# Patient Record
Sex: MALE | Race: WHITE | Employment: OTHER | URBAN - METROPOLITAN AREA
[De-identification: names, ages, dates, MRNs, and addresses within clinical notes are randomized per-mention and may not be internally consistent; named-entity substitution may affect disease eponyms.]

---

## 2016-10-18 LAB — CREATININE, EXTERNAL: 1.36

## 2017-01-03 ENCOUNTER — HOSPITAL ENCOUNTER (OUTPATIENT)
Dept: PHYSICAL THERAPY | Age: 67
Discharge: HOME | End: 2017-01-03
Payer: MEDICARE

## 2017-01-03 PROCEDURE — 97140 MANUAL THERAPY 1/> REGIONS: CPT

## 2017-01-04 NOTE — PROGRESS NOTES
[facility]  Physical Therapy Lymphedema Clinic  [address] [facility], [address] [place] [address], [facility] 22.    LYmphedema Therapy  Visit: 4    [x]                  Daily note               []                 30 day/10th visit progress note    NAME: Gertrudis Santana First  DATE: 1/3/2017    GOALS  Short term goals  Time frame: to meet by 1/20/2017  1. Patient will demonstrate knowledge of signs/symptoms of infections/cellulitis and be independent in skin care to prevent cellulitis. Continued education on prevention of infections/cellulitis and also basic skin care routines. Patient continues to have a large scab from his removal of skin cancer on his left anterior lower leg color around wound is improving. Patient declines to follow instructions for daily skin care to include lotion application. The scab continues to be black in color, but the scab thickness is decreasing. MD had recommended that he use Vaseline on the area. 2. Patient will demonstrate independence in lymphedema home program of therapeutic exercises to improve circulation and decongest limb to improve ADLs. Continued education on Hope and active range of motion exercises. Patient is performing the routines at home following the handouts. Goal Met 1/3/17. 3. Patient will tolerate multi-layer bandages (MLB) and show measureable decrease in limb volume to allow ordering of home compression system (daytime, nighttime garments and pump as needed). Patient stated that he would not feel he would do a good job with MLB and wanted to just work with the knee high stockings at this time. Patient may benefit from a pump trial in the future of a vaso-pneumatic pump, but did not show interest in this today. Patient has received knee high compression Sigvaris [address] from [employer].  The fit was good, but patient requested to order two more pairs of another brand that was able to be placed in the dryer. Order was send to [employer] for 2 pair of Jobst for Men Casual 20-30 mmHg in [place] size Large. Patient given Comperm F last visit  to wear on the L LE below the knee until his compression items arrived and he stated that it really helped his swelling. Patient now with all products ordered. Will return next week to have products assessed and prepare for discharge to restorative. Goal Met 1/3/17.     Long term goals  Time frame: 3/15/2017  1. Pt will show improvement in Lymphedema Life Impact Scale by decreasing impairment percentage to under 20% and thus allow improvement in patient's quality of life. Will have patient fill out the LLIS next visit. 2. Patient will be independent with don/doff of compression system and use in order to prevent reaccumulation of fluid at discharge. Patient was able to demonstrate don/doff of the compression garments today without difficulty. Goal Met 1/3/17. 3. Pt will be independent in self-MLD and show stable limb volumes showing decongestion and pt. ready for transition to independent restorative phase of lymphedema therapy. Will measure next visit after he has been wearing his compression garments. SUBJECTIVE REPORT: Patient arrived today with his first set of compression garments. He has not tried them on to date. Patient reports that the Comperm F did well for him and he feels that his legs are doing better. Patient reports doing his exercises and some of the Self MLD, but states he will not put lotion on as recommended. Patient stated that he would like to order more stockings. Patient feels that his condition is much improved. Pain: 0/10  Gait: Independent  ADLs: Modified Independent to Independent  Treatment Response:Patient has been using the Comperm F since last treatment and just received his first set of compression garments. Patient is eager to order more garments and this was done today through [employer].  Patient was educated in other options like night time garments and vasopneumatic pump and he was not interested in these compression options at this time. When patient returns for follow up next week if he has a good fit with his newest compression garments we will most likely discharge him as he does not appear to be fully compliant with all aspects of CDT at this time. Function: Patient works full time up to 50-60 hours a week. TREATMENT AND OBJECTIVE DATA SUMMARY:   Patient/Family Education:      Educated in skin care: Demonstrated skin care principles. See skin care section for details. Educated in exercise: Instructed in shelley ball and active range of motion exercises to date. Instructed in self MLD:   Written sequence given and reviewed with patient as well as demonstration and instruction during MLD portion of the session. Instructed in don/doff of compression system:   Multi layer bandage (MLB)/Compression donning principles and wear precautions were taught. Patient did not want to pursue bandaging. Patient received his Sigvaris [address] for Men (20-30 mmHg) size LS/Black from [employer]. The fit was good and he wore them out of the clinic today. He was given wear and care instructions. Patient did use the Comperm F that was given to him last visit and feels that his L LE is much better. Patient reports that he would like to order additional compression and requested to order two pair that could be washed and dried. Order was placed through [employer] for 2 pair of Jobst for Men Casual 20 mmHg-30 mmHg Khaki size Large that will be delivered to his home. Patient to return in a week to assess his volumes and the fit of his products and will most likely be discharged to the restorative phase of care at that time.       Therapeutic Activity 0 minutes   Treatment time: 0  Functional Mobility: Modified Independent to Independent   Fall risk: Low Therapeutic Exercise/Procedure 0 minutes   Treatment time: 0  Yolanda ball exercise program: Deferred today. Patient stated he has not received his Estil Gene to date. Stick exercise program: N/A   Free exercises/ROM: Able to demonstrate some knowledge of the lower extremity active range of motion routine. Patient reports that he is trying to be more active at work performing exercises through out the day and trying to walk more. Home program: Patient to perform daily to BID skin care, deep abdominal breathing, exercise routine, Self MLD and patient to wear his compression garments during the day and remove them at night. Patient stated that he did not feel that he would be able to self bandage and when shown the night time systems he also stated that he was not interested in them either at this time. Options of vaso-pneumatic pumps also given and he declined this as well at this time. Rationale: Exercise will increase the lymph angiomotoricity and tissue pressure of the skin and thus decrease swelling. Modalities 0 minutes   Treatment time: 0  Vasopneumatic pump: Will benefit from a pump trial in the future, but at this time patient is not interested in trial.       Manual Lymphatic Drainage (MLD) 35  minutes   Treatment time:8:05am-8:40am  Area to decongest: B LE (L > R)   Sequence used and effectiveness: Secondary Sequence B LE packet was given to patient for home use in a prior visit. He reports performing some of the packet at home. Omission of the lateral neck due to cardiac history. Skin/wound care/debridement: Patient has a scab on his L lower anterior leg that is improving. The redness has improved around the scab and patient stated that the MD wants him to apply Vaseline to the area daily. The thickness of the scab has improved from evaluation. Upper/Lower extremity compression: Multi layer bandage (MLB)/Compression donning principles and wear precautions were taught.  Patient did not want to pursue bandaging. Patient received his Sigvaris [address] for Men (20-30 mmHg) size LS/Black from [employer]. The fit was good and he wore them out of the clinic today. He was given wear and care instructions. Patient did use the Comperm F that was given to him last visit and feels that his L LE is much better. Patient reports that he would like to order additional compression and requested to order two pair that could be washed and dried. Order was placed through [employer] for 2 pair of Jobst for Men Casual 20 mmHg-30 mmHg Khaki size Large that will be delivered to his home. Patient to return in a week to assess his volumes and the fit of his products and will most likely be discharged to the restorative phase of care at that time. Kinesiotaping: Deferred    Girth/Volume measurement: Deferred today. Patient has just received his first set of compression. TOTAL TREATMENT 35 mins         ASSESSMENT:   Treatment effectiveness and tolerance: Patient has arrived with his first set of compression garments. Patient overall is improving, but does state that he is not willing to perform many aspects of his home program. Patient has ordered more compression and if the fit is good he may be potentially discharged to restorative soon. Patient to return next week for follow up and all of his assess goals to see if he is ready to discharge to restorative phase of care. Progress toward goals: See goal section of the note. MET STG 2+3 and LTG 3     PLAN OF CARE:   Changes to the plan of care: Patient to start wearing compression daily. Frequency: Once a week   Other: More compression garments were ordered from [employer].         Toni Rodriguez, PTA, CLT

## 2017-01-10 ENCOUNTER — HOSPITAL ENCOUNTER (OUTPATIENT)
Dept: PHYSICAL THERAPY | Age: 67
Discharge: HOME | End: 2017-01-10
Payer: MEDICARE

## 2017-01-10 VITALS — BODY MASS INDEX: 29.13 KG/M2 | HEIGHT: 68 IN | WEIGHT: 192.2 LBS

## 2017-01-10 PROCEDURE — G8992 OTHER PT/OT  D/C STATUS: HCPCS

## 2017-01-10 PROCEDURE — 97140 MANUAL THERAPY 1/> REGIONS: CPT

## 2017-01-10 PROCEDURE — G8991 OTHER PT/OT GOAL STATUS: HCPCS

## 2017-01-10 NOTE — PROGRESS NOTES
[facility]  Physical Therapy Lymphedema Clinic  [address] [facility], [address], [facility] 22.    LYmphedema Therapy  Visit: 5    []                  Daily note               [x]                 Discharge note with updated G-Codes     NAME: Gertrudis Maki  DATE: 1/10/2017    GOALS  Short term goals met:  2. Patient will demonstrate independence in lymphedema home program of therapeutic exercises to improve circulation and decongest limb to improve ADLs. Continued education on Hope and active range of motion exercises. Patient is performing the routines at home following the handouts. Goal Met 1/3/17. 3. Patient will tolerate multi-layer bandages (MLB) and show measureable decrease in limb volume to allow ordering of home compression system (daytime, nighttime garments and pump as needed). Patient stated that he would not feel he would do a good job with MLB and wanted to just work with the knee high stockings at this time. Patient may benefit from a pump trial in the future of a vaso-pneumatic pump, but did not show interest in this today. Patient has received knee high compression Sigvaris [address] from [employer]. The fit was good, but patient requested to order two more pairs of another brand that was able to be placed in the dryer. Order was send to [employer] for 2 pair of Jobst for Men Casual 20-30 mmHg in [place] size Large. Patient given Comperm F last visit  to wear on the L LE below the knee until his compression items arrived and he stated that it really helped his swelling. Patient now with all products ordered. Will return next week to have products assessed and prepare for discharge to restorative. Goal Met 1/3/17. Short term goals to meet by 1/20/2017:  1. Patient will demonstrate knowledge of signs/symptoms of infections/cellulitis and be independent in skin care to prevent cellulitis. Patient has been educated on skin care and signs and symptoms of cellulitis. Patient has not been fully compliant with this portion of CDT. Patient did arrive today with his skin in improved condition and no longer with scab over wound. Wound bed pink and healing. Patient reports that he has medication provided by MD to place on area. See skin care for details. Goal Met 1/10/17.         Long term goals met:  2. Patient will be independent with don/doff of compression system and use in order to prevent reaccumulation of fluid at discharge. Patient was able to demonstrate don/doff of the compression garments today without difficulty. Goal Met 1/3/17. Long term goals to meet by 3/15/2017:  1. Pt will show improvement in Lymphedema Life Impact Scale by decreasing impairment percentage to under 20% and thus allow improvement in patient's quality of life. Patient scored a 9 on the LLIS with a percent impairment of 13%. Goal Met 1/10/17     3. Pt will be independent in self-MLD and show stable limb volumes showing decongestion and pt. ready for transition to independent restorative phase of lymphedema therapy. Full volumes were taken today to reveal that patient had a decrease of 917 ml in the L LE and 723 ml in the R LE since evaluation. His percent difference has also decreased from 3.94% to 2.03%. Patient also has been performing his Self MLD packet at home. Goal Met 1/10/17. SUBJECTIVE REPORT: Patient arrived today stating that he has been doing much better and just received his Jobst compression stockings that were ordered last visit. Patient is ready for discharge to restorative today. Pain: 0/10  Gait: Independent  ADLs: Modified Independent to Independent  Treatment Response: Patient has been using his Sigvaris compression socks and now has new Jobst stockings that should be better for daily use as they can be dried in the dryer.  Patient feels that he is much improved from evaluation and feels that he can manage with his home program daily during the restorative phase. Patient continues to report that he will not be placing lotion on daily as recommended, but has been compliant with the rest of his home program.    Function: Patient works full time up to 50-60 hours a week. Lymphedema Life Impact Scale: Patient scored a 9 on the LLIS with a percent impairment of 13%. See scanned document. Weight: 188.8 lbs with out boots and 192.2 lbs with boots on. Down from evaluation weight of 197.4 lbs. TREATMENT AND OBJECTIVE DATA SUMMARY:   Patient/Family Education:      Educated in skin care: Demonstrated skin care principles. See skin care section for details. Educated in exercise: Instructed in yolanda ball and active range of motion exercises to date. Instructed in self MLD:   Written sequence given and reviewed with patient as well as demonstration and instruction during MLD portion of the session. Instructed in don/doff of compression system:   Multi layer bandage (MLB)/Compression donning principles and wear precautions were taught. Patient did not want to pursue bandaging. Patient has  Sigvaris [address] for Men (20-30 mmHg) size LS/Black from [employer]. He was given wear and care instructions. Patient ordered 2 pair of Jobst for Men Casual 20 mmHg-30 mmHg Khaki size Large and he brought them in today to be fitted. The fit is good and patient is comfortable in the products. Patient aware if he needs more compression items that he can order them through [employer]. Therapeutic Activity 0 minutes   Treatment time: 0  Functional Mobility: Modified Independent to Independent   Fall risk: Low     Therapeutic Exercise/Procedure 0 minutes   Treatment time: 0  Yolanda ball exercise program: Deferred today. Stick exercise program: N/A   Free exercises/ROM: Able to demonstrate some knowledge of the lower extremity active range of motion routine. Patient reports that he is trying to be more active at work performing exercises through out the day and trying to walk more. Home program: Patient to perform daily to BID skin care, deep abdominal breathing, exercise routine, Self MLD and patient to wear his compression garments during the day and remove them at night. Patient also to elevate legs during the day and evening as able. Patient stated that he did not feel that he would be able to self bandage and when shown the night time systems he also stated that he was not interested in them either at this time. Options of vaso-pneumatic pumps also given and he declined this as well at this time. Rationale: Exercise will increase the lymph angiomotoricity and tissue pressure of the skin and thus decrease swelling. Modalities 0 minutes   Treatment time: 0  Vasopneumatic pump: Will benefit from a pump trial in the future, but at this time patient is not interested in trial.       Manual Lymphatic Drainage (MLD) 50 minutes   Treatment time:8:10am-9:00am  Area to decongest: B LE (L > R)   Sequence used and effectiveness: Secondary Sequence B LE packet was given to patient for home use in a prior visit. He reports performing some of the packet at home. Omission of the lateral neck due to cardiac history. Skin/wound care/debridement: Patient now without scab and the wound is pink. The redness has gone from around the wound and patient stated that the MD wants him to apply Vaseline to the area daily. B LEs were cleansed with Dove soap using MLD principles. Patient also had neosporin placed on the L LE wound for protection. The wound is closed, but continues to be pink.      L LE wound today     L LE wound on 12/28/16 for comparison     L LE wound on evaluation on 12/19/16:      B LE's today     B LE's on 12/23/16 for comparison        B LE's 1/10/17    B LE's on evaluation 12/19/16       Upper/Lower extremity compression: Multi layer bandage (MLB)/Compression donning principles and wear precautions were taught. Patient did not want to pursue bandaging. Patient has  Sigvaris [address] for Men (20-30 mmHg) size LS/Black from [employer]. He was given wear and care instructions. Patient ordered 2 pair of Jobst for Men Casual 20 mmHg-30 mmHg Khaki size Large and he brought them in today to be fitted. The fit is good and patient is comfortable in the products. Patient aware if he needs more compression items that he can order them through [employer]. Kinesiotaping: Deferred    Girth/Volume measurement: Full volumes were taken today to reveal that patient had a decrease of 917 ml in the L LE and 723 ml in the R LE since evaluation. His percent difference has also decreased from 3.94% to 2.03%. Patient also has been performing his Self MLD packet at home. See scanned documents. TOTAL TREATMENT 50 mins         ASSESSMENT:   Treatment effectiveness and tolerance: Patient has met all of his goals to date and is ready to discharge to the restorative phase. Patient has lost volumes in both legs and is now feeling that he is able to manage his swelling with his home program and his compression items. Patient is aware that he needs to follow his daily home program and return in 6 months for follow up. Progress toward goals: See goal section for details. All STG/LTG goals were met. PLAN OF CARE:   Changes to the plan of care: Patient to start wearing compression daily. Frequency: Follow up in 6 months. Other: Patient aware that he can order more compression garments if needed. In compliance with CMSs Claims Based Outcome Reporting, the following G-code set was chosen for this patient based on their primary functional limitation being treated:     The outcome measure chosen to determine the severity of the functional limitation was the Lymphedema Life Impact Scale with a score of 9  which was correlated with a 13% impairment percent.      ? Other PT/OT Primary Functional Limitations:        - GOAL STATUS: CI - 1%-19% impaired, limited or restricted    - D/C STATUS: CI - 1%-19% impaired, limited or restricted     Toni C Patch, PTA, CLT  [employer], PT, [employer]

## 2017-01-24 ENCOUNTER — TELEPHONE (OUTPATIENT)
Dept: ENDOCRINOLOGY | Age: 67
End: 2017-01-24

## 2017-01-24 NOTE — TELEPHONE ENCOUNTER
Called and spoke with pt. His BUN/Cr were very elevated on lab draw from yesterday. He states his BP was low at Dr. Taniya Madison office yesterday. He did have some neck pain yesterday and had taken some BC powder prior to lab draw and has taken some of these in the past week. I told him to not take any more BC powder and just use Tylenol if he has any pain the next 2 days. I asked him to hold the metformin, lasix and lisinopril/hctz the next 2 days. He states he has been following a higher protein diet recently and I don't know if this is contributing to his worsening kidney function. States he was having lower blood sugars with losing weight so he stopped the victoza about a month ago. I told him to stay off this for now and just take the glipizide the next 2 days. We will repeat his kidney function at his visit in 2 days. he voiced understanding of this plan.

## 2017-01-26 ENCOUNTER — OFFICE VISIT (OUTPATIENT)
Dept: ENDOCRINOLOGY | Age: 67
End: 2017-01-26

## 2017-01-26 VITALS
HEIGHT: 68 IN | WEIGHT: 190 LBS | HEART RATE: 48 BPM | BODY MASS INDEX: 28.79 KG/M2 | SYSTOLIC BLOOD PRESSURE: 168 MMHG | DIASTOLIC BLOOD PRESSURE: 65 MMHG

## 2017-01-26 DIAGNOSIS — D50.9 IRON DEFICIENCY ANEMIA, UNSPECIFIED: ICD-10-CM

## 2017-01-26 DIAGNOSIS — E55.9 VITAMIN D DEFICIENCY: ICD-10-CM

## 2017-01-26 DIAGNOSIS — E11.9 CONTROLLED TYPE 2 DIABETES MELLITUS WITHOUT COMPLICATION, WITHOUT LONG-TERM CURRENT USE OF INSULIN (HCC): Primary | ICD-10-CM

## 2017-01-26 DIAGNOSIS — I10 ESSENTIAL HYPERTENSION, BENIGN: ICD-10-CM

## 2017-01-26 DIAGNOSIS — E78.5 HYPERLIPIDEMIA LDL GOAL <70: ICD-10-CM

## 2017-01-26 DIAGNOSIS — E66.9 OBESITY, CLASS I, BMI 30-34.9: ICD-10-CM

## 2017-01-26 RX ORDER — NEBIVOLOL HYDROCHLORIDE 10 MG/1
TABLET ORAL
Refills: 11 | COMMUNITY
Start: 2016-12-28 | End: 2018-01-31 | Stop reason: SDUPTHER

## 2017-01-26 RX ORDER — MELATONIN
2000
COMMUNITY
End: 2018-06-14

## 2017-01-26 NOTE — MR AVS SNAPSHOT
Visit Information Date & Time Provider Department Dept. Phone Encounter #  
 1/26/2017  8:50 AM Donny Kuhn MD [facility]  Follow-up Instructions Return in about 6 months (around 7/26/2017). Upcoming Health Maintenance Date Due Hepatitis C Screening 1950 DTaP/Tdap/Td series (1 - Tdap) 4/25/1972 FOBT Q 1 YEAR AGE 50-75 4/25/2001 ZOSTER VACCINE AGE 60> 4/25/2011 EYE EXAM RETINAL OR DILATED Q1 10/21/2015 GLAUCOMA SCREENING Q2Y 4/25/2016 Pneumococcal 65+ Low/Medium Risk (1 of 2 - PCV13) 4/25/2016 MEDICARE YEARLY EXAM 4/25/2016 INFLUENZA AGE 9 TO ADULT 8/1/2016 FOOT EXAM Q1 2/26/2017 MICROALBUMIN Q1 7/21/2017 HEMOGLOBIN A1C Q6M 7/23/2017 LIPID PANEL Q1 1/23/2018 Allergies as of 1/26/2017  Review Complete On: 1/26/2017 By: Donny Kuhn MD  
 No Known Allergies Current Immunizations  Never Reviewed No immunizations on file. Not reviewed this visit You Were Diagnosed With   
  
 Codes Comments Controlled type 2 diabetes mellitus without complication, without long-term current use of insulin ([facility]    -  Primary ICD-10-CM: E11.9 ICD-9-CM: 250.00 Obesity, Class I, BMI 30-34.9     ICD-10-CM: E66.9 ICD-9-CM: 278.00 Vitamin D deficiency     ICD-10-CM: E55.9 ICD-9-CM: 268.9 Iron deficiency anemia, unspecified     ICD-10-CM: D50.9 ICD-9-CM: 280.9 Essential hypertension, benign     ICD-10-CM: I10 
ICD-9-CM: 401.1 Hyperlipidemia LDL goal <70     ICD-10-CM: E78.5 ICD-9-CM: 272.4 Vitals BP Pulse Height(growth percentile) Weight(growth percentile) BMI Smoking Status 168/65 (BP 1 Location: Left arm, BP Patient Position: Sitting) (!) 48 5' 8\" (1.727 m) 190 lb (86.2 kg) 28.89 kg/m2 Former Smoker Vitals History BMI and BSA Data Body Mass Index Body Surface Area  
 28.89 kg/m 2 2.03 m 2 Preferred Pharmacy Pharmacy Name Phone [phone] Your Updated Medication List  
  
   
This list is accurate as of: 1/26/17  9:43 AM.  Always use your most recent med list.  
  
  
  
  
 allopurinol 100 mg tablet Commonly known as:  [place] Minor Take 200 mg by mouth nightly. amLODIPine 10 mg tablet Commonly known as:  Vane Hutchison Take  by mouth daily. BYSTOLIC 10 mg tablet Generic drug:  nebivolol TAKE TWO TABLETS BY MOUTH EVERY DAY  
  
 ferrous sulfate 325 mg (65 mg iron) Cper Take  by mouth two (2) times a day. FISH OIL 1,000 mg Cap Generic drug:  omega-3 fatty acids-vitamin e Take 3 Caps by mouth. FLOMAX 0.4 mg capsule Generic drug:  tamsulosin Take 0.4 mg by mouth daily. FOLTX 2.5-25-2 mg tablet Generic drug:  folic acid-vit V6-WFG C19 Take 1 Tab by mouth daily. furosemide 40 mg tablet Commonly known as:  LASIX Take 40 mg by mouth daily. glipiZIDE SR 2.5 mg CR tablet Commonly known as:  GLUCOTROL XL  
TAKE ONE (1) TABLET(S) ION Y *NEW LOWER DOSE REPLACES PRIOR SCRIPT ON FILE* LIPITOR 40 mg tablet Generic drug:  atorvastatin Take  by mouth daily. lisinopril-hydroCHLOROthiazide 20-12.5 mg per tablet Commonly known as:  Linda Schilder Take 2 Tabs by mouth daily. metFORMIN 1,000 mg tablet Commonly known as:  GLUCOPHAGE Take 1 Tab by mouth two (2) times daily (with meals). MINOXIDIL PO Take 10 mg by mouth. 1 tab in the morning and 1 whole tab at night OTHER Beta prostate 2 per day PLAVIX 75 mg Tab Generic drug:  clopidogrel Take  by mouth daily. UNIFINE PENTIPS PLUS 32 gauge x 5/32\" Ndle Generic drug:  Insulin Needles (Disposable) USE DAILY  
  
 VICTOZA 3-ЕКАТЕРИНА 0.6 mg/0.1 mL (18 mg/3 mL) sub-q pen Generic drug:  Liraglutide INJECT, SUBCUTANEOUSLY (1.8 MG) DAILY  
  
 VITAMIN D3 1,000 unit tablet Generic drug:  cholecalciferol Take  by mouth daily. We Performed the Following CBC W/O DIFF [64833 CPT(R)] CBC W/O DIFF [74398 CPT(R)] HEMOGLOBIN A1C WITH EAG [68804 CPT(R)] LIPID PANEL [68812 CPT(R)] METABOLIC PANEL, BASIC [50189 CPT(R)] METABOLIC PANEL, COMPREHENSIVE [28533 CPT(R)] MICROALBUMIN, UR, RAND W/ MICROALBUMIN/CREA RATIO [id number] CPT(R)] MN COLLECTION VENOUS BLOOD,VENIPUNCTURE [id number] CPT(R)] MN HANDLG&/OR CONVEY OF SPEC FOR TR OFFICE TO LAB [47331 CPT(R)] VITAMIN D, 25 HYDROXY [id number] CPT(R)] Follow-up Instructions Return in about 6 months (around 7/26/2017). To-Do List   
 01/31/2017 10:00 AM  
(Arrive by 9:30 AM) Appointment with Basilia Lim / Patrick Lomeli [facility] NON-INVASIVE CARD ([phone])  
  
 07/14/2017 8:00 AM  
  Appointment with Rocky Gonzales PT at [address] ([phone]) Patient Instructions 1) Your Hemoglobin A1c (3 month test of blood sugar) is very good at 6.1% and you have lost 13 lbs since 7/16. Stay off the metformin and the victoza until you hear back from me. Keep taking the glipizide in the morning. 2) I will repeat your creatinine (kidney test) today and call you tomorrow with a plan. Stay off the lasix and lisinopril/hctz until I speak with you tomorrow. 3) We will repeat your hemoglobin (red blood cell count) today. 4) Your vitamin D level is 18 which is lower than it was in July when it was 20. Goal is over 30. Please go back to taking 1000 units of vitamin D daily to keep your levels at goal. 
 
 
 
 
  
Introducing [facility]! Dear Joe Martin: Thank you for requesting a [email or web address] account. Our records indicate that you already have an active [email or web address] account. You can access your account anytime at [email or web address] Did you know that you can access your hospital and ER discharge instructions at any time in [email or web address]? You can also review all of your test results from your hospital stay or ER visit. Additional Information If you have questions, please visit the Frequently Asked Questions section of the [email or web address] website at [email or web address] Remember, [email or web address] is NOT to be used for urgent needs. For medical emergencies, dial 911. Now available from your iPhone and Android! Please provide this summary of care documentation to your next provider. Your primary care clinician is listed as Charlee Cohn. If you have any questions after today's visit, please call [phone].

## 2017-01-26 NOTE — PROGRESS NOTES
Chief Complaint   Patient presents with    Diabetes     pcp and pharmacy confirmed     History of Present Illness: Louis Rahman is a 73 y.o. male here for follow up of diabetes. Weight down 13 lbs since last visit in 7/16. Has been holding the lasix, metformin and lis/hctz after I called him 2 days ago when his creatinine was up. Also stopped taking any more BC powder. Took topamax for about 2-3 weeks but felt more depressed with starting this so he stopped. About 40 days ago started nutrimost program and was given some supplements and he stopped these when we spoke 2 days ago also. He told me that when he started the nutrimost he started having lower sugars in the 80s so he stopped the victoza about a month ago and was just taking metformin and glipizide until stopping the metformin 2 days ago. Hasn't checked his sugar the past 2 days. Did have an episode of low BP and low HR at home prior to seeing Dr. Donavan Piña in his office 2 days ago. Was told that his BP was up at Dr. Donavan Piña office around [phone] and will be getting a heart monitor next week. Also took a few doses of tramadol but felt more sleepy on this so he has been off this. Has been off the vitamin D recently. Current Outpatient Prescriptions   Medication Sig    glipiZIDE SR (GLUCOTROL) 2.5 mg CR tablet TAKE ONE (1) TABLET(S) ION Y *NEW LOWER DOSE REPLACES PRIOR SCRIPT ON FILE*    VICTOZA 3-ЕКАТЕРИНА 0.6 mg/0.1 mL (18 mg/3 mL) sub-q pen INJECT, SUBCUTANEOUSLY (1.8 MG) DAILY    UNIFINE PENTIPS PLUS 32 gauge x 5/32\" ndle USE DAILY    OTHER Beta prostate 2 per day    amLODIPine (NORVASC) 10 mg tablet Take  by mouth daily.  ferrous sulfate 325 mg (65 mg iron) cpER Take  by mouth two (2) times a day.  omega-3 fatty acids-vitamin e (FISH OIL) 1,000 mg cap Take 3 Caps by mouth.  MINOXIDIL PO Take 10 mg by mouth. 1 tab in the morning and 1 whole tab at night    atorvastatin (LIPITOR) 40 mg tablet Take  by mouth daily.     folic acid-vit b6-vit b12 (FOLTX) 2.5-25-2 mg tablet Take 1 Tab by mouth daily.  tamsulosin (FLOMAX) 0.4 mg capsule Take 0.4 mg by mouth daily.  clopidogrel (PLAVIX) 75 mg tablet Take  by mouth daily.  allopurinol (ZYLOPRIM) 100 mg tablet Take 200 mg by mouth nightly.  BYSTOLIC 10 mg tablet TAKE TWO TABLETS BY MOUTH EVERY DAY    metFORMIN (GLUCOPHAGE) 1,000 mg tablet Take 1 Tab by mouth two (2) times daily (with meals).  furosemide (LASIX) 40 mg tablet Take 40 mg by mouth daily.  lisinopril-hydrochlorothiazide (PRINZIDE, ZESTORETIC) 20-12.5 mg per tablet Take 2 Tabs by mouth daily. No current facility-administered medications for this visit. No Known Allergies     Review of Systems:  - Eyes: no blurry vision or double vision  - Cardiovascular: no chest pain  - Respiratory: no shortness of breath  - Musculoskeletal: no myalgias  - Neurological: no numbness/tingling in extremities    Physical Examination:  Blood pressure 168/65, pulse (!) 48, height 5' 8\" (1.727 m), weight 190 lb (86.2 kg).  Repeat manually 162/60 in left arm.  - General: pleasant, no distress, good eye contact   - Neck: no carotid bruits  - Cardiovascular: regular, normal rate, nl s1 and s2, no m/r/g,   - Respiratory: clear bilaterally  - Integumentary: 1-2+ pitting edema,   - Psychiatric: normal mood and affect    Data Reviewed:   Component      Latest Ref Rng & Units 1/23/2017 1/23/2017 1/23/2017 1/23/2017           8:10 AM  8:10 AM  8:10 AM  8:10 AM   Glucose      65 - 99 mg/dL 95      BUN      8 - 27 mg/dL 73 (H)      Creatinine      0.76 - 1.27 mg/dL 2.37 (H)      GFR est non-AA      >59 mL/min/1.73 28 (L)      GFR est AA      >59 mL/min/1.73 32 (L)      BUN/Creatinine ratio      10 - 22 31 (H)      Sodium      134 - 144 mmol/L 140      Potassium      3.5 - 5.2 mmol/L 5.8 (H)      Chloride      96 - 106 mmol/L 102      CO2      18 - 29 mmol/L 21      Calcium      8.6 - 10.2 mg/dL 9.6      Protein, total      6.0 - 8.5 g/dL 6.5 Albumin      3.6 - 4.8 g/dL 4.0      GLOBULIN, TOTAL      1.5 - 4.5 g/dL 2.5      A-G Ratio      1.1 - 2.5 1.6      Bilirubin, total      0.0 - 1.2 mg/dL <0.2      Alk. phosphatase      39 - 117 IU/L 81      AST      0 - 40 IU/L 20      ALT      0 - 44 IU/L 22      Cholesterol, total      100 - 199 mg/dL  171     Triglyceride      0 - 149 mg/dL  268 (H)     HDL Cholesterol      >39 mg/dL  32 (L)     VLDL, calculated      5 - 40 mg/dL  54 (H)     LDL, calculated      0 - 99 mg/dL  85     Hemoglobin A1c, (calculated)      4.8 - 5.6 %   6.1 (H)    Estimated average glucose      mg/dL   128    VITAMIN D, 25-HYDROXY      30.0 - 100.0 ng/mL    18.3 (L)       Assessment/Plan:     1. DM w/o complication type II, controlled: his most recent Hgb A1c was 6.1% in 1/17 down from 6.5% in 7/16 down from 6.6% in 2/16 up from 6% in 9/15 down from 6.4% in 4/15 up from 6.3% in 12/14 down from 7.6% in 8/14 up from 8.3% in 3/14 up from 6.9% in November up from 6.3% in July down from 7.3% in Feb 2013 up from 6.7% in October down from 7% in June down from 8.3% in March up from 7.3% in December down from 8% in September down from 9.5% in June, which is the same as in January. It's possible his A1c was falsely lower in Oct 2012 due to transfusion of 3 units PRBCs in September as his level was back up in Feb 2013 but his Hgb has been stable since then so the next 2 values that were <7% reflect good diabetes control. A1c remains very well controlled but his creatinine is elevated so currently I have him off metformin. Has been off victoza for 30 days with weight loss and will make a decision about whether to go back on this based on repeat creatinine today.   - Victoza 1.8 mg daily on hold  - cont Metformin 1g bid on hold  - cont glipizide SR 2.5 mg daily  - check bs once daily at alternating times  - foot exam done 2/16  - pablito BENDER spring 2016--will obtain report  - microalbumin 994 1/11 down to 897 9/11 up to 1383 in 10/12 (possibly due to protein shake) and down to 1083 in 2/13, stable at 1087 in 11/13, down to 453 in 4/15, up to 3402 in 7/16  - check A1c and cmp prior to next visit     2. Unspecified essential hypertension (401.9) his BP was above goal < 140/90 on the right arm which is normally his higher arm. We have now learned that he has HTN on his right arm so we will only use from now on to measure his readings. He is off lasix and lis/hctz while creatinine elevated so will repeat today  - lisinopril/hctz 20/12.5 mg 2 tabs daily on hold  - cont bystolic 10 mg 2 tabs daily  - cont amlodipine 10 mg daily  - cont minoxidil 10 mg bid   - lasix 40 mg daily on hold  - repeat bmp today     3. Other and unspecified hyperlipidemia (272.4) Given DM and CAD, Goal LDL < 70, non-HDL < 100, and TG < 150.  his lipids were all at goal in 3/14 and 12/14 and 4/15. LDL up to 84 in 2/16 due to more red meat.  and LDL 95 in 7/16 off the niaspan and with weight gain so hopefully weight loss will help. LDL 85 and  in 1/17.  - cont lipitor 40 mg daily  - check lipids prior to next visit     4. Iron deficiency anemia:  Hgb 11.3 in 7/16 up from 10.8 in 2/16 down from 11.4 in 9/15 up from 10.9 in 4/15 down from 11.4 in 12/14 down from 11.6 in 8/14 up from 11.1 in 3/14 from 12.5 in 11/13 up from 11.8 in 7/13. Has been on higher dose of iron 1 tab bid since 8/14   - cont iron twice daily  - check cbc w/o diff today and prior to next visit    5. Vitamin D deficiency: level was 29 in 9/15 on 2000 units of D3 daily so wanted to increase to 3000 units daily but his calcium was 10.6 in 9/15 and Dr. Megan Sexton advised cutting back on his dose and he has been taking 1000 units daily and level down to 19 in 2/16 but calcium back to normal at 9.6. Level 20 in 7/16 but calcium 10.2 in 7/16. Now off 1000 units and level down to 18 in 1/17 so will restart this  - cont vitamin D 1000 units daily  - check Vitamin D 25-OH level prior to next visit    6.   Class I Obesity: Tried topamax as I wanted to avoid phentermine given his vascular disease but didn't feel well on this so stopped after 2-3 weeks. Has lost 13 lbs from 7/16 to 1/17 with doing a nutrimost program but has stopped this 2 days ago when his creatinine was high and I don't know if this could have contributed to higher creatinine.  - diet and exercise        Patient Instructions   1) Your Hemoglobin A1c (3 month test of blood sugar) is very good at 6.1% and you have lost 13 lbs since 7/16. Stay off the metformin and the victoza until you hear back from me. Keep taking the glipizide in the morning. 2) I will repeat your creatinine (kidney test) today and call you tomorrow with a plan. Stay off the lasix and lisinopril/hctz until I speak with you tomorrow. 3) We will repeat your hemoglobin (red blood cell count) today. 4) Your vitamin D level is 18 which is lower than it was in July when it was 20. Goal is over 30. Please go back to taking 1000 units of vitamin D daily to keep your levels at goal.          Follow-up Disposition:  Return in about 6 months (around 7/26/2017).     Copy sent to:  Dr. Haydee Pérez [phone]  Dr. Donte Marquez [phone]  Dr. Yodit Alvarado [phone]  Dr. Berta Shea [phone]  Dr. Pennie Menezes    Lab follow up: 1/28/17  Component      Latest Ref Rng & Units 1/26/2017 1/26/2017           9:46 AM  9:46 AM   Glucose      65 - 99 mg/dL  155 (H)   BUN      8 - 27 mg/dL  57 (H)   Creatinine      0.76 - 1.27 mg/dL  1.67 (H)   GFR est non-AA      >59 mL/min/1.73  42 (L)   GFR est AA      >59 mL/min/1.73  49 (L)   BUN/Creatinine ratio      10 - 22  34 (H)   Sodium      134 - 144 mmol/L  142   Potassium      3.5 - 5.2 mmol/L  5.3 (H)   Chloride      96 - 106 mmol/L  103   CO2      18 - 29 mmol/L  18   Calcium      8.6 - 10.2 mg/dL  9.3   WBC      3.4 - 10.8 x10E3/uL 7.1    RBC      4.14 - 5.80 x10E6/uL 3.47 (L)    HGB      12.6 - 17.7 g/dL 10.6 (L)    HCT      37.5 - 51.0 % 32.4 (L)    MCV 79 - 97 fL 93    MCH      26.6 - 33.0 pg 30.5    MCHC      31.5 - 35.7 g/dL 32.7    RDW      12.3 - 15.4 % 16.6 (H)    PLATELET      924 - 647 x10E3/uL 269      Called pt with lab results at 6:30pm on 1/27/17 and sent him the following message through Jaxtr with a plan:  Per our phone conversation:  1) Your BUN and creatinine are markers of kidney function. Your values are abnormal but have improved from 2 days ago. 2) Please remain off the victoza and metformin for now and just continue glipizide for your diabetes. 3) Restart lisinopril/hctz 1 tab daily and lasix 40 mg 1 tab daily. 4) Go to [facility] on Thursday 2/2/17 and have your labs repeated and I'll be in touch with the results. I have placed an order in the computer so you can go directly to [facility] to have these drawn. 5) Your hemoglobin (red blood cell count) is low at 10.6 but stable in the 10-11 range so your anemia is not any worse. Lab follow up: 2/3/17  Component      Latest Ref Rng & Units 2/2/2017           7:53 AM   Glucose      65 - 99 mg/dL 182 (H)   BUN      8 - 27 mg/dL 46 (H)   Creatinine      0.76 - 1.27 mg/dL 1.25   GFR est non-AA      >59 mL/min/1.73 60   GFR est AA      >59 mL/min/1.73 69   BUN/Creatinine ratio      10 - 22 37 (H)   Sodium      134 - 144 mmol/L 142   Potassium      3.5 - 5.2 mmol/L 4.6   Chloride      96 - 106 mmol/L 103   CO2      18 - 29 mmol/L 24   Calcium      8.6 - 10.2 mg/dL 10.3 (H)     Sent him the following message through eyefactive:    BUN and creatinine are markers of kidney function. Your creatinine is back to normal and your BUN is coming down so I think your current doses of lasix one tab daily and lisinopril/hctz one tab daily look good and I would recommend staying on these doses.

## 2017-01-26 NOTE — PATIENT INSTRUCTIONS
1) Your Hemoglobin A1c (3 month test of blood sugar) is very good at 6.1% and you have lost 13 lbs since 7/16. Stay off the metformin and the victoza until you hear back from me. Keep taking the glipizide in the morning. 2) I will repeat your creatinine (kidney test) today and call you tomorrow with a plan. Stay off the lasix and lisinopril/hctz until I speak with you tomorrow. 3) We will repeat your hemoglobin (red blood cell count) today. 4) Your vitamin D level is 18 which is lower than it was in July when it was 20. Goal is over 30.   Please go back to taking 1000 units of vitamin D daily to keep your levels at goal.

## 2017-01-27 LAB
BUN SERPL-MCNC: 57 MG/DL (ref 8–27)
BUN/CREAT SERPL: 34 (ref 10–22)
CALCIUM SERPL-MCNC: 9.3 MG/DL (ref 8.6–10.2)
CHLORIDE SERPL-SCNC: 103 MMOL/L (ref 96–106)
CO2 SERPL-SCNC: 18 MMOL/L (ref 18–29)
CREAT SERPL-MCNC: 1.67 MG/DL (ref 0.76–1.27)
ERYTHROCYTE [DISTWIDTH] IN BLOOD BY AUTOMATED COUNT: 16.6 % (ref 12.3–15.4)
GLUCOSE SERPL-MCNC: 155 MG/DL (ref 65–99)
HCT VFR BLD AUTO: 32.4 % (ref 37.5–51)
HGB BLD-MCNC: 10.6 G/DL (ref 12.6–17.7)
INTERPRETATION: NORMAL
MCH RBC QN AUTO: 30.5 PG (ref 26.6–33)
MCHC RBC AUTO-ENTMCNC: 32.7 G/DL (ref 31.5–35.7)
MCV RBC AUTO: 93 FL (ref 79–97)
PLATELET # BLD AUTO: 269 X10E3/UL (ref 150–379)
POTASSIUM SERPL-SCNC: 5.3 MMOL/L (ref 3.5–5.2)
RBC # BLD AUTO: 3.47 X10E6/UL (ref 4.14–5.8)
SODIUM SERPL-SCNC: 142 MMOL/L (ref 134–144)
WBC # BLD AUTO: 7.1 X10E3/UL (ref 3.4–10.8)

## 2017-01-31 ENCOUNTER — HOSPITAL ENCOUNTER (OUTPATIENT)
Dept: NON INVASIVE DIAGNOSTICS | Age: 67
Discharge: HOME | End: 2017-01-31
Attending: INTERNAL MEDICINE
Payer: MEDICARE

## 2017-01-31 DIAGNOSIS — R00.1 SLOW HEART RATE: ICD-10-CM

## 2017-01-31 PROCEDURE — 93225 XTRNL ECG REC<48 HRS REC: CPT

## 2017-02-02 NOTE — PROCEDURES
Wood Pardo,  [facility].       Name:  Niko Garcia   MR#:  [id number]   :  1950   Account #:  [de-identified]        Date of Adm:  2017       PROCEDURE: Holter monitor. INDICATION: Bradycardia. DESCRIPTION OF PROCEDURE: Holter monitor was carried out   between  and 2017. The patient did not return a diary. During the recording, the patient had isolated premature ventricular   contractions numbering 131. The minimum heart rate was 43 beats per   minute. The maximum heart rate was 95 beats per minute. There were   isolated atrial premature contractions. There were 6 couplets. Isolated   premature ventricular contractions with occasional bigeminy (3). SUMMARY   1. Regular sinus rhythm is observed. 2. Sinus bradycardia with rate of 43 is the minimum heart rate. 3. Isolated premature ventricular contractions. 4. Isolated atrial premature contractions. 5. No significant pauses (1.96 maximal pause).         MD BRYSON Fernando / Lenny.Cue   D:  2017   08:03   T:  2017   11:59   Job #:  [id number]     [facility]

## 2017-02-03 LAB
BUN SERPL-MCNC: 46 MG/DL (ref 8–27)
BUN/CREAT SERPL: 37 (ref 10–22)
CALCIUM SERPL-MCNC: 10.3 MG/DL (ref 8.6–10.2)
CHLORIDE SERPL-SCNC: 103 MMOL/L (ref 96–106)
CO2 SERPL-SCNC: 24 MMOL/L (ref 18–29)
CREAT SERPL-MCNC: 1.25 MG/DL (ref 0.76–1.27)
GLUCOSE SERPL-MCNC: 182 MG/DL (ref 65–99)
POTASSIUM SERPL-SCNC: 4.6 MMOL/L (ref 3.5–5.2)
SODIUM SERPL-SCNC: 142 MMOL/L (ref 134–144)

## 2017-02-21 ENCOUNTER — HOSPITAL ENCOUNTER (OUTPATIENT)
Dept: MRI IMAGING | Age: 67
Discharge: HOME | End: 2017-02-21
Attending: INTERNAL MEDICINE
Payer: MEDICARE

## 2017-02-21 DIAGNOSIS — I70.1 RENAL ARTERY STENOSIS (HCC): ICD-10-CM

## 2017-02-21 PROCEDURE — A9585 GADOBUTROL INJECTION: HCPCS | Performed by: INTERNAL MEDICINE

## 2017-02-21 PROCEDURE — 74185 MRA ABD W OR W/O CNTRST: CPT

## 2017-02-21 PROCEDURE — 74011250636 HC RX REV CODE- 250/636: Performed by: INTERNAL MEDICINE

## 2017-02-21 RX ADMIN — GADOBUTROL 10 ML: 604.72 INJECTION INTRAVENOUS at 08:55

## 2017-03-29 ENCOUNTER — OFFICE VISIT (OUTPATIENT)
Dept: SURGERY | Age: 67
End: 2017-03-29

## 2017-03-29 VITALS
BODY MASS INDEX: 29.89 KG/M2 | WEIGHT: 197.2 LBS | RESPIRATION RATE: 16 BRPM | SYSTOLIC BLOOD PRESSURE: 180 MMHG | HEIGHT: 68 IN | OXYGEN SATURATION: 16 % | DIASTOLIC BLOOD PRESSURE: 51 MMHG | HEART RATE: 53 BPM

## 2017-03-29 DIAGNOSIS — R16.0 LIVER MASS: Primary | ICD-10-CM

## 2017-03-29 DIAGNOSIS — R93.2 ABNORMAL FINDINGS ON DIAGNOSTIC IMAGING OF LIVER AND BILIARY TRACT: ICD-10-CM

## 2017-03-29 DIAGNOSIS — E27.8 ADRENAL MASS (HCC): ICD-10-CM

## 2017-03-29 DIAGNOSIS — K86.2 PANCREAS CYST: ICD-10-CM

## 2017-03-29 NOTE — PROGRESS NOTES
Surgery Consult:  Liver lesion  Requesting physician:  Dr. Morteza Dodd:   Patient 73 y.o.  male presents with abnormal MRI. Patient underwent MRA to evaluate for renal artery stenosis and patient had multiple incidental findings. Patient had MRA on 2/21/17 and it showed:  50% stenosis of the superior left renal artery; duplicated bilateral renal arteries (normal variant); AAA (34 mm) post aortobiiliac bypass graft; indeterminate hypervascular right lobe hepatic lesion; indeterminate right adrenal mass (19 mm). Patient denies any abdominal pain. No nausea or vomiting. No change in bowel habits. No diarrhea or constipation. No history of jaundice or pancreatitis. Patient used to drink heavily, but now drinks socially. Patient with HTN and DM. Labs from 1/23/17 showed normal LFTs. Cr was 2.37. Denies any palpitation, night sweats, weight loss.       Past Medical & Surgical History:  Past Medical History:   Diagnosis Date    BPH (benign prostatic hypertrophy)     CAD (coronary artery disease)     s/p stents    GERD (gastroesophageal reflux disease)     Gout     Other and unspecified hyperlipidemia     PVD [facility]peripheral vascular disease) ([facility])     s/p PTCA of left femoral artery in July 2014    Type II or unspecified type diabetes mellitus without mention of complication, uncontrolled     Unspecified essential hypertension     Unspecified vitamin D deficiency       Past Surgical History:   Procedure Laterality Date    CARDIAC SURG PROCEDURE UNLIST      HX KNEE ARTHROSCOPY      right x2, left x1    VASCULAR SURGERY PROCEDURE UNLIST      aorto-bifem bypass    VASCULAR SURGERY PROCEDURE UNLIST      left carotid endarterectomy    VASCULAR SURGERY PROCEDURE UNLIST      right femoral PTCA       Social History:  Social History     Social History    Marital status: SINGLE     Spouse name: N/A    Number of children: N/A    Years of education: N/A     Occupational History    Not on file.     Social History Main Topics    Smoking status: Former Smoker     Packs/day: 2.00     Years: 25.00     Quit date: 3/11/1991    Smokeless tobacco: Never Used    Alcohol use Yes      Comment: 5 beers on a Friday night    Drug use: No    Sexual activity: Not on file     Other Topics Concern    Not on file     Social History Narrative    Lives in [place] with wife of 28 years. Has 1 son and 1 daughter and 4 step-children. 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 Family History:  Family History   Problem Relation Age of Onset   Jeancarlos Rahman Diabetes Mother     Heart Disease Mother     Heart Disease Maternal Grandmother     Diabetes Daughter      gestational   Jeancarlos Rahman Diabetes Sister     Stroke Sister     Cancer Father     Hypertension Father         Medications:  Current Outpatient Prescriptions   Medication Sig    BYSTOLIC 10 mg tablet TAKE TWO TABLETS BY MOUTH EVERY DAY    cholecalciferol (VITAMIN D3) 1,000 unit tablet Take  by mouth daily.  glipiZIDE SR (GLUCOTROL) 2.5 mg CR tablet TAKE ONE (1) TABLET(S) ION Y *NEW LOWER DOSE REPLACES PRIOR SCRIPT ON FILE*    UNIFINE PENTIPS PLUS 32 gauge x 5/32\" ndle USE DAILY    OTHER Beta prostate 2 per day    amLODIPine (NORVASC) 10 mg tablet Take  by mouth daily.  furosemide (LASIX) 40 mg tablet Take 40 mg by mouth daily.  ferrous sulfate 325 mg (65 mg iron) cpER Take  by mouth two (2) times a day.  omega-3 fatty acids-vitamin e (FISH OIL) 1,000 mg cap Take 3 Caps by mouth.  MINOXIDIL PO Take 10 mg by mouth. 1 tab in the morning and 1 whole tab at night    lisinopril-hydrochlorothiazide (PRINZIDE, ZESTORETIC) 20-12.5 mg per tablet Take 1 Tab by mouth daily. Dose change 1/27/17--updated med list--did not send prescription to the pharmacy    atorvastatin (LIPITOR) 40 mg tablet Take  by mouth daily.     folic acid-vit S3-TVR O02 (FOLTX) 2.5-25-2 mg tablet Take 1 Tab by mouth daily.  tamsulosin (FLOMAX) 0.4 mg capsule Take 0.4 mg by mouth daily.  clopidogrel (PLAVIX) 75 mg tablet Take  by mouth daily.  allopurinol (ZYLOPRIM) 100 mg tablet Take 200 mg by mouth nightly. No current facility-administered medications for this visit. Allergies:  No Known Allergies    Review of Systems  A comprehensive review of systems was negative except for that written in the HPI. Objective:     Exam:    Visit Vitals    /51 (BP 1 Location: Left arm, BP Patient Position: Sitting)    Pulse (!) 53    Resp 16    Ht 5' 8\" (1.727 m)    Wt 89.4 kg (197 lb 3.2 oz)    SpO2 (!) 16%    BMI 29.98 kg/m2     General appearance: alert, cooperative, no distress, appears stated age  Eyes: no sclera icterus  Lungs: clear to auscultation bilaterally  Heart: regular rate and rhythm  Abdomen: soft, non-tender. Non-distended. Extremities: extremities normal, atraumatic, no cyanosis or edema. BELINDA. Skin: Skin color, texture, turgor normal. No rashes or lesions. No jaundice. Neurologic: Grossly normal    Assessment:     Liver lesion  Adrenal mass  Pancreatic cyst    Plan:     Check labs to r/o functioning adrenal mass. Liver protocol CT scan to further evaluate the liver, pancreatic and adrenal mass. RTC after above studies.

## 2017-03-29 NOTE — MR AVS SNAPSHOT
Visit Information Date & Time Provider Department Dept. Phone Encounter #  
 3/29/2017  9:00 AM Courtney Stringer MD [facility] [place] [id number] Your Appointments 7/27/2017  9:10 AM  
Follow Up with MD Carlo Harper Diabetes and Endocrinology [address] [facility]) Appt Note: f/u       dm      6 month  
 [address] [facility] [address] [address] [address] [facility] Upcoming Health Maintenance Date Due Hepatitis C Screening 1950 DTaP/Tdap/Td series (1 - Tdap) 4/25/1972 FOBT Q 1 YEAR AGE 50-75 4/25/2001 ZOSTER VACCINE AGE 60> 4/25/2011 EYE EXAM RETINAL OR DILATED Q1 10/21/2015 GLAUCOMA SCREENING Q2Y 4/25/2016 Pneumococcal 65+ Low/Medium Risk (1 of 2 - PCV13) 4/25/2016 MEDICARE YEARLY EXAM 4/25/2016 INFLUENZA AGE 9 TO ADULT 8/1/2016 FOOT EXAM Q1 2/26/2017 MICROALBUMIN Q1 7/21/2017 HEMOGLOBIN A1C Q6M 7/23/2017 LIPID PANEL Q1 1/23/2018 Allergies as of 3/29/2017  Review Complete On: 3/29/2017 By: Alvaro Woods No Known Allergies Current Immunizations  Never Reviewed No immunizations on file. Not reviewed this visit You Were Diagnosed With   
  
 Codes Comments Ad[facility]    -  Primary ICD-10-CM: E27.9 ICD-9-CM: 255.9 Liver mass     ICD-10-CM: R16.0 ICD-9-CM: 573.9 Abnormal findings on diagnostic imaging of liver and biliary tract     ICD-10-CM: R93.2 ICD-9-CM: 793.3 Vitals BP Pulse Resp Height(growth percentile) Weight(growth percentile) SpO2  
 180/51 (BP 1 Location: Left arm, BP Patient Position: Sitting) (!) 53 16 5' 8\" (1.727 m) 197 lb 3.2 oz (89.4 kg) (!) 16% BMI Smoking Status 29.98 kg/m2 Former Smoker BMI and BSA Data Body Mass Index Body Surface Area  
 29.98 kg/m 2 2.07 m 2 Your Updated Medication List  
  
 This list is accurate as of: 3/29/17  9:45 AM.  Always use your most recent med list.  
  
  
  
  
 allopurinol 100 mg tablet Commonly known as:  Gene Deadayanna Take 200 mg by mouth nightly. amLODIPine 10 mg tablet Commonly known as:  Jo-Ann Croon Take  by mouth daily. BYSTOLIC 10 mg tablet Generic drug:  nebivolol TAKE TWO TABLETS BY MOUTH EVERY DAY  
  
 ferrous sulfate 325 mg (65 mg iron) Cper Take  by mouth two (2) times a day. FISH OIL 1,000 mg Cap Generic drug:  omega-3 fatty acids-vitamin e Take 3 Caps by mouth. FLOMAX 0.4 mg capsule Generic drug:  tamsulosin Take 0.4 mg by mouth daily. FOLTX 2.5-25-2 mg tablet Generic drug:  folic acid-vit P9-QNH J51 Take 1 Tab by mouth daily. furosemide 40 mg tablet Commonly known as:  LASIX Take 40 mg by mouth daily. glipiZIDE SR 2.5 mg CR tablet Commonly known as:  GLUCOTROL XL  
TAKE ONE (1) TABLET(S) ION Y *NEW LOWER DOSE REPLACES PRIOR SCRIPT ON FILE* LIPITOR 40 mg tablet Generic drug:  atorvastatin Take  by mouth daily. lisinopril-hydroCHLOROthiazide 20-12.5 mg per tablet Commonly known as:  Andrei Renaldo Take 1 Tab by mouth daily. Dose change 1/27/17--updated med list--did not send prescription to the pharmacy MINOXIDIL PO Take 10 mg by mouth. 1 tab in the morning and 1 whole tab at night OTHER Beta prostate 2 per day PLAVIX 75 mg Tab Generic drug:  clopidogrel Take  by mouth daily. UNIFINE PENTIPS PLUS 32 gauge x 5/32\" Ndle Generic drug:  Insulin Needles (Disposable) USE DAILY  
  
 VITAMIN D3 1,000 unit tablet Generic drug:  cholecalciferol Take  by mouth daily. We Performed the Following ALDOSTERONE [id number] CPT(R)] METANEPHRINES, PLASMA [84884 CPT(R)] RENIN, PLASMA (2 SPECIMENS) [78100 CPT(R)] To-Do List   
 03/29/2017   Imaging:  CT ABD PELV W WO CONT   
  
 03/30/2017 8:00 AM  
 Appointment with [address] 2 at [facility] RAD CT ([phone]) CONTRAST STUDY: 1. The patient should not eat solid food four hours before the appointment but should be encouraged to drink clear liquids. 2.  If you have to drink oral contrast, please pick it up any weekday prior to your appointment, if you cannot please check in 2 hrs before appt time. 3.  The patient will require IV access for contrast administration. 4.  The patient should not take Ibuprofen (Advil, Motrin, etc.) and Naproxen Sodium (Aleve, etc.)  on the day of the exam. Stopping non-steroidal anti-inflammatory agents (NSAIDs) like Ibuprofen decreases the risk of kidney damage from the x-ray contrast (dye). 5.  Bring any non [facility] facility films/images pertaining to the area of interest with you on the day of appointment. 6.  Bring current lab work if available (within last 90 days CMP) ***If scheduled at [facility], iSTAT is not available, labs will need to be done before appointment*** 7. Check in at registration at least 30 minutes before appt time unless you were instructed to do otherwise.  
  
 07/14/2017 8:00 AM  
  Appointment with Demetrio Diamond PT at [address] ([phone]) [facility]! Dear Hannah Hardin: Thank you for requesting a Kout account. Our records indicate that you already have an active Kout account. You can access your account anytime at [email or web address] Did you know that you can access your hospital and ER discharge instructions at any time in Kout? You can also review all of your test results from your hospital stay or ER visit. Additional Information If you have questions, please visit the Frequently Asked Questions section of the Kout website at [email or web address] Remember, Kout is NOT to be used for urgent needs. For medical emergencies, dial 911. Now available from your iPhone and Android! Please provide this summary of care documentation to your next provider. Your primary care clinician is listed as Martha Joshi. If you have any questions after today's visit, please call [phone].

## 2017-03-30 ENCOUNTER — HOSPITAL ENCOUNTER (OUTPATIENT)
Dept: CT IMAGING | Age: 67
Discharge: HOME | End: 2017-03-30
Attending: SURGERY
Payer: MEDICARE

## 2017-03-30 VITALS
OXYGEN SATURATION: 97 % | SYSTOLIC BLOOD PRESSURE: 174 MMHG | RESPIRATION RATE: 16 BRPM | HEART RATE: 53 BPM | DIASTOLIC BLOOD PRESSURE: 56 MMHG

## 2017-03-30 DIAGNOSIS — R93.2 ABNORMAL FINDINGS ON DIAGNOSTIC IMAGING OF LIVER AND BILIARY TRACT: ICD-10-CM

## 2017-03-30 DIAGNOSIS — E27.8 ADRENAL MASS (HCC): ICD-10-CM

## 2017-03-30 LAB
CREAT BLD-MCNC: 1.5 MG/DL (ref 0.6–1.3)
CREAT BLD-MCNC: 1.6 MG/DL (ref 0.6–1.3)

## 2017-03-30 PROCEDURE — 82565 ASSAY OF CREATININE: CPT

## 2017-03-30 PROCEDURE — 74011250636 HC RX REV CODE- 250/636: Performed by: RADIOLOGY

## 2017-03-30 RX ORDER — SODIUM CHLORIDE 0.9 % (FLUSH) 0.9 %
10 SYRINGE (ML) INJECTION
Status: DISCONTINUED | OUTPATIENT
Start: 2017-03-30 | End: 2017-03-30

## 2017-03-30 RX ORDER — SODIUM CHLORIDE 9 MG/ML
50 INJECTION, SOLUTION INTRAVENOUS
Status: DISCONTINUED | OUTPATIENT
Start: 2017-03-30 | End: 2017-03-30

## 2017-03-30 RX ADMIN — SODIUM CHLORIDE 1000 ML: 900 INJECTION, SOLUTION INTRAVENOUS at 09:00

## 2017-03-30 NOTE — ROUTINE PROCESS
0900- Pt in for hydration prior to CT. Dr Kirill Palomares wants 1 liter NS infused. Lungs clear. VSS.

## 2017-04-14 RX ORDER — GLIPIZIDE 2.5 MG/1
TABLET, EXTENDED RELEASE ORAL
Qty: 90 TAB | Refills: 0 | Status: SHIPPED | OUTPATIENT
Start: 2017-04-14 | End: 2017-05-04 | Stop reason: SDUPTHER

## 2017-05-04 RX ORDER — GLIPIZIDE 2.5 MG/1
TABLET, EXTENDED RELEASE ORAL
Qty: 90 TAB | Refills: 3 | Status: SHIPPED | OUTPATIENT
Start: 2017-05-04 | End: 2017-07-19 | Stop reason: SDUPTHER

## 2017-05-24 ENCOUNTER — TELEPHONE (OUTPATIENT)
Dept: ENDOCRINOLOGY | Age: 67
End: 2017-05-24

## 2017-05-24 NOTE — TELEPHONE ENCOUNTER
5/24/2017  4:27 PM      Please call Dr. Kita Morales at [phone] when you have a moment regarding Mr. Joleen Ash.         Thanks

## 2017-05-30 ENCOUNTER — TELEPHONE (OUTPATIENT)
Dept: SURGERY | Age: 67
End: 2017-05-30

## 2017-05-30 NOTE — TELEPHONE ENCOUNTER
Patient notified of CT scan reschedule on 6/31/17 for 9 AM. Instructions given with time.  Patient will  barium today to drink at home in the AM.

## 2017-05-31 ENCOUNTER — HOSPITAL ENCOUNTER (OUTPATIENT)
Dept: CT IMAGING | Age: 67
Discharge: HOME | End: 2017-05-31
Attending: SURGERY
Payer: MEDICARE

## 2017-05-31 LAB — CREAT BLD-MCNC: 2.1 MG/DL (ref 0.6–1.3)

## 2017-05-31 PROCEDURE — 82565 ASSAY OF CREATININE: CPT

## 2017-05-31 PROCEDURE — 74011636320 HC RX REV CODE- 636/320: Performed by: SURGERY

## 2017-05-31 PROCEDURE — 74011250636 HC RX REV CODE- 250/636: Performed by: SURGERY

## 2017-05-31 PROCEDURE — 74178 CT ABD&PLV WO CNTR FLWD CNTR: CPT

## 2017-05-31 RX ORDER — SODIUM CHLORIDE 9 MG/ML
100 INJECTION, SOLUTION INTRAVENOUS CONTINUOUS
Status: DISCONTINUED | OUTPATIENT
Start: 2017-05-31 | End: 2017-06-04 | Stop reason: HOSPADM

## 2017-05-31 RX ORDER — SODIUM CHLORIDE 0.9 % (FLUSH) 0.9 %
10 SYRINGE (ML) INJECTION
Status: COMPLETED | OUTPATIENT
Start: 2017-05-31 | End: 2017-05-31

## 2017-05-31 RX ADMIN — SODIUM CHLORIDE 100 ML/HR: 900 INJECTION, SOLUTION INTRAVENOUS at 11:31

## 2017-05-31 RX ADMIN — SODIUM CHLORIDE 100 ML/HR: 900 INJECTION, SOLUTION INTRAVENOUS at 10:25

## 2017-05-31 RX ADMIN — IOPAMIDOL 100 ML: 755 INJECTION, SOLUTION INTRAVENOUS at 11:30

## 2017-05-31 RX ADMIN — SODIUM CHLORIDE 100 ML/HR: 900 INJECTION, SOLUTION INTRAVENOUS at 11:30

## 2017-05-31 RX ADMIN — Medication 10 ML: at 11:30

## 2017-05-31 NOTE — ROUTINE PROCESS
IV hydration begun NaCl at 100 ml/hr via pump as per order. Pt resting in wheelchair in holding area with no complaints at this time.

## 2017-05-31 NOTE — PROGRESS NOTES
Renal-called to see Mr. Janet Zuñiga in anticipation of his ct scan. Reportedly scan won't be done unless I see patient. CT cancelled a couple weeks ago when creatinine was 1.5-pt seen in office, was nephrotic, and we discussed the necessity for the ct to assess the liver lesion/renal lesion and adrenal lesion that was seen on MRA from 2/17-Creatinine now 2.1 (POC testing)-will give ns 2 hour pre and post ct. Pt will have repeat labs done in office after. Pt understands that his renal function may deteriorate from contrast but we are trying to prevent this with IVF hydration. He agrees the benefits of doing the test outweigh the risks at this time.       Sunil Haywood MD

## 2017-06-07 ENCOUNTER — TELEPHONE (OUTPATIENT)
Dept: SURGERY | Age: 67
End: 2017-06-07

## 2017-06-07 DIAGNOSIS — K86.2 CYSTIC MASS OF PANCREAS: Primary | ICD-10-CM

## 2017-06-07 NOTE — TELEPHONE ENCOUNTER
Results of the CT scan discussed with the patient. Benign findings on adrenal and liver. Patient with small cystic mass in the neck of the pancreas measuring 8 x 6 x 18 mm consistent with small side branch IPMN. Given small size and patient's multiple medical problems, recommend f/u for now. F/u CT scan in 1 year.

## 2017-06-27 ENCOUNTER — TELEPHONE (OUTPATIENT)
Dept: SURGERY | Age: 67
End: 2017-06-27

## 2017-06-27 NOTE — TELEPHONE ENCOUNTER
Pt scheduled for 06/08/2018 at 1300 with arrival at 1045 to drink oral contrast.    Pt advised to consult with nephrologist prior to test for BUN and creat. appt time mailed to pt.

## 2017-07-14 ENCOUNTER — APPOINTMENT (OUTPATIENT)
Dept: PHYSICAL THERAPY | Age: 67
End: 2017-07-14

## 2017-07-19 RX ORDER — GLIPIZIDE 5 MG/1
TABLET, FILM COATED, EXTENDED RELEASE ORAL
Qty: 90 TAB | Refills: 3 | Status: SHIPPED | OUTPATIENT
Start: 2017-07-19 | End: 2017-10-27 | Stop reason: DRUGHIGH

## 2017-07-19 NOTE — TELEPHONE ENCOUNTER
Patient is requesting a refill on his glipizide. He stated that he is completely out. Patient uses [facility]  [phone]. Patient would also like a call to let him know it went through. He can be reached at [phone].

## 2017-07-19 NOTE — TELEPHONE ENCOUNTER
MrAlma Delia stated that it was okay to speak with Ms. Kari Conley and she was notified of the new script for Glipizide 5 mg tablets one daily. She voiced understanding to the message noted.

## 2017-07-19 NOTE — TELEPHONE ENCOUNTER
Please let him know if he was taking 2 of the 2.5 mg tabs then I will send 5 mg tabs of glipizide to the pharmacy so he can just take one tablet daily until he comes back to see me this month. This will be ready for  at the pharmacy today.

## 2017-07-19 NOTE — TELEPHONE ENCOUNTER
Patient is requesting a refill on his glipizide. He stated that he has been off of the medication for two weeks. He has been taking two tablets a day.

## 2017-07-22 NOTE — TELEPHONE ENCOUNTER
I had spoken with Dr. Ramin Figueroa on 5/24/17 about Mr. Canela's CT scan that shows an adrenal adenoma and told him I would keep this in mind during his upcoming appointment in July.

## 2017-07-25 LAB
25(OH)D3+25(OH)D2 SERPL-MCNC: 13.1 NG/ML (ref 30–100)
ALBUMIN SERPL-MCNC: 3.8 G/DL (ref 3.6–4.8)
ALBUMIN/CREAT UR: 4040 MG/G CREAT (ref 0–30)
ALBUMIN/GLOB SERPL: 1.3 {RATIO} (ref 1.2–2.2)
ALP SERPL-CCNC: 94 IU/L (ref 39–117)
ALT SERPL-CCNC: 20 IU/L (ref 0–44)
AST SERPL-CCNC: 23 IU/L (ref 0–40)
BILIRUB SERPL-MCNC: 0.2 MG/DL (ref 0–1.2)
BUN SERPL-MCNC: 73 MG/DL (ref 8–27)
BUN/CREAT SERPL: 45 (ref 10–24)
CALCIUM SERPL-MCNC: 9.7 MG/DL (ref 8.6–10.2)
CHLORIDE SERPL-SCNC: 97 MMOL/L (ref 96–106)
CHOLEST SERPL-MCNC: 230 MG/DL (ref 100–199)
CO2 SERPL-SCNC: 26 MMOL/L (ref 18–29)
CREAT SERPL-MCNC: 1.61 MG/DL (ref 0.76–1.27)
CREAT UR-MCNC: 45.3 MG/DL
ERYTHROCYTE [DISTWIDTH] IN BLOOD BY AUTOMATED COUNT: 15.9 % (ref 12.3–15.4)
EST. AVERAGE GLUCOSE BLD GHB EST-MCNC: 217 MG/DL
GLOBULIN SER CALC-MCNC: 2.9 G/DL (ref 1.5–4.5)
GLUCOSE SERPL-MCNC: 192 MG/DL (ref 65–99)
HBA1C MFR BLD: 9.2 % (ref 4.8–5.6)
HCT VFR BLD AUTO: 34.8 % (ref 37.5–51)
HDLC SERPL-MCNC: 33 MG/DL
HGB BLD-MCNC: 11.4 G/DL (ref 12.6–17.7)
INTERPRETATION, 910389: NORMAL
INTERPRETATION: NORMAL
LDLC SERPL CALC-MCNC: ABNORMAL MG/DL (ref 0–99)
Lab: NORMAL
MCH RBC QN AUTO: 30.8 PG (ref 26.6–33)
MCHC RBC AUTO-ENTMCNC: 32.8 G/DL (ref 31.5–35.7)
MCV RBC AUTO: 94 FL (ref 79–97)
MICROALBUMIN UR-MCNC: 1830.1 UG/ML
PDF IMAGE, 910387: NORMAL
PLATELET # BLD AUTO: 335 X10E3/UL (ref 150–379)
POTASSIUM SERPL-SCNC: 4.2 MMOL/L (ref 3.5–5.2)
PROT SERPL-MCNC: 6.7 G/DL (ref 6–8.5)
RBC # BLD AUTO: 3.7 X10E6/UL (ref 4.14–5.8)
SODIUM SERPL-SCNC: 142 MMOL/L (ref 134–144)
TRIGL SERPL-MCNC: 520 MG/DL (ref 0–149)
VLDLC SERPL CALC-MCNC: ABNORMAL MG/DL (ref 5–40)
WBC # BLD AUTO: 7.4 X10E3/UL (ref 3.4–10.8)

## 2017-07-27 ENCOUNTER — OFFICE VISIT (OUTPATIENT)
Dept: ENDOCRINOLOGY | Age: 67
End: 2017-07-27

## 2017-07-27 VITALS
BODY MASS INDEX: 29.33 KG/M2 | SYSTOLIC BLOOD PRESSURE: 138 MMHG | WEIGHT: 193.5 LBS | HEART RATE: 75 BPM | DIASTOLIC BLOOD PRESSURE: 50 MMHG | HEIGHT: 68 IN

## 2017-07-27 DIAGNOSIS — E55.9 VITAMIN D DEFICIENCY: ICD-10-CM

## 2017-07-27 DIAGNOSIS — D50.9 IRON DEFICIENCY ANEMIA, UNSPECIFIED: ICD-10-CM

## 2017-07-27 DIAGNOSIS — E11.9 CONTROLLED TYPE 2 DIABETES MELLITUS WITHOUT COMPLICATION, WITHOUT LONG-TERM CURRENT USE OF INSULIN (HCC): Primary | ICD-10-CM

## 2017-07-27 DIAGNOSIS — E78.5 HYPERLIPIDEMIA LDL GOAL <70: ICD-10-CM

## 2017-07-27 DIAGNOSIS — I10 ESSENTIAL HYPERTENSION, BENIGN: ICD-10-CM

## 2017-07-27 DIAGNOSIS — D35.01 ADENOMA OF RIGHT ADRENAL GLAND: ICD-10-CM

## 2017-07-27 DIAGNOSIS — E66.9 OBESITY, CLASS I, BMI 30-34.9: ICD-10-CM

## 2017-07-27 RX ORDER — FUROSEMIDE 80 MG/1
TABLET ORAL
Refills: 3 | COMMUNITY
Start: 2017-06-08 | End: 2017-07-27 | Stop reason: SDUPTHER

## 2017-07-27 RX ORDER — DEXAMETHASONE 0.5 MG/1
TABLET ORAL
Qty: 2 TAB | Refills: 0 | Status: SHIPPED | OUTPATIENT
Start: 2017-07-27 | End: 2017-10-27

## 2017-07-27 RX ORDER — FUROSEMIDE 80 MG/1
80 TABLET ORAL DAILY
Refills: 3 | Status: ON HOLD | COMMUNITY
Start: 2017-07-27 | End: 2018-06-15

## 2017-07-27 NOTE — MR AVS SNAPSHOT
Visit Information Date & Time Provider Department Dept. Phone Encounter #  
 7/27/2017  9:10 AM Isabel Nugent, [address] [place] [address] Diabetes and Endocrinology [phone] [id number] Follow-up Instructions Return in about 3 months (around 10/27/2017) for ok to use 12:10. Your Appointments 6/15/2018  2:00 PM  
ESTABLISHED PATIENT with Wally Hernandez MD  
[facility] ([address]) Appt Note: flu pancreas mass (1yr f/u) [address] [facility] [address] [address]  
[address], [address] [facility] [address] [address] Upcoming Health Maintenance Date Due Hepatitis C Screening 1950 DTaP/Tdap/Td series (1 - Tdap) 4/25/1972 FOBT Q 1 YEAR AGE 50-75 4/25/2001 ZOSTER VACCINE AGE 60> 2/25/2011 EYE EXAM RETINAL OR DILATED Q1 10/21/2015 GLAUCOMA SCREENING Q2Y 4/25/2016 Pneumococcal 65+ Low/Medium Risk (1 of 2 - PCV13) 4/25/2016 MEDICARE YEARLY EXAM 4/25/2016 FOOT EXAM Q1 2/26/2017 INFLUENZA AGE 9 TO ADULT 8/1/2017 HEMOGLOBIN A1C Q6M 1/24/2018 MICROALBUMIN Q1 7/24/2018 LIPID PANEL Q1 7/24/2018 Allergies as of 7/27/2017  Review Complete On: 7/27/2017 By: Isabel Nugent MD  
 No Known Allergies Current Immunizations  Never Reviewed No immunizations on file. Not reviewed this visit You Were Diagnosed With   
  
 Codes Comments Controlled type 2 diabetes mellitus without complication, without long-term current use of insulin ([facility]    -  Primary ICD-10-CM: E11.9 ICD-9-CM: 250.00 Essential hypertension, benign     ICD-10-CM: I10 
ICD-9-CM: 401.1 Hyperlipidemia LDL goal <70     ICD-10-CM: E78.5 ICD-9-CM: 272.4 Iron deficiency anemia, unspecified     ICD-10-CM: D50.9 ICD-9-CM: 280.9 Adenoma of right adrenal gland     ICD-10-CM: D35.01 
ICD-9-CM: 227.0 Vitals BP Pulse Height(growth percentile) Weight(growth percentile) BMI Smoking Status 138/50 75 5' 8\" (1.727 m) 193 lb 8 oz (87.8 kg) 29.42 kg/m2 Former Smoker Vitals History BMI and BSA Data Body Mass Index Body Surface Area  
 29.42 kg/m 2 2.05 m 2 Preferred Pharmacy Pharmacy Name Phone [facility] [place] [address] Drive [phone] Your Updated Medication List  
  
   
This list is accurate as of: 7/27/17  9:43 AM.  Always use your most recent med list.  
  
  
  
  
 allopurinol 100 mg tablet Commonly known as:  Agustina Ridgeway Take 100 mg by mouth nightly. amLODIPine 10 mg tablet Commonly known as:  Luciana Shield Take  by mouth daily. BYSTOLIC 10 mg tablet Generic drug:  nebivolol TAKE TWO TABLETS BY MOUTH EVERY DAY  
  
 dexamethasone 0.5 mg tablet Commonly known as:  DECADRON Take 2 tablets at 11 pm the night before coming for lab draw between 7:30-9am the next morning  
  
 ferrous sulfate 325 mg (65 mg iron) Cper Take  by mouth two (2) times a day. FISH OIL 1,000 mg Cap Generic drug:  omega-3 fatty acids-vitamin e Take  by mouth. FLOMAX 0.4 mg capsule Generic drug:  tamsulosin Take 0.4 mg by mouth daily. FOLTX 2.5-25-2 mg tablet Generic drug:  folic acid-vit Q2-GMI M15 Take 1 Tab by mouth daily. furosemide 80 mg tablet Commonly known as:  LASIX Take 1 tab in the morning and 1/2 tab at 2pm--Dose change 7/27/17--updated med list--did not send prescription to the pharmacy  
  
 glipiZIDE SR 5 mg CR tablet Commonly known as:  GLUCOTROL XL  
TAKE ONE (1) TABLET(S) ION Y *NEW HIGHER DOSE REPLACES PRIOR SCRIPT ON FILE* LIPITOR 40 mg tablet Generic drug:  atorvastatin Take  by mouth daily. lisinopril-hydroCHLOROthiazide 20-12.5 mg per tablet Commonly known as:  Grady Siegel  
 Take 1 Tab by mouth daily. Dose change 1/27/17--updated med list--did not send prescription to the pharmacy MINOXIDIL PO Take 10 mg by mouth nightly. OTHER Beta prostate 2 in am and 1 in pm  
  
 PLAVIX 75 mg Tab Generic drug:  clopidogrel Take  by mouth daily. UNIFINE PENTIPS PLUS 32 gauge x 5/32\" Ndle Generic drug:  Insulin Needles (Disposable) USE DAILY  
  
 VITAMIN D3 1,000 unit tablet Generic drug:  cholecalciferol Take  by mouth daily. Prescriptions Sent to Pharmacy Refills  
 dexamethasone (DECADRON) 0.5 mg tablet 0 Sig: Take 2 tablets at 11 pm the night before coming for lab draw between 7:30-9am the next morning Class: Normal  
 [facility] [address] AT [address]  #: [phone] We Performed the Following ALDOSTERONE/RENIN RATIO [[id number] Custom] CORTISOL, AM [id number] CPT(R)] METANEPHRINES, PLASMA [09998 CPT(R)] Follow-up Instructions Return in about 3 months (around 10/27/2017) for ok to use 12:10. To-Do List   
 06/08/2018 1:00 PM  
  Appointment with [facility] CT 1 at [facility] CT ([phone]) CONTRAST STUDY: 1. The patient should not eat solid food four hours before the appointment but should be encouraged to drink clear liquids. 2.  If you have to drink oral contrast, please pick it up any weekday prior to your appointment, if you cannot please check in 2 hrs before appt time. 3.  The patient will require IV access for contrast administration. 4.  The patient should not take Ibuprofen (Advil, Motrin, etc.) and Naproxen Sodium (Aleve, etc.)  on the day of the exam. Stopping non-steroidal anti-inflammatory agents (NSAIDs) like Ibuprofen decreases the risk of kidney damage from the x-ray contrast (dye).  5.  Bring any non [facility] facility films/images pertaining to the area of interest with you on the day of appointment. 6.  Bring current lab work if available (within last 90 days CMP) ***If scheduled at [facility], iSTAT is not available, labs will need to be done before appointment*** 7. Check in at registration at least 30 minutes before appt time unless you were instructed to do otherwise. Patient Instructions 1) Make sure you are taking the atorvastatin for your cholesterol every night as your level has risen. If you are out of this and need a prescription, let me know. 2) Your calcium level is back to normal and your vitamin D level is very low at 13 so I need you to take 1000 of vitamin D3 daily as this should not make your calcium level worse. 3) Your Hemoglobin A1c (3 month test of blood sugar) is much worse due to being off your diet but recently it sounds like your sugars are much better so keep taking the 5 mg of glipizide every morning. 4) Your hemoglobin (red blood cell count) is stable. It was 11.4 this time up from 10.6 in 1/17. 5) Your creatinine (kidney test) is worse than last time likely due to more lasix. Keep taking 80 mg in the morning but decrease to 40 mg (1/2 tab) around 2pm as I don't want this to get any worse. 6) We are evaluating you for 3 conditions: 
- primary hyperaldosteronism (elevated aldosterone levels, high blood pressure, low potassium) - pheochromocytoma (elevated adrenaline levels leading to high blood pressure that very rarely can lead to heart attack and stroke) - Cushings syndrome (elevated cortisol levels leading to high blood pressure and diabetes) Take Dexamethasone 0.5 mg 2 tabs at 11pm the night before you come for lab draw between 7:30-9am the next morning. Please fast for your labs. Don't eat anything after midnight. Introducing [facility]! Dear Samuel Astorga: Thank you for requesting a Commtimize account. Our records indicate that you already have an active Commtimize account.   You can access your account anytime at [email or web address] [place] Did you know that you can access your hospital and ER discharge instructions at any time in SciAps? You can also review all of your test results from your hospital stay or ER visit. Additional Information If you have questions, please visit the Frequently Asked Questions section of the SciAps website at [email or web address] Remember, SciAps is NOT to be used for urgent needs. For medical emergencies, dial 911. Now available from your iPhone and Android! Please provide this summary of care documentation to your next provider. Your primary care clinician is listed as Mary Nieto. If you have any questions after today's visit, please call [phone].

## 2017-07-27 NOTE — PROGRESS NOTES
Chief Complaint   Patient presents with    Diabetes     pcp and pharmacy confirmed    Diabetic Foot Exam     due     History of Present Illness: Cheryl Jarquin is a 78 y.o. male here for follow up of diabetes. Weight up 3 lbs since last visit in 1/17. After last visit, got off track on his diet and was eating club sandwiches at [facility] [employer] every other day and wasn't checking his sugars. Was told by the kidney doctor that his sugar was over 600 on lab draw and then he started checking his sugars and doubled up on the 2.5 mg glipizide and got back on his diet and his sugars started coming back down from the 300s to the 150s or less but he ran out of the pills for 2 weeks because of doubling up and then called us on 7/19/17 and I sent the 5 mg tabs at that time and he has been taking 1 tab daily since then. His sugars were running in the 140-150s when he was of glipizide but still watching his diet and back on the glipizide they are down in the 120s. Had stayed on one lasix 40 mg per day until about a month ago and Dr. Gene Toro was covering for Dr. Sheridan Harrington and she stopped his morning dose of minoxidil and has stayed on the evening dose and she increased his lasix to 80 mg twice daily and his swelling has improved. States he saw Dr. Sheridan Harrington yesterday and was told to take 2 of the 80 mg tabs in the next 4 days in the morning and cont 1 tab at 2pm but I told him I don't want him to do this as his creatinine is already worse than last time. Doesn't think he has been out of the atorvastatin. States he was taking 1000 units of vitamin D daily until Dr. Sheridan Harrington told him to stop this because it could make his calcium level worse but his calcium level is normal and his vitamin D is very low so I asked him to go back on this. He was found to have a 1.9 cm right adrenal adenoma on MRI in 2/17 and also an IPMN on CT scan in 6/17 as evaluated by Dr. Grover Hammans.   Dr. Sheridan Harrington called me about the adrenal adenoma and I told him I would further evaluate this as below. Current Outpatient Prescriptions   Medication Sig    furosemide (LASIX) 80 mg tablet TK 1 T PO BID    glipiZIDE SR (GLUCOTROL XL) 5 mg CR tablet TAKE ONE (1) TABLET(S) ION Y *NEW HIGHER DOSE REPLACES PRIOR SCRIPT ON FILE*    BYSTOLIC 10 mg tablet TAKE TWO TABLETS BY MOUTH EVERY DAY    cholecalciferol (VITAMIN D3) 1,000 unit tablet Take  by mouth daily.  UNIFINE PENTIPS PLUS 32 gauge x 5/32\" ndle USE DAILY    OTHER Beta prostate 2 in am and 1 in pm    amLODIPine (NORVASC) 10 mg tablet Take  by mouth daily.  ferrous sulfate 325 mg (65 mg iron) cpER Take  by mouth two (2) times a day.  omega-3 fatty acids-vitamin e (FISH OIL) 1,000 mg cap Take  by mouth.  MINOXIDIL PO Take 10 mg by mouth nightly.  lisinopril-hydrochlorothiazide (PRINZIDE, ZESTORETIC) 20-12.5 mg per tablet Take 1 Tab by mouth daily. Dose change 1/27/17--updated med list--did not send prescription to the pharmacy    atorvastatin (LIPITOR) 40 mg tablet Take  by mouth daily.  folic acid-vit N9-HGF H27 (FOLTX) 2.5-25-2 mg tablet Take 1 Tab by mouth daily.  tamsulosin (FLOMAX) 0.4 mg capsule Take 0.4 mg by mouth daily.  clopidogrel (PLAVIX) 75 mg tablet Take  by mouth daily.  allopurinol (ZYLOPRIM) 100 mg tablet Take 100 mg by mouth nightly. No current facility-administered medications for this visit. No Known Allergies     Review of Systems:  - Eyes: no blurry vision or double vision  - Cardiovascular: no chest pain  - Respiratory: no shortness of breath  - Musculoskeletal: no myalgias  - Neurological: no numbness/tingling in extremities    Physical Examination:  Blood pressure 138/50, pulse 75, height 5' 8\" (1.727 m), weight 193 lb 8 oz (87.8 kg).   - General: pleasant, no distress, good eye contact   - Neck: no carotid bruits  - Cardiovascular: regular, normal rate, nl s1 and s2, no m/r/g,   - Respiratory: clear bilaterally  - Integumentary: no edema,   - Psychiatric: normal mood and affect         Diabetic foot exam performed by Antonio Crocker MD     Measurement  Response Nurse Comment Physician Comment   Monofilament  R - reduced sensation with micro filament  L - reduced sensation with micro filament     Pulse DP R - 2+ (normal)  L - 2+ (normal)     Vibration R - decreased  L - decreased     Structural deformity R - None  L - None     Skin Integrity / Deformity R - Mild - callus  L - Mild - callus        Reviewed by:                 Data Reviewed: Component      Latest Ref Rng & Units 7/24/2017 7/24/2017 7/24/2017 7/24/2017           8:10 AM  8:10 AM  8:10 AM  8:10 AM   Glucose      65 - 99 mg/dL    192 (H)   BUN      8 - 27 mg/dL    73 (H)   Creatinine      0.76 - 1.27 mg/dL    1.61 (H)   GFR est non-AA      >59 mL/min/1.73    44 (L)   GFR est AA      >59 mL/min/1.73    51 (L)   BUN/Creatinine ratio      10 - 24    45 (H)   Sodium      134 - 144 mmol/L    142   Potassium      3.5 - 5.2 mmol/L    4.2   Chloride      96 - 106 mmol/L    97   CO2      18 - 29 mmol/L    26   Calcium      8.6 - 10.2 mg/dL    9.7   Protein, total      6.0 - 8.5 g/dL    6.7   Albumin      3.6 - 4.8 g/dL    3.8   GLOBULIN, TOTAL      1.5 - 4.5 g/dL    2.9   A-G Ratio      1.2 - 2.2    1.3   Bilirubin, total      0.0 - 1.2 mg/dL    0.2   Alk. phosphatase      39 - 117 IU/L    94   AST      0 - 40 IU/L    23   ALT (SGPT)      0 - 44 IU/L    20   WBC      3.4 - 10.8 x10E3/uL 7.4      RBC      4.14 - 5.80 x10E6/uL 3.70 (L)      HGB      12.6 - 17.7 g/dL 11.4 (L)      HCT      37.5 - 51.0 % 34.8 (L)      MCV      79 - 97 fL 94      MCH      26.6 - 33.0 pg 30.8      MCHC      31.5 - 35.7 g/dL 32.8      RDW      12.3 - 15.4 % 15.9 (H)      PLATELET      787 - 313 x10E3/uL 335      Creatinine, urine      Not Estab. mg/dL  45.3     Microalbumin, urine      Not Estab. ug/mL  1830.1     Microalbumin/Creat.  Ratio      0.0 - 30.0 mg/g creat  4040.0 (H)     VITAMIN D, 25-HYDROXY      30.0 - 100.0 ng/mL   13.1 (L)      Component      Latest Ref Rng & Units 7/24/2017 7/24/2017           8:10 AM  8:10 AM   Cholesterol, total      100 - 199 mg/dL 230 (H)    Triglyceride      0 - 149 mg/dL 520 (H)    HDL Cholesterol      >39 mg/dL 33 (L)    VLDL, calculated      5 - 40 mg/dL Comment    LDL, calculated      0 - 99 mg/dL Comment    Hemoglobin A1c, (calculated)      4.8 - 5.6 %  9.2 (H)   Estimated average glucose      mg/dL  217       Assessment/Plan:     1. DM w/o complication type II, controlled: his most recent Hgb A1c was 9.2% in 7/17 up from 6.1% in 1/17 down from 6.5% in 7/16 down from 6.6% in 2/16 up from 6% in 9/15 down from 6.4% in 4/15 up from 6.3% in 12/14 down from 7.6% in 8/14 up from 8.3% in 3/14 up from 6.9% in November up from 6.3% in July down from 7.3% in Feb 2013 up from 6.7% in October down from 7% in June down from 8.3% in March up from 7.3% in December down from 8% in September down from 9.5% in June, which is the same as in January. It's possible his A1c was falsely lower in Oct 2012 due to transfusion of 3 units PRBCs in September as his level was back up in Feb 2013 but his Hgb has been stable since then so the next 2 values that were <7% reflect good diabetes control. A1c is much worse due to being off diet but recently sugars are much better controlled since being back on the higher dose of glipizide so will continue this for now. - cont glipizide SR 5 mg daily  - check bs once daily at alternating times  - foot exam done 7/17  - optho UTD spring 2016--will obtain report  - microalbumin 994 1/11 down to 897 9/11 up to 1383 in 10/12 (possibly due to protein shake) and down to 1083 in 2/13, stable at 1087 in 11/13, down to 453 in 4/15, up to 3402 in 7/16 and 4040 in 7/17  - check A1c and cmp prior to next visit     2. Unspecified essential hypertension (401.9) his BP was at goal < 140/90 on the right arm which is normally his higher arm.  We have now learned that he has HTN on his right arm so we will only use from now on to measure his readings. I will cut back on his lasix to avoid worsening his creatinine.  - lisinopril/hctz 20/12.5 mg 2 tabs daily on hold  - cont bystolic 10 mg 2 tabs daily  - cont amlodipine 10 mg daily  - cont minoxidil 10 mg at night  - lasix 80 mg in the am and decrease to 40 mg at 2pm       3. Other and unspecified hyperlipidemia (272.4) Given DM and CAD, Goal LDL < 70, non-HDL < 100, and TG < 150.  his lipids were all at goal in 3/14 and 12/14 and 4/15. LDL up to 84 in 2/16 due to more red meat.  and LDL 95 in 7/16 off the niaspan and with weight gain so hopefully weight loss will help. LDL 85 and  in 1/17.  and non- in 7/17  - cont lipitor 40 mg daily  - check lipids prior to next visit     4. Iron deficiency anemia:  Hgb 11.4 in 7/17 up from 10.6 in 1/17 down from 11.3 in 7/16 up from 10.8 in 2/16 down from 11.4 in 9/15 up from 10.9 in 4/15 down from 11.4 in 12/14 down from 11.6 in 8/14 up from 11.1 in 3/14 from 12.5 in 11/13 up from 11.8 in 7/13. Has been on higher dose of iron 1 tab bid since 8/14   - cont iron twice daily  - check cbc w/o diff prior to next visit    5. Vitamin D deficiency: level was 29 in 9/15 on 2000 units of D3 daily so wanted to increase to 3000 units daily but his calcium was 10.6 in 9/15 and Dr. Zeb Matias advised cutting back on his dose and he has been taking 1000 units daily and level down to 19 in 2/16 but calcium back to normal at 9.6. Level 20 in 7/16 but calcium 10.2 in 7/16. Now off 1000 units and level down to 18 in 1/17 so wanted him to restart this and he apparently did and then was told to stop by Dr. Zeb Matias and down to 13.1 in 7/17 but calcium is normal at 9.7 so will have him go back to this  - cont vitamin D 1000 units daily  - check Vitamin D 25-OH level prior to next visit    6.   Class I Obesity: Tried topamax as I wanted to avoid phentermine given his vascular disease but didn't feel well on this so stopped after 2-3 weeks. Had lost 13 lbs from 7/16 to 1/17 with doing a nutrimost program but had stopped this in 1/17 when his creatinine was high and I don't know if this could have contributed to higher creatinine. Wt up 3 lbs by 7/17  - diet and exercise    7. Right adrenal gland adenoma: he was found to have a 1.9 cm right adrenal incidentaloma on MRI in 2/17. This is a common finding on CT/MRI that usually has no clinical significance. However, it is worth screening for 3 potential endocrine conditions that occur due to excess hormone production from the adrenal gland: hyperaldosteronism, Cushings syndrome, and pheochromocytoma. My suspicion for all three of these is rather low. The radiologic characteristics of the adenoma do not sound too worrisome as the Hounsfield units are low   - check overnight dexamethasone suppression test with 1 mg of dex given at 11pm and cortisol drawn at 7:30-9am the next morning to evaluate for Cushings (normal cortisol is < 1.8 in the morning after dex)  - check aldosterone/renin ratio to evaluate for hyperaldosteronism (normal ratio is < 20 and aldosterone should be < 15)  - check plasma metanephrines to evaluate for pheochromocytoma  - will need a repeat CT scan in 6-12 months to document stability in size of adenoma, if no change at that time, no need for further repeat imaging    We spent 40 minutes of face to face time together and > 50% of the time was spent in counseling on management of all of the above conditions            Patient Instructions   1) Make sure you are taking the atorvastatin for your cholesterol every night as your level has risen. If you are out of this and need a prescription, let me know. 2) Your calcium level is back to normal and your vitamin D level is very low at 13 so I need you to take 1000 of vitamin D3 daily as this should not make your calcium level worse.     3) Your Hemoglobin A1c (3 month test of blood sugar) is much worse due to being off your diet but recently it sounds like your sugars are much better so keep taking the 5 mg of glipizide every morning. 4) Your hemoglobin (red blood cell count) is stable. It was 11.4 this time up from 10.6 in 1/17. 5) Your creatinine (kidney test) is worse than last time likely due to more lasix. Keep taking 80 mg in the morning but decrease to 40 mg (1/2 tab) around 2pm as I don't want this to get any worse. 6) We are evaluating you for 3 conditions:  - primary hyperaldosteronism (elevated aldosterone levels, high blood pressure, low potassium)  - pheochromocytoma (elevated adrenaline levels leading to high blood pressure that very rarely can lead to heart attack and stroke)  - Cushings syndrome (elevated cortisol levels leading to high blood pressure and diabetes)    Take Dexamethasone 0.5 mg 2 tabs at 11pm the night before you come for lab draw between 7:30-9am the next morning. Please fast for your labs. Don't eat anything after midnight. Follow-up Disposition:  Return in about 3 months (around 10/27/2017) for ok to use 12:10. Copy sent to:  Dr. Christo Madera [phone]  Dr. Sonia Canavan [phone]  Dr. Michael Garza [phone]  Dr. Marylu Palma via [facility]    Lab follow up: 8/29/17    Component      Latest Ref Rng & Units 8/9/2017 8/9/2017 8/9/2017          11:48 AM 11:48 AM 11:48 AM   Aldosterone      0.0 - 30.0 ng/dL  <1.0    Renin Activity      0.167 - 5.380 ng/mL/hr  5.851 (H)    Aldos/Renin Ratio      0.0 - 30.0  <.2    Normetanephrine, free      0 - 145 pg/mL 105     Metanephrine, free      0 - 62 pg/mL 15     Cortisol, a.m.      6.2 - 19.4 ug/dL   1.9 (L)     Sent him the following message through [employer]: Thanks for your patience as it has been very busy getting caught up after being away on vacation the past 2 weeks. Your metanephrine levels are normal so you don't have evidence of pheochromocytoma.   Your aldosterone level is low, not high, and your renin level is slightly high, not low but this is to be expected when you are on lisinopril so you do not appear to have hyperaldosteronism. Lastly, your cortisol was appropriately low after taking dexamethasone as normal is anything less than 2.0. Therefore your body is not making too much cortisol and you don't have Cushing's disease. Therefore, it's likely that your adrenal gland has a benign growth on it that his not making too much hormone and you don't need further evaluation or treatment of this.

## 2017-07-27 NOTE — PATIENT INSTRUCTIONS
1) Make sure you are taking the atorvastatin for your cholesterol every night as your level has risen. If you are out of this and need a prescription, let me know. 2) Your calcium level is back to normal and your vitamin D level is very low at 13 so I need you to take 1000 of vitamin D3 daily as this should not make your calcium level worse. 3) Your Hemoglobin A1c (3 month test of blood sugar) is much worse due to being off your diet but recently it sounds like your sugars are much better so keep taking the 5 mg of glipizide every morning. 4) Your hemoglobin (red blood cell count) is stable. It was 11.4 this time up from 10.6 in 1/17. 5) Your creatinine (kidney test) is worse than last time likely due to more lasix. Keep taking 80 mg in the morning but decrease to 40 mg (1/2 tab) around 2pm as I don't want this to get any worse. 6) We are evaluating you for 3 conditions:  - primary hyperaldosteronism (elevated aldosterone levels, high blood pressure, low potassium)  - pheochromocytoma (elevated adrenaline levels leading to high blood pressure that very rarely can lead to heart attack and stroke)  - Cushings syndrome (elevated cortisol levels leading to high blood pressure and diabetes)    Take Dexamethasone 0.5 mg 2 tabs at 11pm the night before you come for lab draw between 7:30-9am the next morning. Please fast for your labs. Don't eat anything after midnight.

## 2017-07-28 ENCOUNTER — TELEPHONE (OUTPATIENT)
Dept: ENDOCRINOLOGY | Age: 67
End: 2017-07-28

## 2017-07-28 NOTE — TELEPHONE ENCOUNTER
----- Message from Kellie Friedman sent at 7/28/2017  8:37 AM EDT -----  Regarding: Dr. Jessica Candelaria telephone  Contact: [phone]  Pt sister in law Reyes Marinas is requesting a call back to see if pt needed lab work before next appt on 10/27. Best contact number is [phone].

## 2017-07-28 NOTE — TELEPHONE ENCOUNTER
is not on patient's Release of Information form. Spoke with patient. Advised that orders will be placed and that he does need to be fasting for his labs 1 week prior to [address] Patient expressed understanding.

## 2017-08-15 LAB
ALDOST SERPL-MCNC: <1 NG/DL (ref 0–30)
ALDOST/RENIN PLAS-RTO: <.2 {RATIO} (ref 0–30)
CORTIS AM PEAK SERPL-MCNC: 1.9 UG/DL (ref 6.2–19.4)
METANEPH FREE SERPL-MCNC: 15 PG/ML (ref 0–62)
NORMETANEPHRINE SERPL-MCNC: 105 PG/ML (ref 0–145)
RENIN PLAS-CCNC: 5.85 NG/ML/HR (ref 0.17–5.38)

## 2017-10-21 LAB
25(OH)D3+25(OH)D2 SERPL-MCNC: 13.4 NG/ML (ref 30–100)
ALBUMIN SERPL-MCNC: 3.9 G/DL (ref 3.6–4.8)
ALBUMIN/CREAT UR: 5421 MG/G CREAT (ref 0–30)
ALBUMIN/GLOB SERPL: 1.4 {RATIO} (ref 1.2–2.2)
ALP SERPL-CCNC: 86 IU/L (ref 39–117)
ALT SERPL-CCNC: 28 IU/L (ref 0–44)
AST SERPL-CCNC: 24 IU/L (ref 0–40)
BILIRUB SERPL-MCNC: <0.2 MG/DL (ref 0–1.2)
BUN SERPL-MCNC: 44 MG/DL (ref 8–27)
BUN/CREAT SERPL: 19 (ref 10–24)
CALCIUM SERPL-MCNC: 9.4 MG/DL (ref 8.6–10.2)
CHLORIDE SERPL-SCNC: 97 MMOL/L (ref 96–106)
CHOLEST SERPL-MCNC: 132 MG/DL (ref 100–199)
CO2 SERPL-SCNC: 24 MMOL/L (ref 18–29)
CREAT SERPL-MCNC: 2.37 MG/DL (ref 0.76–1.27)
CREAT UR-MCNC: 25.7 MG/DL
ERYTHROCYTE [DISTWIDTH] IN BLOOD BY AUTOMATED COUNT: 14.6 % (ref 12.3–15.4)
EST. AVERAGE GLUCOSE BLD GHB EST-MCNC: 226 MG/DL
GFR SERPLBLD CREATININE-BSD FMLA CKD-EPI: 28 ML/MIN/1.73
GFR SERPLBLD CREATININE-BSD FMLA CKD-EPI: 32 ML/MIN/1.73
GLOBULIN SER CALC-MCNC: 2.8 G/DL (ref 1.5–4.5)
GLUCOSE SERPL-MCNC: 266 MG/DL (ref 65–99)
HBA1C MFR BLD: 9.5 % (ref 4.8–5.6)
HCT VFR BLD AUTO: 34.6 % (ref 37.5–51)
HDLC SERPL-MCNC: 38 MG/DL
HGB BLD-MCNC: 11.5 G/DL (ref 12.6–17.7)
INTERPRETATION, 910389: NORMAL
INTERPRETATION: NORMAL
LDLC SERPL CALC-MCNC: 33 MG/DL (ref 0–99)
Lab: NORMAL
MCH RBC QN AUTO: 31.1 PG (ref 26.6–33)
MCHC RBC AUTO-ENTMCNC: 33.2 G/DL (ref 31.5–35.7)
MCV RBC AUTO: 94 FL (ref 79–97)
MICROALBUMIN UR-MCNC: 1393.2 UG/ML
PDF IMAGE, 910387: NORMAL
PLATELET # BLD AUTO: 290 X10E3/UL (ref 150–379)
POTASSIUM SERPL-SCNC: 4.3 MMOL/L (ref 3.5–5.2)
PROT SERPL-MCNC: 6.7 G/DL (ref 6–8.5)
RBC # BLD AUTO: 3.7 X10E6/UL (ref 4.14–5.8)
SODIUM SERPL-SCNC: 141 MMOL/L (ref 134–144)
TRIGL SERPL-MCNC: 306 MG/DL (ref 0–149)
VLDLC SERPL CALC-MCNC: 61 MG/DL (ref 5–40)
WBC # BLD AUTO: 7.9 X10E3/UL (ref 3.4–10.8)

## 2017-10-27 ENCOUNTER — OFFICE VISIT (OUTPATIENT)
Dept: ENDOCRINOLOGY | Age: 67
End: 2017-10-27

## 2017-10-27 VITALS
HEART RATE: 58 BPM | BODY MASS INDEX: 30.22 KG/M2 | HEIGHT: 68 IN | WEIGHT: 199.4 LBS | DIASTOLIC BLOOD PRESSURE: 66 MMHG | SYSTOLIC BLOOD PRESSURE: 178 MMHG

## 2017-10-27 DIAGNOSIS — D50.0 IRON DEFICIENCY ANEMIA DUE TO CHRONIC BLOOD LOSS: ICD-10-CM

## 2017-10-27 DIAGNOSIS — E55.9 VITAMIN D DEFICIENCY: ICD-10-CM

## 2017-10-27 DIAGNOSIS — E78.5 HYPERLIPIDEMIA LDL GOAL <70: ICD-10-CM

## 2017-10-27 DIAGNOSIS — D35.01 ADENOMA OF RIGHT ADRENAL GLAND: ICD-10-CM

## 2017-10-27 DIAGNOSIS — E66.9 OBESITY, CLASS I, BMI 30-34.9: ICD-10-CM

## 2017-10-27 DIAGNOSIS — I10 ESSENTIAL HYPERTENSION, BENIGN: ICD-10-CM

## 2017-10-27 RX ORDER — CLONIDINE HYDROCHLORIDE 0.1 MG/1
0.1 TABLET ORAL 2 TIMES DAILY
Qty: 180 TAB | Refills: 3 | Status: SHIPPED | OUTPATIENT
Start: 2017-10-27 | End: 2017-11-12 | Stop reason: SDUPTHER

## 2017-10-27 RX ORDER — GLIPIZIDE 10 MG/1
10 TABLET, FILM COATED, EXTENDED RELEASE ORAL DAILY
Qty: 180 TAB | Refills: 3 | Status: SHIPPED | OUTPATIENT
Start: 2017-10-27 | End: 2018-01-31 | Stop reason: SDUPTHER

## 2017-10-27 RX ORDER — ALIROCUMAB 75 MG/ML
75 INJECTION, SOLUTION SUBCUTANEOUS EVERY 2 WEEKS
Refills: 3 | COMMUNITY
Start: 2017-10-11

## 2017-10-27 NOTE — PATIENT INSTRUCTIONS
1) Stop the morning dose of atorvastatin (lipitor) and just stay on the dose at night along with praluent as your total cholesterol dropped 100 points with just one shot. We'll see if this helps with joint pains on the lower dose of lipitor. 2) Your Hemoglobin A1c (3 month test of blood sugar) is still high at 9.5%. Start taking 2 of the 5 mg glipizide tabs in the morning to use these up and then change to one 10 mg tab in the morning. This will be ready for  at the pharmacy today. If your after lunch readings are still over 180 in 2 weeks, then let me know as I will want to double your glipizide to 1 tab twice daily. 3) Your creatinine (kidney test) has risen from last time due to higher blood pressure and higher sugar so hopefully with getting your diabetes and blood pressure under better control, this will improve. 4) I will add clonidine 0.1 mg twice daily with breakfast and dinner. Try stopping the morning dose of minoxidil to see if this helps with swelling during the day. 5) Your vitamin D level is 13 which is still low. Goal is over 30.   Please increase your dose to 2000 units of vitamin D daily to keep your levels at goal.

## 2017-10-27 NOTE — MR AVS SNAPSHOT
Visit Information Date & Time Provider Department Dept. Phone Encounter #  
 10/27/2017 12:10 PM Audrey Matias, [address] [facility] Endocr[facility]ology [phone] Follow-up Instructions Return in about 4 months (around 2/27/2018). Your Appointments 6/15/2018  2:00 PM  
ESTABLISHED PATIENT with Hamilton Mar MD  
[facility] Dr. Rodrigo Christensen [facility] ([facility]) Appt Note: flu pancreas mass (1yr f/u) [address], [address] [id number]  
[address], [address], [address] [facility] [address] [address] Upcoming Health Maintenance Date Due Hepatitis C Screening 1950 DTaP/Tdap/Td series (1 - Tdap) 4/25/1972 FOBT Q 1 YEAR AGE 50-75 4/25/2001 ZOSTER VACCINE AGE 60> 2/25/2011 EYE EXAM RETINAL OR DILATED Q1 10/21/2015 GLAUCOMA SCREENING Q2Y 4/25/2016 Pneumococcal 65+ Low/Medium Risk (1 of 2 - PCV13) 4/25/2016 MEDICARE YEARLY EXAM 4/25/2016 INFLUENZA AGE 9 TO ADULT 8/1/2017 HEMOGLOBIN A1C Q6M 4/20/2018 FOOT EXAM Q1 7/27/2018 MICROALBUMIN Q1 10/20/2018 LIPID PANEL Q1 10/20/2018 Allergies as of 10/27/2017  Review Complete On: 10/27/2017 By: Audrey Matias MD  
 No Known Allergies Current Immunizations  Never Reviewed No immunizations on file. Not reviewed this visit You Were Diagnosed With   
  
 Codes Comments Uncontrolled type 2 diabetes mellitus with diabetic nephropathy, without long-term current use of insulin ([facility] [address]    -  Primary ICD-10-CM: E11.21, E11.65 ICD-9-CM: 250.42, 583.81 Adenoma of right adrenal gland     ICD-10-CM: D35.01 
ICD-9-CM: 227.0 Essential hypertension, benign     ICD-10-CM: I10 
ICD-9-CM: 401.1 Hyperlipidemia LDL goal <70     ICD-10-CM: E78.5 ICD-9-CM: 272.4 Vitamin D deficiency     ICD-10-CM: E55.9 ICD-9-CM: 268.9 Iron deficiency anemia due to chronic blood loss     ICD-10-CM: D50.0 ICD-9-CM: 280.0 Obesity, Class I, BMI 30-34.9     ICD-10-CM: E66.9 ICD-9-CM: 278.00 Vitals BP Pulse Height(growth percentile) Weight(growth percentile) BMI Smoking Status 178/66 (!) 58 5' 8\" (1.727 m) 199 lb 6.4 oz (90.4 kg) 30.32 kg/m2 Former Smoker Vitals History BMI and BSA Data Body Mass Index Body Surface Area  
 30.32 kg/m 2 2.08 m 2 Preferred Pharmacy Pharmacy Name [facility] [place] [address] Drive [phone] Your Updated Medication List  
  
   
This list is accurate as of: 10/27/17  1:31 PM.  Always use your most recent med list.  
  
  
  
  
 allopurinol 100 mg tablet Commonly known as:  Teresa Archie Take 100 mg by mouth nightly. amLODIPine 10 mg tablet Commonly known as:  Love Breach Take 10 mg by mouth daily. BYSTOLIC 10 mg tablet Generic drug:  nebivolol TAKE TWO TABLETS BY MOUTH EVERY DAY  
  
 cloNIDine HCl 0.1 mg tablet Commonly known as:  CATAPRES Take 1 Tab by mouth two (2) times a day. ferrous sulfate 325 mg (65 mg iron) Cper Take  by mouth two (2) times a day. FISH OIL 1,000 mg Cap Generic drug:  omega-3 fatty acids-vitamin e Take  by mouth. FLOMAX 0.4 mg capsule Generic drug:  tamsulosin Take 0.4 mg by mouth daily. FOLTX 2.5-25-2 mg tablet Generic drug:  folic acid-vit M0-UZG U57 Take 1 Tab by mouth daily. furosemide 80 mg tablet Commonly known as:  LASIX Take 1 tab in the morning and 1/2 tab at 2pm--Dose change 7/27/17--updated med list--did not send prescription to the pharmacy  
  
 glipiZIDE SR 10 mg CR tablet Commonly known as:  GLUCOTROL XL Take 1 Tab by mouth daily. LIPITOR 40 mg tablet Generic drug:  atorvastatin Take 40 mg by mouth nightly. MINOXIDIL PO Take 10 mg by mouth Daily (before dinner). OTHER Beta prostate 2 in am and 1 in pm  
  
 PLAVIX 75 mg Tab Generic drug:  clopidogrel Take  by mouth daily. PRALUENT PEN 75 mg/mL injector pen Generic drug:  alirocumab INJECT EVERY 2 WEEKS  
  
 UNIFINE PENTIPS PLUS 32 gauge x 5/32\" Ndle Generic drug:  Insulin Needles (Disposable) USE DAILY  
  
 VITAMIN D3 1,000 unit tablet Generic drug:  cholecalciferol Take 2,000 Units by mouth daily. Prescriptions Sent to Pharmacy Refills  
 cloNIDine HCl (CATAPRES) 0.1 mg tablet 3 Sig: Take 1 Tab by mouth two (2) times a day. Class: Normal  
 Pharmacy: [facility] [address] Ph #: [phone] Route: Oral  
 glipiZIDE SR (GLUCOTROL XL) 10 mg CR tablet 3 Sig: Take 1 Tab by mouth daily. Class: Normal  
 Pharmacy: [email or web address]. [facility] [address] Ph #: [phone] Route: Oral  
  
We Performed the Following CBC W/O DIFF [15104 CPT(R)] HEMOGLOBIN A1C WITH EAG [09116 CPT(R)] LIPID PANEL [85957 CPT(R)] METABOLIC PANEL, COMPREHENSIVE [53372 CPT(R)] VITAMIN D, 25 HYDROXY [id number] CPT(R)] Follow-up Instructions Return in about 4 months (around 2/27/2018). To-Do List   
 06/08/2018 1:00 PM  
  Appointment with [facility] CT 1 at [facility] ([phone]) CONTRAST STUDY: 1. The patient should not eat solid food four hours before the appointment but should be encouraged to drink clear liquids. 2.  If you have to drink oral contrast, please pick it up any weekday prior to your appointment, if you cannot please check in 2 hrs before appt time. 3.  The patient will require IV access for contrast administration.  4.  The patient should not take Ibuprofen (Advil, Motrin, etc.) and Naproxen Sodium (Aleve, etc.)  on the day of the exam. Stopping non-steroidal anti-inflammatory agents (NSAIDs) like Ibuprofen decreases the risk of kidney damage from the x-ray contrast (dye). 5.  Bring any non [facility] facility films/images pertaining to the area of interest with you on the day of appointment. 6.  Bring current lab work if available (within last 90 days [facility]) ***If scheduled at [facility], iSTAT is not available, labs will need to be done before appointment*** 7. Check in at registration at least 30 minutes before appt time unless you were instructed to do otherwise. Patient Instructions 1) Stop the morning dose of atorvastatin (lipitor) and just stay on the dose at night along with praluent as your total cholesterol dropped 100 points with just one shot. We'll see if this helps with joint pains on the lower dose of lipitor. 2) Your Hemoglobin A1c (3 month test of blood sugar) is still high at 9.5%. Start taking 2 of the 5 mg glipizide tabs in the morning to use these up and then change to one 10 mg tab in the morning. This will be ready for  at the pharmacy today. If your after lunch readings are still over 180 in 2 weeks, then let me know as I will want to double your glipizide to 1 tab twice daily. 3) Your creatinine (kidney test) has risen from last time due to higher blood pressure and higher sugar so hopefully with getting your diabetes and blood pressure under better control, this will improve. 4) I will add clonidine 0.1 mg twice daily with breakfast and dinner. Try stopping the morning dose of minoxidil to see if this helps with swelling during the day. 5) Your vitamin D level is 13 which is still low. Goal is over 30. Please increase your dose to 2000 units of vitamin D daily to keep your levels at goal. 
 
 
 
  
Introducing [facility]! Dear Rachna Mcfadden: Thank you for requesting a [employer] account. Our records indicate that you already have an active [employer] account.   You can access your account anytime at [email or web address] Did you know that you can access your hospital and ER discharge instructions at any time in [employer]? You can also review all of your test results from your hospital stay or ER visit. Additional Information If you have questions, please visit the Frequently Asked Questions section of the [employer] website at [email or web address] Remember, [employer] is NOT to be used for urgent needs. For medical emergencies, dial 911. Now available from your iPhone and Android! Please provide this summary of care documentation to your next provider. Your primary care clinician is listed as Ashlyn Cobos. If you have any questions after today's visit, please call [phone].

## 2017-10-27 NOTE — PROGRESS NOTES
Chief Complaint   Patient presents with    Diabetes     pcp and pharmacy confirmed     History of Present Illness: Dianne Camacho is a 78 y.o. male here for follow up of diabetes. Weight up 6 lbs since last visit in 7/17. Has been checking 2-2.5 hours after lunch and most readings are in the 200-250 range. Has still been taking glipizide 5 mg every morning. Was put back on the morning dose of minoxidil so he is now on 1 tab bid and his leg swelling has worsened. Mostly is just taking 1 tab of lasix in the morning and once a week may take the 1/2 tab in the afternoon. Dr. Priyanka Olivo doubled his lipitor to 40 mg bid and has noticed more joint pains on this higher dose. Was just started on praluent this month and took his 2nd dose last night so his labs actually reflect only one dose in his system and they improved drastically. Has been taking 1000 units of vitamin D daily. Was told not to drink more than 48 oz of fluid per day but some days this is hard to do due to dry mouth. Has not been taking any NSAIDs so it's likely his worsening BUN/Cr is from higher A1c and BP. Current Outpatient Prescriptions   Medication Sig    PRALUENT PEN 75 mg/mL injector pen INJECT EVERY 2 WEEKS    furosemide (LASIX) 80 mg tablet Take 1 tab in the morning and 1/2 tab at 2pm--Dose change 7/27/17--updated med list--did not send prescription to the pharmacy    glipiZIDE SR (GLUCOTROL XL) 5 mg CR tablet TAKE ONE (1) TABLET(S) ION Y *NEW HIGHER DOSE REPLACES PRIOR SCRIPT ON FILE*    BYSTOLIC 10 mg tablet TAKE TWO TABLETS BY MOUTH EVERY DAY    cholecalciferol (VITAMIN D3) 1,000 unit tablet Take  by mouth daily.  UNIFINE PENTIPS PLUS 32 gauge x 5/32\" ndle USE DAILY    OTHER Beta prostate 2 in am and 1 in pm    amLODIPine (NORVASC) 10 mg tablet Take 10 mg by mouth daily.  ferrous sulfate 325 mg (65 mg iron) cpER Take  by mouth two (2) times a day.  omega-3 fatty acids-vitamin e (FISH OIL) 1,000 mg cap Take  by mouth.  MINOXIDIL PO Take 10 mg by mouth two (2) times a day.  atorvastatin (LIPITOR) 40 mg tablet Take 40 mg by mouth two (2) times a day.  folic acid-vit X1-XUL H13 (FOLTX) 2.5-25-2 mg tablet Take 1 Tab by mouth daily.  tamsulosin (FLOMAX) 0.4 mg capsule Take 0.4 mg by mouth daily.  clopidogrel (PLAVIX) 75 mg tablet Take  by mouth daily.  allopurinol (ZYLOPRIM) 100 mg tablet Take 100 mg by mouth nightly. No current facility-administered medications for this visit. No Known Allergies     Review of Systems:  - Eyes: no blurry vision or double vision  - Cardiovascular: no chest pain  - Respiratory: no shortness of breath  - Musculoskeletal: no myalgias  - Neurological: no numbness/tingling in extremities    Physical Examination:  Blood pressure 178/66, pulse (!) 58, height 5' 8\" (1.727 m), weight 199 lb 6.4 oz (90.4 kg). - General: pleasant, no distress, good eye contact   - Neck: (+) bilateral carotid bruits  - Cardiovascular: regular, normal rate, nl s1 and s2, no m/r/g,   - Respiratory: clear bilaterally  - Integumentary: 1+ pitting edema,   - Psychiatric: normal mood and affect    Data Reviewed:   -  Component      Latest Ref Rng & Units 10/20/2017 10/20/2017 10/20/2017 10/20/2017           7:57 AM  7:57 AM  7:57 AM  7:57 AM   Cholesterol, total      100 - 199 mg/dL    132   Triglyceride      0 - 149 mg/dL    306 (H)   HDL Cholesterol      >39 mg/dL    38 (L)   VLDL, calculated      5 - 40 mg/dL    61 (H)   LDL, calculated      0 - 99 mg/dL    33   Creatinine, urine      Not Estab. mg/dL   25.7    Microalbumin, urine      Not Estab. ug/mL   1393.2    Microalbumin/Creat.  Ratio      0.0 - 30.0 mg/g creat   5421.0 (H)    Hemoglobin A1c, (calculated)      4.8 - 5.6 %  9.5 (H)     Estimated average glucose      mg/dL  226     VITAMIN D, 25-HYDROXY      30.0 - 100.0 ng/mL 13.4 (L)        Component      Latest Ref Rng & Units 10/20/2017 10/20/2017           7:57 AM  7:57 AM   Glucose      65 - 99 mg/dL  266 (H)   BUN      8 - 27 mg/dL  44 (H)   Creatinine      0.76 - 1.27 mg/dL  2.37 (H)   GFR est non-AA      >59 mL/min/1.73  28 (L)   GFR est AA      >59 mL/min/1.73  32 (L)   BUN/Creatinine ratio      10 - 24  19   Sodium      134 - 144 mmol/L  141   Potassium      3.5 - 5.2 mmol/L  4.3   Chloride      96 - 106 mmol/L  97   CO2      18 - 29 mmol/L  24   Calcium      8.6 - 10.2 mg/dL  9.4   Protein, total      6.0 - 8.5 g/dL  6.7   Albumin      3.6 - 4.8 g/dL  3.9   GLOBULIN, TOTAL      1.5 - 4.5 g/dL  2.8   A-G Ratio      1.2 - 2.2  1.4   Bilirubin, total      0.0 - 1.2 mg/dL  <0.2   Alk. phosphatase      39 - 117 IU/L  86   AST      0 - 40 IU/L  24   ALT (SGPT)      0 - 44 IU/L  28   WBC      3.4 - 10.8 x10E3/uL 7.9    RBC      4.14 - 5.80 x10E6/uL 3.70 (L)    HGB      12.6 - 17.7 g/dL 11.5 (L)    HCT      37.5 - 51.0 % 34.6 (L)    MCV      79 - 97 fL 94    MCH      26.6 - 33.0 pg 31.1    MCHC      31.5 - 35.7 g/dL 33.2    RDW      12.3 - 15.4 % 14.6    PLATELET      999 - 148 x10E3/uL 290        Assessment/Plan:     1. DM w/o complication type II, controlled: his most recent Hgb A1c was 9.5% in 10/17 up from9.2% in 7/17 up from 6.1% in 1/17 down from 6.5% in 7/16 down from 6.6% in 2/16 up from 6% in 9/15 down from 6.4% in 4/15 up from 6.3% in 12/14 down from 7.6% in 8/14 up from 8.3% in 3/14 up from 6.9% in November up from 6.3% in July down from 7.3% in Feb 2013 up from 6.7% in October down from 7% in June down from 8.3% in March up from 7.3% in December down from 8% in September down from 9.5% in June, which is the same as in January. It's possible his A1c was falsely lower in Oct 2012 due to transfusion of 3 units PRBCs in September as his level was back up in Feb 2013 but his Hgb has been stable since then so the next 2 values that were <7% reflect good diabetes control.   A1c is still uncontrolled despite higher dose of glipizide so will double again and if not at goal in 2 weeks, will make to 10 mg bid and if still not at goal, will add back victoza. - increase glipizide SR to 10 mg daily  - check bs once daily at alternating times  - foot exam done 7/17  - pablito BENDER spring 2016--will obtain report  - microalbumin 994 1/11 down to 897 9/11 up to 1383 in 10/12 (possibly due to protein shake) and down to 1083 in 2/13, stable at 1087 in 11/13, down to 453 in 4/15, up to 3402 in 7/16 and 4040 in 7/17 and 5421 in 10/17  - check A1c and cmp prior to next visit     2. Unspecified essential hypertension (401.9) his BP was above goal < 140/90 on the right arm which is normally his higher arm. We have now learned that he has HTN on his right arm so we will only use from now on to measure his readings. I will decrease his minoxidil to help with edema and instead add low dose clonidine and will have to watch his pulse closely. - cont bystolic 10 mg 2 tabs daily  - cont amlodipine 10 mg daily  - decrease minoxidil to 10 mg at night  - lasix 80 mg in the am and on occasion 40 mg at 2pm  - begin clonidine 0.1 mg bid       3. Other and unspecified hyperlipidemia (272.4) Given DM and CAD, Goal LDL < 70, non-HDL < 100, and TG < 150.  his lipids were all at goal in 3/14 and 12/14 and 4/15. LDL up to 84 in 2/16 due to more red meat.  and LDL 95 in 7/16 off the niaspan and with weight gain so hopefully weight loss will help. LDL 85 and  in 1/17.  and non- in 7/17. Lipitor doubled to 40 mg bid and praluent added in 10/17 and TG down to 306 and non-HDL down to 96 in 10/17. Will cut back on lipitor to 40 mg daily to help with joint pains.  - decrease lipitor back to 40 mg daily  - cont praluent 75 mg weekly  - check lipids prior to next visit     4.   Iron deficiency anemia:  Hgb 11.5% in 10/17 up from 11.4 in 7/17 up from 10.6 in 1/17 down from 11.3 in 7/16 up from 10.8 in 2/16 down from 11.4 in 9/15 up from 10.9 in 4/15 down from 11.4 in 12/14 down from 11.6 in 8/14 up from 11.1 in 3/14 from 12.5 in 11/13 up from 11.8 in 7/13. Has been on higher dose of iron 1 tab bid since 8/14   - cont iron twice daily  - check cbc w/o diff prior to next visit    5. Vitamin D deficiency: level was 29 in 9/15 on 2000 units of D3 daily so wanted to increase to 3000 units daily but his calcium was 10.6 in 9/15 and Dr. Ashley Truong advised cutting back on his dose and he has been taking 1000 units daily and level down to 19 in 2/16 but calcium back to normal at 9.6. Level 20 in 7/16 but calcium 10.2 in 7/16. Now off 1000 units and level down to 18 in 1/17 so wanted him to restart this and he apparently did and then was told to stop by Dr. Ashley Truong and down to 13.1 in 7/17 but calcium is normal at 9.7 so had him go back to this but level still 13 in 10/17 so will increase to 2000 units daily  - increase vitamin D to 2000 units daily  - check Vitamin D 25-OH level prior to next visit    6. Class I Obesity: Tried topamax as I wanted to avoid phentermine given his vascular disease but didn't feel well on this so stopped after 2-3 weeks. Had lost 13 lbs from 7/16 to 1/17 with doing a nutrimost program but had stopped this in 1/17 when his creatinine was high and I don't know if this could have contributed to higher creatinine. Wt up 3 lbs by 7/17 and 6 lbs by 10/17 likely due to fluid  - diet and exercise    7. Right adrenal gland adenoma: he was found to have a 1.9 cm right adrenal incidentaloma on MRI in 2/17. His dex suppressed cortisol was 1.9 and plasma mets were normal and josue/renin ratio were normal in 8/17 so he doesn't appear to have a functioning adrenal gland.  - will need a repeat CT scan in 6-12 months to document stability in size of adenoma, if no change at that time, no need for further repeat imaging      Patient Instructions   1) Stop the morning dose of atorvastatin (lipitor) and just stay on the dose at night along with praluent as your total cholesterol dropped 100 points with just one shot.   We'll see if this helps with joint pains on the lower dose of lipitor. 2) Your Hemoglobin A1c (3 month test of blood sugar) is still high at 9.5%. Start taking 2 of the 5 mg glipizide tabs in the morning to use these up and then change to one 10 mg tab in the morning. This will be ready for  at the pharmacy today. If your after lunch readings are still over 180 in 2 weeks, then let me know as I will want to double your glipizide to 1 tab twice daily. 3) Your creatinine (kidney test) has risen from last time due to higher blood pressure and higher sugar so hopefully with getting your diabetes and blood pressure under better control, this will improve. 4) I will add clonidine 0.1 mg twice daily with breakfast and dinner. Try stopping the morning dose of minoxidil to see if this helps with swelling during the day. 5) Your vitamin D level is 13 which is still low. Goal is over 30. Please increase your dose to 2000 units of vitamin D daily to keep your levels at goal.      Follow-up Disposition:  Return in about 4 months (around 2/27/2018).     Copy sent to:  Dr. Kari Hoffman [phone]  Dr. Stewart King [phone]  Dr. Luis Harrington [phone]  Dr. Inocencia Rothman via [facility]

## 2017-11-05 ENCOUNTER — HOSPITAL ENCOUNTER (EMERGENCY)
Age: 67
Discharge: STILL ADMITTED OR ADMINISTRATIVE | End: 2017-11-05
Attending: FAMILY MEDICINE

## 2017-11-12 RX ORDER — CLONIDINE HYDROCHLORIDE 0.2 MG/1
0.2 TABLET ORAL 2 TIMES DAILY
Qty: 180 TAB | Refills: 3 | Status: SHIPPED | OUTPATIENT
Start: 2017-11-12 | End: 2018-02-07 | Stop reason: DRUGHIGH

## 2018-01-31 ENCOUNTER — TELEPHONE (OUTPATIENT)
Dept: ENDOCRINOLOGY | Age: 68
End: 2018-01-31

## 2018-01-31 RX ORDER — ATORVASTATIN CALCIUM 80 MG/1
80 TABLET, FILM COATED ORAL
Refills: 3 | COMMUNITY
Start: 2017-11-14 | End: 2018-02-26 | Stop reason: SDUPTHER

## 2018-01-31 RX ORDER — NEBIVOLOL HYDROCHLORIDE 20 MG/1
20 TABLET ORAL
Refills: 6 | COMMUNITY
Start: 2017-12-15

## 2018-01-31 RX ORDER — MINOXIDIL 10 MG/1
10 TABLET ORAL 2 TIMES DAILY
COMMUNITY
Start: 2018-01-11 | End: 2018-06-14

## 2018-01-31 RX ORDER — GLIPIZIDE 10 MG/1
20 TABLET, FILM COATED, EXTENDED RELEASE ORAL DAILY
Qty: 180 TAB | Refills: 3 | Status: SHIPPED | OUTPATIENT
Start: 2018-01-31 | End: 2019-01-21

## 2018-02-01 NOTE — TELEPHONE ENCOUNTER
Sent him the following message through UserApp:    I received your letter that said your blood sugars have been over 200 the last 6 times and you had one value of 266 after eating salad.  I think it's reasonable to increase your glipizide to 2 tabs every morning.  I sent an updated prescription to your pharmacy.  I also updated your med list based on what you sent to me as I had your lipitor at 40 mg but you appear to be on 80 mg.  Also are you no longer on clonidine as this is not on your list?  Let me know when you have a chance.

## 2018-02-07 ENCOUNTER — APPOINTMENT (OUTPATIENT)
Dept: GENERAL RADIOLOGY | Age: 68
DRG: 871 | End: 2018-02-07
Attending: EMERGENCY MEDICINE
Payer: MEDICARE

## 2018-02-07 ENCOUNTER — HOSPITAL ENCOUNTER (INPATIENT)
Age: 68
LOS: 4 days | Discharge: HOME | DRG: 871 | End: 2018-02-11
Attending: EMERGENCY MEDICINE | Admitting: HOSPITALIST
Payer: MEDICARE

## 2018-02-07 ENCOUNTER — APPOINTMENT (OUTPATIENT)
Dept: CT IMAGING | Age: 68
DRG: 871 | End: 2018-02-07
Attending: EMERGENCY MEDICINE
Payer: MEDICARE

## 2018-02-07 DIAGNOSIS — R09.02 HYPOXIA: Primary | ICD-10-CM

## 2018-02-07 DIAGNOSIS — R79.89 ELEVATED BRAIN NATRIURETIC PEPTIDE (BNP) LEVEL: ICD-10-CM

## 2018-02-07 DIAGNOSIS — R68.89 FLU-LIKE SYMPTOMS: ICD-10-CM

## 2018-02-07 DIAGNOSIS — R06.00 DYSPNEA, UNSPECIFIED TYPE: ICD-10-CM

## 2018-02-07 PROBLEM — J18.9 BILATERAL PNEUMONIA: Status: ACTIVE | Noted: 2018-02-07

## 2018-02-07 PROBLEM — J96.00 ACUTE RESPIRATORY FAILURE (HCC): Status: ACTIVE | Noted: 2018-02-07

## 2018-02-07 LAB
ALBUMIN SERPL-MCNC: 2.5 G/DL (ref 3.5–5)
ALBUMIN/GLOB SERPL: 0.5 {RATIO} (ref 1.1–2.2)
ALP SERPL-CCNC: 86 U/L (ref 45–117)
ALT SERPL-CCNC: 20 U/L (ref 12–78)
ANION GAP SERPL CALC-SCNC: 8 MMOL/L (ref 5–15)
APPEARANCE UR: CLEAR
ARTERIAL PATENCY WRIST A: ABNORMAL
AST SERPL-CCNC: 17 U/L (ref 15–37)
ATRIAL RATE: 64 BPM
BACTERIA URNS QL MICRO: NEGATIVE /HPF
BASE DEFICIT BLDA-SCNC: 3.6 MMOL/L
BASOPHILS # BLD: 0 K/UL (ref 0–0.1)
BASOPHILS NFR BLD: 0 % (ref 0–1)
BDY SITE: ABNORMAL
BILIRUB SERPL-MCNC: 0.6 MG/DL (ref 0.2–1)
BILIRUB UR QL: NEGATIVE
BNP SERPL-MCNC: 3184 PG/ML (ref 0–125)
BUN SERPL-MCNC: 54 MG/DL (ref 6–20)
BUN/CREAT SERPL: 19 (ref 12–20)
CALCIUM SERPL-MCNC: 8.5 MG/DL (ref 8.5–10.1)
CALCULATED R AXIS, ECG10: 7 DEGREES
CALCULATED T AXIS, ECG11: 76 DEGREES
CHLORIDE SERPL-SCNC: 105 MMOL/L (ref 97–108)
CO2 SERPL-SCNC: 26 MMOL/L (ref 21–32)
COLOR UR: ABNORMAL
CREAT SERPL-MCNC: 2.8 MG/DL (ref 0.7–1.3)
DIAGNOSIS, 93000: NORMAL
DIFFERENTIAL METHOD BLD: ABNORMAL
EOSINOPHIL # BLD: 0.1 K/UL (ref 0–0.4)
EOSINOPHIL NFR BLD: 1 % (ref 0–7)
EPITH CASTS URNS QL MICRO: ABNORMAL /LPF
ERYTHROCYTE [DISTWIDTH] IN BLOOD BY AUTOMATED COUNT: 14.9 % (ref 11.5–14.5)
FIO2 ON VENT: 100 %
FLUAV AG NPH QL IA: NEGATIVE
FLUBV AG NOSE QL IA: NEGATIVE
GAS FLOW.O2 O2 DELIVERY SYS: 15 L/MIN
GLOBULIN SER CALC-MCNC: 4.7 G/DL (ref 2–4)
GLUCOSE BLD STRIP.AUTO-MCNC: 193 MG/DL (ref 65–100)
GLUCOSE BLD STRIP.AUTO-MCNC: 274 MG/DL (ref 65–100)
GLUCOSE SERPL-MCNC: 253 MG/DL (ref 65–100)
GLUCOSE UR STRIP.AUTO-MCNC: 500 MG/DL
HCO3 BLDA-SCNC: 19 MMOL/L (ref 22–26)
HCT VFR BLD AUTO: 28.3 % (ref 36.6–50.3)
HGB BLD-MCNC: 9.4 G/DL (ref 12.1–17)
HGB UR QL STRIP: ABNORMAL
HYALINE CASTS URNS QL MICRO: ABNORMAL /LPF (ref 0–5)
IMM GRANULOCYTES # BLD: 0 K/UL (ref 0–0.04)
IMM GRANULOCYTES NFR BLD AUTO: 0 % (ref 0–0.5)
KETONES UR QL STRIP.AUTO: NEGATIVE MG/DL
LACTATE SERPL-SCNC: 1.4 MMOL/L (ref 0.4–2)
LEUKOCYTE ESTERASE UR QL STRIP.AUTO: NEGATIVE
LYMPHOCYTES # BLD: 0.4 K/UL (ref 0.8–3.5)
LYMPHOCYTES NFR BLD: 3 % (ref 12–49)
MCH RBC QN AUTO: 31.1 PG (ref 26–34)
MCHC RBC AUTO-ENTMCNC: 33.2 G/DL (ref 30–36.5)
MCV RBC AUTO: 93.7 FL (ref 80–99)
MONOCYTES # BLD: 0.5 K/UL (ref 0–1)
MONOCYTES NFR BLD: 4 % (ref 5–13)
NEUTS BAND NFR BLD MANUAL: 7 %
NEUTS SEG # BLD: 11.9 K/UL (ref 1.8–8)
NEUTS SEG NFR BLD: 85 % (ref 32–75)
NITRITE UR QL STRIP.AUTO: NEGATIVE
NRBC # BLD: 0 K/UL (ref 0–0.01)
NRBC BLD-RTO: 0 PER 100 WBC
PCO2 BLDA: 29 MMHG (ref 35–45)
PH BLDA: 7.44 [PH] (ref 7.35–7.45)
PH UR STRIP: 6 [PH] (ref 5–8)
PLATELET # BLD AUTO: 228 K/UL (ref 150–400)
PMV BLD AUTO: 12.6 FL (ref 8.9–12.9)
PO2 BLDA: 77 MMHG (ref 80–100)
POTASSIUM SERPL-SCNC: 3.6 MMOL/L (ref 3.5–5.1)
PROT SERPL-MCNC: 7.2 G/DL (ref 6.4–8.2)
PROT UR STRIP-MCNC: >300 MG/DL
Q-T INTERVAL, ECG07: 414 MS
QRS DURATION, ECG06: 92 MS
QTC CALCULATION (BEZET), ECG08: 465 MS
RBC # BLD AUTO: 3.02 M/UL (ref 4.1–5.7)
RBC #/AREA URNS HPF: ABNORMAL /HPF (ref 0–5)
RBC MORPH BLD: ABNORMAL
SAO2 % BLD: 96 % (ref 92–97)
SAO2% DEVICE SAO2% SENSOR NAME: ABNORMAL
SERVICE CMNT-IMP: ABNORMAL
SERVICE CMNT-IMP: ABNORMAL
SODIUM SERPL-SCNC: 139 MMOL/L (ref 136–145)
SP GR UR REFRACTOMETRY: 1.02 (ref 1–1.03)
SPECIMEN SITE: ABNORMAL
TROPONIN I SERPL-MCNC: <0.04 NG/ML
UROBILINOGEN UR QL STRIP.AUTO: 0.2 EU/DL (ref 0.2–1)
VENTRICULAR RATE, ECG03: 76 BPM
WBC # BLD AUTO: 12.9 K/UL (ref 4.1–11.1)
WBC URNS QL MICRO: ABNORMAL /HPF (ref 0–4)

## 2018-02-07 PROCEDURE — 83880 ASSAY OF NATRIURETIC PEPTIDE: CPT | Performed by: EMERGENCY MEDICINE

## 2018-02-07 PROCEDURE — 74011250637 HC RX REV CODE- 250/637: Performed by: EMERGENCY MEDICINE

## 2018-02-07 PROCEDURE — 94640 AIRWAY INHALATION TREATMENT: CPT

## 2018-02-07 PROCEDURE — 71045 X-RAY EXAM CHEST 1 VIEW: CPT

## 2018-02-07 PROCEDURE — 87804 INFLUENZA ASSAY W/OPTIC: CPT | Performed by: EMERGENCY MEDICINE

## 2018-02-07 PROCEDURE — 77030029684 HC NEB SM VOL KT MONA -A

## 2018-02-07 PROCEDURE — 74011000250 HC RX REV CODE- 250: Performed by: EMERGENCY MEDICINE

## 2018-02-07 PROCEDURE — 36600 WITHDRAWAL OF ARTERIAL BLOOD: CPT | Performed by: HOSPITALIST

## 2018-02-07 PROCEDURE — 96374 THER/PROPH/DIAG INJ IV PUSH: CPT

## 2018-02-07 PROCEDURE — 99285 EMERGENCY DEPT VISIT HI MDM: CPT

## 2018-02-07 PROCEDURE — 74011000250 HC RX REV CODE- 250: Performed by: HOSPITALIST

## 2018-02-07 PROCEDURE — 71250 CT THORAX DX C-: CPT

## 2018-02-07 PROCEDURE — 74011250636 HC RX REV CODE- 250/636: Performed by: HOSPITALIST

## 2018-02-07 PROCEDURE — 65660000000 HC RM CCU STEPDOWN

## 2018-02-07 PROCEDURE — 82803 BLOOD GASES ANY COMBINATION: CPT | Performed by: HOSPITALIST

## 2018-02-07 PROCEDURE — 93005 ELECTROCARDIOGRAM TRACING: CPT

## 2018-02-07 PROCEDURE — 74011000258 HC RX REV CODE- 258: Performed by: HOSPITALIST

## 2018-02-07 PROCEDURE — 74011250637 HC RX REV CODE- 250/637: Performed by: HOSPITALIST

## 2018-02-07 PROCEDURE — 85025 COMPLETE CBC W/AUTO DIFF WBC: CPT | Performed by: EMERGENCY MEDICINE

## 2018-02-07 PROCEDURE — 83605 ASSAY OF LACTIC ACID: CPT | Performed by: EMERGENCY MEDICINE

## 2018-02-07 PROCEDURE — 74011250636 HC RX REV CODE- 250/636: Performed by: EMERGENCY MEDICINE

## 2018-02-07 PROCEDURE — 94664 DEMO&/EVAL PT USE INHALER: CPT

## 2018-02-07 PROCEDURE — 80053 COMPREHEN METABOLIC PANEL: CPT | Performed by: EMERGENCY MEDICINE

## 2018-02-07 PROCEDURE — 96361 HYDRATE IV INFUSION ADD-ON: CPT

## 2018-02-07 PROCEDURE — 81001 URINALYSIS AUTO W/SCOPE: CPT | Performed by: EMERGENCY MEDICINE

## 2018-02-07 PROCEDURE — 74011636637 HC RX REV CODE- 636/637: Performed by: HOSPITALIST

## 2018-02-07 PROCEDURE — 82962 GLUCOSE BLOOD TEST: CPT

## 2018-02-07 PROCEDURE — 87040 BLOOD CULTURE FOR BACTERIA: CPT | Performed by: EMERGENCY MEDICINE

## 2018-02-07 PROCEDURE — 36415 COLL VENOUS BLD VENIPUNCTURE: CPT | Performed by: EMERGENCY MEDICINE

## 2018-02-07 PROCEDURE — 84484 ASSAY OF TROPONIN QUANT: CPT | Performed by: EMERGENCY MEDICINE

## 2018-02-07 RX ORDER — LANOLIN ALCOHOL/MO/W.PET/CERES
1 CREAM (GRAM) TOPICAL
Status: DISCONTINUED | OUTPATIENT
Start: 2018-02-08 | End: 2018-02-11 | Stop reason: HOSPADM

## 2018-02-07 RX ORDER — TAMSULOSIN HYDROCHLORIDE 0.4 MG/1
0.4 CAPSULE ORAL
Status: DISCONTINUED | OUTPATIENT
Start: 2018-02-07 | End: 2018-02-11 | Stop reason: HOSPADM

## 2018-02-07 RX ORDER — FUROSEMIDE 10 MG/ML
40 INJECTION INTRAMUSCULAR; INTRAVENOUS
Status: COMPLETED | OUTPATIENT
Start: 2018-02-07 | End: 2018-02-07

## 2018-02-07 RX ORDER — ACETAMINOPHEN 500 MG
1000 TABLET ORAL
COMMUNITY
End: 2018-02-26

## 2018-02-07 RX ORDER — OSELTAMIVIR PHOSPHATE 30 MG/1
30 CAPSULE ORAL
Status: COMPLETED | OUTPATIENT
Start: 2018-02-07 | End: 2018-02-07

## 2018-02-07 RX ORDER — IPRATROPIUM BROMIDE AND ALBUTEROL SULFATE 2.5; .5 MG/3ML; MG/3ML
3 SOLUTION RESPIRATORY (INHALATION)
Status: DISCONTINUED | OUTPATIENT
Start: 2018-02-07 | End: 2018-02-10

## 2018-02-07 RX ORDER — DOCUSATE SODIUM 100 MG/1
100 CAPSULE, LIQUID FILLED ORAL 2 TIMES DAILY
Status: DISCONTINUED | OUTPATIENT
Start: 2018-02-07 | End: 2018-02-11 | Stop reason: HOSPADM

## 2018-02-07 RX ORDER — HEPARIN SODIUM 5000 [USP'U]/ML
5000 INJECTION, SOLUTION INTRAVENOUS; SUBCUTANEOUS EVERY 8 HOURS
Status: DISCONTINUED | OUTPATIENT
Start: 2018-02-07 | End: 2018-02-11 | Stop reason: HOSPADM

## 2018-02-07 RX ORDER — IPRATROPIUM BROMIDE AND ALBUTEROL SULFATE 2.5; .5 MG/3ML; MG/3ML
3 SOLUTION RESPIRATORY (INHALATION)
Status: COMPLETED | OUTPATIENT
Start: 2018-02-07 | End: 2018-02-07

## 2018-02-07 RX ORDER — ACETAMINOPHEN 500 MG
1000 TABLET ORAL
Status: COMPLETED | OUTPATIENT
Start: 2018-02-07 | End: 2018-02-07

## 2018-02-07 RX ORDER — DEXTROSE 50 % IN WATER (D50W) INTRAVENOUS SYRINGE
12.5-25 AS NEEDED
Status: DISCONTINUED | OUTPATIENT
Start: 2018-02-07 | End: 2018-02-11 | Stop reason: HOSPADM

## 2018-02-07 RX ORDER — MELATONIN
1000
Status: DISCONTINUED | OUTPATIENT
Start: 2018-02-07 | End: 2018-02-11 | Stop reason: HOSPADM

## 2018-02-07 RX ORDER — MAGNESIUM SULFATE 100 %
4 CRYSTALS MISCELLANEOUS AS NEEDED
Status: DISCONTINUED | OUTPATIENT
Start: 2018-02-07 | End: 2018-02-11 | Stop reason: HOSPADM

## 2018-02-07 RX ORDER — OMEGA-3 FATTY ACIDS 1000 MG
2000 CAPSULE ORAL
COMMUNITY
End: 2018-02-11

## 2018-02-07 RX ORDER — INSULIN LISPRO 100 [IU]/ML
INJECTION, SOLUTION INTRAVENOUS; SUBCUTANEOUS
Status: DISCONTINUED | OUTPATIENT
Start: 2018-02-07 | End: 2018-02-11 | Stop reason: HOSPADM

## 2018-02-07 RX ORDER — CLOPIDOGREL BISULFATE 75 MG/1
75 TABLET ORAL
Status: DISCONTINUED | OUTPATIENT
Start: 2018-02-07 | End: 2018-02-11 | Stop reason: HOSPADM

## 2018-02-07 RX ORDER — CLONIDINE HYDROCHLORIDE 0.1 MG/1
0.2 TABLET ORAL 2 TIMES DAILY
Status: DISCONTINUED | OUTPATIENT
Start: 2018-02-07 | End: 2018-02-11 | Stop reason: HOSPADM

## 2018-02-07 RX ORDER — OSELTAMIVIR PHOSPHATE 75 MG/1
75 CAPSULE ORAL 2 TIMES DAILY
COMMUNITY
End: 2018-02-11

## 2018-02-07 RX ORDER — SODIUM CHLORIDE 0.9 % (FLUSH) 0.9 %
5-10 SYRINGE (ML) INJECTION AS NEEDED
Status: DISCONTINUED | OUTPATIENT
Start: 2018-02-07 | End: 2018-02-11 | Stop reason: HOSPADM

## 2018-02-07 RX ORDER — ONDANSETRON 2 MG/ML
4 INJECTION INTRAMUSCULAR; INTRAVENOUS
Status: DISCONTINUED | OUTPATIENT
Start: 2018-02-07 | End: 2018-02-11 | Stop reason: HOSPADM

## 2018-02-07 RX ORDER — ALLOPURINOL 100 MG/1
200 TABLET ORAL
Status: DISCONTINUED | OUTPATIENT
Start: 2018-02-07 | End: 2018-02-11 | Stop reason: HOSPADM

## 2018-02-07 RX ORDER — ACETAMINOPHEN 325 MG/1
650 TABLET ORAL
Status: DISCONTINUED | OUTPATIENT
Start: 2018-02-07 | End: 2018-02-11 | Stop reason: HOSPADM

## 2018-02-07 RX ORDER — GLIPIZIDE 5 MG/1
20 TABLET, FILM COATED, EXTENDED RELEASE ORAL DAILY
Status: DISCONTINUED | OUTPATIENT
Start: 2018-02-08 | End: 2018-02-11 | Stop reason: HOSPADM

## 2018-02-07 RX ORDER — CLONIDINE HYDROCHLORIDE 0.2 MG/1
0.4 TABLET ORAL 2 TIMES DAILY
COMMUNITY
End: 2018-02-11

## 2018-02-07 RX ORDER — NEBIVOLOL 10 MG/1
20 TABLET ORAL
Status: DISCONTINUED | OUTPATIENT
Start: 2018-02-07 | End: 2018-02-11 | Stop reason: HOSPADM

## 2018-02-07 RX ORDER — OSELTAMIVIR PHOSPHATE 75 MG/1
150 CAPSULE ORAL ONCE
COMMUNITY
End: 2018-02-07

## 2018-02-07 RX ORDER — SODIUM CHLORIDE 9 MG/ML
100 INJECTION, SOLUTION INTRAVENOUS CONTINUOUS
Status: DISCONTINUED | OUTPATIENT
Start: 2018-02-07 | End: 2018-02-07

## 2018-02-07 RX ORDER — SODIUM CHLORIDE 0.9 % (FLUSH) 0.9 %
5-10 SYRINGE (ML) INJECTION EVERY 8 HOURS
Status: DISCONTINUED | OUTPATIENT
Start: 2018-02-07 | End: 2018-02-11 | Stop reason: HOSPADM

## 2018-02-07 RX ORDER — BUMETANIDE 0.25 MG/ML
1 INJECTION INTRAMUSCULAR; INTRAVENOUS DAILY
Status: DISCONTINUED | OUTPATIENT
Start: 2018-02-08 | End: 2018-02-10

## 2018-02-07 RX ORDER — OMEGA-3/DHA/EPA/FISH OIL 300-1000MG
2 CAPSULE,DELAYED RELEASE (ENTERIC COATED) ORAL DAILY
Status: DISCONTINUED | OUTPATIENT
Start: 2018-02-08 | End: 2018-02-11 | Stop reason: HOSPADM

## 2018-02-07 RX ORDER — OMEGA-3/DHA/EPA/FISH OIL 300-1000MG
1 CAPSULE,DELAYED RELEASE (ENTERIC COATED) ORAL DAILY
Status: DISCONTINUED | OUTPATIENT
Start: 2018-02-08 | End: 2018-02-11 | Stop reason: HOSPADM

## 2018-02-07 RX ORDER — MINOXIDIL 2.5 MG/1
10 TABLET ORAL 2 TIMES DAILY
Status: DISCONTINUED | OUTPATIENT
Start: 2018-02-07 | End: 2018-02-11 | Stop reason: HOSPADM

## 2018-02-07 RX ORDER — OMEGA-3 FATTY ACIDS 1000 MG
1000 CAPSULE ORAL 2 TIMES DAILY
COMMUNITY

## 2018-02-07 RX ADMIN — FUROSEMIDE 40 MG: 10 INJECTION, SOLUTION INTRAMUSCULAR; INTRAVENOUS at 15:24

## 2018-02-07 RX ADMIN — SODIUM CHLORIDE 100 ML/HR: 900 INJECTION, SOLUTION INTRAVENOUS at 15:21

## 2018-02-07 RX ADMIN — SODIUM CHLORIDE 2721 ML: 900 INJECTION, SOLUTION INTRAVENOUS at 08:54

## 2018-02-07 RX ADMIN — CEFTRIAXONE 1 G: 1 INJECTION, POWDER, FOR SOLUTION INTRAMUSCULAR; INTRAVENOUS at 13:19

## 2018-02-07 RX ADMIN — AZITHROMYCIN 500 MG: 500 INJECTION, POWDER, LYOPHILIZED, FOR SOLUTION INTRAVENOUS at 14:00

## 2018-02-07 RX ADMIN — Medication 10 ML: at 21:22

## 2018-02-07 RX ADMIN — ALLOPURINOL 200 MG: 100 TABLET ORAL at 21:16

## 2018-02-07 RX ADMIN — CLOPIDOGREL BISULFATE 75 MG: 75 TABLET ORAL at 21:16

## 2018-02-07 RX ADMIN — FUROSEMIDE 40 MG: 10 INJECTION, SOLUTION INTRAMUSCULAR; INTRAVENOUS at 10:12

## 2018-02-07 RX ADMIN — HEPARIN SODIUM 5000 UNITS: 5000 INJECTION, SOLUTION INTRAVENOUS; SUBCUTANEOUS at 13:43

## 2018-02-07 RX ADMIN — IPRATROPIUM BROMIDE AND ALBUTEROL SULFATE 3 ML: .5; 3 SOLUTION RESPIRATORY (INHALATION) at 08:47

## 2018-02-07 RX ADMIN — HEPARIN SODIUM 5000 UNITS: 5000 INJECTION, SOLUTION INTRAVENOUS; SUBCUTANEOUS at 21:19

## 2018-02-07 RX ADMIN — VITAMIN D, TAB 1000IU (100/BT) 1000 UNITS: 25 TAB at 21:16

## 2018-02-07 RX ADMIN — IPRATROPIUM BROMIDE AND ALBUTEROL SULFATE 3 ML: .5; 3 SOLUTION RESPIRATORY (INHALATION) at 13:18

## 2018-02-07 RX ADMIN — IPRATROPIUM BROMIDE AND ALBUTEROL SULFATE 3 ML: .5; 3 SOLUTION RESPIRATORY (INHALATION) at 19:11

## 2018-02-07 RX ADMIN — MINOXIDIL 10 MG: 2.5 TABLET ORAL at 17:55

## 2018-02-07 RX ADMIN — OSELTAMIVIR PHOSPHATE 30 MG: 30 CAPSULE ORAL at 10:12

## 2018-02-07 RX ADMIN — Medication 10 ML: at 14:01

## 2018-02-07 RX ADMIN — ACETAMINOPHEN 650 MG: 325 TABLET ORAL at 15:37

## 2018-02-07 RX ADMIN — ACETAMINOPHEN 1000 MG: 500 TABLET ORAL at 08:47

## 2018-02-07 RX ADMIN — IPRATROPIUM BROMIDE AND ALBUTEROL SULFATE 3 ML: .5; 3 SOLUTION RESPIRATORY (INHALATION) at 23:08

## 2018-02-07 RX ADMIN — TAMSULOSIN HYDROCHLORIDE 0.4 MG: 0.4 CAPSULE ORAL at 21:17

## 2018-02-07 RX ADMIN — IPRATROPIUM BROMIDE AND ALBUTEROL SULFATE 3 ML: .5; 3 SOLUTION RESPIRATORY (INHALATION) at 15:59

## 2018-02-07 RX ADMIN — ACETAMINOPHEN 650 MG: 325 TABLET ORAL at 23:00

## 2018-02-07 RX ADMIN — NEBIVOLOL HYDROCHLORIDE 20 MG: 10 TABLET ORAL at 21:17

## 2018-02-07 RX ADMIN — CLONIDINE HYDROCHLORIDE 0.2 MG: 0.1 TABLET ORAL at 17:55

## 2018-02-07 RX ADMIN — INSULIN LISPRO 7 UNITS: 100 INJECTION, SOLUTION INTRAVENOUS; SUBCUTANEOUS at 17:13

## 2018-02-07 RX ADMIN — DOCUSATE SODIUM 100 MG: 100 CAPSULE, LIQUID FILLED ORAL at 17:55

## 2018-02-07 NOTE — PROGRESS NOTES
TRANSFER - OUT REPORT:    Verbal report given to VIRGIL Davila (name) on Acacia Romero  being transferred to PCU (unit) for routine progression of care       Report consisted of patients Situation, Background, Assessment and   Recommendations(SBAR). Information from the following report(s) SBAR, Kardex, Intake/Output, Recent Results and Cardiac Rhythm NSR was reviewed with the receiving nurse. Lines:   Peripheral IV 02/07/18 Left Antecubital (Active)   Site Assessment Clean, dry, & intact 2/7/2018  3:18 PM   Phlebitis Assessment 0 2/7/2018  3:18 PM   Infiltration Assessment 0 2/7/2018  3:18 PM   Dressing Status Clean, dry, & intact 2/7/2018  3:18 PM   Dressing Type Tape 2/7/2018  3:18 PM   Hub Color/Line Status Infusing;Blue 2/7/2018  3:18 PM   Action Taken Blood drawn 2/7/2018  7:55 AM        Opportunity for questions and clarification was provided.       Patient transported with:   Monitor  Registered Nurse

## 2018-02-07 NOTE — IP AVS SNAPSHOT
[address] [facility] 
[phone] Patient: Liliana Riggins 
MRN: [id number] BQN:[id number] About your hospitalization You were admitted on:  February 7, 2018 You last received care in the:  [facility] 2 CARDIOPULMONARY CARE You were discharged on:  February 11, 2018 Why you were hospitalized Your primary diagnosis was:  Bilateral Pneumonia Your diagnoses also included:  Acute Respiratory Failure (Hcc), Pulmonary Edema, Sepsis (Hcc) Follow-up Information Follow up With Details Comments Contact Info Deandra Diego MD In 3 days hospital follow-up [address] [facility] 
[phone] [facility] Office on Aging  For additional CHF education, med management, resources, and support in the community. [address] Your Scheduled Appointments Monday February 26, 2018  9:10 AM EST Follow Up with MD [facility]) [facility] [address] [address] [phone] Discharge Orders None A check griselda indicates which time of day the medication should be taken. My Medications START taking these medications Instructions Each Dose to Equal  
 Morning Noon Evening Bedtime  
 guaiFENesin  mg ER tablet Commonly known as:  Nonnie Eagles Your last dose was: Your next dose is: Take 1 Tab by mouth every twelve (12) hours. 600 mg  
    
   
   
   
  
 levoFLOXacin 750 mg tablet Commonly known as:  Edvin Crater Your last dose was: Your next dose is: Take 1 Tab by mouth daily. 750 mg CHANGE how you take these medications Instructions Each Dose to Equal  
 Morning Noon Evening Bedtime  
 cloNIDine HCl 0.2 mg tablet Commonly known as:  CATAPRES What changed:   
- how much to take - when to take this - Another medication with the same name was removed. Continue taking this medication, and follow the directions you see here. Your last dose was: Your next dose is: Take 1 Tab by mouth Before breakfast, lunch, and dinner. 0.2 mg  
    
   
   
   
  
 FISH OIL CONCENTRATE 1,000 mg Cap Generic drug:  omega-3 fatty acids What changed:  Another medication with the same name was removed. Continue taking this medication, and follow the directions you see here. Your last dose was: Your next dose is: Take 1,000 mg by mouth daily. Patient takes 1,000 mg in the AM and 2,000 mg in the PM  
 1000 mg  
    
   
   
   
  
 * glipiZIDE SR 10 mg CR tablet Commonly known as:  GLUCOTROL XL What changed:  Another medication with the same name was added. Make sure you understand how and when to take each. Your last dose was: Your next dose is: Take 2 Tabs by mouth daily. 20 mg  
    
   
   
   
  
 * glipiZIDE SR 10 mg CR tablet Commonly known as:  GLUCOTROL XL Start taking on:  2/12/2018 What changed: You were already taking a medication with the same name, and this prescription was added. Make sure you understand how and when to take each. Your last dose was: Your next dose is: Take 2 Tabs by mouth daily. 20 mg  
    
   
   
   
  
 * Notice: This list has 2 medication(s) that are the same as other medications prescribed for you. Read the directions carefully, and ask your doctor or other care provider to review them with you. CONTINUE taking these medications Instructions Each Dose to Equal  
 Morning Noon Evening Bedtime  
 allopurinol 100 mg tablet Commonly known as:  Julio Wilder Your last dose was: Your next dose is: Take 200 mg by mouth nightly.   
 200 mg  
    
   
   
 amLODIPine 10 mg tablet Commonly known as:  Paola Perales Your last dose was: Your next dose is: Take 10 mg by mouth nightly. 10 mg  
    
   
   
   
  
 atorvastatin 80 mg tablet Commonly known as:  LIPITOR Your last dose was: Your next dose is: Take 80 mg by mouth nightly. Take 1 tab daily 80 mg  
    
   
   
   
  
 BYSTOLIC 20 mg tablet Generic drug:  nebivolol Your last dose was: Your next dose is: Take 20 mg by mouth nightly. Take 1 tab daily 20 mg  
    
   
   
   
  
 ferrous sulfate 325 mg (65 mg iron) Cper Your last dose was: Your next dose is: Take 1 Tab by mouth two (2) times a day. 1 Tab FLOMAX 0.4 mg capsule Generic drug:  tamsulosin Your last dose was: Your next dose is: Take 0.4 mg by mouth nightly. 0.4 mg  
    
   
   
   
  
 FOLTX 2.5-25-2 mg tablet Generic drug:  folic acid-vit J0-VXR N63 Your last dose was: Your next dose is: Take 1 Tab by mouth nightly. 1 Tab  
    
   
   
   
  
 furosemide 80 mg tablet Commonly known as:  LASIX Your last dose was: Your next dose is: Take 80 mg by mouth two (2) times a day. 80 mg  
    
   
   
   
  
 minoxidil 10 mg tablet Commonly known as:  Mearl Hollow Your last dose was: Your next dose is: Take 10 mg by mouth two (2) times a day. 10 mg  
    
   
   
   
  
 PLAVIX 75 mg Tab Generic drug:  clopidogrel Your last dose was: Your next dose is: Take 75 mg by mouth nightly. 75 mg PRALUENT PEN 75 mg/mL injector pen Generic drug:  alirocumab Your last dose was: Your next dose is: INJECT EVERY 2 WEEKS TYLENOL EXTRA STRENGTH 500 mg tablet Generic drug:  acetaminophen Your last dose was: Your next dose is: Take 1,000 mg by mouth every six (6) hours as needed for Pain. 1000 mg  
    
   
   
   
  
 VITAMIN D3 1,000 unit tablet Generic drug:  cholecalciferol Your last dose was: Your next dose is: Take 1,000 Units by mouth nightly. 1000 Units STOP taking these medications FISH OIL 1,000 mg Cap Generic drug:  omega-3 fatty acids-vitamin e  
   
  
 OTHER  
   
  
 TAMIFLU 75 mg capsule Generic drug:  oseltamivir Where to Get Your Medications These medications were sent to [address] [place] [address] Steve Reddynatanael [place] [address] Phone:  [phone]  
  glipiZIDE SR 10 mg CR tablet Information on where to get these meds will be given to you by the nurse or doctor. ! Ask your nurse or doctor about these medications  
  cloNIDine HCl 0.2 mg tablet  
 guaiFENesin  mg ER tablet  
 levoFLOXacin 750 mg tablet Discharge Instructions DASH Diet: Care Instructions Your Care Instructions The DASH diet is an eating plan that can help lower your blood pressure. DASH stands for Dietary Approaches to Stop Hypertension. Hypertension is high blood pressure. The DASH diet focuses on eating foods that are high in calcium, potassium, and magnesium. These nutrients can lower blood pressure. The foods that are highest in these nutrients are fruits, vegetables, low-fat dairy products, nuts, seeds, and legumes. But taking calcium, potassium, and magnesium supplements instead of eating foods that are high in those nutrients does not have the same effect. The DASH diet also includes whole grains, fish, and poultry. The DASH diet is one of several lifestyle changes your doctor may recommend to lower your high blood pressure.  Your doctor may also want you to decrease the amount of sodium in your diet. Lowering sodium while following the DASH diet can lower blood pressure even further than just the DASH diet alone. Follow-up care is a key part of your treatment and safety. Be sure to make and go to all appointments, and call your doctor if you are having problems. It's also a good idea to know your test results and keep a list of the medicines you take. How can you care for yourself at home? Following the DASH diet · Eat 4 to 5 servings of fruit each day. A serving is 1 medium-sized piece of fruit, ½ cup chopped or canned fruit, 1/4 cup dried fruit, or 4 ounces (½ cup) of fruit juice. Choose fruit more often than fruit juice. · Eat 4 to 5 servings of vegetables each day. A serving is 1 cup of lettuce or raw leafy vegetables, ½ cup of chopped or cooked vegetables, or 4 ounces (½ cup) of vegetable juice. Choose vegetables more often than vegetable juice. · Get 2 to 3 servings of low-fat and fat-free dairy each day. A serving is 8 ounces of milk, 1 cup of yogurt, or 1 ½ ounces of cheese. · Eat 6 to 8 servings of grains each day. A serving is 1 slice of bread, 1 ounce of dry cereal, or ½ cup of cooked rice, pasta, or cooked cereal. Try to choose whole-grain products as much as possible. · Limit lean meat, poultry, and fish to 2 servings each day. A serving is 3 ounces, about the size of a deck of cards. · Eat 4 to 5 servings of nuts, seeds, and legumes (cooked dried beans, lentils, and split peas) each week. A serving is 1/3 cup of nuts, 2 tablespoons of seeds, or ½ cup of cooked beans or peas. · Limit fats and oils to 2 to 3 servings each day. A serving is 1 teaspoon of vegetable oil or 2 tablespoons of salad dressing. · Limit sweets and added sugars to 5 servings or less a week. A serving is 1 tablespoon jelly or jam, ½ cup sorbet, or 1 cup of lemonade. · Eat less than 2,300 milligrams (mg) of sodium a day.  If you limit your sodium to 1,500 mg a day, you can lower your blood pressure even more. Tips for success · Start small. Do not try to make dramatic changes to your diet all at once. You might feel that you are missing out on your favorite foods and then be more likely to not follow the plan. Make small changes, and stick with them. Once those changes become habit, add a few more changes. · Try some of the following: ¨ Make it a goal to eat a fruit or vegetable at every meal and at snacks. This will make it easy to get the recommended amount of fruits and vegetables each day. ¨ Try yogurt topped with fruit and nuts for a snack or healthy dessert. ¨ Add lettuce, tomato, cucumber, and onion to sandwiches. ¨ Combine a ready-made pizza crust with low-fat mozzarella cheese and lots of vegetable toppings. Try using tomatoes, squash, spinach, broccoli, carrots, cauliflower, and onions. ¨ Have a variety of cut-up vegetables with a low-fat dip as an appetizer instead of chips and dip. ¨ Sprinkle sunflower seeds or chopped almonds over salads. Or try adding chopped walnuts or almonds to cooked vegetables. ¨ Try some vegetarian meals using beans and peas. Add garbanzo or kidney beans to salads. Make burritos and tacos with mashed don beans or black beans. Where can you learn more? Go to http://theodore-levar.info/. Enter M464 in the search box to learn more about \"DASH Diet: Care Instructions. \" Current as of: September 21, 2016 Content Version: 11.4 © 1975-6031 [employer]. Care instructions adapted under license by [employer] (which disclaims liability or warranty for this information). If you have questions about a medical condition or this instruction, always ask your healthcare professional. [place] [address] any warranty or liability for your use of this information. [employer] Announcement We are excited to announce that we are making your provider's discharge notes available to you in [employer]. You will see these notes when they are completed and signed by the physician that discharged you from your recent hospital stay. If you have any questions or concerns about any information you see in [employer], please call the Health Information Department where you were seen or reach out to your Primary Care Provider for more information about your plan of care. Introducing [address]! Dear Kim Fiddler: Thank you for requesting a [employer] account. Our records indicate that you already have an active [employer] account. You can access your account anytime at [email or web address] Did you know that you can access your hospital and ER discharge instructions at any time in [employer]? You can also review all of your test results from your hospital stay or ER visit. Additional Information If you have questions, please visit the Frequently Asked Questions section of the [employer] website at [email or web address] Remember, [employer] is NOT to be used for urgent needs. For medical emergencies, dial 911. Now available from your iPhone and Android! Unresulted Labs-Please follow up with your PCP about these lab tests Order Current Status CULTURE, BLOOD Preliminary result CULTURE, BLOOD Preliminary result Providers Seen During Your Hospitalization Provider Specialty Primary office phone Ravin Olivas MD Emergency Medicine [phone] Clayton Holloway MD Internal Medicine [phone] Carlito Torres MD Internal Medicine [phone] Your Primary Care Physician (PCP) Primary Care Physician Office Phone Office Fax [address], [address] [facility] [phone] You are allergic to the following No active allergies Recent Documentation Height Weight BMI Smoking Status 1.727 m 87.6 kg 29.36 kg/m2 Former Smoker Emergency Contacts Name Discharge Info Relation Home Work Mobile [address] DISCHARGE CAREGIVER [3] Son [22] [phone] Milli Canela DISCHARGE CAREGIVER [3] Spouse [3] [phone] Patient Belongings The following personal items are in your possession at time of discharge: 
  Dental Appliances: None  Visual Aid: Glasses, With patient      Home Medications: None   Jewelry: Watch  Clothing: Shirt, Undergarments, Pants, With patient, At bedside    Other Valuables: Cell Phone, Other (comment), With patient, At bedside, Personal electronic devices (comment) (home CPAP machine) Please provide this summary of care documentation to your next provider. Signatures-by signing, you are acknowledging that this After Visit Summary has been reviewed with you and you have received a copy. Patient Signature:  ____________________________________________________________ Date:  ____________________________________________________________  
  
Lisa Trinidad Provider Signature:  ____________________________________________________________ Date:  ____________________________________________________________

## 2018-02-07 NOTE — PROGRESS NOTES
TRANSFER - IN REPORT:    Verbal report received from VIRGIL Martines(name) on Gertrudis Canela Jr  being received from [facility](unit) for routine progression of care      Report consisted of patients Situation, Background, Assessment and   Recommendations(SBAR). Information from the following report(s) SBAR, Kardex, ED Summary, Recent Results and Cardiac Rhythm NSR was reviewed with the receiving nurse. Opportunity for questions and clarification was provided. Assessment completed upon patients arrival to unit and care assumed.

## 2018-02-07 NOTE — ED PROVIDER NOTES
EMERGENCY DEPARTMENT HISTORY AND PHYSICAL EXAM      Date: 2/7/2018  Patient Name: Kenny Martínez    History of Presenting Illness     Chief Complaint   Patient presents with    Shortness of Breath       History Provided By: Patient and significant other    HPI: Gertrudis Cullen, 78 y.o. male with PMHx significant for DM, HTN, HLD, CAD s/p stents, PVD, GERD, gout, BPH, presents via EMS from home to the ED with cc of two days of moderately severe shortness of breath, which has decreased since onset. Pt describes his sx as \"flu\" like sx. He reports improvement of his SOB with sitting upright after noticing that his CPAP last night did not help. He also c/o chills, muffled hearing, some dysuria, intermittent cough, myalgia, and rigors. Pt specifically denies any vomiting, diarrhea, or other sx. He does not have home inhalers for use. Of note, pt is a former tobacco user of 25-30 years. He denies h/o asthma, copd or emphysema. Pt does have a h/o CAD and stents. Pt is on Plavix. PCP: Robb Silva MD    There are no other complaints, changes, or physical findings at this time.     Current Facility-Administered Medications   Medication Dose Route Frequency Provider Last Rate Last Dose    cefTRIAXone (ROCEPHIN) 1 g/50 mL NS IVPB  1 g IntraVENous Q24H Luis Antonio Dallas MD   1 g at 02/08/18 1149    guaiFENesin ER (MUCINEX) tablet 600 mg  600 mg Oral Q12H Luis Antonio Dallas MD   600 mg at 02/08/18 1414    sodium chloride (NS) flush 5-10 mL  5-10 mL IntraVENous PRN Charli Wilson MD        azithromycin [facility]) 500 mg in 0.9% sodium chloride 250 mL IVPB  500 mg IntraVENous Q24H Demond Cuevas  mL/hr at 02/08/18 1326 500 mg at 02/08/18 1326    sodium chloride (NS) flush 5-10 mL  5-10 mL IntraVENous Q8H Demond Cuevas MD   10 mL at 02/08/18 1217    sodium chloride (NS) flush 5-10 mL  5-10 mL IntraVENous PRN Demond Cuevas MD        acetaminophen (TYLENOL) tablet 650 mg  650 mg Oral Q6H PRN Demond Cuevas MD 650 mg at 02/08/18 1605    ondansetron (ZOFRAN) injection 4 mg  4 mg IntraVENous Q6H PRN Luma Aviles MD        docusate sodium (COLACE) capsule 100 mg  100 mg Oral BID Luma Aviles MD   100 mg at 02/08/18 1713    heparin (porcine) injection 5,000 Units  5,000 Units SubCUTAneous Stacey Beatty MD   5,000 Units at 02/08/18 1305    insulin lispro (HUMALOG) injection   SubCUTAneous AC&HS Luma Aviles MD   10 Units at 02/08/18 1214    glucose chewable tablet 16 g  4 Tab Oral PRN Luma Aviles MD        dextrose (D50W) injection syrg 12.5-25 g  12.5-25 g IntraVENous PRN Luma Aviles MD        glucagon [facility]) injection 1 mg  1 mg IntraMUSCular PRN Luma Aviles MD        albuterol-ipratropium (DUO-NEB) 2.5 MG-0.5 MG/3 ML  3 mL Nebulization Q4H RT Luma Aviles MD   3 mL at 02/08/18 1950    cloNIDine HCl (CATAPRES) tablet 0.2 mg  0.2 mg Oral BID Luma Aviles MD   0.2 mg at 02/08/18 1713    nebivolol (BYSTOLIC) tablet 20 mg  20 mg Oral QHS Luma Aviles MD   20 mg at 02/07/18 2117    glipiZIDE SR (GLUCOTROL XL) tablet 20 mg  20 mg Oral DAILY Luma Aviles MD   20 mg at 02/08/18 0858    minoxidil (LONITEN) tablet 10 mg  10 mg Oral BID Luma Aviles MD   10 mg at 02/07/18 1755    allopurinol (ZYLOPRIM) tablet 200 mg  200 mg Oral QHS Luma Aviles MD   200 mg at 02/07/18 2116    clopidogrel (PLAVIX) tablet 75 mg  75 mg Oral QHS Luma Aviles MD   75 mg at 02/07/18 2116    tamsulosin (FLOMAX) capsule 0.4 mg  0.4 mg Oral QHS Luma Aviles MD   0.4 mg at 02/07/18 2117    cholecalciferol (VITAMIN D3) tablet 1,000 Units  1,000 Units Oral QHS Luma Aviles MD   1,000 Units at 02/07/18 2116    fish oil- omega-3 fatty acids 300-1,000 mg capsule 1 Cap  1 Cap Oral DAILY Luma Aviles MD   1 Cap at 02/08/18 9636    And    fish oil- omega-3 fatty acids 300-1,000 mg capsule 2 Cap  2 Cap Oral DAILY Luma Aviles MD   2 Cap at 02/08/18 1714    B complex-vitaminC-folic acid (NEPHROCAP) cap  1 Cap Oral DAILY Cliff Multani MD   1 Cap at 02/08/18 9225    ferrous sulfate tablet 325 mg  1 Tab Oral DAILY WITH BREAKFAST Cliff Multani MD   325 mg at 02/08/18 0800    bumetanide (BUMEX) injection 1 mg  1 mg IntraVENous DAILY Cliff Multani MD   Stopped at 02/08/18 0900       Past History     Past Medical History:  Past Medical History:   Diagnosis Date    BPH (benign prostatic hypertrophy)     CAD (coronary artery disease)     s/p stents    GERD (gastroesophageal reflux disease)     Gout     Other and unspecified hyperlipidemia     PVD (peripheral vascular disease) ([facility] [address]     s/p PTCA of left femoral artery in July 2014    Type II or unspecified type diabetes mellitus without mention of complication, uncontrolled     Unspecified essential hypertension     Unspecified vitamin D deficiency        Past Surgical History:  Past Surgical History:   Procedure Laterality Date    CARDIAC SURG PROCEDURE UNLIST      HX KNEE ARTHROSCOPY      right x2, left x1    VASCULAR SURGERY PROCEDURE UNLIST      aorto-bifem bypass    VASCULAR SURGERY PROCEDURE UNLIST      left carotid endarterectomy    VASCULAR SURGERY PROCEDURE UNLIST      right femoral PTCA       Family History:  Family History   Problem Relation Age of Onset    Diabetes Mother     Heart Disease Mother     Heart Disease Maternal Grandmother     Diabetes Daughter      gestational   Delona Face Diabetes Sister     Stroke Sister     Cancer Father     Hypertension Father        Social History:  Social History   Substance Use Topics    Smoking status: Former Smoker     Packs/day: 2.00     Years: 25.00     Quit date: 3/11/1991    Smokeless tobacco: Never Used    Alcohol use Yes      Comment: 5 beers on a Friday night       Allergies:  No Known Allergies      Review of Systems   Review of Systems   Constitutional: Positive for chills and fever. Respiratory: Positive for cough and shortness of breath. Gastrointestinal: Negative for diarrhea and vomiting. Genitourinary: Positive for dysuria. Musculoskeletal: Positive for myalgias. All other systems reviewed and are negative. Physical Exam   Physical Exam  Vital signs and nursing notes reviewed    CONSTITUTIONAL: Alert, in no apparent distress; well-developed; well-nourished. HEAD:  Normocephalic, atraumatic  EYES: PERRL; EOM's intact. ENTM: Nose: no rhinorrhea; Throat: no erythema or exudate, mucous membranes moist  Neck:  Supple. trachea is midline. RESP: Chest clear, equal breath sounds. - W/R/R. 89% on RA. CV: S1 and S2 WNL; No murmurs, gallops or rubs. 2+ radial and DP pulses bilaterally. GI: non-distended, normal bowel sounds, abdomen soft and non-tender. No masses or organomegaly. : No costo-vertebral angle tenderness. BACK:  Non-tender, normal appearance  UPPER EXT:  Normal inspection. no joint or soft tissue swelling  LOWER EXT: No edema, no calf tenderness. Trace peripheral edema. NEURO: Alert and oriented x3, 5/5 strength and light touch sensation intact in bilateral upper and lower extremities. SKIN: No rashes; Warm and dry.    PSYCH: Normal mood, normal affect  Written by ELMER Templeton, as dictated by Silas Fuentes MD.     Diagnostic Study Results     Labs -     Recent Results (from the past 12 hour(s))   TROPONIN I    Collection Time: 02/08/18  9:26 AM   Result Value Ref Range    Troponin-I, Qt. 0.13 (H) <0.05 ng/mL   GLUCOSE, POC    Collection Time: 02/08/18 11:14 AM   Result Value Ref Range    Glucose (POC) 306 (H) 65 - 100 mg/dL    Performed by Gloria Samano    GLUCOSE, POC    Collection Time: 02/08/18  4:20 PM   Result Value Ref Range    Glucose (POC) 198 (H) 65 - 100 mg/dL    Performed by GEOFF MOHAN (PCT) CON    TROPONIN I    Collection Time: 02/08/18  4:45 PM   Result Value Ref Range    Troponin-I, Qt. 0.10 (H) <0.05 ng/mL       Radiologic Studies -   XR CHEST PORT   Final Result      CT CHEST WO CONT   Final Result      XR CHEST PORT   Final Result        CT Results  (Last 48 hours)               02/07/18 1001  CT CHEST WO CONT Final result    Impression:  IMPRESSION:   Tiny bilateral pleural effusions with bilateral lower lobe consolidation. Bilateral pulmonary infiltrates are noted. Narrative:  INDICATION: Persistent cough, fever       COMPARISON: None       CONTRAST: None. TECHNIQUE:  5 mm axial images were obtained through the chest. Coronal and   sagittal reconstructions were generated. CT dose reduction was achieved through   use of a standardized protocol tailored for this examination and automatic   exposure control for dose modulation. The absence of intravenous contrast reduces the sensitivity for evaluation of   the mediastinum and upper abdominal organs. FINDINGS:       THYROID: No nodule. MEDIASTINUM: Prominent mediastinal lymph nodes are noted, with a reference 17 mm   precarinal lymph node. CHYNA: Hilar evaluation is limited by the lack of intravenous contrast.   THORACIC AORTA: Atherosclerotic without evidence of aneurysm. MAIN PULMONARY ARTERY: Enlarged   TRACHEA/BRONCHI: Patent. ESOPHAGUS: No wall thickening or dilatation. HEART: Dilatation of the left atrium and left ventricle is noted. PLEURA: There are tiny bilateral pleural effusions. LUNGS: Bilateral lower lobe consolidation is noted along with bilateral   pulmonary infiltrates. INCIDENTALLY IMAGED UPPER ABDOMEN: No focal abnormality. BONES: Degenerative changes are seen in the thoracic spine. CXR Results  (Last 48 hours)               02/07/18 1442  XR CHEST PORT Final result    Impression:  IMPRESSION: Worsening pulmonary gas or congestive changes. Narrative:  INDICATION: Acute shortness of breath, worsening. Portable AP semiupright views of the chest.       Direct comparison made to prior chest x-ray dated February 7, 2018, 8:38 AM.       Cardiomediastinal silhouette is stable.  There is worsening pulmonary vascular prominence and diffuse bilateral interstitial edema. There is also mild patchy   bibasilar airspace disease. No pleural fluid is visualized on AP views. 02/07/18 0841  XR CHEST PORT Final result    Impression:  Impression: Stable cardiomegaly. Mild pulmonary edema. Narrative: Indication: Shortness of breath since yesterday       Comparison: 2/11/2014       Portable exam of the chest obtained at 835 demonstrates stable cardiomegaly. Mild pulmonary edema is noted. Degenerative changes are seen in the thoracic   spine. Medical Decision Making   I am the first provider for this patient. I reviewed the vital signs, available nursing notes, past medical history, past surgical history, family history and social history. Vital Signs-Reviewed the patient's vital signs. Patient Vitals for the past 12 hrs:   Temp Pulse Resp BP SpO2   02/08/18 1923 100.2 °F (37.9 °C) 77 22 130/60 96 %   02/08/18 1552 99.1 °F (37.3 °C) - - - -   02/08/18 1540 - 85 - 159/50 -   02/08/18 1531 - 82 - - 95 %   02/08/18 1521 - - - - 93 %   02/08/18 1427 - 74 - - 90 %   02/08/18 1425 - 75 - - 90 %   02/08/18 1413 - 73 - - (!) 88 %   02/08/18 1352 - 80 - - 93 %   02/08/18 1336 98.8 °F (37.1 °C) 74 - - 97 %   02/08/18 1218 99.1 °F (37.3 °C) - - - -   02/08/18 1217 - 75 - - 100 %   02/08/18 1122 - - - - 97 %   02/08/18 1027 98.8 °F (37.1 °C) - - - -   02/08/18 0928 98 °F (36.7 °C) - - - 95 %   02/08/18 0903 - - - 130/52 94 %       Pulse Oximetry Analysis - 93% on 4L O2 NC    Cardiac Monitor:   Rate: 78 bpm  Rhythm: Normal Sinus Rhythm      EKG interpretation: (Preliminary)  0852. SR at 76 bpm. Nml axis. Variable MI interval without visible AV block. No acute ischemic changes.    Written by Edman Mercy, ED Scribe, as dictated by Bernice Squires MD.    Records Reviewed: Nursing Notes and Old Medical Records    Provider Notes (Medical Decision Making): 79 y/o M without previous oxygen requirement now with hypoxia in the setting of upper respiratory and flu like sxs meeting intial SIRS criteria with suspicion for influenza and/or PNA. Plan for early goal directed therapy. Pt does have h/o CAD, consider CHF, doubt acute ischemia but will evaluate with Tn and EKG. Given oxygen requirement, pt will need to be admitted for further care. ED Course:   Initial assessment performed. The patients presenting problems have been discussed, and they are in agreement with the care plan formulated and outlined with them. I have encouraged them to ask questions as they arise throughout their visit. 9:29 AM   Pt's repeat lactate nml. Reviewed CXR which revealed pulmonary edema. Pt also has elevated PRO BNP. Given reassuring lactate, ongoing hypoxia, prudent to d/c IVF at this time. D/w nurse. Lasix 40 mg IV ordered. Consult Note:   9:32 AM  Raisa Lawrence MD spoke with Gefof Hoyos MD   Specialty: Hospitalist  Discussed pt's hx, disposition, and available diagnostic and imaging results. Reviewed care plans. Consultant will evaluate pt for admission. Written by Hugo Avitia ED Scribe, as dictated by Raisa Lawrence MD.     2:24 PM   Pt c/o worsening SOB after starting NRB. Stat portable CXR ordered. Critical Care Time:   CRITICAL CARE NOTE:  5:13 PM  IMPENDING DETERIORATION -Respiratory  ASSOCIATED RISK FACTORS - Hypoxia  MANAGEMENT- Bedside Assessment and Supervision of Care  INTERPRETATION -  Xrays, Blood Pressure and Cardiac Output Measures   INTERVENTIONS - respiratory management   CASE REVIEW - Hospitalist, Nursing and Family  TREATMENT RESPONSE -Stable  PERFORMED BY - Self    NOTES:   I have spent 38 minutes of critical care time involved in lab review, consultations with specialist, family decision- making, bedside attention and documentation. During this entire length of time I was immediately available to the patient. Raisa Lawrence MD    Disposition:    1.  Admit to hospitalist.    Admit Note:  9:32 AM  Pt is being admitted by Dr. Garland Lau, hospitalist. The results of their tests and reason(s) for their admission have been discussed with pt and/or available family. They convey agreement and understanding for the need to be admitted and for admission diagnosis. PLAN:  1. Current Discharge Medication List        2. Follow-up Information     None        Return to ED if worse     Diagnosis     Clinical Impression:   1. Hypoxia    2. Dyspnea, unspecified type    3. Flu-like symptoms    4. Elevated brain natriuretic peptide (BNP) level        Attestations: This note is prepared by Abdi Andrade, acting as Scribe for Rosaline Farmer MD.       The scribe's documentation has been prepared under my direction and personally reviewed by me in its entirety. I confirm that the note above accurately reflects all work, treatment, procedures, and medical decision making performed by me.

## 2018-02-07 NOTE — IP AVS SNAPSHOT
[address] [facility] 
[phone] Patient: Saintclair Rickers 
MRN: [id number] A check griselda indicates which time of day the medication should be taken. My Medications START taking these medications Instructions Each Dose to Equal  
 Morning Noon Evening Bedtime  
 guaiFENesin  mg ER tablet Commonly known as:  Vikram & Vikram Your last dose was: Your next dose is: Take 1 Tab by mouth every twelve (12) hours. 600 mg  
    
   
   
   
  
 levoFLOXacin 750 mg tablet Commonly known as:  Claude Santana Your last dose was: Your next dose is: Take 1 Tab by mouth daily. 750 mg CHANGE how you take these medications Instructions Each Dose to Equal  
 Morning Noon Evening Bedtime  
 cloNIDine HCl 0.2 mg tablet Commonly known as:  CATAPRES What changed:   
- how much to take - when to take this - Another medication with the same name was removed. Continue taking this medication, and follow the directions you see here. Your last dose was: Your next dose is: Take 1 Tab by mouth Before breakfast, lunch, and dinner. 0.2 mg  
    
   
   
   
  
 FISH OIL CONCENTRATE 1,000 mg Cap Generic drug:  omega-3 fatty acids What changed:  Another medication with the same name was removed. Continue taking this medication, and follow the directions you see here. Your last dose was: Your next dose is: Take 1,000 mg by mouth daily. Patient takes 1,000 mg in the AM and 2,000 mg in the PM  
 1000 mg  
    
   
   
   
  
 * glipiZIDE SR 10 mg CR tablet Commonly known as:  GLUCOTROL XL What changed:  Another medication with the same name was added. Make sure you understand how and when to take each. Your last dose was: Your next dose is: Take 2 Tabs by mouth daily. 20 mg  
    
   
   
   
  
 * glipiZIDE SR 10 mg CR tablet Commonly known as:  GLUCOTROL XL Start taking on:  2/12/2018 What changed: You were already taking a medication with the same name, and this prescription was added. Make sure you understand how and when to take each. Your last dose was: Your next dose is: Take 2 Tabs by mouth daily. 20 mg  
    
   
   
   
  
 * Notice: This list has 2 medication(s) that are the same as other medications prescribed for you. Read the directions carefully, and ask your doctor or other care provider to review them with you. CONTINUE taking these medications Instructions Each Dose to Equal  
 Morning Noon Evening Bedtime  
 allopurinol 100 mg tablet Commonly known as:  Mortimer Parma Your last dose was: Your next dose is: Take 200 mg by mouth nightly. 200 mg  
    
   
   
   
  
 amLODIPine 10 mg tablet Commonly known as:  Saintclair Rickers Your last dose was: Your next dose is: Take 10 mg by mouth nightly. 10 mg  
    
   
   
   
  
 atorvastatin 80 mg tablet Commonly known as:  LIPITOR Your last dose was: Your next dose is: Take 80 mg by mouth nightly. Take 1 tab daily 80 mg  
    
   
   
   
  
 BYSTOLIC 20 mg tablet Generic drug:  nebivolol Your last dose was: Your next dose is: Take 20 mg by mouth nightly. Take 1 tab daily 20 mg  
    
   
   
   
  
 ferrous sulfate 325 mg (65 mg iron) Cper Your last dose was: Your next dose is: Take 1 Tab by mouth two (2) times a day. 1 Tab FLOMAX 0.4 mg capsule Generic drug:  tamsulosin Your last dose was: Your next dose is: Take 0.4 mg by mouth nightly. 0.4 mg  
    
   
   
   
  
 FOLTX 2.5-25-2 mg tablet Generic drug:  folic acid-vit U3-OTB M14  
   
 Your last dose was: Your next dose is: Take 1 Tab by mouth nightly. 1 Tab  
    
   
   
   
  
 furosemide 80 mg tablet Commonly known as:  LASIX Your last dose was: Your next dose is: Take 80 mg by mouth two (2) times a day. 80 mg  
    
   
   
   
  
 minoxidil 10 mg tablet Commonly known as:  Alexander Julio Your last dose was: Your next dose is: Take 10 mg by mouth two (2) times a day. 10 mg  
    
   
   
   
  
 PLAVIX 75 mg Tab Generic drug:  clopidogrel Your last dose was: Your next dose is: Take 75 mg by mouth nightly. 75 mg PRALUENT PEN 75 mg/mL injector pen Generic drug:  alirocumab Your last dose was: Your next dose is: INJECT EVERY 2 WEEKS TYLENOL EXTRA STRENGTH 500 mg tablet Generic drug:  acetaminophen Your last dose was: Your next dose is: Take 1,000 mg by mouth every six (6) hours as needed for Pain. 1000 mg  
    
   
   
   
  
 VITAMIN D3 1,000 unit tablet Generic drug:  cholecalciferol Your last dose was: Your next dose is: Take 1,000 Units by mouth nightly. 1000 Units STOP taking these medications FISH OIL 1,000 mg Cap Generic drug:  omega-3 fatty acids-vitamin e  
   
  
 OTHER  
   
  
 TAMIFLU 75 mg capsule Generic drug:  oseltamivir Where to Get Your Medications These medications were sent to [address] AT [address] [place] [id number] Phone:  [phone]  
  glipiZIDE SR 10 mg CR tablet Information on where to get these meds will be given to you by the nurse or doctor. ! Ask your nurse or doctor about these medications  
  cloNIDine HCl 0.2 mg tablet guaiFENesin  mg ER tablet  
 levoFLOXacin 750 mg tablet

## 2018-02-07 NOTE — PROGRESS NOTES
TRANSFER - IN REPORT:    Verbal report received from [facility] (name) on Rosy Faye  being received from ED (unit) for routine progression of care      Report consisted of patients Situation, Background, Assessment and   Recommendations(SBAR). Information from the following report(s) SBAR was reviewed with the receiving nurse. Opportunity for questions and clarification was provided. Assessment completed upon patients arrival to unit and care assumed.            Primary Nurse Dickson Cervantes Parent, RN performed a dual skin assessment on this patient No impairment noted  Gregorio score is 20

## 2018-02-07 NOTE — ED NOTES
TRANSFER - OUT REPORT:    Verbal report given to Tressa Stephenson (name) on Rosaura Villa  being transferred to [facility] (unit) for routine progression of care       Report consisted of patients Situation, Background, Assessment and   Recommendations(SBAR). Information from the following report(s) SBAR, Kardex, ED Summary and MAR was reviewed with the receiving nurse. Lines:   Peripheral IV 02/07/18 Left Antecubital (Active)   Site Assessment Clean, dry, & intact 2/7/2018  7:55 AM   Phlebitis Assessment 0 2/7/2018  7:55 AM   Infiltration Assessment 0 2/7/2018  7:55 AM   Dressing Status Clean, dry, & intact 2/7/2018  7:55 AM   Dressing Type Tape 2/7/2018  7:55 AM   Hub Color/Line Status Flushed 2/7/2018  7:55 AM   Action Taken Blood drawn 2/7/2018  7:55 AM        Opportunity for questions and clarification was provided.       Patient transported with:   Registered Nurse

## 2018-02-07 NOTE — ED TRIAGE NOTES
Pt arrives via EMS with difficulty breathing that started yesterday. Pt is also c/o flu-like symptoms. Pt uses a CPAP for sleep apnea. EMS repors the pt was sating at 77% on room air so they kept the pt on his CPAP machine. Upon arrival, pt was placed on 2 L nasal cannula and is having oxygen saturation at 93%. Pt has a hx of CHF and HTN.

## 2018-02-07 NOTE — H&P
Hospitalist Admission Note    NAME: Lisbet Hurtado   :  1950   MRN:  [id number]     Date/Time:  2018 12:38 PM    Patient PCP: Chandu Kitchen MD   Renal:  Dr. Boo Henry  Cardiology:  Momo Aviles MD  ______________________________________________________________________   Assessment & Plan:  Acute hypoxic respiratory, POA due to  Bilateral lower lobe pneumonia, POA with  Sepsis, POA due to pneumonia  Possible chf EF unknown  --requiring 4L to keep O2 sat 93%. Not on home O2.  --CXR read as mild pulmonary edema and probnp elevated 3184.  --WBC 12.9, fever 101.5. Flu negative. CT chest without contrast shows tiny b/l pleural effusions with bilateral lower lobe consolidation and infiltrates. --supplemental O2, start duoneb q4h, start abx with rocephin and azithromycin. Get sputum culture, urine legionella and urine strep pneumoniae antigens. --repeat cardiac enzymes in AM, check echo  --diuresed with lasix given in ER. Will put on bumex 1mg IV daily. I/O, daily weight  --tamiflu given empirically in ER. Given PNA on CT chest, will not continue. HARDIK, on cpap but not O2 at home  --continue Cpap but add in O2    DM type 2   --continue glipizide. SSI    CAD s/p prior stent  HTN  --denies hx of chf   --continue aspirin, plavix. Hold statin due to myalgia. --change lasix to bumex  --continue clonidine but reduce dose by 50% to have BP room to diurese, bystolic, minoxidil. Hold amlodipine. --check echo    PAD   --s/p left CEA, s/p aortobifemoral bypass  --has stenosis in left arm causing false low blood pressure in that arm by 25mmHg per patient, ,     CKD stage 4  --Cr slightly worse from prior 2.37 10/17 to 2.8 today  --monitor with diuresis. Gout  --continue allopurinol    BPH  --continue flomax    Right adrenal gland adenoma:   --found to have a 1.9 cm right adrenal incidentaloma on MRI in .   His dex suppressed cortisol was 1.9 and plasma mets were normal and josue/renin ratio were normal in 8/17 so he doesn't appear to have a functioning adrenal gland.  --seen Dr. Sophie Salamanca 3/2017. Benign findings on adrenal and liver. Patient with small cystic mass in the neck of the pancreas measuring 8 x 6 x 18 mm consistent with small side branch IPMN. Given small size and patient's multiple medical problems, recommend f/u for now. F/u CT scan in 1 year. --Dr. Salena Beckwith recommended to have repeat CT scan in 6-12 months to document stability in size of adenoma, if no change at that time, no need for further repeat imaging  --patient reports he discussed with his nephrologist and it was felt risk of IV dye and contrast nephropathy outweighs benefit (since CT is for surveillance) so he does not plan to get follow up CT     Body mass index is 30.41 kg/(m^2). Code: discussed, full code but no long term mechanical ventilation if he does not improve. Had been DNR in past  DVT prophylaxis: heparin  Surrogate decision maker:  Son Claudio Oh        Subjective:   CHIEF COMPLAINT:  Myalgia, fever, sob    HISTORY OF PRESENT ILLNESS:     Gertrudis Cunningham is a 78 y.o.  male PMHx significant for DM, HTN, HLD, CAD s/p stents, PVD, GERD, gout, BPH, presents two days of moderately severe shortness of breath. Report son had flu. He has been having subjective fever and chills, myalgia and arthralgia since yesterday. Very weak. Developed SOB when took off his cpap this morning  Mild cough, no chest pain. Son gave him a dose of his tamiflu to take since patient and his girlfriend self diagnosed as having flu. SOB better with standing. Mild dysuria. No abdominal pain, nausea, vomiting, diarrhea. Not on home O2. Denies hx of copd, not on inhaler despite hx heavy smoking. No travel. We were asked to admit for work up and evaluation of the above problems.      Past Medical History:   Diagnosis Date    BPH (benign prostatic hypertrophy)     CAD (coronary artery disease) s/p stents    GERD (gastroesophageal reflux disease)     Gout     Other and unspecified hyperlipidemia     [facility]VD [facility]peripheral vascular disease) [facility])     s/p PTCA of left femoral artery in July 2014    Type II or unspecified type diabetes mellitus without mention of complication, uncontrolled     Unspecified essential hypertension     Unspecified vitamin D deficiency       Past Surgical History:   Procedure Laterality Date    CARDIAC SURG PROCEDURE UNLIST      HX KNEE ARTHROSCOPY      right x2, left x1    VASCULAR SURGERY PROCEDURE UNLIST      aorto-bifem bypass    VASCULAR SURGERY PROCEDURE UNLIST      left carotid endarterectomy    VASCULAR SURGERY PROCEDURE UNLIST      right femoral PTCA     Social History   Substance Use Topics    Smoking status: Former Smoker     Packs/day: 2.00     Years: 25.00     Quit date: 3/11/1991    Smokeless tobacco: Never Used    Alcohol use Yes      Comment: 5 beers on a Friday night     Family History   Problem Relation Age of Onset    Diabetes Mother     Heart Disease Mother     Heart Disease Maternal Grandmother     Diabetes Daughter      [facility] Sister     Stroke Sister     Cancer Father     Hypertension Father      No Known Allergies     Prior to Admission medications    Medication Sig Start Date End Date Taking? Authorizing Provider   cloNIDine HCl (CATAPRES) 0.2 mg tablet Take 0.4 mg by mouth two (2) times a day. Yes Historical Provider   omega-3 fatty acids (FISH OIL CONCENTRATE) 1,000 mg cap Take 1,000 mg by mouth daily. Patient takes 1,000 mg in the AM and 2,000 mg in the PM    Yes Historical Provider   omega-3 fatty acids (FISH OIL CONCENTRATE) 1,000 mg cap Take 2,000 mg by mouth nightly. Patient takes 1,000 mg in the AM and 2,000 mg in the PM    Yes Historical Provider   acetaminophen (TYLENOL EXTRA STRENGTH) 500 mg tablet Take 1,000 mg by mouth every six (6) hours as needed for Pain.    Yes Historical Provider   OTHER Take 1 Tab by mouth daily as needed (Cold). Patient takes Zicam tablet PRN for Cold   Yes Historical Provider   oseltamivir (TAMIFLU) 75 mg capsule Take 75 mg by mouth two (2) times a day. Yes Historical Provider   glipiZIDE SR (GLUCOTROL XL) 10 mg CR tablet Take 2 Tabs by mouth daily. 1/31/18  Yes Nanette Andrew MD   atorvastatin (LIPITOR) 80 mg tablet Take 80 mg by mouth nightly. Take 1 tab daily 11/14/17  Yes Historical Provider   minoxidil (LONITEN) 10 mg tablet Take 10 mg by mouth two (2) times a day. 1/11/18  Yes Historical Provider   BYSTOLIC 20 mg tablet Take 20 mg by mouth nightly. Take 1 tab daily 12/15/17  Yes Historical Provider   PRALUENT PEN 75 mg/mL injector pen INJECT EVERY 2 WEEKS 10/11/17  Yes Historical Provider   furosemide (LASIX) 80 mg tablet Take 80 mg by mouth two (2) times a day. 7/27/17  Yes Nanette Andrew MD   cholecalciferol (VITAMIN D3) 1,000 unit tablet Take 1,000 Units by mouth nightly. Yes Historical Provider   amLODIPine (NORVASC) 10 mg tablet Take 10 mg by mouth nightly. Yes Historical Provider   ferrous sulfate 325 mg (65 mg iron) cpER Take 1 Tab by mouth two (2) times a day. Yes Historical Provider   folic acid-vit Z5-TFV E24 (FOLTX) 2.5-25-2 mg tablet Take 1 Tab by mouth nightly. Yes Historical Provider   tamsulosin (FLOMAX) 0.4 mg capsule Take 0.4 mg by mouth nightly. Yes Historical Provider   clopidogrel (PLAVIX) 75 mg tablet Take 75 mg by mouth nightly. Yes Historical Provider   allopurinol (ZYLOPRIM) 100 mg tablet Take 200 mg by mouth nightly. Yes Historical Provider     REVIEW OF SYSTEMS:  POSITIVE= Bold.   Negative = normal text  General:  fever, chills, sweats, generalized weakness, weight loss/gain, loss of appetite  Eyes:  blurred vision, eye pain, loss of vision, diplopia  Ear Nose and Throat:  rhinorrhea, pharyngitis  Respiratory:   cough, sputum production, SOB, wheezing, RUBIO, pleuritic pain  Cardiology:  chest pain, palpitations, orthopnea, PND, edema, syncope   Gastrointestinal:  abdominal pain, N/V, dysphagia, diarrhea, constipation, bleeding  Genitourinary:  frequency, urgency, dysuria, hematuria, incontinence, hesitancy  Muskuloskeletal :  arthralgia, myalgia  Hematology:  easy bruising, bleeding, lymphadenopathy  Dermatological:  Rash--girlfriend notice petechial rash in right forearm today not itching, ulceration, pruritis  Endocrine:  hot flashes or polydipsia  Neurological:  headache, dizziness, confusion, focal weakness, paresthesia, memory loss, gait disturbance  Psychological: anxiety, depression, agitation      Objective:   VITALS:    Visit Vitals    /58    Pulse 81    Temp (!) 101.5 °F (38.6 °C)    Resp 23    Ht 5' 8\" (1.727 m)    Wt 90.7 kg (200 lb)    SpO2 93%    BMI 30.41 kg/m2     Temp (24hrs), Av.5 °F (38.6 °C), Min:101.5 °F (38.6 °C), Max:101.5 °F (38.6 °C)    Body mass index is 30.41 kg/(m^2). PHYSICAL EXAM:    General:    Alert, ill appearing, cooperative, no respiratory distress, appears stated age. Laying flat in bed  HEENT: Atraumatic, anicteric sclerae, pale conjunctivae     No oral ulcers, mucosa dry, throat clear. Hearing intact. Neck:  Supple, symmetrical,  thyroid: non tender. No JVD  Lungs:   Diffuse rhonchi R>>L. Mild diffuse wheezing. No rales. Chest wall:  No tenderness  No Accessory muscle use. Heart:   Regular  rhythm,  No  murmur   No gallop. No pitting edema. Abdomen:   Protuberant abdomen, Soft, mild epigastric tenderness. Not distended. Bowel sounds normal. No masses  Extremities: No cyanosis. No clubbing  Skin:     Pale Not Jaundiced  Petechial rash on right forearm (per GF, noticed in ambulance)   Psych:  Good insight. Not depressed. Not anxious or agitated. Neurologic: EOMs intact. No facial asymmetry. No aphasia or slurred speech. Symmetrical strength 5/5, legs 4/5, Alert and oriented X 3.    Peripheral pulse: Left radial 1+ Right radial 2+,   Capillary refill:  normal    IMAGING RESULTS:   []       I have personally reviewed the actual   []     CXR  []     CT scan  CXR:  CT :  EKG:   ________________________________________________________________________  Care Plan discussed with:    Comments   Patient y    Family  y girlfriend   RN     Care Manager                    Consultant:      ________________________________________________________________________  Prophylaxis:  GI none   DVT heparin   ________________________________________________________________________  Recommended Disposition:   Home with Family y   HH/PT/OT/RN    SNF/LTC    [facility]    ________________________________________________________________________  Code Status:  Full Code y   DNR/DNI    ________________________________________________________________________  TOTAL TIME:  55 minutes  ______________________________________________________________________  Lonnie Ramirez MD      Procedures: see electronic medical records for all procedures/Xrays and details which were not copied into this note but were reviewed prior to creation of Plan. LAB DATA REVIEWED:    Recent Results (from the past 24 hour(s))   LACTIC ACID    Collection Time: 02/07/18  8:49 AM   Result Value Ref Range    Lactic acid 1.4 0.4 - 2.0 MMOL/L   METABOLIC PANEL, COMPREHENSIVE    Collection Time: 02/07/18  8:49 AM   Result Value Ref Range    Sodium 139 136 - 145 mmol/L    Potassium 3.6 3.5 - 5.1 mmol/L    Chloride 105 97 - 108 mmol/L    CO2 26 21 - 32 mmol/L    Anion gap 8 5 - 15 mmol/L    Glucose 253 (H) 65 - 100 mg/dL    BUN 54 (H) 6 - 20 MG/DL    Creatinine 2.80 (H) 0.70 - 1.30 MG/DL    BUN/Creatinine ratio 19 12 - 20      GFR est AA 28 (L) >60 ml/min/1.73m2    GFR est non-AA 23 (L) >60 ml/min/1.73m2    Calcium 8.5 8.5 - 10.1 MG/DL    Bilirubin, total 0.6 0.2 - 1.0 MG/DL    ALT (SGPT) 20 12 - 78 U/L    AST (SGOT) 17 15 - 37 U/L    Alk.  phosphatase 86 45 - 117 U/L    Protein, total 7.2 6.4 - 8.2 g/dL    Albumin 2.5 (L) 3.5 - 5.0 g/dL    Globulin 4.7 (H) 2.0 - 4.0 g/dL    A-G Ratio 0.5 (L) 1.1 - 2.2     CBC WITH AUTOMATED DIFF    Collection Time: 02/07/18  8:49 AM   Result Value Ref Range    WBC 12.9 (H) 4.1 - 11.1 K/uL    RBC 3.02 (L) 4.10 - 5.70 M/uL    HGB 9.4 (L) 12.1 - 17.0 g/dL    HCT 28.3 (L) 36.6 - 50.3 %    MCV 93.7 80.0 - 99.0 FL    MCH 31.1 26.0 - 34.0 PG    MCHC 33.2 30.0 - 36.5 g/dL    RDW 14.9 (H) 11.5 - 14.5 %    PLATELET 532 371 - 817 K/uL    MPV 12.6 8.9 - 12.9 FL    NRBC 0.0 0  WBC    ABSOLUTE NRBC 0.00 0.00 - 0.01 K/uL    NEUTROPHILS 85 (H) 32 - 75 %    BAND NEUTROPHILS 7 %    LYMPHOCYTES 3 (L) 12 - 49 %    MONOCYTES 4 (L) 5 - 13 %    EOSINOPHILS 1 0 - 7 %    BASOPHILS 0 0 - 1 %    IMMATURE GRANULOCYTES 0 0.0 - 0.5 %    ABS. NEUTROPHILS 11.9 (H) 1.8 - 8.0 K/UL    ABS. LYMPHOCYTES 0.4 (L) 0.8 - 3.5 K/UL    ABS. MONOCYTES 0.5 0.0 - 1.0 K/UL    ABS. EOSINOPHILS 0.1 0.0 - 0.4 K/UL    ABS. BASOPHILS 0.0 0.0 - 0.1 K/UL    ABS. IMM.  GRANS. 0.0 0.00 - 0.04 K/UL    DF MANUAL      RBC COMMENTS NORMOCYTIC, NORMOCHROMIC     TROPONIN I    Collection Time: 02/07/18  8:49 AM   Result Value Ref Range    Troponin-I, Qt. <0.04 <0.05 ng/mL   NT-PRO BNP    Collection Time: 02/07/18  8:49 AM   Result Value Ref Range    NT pro-BNP 3184 (H) 0 - 125 PG/ML   INFLUENZA A & B AG (RAPID TEST)    Collection Time: 02/07/18  8:49 AM   Result Value Ref Range    Influenza A Antigen NEGATIVE  NEG      Influenza B Antigen NEGATIVE  NEG     EKG, 12 LEAD, INITIAL    Collection Time: 02/07/18  8:52 AM   Result Value Ref Range    Ventricular Rate 76 BPM    Atrial Rate 64 BPM    QRS Duration 92 ms    Q-T Interval 414 ms    QTC Calculation (Bezet) 465 ms    Calculated R Axis 7 degrees    Calculated T Axis 76 degrees    Diagnosis       Sinus rhythm with AV dissociation and Accelerated Junctional rhythm  Cannot rule out Anterior infarct , age undetermined  Abnormal ECG     URINALYSIS W/ RFLX MICROSCOPIC    Collection Time: 02/07/18 10:15 AM   Result Value Ref Range    Color YELLOW/STRAW      Appearance CLEAR CLEAR      Specific gravity 1.021 1.003 - 1.030      pH (UA) 6.0 5.0 - 8.0      Protein >300 (A) NEG mg/dL    Glucose 500 (A) NEG mg/dL    Ketone NEGATIVE  NEG mg/dL    Bilirubin NEGATIVE  NEG      Blood TRACE (A) NEG      Urobilinogen 0.2 0.2 - 1.0 EU/dL    Nitrites NEGATIVE  NEG      Leukocyte Esterase NEGATIVE  NEG      WBC 0-4 0 - 4 /hpf    RBC 0-5 0 - 5 /hpf    Epithelial cells FEW FEW /lpf    Bacteria NEGATIVE  NEG /hpf    Hyaline cast 2-5 0 - 5 /lpf

## 2018-02-07 NOTE — PROGRESS NOTES
1554: Patient continue to have difficulty breathing. Patient was placed on venti-mask with O2 sat at  88%, and then was placed on NRB mask. Patient remain on respiratory distress. Notified admitting MD - Dr. Moriah Browning, order received to transfer to [facility] for BIPAP. Supervisor and PCU CCL was notified. PCU CCL at bedside with patient.

## 2018-02-07 NOTE — PROGRESS NOTES
Pharmacy Clarification of Prior to Admission Medication Regimen     The patient was interviewed regarding clarification of the prior to admission medication regimen. Patient's girlfriend was present in room and obtained permission from patient to discuss drug regimen with visitor(s) present. Patient was questioned regarding use of any other inhalers, topical products, over the counter medications, herbal medications, vitamin products or ophthalmic/nasal/otic medication use. Information Obtained From: Rx Query and patient    Pertinent Pharmacy Findings:   oseltamivir (TAMIFLU) 75 mg capsule: Patient stated he started this regimen yesterday, 02/06/2018. The patient has completed 1 day and 1 dose of therapy as of 02/07/2018.  OTHER: Patient stated he takes Zicam daily as needed for \"cold symptoms. \"      PTA medication list was corrected to the following:     Prior to Admission Medications   Prescriptions Last Dose Informant Patient Reported? Taking? BYSTOLIC 20 mg tablet 9/5/9603 at Unknown time Self Yes Yes   Sig: Take 20 mg by mouth nightly. Take 1 tab daily   OTHER 2/6/2018 at Unknown time Self Yes Yes   Sig: Take 1 Tab by mouth daily as needed (Cold). Patient takes Zicam tablet PRN for Cold   PRALUENT PEN 75 mg/mL injector pen 2/1/2018 at Unknown time Self Yes Yes   Sig: INJECT EVERY 2 WEEKS   acetaminophen (TYLENOL EXTRA STRENGTH) 500 mg tablet 2/6/2018 at Unknown time Self Yes Yes   Sig: Take 1,000 mg by mouth every six (6) hours as needed for Pain. allopurinol (ZYLOPRIM) 100 mg tablet 2/6/2018 at Unknown time Self Yes Yes   Sig: Take 200 mg by mouth nightly. amLODIPine (NORVASC) 10 mg tablet 2/6/2018 at Unknown time Self Yes Yes   Sig: Take 10 mg by mouth nightly. atorvastatin (LIPITOR) 80 mg tablet 2/6/2018 at Unknown time Self Yes Yes   Sig: Take 80 mg by mouth nightly.  Take 1 tab daily   cholecalciferol (VITAMIN D3) 1,000 unit tablet 2/6/2018 at Unknown time Self Yes Yes   Sig: Take 1,000 Units by mouth nightly. cloNIDine HCl (CATAPRES) 0.2 mg tablet 2/6/2018 at Unknown time Self Yes Yes   Sig: Take 0.4 mg by mouth two (2) times a day. clopidogrel (PLAVIX) 75 mg tablet 2/6/2018 at Unknown time Self Yes Yes   Sig: Take 75 mg by mouth nightly. ferrous sulfate 325 mg (65 mg iron) cpER 1/31/2018 at Unknown time Self Yes Yes   Sig: Take 1 Tab by mouth two (2) times a day. folic acid-vit P2-LBQ B76 (FOLTX) 2.5-25-2 mg tablet 2/6/2018 at Unknown time Self Yes Yes   Sig: Take 1 Tab by mouth nightly. furosemide (LASIX) 80 mg tablet 2/6/2018 at Unknown time Self Yes Yes   Sig: Take 80 mg by mouth two (2) times a day. glipiZIDE SR (GLUCOTROL XL) 10 mg CR tablet 2/6/2018 at Unknown time Self No Yes   Sig: Take 2 Tabs by mouth daily. minoxidil (LONITEN) 10 mg tablet 2/6/2018 at Unknown time Self Yes Yes   Sig: Take 10 mg by mouth two (2) times a day. omega-3 fatty acids (FISH OIL CONCENTRATE) 1,000 mg cap 2/6/2018 at Unknown time Self Yes Yes   Sig: Take 1,000 mg by mouth daily. Patient takes 1,000 mg in the AM and 2,000 mg in the PM    omega-3 fatty acids (FISH OIL CONCENTRATE) 1,000 mg cap 2/6/2018 at Unknown time Self Yes Yes   Sig: Take 2,000 mg by mouth nightly. Patient takes 1,000 mg in the AM and 2,000 mg in the PM    oseltamivir (TAMIFLU) 75 mg capsule 2/7/2018 at Unknown time Self Yes Yes   Sig: Take 75 mg by mouth two (2) times a day. tamsulosin (FLOMAX) 0.4 mg capsule 2/6/2018 at Unknown time Self Yes Yes   Sig: Take 0.4 mg by mouth nightly.       Facility-Administered Medications: None          Thank you,  Teodoro Denver, CPhT  Medication History Pharmacy Technician

## 2018-02-07 NOTE — ED NOTES
Per Dr. Freeman Lombardo, pt's normal saline fluids will be stopped due to pulmonary edema and CHF on the chest xray

## 2018-02-08 LAB
ANION GAP SERPL CALC-SCNC: 10 MMOL/L (ref 5–15)
BASOPHILS # BLD: 0 K/UL (ref 0–0.1)
BASOPHILS NFR BLD: 0 % (ref 0–1)
BUN SERPL-MCNC: 52 MG/DL (ref 6–20)
BUN/CREAT SERPL: 19 (ref 12–20)
CALCIUM SERPL-MCNC: 8.5 MG/DL (ref 8.5–10.1)
CHLORIDE SERPL-SCNC: 105 MMOL/L (ref 97–108)
CO2 SERPL-SCNC: 23 MMOL/L (ref 21–32)
CREAT SERPL-MCNC: 2.68 MG/DL (ref 0.7–1.3)
DIFFERENTIAL METHOD BLD: ABNORMAL
EOSINOPHIL # BLD: 0 K/UL (ref 0–0.4)
EOSINOPHIL NFR BLD: 0 % (ref 0–7)
ERYTHROCYTE [DISTWIDTH] IN BLOOD BY AUTOMATED COUNT: 14.9 % (ref 11.5–14.5)
GLUCOSE BLD STRIP.AUTO-MCNC: 198 MG/DL (ref 65–100)
GLUCOSE BLD STRIP.AUTO-MCNC: 209 MG/DL (ref 65–100)
GLUCOSE BLD STRIP.AUTO-MCNC: 215 MG/DL (ref 65–100)
GLUCOSE BLD STRIP.AUTO-MCNC: 306 MG/DL (ref 65–100)
GLUCOSE SERPL-MCNC: 223 MG/DL (ref 65–100)
HCT VFR BLD AUTO: 24.1 % (ref 36.6–50.3)
HGB BLD-MCNC: 7.9 G/DL (ref 12.1–17)
IMM GRANULOCYTES # BLD: 0.1 K/UL (ref 0–0.04)
IMM GRANULOCYTES NFR BLD AUTO: 1 % (ref 0–0.5)
LYMPHOCYTES # BLD: 0.8 K/UL (ref 0.8–3.5)
LYMPHOCYTES NFR BLD: 7 % (ref 12–49)
MAGNESIUM SERPL-MCNC: 2.1 MG/DL (ref 1.6–2.4)
MCH RBC QN AUTO: 31 PG (ref 26–34)
MCHC RBC AUTO-ENTMCNC: 32.8 G/DL (ref 30–36.5)
MCV RBC AUTO: 94.5 FL (ref 80–99)
MONOCYTES # BLD: 1.4 K/UL (ref 0–1)
MONOCYTES NFR BLD: 11 % (ref 5–13)
NEUTS SEG # BLD: 10.3 K/UL (ref 1.8–8)
NEUTS SEG NFR BLD: 82 % (ref 32–75)
NRBC # BLD: 0 K/UL (ref 0–0.01)
NRBC BLD-RTO: 0 PER 100 WBC
PHOSPHATE SERPL-MCNC: 3.9 MG/DL (ref 2.6–4.7)
PLATELET # BLD AUTO: 226 K/UL (ref 150–400)
PMV BLD AUTO: 12.4 FL (ref 8.9–12.9)
POTASSIUM SERPL-SCNC: 3.5 MMOL/L (ref 3.5–5.1)
RBC # BLD AUTO: 2.55 M/UL (ref 4.1–5.7)
SERVICE CMNT-IMP: ABNORMAL
SODIUM SERPL-SCNC: 138 MMOL/L (ref 136–145)
TROPONIN I SERPL-MCNC: 0.1 NG/ML
TROPONIN I SERPL-MCNC: 0.13 NG/ML
TROPONIN I SERPL-MCNC: 0.16 NG/ML
WBC # BLD AUTO: 12.6 K/UL (ref 4.1–11.1)

## 2018-02-08 PROCEDURE — G8979 MOBILITY GOAL STATUS: HCPCS

## 2018-02-08 PROCEDURE — 84100 ASSAY OF PHOSPHORUS: CPT | Performed by: HOSPITALIST

## 2018-02-08 PROCEDURE — 36415 COLL VENOUS BLD VENIPUNCTURE: CPT | Performed by: HOSPITALIST

## 2018-02-08 PROCEDURE — 74011636637 HC RX REV CODE- 636/637: Performed by: HOSPITALIST

## 2018-02-08 PROCEDURE — 80048 BASIC METABOLIC PNL TOTAL CA: CPT | Performed by: HOSPITALIST

## 2018-02-08 PROCEDURE — 87449 NOS EACH ORGANISM AG IA: CPT | Performed by: HOSPITALIST

## 2018-02-08 PROCEDURE — G8988 SELF CARE GOAL STATUS: HCPCS | Performed by: OCCUPATIONAL THERAPIST

## 2018-02-08 PROCEDURE — 97162 PT EVAL MOD COMPLEX 30 MIN: CPT

## 2018-02-08 PROCEDURE — 83735 ASSAY OF MAGNESIUM: CPT | Performed by: HOSPITALIST

## 2018-02-08 PROCEDURE — 74011250636 HC RX REV CODE- 250/636: Performed by: HOSPITALIST

## 2018-02-08 PROCEDURE — 94640 AIRWAY INHALATION TREATMENT: CPT

## 2018-02-08 PROCEDURE — 82962 GLUCOSE BLOOD TEST: CPT

## 2018-02-08 PROCEDURE — 74011250637 HC RX REV CODE- 250/637: Performed by: HOSPITALIST

## 2018-02-08 PROCEDURE — 84484 ASSAY OF TROPONIN QUANT: CPT | Performed by: HOSPITALIST

## 2018-02-08 PROCEDURE — 87070 CULTURE OTHR SPECIMN AEROBIC: CPT | Performed by: GENERAL ACUTE CARE HOSPITAL

## 2018-02-08 PROCEDURE — 77010033678 HC OXYGEN DAILY

## 2018-02-08 PROCEDURE — 97165 OT EVAL LOW COMPLEX 30 MIN: CPT | Performed by: OCCUPATIONAL THERAPIST

## 2018-02-08 PROCEDURE — 87899 AGENT NOS ASSAY W/OPTIC: CPT | Performed by: HOSPITALIST

## 2018-02-08 PROCEDURE — 65660000000 HC RM CCU STEPDOWN

## 2018-02-08 PROCEDURE — 74011000250 HC RX REV CODE- 250: Performed by: HOSPITALIST

## 2018-02-08 PROCEDURE — 85025 COMPLETE CBC W/AUTO DIFF WBC: CPT | Performed by: HOSPITALIST

## 2018-02-08 PROCEDURE — 74011250636 HC RX REV CODE- 250/636: Performed by: GENERAL ACUTE CARE HOSPITAL

## 2018-02-08 PROCEDURE — G8987 SELF CARE CURRENT STATUS: HCPCS | Performed by: OCCUPATIONAL THERAPIST

## 2018-02-08 PROCEDURE — G8978 MOBILITY CURRENT STATUS: HCPCS

## 2018-02-08 PROCEDURE — 93306 TTE W/DOPPLER COMPLETE: CPT

## 2018-02-08 PROCEDURE — 74011250637 HC RX REV CODE- 250/637: Performed by: GENERAL ACUTE CARE HOSPITAL

## 2018-02-08 PROCEDURE — 97116 GAIT TRAINING THERAPY: CPT

## 2018-02-08 RX ORDER — GUAIFENESIN 600 MG/1
600 TABLET, EXTENDED RELEASE ORAL EVERY 12 HOURS
Status: DISCONTINUED | OUTPATIENT
Start: 2018-02-08 | End: 2018-02-11 | Stop reason: HOSPADM

## 2018-02-08 RX ADMIN — TAMSULOSIN HYDROCHLORIDE 0.4 MG: 0.4 CAPSULE ORAL at 21:40

## 2018-02-08 RX ADMIN — HEPARIN SODIUM 5000 UNITS: 5000 INJECTION, SOLUTION INTRAVENOUS; SUBCUTANEOUS at 13:05

## 2018-02-08 RX ADMIN — NEPHROCAP 1 CAPSULE: 1 CAP ORAL at 08:59

## 2018-02-08 RX ADMIN — ALLOPURINOL 200 MG: 100 TABLET ORAL at 21:40

## 2018-02-08 RX ADMIN — IPRATROPIUM BROMIDE AND ALBUTEROL SULFATE 3 ML: .5; 3 SOLUTION RESPIRATORY (INHALATION) at 03:31

## 2018-02-08 RX ADMIN — DOCUSATE SODIUM 100 MG: 100 CAPSULE, LIQUID FILLED ORAL at 17:13

## 2018-02-08 RX ADMIN — INSULIN LISPRO 4 UNITS: 100 INJECTION, SOLUTION INTRAVENOUS; SUBCUTANEOUS at 09:15

## 2018-02-08 RX ADMIN — OMEGA-3 FATTY ACIDS CAP DELAYED RELEASE 1000 MG 1 CAPSULE: 1000 CAPSULE DELAYED RELEASE at 09:13

## 2018-02-08 RX ADMIN — FERROUS SULFATE TAB 325 MG (65 MG ELEMENTAL FE) 325 MG: 325 (65 FE) TAB at 08:00

## 2018-02-08 RX ADMIN — Medication 10 ML: at 21:49

## 2018-02-08 RX ADMIN — AZITHROMYCIN 500 MG: 500 INJECTION, POWDER, LYOPHILIZED, FOR SOLUTION INTRAVENOUS at 13:26

## 2018-02-08 RX ADMIN — IPRATROPIUM BROMIDE AND ALBUTEROL SULFATE 3 ML: .5; 3 SOLUTION RESPIRATORY (INHALATION) at 15:21

## 2018-02-08 RX ADMIN — DOCUSATE SODIUM 100 MG: 100 CAPSULE, LIQUID FILLED ORAL at 07:54

## 2018-02-08 RX ADMIN — Medication 10 ML: at 12:17

## 2018-02-08 RX ADMIN — GUAIFENESIN 600 MG: 600 TABLET, EXTENDED RELEASE ORAL at 14:14

## 2018-02-08 RX ADMIN — CLONIDINE HYDROCHLORIDE 0.2 MG: 0.1 TABLET ORAL at 17:13

## 2018-02-08 RX ADMIN — CEFTRIAXONE SODIUM 1 G: 1 INJECTION, POWDER, FOR SOLUTION INTRAMUSCULAR; INTRAVENOUS at 11:49

## 2018-02-08 RX ADMIN — NEBIVOLOL HYDROCHLORIDE 20 MG: 10 TABLET ORAL at 21:40

## 2018-02-08 RX ADMIN — CLONIDINE HYDROCHLORIDE 0.2 MG: 0.1 TABLET ORAL at 08:00

## 2018-02-08 RX ADMIN — Medication 10 ML: at 03:23

## 2018-02-08 RX ADMIN — IPRATROPIUM BROMIDE AND ALBUTEROL SULFATE 3 ML: .5; 3 SOLUTION RESPIRATORY (INHALATION) at 07:56

## 2018-02-08 RX ADMIN — INSULIN LISPRO 10 UNITS: 100 INJECTION, SOLUTION INTRAVENOUS; SUBCUTANEOUS at 12:14

## 2018-02-08 RX ADMIN — GUAIFENESIN 600 MG: 600 TABLET, EXTENDED RELEASE ORAL at 21:40

## 2018-02-08 RX ADMIN — ACETAMINOPHEN 650 MG: 325 TABLET ORAL at 07:52

## 2018-02-08 RX ADMIN — IPRATROPIUM BROMIDE AND ALBUTEROL SULFATE 3 ML: .5; 3 SOLUTION RESPIRATORY (INHALATION) at 11:22

## 2018-02-08 RX ADMIN — HEPARIN SODIUM 5000 UNITS: 5000 INJECTION, SOLUTION INTRAVENOUS; SUBCUTANEOUS at 05:24

## 2018-02-08 RX ADMIN — IPRATROPIUM BROMIDE AND ALBUTEROL SULFATE 3 ML: .5; 3 SOLUTION RESPIRATORY (INHALATION) at 23:25

## 2018-02-08 RX ADMIN — GLIPIZIDE 20 MG: 5 TABLET, FILM COATED, EXTENDED RELEASE ORAL at 08:58

## 2018-02-08 RX ADMIN — OMEGA-3 FATTY ACIDS CAP DELAYED RELEASE 1000 MG 2 CAPSULE: 1000 CAPSULE DELAYED RELEASE at 17:14

## 2018-02-08 RX ADMIN — INSULIN LISPRO 2 UNITS: 100 INJECTION, SOLUTION INTRAVENOUS; SUBCUTANEOUS at 21:45

## 2018-02-08 RX ADMIN — BUMETANIDE 1 MG: 0.25 INJECTION INTRAMUSCULAR; INTRAVENOUS at 07:36

## 2018-02-08 RX ADMIN — HEPARIN SODIUM 5000 UNITS: 5000 INJECTION, SOLUTION INTRAVENOUS; SUBCUTANEOUS at 21:40

## 2018-02-08 RX ADMIN — IPRATROPIUM BROMIDE AND ALBUTEROL SULFATE 3 ML: .5; 3 SOLUTION RESPIRATORY (INHALATION) at 19:50

## 2018-02-08 RX ADMIN — ACETAMINOPHEN 650 MG: 325 TABLET ORAL at 16:05

## 2018-02-08 RX ADMIN — CLOPIDOGREL BISULFATE 75 MG: 75 TABLET ORAL at 21:40

## 2018-02-08 RX ADMIN — VITAMIN D, TAB 1000IU (100/BT) 1000 UNITS: 25 TAB at 21:40

## 2018-02-08 NOTE — PROGRESS NOTES
Bedside and Verbal shift change report given to [address] (oncoming nurse) by Jase Lagos (offgoing nurse). Report included the following information SBAR, Kardex, ED Summary and Recent Results.

## 2018-02-08 NOTE — PROGRESS NOTES
Problem: Falls - Risk of  Goal: *Absence of Falls  Document Rosy Fall Risk and appropriate interventions in the flowsheet.    Fall Risk Interventions:            Medication Interventions: Teach patient to arise slowly, Patient to call before getting OOB    Elimination Interventions: Call light in reach

## 2018-02-08 NOTE — DIABETES MGMT
[facility]        Recommendations/ Comments:Please consider changing glipizide to immediate release tablet and changing dose to 15mg ac b/d. Current dosing of glipizide SR 20mg daily is at max recommended dose and BG today so far 215, 306. Also please consider adding a new A1C test to next lab draw to assess home management. A1c:   Lab Results   Component Value Date/Time    Hemoglobin A1c 9.5 (H) 10/20/2017 07:57 AM    Hemoglobin A1c 9.2 (H) 07/24/2017 08:10 AM       Will continue to follow as needed. Thank you.   CARMEN Lange, RN, [address]

## 2018-02-08 NOTE — PROGRESS NOTES
Primary Nurse Sana Salguero RN and Le Person, RN performed a dual skin assessment on this patient No impairment noted

## 2018-02-08 NOTE — PROGRESS NOTES
Problem: Falls - Risk of  Goal: *Absence of Falls  Document Rosy Fall Risk and appropriate interventions in the flowsheet.    Outcome: Progressing Towards Goal  Fall Risk Interventions:            Medication Interventions: Assess postural VS orthostatic hypotension, Patient to call before getting OOB, Teach patient to arise slowly    Elimination Interventions: Call light in reach, Patient to call for help with toileting needs, Toileting schedule/hourly rounds, Urinal in reach

## 2018-02-08 NOTE — PROGRESS NOTES
Bedside and Verbal shift change report given to [address] (oncoming nurse) by Curt Lei (offgoing nurse). Report included the following information SBAR, Kardex and Recent Results.

## 2018-02-08 NOTE — PROGRESS NOTES
Hospitalist Progress Note    NAME: Acacia Romero   :  1950   MRN:  [id number]       Assessment / Plan:  Acute hypoxic respiratory, POA due to  Bilateral lower lobe pneumonia, POA with  Sepsis, POA due to pneumonia  Possible chf EF unknown  --requiring 4L to keep O2 sat 93%. Not on home O2. - continue to wean as tolerated   --CXR read as mild pulmonary edema and probnp elevated 3184.  --WBC improving 12.6 , fever 100.5 most recent. Flu negative. CT chest without contrast shows tiny b/l pleural effusions with bilateral lower lobe consolidation and infiltrates. --supplemental O2, start duoneb q4h, continue abx with rocephin and azithromycin. Get sputum culture, urine legionella and urine strep pneumoniae antigens. --Trop 0.04 to 0.16 to 0.13   --diuresed with lasix given in ER. Will put on bumex 1mg IV daily. I/O, daily weight  --tamiflu given empirically in ER. Given PNA on CT chest, will not continue.     HARDIK, on cpap but not O2 at home  --continue Cpap but add in O2     DM type 2   --continue glipizide. SSI     CAD s/p prior stent  HTN  --denies hx of chf   --continue aspirin, plavix. Hold statin due to myalgia. --Continue Bumex  --continue clonidine but reduce dose by 50% to have BP room to diurese, bystolic, minoxidil. Hold amlodipine. --check echo     PAD   --s/p left CEA, s/p aortobifemoral bypass  --has stenosis in left arm causing false low blood pressure in that arm by 25mmHg per patient, ,     Likely at baseline, has CKD4  -BUN/Cr 52/2.68  -monitor with diuresis.     Gout  --continue allopurinol     BPH  --continue flomax     Right adrenal gland adenoma:   --found to have a 1.9 cm right adrenal incidentaloma on MRI in .  His dex suppressed cortisol was 1.9 and plasma mets were normal and josue/renin ratio were normal in  so he doesn't appear to have a functioning adrenal gland.  --seen Dr. Emely Xiong 3/2017.   Benign findings on adrenal and liver.  Patient with small cystic mass in the neck of the pancreas measuring 8 x 6 x 18 mm consistent with small side branch IPMN.  Given small size and patient's multiple medical problems, recommend f/u for now.  F/u CT scan in 1 year. --Dr. Yoselyn Paitño recommended to have repeat CT scan in 6-12 months to document stability in size of adenoma, if no change at that time, no need for further repeat imaging  --patient reports he discussed with his nephrologist and it was felt risk of IV dye and contrast nephropathy outweighs benefit (since CT is for surveillance) so he does not plan to get follow up CT      Body mass index is 30.41 kg/(m^2).     Code: discussed, full code but no long term mechanical ventilation if he does not improve. Had been DNR in past  DVT prophylaxis: Heparin     Subjective:     Chief Complaint / Reason for Physician Visit  \"I feel better than when I came in but still can't catch my breath\". Discussed with RN events overnight. Review of Systems:  Symptom Y/N Comments  Symptom Y/N Comments   Fever/Chills n   Chest Pain n    Poor Appetite n   Edema     Cough n   Abdominal Pain n    Sputum    Joint Pain     SOB/RUBIO y   Pruritis/Rash     Nausea/vomit    Tolerating PT/OT     Diarrhea    Tolerating Diet     Constipation    Other       Could NOT obtain due to:      Objective:     VITALS:   Last 24hrs VS reviewed since prior progress note.  Most recent are:  Patient Vitals for the past 24 hrs:   Temp Pulse Resp BP SpO2   02/08/18 1521 - - - - 93 %   02/08/18 1427 - 74 - - 90 %   02/08/18 1425 - 75 - - 90 %   02/08/18 1413 - 73 - - (!) 88 %   02/08/18 1352 - 80 - - 93 %   02/08/18 1336 98.8 °F (37.1 °C) 74 - - 97 %   02/08/18 1218 99.1 °F (37.3 °C) - - - -   02/08/18 1217 - 75 - - 100 %   02/08/18 1122 - - - - 97 %   02/08/18 1027 98.8 °F (37.1 °C) - - - -   02/08/18 0928 98 °F (36.7 °C) - - - 95 %   02/08/18 0903 - - - 130/52 94 %   02/08/18 0809 - - - 156/55 -   02/08/18 0757 - - - - 97 %   02/08/18 0755 - 83 - - 98 %   02/08/18 0739 - 82 - - 94 %   02/08/18 0714 - 82 - 176/60 (!) 87 %   02/08/18 0712 - 82 - 172/65 (!) 88 %   02/08/18 0711 (!) 100.5 °F (38.1 °C) 83 20 172/65 (!) 88 %   02/08/18 0301 99.2 °F (37.3 °C) 66 22 134/52 97 %   02/07/18 2255 99.9 °F (37.7 °C) 85 24 149/66 90 %   02/07/18 2115 - 82 - 138/41 -   02/07/18 1928 99.6 °F (37.6 °C) 80 22 125/46 93 %   02/07/18 1910 98.9 °F (37.2 °C) - - - -   02/07/18 1908 98.9 °F (37.2 °C) 76 20 (!) 126/37 95 %   02/07/18 1754 - - - - 96 %   02/07/18 1718 99.2 °F (37.3 °C) - - - -   02/07/18 1642 100.3 °F (37.9 °C) 87 24 147/46 95 %   02/07/18 1558 - - - - 96 %   02/07/18 1538 - - - - (!) 89 %   02/07/18 1534 - - - - (!) 88 %   02/07/18 1533 - - - - (!) 86 %       Intake/Output Summary (Last 24 hours) at 02/08/18 1532  Last data filed at 02/08/18 1432   Gross per 24 hour   Intake             2130 ml   Output             2455 ml   Net             -325 ml        PHYSICAL EXAM:  General: Alert, cooperative, on NC  EENT:  EOMI. Anicteric sclerae. MMM  Resp:  Coarse bibasilar  CV:  Regular  rhythm,  No edema  GI:  Soft, Non distended, Non tender.  +Bowel sounds  Neurologic:  Alert and oriented X 3, normal speech   Psych:   Good insight. Not anxious nor agitated  Skin:  No rashes. No jaundice    Reviewed most current lab test results and cultures  YES  Reviewed most current radiology test results   YES  Review and summation of old records today    NO  Reviewed patient's current orders and MAR    YES  PMH/SH reviewed - no change compared to H&P  ________________________________________________________________________  Care Plan discussed with:    Comments   Patient x    Family      RN x    Care Manager     Consultant                        Multidiciplinary team rounds were held today with , nursing, pharmacist and clinical coordinator. Patient's plan of care was discussed; medications were reviewed and discharge planning was addressed. ________________________________________________________________________  Total NON critical care TIME:  25   Minutes    Total CRITICAL CARE TIME Spent:   Minutes non procedure based      Comments   >50% of visit spent in counseling and coordination of care     ________________________________________________________________________  Nancy Quintero MD     Procedures: see electronic medical records for all procedures/Xrays and details which were not copied into this note but were reviewed prior to creation of Plan. LABS:  I reviewed today's most current labs and imaging studies.   Pertinent labs include:  Recent Labs      02/08/18 0309 02/07/18   0849   WBC  12.6*  12.9*   HGB  7.9*  9.4*   HCT  24.1*  28.3*   PLT  226  228     Recent Labs      02/08/18 0309 02/07/18   0849   NA  138  139   K  3.5  3.6   CL  105  105   CO2  23  26   GLU  223*  253*   BUN  52*  54*   CREA  2.68*  2.80*   CA  8.5  8.5   MG  2.1   --    PHOS  3.9   --    ALB   --   2.5*   TBILI   --   0.6   SGOT   --   17   ALT   --   20       Signed: Nancy Quintero MD

## 2018-02-08 NOTE — PROGRESS NOTES
PCU SHIFT NURSING NOTE      Bedside and Verbal shift change report given to Sherly Randle RN (oncoming nurse) by Abraham Madera RN (offgoing nurse). Report included the following information SBAR, Kardex, ED Summary, Intake/Output, MAR, Recent Results and Cardiac Rhythm NSR. Shift Summary: 2985 1930: PM assessment completed. Pt A&O x 4. VSS on 4L NC. Patient's girlfriend @ bedside. All questions answered. No complaints voiced. Call bell in reach. Will monitor. 2045: [employer] @ bedside assessing home CPAP machine. Approved per . 2125: Patient placed on home CPAP mask w/ 4L bled in.    0040: Spoke w/ patient's gf. Updated. 0425: Trop 0.16 from <0.04, hospitalist paged. 18: Hospitalist paged again. 9402: Dr. Darshan Antunez made aware of jump in troponin. Patient asymptomatic. No new orders received. 0720: Bedside and Verbal shift change report given to Abraham Madera RN (oncoming nurse) by Sherly Randle RN (offgoing nurse). Report included the following information SBAR, Kardex, ED Summary, Intake/Output, MAR, Recent Results and Cardiac Rhythm NSR. Admission Date 2/7/2018   Admission Diagnosis Bilateral pneumonia  Acute respiratory failure ([facility] [address]   Consults None        Consults   [x]PT   [x]OT   []Speech   []Case Management      [] Palliative      Cardiac Monitoring Order   [x]Yes   []No     IV drips   []Yes    Drip:                            Dose:  Drip:                            Dose:  Drip:                            Dose:   [x]No     GI Prophylaxis   [x]Yes   []No         DVT Prophylaxis   SCDs:             Tico stockings:         [x] Medication   []Contraindicated   []None      Activity Level Activity Level: Up with Assistance     Activity Assistance: Partial (one person)   Purposeful Rounding every 1-2 hour?    [x]Yes   Esparza Score  Total Score: 1   Bed Alarm (If score 3 or >)   [x]Yes   [] Refused (See signed refusal form in chart)   Gregorio Score  Gregorio Score: 20   Gregorio Score (if score 14 or less)   []PMT consult   []Wound Care consult      []Specialty bed   [] Nutrition consult          Needs prior to discharge:   Home O2 required:    [x]Yes   []No    If yes, how much O2 required? CPAP @ night    Other:    Last Bowel Movement: Last Bowel Movement Date: 02/05/18      Influenza Vaccine          Pneumonia Vaccine           Diet Active Orders   Diet    DIET RENAL WITH OPTIONS 70-70-70 (House); Regular; Consistent Carb 2000kcal; AHA-LOW-CHOL FAT      LDAs               Peripheral IV 02/07/18 Left Antecubital (Active)   Site Assessment Clean, dry, & intact 2/7/2018  5:00 PM   Phlebitis Assessment 0 2/7/2018  5:00 PM   Infiltration Assessment 0 2/7/2018  5:00 PM   Dressing Status Clean, dry, & intact 2/7/2018  5:00 PM   Dressing Type Transparent 2/7/2018  5:00 PM   Hub Color/Line Status Capped 2/7/2018  5:00 PM   Action Taken Open ports on tubing capped 2/7/2018  5:00 PM   Alcohol Cap Used Yes 2/7/2018  5:00 PM                      Urinary Catheter      Intake & Output   Date 02/07/18 0700 - 02/08/18 0659 02/08/18 0700 - 02/09/18 0659   Shift 3817-5458 5440-2083 24 Hour Total 2471-7509 4691-9647 24 Hour Total   I  N  T  A  K  E   P.O. 250 490 740         P. O. 250 490 740       Shift Total  (mL/kg) 250  (2.8) 490  (5.4) 740  (8.2)      O  U  T  P  U  T   Urine  (mL/kg/hr) 750  (0.7) 680 1430         Urine Voided        Shift Total  (mL/kg) 750  (8.3) 680  (7.5) 1430  (15.8)      NET -500 -190 -690      Weight (kg) 90.7 90.7 90.7 90.7 90.7 90.7         Readmission Risk Assessment Tool Score Medium Risk            19       Total Score        5 Pt. Coverage (Medicare=5 , Medicaid, or Self-Pay=4)    14 Charlson Comorbidity Score (Age + Comorbid Conditions)        Criteria that do not apply:    Has Seen PCP in Last 6 Months (Yes=3, No=0)    . Living with Significant Other. Assisted Living. LTAC. SNF.  or   Rehab    Patient Length of Stay (>5 days = 3)    IP Visits Last 12 Months (1-3=4, 4=9, >4=11) Expected Length of Stay - - -   Actual Length of Stay 1

## 2018-02-08 NOTE — PROGRESS NOTES
Problem: Mobility Impaired (Adult and Pediatric)  Goal: *Acute Goals and Plan of Care (Insert Text)  Physical Therapy Goals  Initiated 2/8/2018  1. Patient will move from supine to sit and sit to supine , scoot up and down and roll side to side in bed with independence within 7 day(s). 2.  Patient will transfer from bed to chair and chair to bed with independence using the least restrictive device within 7 day(s). 3.  Patient will perform sit to stand with independence within 7 day(s). 4.  Patient will ambulate with independence for 200 feet with the least restrictive device within 7 day(s). 5.  Patient will ascend/descend 4 stairs with one sided handrail(s) with modified independence within 7 day(s). All while maintaining O2 sats >90%  physical Therapy EVALUATION  Patient: Helena Gr (78 y.o. male)  Date: 2/8/2018  Primary Diagnosis: Bilateral pneumonia  Acute respiratory failure (HCC)        Precautions:        ASSESSMENT :  Based on the objective data described below, the patient presents with good strength, good functional mobility, and mildly unsteady gait following admission for bilateral pneumonia. PTA patient lives with his significant other. He is independent with all aspects of functional mobility and ADLs, works full time. Currently, patient received sitting in the chair, agreeable to PT. Patient is independent with sit <> stand. Patient ambulated 150 feet with SBA-supervision. Patient with occasional path deviations and mildly unsteady gait although patient reports this is his first time ambulating. O2 sats at 90% on 4L O2 with ambulation, 96% at rest. Patient left sitting in the chair at the conclusion of PT treatment session. Encouraged patient to ambulate to the bathroom and be up in the chair for all meals to improve strength and endurance. Patient may need HH PT vs. None pending progress at discharge.      Patient will benefit from skilled intervention to address the above impairments. Patients rehabilitation potential is considered to be Good  Factors which may influence rehabilitation potential include:   []         None noted  []         Mental ability/status  [x]         Medical condition  []         Home/family situation and support systems  []         Safety awareness  []         Pain tolerance/management  []         Other:      PLAN :  Recommendations and Planned Interventions:  [x]           Bed Mobility Training             []    Neuromuscular Re-Education  [x]           Transfer Training                   []    Orthotic/Prosthetic Training  [x]           Gait Training                         []    Modalities  [x]           Therapeutic Exercises           []    Edema Management/Control  [x]           Therapeutic Activities            [x]    Patient and Family Training/Education  []           Other (comment):    Frequency/Duration: Patient will be followed by physical therapy  3 times a week to address goals. Discharge Recommendations: Home Health vs None  Further Equipment Recommendations for Discharge: none     SUBJECTIVE:   Patient stated It is nice to get up and move.     OBJECTIVE DATA SUMMARY:   HISTORY:    Past Medical History:   Diagnosis Date    BPH (benign prostatic hypertrophy)     CAD (coronary artery disease)     s/p stents    GERD (gastroesophageal reflux disease)     Gout     Other and unspecified hyperlipidemia     [facility]VD [facility]peripheral vascular [facility]     s/p PTCA of left femoral artery in July 2014    Type II or unspecified type diabetes mellitus without mention of complication, uncontrolled     Unspecified essential hypertension     Unspecified vitamin D deficiency      Past Surgical History:   Procedure Laterality Date    CARDIAC SURG PROCEDURE UNLIST      HX KNEE ARTHROSCOPY      right x2, left x1    VASCULAR SURGERY PROCEDURE UNLIST      aorto-bifem bypass    VASCULAR SURGERY PROCEDURE UNLIST      left carotid endarterectomy    VASCULAR SURGERY PROCEDURE UNLIST      right femoral PTCA     Prior Level of Function/Home Situation: patient lives with his significant other. He is independent with all aspects of functional mobility and ADLs, works full time. Personal factors and/or comorbidities impacting plan of care:     Home Situation  Home Environment: Private residence  # Steps to Enter: 0  One/Two Story Residence: One story  Living Alone: No  Support Systems: Spouse/Significant Other/Partner  Patient Expects to be Discharged to[de-identified] Private residence  Current DME Used/Available at Home: Glucometer, Cane, straight    EXAMINATION/PRESENTATION/DECISION MAKING:   Critical Behavior:  Neurologic State: Alert, Appropriate for age  Orientation Level: Oriented X4  Cognition: Appropriate for age attention/concentration     Hearing: Auditory  Auditory Impairment: None  Skin:    Edema:   Range Of Motion:  AROM: Within functional limits           PROM: Within functional limits           Strength:    Strength: Generally decreased, functional                    Tone & Sensation:   Tone: Normal              Sensation: Intact               Coordination:  Coordination: Within functional limits  Vision:      Functional Mobility:  Bed Mobility:              Transfers:  Sit to Stand: Independent  Stand to Sit: Independent        Bed to Chair: Independent              Balance:   Sitting: Intact; Without support  Standing: Impaired; Without support  Standing - Static: Good  Standing - Dynamic : Good  Ambulation/Gait Training:  Distance (ft): 150 Feet (ft)  Assistive Device: Gait belt  Ambulation - Level of Assistance: Supervision;Stand-by asssistance     Gait Description (WDL): Exceptions to WDL  Gait Abnormalities: Decreased step clearance; Path deviations        Base of Support: Widened     Speed/Cari: Pace decreased (<100 feet/min)  Step Length: Right shortened;Left shortened                     Stairs:               Therapeutic Exercises:       Functional Measure:  Tinetti test:    Sitting Balance: 1  Arises: 2  Attempts to Rise: 2  Immediate Standing Balance: 2  Standing Balance: 2  Nudged: 2  Eyes Closed: 1  Turn 360 Degrees - Continuous/Discontinuous: 1  Turn 360 Degrees - Steady/Unsteady: 1  Sitting Down: 2  Balance Score: 16  Indication of Gait: 1  R Step Length/Height: 1  L Step Length/Height: 1  R Foot Clearance: 1  L Foot Clearance: 1  Step Symmetry: 1  Step Continuity: 1  Path: 1  Trunk: 1  Walking Time: 0  Gait Score: 9  Total Score: 25       Tinetti Test and G-code impairment scale:  Percentage of Impairment CH    0%   CI    1-19% CJ    20-39% CK    40-59% CL    60-79% CM    80-99% CN     100%   Tinetti  Score 0-28 28 23-27 17-22 12-16 6-11 1-5 0       Tinetti Tool Score Risk of Falls  <19 = High Fall Risk  19-24 = Moderate Fall Risk  25-28 = Low Fall Risk  Tinetti ME. Performance-Oriented Assessment of Mobility Problems in Elderly Patients. [place] 67; [id number]. (Scoring Description: PT Bulletin Feb. 10, 1993)    Older adults: Stanford Reynoso et al, 2009; n = 1000 [facility] elderly evaluated with ABC, JARAD, ADL, and IADL)  · Mean JARAD score for males aged 69-68 years = 26.21(3.40)  · Mean JARAD score for females age 69-68 years = 25.16(4.30)  · Mean JARAD score for males over 80 years = 23.29(6.02)  · Mean JARAD score for females over 80 years = 17.20(8.32)         G codes: In compliance with CMSs Claims Based Outcome Reporting, the following G-code set was chosen for this patient based on their primary functional limitation being treated: The outcome measure chosen to determine the severity of the functional limitation was the Tinetti with a score of 25/28 which was correlated with the impairment scale.     ? Mobility - Walking and Moving Around:     - CURRENT STATUS: CI - 1%-19% impaired, limited or restricted    - GOAL STATUS: CH - 0% impaired, limited or restricted    - D/C STATUS:  ---------------To be determined---------------      Physical Therapy Evaluation Charge Determination   History Examination Presentation Decision-Making   MEDIUM  Complexity : 1-2 comorbidities / personal factors will impact the outcome/ POC  MEDIUM Complexity : 3 Standardized tests and measures addressing body structure, function, activity limitation and / or participation in recreation  MEDIUM Complexity : Evolving with changing characteristics  Other outcome measures Tinetti  MEDIUM      Based on the above components, the patient evaluation is determined to be of the following complexity level: MEDIUM    Pain:  Pain Scale 1: Numeric (0 - 10)  Pain Intensity 1: 0              Activity Tolerance:   Fair, VSS on 4L O2 with activity. Please refer to the flowsheet for vital signs taken during this treatment. After treatment:   [x]         Patient left in no apparent distress sitting up in chair  []         Patient left in no apparent distress in bed  [x]         Call bell left within reach  [x]         Nursing notified  []         Caregiver present  []         Bed alarm activated    COMMUNICATION/EDUCATION:   The patients plan of care was discussed with: Occupational Therapist, Registered Nurse and . [x]         Fall prevention education was provided and the patient/caregiver indicated understanding. [x]         Patient/family have participated as able in goal setting and plan of care. [x]         Patient/family agree to work toward stated goals and plan of care. []         Patient understands intent and goals of therapy, but is neutral about his/her participation. []         Patient is unable to participate in goal setting and plan of care.     Thank you for this referral.  Ruthy Sullivan, PT, DPT   Time Calculation: 20 mins

## 2018-02-08 NOTE — PROGRESS NOTES
ADULT PROTOCOL: JET AEROSOL  REASSESSMENT    Patient  Gertrudis Salguero     78 y.o.   male     2/8/2018  10:38 AM    Breath Sounds Pre Procedure:  Clear       Post Procedure:  Clear                                   Breathing pattern: Pre procedure Breathing Pattern: Regular          Post procedure Breathing Pattern: Regular    Heart Rate: Pre procedure Pulse: 80           Post procedure Pulse: 82    Resp Rate: Pre procedure Respirations: 20           Post procedure Respirations: 20      Incentive Spirometry:  Actual Volume (ml): 1000 ml          Cough: Pre procedure Cough: Non-productive               Post procedure Cough: Non-productive      Oxygen: O2 Device: Nasal cannula   4-6L        SpO2: Pre procedure SpO2: 97 %                Post procedure SpO2: 96 %      Nebulizer Therapy: Current medications Aerosolized Medications: DuoNeb q4hrs      Changed:NO    Smoking History:  Former    Problem List:   Patient Active Problem List   Diagnosis Code    Uncontrolled type 2 diabetes mellitus with diabetic nephropathy, without long-term current use of insulin (HCC) E11.21, E11.65    Essential hypertension, benign I10    Hyperlipidemia LDL goal <70 E78.5    Iron deficiency anemia D50.9    SOB (shortness of breath) R06.02    Acute blood loss anemia D62    Senile nuclear sclerosis H25.10    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

## 2018-02-08 NOTE — PROGRESS NOTES
Occupational Therapy Goals  Initiated 2/8/2018  1. Patient will perform dressing ADLs with independence within 7 day(s). 2.  Patient will perform sponge bathing with independence within 7 day(s). 3.  Patient will perform toilet transfers with independence within 7 day(s). 4.  Patient will perform all aspects of toileting with independence within 7 day(s). 5.  Patient will perform ADL setup with independence within 7 day(s). Occupational Therapy EVALUATION  Patient: Acacia Romero (89 y.o. male)  Date: 2/8/2018  Primary Diagnosis: Bilateral pneumonia  Acute respiratory failure (HCC)        Precautions: None       ASSESSMENT :  Based on the objective data described below, the patient presents with GW, decreased activity tolerance and decreased balance which is impairing his functional independence. He is functioning below his independent baseline, now performing ADLs at an independent to min A level, and is SBA to CGA for functional mobility. Patient desaturating to as low as 78% on 4L NC after performing light functional activity and slowly recovered once at rest. He should progress quickly as his pneumonia resolves, but will benefit from skilled intervention to address the above impairments. No OT needs anticipated for after discharge.    Patients rehabilitation potential is considered to be Good  Factors which may influence rehabilitation potential include:   [x]             None noted  []             Mental ability/status  []             Medical condition  []             Home/family situation and support systems  []             Safety awareness  []             Pain tolerance/management  []             Other:      PLAN :  Recommendations and Planned Interventions:  [x]               Self Care Training                  []        Therapeutic Activities  [x]               Functional Mobility Training    []        Cognitive Retraining  [x]               Therapeutic Exercises           [x]        Endurance Activities  [x]               Balance Training                   []        Neuromuscular Re-Education  []               Visual/Perceptual Training     [x]   Home Safety Training  [x]               Patient Education                 [x]        Family Training/Education  []               Other (comment):    Frequency/Duration: Patient will be followed by occupational therapy 3 times a week to address goals. Discharge Recommendations: None  Further Equipment Recommendations for Discharge: none     SUBJECTIVE:   Patient stated I felt better earlier today.     OBJECTIVE DATA SUMMARY:     Past Medical History:   Diagnosis Date    BPH (benign prostatic hypertrophy)     CAD (coronary artery disease)     s/p stents    GERD (gastroesophageal reflux disease)     Gout     Other and unspecified hyperlipidemia     PVD (peripheral vascular disease) ([facility])     s/p PTCA of left femoral artery in July 2014    Type II or unspecified type diabetes mellitus without mention of complication, uncontrolled     Unspecified essential hypertension     Unspecified vitamin D deficiency      Past Surgical History:   Procedure Laterality Date    CARDIAC SURG PROCEDURE UNLIST      HX KNEE ARTHROSCOPY      right x2, left x1    VASCULAR SURGERY PROCEDURE UNLIST      aorto-bifem bypass    VASCULAR SURGERY PROCEDURE UNLIST      left carotid endarterectomy    VASCULAR SURGERY PROCEDURE UNLIST      right femoral PTCA     Prior Level of Function/Home Situation: independent and working  Expanded or extensive additional review of patient history:     Home Situation  Home Environment: Private residence  # Steps to Enter: 0  One/Two Story Residence: One story  Living Alone: No  Support Systems: Spouse/Significant Other/Partner  Patient Expects to be Discharged to[de-identified] Private residence  Current DME Used/Available at Home: Glucometer, Cane, straight  [x]  Right hand dominant   []  Left hand dominant  Cognitive/Behavioral Status:  Neurologic State: Alert  Orientation Level: Oriented X4  Cognition: Appropriate decision making; Appropriate safety awareness; Appropriate for age attention/concentration; Follows commands  Perception: Appears intact     Safety/Judgement: Awareness of environment; Insight into deficits    Vision/Perceptual:         Acuity: Within Defined Limits       Range of Motion:  AROM: Within functional limits  PROM: Within functional limits  Strength:  Strength: Generally decreased, functional  Coordination:  Coordination: Within functional limits          Tone & Sensation:  Tone: Normal  Balance:  Sitting: Intact; Without support  Standing: Impaired; Without support  Standing - Static: Good  Standing - Dynamic : Good  Functional Mobility and Transfers for ADLs:  Bed Mobility:  Supine to Sit: CGA  Sit to supine: CGA  Scooting independent      Transfers:  Sit to Stand: Stand-by asssistance     Bed to Chair: Stand-by asssistance         ADL Assessment:  Feeding: Independent    Oral Facial Hygiene/Grooming: Supervision    Bathing: Minimum assistance    Upper Body Dressing: Minimum assistance    Lower Body Dressing: Minimum assistance    Toileting: Supervision                Functional Measure:  Barthel Index:    Bathin  Bladder: 10  Bowels: 10  Groomin  Dressin  Feeding: 10  Mobility: 0  Stairs: 0  Toilet Use: 5  Transfer (Bed to Chair and Back): 10  Total: 55       Barthel and G-code impairment scale:  Percentage of impairment CH  0% CI  1-19% CJ  20-39% CK  40-59% CL  60-79% CM  80-99% CN  100%   Barthel Score 0-100 100 99-80 79-60 59-40 20-39 1-19   0   Barthel Score 0-20 20 17-19 13-16 9-12 5-8 1-4 0      The Barthel ADL Index: Guidelines  1. The index should be used as a record of what a patient does, not as a record of what a patient could do. 2. The main aim is to establish degree of independence from any help, physical or verbal, however minor and for whatever reason.   3. The need for supervision renders the patient not independent. 4. A patient's performance should be established using the best available evidence. Asking the patient, friends/relatives and nurses are the usual sources, but direct observation and common sense are also important. However direct testing is not needed. 5. Usually the patient's performance over the preceding 24-48 hours is important, but occasionally longer periods will be relevant. 6. Middle categories imply that the patient supplies over 50 per cent of the effort. 7. Use of aids to be independent is allowed. Sonam Steven., Barthel, D.W. ([id number]). Functional evaluation: the Barthel Index. [facility] (14)2. Norval Low sandra SHAKIRA Little, Ysabel Louis., Rickie Lewis., [place], [address] [place] [address] Ave (1999). Measuring the change indisability after inpatient rehabilitation; comparison of the responsiveness of the Barthel Index and Functional [place] Measure. Journal of Neurology, Neurosurgery, and Psychiatry, 66(4), 765-344. Amy Lawrence, N.J.A, OXANA Garcia, & Jacquelin Jenkins MJEIMY. (2004.) Assessment of post-stroke quality of life in cost-effectiveness studies: The usefulness of the Barthel Index and the EuroQoL-5D. Quality of Life Research, 13, 427-43       In compliance with CMSs Claims Based Outcome Reporting, the following G-code set was chosen for this patient based on their primary functional limitation being treated: The outcome measure chosen to determine the severity of the functional limitation was the Barthel Index with a score of 55/100 which was correlated with the impairment scale. ? Self Care:     - CURRENT STATUS: CK - 40%-59% impaired, limited or restricted    - GOAL STATUS:  CJ - 20%-39% impaired, limited or restricted    - D/C STATUS:  ---------------To be determined---------------       Activity Tolerance:   Poor, desaturating to 78% on 4L NC with light functional activity.  Recovered after deep coughing and use of incentive spirometer  Please refer to the flowsheet for vital signs taken during this treatment. After treatment:   [] Patient left in no apparent distress sitting up in chair  [x] Patient left in no apparent distress in bed  [x] Call bell left within reach  [x] Nursing notified  [] Caregiver present  [] Bed alarm activated    COMMUNICATION/EDUCATION:   The patients plan of care was discussed with: Physical Therapist.  [x] Home safety education was provided and the patient/caregiver indicated understanding. [x] Patient/family have participated as able in goal setting and plan of care. [x] Patient/family agree to work toward stated goals and plan of care. [] Patient understands intent and goals of therapy, but is neutral about his/her participation. [] Patient is unable to participate in goal setting and plan of care. This patients plan of care is appropriate for delegation to [facility].     Thank you for this referral.  Tomy Wilhelm, OTR/L  Time Calculation: 14 mins

## 2018-02-09 PROBLEM — J81.1 PULMONARY EDEMA: Status: ACTIVE | Noted: 2018-02-09

## 2018-02-09 PROBLEM — A41.9 SEPSIS (HCC): Status: ACTIVE | Noted: 2018-02-09

## 2018-02-09 LAB
ANION GAP SERPL CALC-SCNC: 10 MMOL/L (ref 5–15)
BASOPHILS # BLD: 0 K/UL (ref 0–0.1)
BASOPHILS NFR BLD: 0 % (ref 0–1)
BUN SERPL-MCNC: 51 MG/DL (ref 6–20)
BUN/CREAT SERPL: 19 (ref 12–20)
CALCIUM SERPL-MCNC: 9 MG/DL (ref 8.5–10.1)
CHLORIDE SERPL-SCNC: 104 MMOL/L (ref 97–108)
CO2 SERPL-SCNC: 23 MMOL/L (ref 21–32)
CREAT SERPL-MCNC: 2.66 MG/DL (ref 0.7–1.3)
DIFFERENTIAL METHOD BLD: ABNORMAL
EOSINOPHIL # BLD: 0.2 K/UL (ref 0–0.4)
EOSINOPHIL NFR BLD: 1 % (ref 0–7)
ERYTHROCYTE [DISTWIDTH] IN BLOOD BY AUTOMATED COUNT: 14.7 % (ref 11.5–14.5)
FLUID CULTURE, SPNG2: NORMAL
GLUCOSE BLD STRIP.AUTO-MCNC: 159 MG/DL (ref 65–100)
GLUCOSE BLD STRIP.AUTO-MCNC: 201 MG/DL (ref 65–100)
GLUCOSE BLD STRIP.AUTO-MCNC: 232 MG/DL (ref 65–100)
GLUCOSE BLD STRIP.AUTO-MCNC: 257 MG/DL (ref 65–100)
GLUCOSE SERPL-MCNC: 159 MG/DL (ref 65–100)
HCT VFR BLD AUTO: 27.4 % (ref 36.6–50.3)
HGB BLD-MCNC: 8.9 G/DL (ref 12.1–17)
IMM GRANULOCYTES # BLD: 0.1 K/UL (ref 0–0.04)
IMM GRANULOCYTES NFR BLD AUTO: 1 % (ref 0–0.5)
L PNEUMO1 AG UR QL IA: NEGATIVE
LYMPHOCYTES # BLD: 0.9 K/UL (ref 0.8–3.5)
LYMPHOCYTES NFR BLD: 9 % (ref 12–49)
MCH RBC QN AUTO: 30.4 PG (ref 26–34)
MCHC RBC AUTO-ENTMCNC: 32.5 G/DL (ref 30–36.5)
MCV RBC AUTO: 93.5 FL (ref 80–99)
MONOCYTES # BLD: 1.1 K/UL (ref 0–1)
MONOCYTES NFR BLD: 11 % (ref 5–13)
NEUTS SEG # BLD: 8.4 K/UL (ref 1.8–8)
NEUTS SEG NFR BLD: 79 % (ref 32–75)
NRBC # BLD: 0 K/UL (ref 0–0.01)
NRBC BLD-RTO: 0 PER 100 WBC
ORGANISM ID, SPNG3: NORMAL
PLATELET # BLD AUTO: 268 K/UL (ref 150–400)
PLEASE NOTE, SPNG4: NORMAL
PMV BLD AUTO: 12.2 FL (ref 8.9–12.9)
POTASSIUM SERPL-SCNC: 3.5 MMOL/L (ref 3.5–5.1)
RBC # BLD AUTO: 2.93 M/UL (ref 4.1–5.7)
S PNEUM AG SPEC QL LA: NEGATIVE
SERVICE CMNT-IMP: ABNORMAL
SODIUM SERPL-SCNC: 137 MMOL/L (ref 136–145)
SPECIMEN SOURCE: NORMAL
SPECIMEN SOURCE: NORMAL
SPECIMEN, SPNG1: NORMAL
WBC # BLD AUTO: 10.6 K/UL (ref 4.1–11.1)

## 2018-02-09 PROCEDURE — 80048 BASIC METABOLIC PNL TOTAL CA: CPT | Performed by: GENERAL ACUTE CARE HOSPITAL

## 2018-02-09 PROCEDURE — 74011250636 HC RX REV CODE- 250/636: Performed by: HOSPITALIST

## 2018-02-09 PROCEDURE — 74011000250 HC RX REV CODE- 250: Performed by: HOSPITALIST

## 2018-02-09 PROCEDURE — 82962 GLUCOSE BLOOD TEST: CPT

## 2018-02-09 PROCEDURE — 65660000000 HC RM CCU STEPDOWN

## 2018-02-09 PROCEDURE — 74011636637 HC RX REV CODE- 636/637: Performed by: HOSPITALIST

## 2018-02-09 PROCEDURE — 97116 GAIT TRAINING THERAPY: CPT

## 2018-02-09 PROCEDURE — 36415 COLL VENOUS BLD VENIPUNCTURE: CPT | Performed by: GENERAL ACUTE CARE HOSPITAL

## 2018-02-09 PROCEDURE — 74011250637 HC RX REV CODE- 250/637: Performed by: GENERAL ACUTE CARE HOSPITAL

## 2018-02-09 PROCEDURE — 74011250637 HC RX REV CODE- 250/637: Performed by: HOSPITALIST

## 2018-02-09 PROCEDURE — 74011250636 HC RX REV CODE- 250/636: Performed by: GENERAL ACUTE CARE HOSPITAL

## 2018-02-09 PROCEDURE — 77010033678 HC OXYGEN DAILY

## 2018-02-09 PROCEDURE — 94640 AIRWAY INHALATION TREATMENT: CPT

## 2018-02-09 PROCEDURE — 85025 COMPLETE CBC W/AUTO DIFF WBC: CPT | Performed by: GENERAL ACUTE CARE HOSPITAL

## 2018-02-09 RX ADMIN — CEFTRIAXONE SODIUM 1 G: 1 INJECTION, POWDER, FOR SOLUTION INTRAMUSCULAR; INTRAVENOUS at 13:10

## 2018-02-09 RX ADMIN — OMEGA-3 FATTY ACIDS CAP DELAYED RELEASE 1000 MG 1 CAPSULE: 1000 CAPSULE DELAYED RELEASE at 09:26

## 2018-02-09 RX ADMIN — CLONIDINE HYDROCHLORIDE 0.2 MG: 0.1 TABLET ORAL at 08:55

## 2018-02-09 RX ADMIN — CLONIDINE HYDROCHLORIDE 0.2 MG: 0.1 TABLET ORAL at 17:34

## 2018-02-09 RX ADMIN — TAMSULOSIN HYDROCHLORIDE 0.4 MG: 0.4 CAPSULE ORAL at 22:27

## 2018-02-09 RX ADMIN — Medication 10 ML: at 06:26

## 2018-02-09 RX ADMIN — INSULIN LISPRO 2 UNITS: 100 INJECTION, SOLUTION INTRAVENOUS; SUBCUTANEOUS at 22:00

## 2018-02-09 RX ADMIN — AZITHROMYCIN 500 MG: 500 INJECTION, POWDER, LYOPHILIZED, FOR SOLUTION INTRAVENOUS at 13:17

## 2018-02-09 RX ADMIN — IPRATROPIUM BROMIDE AND ALBUTEROL SULFATE 3 ML: .5; 3 SOLUTION RESPIRATORY (INHALATION) at 11:22

## 2018-02-09 RX ADMIN — GUAIFENESIN 600 MG: 600 TABLET, EXTENDED RELEASE ORAL at 22:26

## 2018-02-09 RX ADMIN — OMEGA-3 FATTY ACIDS CAP DELAYED RELEASE 1000 MG 2 CAPSULE: 1000 CAPSULE DELAYED RELEASE at 18:22

## 2018-02-09 RX ADMIN — IPRATROPIUM BROMIDE AND ALBUTEROL SULFATE 3 ML: .5; 3 SOLUTION RESPIRATORY (INHALATION) at 15:19

## 2018-02-09 RX ADMIN — GUAIFENESIN 600 MG: 600 TABLET, EXTENDED RELEASE ORAL at 08:54

## 2018-02-09 RX ADMIN — ALLOPURINOL 200 MG: 100 TABLET ORAL at 22:26

## 2018-02-09 RX ADMIN — Medication 10 ML: at 22:27

## 2018-02-09 RX ADMIN — DOCUSATE SODIUM 100 MG: 100 CAPSULE, LIQUID FILLED ORAL at 08:54

## 2018-02-09 RX ADMIN — NEBIVOLOL HYDROCHLORIDE 20 MG: 10 TABLET ORAL at 22:27

## 2018-02-09 RX ADMIN — GLIPIZIDE 20 MG: 5 TABLET, FILM COATED, EXTENDED RELEASE ORAL at 09:13

## 2018-02-09 RX ADMIN — VITAMIN D, TAB 1000IU (100/BT) 1000 UNITS: 25 TAB at 22:26

## 2018-02-09 RX ADMIN — NEPHROCAP 1 CAPSULE: 1 CAP ORAL at 08:54

## 2018-02-09 RX ADMIN — IPRATROPIUM BROMIDE AND ALBUTEROL SULFATE 3 ML: .5; 3 SOLUTION RESPIRATORY (INHALATION) at 03:15

## 2018-02-09 RX ADMIN — FERROUS SULFATE TAB 325 MG (65 MG ELEMENTAL FE) 325 MG: 325 (65 FE) TAB at 08:54

## 2018-02-09 RX ADMIN — BUMETANIDE 1 MG: 0.25 INJECTION INTRAMUSCULAR; INTRAVENOUS at 09:25

## 2018-02-09 RX ADMIN — DOCUSATE SODIUM 100 MG: 100 CAPSULE, LIQUID FILLED ORAL at 17:34

## 2018-02-09 RX ADMIN — Medication 10 ML: at 13:10

## 2018-02-09 RX ADMIN — IPRATROPIUM BROMIDE AND ALBUTEROL SULFATE 3 ML: .5; 3 SOLUTION RESPIRATORY (INHALATION) at 07:55

## 2018-02-09 RX ADMIN — CLOPIDOGREL BISULFATE 75 MG: 75 TABLET ORAL at 22:26

## 2018-02-09 RX ADMIN — MINOXIDIL 10 MG: 2.5 TABLET ORAL at 17:34

## 2018-02-09 RX ADMIN — IPRATROPIUM BROMIDE AND ALBUTEROL SULFATE 3 ML: .5; 3 SOLUTION RESPIRATORY (INHALATION) at 19:01

## 2018-02-09 NOTE — PROGRESS NOTES
Problem: Falls - Risk of  Goal: *Absence of Falls  Document Rosy Fall Risk and appropriate interventions in the flowsheet.    Outcome: Progressing Towards Goal  Fall Risk Interventions:  Mobility Interventions: Patient to call before getting OOB         Medication Interventions: Patient to call before getting OOB, Teach patient to arise slowly    Elimination Interventions: Call light in reach, Patient to call for help with toileting needs, Urinal in reach

## 2018-02-09 NOTE — PROGRESS NOTES
Problem: Mobility Impaired (Adult and Pediatric)  Goal: *Acute Goals and Plan of Care (Insert Text)  Physical Therapy Goals  Initiated 2/8/2018  1. Patient will move from supine to sit and sit to supine , scoot up and down and roll side to side in bed with independence within 7 day(s). 2.  Patient will transfer from bed to chair and chair to bed with independence using the least restrictive device within 7 day(s). 3.  Patient will perform sit to stand with independence within 7 day(s). 4.  Patient will ambulate with independence for 200 feet with the least restrictive device within 7 day(s). 5.  Patient will ascend/descend 4 stairs with one sided handrail(s) with modified independence within 7 day(s). All while maintaining O2 sats >90%   physical Therapy TREATMENT  Patient: Jillyn Boast (78 y.o. male)  Date: 2/9/2018  Diagnosis: Bilateral pneumonia  Acute respiratory failure (HCC) Bilateral pneumonia       Precautions:    Chart, physical therapy assessment, plan of care and goals were reviewed. ASSESSMENT:  Pt continues to make good progress toward goals. Pt received supine, with nursing present, on RA, and willing to participate with therapy. Pt reports that he's been up and about in the room, but is looking forward to a longer walk. Supine on RA SPO2 at 95%. Pt supervision assist with bed mobility and transferring sit<>stand. Pt ambulates 200' with supervision>SBA. Patient demonstrates decreased pace and wide JERMAIN while ambulating. Pt demonstrates 2 mild path deviations while walking and reports feeling unsteady as he tires. After ambulating 150' pt's SPO2 reading 92% on RA. Pt returned to room and left in chair with lunch and SPO2 holding at 95%. Encouraged patient to ambulate to the bathroom and be up in the chair for all meals to increase activity tolerance.  Patient may need HH PT vs. None pending progress at discharge  Progression toward goals:  [x]      Improving appropriately and progressing toward goals  []      Improving slowly and progressing toward goals  []      Not making progress toward goals and plan of care will be adjusted     PLAN:  Patient continues to benefit from skilled intervention to address the above impairments. Continue treatment per established plan of care. Discharge Recommendations:  HH PT vs none  Further Equipment Recommendations for Discharge:  none     SUBJECTIVE:   Patient stated I feel unsteady when I get tired.     OBJECTIVE DATA SUMMARY:   Critical Behavior:  Neurologic State: Alert  Orientation Level: Oriented X4  Cognition: Follows commands  Safety/Judgement: Awareness of environment, Insight into deficits  Functional Mobility Training:  Bed Mobility:                     Transfers:  Sit to Stand: Supervision  Stand to Sit: Supervision        Bed to Chair: Supervision                    Balance:  Sitting: Intact; Without support  Standing: Intact  Standing - Static: Good  Standing - Dynamic : Good (mild trunk sway when fatigued)  Ambulation/Gait Training:  Distance (ft): 200 Feet (ft)  Assistive Device: Gait belt  Ambulation - Level of Assistance: Supervision        Gait Abnormalities: Decreased step clearance; Path deviations        Base of Support: Widened     Speed/Cari: Pace decreased (<100 feet/min)  Step Length: Left shortened;Right shortened             Pain:  Pain Scale 1: Numeric (0 - 10)  Pain Intensity 1: 0              Activity Tolerance:   Good: ambulates 200' with supervision assist, mild dyspnea, and SPO2 doesn't drop below 92%  Please refer to the flowsheet for vital signs taken during this treatment.   After treatment:   [x] Patient left in no apparent distress sitting up in chair  [] Patient left in no apparent distress in bed  [x] Call bell left within reach  [x] Nursing notified  [] Caregiver present  [] Bed alarm activated    COMMUNICATION/COLLABORATION:   The patients plan of care was discussed with: Physical Therapist and Registered Nurse    Yariel Holt, [facility], [place]   Time Calculation: 12 mins     Regarding student involvement in patient care:  A student participated in this treatment session. Per CMS Medicare statements and APTA guidelines I certify that the following was true:  1. I was present and directly observed the entire session. 2. I made all skilled judgments and clinical decisions regarding care. 3. I am the practitioner responsible for assessment, treatment, and documentation.

## 2018-02-09 NOTE — ROUTINE PROCESS
TRANSFER - OUT REPORT:    Verbal report given to Candy Esparza RN (name) [facility]  being transferred to [address] (unit) for routine progression of care       Report consisted of patients Situation, Background, Assessment and   Recommendations(SBAR). Information from the following report(s) SBAR, Kardex, [facility] and Recent Results was reviewed with the receiving nurse. Lines:   Peripheral IV 02/08/18 Left Forearm (Active)   Site Assessment Clean, dry, & intact 2/9/2018  4:31 PM   Phlebitis Assessment 0 2/9/2018  4:31 PM   Infiltration Assessment 0 2/9/2018  4:31 PM   Dressing Status Clean, dry, & intact 2/9/2018  4:31 PM   Dressing Type Tape;Transparent 2/9/2018  4:31 PM   Hub Color/Line Status Blue;Capped 2/9/2018  4:31 PM   Action Taken Open ports on tubing capped 2/9/2018  2:00 PM   Alcohol Cap Used Yes 2/9/2018  8:00 AM        Opportunity for questions and clarification was provided.       Patient transported with:

## 2018-02-09 NOTE — PROGRESS NOTES
Hospitalist Progress Note    NAME: Dimitris Wiseman   :  1950   MRN:  [id number]       Assessment / Plan:  Acute hypoxic respiratory failure, POA due to  Bilateral lower lobe pneumonia, POA with  Sepsis, POA due to pneumonia  Possible chf EF unknown  -Initially had required 4L to keep O2 sats above 93%  -This has been weaned down but still on 1.5L   -Nursing for O2 challenge test  -CXR noted  -Continue neb treatemnt  -WBC continues to improve, now 10.6  -Afebrile > 24hrs, flu negative  -CT Chest: shows tiny b/l pleural effusions with bilateral lower lobe consolidation and infiltrates   -Continue Rocephin and Azithromycin  -Sputum Cx prelim   -BCx NGTD 2 days  -Urine legionella pending  -Trop 0.04 to 0.16 to 0.13 to 0.10   -diuresed with lasix given in ER.  Will put on bumex 1mg IV daily.  -+280 mL 24 hours  -Weight approx 194 / was 200 pounds on admission  -Transfer out of PCU to telemetry  -Will repeat CXR tmr      HARDIK, on cpap but not O2 at home  --continue Cpap but add in O2      DM type 2   --continue glipizide.  SSI      CAD s/p prior stent  HTN  --denies hx of chf   --continue aspirin, plavix.  Hold statin due to myalgia.    --Continue Bumex  --continue clonidine but reduce dose by 50% to have BP room to diurese, bystolic, minoxidil.  Hold amlodipine.      PAD   --s/p left CEA, s/p aortobifemoral bypass  --Has stenosis in left arm causing false low blood pressure in that arm by 25mmHg per patient      Likely at baseline, has CKD4  -BUN/Cr 52/2.66  -monitor with diuresis.      Gout  --continue allopurinol      BPH  --continue flomax      Right adrenal gland adenoma:   --found to have a 1.9 cm right adrenal incidentaloma on MRI in .  His dex suppressed cortisol was 1.9 and plasma mets were normal and josue/renin ratio were normal in  so he doesn't appear to have a functioning adrenal gland.  --seen Dr. Jone Arriaga 3/2017Osmin Ag findings on adrenal and liver.  Patient with small cystic mass in the neck of the pancreas measuring 8 x 6 x 18 mm consistent with small side branch IPMN.  Given small size and patient's multiple medical problems, recommend f/u for now.  F/u CT scan in 1 year.   --Dr. Veronique Lemon recommended to have repeat CT scan in 6-12 months to document stability in size of adenoma, if no change at that time, no need for further repeat imaging  --patient reports he discussed with his nephrologist and it was felt risk of IV dye and contrast nephropathy outweighs benefit (since CT is for surveillance) so he does not plan to get follow up CT      Body mass index is 30.41 kg/(m^2).     Code: discussed, full code but no long term mechanical ventilation if he does not improve.  Had been DNR in past  DVT prophylaxis: Heparin     Subjective:     Chief Complaint / Reason for Physician Visit  \"I am feeling better. I am getting there. \". Discussed with RN events overnight. Review of Systems:  Symptom Y/N Comments  Symptom Y/N Comments   Fever/Chills n   Chest Pain n    Poor Appetite n   Edema     Cough n   Abdominal Pain n    Sputum    Joint Pain     SOB/RUBIO y   Pruritis/Rash     Nausea/vomit    Tolerating PT/OT     Diarrhea    Tolerating Diet     Constipation    Other       Could NOT obtain due to:      Objective:     VITALS:   Last 24hrs VS reviewed since prior progress note.  Most recent are:  Patient Vitals for the past 24 hrs:   Temp Pulse Resp BP SpO2   02/09/18 1144 99 °F (37.2 °C) 78 - 172/59 94 %   02/09/18 1122 - - - - 93 %   02/09/18 0755 - - - - 96 %   02/09/18 0316 - 77 - 120/62 95 %   02/09/18 0314 99.8 °F (37.7 °C) 76 21 167/67 95 %   02/08/18 2357 99.3 °F (37.4 °C) 74 22 162/60 93 %   02/08/18 1923 100.2 °F (37.9 °C) 77 22 130/60 96 %   02/08/18 1552 99.1 °F (37.3 °C) - - - -   02/08/18 1540 - 85 - 159/50 -   02/08/18 1531 - 82 - - 95 %   02/08/18 1521 - - - - 93 %   02/08/18 1427 - 74 - - 90 %   02/08/18 1425 - 75 - - 90 %       Intake/Output Summary (Last 24 hours) at 02/09/18 6317 [facility] filed at 02/09/18 0631   Gross per 24 hour   Intake             1030 ml   Output              750 ml   Net              280 ml        PHYSICAL EXAM:  General: Alert, cooperative, on NC, NAD    EENT:  EOMI. Anicteric sclerae. MMM  Resp:  Coarse BS  CV:  Regular  rhythm,  No edema  GI:  Soft, Non distended, Non tender.  +Bowel sounds  Neurologic:  Alert and oriented X 3, normal speech   Psych:   Good insight. Not anxious nor agitated  Skin:  No rashes. No jaundice    Reviewed most current lab test results and cultures  YES  Reviewed most current radiology test results   YES  Review and summation of old records today    NO  Reviewed patient's current orders and MAR    YES  PMH/SH reviewed - no change compared to H&P  ________________________________________________________________________  Care Plan discussed with:    Comments   Patient x    Family      RN x    Care Manager     Consultant                        Multidiciplinary team rounds were held today with , nursing, pharmacist and clinical coordinator. Patient's plan of care was discussed; medications were reviewed and discharge planning was addressed. ________________________________________________________________________  Total NON critical care TIME:  25   Minutes    Total CRITICAL CARE TIME Spent:   Minutes non procedure based      Comments   >50% of visit spent in counseling and coordination of care     ________________________________________________________________________  Nancy Quintero MD     Procedures: see electronic medical records for all procedures/Xrays and details which were not copied into this note but were reviewed prior to creation of Plan. LABS:  I reviewed today's most current labs and imaging studies.   Pertinent labs include:  Recent Labs      02/09/18   0327  02/08/18   0309  02/07/18   0849   WBC  10.6  12.6*  12.9*   HGB  8.9*  7.9*  9.4*   HCT  27.4*  24.1*  28.3*   PLT  268  226  228     Recent Labs      02/09/18   0327  02/08/18   0309  02/07/18   0849   NA  137  138  139   K  3.5  3.5  3.6   CL  104  105  105   CO2  23  23  26   GLU  159*  223*  253*   BUN  51*  52*  54*   CREA  2.66*  2.68*  2.80*   CA  9.0  8.5  8.5   MG   --   2.1   --    PHOS   --   3.9   --    ALB   --    --   2.5*   TBILI   --    --   0.6   SGOT   --    --   17   ALT   --    --   20       Signed: Nancy Quintero MD

## 2018-02-09 NOTE — PROGRESS NOTES
2D Echo department called by RN to confirm patient is on list to be done today. Echo department says they will be able to complete it today. [address] from 2D echo at bedside says ECHO  Was done yesterday. RN and Avtar Weiss unable to locate results in computer. Avtar Weiss going to 2D echo department to find out issue and resubmit results.

## 2018-02-09 NOTE — PROGRESS NOTES
PCU SHIFT NURSING NOTE      Bedside and Verbal shift change report given to Pool Ramos RN (oncoming nurse) by Jasmin Pablo RN (offgoing nurse). Report included the following information SBAR, Kardex, ED Summary, Intake/Output, MAR, Recent Results and Cardiac Rhythm NSR. Shift Summary: 7429    3273: PM assessment completed. Pt A&O x 4. VS obtained on 4L NC. NSR on telemetry. No complaints voiced. Call bell within reach. Will monitor. 2145: PM meds given. Patient placed on home CPAP mask w/ 4L O2 bled in. No complaints. Will monitor. 0330: AM labs drawn. VSS. No complaints. Will monitor. 0725: Bedside and Verbal shift change report given to Jasmin Pablo RN (oncoming nurse) by Pool Villa / Rosey Ramos RN (offgoing nurse). Report included the following information SBAR, Kardex, ED Summary, Intake/Output, MAR, Recent Results and Cardiac Rhythm NSR. Admission Date 2/7/2018   Admission Diagnosis Bilateral pneumonia  Acute respiratory failure ([address]   Consults None        Consults   []PT   []OT   []Speech   []Case Management      [] Palliative      Cardiac Monitoring Order   [x]Yes   []No     IV drips   []Yes    Drip:                            Dose:  Drip:                            Dose:  Drip:                            Dose:   [x]No     GI Prophylaxis   []Yes   []No         DVT Prophylaxis   SCDs:  Sequential Compression Device: Bilateral          Tico stockings:         [x] Medication   []Contraindicated   []None      Activity Level Activity Level: Up with Assistance     Activity Assistance: Partial (one person)   Purposeful Rounding every 1-2 hour?    [x]Yes   Esparza Score  Total Score: 2   Bed Alarm (If score 3 or >)   []Yes   [] Refused (See signed refusal form in chart)   Gregorio Score  Gregorio Score: 20   Gregorio Score (if score 14 or less)   []PMT consult   []Wound Care consult      []Specialty bed   [] Nutrition consult          Needs prior to discharge:   Home O2 required:    []Yes   [x]No    If yes, how much O2 required? CPAP @ night    Other:    Last Bowel Movement: Last Bowel Movement Date: 02/05/18      Influenza Vaccine Received Flu Vaccine for Current Season (usually Sept-March): No    Patient/Guardian Refused (Notify MD): Yes   Pneumonia Vaccine           Diet Active Orders   Diet    DIET DIABETIC CONSISTENT CARB Regular; AHA-LOW-CHOL FAT      LDAs               Peripheral IV 02/08/18 Left Forearm (Active)   Site Assessment Clean, dry, & intact 2/8/2018  2:00 PM   Phlebitis Assessment 0 2/8/2018  2:00 PM   Infiltration Assessment 0 2/8/2018  2:00 PM   Dressing Status Clean, dry, & intact 2/8/2018  2:00 PM   Dressing Type Transparent;Tape 2/8/2018  2:00 PM   Hub Color/Line Status Blue;Flushed; Infusing 2/8/2018  2:00 PM   Action Taken Open ports on tubing capped;Dressing reinforced 2/8/2018  2:00 PM   Alcohol Cap Used Yes 2/8/2018  2:00 PM                      Urinary Catheter      Intake & Output   Date 02/07/18 1900 - 02/08/18 0659 02/08/18 0700 - 02/09/18 0659   Shift 2331-5002 24 Hour Total 1162-8401 8086-4720 24 Hour Total   I  N  T  A  K  E   P.O. 490 740 840  840      P. O. 490 740 840  840    I.V.  (mL/kg/hr)   550  (0.5)  550      Volume (azithromycin (ZITHROMAX) 500 mg in 0.9% sodium chloride 250 mL IVPB)   500  500      Volume (cefTRIAXone (ROCEPHIN) 1 g/50 mL NS IVPB)   50  50    Shift Total  (mL/kg) 490  (5.6) 740  (8.4) 1390  (15.9)  1390  (15.9)   O  U  T  P  U  T   Urine  (mL/kg/hr) 905 1655 800  (0.8)  800      Urine Voided 905 1655 800  800    Shift Total  (mL/kg) 905  (10.3) 1655  (18.9) 800  (9.1)  800  (9.1)   NET -415 -917 590  590   Weight (kg) 87.6 87.6 87.6 87.6 87.6         Readmission Risk Assessment Tool Score High Risk            24       Total Score        3 Has Seen PCP in Last 6 Months (Yes=3, No=0)    2 . Living with Significant Other. Assisted Living. LTAC. SNF. or   Rehab    5 Pt.  Coverage (Medicare=5 , Medicaid, or Self-Pay=4)    14 Charlson Comorbidity Score (Age + Comorbid Conditions)        Criteria that do not apply:    Patient Length of Stay (>5 days = 3)    IP Visits Last 12 Months (1-3=4, 4=9, >4=11)       Expected Length of Stay 4d 21h   Actual Length of Stay 1

## 2018-02-10 LAB
ANION GAP SERPL CALC-SCNC: 9 MMOL/L (ref 5–15)
BACTERIA SPEC CULT: NORMAL
BASOPHILS # BLD: 0 K/UL (ref 0–0.1)
BASOPHILS NFR BLD: 0 % (ref 0–1)
BUN SERPL-MCNC: 45 MG/DL (ref 6–20)
BUN/CREAT SERPL: 20 (ref 12–20)
CALCIUM SERPL-MCNC: 8.6 MG/DL (ref 8.5–10.1)
CHLORIDE SERPL-SCNC: 107 MMOL/L (ref 97–108)
CO2 SERPL-SCNC: 24 MMOL/L (ref 21–32)
CREAT SERPL-MCNC: 2.27 MG/DL (ref 0.7–1.3)
DIFFERENTIAL METHOD BLD: ABNORMAL
EOSINOPHIL # BLD: 0.3 K/UL (ref 0–0.4)
EOSINOPHIL NFR BLD: 3 % (ref 0–7)
ERYTHROCYTE [DISTWIDTH] IN BLOOD BY AUTOMATED COUNT: 14.7 % (ref 11.5–14.5)
GLUCOSE BLD STRIP.AUTO-MCNC: 162 MG/DL (ref 65–100)
GLUCOSE BLD STRIP.AUTO-MCNC: 198 MG/DL (ref 65–100)
GLUCOSE BLD STRIP.AUTO-MCNC: 224 MG/DL (ref 65–100)
GLUCOSE BLD STRIP.AUTO-MCNC: 287 MG/DL (ref 65–100)
GLUCOSE SERPL-MCNC: 159 MG/DL (ref 65–100)
GRAM STN SPEC: NORMAL
HCT VFR BLD AUTO: 25.2 % (ref 36.6–50.3)
HGB BLD-MCNC: 8.4 G/DL (ref 12.1–17)
IMM GRANULOCYTES # BLD: 0 K/UL (ref 0–0.04)
IMM GRANULOCYTES NFR BLD AUTO: 0 % (ref 0–0.5)
LYMPHOCYTES # BLD: 0.9 K/UL (ref 0.8–3.5)
LYMPHOCYTES NFR BLD: 10 % (ref 12–49)
MCH RBC QN AUTO: 31.2 PG (ref 26–34)
MCHC RBC AUTO-ENTMCNC: 33.3 G/DL (ref 30–36.5)
MCV RBC AUTO: 93.7 FL (ref 80–99)
MONOCYTES # BLD: 1.1 K/UL (ref 0–1)
MONOCYTES NFR BLD: 12 % (ref 5–13)
NEUTS SEG # BLD: 7 K/UL (ref 1.8–8)
NEUTS SEG NFR BLD: 75 % (ref 32–75)
NRBC # BLD: 0 K/UL (ref 0–0.01)
NRBC BLD-RTO: 0 PER 100 WBC
PLATELET # BLD AUTO: 276 K/UL (ref 150–400)
PMV BLD AUTO: 11.5 FL (ref 8.9–12.9)
POTASSIUM SERPL-SCNC: 3.7 MMOL/L (ref 3.5–5.1)
RBC # BLD AUTO: 2.69 M/UL (ref 4.1–5.7)
SERVICE CMNT-IMP: ABNORMAL
SERVICE CMNT-IMP: NORMAL
SODIUM SERPL-SCNC: 140 MMOL/L (ref 136–145)
WBC # BLD AUTO: 9.4 K/UL (ref 4.1–11.1)

## 2018-02-10 PROCEDURE — 36415 COLL VENOUS BLD VENIPUNCTURE: CPT | Performed by: GENERAL ACUTE CARE HOSPITAL

## 2018-02-10 PROCEDURE — 74011000250 HC RX REV CODE- 250: Performed by: GENERAL ACUTE CARE HOSPITAL

## 2018-02-10 PROCEDURE — 74011250636 HC RX REV CODE- 250/636: Performed by: HOSPITALIST

## 2018-02-10 PROCEDURE — 85025 COMPLETE CBC W/AUTO DIFF WBC: CPT | Performed by: GENERAL ACUTE CARE HOSPITAL

## 2018-02-10 PROCEDURE — 74011000250 HC RX REV CODE- 250: Performed by: HOSPITALIST

## 2018-02-10 PROCEDURE — 74011250636 HC RX REV CODE- 250/636: Performed by: GENERAL ACUTE CARE HOSPITAL

## 2018-02-10 PROCEDURE — 74011250637 HC RX REV CODE- 250/637: Performed by: GENERAL ACUTE CARE HOSPITAL

## 2018-02-10 PROCEDURE — 82962 GLUCOSE BLOOD TEST: CPT

## 2018-02-10 PROCEDURE — 65660000000 HC RM CCU STEPDOWN

## 2018-02-10 PROCEDURE — 74011250637 HC RX REV CODE- 250/637: Performed by: HOSPITALIST

## 2018-02-10 PROCEDURE — 94640 AIRWAY INHALATION TREATMENT: CPT

## 2018-02-10 PROCEDURE — 80048 BASIC METABOLIC PNL TOTAL CA: CPT | Performed by: GENERAL ACUTE CARE HOSPITAL

## 2018-02-10 PROCEDURE — 74011636637 HC RX REV CODE- 636/637: Performed by: HOSPITALIST

## 2018-02-10 RX ORDER — BUMETANIDE 1 MG/1
1 TABLET ORAL DAILY
Status: DISCONTINUED | OUTPATIENT
Start: 2018-02-10 | End: 2018-02-11 | Stop reason: HOSPADM

## 2018-02-10 RX ORDER — IPRATROPIUM BROMIDE AND ALBUTEROL SULFATE 2.5; .5 MG/3ML; MG/3ML
3 SOLUTION RESPIRATORY (INHALATION) EVERY 6 HOURS
Status: DISCONTINUED | OUTPATIENT
Start: 2018-02-10 | End: 2018-02-10

## 2018-02-10 RX ORDER — IPRATROPIUM BROMIDE AND ALBUTEROL SULFATE 2.5; .5 MG/3ML; MG/3ML
3 SOLUTION RESPIRATORY (INHALATION)
Status: DISCONTINUED | OUTPATIENT
Start: 2018-02-10 | End: 2018-02-11 | Stop reason: HOSPADM

## 2018-02-10 RX ADMIN — INSULIN LISPRO 7 UNITS: 100 INJECTION, SOLUTION INTRAVENOUS; SUBCUTANEOUS at 13:09

## 2018-02-10 RX ADMIN — IPRATROPIUM BROMIDE AND ALBUTEROL SULFATE 3 ML: .5; 3 SOLUTION RESPIRATORY (INHALATION) at 14:04

## 2018-02-10 RX ADMIN — Medication 10 ML: at 13:08

## 2018-02-10 RX ADMIN — DOCUSATE SODIUM 100 MG: 100 CAPSULE, LIQUID FILLED ORAL at 10:21

## 2018-02-10 RX ADMIN — BUMETANIDE 1 MG: 1 TABLET ORAL at 13:09

## 2018-02-10 RX ADMIN — Medication 10 ML: at 06:00

## 2018-02-10 RX ADMIN — CEFTRIAXONE SODIUM 1 G: 1 INJECTION, POWDER, FOR SOLUTION INTRAMUSCULAR; INTRAVENOUS at 15:31

## 2018-02-10 RX ADMIN — DOCUSATE SODIUM 100 MG: 100 CAPSULE, LIQUID FILLED ORAL at 17:02

## 2018-02-10 RX ADMIN — CLOPIDOGREL BISULFATE 75 MG: 75 TABLET ORAL at 21:07

## 2018-02-10 RX ADMIN — IPRATROPIUM BROMIDE AND ALBUTEROL SULFATE 3 ML: .5; 3 SOLUTION RESPIRATORY (INHALATION) at 07:54

## 2018-02-10 RX ADMIN — GUAIFENESIN 600 MG: 600 TABLET, EXTENDED RELEASE ORAL at 21:07

## 2018-02-10 RX ADMIN — AZITHROMYCIN 500 MG: 500 INJECTION, POWDER, LYOPHILIZED, FOR SOLUTION INTRAVENOUS at 15:38

## 2018-02-10 RX ADMIN — IPRATROPIUM BROMIDE AND ALBUTEROL SULFATE 3 ML: .5; 3 SOLUTION RESPIRATORY (INHALATION) at 00:00

## 2018-02-10 RX ADMIN — FERROUS SULFATE TAB 325 MG (65 MG ELEMENTAL FE) 325 MG: 325 (65 FE) TAB at 10:21

## 2018-02-10 RX ADMIN — ACETAMINOPHEN 650 MG: 325 TABLET ORAL at 17:01

## 2018-02-10 RX ADMIN — CLONIDINE HYDROCHLORIDE 0.2 MG: 0.1 TABLET ORAL at 17:02

## 2018-02-10 RX ADMIN — CLONIDINE HYDROCHLORIDE 0.2 MG: 0.1 TABLET ORAL at 10:21

## 2018-02-10 RX ADMIN — GLIPIZIDE 20 MG: 5 TABLET, FILM COATED, EXTENDED RELEASE ORAL at 10:21

## 2018-02-10 RX ADMIN — IPRATROPIUM BROMIDE AND ALBUTEROL SULFATE 3 ML: .5; 3 SOLUTION RESPIRATORY (INHALATION) at 20:52

## 2018-02-10 RX ADMIN — TAMSULOSIN HYDROCHLORIDE 0.4 MG: 0.4 CAPSULE ORAL at 21:07

## 2018-02-10 RX ADMIN — ALLOPURINOL 200 MG: 100 TABLET ORAL at 21:07

## 2018-02-10 RX ADMIN — OMEGA-3 FATTY ACIDS CAP DELAYED RELEASE 1000 MG 2 CAPSULE: 1000 CAPSULE DELAYED RELEASE at 17:03

## 2018-02-10 RX ADMIN — MINOXIDIL 10 MG: 2.5 TABLET ORAL at 17:02

## 2018-02-10 RX ADMIN — GUAIFENESIN 600 MG: 600 TABLET, EXTENDED RELEASE ORAL at 10:21

## 2018-02-10 RX ADMIN — Medication 10 ML: at 21:11

## 2018-02-10 RX ADMIN — NEPHROCAP 1 CAPSULE: 1 CAP ORAL at 10:21

## 2018-02-10 RX ADMIN — MINOXIDIL 10 MG: 2.5 TABLET ORAL at 10:21

## 2018-02-10 RX ADMIN — INSULIN LISPRO 4 UNITS: 100 INJECTION, SOLUTION INTRAVENOUS; SUBCUTANEOUS at 17:01

## 2018-02-10 RX ADMIN — ACETAMINOPHEN 650 MG: 325 TABLET ORAL at 02:04

## 2018-02-10 RX ADMIN — OMEGA-3 FATTY ACIDS CAP DELAYED RELEASE 1000 MG 1 CAPSULE: 1000 CAPSULE DELAYED RELEASE at 10:20

## 2018-02-10 RX ADMIN — VITAMIN D, TAB 1000IU (100/BT) 1000 UNITS: 25 TAB at 21:07

## 2018-02-10 RX ADMIN — NEBIVOLOL HYDROCHLORIDE 20 MG: 10 TABLET ORAL at 21:07

## 2018-02-10 NOTE — PROGRESS NOTES
ADULT PROTOCOL: JET AEROSOL  REASSESSMENT    Patient  Gertrudis Espinoza     78 y.o.   male     2/10/2018  12:21 AM    Breath Sounds Pre Procedure: Right Breath Sounds: Clear, Diminished                               Left Breath Sounds: Clear, Diminished    Breath Sounds Post Procedure: Right Breath Sounds: Clear, Diminished                                 Left Breath Sounds: Clear, Diminished    Breathing pattern: Pre procedure Breathing Pattern: Regular          Post procedure Breathing Pattern: Regular    Heart Rate: Pre procedure Pulse: 73           Post procedure Pulse: 79    Resp Rate: Pre procedure Respirations: 18           Post procedure Respirations: 18    Peak Flow: Pre bronchodilator    n/a              Post bronchodilator   n/    Incentive Spirometry:  Actual Volume (ml): 1000 ml          Cough: Pre procedure Cough: Non-productive, Dry               Post procedure Cough: Non-productive, Dry    Sputum: Pre procedure  n/a                 Post procedure  n/a    Oxygen: O2 Device: CPAP mask   Flow rate (L/min) 2 lpm bleed into cpap mask and FiO2 (%) room air off cpap mask   21%     Changed: NO    SpO2: Pre procedure SpO2: 92 %   without oxygen              Post procedure SpO2: 94 %  without oxygen    Nebulizer Therapy: Current medications Aerosolized Medications: DuoNeb      Changed: YES    Problem List:   Patient Active Problem List   Diagnosis Code    Uncontrolled type 2 diabetes mellitus with diabetic nephropathy, without long-term current use of insulin (HCC) E11.21, E11.65    Essential hypertension, benign I10    Hyperlipidemia LDL goal <70 E78.5    Iron deficiency anemia D50.9    SOB (shortness of breath) R06.02    Acute blood loss anemia D62    Senile nuclear sclerosis H25.10    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

## 2018-02-10 NOTE — PROGRESS NOTES
Hospitalist Progress Note    NAME: Liliana Riggins   :  1950   MRN:  [id number]       Assessment / Plan:  Acute hypoxic respiratory failure [resolved], POA due to  Bilateral lower lobe pneumonia, POA with  Sepsis, POA due to pneumonia, improving  -Initially had required 4L to keep O2 sats above 93% - now completely weaned off   -Nursing for O2 challenge test  -CXR noted  -Continue neb treatemnt  -WBC continues to improve, now 9.4  -Low grade fever 100.5 - will continue to monitor  -CT Chest: shows tiny b/l pleural effusions with bilateral lower lobe consolidation and infiltrates   -Continue Rocephin and Azithromycin  -Sputum Cx prelim   -BCx NGTD 3 days  -Urine legionella pending  -Trop 0.04 to 0.16 to 0.13 to 0.10   - -720mL 24 hours  -Weight approx 194 / was 200 pounds on admission  -Switched IV Bumex to PO 1mg daily  -PT/OT appreciated - pt likely for DC tmr      HARDIK, on cpap but not O2 at home  --continue Cpap but add in O2      DM type 2   --continue glipizide.  SSI      CAD s/p prior stent  HTN  --denies hx of chf - Echo shows normal EF therefore HFrEF ruled out  --continue aspirin, plavix.  Hold statin due to myalgia.    --Continue Bumex  --continue clonidine but reduce dose by 50% to have BP room to diurese, bystolic, minoxidil.  Hold amlodipine.      PAD   --s/p left CEA, s/p aortobifemoral bypass  --Has stenosis in left arm causing false low blood pressure in that arm by 25mmHg per patient       CKD 4 - likely at baseline  -BUN/Cr 45/2.27  -monitor with diuresis.      Gout  --continue allopurinol      BPH  --continue flomax      Right adrenal gland adenoma:   --found to have a 1.9 cm right adrenal incidentaloma on MRI in .  His dex suppressed cortisol was 1.9 and plasma mets were normal and josue/renin ratio were normal in  so he doesn't appear to have a functioning adrenal gland.  --seen Dr. Mae Pitts 3/2017Delene  findings on adrenal and liver.  Patient with small cystic mass in the neck of the pancreas measuring 8 x 6 x 18 mm consistent with small side branch IPMN.  Given small size and patient's multiple medical problems, recommend f/u for now.  F/u CT scan in 1 year.   --Dr. Willard Rosario recommended to have repeat CT scan in 6-12 months to document stability in size of adenoma, if no change at that time, no need for further repeat imaging  --patient reports he discussed with his nephrologist and it was felt risk of IV dye and contrast nephropathy outweighs benefit (since CT is for surveillance) so he does not plan to get follow up CT      Body mass index is 30.41 kg/(m^2).     Code: discussed, full code but no long term mechanical ventilation if he does not improve.  Had been DNR in past  DVT prophylaxis: Heparin     Subjective:     Chief Complaint / Reason for Physician Visit  \"I am doing better yeah\". Discussed with RN events overnight. Review of Systems:  Symptom Y/N Comments  Symptom Y/N Comments   Fever/Chills n   Chest Pain n    Poor Appetite n   Edema     Cough    Abdominal Pain n    Sputum    Joint Pain     SOB/RUBIO n   Pruritis/Rash     Nausea/vomit n   Tolerating PT/OT y    Diarrhea    Tolerating Diet y    Constipation    Other       Could NOT obtain due to:      Objective:     VITALS:   Last 24hrs VS reviewed since prior progress note.  Most recent are:  Patient Vitals for the past 24 hrs:   Temp Pulse Resp BP SpO2   02/10/18 0900 98.4 °F (36.9 °C) 84 18 166/47 96 %   02/10/18 0310 97.9 °F (36.6 °C) 73 20 156/59 92 %   02/10/18 0000 - - - - 92 %   02/09/18 2227 98.9 °F (37.2 °C) 81 22 164/58 93 %   02/09/18 1901 - - - - 95 %   02/09/18 1724 (!) 100.5 °F (38.1 °C) 80 16 189/64 96 %   02/09/18 1605 100 °F (37.8 °C) 85 16 180/57 95 %   02/09/18 1520 - - - - 96 %   02/09/18 1144 99 °F (37.2 °C) 78 - 172/59 94 %       Intake/Output Summary (Last 24 hours) at 02/10/18 1131  Last data filed at 02/10/18 0809   Gross per 24 hour   Intake              240 ml   Output              960 ml Net             -720 ml        PHYSICAL EXAM:  General:                    Alert, cooperative, on NC, NAD    EENT:                       EOMI. Anicteric sclerae. MMM  Resp:                        Good air entry, no wheezing, no crackles  CV:                            Regular  rhythm,  No edema  GI:                             Soft, Non distended, Non tender.  +Bowel sounds  Neurologic:                Alert and oriented X 3, normal speech   Psych:                       Good insight. Not anxious nor agitated  Skin:                          No rashes. No jaundice    Reviewed most current lab test results and cultures  YES  Reviewed most current radiology test results   YES  Review and summation of old records today    NO  Reviewed patient's current orders and MAR    YES  PMH/SH reviewed - no change compared to H&P  ________________________________________________________________________  Care Plan discussed with:    Comments   Patient x    Family      RN x    Care Manager     Consultant                        Multidiciplinary team rounds were held today with , nursing, pharmacist and clinical coordinator. Patient's plan of care was discussed; medications were reviewed and discharge planning was addressed. ________________________________________________________________________  Total NON critical care TIME:  25   Minutes    Total CRITICAL CARE TIME Spent:   Minutes non procedure based      Comments   >50% of visit spent in counseling and coordination of care     ________________________________________________________________________  Yana Patel MD     Procedures: see electronic medical records for all procedures/Xrays and details which were not copied into this note but were reviewed prior to creation of Plan. LABS:  I reviewed today's most current labs and imaging studies.   Pertinent labs include:  Recent Labs      02/10/18   0206  02/09/18   0327  02/08/18   0309   WBC  9.4  10.6  12.6* HGB  8.4*  8.9*  7.9*   HCT  25.2*  27.4*  24.1*   PLT  276  268  226     Recent Labs      02/10/18   0206  02/09/18   0327  02/08/18   0309   NA  140  137  138   K  3.7  3.5  3.5   CL  107  104  105   CO2  24  23  23   GLU  159*  159*  223*   BUN  45*  51*  52*   CREA  2.27*  2.66*  2.68*   CA  8.6  9.0  8.5   MG   --    --   2.1   PHOS   --    --   3.9       Signed: Malvin Bui MD

## 2018-02-11 VITALS
OXYGEN SATURATION: 100 % | TEMPERATURE: 98 F | SYSTOLIC BLOOD PRESSURE: 158 MMHG | HEIGHT: 68 IN | WEIGHT: 193.1 LBS | BODY MASS INDEX: 29.27 KG/M2 | DIASTOLIC BLOOD PRESSURE: 56 MMHG | RESPIRATION RATE: 20 BRPM | HEART RATE: 74 BPM

## 2018-02-11 LAB
ANION GAP SERPL CALC-SCNC: 7 MMOL/L (ref 5–15)
BASOPHILS # BLD: 0 K/UL (ref 0–0.1)
BASOPHILS NFR BLD: 0 % (ref 0–1)
BUN SERPL-MCNC: 40 MG/DL (ref 6–20)
BUN/CREAT SERPL: 17 (ref 12–20)
CALCIUM SERPL-MCNC: 8.9 MG/DL (ref 8.5–10.1)
CHLORIDE SERPL-SCNC: 107 MMOL/L (ref 97–108)
CO2 SERPL-SCNC: 24 MMOL/L (ref 21–32)
CREAT SERPL-MCNC: 2.36 MG/DL (ref 0.7–1.3)
DIFFERENTIAL METHOD BLD: ABNORMAL
EOSINOPHIL # BLD: 0.4 K/UL (ref 0–0.4)
EOSINOPHIL NFR BLD: 4 % (ref 0–7)
ERYTHROCYTE [DISTWIDTH] IN BLOOD BY AUTOMATED COUNT: 14.7 % (ref 11.5–14.5)
GLUCOSE BLD STRIP.AUTO-MCNC: 183 MG/DL (ref 65–100)
GLUCOSE SERPL-MCNC: 186 MG/DL (ref 65–100)
HCT VFR BLD AUTO: 28.7 % (ref 36.6–50.3)
HGB BLD-MCNC: 9.3 G/DL (ref 12.1–17)
IMM GRANULOCYTES # BLD: 0.1 K/UL (ref 0–0.04)
IMM GRANULOCYTES NFR BLD AUTO: 1 % (ref 0–0.5)
LYMPHOCYTES # BLD: 1.1 K/UL (ref 0.8–3.5)
LYMPHOCYTES NFR BLD: 13 % (ref 12–49)
MCH RBC QN AUTO: 30.2 PG (ref 26–34)
MCHC RBC AUTO-ENTMCNC: 32.4 G/DL (ref 30–36.5)
MCV RBC AUTO: 93.2 FL (ref 80–99)
MONOCYTES # BLD: 1.1 K/UL (ref 0–1)
MONOCYTES NFR BLD: 12 % (ref 5–13)
NEUTS SEG # BLD: 6.1 K/UL (ref 1.8–8)
NEUTS SEG NFR BLD: 69 % (ref 32–75)
NRBC # BLD: 0 K/UL (ref 0–0.01)
NRBC BLD-RTO: 0 PER 100 WBC
PLATELET # BLD AUTO: 302 K/UL (ref 150–400)
PMV BLD AUTO: 11.2 FL (ref 8.9–12.9)
POTASSIUM SERPL-SCNC: 3.3 MMOL/L (ref 3.5–5.1)
RBC # BLD AUTO: 3.08 M/UL (ref 4.1–5.7)
SERVICE CMNT-IMP: ABNORMAL
SODIUM SERPL-SCNC: 138 MMOL/L (ref 136–145)
WBC # BLD AUTO: 8.8 K/UL (ref 4.1–11.1)

## 2018-02-11 PROCEDURE — 85025 COMPLETE CBC W/AUTO DIFF WBC: CPT | Performed by: GENERAL ACUTE CARE HOSPITAL

## 2018-02-11 PROCEDURE — 74011250637 HC RX REV CODE- 250/637: Performed by: GENERAL ACUTE CARE HOSPITAL

## 2018-02-11 PROCEDURE — 74011636637 HC RX REV CODE- 636/637: Performed by: HOSPITALIST

## 2018-02-11 PROCEDURE — 82962 GLUCOSE BLOOD TEST: CPT

## 2018-02-11 PROCEDURE — 74011250636 HC RX REV CODE- 250/636: Performed by: GENERAL ACUTE CARE HOSPITAL

## 2018-02-11 PROCEDURE — 80048 BASIC METABOLIC PNL TOTAL CA: CPT | Performed by: GENERAL ACUTE CARE HOSPITAL

## 2018-02-11 PROCEDURE — 74011000250 HC RX REV CODE- 250: Performed by: GENERAL ACUTE CARE HOSPITAL

## 2018-02-11 PROCEDURE — 94640 AIRWAY INHALATION TREATMENT: CPT

## 2018-02-11 PROCEDURE — 36415 COLL VENOUS BLD VENIPUNCTURE: CPT | Performed by: GENERAL ACUTE CARE HOSPITAL

## 2018-02-11 PROCEDURE — 74011250637 HC RX REV CODE- 250/637: Performed by: HOSPITALIST

## 2018-02-11 PROCEDURE — 77010033678 HC OXYGEN DAILY

## 2018-02-11 RX ORDER — GUAIFENESIN 600 MG/1
600 TABLET, EXTENDED RELEASE ORAL EVERY 12 HOURS
Qty: 30 TAB | Refills: 1 | Status: SHIPPED | OUTPATIENT
Start: 2018-02-11 | End: 2018-02-26

## 2018-02-11 RX ORDER — LEVOFLOXACIN 750 MG/1
750 TABLET ORAL DAILY
Qty: 4 TAB | Refills: 0 | Status: SHIPPED | OUTPATIENT
Start: 2018-02-11 | End: 2018-02-26

## 2018-02-11 RX ORDER — GLIPIZIDE 10 MG/1
20 TABLET, FILM COATED, EXTENDED RELEASE ORAL DAILY
Qty: 30 TAB | Refills: 0 | Status: SHIPPED | OUTPATIENT
Start: 2018-02-12 | End: 2018-02-26 | Stop reason: SDUPTHER

## 2018-02-11 RX ORDER — CLONIDINE HYDROCHLORIDE 0.2 MG/1
0.2 TABLET ORAL
Qty: 90 TAB | Refills: 0 | Status: SHIPPED | OUTPATIENT
Start: 2018-02-11 | End: 2018-02-26 | Stop reason: SDUPTHER

## 2018-02-11 RX ORDER — POTASSIUM CHLORIDE 20 MEQ/1
40 TABLET, EXTENDED RELEASE ORAL
Status: COMPLETED | OUTPATIENT
Start: 2018-02-11 | End: 2018-02-11

## 2018-02-11 RX ORDER — HYDRALAZINE HYDROCHLORIDE 20 MG/ML
10 INJECTION INTRAMUSCULAR; INTRAVENOUS ONCE
Status: COMPLETED | OUTPATIENT
Start: 2018-02-11 | End: 2018-02-11

## 2018-02-11 RX ADMIN — BUMETANIDE 1 MG: 1 TABLET ORAL at 09:12

## 2018-02-11 RX ADMIN — CLONIDINE HYDROCHLORIDE 0.2 MG: 0.1 TABLET ORAL at 09:13

## 2018-02-11 RX ADMIN — POTASSIUM CHLORIDE 40 MEQ: 20 TABLET, EXTENDED RELEASE ORAL at 09:13

## 2018-02-11 RX ADMIN — GUAIFENESIN 600 MG: 600 TABLET, EXTENDED RELEASE ORAL at 09:12

## 2018-02-11 RX ADMIN — MINOXIDIL 10 MG: 2.5 TABLET ORAL at 09:12

## 2018-02-11 RX ADMIN — INSULIN LISPRO 3 UNITS: 100 INJECTION, SOLUTION INTRAVENOUS; SUBCUTANEOUS at 09:13

## 2018-02-11 RX ADMIN — Medication 5 ML: at 05:00

## 2018-02-11 RX ADMIN — HYDRALAZINE HYDROCHLORIDE 10 MG: 20 INJECTION INTRAMUSCULAR; INTRAVENOUS at 11:27

## 2018-02-11 RX ADMIN — NEPHROCAP 1 CAPSULE: 1 CAP ORAL at 09:12

## 2018-02-11 RX ADMIN — FERROUS SULFATE TAB 325 MG (65 MG ELEMENTAL FE) 325 MG: 325 (65 FE) TAB at 09:12

## 2018-02-11 RX ADMIN — IPRATROPIUM BROMIDE AND ALBUTEROL SULFATE 3 ML: .5; 3 SOLUTION RESPIRATORY (INHALATION) at 07:30

## 2018-02-11 RX ADMIN — GLIPIZIDE 20 MG: 5 TABLET, FILM COATED, EXTENDED RELEASE ORAL at 09:12

## 2018-02-11 RX ADMIN — ACETAMINOPHEN 650 MG: 325 TABLET ORAL at 09:14

## 2018-02-11 RX ADMIN — ACETAMINOPHEN 650 MG: 325 TABLET ORAL at 02:44

## 2018-02-11 RX ADMIN — DOCUSATE SODIUM 100 MG: 100 CAPSULE, LIQUID FILLED ORAL at 09:13

## 2018-02-11 RX ADMIN — IPRATROPIUM BROMIDE AND ALBUTEROL SULFATE 3 ML: .5; 3 SOLUTION RESPIRATORY (INHALATION) at 03:00

## 2018-02-11 RX ADMIN — OMEGA-3 FATTY ACIDS CAP DELAYED RELEASE 1000 MG 1 CAPSULE: 1000 CAPSULE DELAYED RELEASE at 11:27

## 2018-02-11 NOTE — PROGRESS NOTES
DISCHARGE SUMMARY from Nurse    Patient stable for discharge. I have reviewed discharge instructions with the patient and significant other. The patient and significant other verbalized understanding. All questions fully answered. Prescriptions and medication handouts given to patient and significant other. Telemonitor and PIV removed. No personal belongings, home medications and valuables left at patients bedside//safe. patient and significant other verbalized no complaints.

## 2018-02-11 NOTE — DISCHARGE SUMMARY
Hospitalist Discharge Summary     Patient ID:  Mayra Lino  [id number]  78 y.o.  1950    PCP on record: Placido Lane MD    Admit date: 2/7/2018  Discharge date and time: 2/11/2018      DISCHARGE DIAGNOSIS:  Acute hypoxic respiratory failure [resolved], POA due to  Bilateral lower lobe pneumonia, POA with  Sepsis, POA due to pneumonia, resolved  HARDIK, on cpap but not O2 at home  DM type 2   CAD s/p prior stent  HTN  PAD  CKD 4 - likely at baseline  Gout  BPH  Right adrenal gland adenoma     CONSULTATIONS:  None    Excerpted HPI from H&P of Antonia Faria MD:  Mayra Lino is a 78 y.o.  male PMHx significant for DM, HTN, HLD, CAD s/p stents, PVD, GERD, gout, BPH, presents two days of moderately severe shortness of breath. Report son had flu. He has been having subjective fever and chills, myalgia and arthralgia since yesterday. Very weak. Developed SOB when took off his cpap this morning  Mild cough, no chest pain. Son gave him a dose of his tamiflu to take since patient and his girlfriend self diagnosed as having flu. SOB better with standing. Mild dysuria. No abdominal pain, nausea, vomiting, diarrhea. Not on home O2. Denies hx of copd, not on inhaler despite hx heavy smoking. No travel.    ______________________________________________________________________  DISCHARGE SUMMARY/HOSPITAL COURSE:  for full details see H&P, daily progress notes, labs, consult notes.      Acute hypoxic respiratory failure [resolved], POA due to  Bilateral lower lobe pneumonia, POA with  Sepsis, POA due to pneumonia, resolved  -Initially had required 4L to keep O2 sats above 93% - now completely weaned off   -WBC continues to improve, now 9.4  -Afebrile > 24hours  -CT Chest: shows tiny b/l pleural effusions with bilateral lower lobe consolidation and infiltrates   -Discharged on Levaquin to complete 7 day course  -Sputum Cx normal resp mabel final   -BCx NGTD 4 days  -Urine legionella negative  -PT/OT appreciated     Pt stable for discharge back home. Pt's wife at bedside. Updated pt and wife about plan and they verbalize understanding and agree with DC planning. Pt on RA, saturating above 95%, no respiratory complaints, tolerating PO and ambulating well.      HARDIK, on cpap but not O2 at home  --continue Cpap but add in O2      DM type 2   --continue glipizide.  SSI      CAD s/p prior stent  HTN  --denies hx of chf - Echo shows normal EF therefore HFrEF ruled out  --continue aspirin, plavix.    --Pt on home Lasix that he takes intermittently - advised to follow up with PCP in regards to dose titration. --Resume home statin  --continue clonidine - dose increased to 0.2 TID      PAD   --s/p left CEA, s/p aortobifemoral bypass  --Has stenosis in left arm causing false low blood pressure in that arm by 25mmHg per patient       CKD 4 - likely at baseline  -BUN/Cr 40/2. 39      Gout  --continue allopurinol      BPH  --continue flomax      Right adrenal gland adenoma:   --found to have a 1.9 cm right adrenal incidentaloma on MRI in 2/17.  His dex suppressed cortisol was 1.9 and plasma mets were normal and josue/renin ratio were normal in 8/17 so he doesn't appear to have a functioning adrenal gland.  --seen Dr. Jules Fischer 3/2017Yeimy Gusman findings on adrenal and liver.  Patient with small cystic mass in the neck of the pancreas measuring 8 x 6 x 18 mm consistent with small side branch IPMN.   Given small size and patient's multiple medical problems, recommend f/u for now.  F/u CT scan in 1 year.   --Dr. Seema Seals recommended to have repeat CT scan in 6-12 months to document stability in size of adenoma, if no change at that time, no need for further repeat imaging  --patient reports he discussed with his nephrologist and it was felt risk of IV dye and contrast nephropathy outweighs benefit (since CT is for surveillance) so he does not plan to get follow up CT     _______________________________________________________________________  Patient seen and examined by me on discharge day. Pertinent Findings:  Gen:    Not in distress  Chest: Clear lungs  CVS:   Regular rhythm. No edema  Abd:  Soft, not distended, not tender  Neuro:  Alert, oriented x3, no focal deficits  _______________________________________________________________________  DISCHARGE MEDICATIONS:   Current Discharge Medication List      START taking these medications    Details   guaiFENesin ER (MUCINEX) 600 mg ER tablet Take 1 Tab by mouth every twelve (12) hours. Qty: 30 Tab, Refills: 1      levoFLOXacin (LEVAQUIN) 750 mg tablet Take 1 Tab by mouth daily. Qty: 4 Tab, Refills: 0         CONTINUE these medications which have CHANGED    Details   cloNIDine HCl (CATAPRES) 0.2 mg tablet Take 1 Tab by mouth Before breakfast, lunch, and dinner. Qty: 90 Tab, Refills: 0      !! glipiZIDE SR (GLUCOTROL XL) 10 mg CR tablet Take 2 Tabs by mouth daily. Qty: 30 Tab, Refills: 0       !! - Potential duplicate medications found. Please discuss with provider. CONTINUE these medications which have NOT CHANGED    Details   omega-3 fatty acids (FISH OIL CONCENTRATE) 1,000 mg cap Take 1,000 mg by mouth daily. Patient takes 1,000 mg in the AM and 2,000 mg in the PM       acetaminophen (TYLENOL EXTRA STRENGTH) 500 mg tablet Take 1,000 mg by mouth every six (6) hours as needed for Pain. !! glipiZIDE SR (GLUCOTROL XL) 10 mg CR tablet Take 2 Tabs by mouth daily. Qty: 180 Tab, Refills: 3      atorvastatin (LIPITOR) 80 mg tablet Take 80 mg by mouth nightly. Take 1 tab daily  Refills: 3      minoxidil (LONITEN) 10 mg tablet Take 10 mg by mouth two (2) times a day. BYSTOLIC 20 mg tablet Take 20 mg by mouth nightly. Take 1 tab daily  Refills: 6      PRALUENT PEN 75 mg/mL injector pen INJECT EVERY 2 WEEKS  Refills: 3      furosemide (LASIX) 80 mg tablet Take 80 mg by mouth two (2) times a day.   Refills: 3 cholecalciferol (VITAMIN D3) 1,000 unit tablet Take 1,000 Units by mouth nightly. amLODIPine (NORVASC) 10 mg tablet Take 10 mg by mouth nightly. ferrous sulfate 325 mg (65 mg iron) cpER Take 1 Tab by mouth two (2) times a day. folic acid-vit M9-KVE C92 (FOLTX) 2.5-25-2 mg tablet Take 1 Tab by mouth nightly. tamsulosin (FLOMAX) 0.4 mg capsule Take 0.4 mg by mouth nightly. clopidogrel (PLAVIX) 75 mg tablet Take 75 mg by mouth nightly. allopurinol (ZYLOPRIM) 100 mg tablet Take 200 mg by mouth nightly. !! - Potential duplicate medications found. Please discuss with provider. STOP taking these medications       OTHER Comments:   Reason for Stopping:         oseltamivir (TAMIFLU) 75 mg capsule Comments:   Reason for Stopping:         omega-3 fatty acids-vitamin e (FISH OIL) 1,000 mg cap Comments:   Reason for Stopping:               My Recommended Diet, Activity, Wound Care, and follow-up labs are listed in the patient's Discharge Insturctions which I have personally completed and reviewed.     ______________________________________________________________________    Risk of deterioration: Low    Condition at Discharge:  Stable  ______________________________________________________________________    Disposition  Home with family, no needs  ______________________________________________________________________    Care Plan discussed with:   Patient, Family, RN    ______________________________________________________________________    Code Status: Full Code  ______________________________________________________________________      Follow up with:   PCP : Jerson Mitchell MD  Follow-up Information     [facility] With Details [address] [facility]   [address] [facility]  [address]  [phone]                Total time in minutes spent coordinating this discharge (includes going over instructions, follow-up, prescriptions, and preparing report for sign off to her PCP) :  35 minutes    Signed:  Janis Quinn MD

## 2018-02-11 NOTE — DISCHARGE INSTRUCTIONS

## 2018-02-11 NOTE — PROGRESS NOTES
CM acknowledged discharge orders. Per chart review, pt has been completely weaned off of O2, thus home O2 is not needed at this time. Referral sent to [facility] via [employer]. CM to schedule PCP f/u appointment tomorrow, as offices are closed today. CM to follow-up with pt by telephone once appointment is made. Pt has no additional CM needs at this time.      [place] Thurman Supervisee in Social Work, [employer]  [phone]

## 2018-02-12 ENCOUNTER — PATIENT OUTREACH (OUTPATIENT)
Dept: CASE MANAGEMENT | Age: 68
End: 2018-02-12

## 2018-02-12 NOTE — PROGRESS NOTES
Marilee Ba is a 78 y.o. male   This patient was received as a referral from [facility]   Summary of patients top three medical problems:     Problem 1: Pneumonia/sepsis  Patient admitted on 2/7/18 to [facility] for pneumonia/sepsis. Patient discharged on 2/11/18. Patient's chest x-ray on 2/7/18 showed bilateral lower lobe infiltration. Patient's WBCs have stabilized to 8.8 on 2/10/18 and lactic acid on admission was 1.4. Patients states he is doing well today, no weakness, pain, SOB, chest pain, coughing, wheezing, fever or difficulty breathing noted. Patient states taking his antibiotics, Levaquin 750 mg daily for the next 4 days. Patient states has hospital follow up scheduled with PCP, Dr. Karime Barrera on 1/14/18. No home health was ordered. Problem 2: Diabetes   Patient is follow by endocrinology, Dr. Dawson Delgadillo. Patient's next appointment with endocrinology is on 2/26/18. Patient's last hemoglobin A1C was 9.5 on 10/20/18. Patient currently taking 10 mg of Glipizide 2 times daily for diabetes management. Problem 3: Advance Directive  Patient has no advance directive on file. Patient states he has an living will with an advance directive completed in his 's office. Patient's challenges to self management identified:  NONE  Patients motivational level on a scale of 0-10: 9  Medication Management:  good adherence, good understanding  Advance Care Planning:   Patient was offered the opportunity to discuss advance care planning:  yes     Does patient have an Advance Directive:  yes   If no, did you provide information on Advance Care Planning? Patient states living will w/ AD is in his 's office     [employer], Referrals, and Durable Medical Equipment: None    Follow up appointments:  PCP, Dr. Karime Barrera on 2/14/18 and endocrinology, Dr. Dawson Delgadillo on 2/26/18  Goals      Prevent relapse of pneumonia symptoms.             2/12/18  Patient will complete antibiotic medication and report any signs and symptoms of relapse of pneumonia, such as SOB, difficulty breathing, fever, severe coughing or wheezing. YHW          Patient verbalized understanding of all information discussed. Patient has this Nurse Navigators contact information for any further questions, concerns, or needs.

## 2018-02-13 LAB
BACTERIA SPEC CULT: NORMAL
BACTERIA SPEC CULT: NORMAL
SERVICE CMNT-IMP: NORMAL
SERVICE CMNT-IMP: NORMAL

## 2018-02-16 ENCOUNTER — PATIENT OUTREACH (OUTPATIENT)
Dept: CASE MANAGEMENT | Age: 68
End: 2018-02-16

## 2018-02-16 NOTE — PROGRESS NOTES
Goals Addressed      Prevent relapse of pneumonia symptoms. 2/12/18  Patient will complete antibiotic medication and report any signs and symptoms of relapse of pneumonia, such as SOB, difficulty breathing, fever, severe coughing or wheezing.   [facility]    2/16/18  NN confirmed patient attended follow up hospital visit with PCP on 2/14/18

## 2018-02-21 ENCOUNTER — PATIENT OUTREACH (OUTPATIENT)
Dept: CASE MANAGEMENT | Age: 68
End: 2018-02-21

## 2018-02-21 NOTE — PROGRESS NOTES
Goals Addressed      Prepare patients and caregivers for end of life decisions (ie. need for hospice, pain management, symptom relief, advance directives etc.)                  18  Patient will bring a copy of living will or advance directive to next follow up appointment. YHW    18  Patient has follow up visit with endocrinology on 18. Will discuss advance directive at follow up visit. YHW         Prevent relapse of pneumonia symptoms. 18  Patient will complete antibiotic medication and report any signs and symptoms of relapse of pneumonia, such as SOB, difficulty breathing, fever, severe coughing or wheezing. [facility]    18  NN confirmed patient attended follow up hospital visit with PCP on 18  Spoke to patient today, verified patient's name, address and . Patient states currently out with a friend eating. Patient states he is doing well, no problems or concerns noted. Patient states he is doing regular daily activities. Patient states no fever, SOB, difficulty breathing or chest pain noted. Patient states will be visiting endocrinology and his PCP on 18.   [facility]

## 2018-02-22 LAB
25(OH)D3+25(OH)D2 SERPL-MCNC: 20.5 NG/ML (ref 30–100)
ALBUMIN SERPL-MCNC: 3.3 G/DL (ref 3.6–4.8)
ALBUMIN/GLOB SERPL: 1.2 {RATIO} (ref 1.2–2.2)
ALP SERPL-CCNC: 86 IU/L (ref 39–117)
ALT SERPL-CCNC: 28 IU/L (ref 0–44)
AST SERPL-CCNC: 24 IU/L (ref 0–40)
BILIRUB SERPL-MCNC: <0.2 MG/DL (ref 0–1.2)
BUN SERPL-MCNC: 51 MG/DL (ref 8–27)
BUN/CREAT SERPL: 20 (ref 10–24)
CALCIUM SERPL-MCNC: 9.5 MG/DL (ref 8.6–10.2)
CHLORIDE SERPL-SCNC: 100 MMOL/L (ref 96–106)
CHOLEST SERPL-MCNC: 83 MG/DL (ref 100–199)
CO2 SERPL-SCNC: 21 MMOL/L (ref 18–29)
CREAT SERPL-MCNC: 2.5 MG/DL (ref 0.76–1.27)
ERYTHROCYTE [DISTWIDTH] IN BLOOD BY AUTOMATED COUNT: 15.8 % (ref 12.3–15.4)
EST. AVERAGE GLUCOSE BLD GHB EST-MCNC: 209 MG/DL
GFR SERPLBLD CREATININE-BSD FMLA CKD-EPI: 26 ML/MIN/{1.73_M2}
GFR SERPLBLD CREATININE-BSD FMLA CKD-EPI: 30 ML/MIN/{1.73_M2}
GLOBULIN SER CALC-MCNC: 2.7 G/L (ref 1.5–4.5)
GLUCOSE SERPL-MCNC: 203 MG/DL (ref 65–99)
HBA1C MFR BLD: 8.9 % (ref 4.8–5.6)
HCT VFR BLD AUTO: 28.5 % (ref 37.5–51)
HDLC SERPL-MCNC: 29 MG/DL
HGB BLD-MCNC: 8.8 G/DL (ref 13–17.7)
INTERPRETATION, 910389: NORMAL
INTERPRETATION: NORMAL
LDLC SERPL CALC-MCNC: 29 MG/DL (ref 0–99)
Lab: NORMAL
MCH RBC QN AUTO: 29.3 PG (ref 26.6–33)
MCHC RBC AUTO-ENTMCNC: 30.9 G/DL (ref 31.5–35.7)
MCV RBC AUTO: 95 FL (ref 79–97)
PDF IMAGE, 910387: NORMAL
PLATELET # BLD AUTO: 411 X10E3/UL (ref 150–379)
POTASSIUM SERPL-SCNC: 5.1 MMOL/L (ref 3.5–5.2)
PROT SERPL-MCNC: 6 G/DL (ref 6–8.5)
RBC # BLD AUTO: 3 X10E6/UL (ref 4.14–5.8)
SODIUM SERPL-SCNC: 140 MMOL/L (ref 134–144)
TRIGL SERPL-MCNC: 126 MG/DL (ref 0–149)
VLDLC SERPL CALC-MCNC: 25 MG/DL (ref 5–40)
WBC # BLD AUTO: 7.4 X10E3/UL (ref 3.4–10.8)

## 2018-02-26 ENCOUNTER — OFFICE VISIT (OUTPATIENT)
Dept: ENDOCRINOLOGY | Age: 68
End: 2018-02-26

## 2018-02-26 ENCOUNTER — PATIENT OUTREACH (OUTPATIENT)
Dept: ENDOCRINOLOGY | Age: 68
End: 2018-02-26

## 2018-02-26 VITALS
HEART RATE: 54 BPM | DIASTOLIC BLOOD PRESSURE: 53 MMHG | HEIGHT: 68 IN | SYSTOLIC BLOOD PRESSURE: 146 MMHG | BODY MASS INDEX: 28.67 KG/M2 | WEIGHT: 189.2 LBS

## 2018-02-26 DIAGNOSIS — I10 ESSENTIAL HYPERTENSION, BENIGN: ICD-10-CM

## 2018-02-26 DIAGNOSIS — E66.9 OBESITY, CLASS I, BMI 30-34.9: ICD-10-CM

## 2018-02-26 DIAGNOSIS — E55.9 VITAMIN D DEFICIENCY: ICD-10-CM

## 2018-02-26 DIAGNOSIS — D50.0 IRON DEFICIENCY ANEMIA DUE TO CHRONIC BLOOD LOSS: ICD-10-CM

## 2018-02-26 DIAGNOSIS — E78.5 HYPERLIPIDEMIA LDL GOAL <70: ICD-10-CM

## 2018-02-26 DIAGNOSIS — D35.01 ADENOMA OF RIGHT ADRENAL GLAND: ICD-10-CM

## 2018-02-26 RX ORDER — CLONIDINE HYDROCHLORIDE 0.2 MG/1
0.2 TABLET ORAL 2 TIMES DAILY
Qty: 180 TAB | Refills: 3 | Status: SHIPPED | OUTPATIENT
Start: 2018-02-26 | End: 2018-06-14 | Stop reason: DRUGHIGH

## 2018-02-26 RX ORDER — ATORVASTATIN CALCIUM 80 MG/1
80 TABLET, FILM COATED ORAL
Refills: 3 | COMMUNITY
Start: 2018-02-26 | End: 2018-08-23

## 2018-02-26 NOTE — PATIENT INSTRUCTIONS
1) Start taking 1/2 of the 80 mg lipitor everyday to help with muscle aches as your total and LDL (bad cholesterol) cholesterol are extremely on the praluent so I don't think you need 80 mg any longer. 2) Your A1c was still high at 8.9% but down from 9.5% but you have only been on the higher dose of glipizide a few weeks and this test is an average over 3 months so my hope is the next A1c should be back down. 3) Your weight is down 10 lbs since last visit. 4) Your blood pressure is better. 5) Your vitamin D is coming up so keep taking 2000 of D3 daily especially as your calcium level remains normal.    6) Your hemoglobin (red blood cell count) was lower after the hospital stay but keep taking iron twice daily and this should come back up.

## 2018-02-26 NOTE — MR AVS SNAPSHOT
[address] [facility] [address] [address] [phone] Patient: Madina Mckay 
MRN: [id number] UOF:[id number] Visit Information Date & Time Provider Department Dept. Phone Encounter #  
 2/26/2018  9:10 AM Chris Morgan, [address] [place] [address] Diabetes and Endocrinology [phone] [id number] Follow-up Instructions Return in about 5 months (around 7/26/2018). Your Appointments 6/15/2018  2:00 PM  
ESTABLISHED PATIENT with Winston Retana MD  
[facility] Dr. 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 [id number]  
[address] [facility] [facility] [address] [address] Upcoming Health Maintenance Date Due Hepatitis C Screening 1950 DTaP/Tdap/Td series (1 - Tdap) 4/25/1972 FOBT Q 1 YEAR AGE 50-75 4/25/2001 ZOSTER VACCINE AGE 60> 2/25/2011 EYE EXAM RETINAL OR DILATED Q1 10/21/2015 GLAUCOMA SCREENING Q2Y 4/25/2016 Pneumococcal 65+ Low/Medium Risk (1 of 2 - PCV13) 4/25/2016 MEDICARE YEARLY EXAM 4/25/2016 Influenza Age 5 to Adult 8/1/2017 FOOT EXAM Q1 7/27/2018 HEMOGLOBIN A1C Q6M 8/21/2018 MICROALBUMIN Q1 10/20/2018 LIPID PANEL Q1 2/21/2019 Allergies as of 2/26/2018  Review Complete On: 2/26/2018 By: Chris Morgan MD  
 No Known Allergies Current Immunizations  Never Reviewed No immunizations on file. Not reviewed this visit You Were Diagnosed With   
  
 Codes Comments Uncontrolled type 2 diabetes mellitus with diabetic nephropathy, without long-term current use of insulin ([facility]    -  Primary ICD-10-CM: E11.21, E11.65 ICD-9-CM: 250.42, 583.81 Adenoma of right adrenal gland     ICD-10-CM: D35.01 
ICD-9-CM: 227.0 Essential hypertension, benign     ICD-10-CM: I10 
ICD-9-CM: 401.1 Hyperlipidemia LDL goal <70     ICD-10-CM: E78.5 ICD-9-CM: 272.4 Iron deficiency anemia due to chronic blood loss     ICD-10-CM: D50.0 ICD-9-CM: 280.0 Vitamin D deficiency     ICD-10-CM: E55.9 ICD-9-CM: 268.9 Obesity, Class I, BMI 30-34.9     ICD-10-CM: E66.9 ICD-9-CM: 278.00 Vitals BP Pulse Height(growth percentile) Weight(growth percentile) BMI Smoking Status 146/53 (!) 54 5' 8\" (1.727 m) 189 lb 3.2 oz (85.8 kg) 28.77 kg/m2 Former Smoker Vitals History BMI and BSA Data Body Mass Index Body Surface Area 28.77 kg/m 2 2.03 m 2 Preferred Pharmacy Pharmacy Name Phone [facility], [address] [place], [place] [address]vd AT [address] [place] [address] Drive [phone] Your Updated Medication List  
  
   
This list is accurate as of 2/26/18  9:51 AM.  Always use your most recent med list.  
  
  
  
  
 allopurinol 100 mg tablet Commonly known as:  Pleasant Ruffini Take 200 mg by mouth nightly. amLODIPine 10 mg tablet Commonly known as:  Autumn Prow Take 10 mg by mouth nightly. atorvastatin 80 mg tablet Commonly known as:  LIPITOR Take 0.5 Tabs by mouth nightly. Dose change 2/26/18--updated med list--did not send prescription to the pharmacy BYSTOLIC 20 mg tablet Generic drug:  nebivolol Take 20 mg by mouth nightly. Take 1 tab daily  
  
 cloNIDine HCl 0.2 mg tablet Commonly known as:  CATAPRES Take 1 Tab by mouth two (2) times a day. ferrous sulfate 325 mg (65 mg iron) Cper Take 1 Tab by mouth two (2) times a day. FISH OIL CONCENTRATE 1,000 mg Cap Generic drug:  omega-3 fatty acids Take 1,000 mg by mouth daily. Patient takes 1,000 mg in the AM and 2,000 mg in the PM  
  
 FLOMAX 0.4 mg capsule Generic drug:  tamsulosin Take 0.4 mg by mouth nightly. furosemide 80 mg tablet Commonly known as:  LASIX Take 80 mg by mouth two (2) times a day. glipiZIDE SR 10 mg CR tablet Commonly known as:  GLUCOTROL XL Take 2 Tabs by mouth daily. minoxidil 10 mg tablet Commonly known as:  Jazmine Battles Take 10 mg by mouth two (2) times a day. PLAVIX 75 mg Tab Generic drug:  clopidogrel Take 75 mg by mouth nightly. PRALUENT PEN 75 mg/mL injector pen Generic drug:  alirocumab INJECT EVERY 2 WEEKS  
  
 VITAMIN D3 1,000 unit tablet Generic drug:  cholecalciferol Take 2,000 Units by mouth nightly. Prescriptions Sent to Pharmacy Refills  
 cloNIDine HCl (CATAPRES) 0.2 mg tablet 3 Sig: Take 1 Tab by mouth two (2) times a day. Class: Normal  
 Pharmacy: [facility] [address] AT [address] Ph #: [phone] Route: Oral  
  
We Performed the Following CBC W/O DIFF [14155 CPT(R)] HEMOGLOBIN A1C WITH EAG [83923 CPT(R)] LIPID PANEL [50870 CPT(R)] METABOLIC PANEL, COMPREHENSIVE [57760 CPT(R)] MICROALBUMIN, UR, RAND W/ MICROALB/CREAT RATIO [id number] CPT(R)] VITAMIN D, 25 HYDROXY [id number] CPT(R)] Follow-up Instructions Return in about 5 months (around 7/26/2018). To-Do List   
 06/08/2018 1:00 PM  
  Appointment with [address] at [facility] RAD CT ([phone]) CONTRAST STUDY: 1. The patient should not eat solid food four hours before the appointment but should be encouraged to drink clear liquids. 2.  If you have to drink oral contrast, please pick it up any weekday prior to your appointment, if you cannot please check in 2 hrs before appt time. 3.  The patient will require IV access for contrast administration. 4.  The patient should not take Ibuprofen (Advil, Motrin, etc.) and Naproxen Sodium (Aleve, etc.)  on the day of the exam. Stopping non-steroidal anti-inflammatory agents (NSAIDs) like Ibuprofen decreases the risk of kidney damage from the x-ray contrast (dye).  5.  Bring any non [employer] facility films/images pertaining to the area of interest with you on the day of appointment. 6.  Bring current lab work if available (within last 90 days CMP) ***If scheduled at [facility], iSTAT is not available, labs will need to be done before appointment*** 7. Check in at registration at least 30 minutes before appt time unless you were instructed to do otherwise. Patient Instructions 1) Start taking 1/2 of the 80 mg lipitor everyday to help with muscle aches as your total and LDL (bad cholesterol) cholesterol are extremely on the praluent so I don't think you need 80 mg any longer. 2) Your A1c was still high at 8.9% but down from 9.5% but you have only been on the higher dose of glipizide a few weeks and this test is an average over 3 months so my hope is the next A1c should be back down. 3) Your weight is down 10 lbs since last visit. 4) Your blood pressure is better. 5) Your vitamin D is coming up so keep taking 2000 of D3 daily especially as your calcium level remains normal. 
 
6) Your hemoglobin (red blood cell count) was lower after the hospital stay but keep taking iron twice daily and this should come back up. Introducing [facility]! Dear Chuy Marti: Thank you for requesting a [employer] account. Our records indicate that you already have an active [employer] account. You can access your account anytime at [email or web address]. [email or web address] Did you know that you can access your hospital and ER discharge instructions at any time in [employer]? You can also review all of your test results from your hospital stay or ER visit. Additional Information If you have questions, please visit the Frequently Asked Questions section of the [employer] website at [email or web address] Remember, [employer] is NOT to be used for urgent needs. For medical emergencies, dial 911. Now available from your iPhone and Android! Please provide this summary of care documentation to your next provider. Your primary care clinician is listed as Analisa Yanez. If you have any questions after today's visit, please call [phone].

## 2018-02-26 NOTE — PROGRESS NOTES
Chief Complaint   Patient presents with    Diabetes     pcp and pharmacy confirmed    Other     consent for signed to obtain eye [facility]     History of Present Illness: Madina Mckay is a 78 y.o. male here for follow up of diabetes. Weight down 10 lbs since last visit in 10/17. Has remained on the lipitor 80 mg daily along with the praluent every 2 weeks and does still have muscle aches on this dose. Was admitted for 4 days on 2/17/18 for sepsis from PNA and was treated with levaquin. His clonidine was increased from 0.1 mg bid to 0.2 mg tid but he has only been taking this bid. Home BP readings are running closer to 130-140s on the higher dose but is down from the 160-170s prior to the hospital stay. He sent me a letter at the end of January and his sugars were running over 200 so I increased the glipizide to 2 of the 10 mg tabs daily and since doing this, they are running closer to 130-150s and none over 200 the past 2 weeks. Has been back on minoxidil bid. Compliant with vitamin D 2000 units daily. Still taking iron twice daily. Current Outpatient Prescriptions   Medication Sig    cloNIDine HCl (CATAPRES) 0.2 mg tablet Take 1 Tab by mouth two (2) times a day.  glipiZIDE SR (GLUCOTROL XL) 10 mg CR tablet Take 2 Tabs by mouth daily.  omega-3 fatty acids (FISH OIL CONCENTRATE) 1,000 mg cap Take 1,000 mg by mouth daily. Patient takes 1,000 mg in the AM and 2,000 mg in the PM     glipiZIDE SR (GLUCOTROL XL) 10 mg CR tablet Take 2 Tabs by mouth daily.  atorvastatin (LIPITOR) 80 mg tablet Take 80 mg by mouth nightly. Take 1 tab daily    minoxidil (LONITEN) 10 mg tablet Take 10 mg by mouth two (2) times a day.  BYSTOLIC 20 mg tablet Take 20 mg by mouth nightly. Take 1 tab daily    PRALUENT PEN 75 mg/mL injector pen INJECT EVERY 2 WEEKS    furosemide (LASIX) 80 mg tablet Take 80 mg by mouth two (2) times a day.     cholecalciferol (VITAMIN D3) 1,000 unit tablet Take 2,000 Units by mouth nightly.  amLODIPine (NORVASC) 10 mg tablet Take 10 mg by mouth nightly.  ferrous sulfate 325 mg (65 mg iron) cpER Take 1 Tab by mouth two (2) times a day.  tamsulosin (FLOMAX) 0.4 mg capsule Take 0.4 mg by mouth nightly.  clopidogrel (PLAVIX) 75 mg tablet Take 75 mg by mouth nightly.  allopurinol (ZYLOPRIM) 100 mg tablet Take 200 mg by mouth nightly. No current facility-administered medications for this visit. No Known Allergies     Review of Systems:  - Eyes: no blurry vision or double vision  - Cardiovascular: no chest pain  - Respiratory: no shortness of breath  - Musculoskeletal: no myalgias  - Neurological: no numbness/tingling in extremities    Physical Examination:  Blood pressure 146/53, pulse (!) 54, height 5' 8\" (1.727 m), weight 189 lb 3.2 oz (85.8 kg). - General: pleasant, no distress, good eye contact   - Neck: bilateral carotid bruits  - Cardiovascular: regular, normal rate, nl s1 and s2, 2/6 systolic murmur,   - Respiratory: clear bilaterally  - Integumentary: trace non-pitting edema,   - Psychiatric: normal mood and affect    Data Reviewed:   Component      Latest Ref Rng & Units 2/21/2018 2/21/2018 2/21/2018 2/21/2018           8:35 AM  8:35 AM  8:35 AM  8:35 AM   Glucose      65 - 99 mg/dL    203 (H)   BUN      8 - 27 mg/dL    51 (H)   Creatinine      0.76 - 1.27 mg/dL    2.50 (H)   GFR est non-AA      >59    26 (L)   GFR est AA      >59    30 (L)   BUN/Creatinine ratio      10 - 24    20   Sodium      134 - 144 mmol/L    140   Potassium      3.5 - 5.2 mmol/L    5.1   Chloride      96 - 106 mmol/L    100   CO2      18 - 29 mmol/L    21   Calcium      8.6 - 10.2 mg/dL    9.5   Protein, total      6.0 - 8.5 g/dL    6.0   Albumin      3.6 - 4.8 g/dL    3.3 (L)   GLOBULIN, TOTAL      1.5 - 4.5    2.7   A-G Ratio      1.2 - 2.2    1.2   Bilirubin, total      0.0 - 1.2 mg/dL    <0.2   Alk.  phosphatase      39 - 117 IU/L    86   AST      0 - 40 IU/L    24   ALT (SGPT)      0 - 44 IU/L    28   WBC      3.4 - 10.8 x10E3/uL  7.4     RBC      4.14 - 5.80 x10E6/uL  3.00 (L)     HGB      13.0 - 17.7 g/dL  8.8 (L)     HCT      37.5 - 51.0 %  28.5 (L)     MCV      79 - 97 fL  95     MCH      26.6 - 33.0 pg  29.3     MCHC      31.5 - 35.7 g/dL  30.9 (L)     RDW      12.3 - 15.4 %  15.8 (H)     PLATELET      296 - 158 x10E3/uL  411 (H)     Cholesterol, total      100 - 199 mg/dL   83 (L)    Triglyceride      0 - 149 mg/dL   126    HDL Cholesterol      >39 mg/dL   29 (L)    VLDL, calculated      5 - 40   25    LDL, calculated      0 - 99   29    VITAMIN D, 25-HYDROXY      30.0 - 100.0 ng/mL 20.5 (L)        Component      Latest Ref Rng & Units 2/21/2018           8:35 AM   Hemoglobin A1c, (calculated)      4.8 - 5.6 % 8.9 (H)   Estimated average glucose       209       Assessment/Plan:     1. DM w/o complication type II, uncontrolled wit nephropathy: his most recent Hgb A1c was 8.9% in 2/18 down from 9.5% in 10/17 up from 9.2% in 7/17 up from 6.1% in 1/17 down from 6.5% in 7/16 down from 6.6% in 2/16 up from 6% in 9/15 down from 6.4% in 4/15 up from 6.3% in 12/14 down from 7.6% in 8/14 up from 8.3% in 3/14 up from 6.9% in November up from 6.3% in July down from 7.3% in Feb 2013 up from 6.7% in October down from 7% in June down from 8.3% in March up from 7.3% in December down from 8% in September down from 9.5% in June, which is the same as in January. A1c coming down on the higher dose of glipizide but has only been on this for 3 weeks so we'll see if the next one is even better.   - cont glipizide SR 10 mg 2 tabs daily  - check bs once daily at alternating times  - foot exam done 7/17  - optho UTD 11/17--will obtain report  - microalbumin 994 1/11 down to 897 9/11 up to 1383 in 10/12 (possibly due to protein shake) and down to 1083 in 2/13, stable at 1087 in 11/13, down to 453 in 4/15, up to 3402 in 7/16 and 4040 in 7/17 and 5421 in 10/17  - check A1c and cmp and microalbumin prior to next visit     2. Unspecified essential hypertension (401.9) his BP was just above goal < 140/90 on the right arm which is normally his higher arm. We have now learned that he has HTN on his right arm so we will only use from now on to measure his readings. Will keep everything the same for now. - cont bystolic 10 mg 2 tabs daily  - cont amlodipine 10 mg daily  - cont minoxidil 10 mg bid  - lasix 80 mg in the am and on occasion 40 mg at 2pm  - cont clonidine 0.2 mg bid       3. Other and unspecified hyperlipidemia (272.4) Given DM and CAD, Goal LDL < 70, non-HDL < 100, and TG < 150.  his lipids were all at goal in 3/14 and 12/14 and 4/15. LDL up to 84 in 2/16 due to more red meat.  and LDL 95 in 7/16 off the niaspan and with weight gain so hopefully weight loss will help. LDL 85 and  in 1/17.  and non- in 7/17. Lipitor doubled to 40 mg bid and praluent added in 10/17 and TG down to 306 and non-HDL down to 96 in 10/17. I cut back on lipitor to 40 mg daily to help with joint pains but it appears Dr. Norris Beltran kept him on 80 mg and LDL 29 in 2/18 and still having joint pains so asked him again to cut back to 40 mg given how low his total cholesterol and LDL are.  - decrease lipitor back to 40 mg daily  - cont praluent 75 mg weekly  - check lipids prior to next visit     4. Iron deficiency anemia:  Hgb 8.8 in 2/18 down from 11.5 in 10/17 up from 11.4 in 7/17 up from 10.6 in 1/17 down from 11.3 in 7/16 up from 10.8 in 2/16 down from 11.4 in 9/15 up from 10.9 in 4/15 down from 11.4 in 12/14 down from 11.6 in 8/14 up from 11.1 in 3/14 from 12.5 in 11/13 up from 11.8 in 7/13. Has been on higher dose of iron 1 tab bid since 8/14   - cont iron twice daily  - check cbc w/o diff prior to next visit    5.  Vitamin D deficiency: level was 29 in 9/15 on 2000 units of D3 daily so wanted to increase to 3000 units daily but his calcium was 10.6 in 9/15 and Dr. Sean Olmstead advised cutting back on his dose and he has been taking 1000 units daily and level down to 19 in 2/16 but calcium back to normal at 9.6. Level 20 in 7/16 but calcium 10.2 in 7/16. Now off 1000 units and level down to 18 in 1/17 so wanted him to restart this and he apparently did and then was told to stop by Dr. Nevaeh Sanders and down to 13.1 in 7/17 but calcium is normal at 9.7 so had him go back to this but level still 13 in 10/17 so increased to 2000 units daily and up to 20.5 in 2/18 and calcium still normal so will stay on this dose  - cont vitamin D 2000 units daily  - check Vitamin D 25-OH level prior to next visit    6. Class I Obesity: Tried topamax as I wanted to avoid phentermine given his vascular disease but didn't feel well on this so stopped after 2-3 weeks. Had lost 13 lbs from 7/16 to 1/17 with doing a nutrimost program but had stopped this in 1/17 when his creatinine was high and I don't know if this could have contributed to higher creatinine. Wt up 3 lbs by 7/17 and 6 lbs by 10/17 likely due to fluid but down 10 lbs by 2/18  - diet and exercise    7. Right adrenal gland adenoma: he was found to have a 1.9 cm right adrenal incidentaloma on MRI in 2/17. His dex suppressed cortisol was 1.9 and plasma mets were normal and josue/renin ratio were normal in 8/17 so he doesn't appear to have a functioning adrenal gland.  - will need a repeat CT scan in 6-12 months to document stability in size of adenoma, if no change at that time, no need for further repeat imaging    Patient Instructions   1) Start taking 1/2 of the 80 mg lipitor everyday to help with muscle aches as your total and LDL (bad cholesterol) cholesterol are extremely on the praluent so I don't think you need 80 mg any longer. 2) Your A1c was still high at 8.9% but down from 9.5% but you have only been on the higher dose of glipizide a few weeks and this test is an average over 3 months so my hope is the next A1c should be back down.     3) Your weight is down 10 lbs since last visit. 4) Your blood pressure is better. 5) Your vitamin D is coming up so keep taking 2000 of D3 daily especially as your calcium level remains normal.    6) Your hemoglobin (red blood cell count) was lower after the hospital stay but keep taking iron twice daily and this should come back up. Follow-up Disposition:  Return in about 5 months (around 7/26/2018).     Copy sent to:  Dr. Renetta Flanagan [phone]  Dr. Suzanna Bhakta [phone]  Dr. Dania Veliz [phone]  Dr. Norita Pitcher Dr. Jenell Habermann via [facility]

## 2018-02-27 NOTE — PROGRESS NOTES
Goals Addressed      Prepare patients and caregivers for end of life decisions (ie. need for hospice, pain management, symptom relief, advance directives etc.)                  18  Patient will bring a copy of living will or advance directive to next follow up appointment. YW    18  Patient has follow up visit with endocrinology on 18. Will discuss advance directive at follow up visit. YW    18  Patient attended follow up appointment. Patient did not provide any advance directive information. NN will discuss again at next contact. YHW       Prevent relapse of pneumonia symptoms. 18  Patient will complete antibiotic medication and report any signs and symptoms of relapse of pneumonia, such as SOB, difficulty breathing, fever, severe coughing or wheezing. [facility]    18  NN confirmed patient attended follow up hospital visit with PCP on 18  Spoke to patient today, verified patient's name, address and . Patient states currently out with a friend eating. Patient states he is doing well, no problems or concerns noted. Patient states he is doing regular daily activities. Patient states no fever, SOB, difficulty breathing or chest pain noted. Patient states will be visiting endocrinology and his PCP on 18. YW    18  Patient attended endocrinology appointment today.   [facility]

## 2018-03-07 ENCOUNTER — PATIENT OUTREACH (OUTPATIENT)
Dept: CASE MANAGEMENT | Age: 68
End: 2018-03-07

## 2018-03-12 ENCOUNTER — PATIENT OUTREACH (OUTPATIENT)
Dept: CASE MANAGEMENT | Age: 68
End: 2018-03-12

## 2018-03-12 NOTE — PROGRESS NOTES
Goals Addressed      Patient verbalizes understanding of self -management goals of living with Diabetes. 18  Patient's last hemoglobin A1C was 8.9 on 18. Trending down from 9.5 on 10/20/17. Patient will continue diabetes regimen of Glizpizide 10 mg 2 tablets daily. Patient will test blood sugar 1 time daily and send to office for MD review. YHW            Prepare patients and caregivers for end of life decisions (ie. need for hospice, pain management, symptom relief, advance directives etc.)                  18  Patient will bring a copy of living will or advance directive to next follow up appointment. YW    18  Patient has follow up visit with endocrinology on 18. Will discuss advance directive at follow up visit. YW    18  Patient attended follow up appointment. Patient did not provide any advance directive information. NN will discuss again at next contact. YHW           COMPLETED: Prevent relapse of pneumonia symptoms. 18  Patient will complete antibiotic medication and report any signs and symptoms of relapse of pneumonia, such as SOB, difficulty breathing, fever, severe coughing or wheezing. [facility]    18  NN confirmed patient attended follow up hospital visit with PCP on 18  Spoke to patient today, verified patient's name, address and . Patient states currently out with a friend eating. Patient states he is doing well, no problems or concerns noted. Patient states he is doing regular daily activities. Patient states no fever, SOB, difficulty breathing or chest pain noted. Patient states will be visiting endocrinology and his PCP on 18. YW    18  Patient attended endocrinology appointment today. YW    3/7/18  Attempted to contact patient, no answer, left voicemail message to return telephone call.   YW    3/12/18  Patient admitted to [facility] on 18 for pneumonia/sepsis and discharged on 2/11/18. Patient contacted by NN on 2/12/18. Patient followed up hospital visit with PCP on 2/14/18. Patient attended endocrinology follow up on 2/26/18. Patient has had no further hospital admission in past 30 days. NN will close RYAN.   Roxanne Arana

## 2018-03-19 ENCOUNTER — PATIENT OUTREACH (OUTPATIENT)
Dept: ENDOCRINOLOGY | Age: 68
End: 2018-03-19

## 2018-03-20 NOTE — PROGRESS NOTES
Goals Addressed      Patient verbalizes understanding of self -management goals of living with Diabetes. 18  Patient's last hemoglobin A1C was 8.9 on 18. Trending down from 9.5 on 10/20/17. Patient will continue diabetes regimen of Glizpizide 10 mg 2 tablets daily. Patient will test blood sugar 1 time daily and send to office for MD review. YW    3/19/18  Attempted to contact patient, follow up for chronic condition of diabetes with blood sugars and diabetes medication. No answer, left voicemail message to return telephone call. 3/20/18  Spoke to patient today, verified patient's name, address and . Patient states doing well, no problems or concerns noted. Patient states taking Glipizide 10 mg 2 times daily. Patient states he tests his blood sugar 1 daily. Patient will record blood sugar readings and send to office for MD review.   Brad Breen

## 2018-03-30 LAB — CREATININE, EXTERNAL: 3.33

## 2018-04-06 ENCOUNTER — HOSPITAL ENCOUNTER (OUTPATIENT)
Dept: ULTRASOUND IMAGING | Age: 68
Discharge: HOME | End: 2018-04-06
Attending: INTERNAL MEDICINE
Payer: MEDICARE

## 2018-04-06 DIAGNOSIS — N18.30 CHRONIC KIDNEY DISEASE, STAGE III (MODERATE) (HCC): ICD-10-CM

## 2018-04-06 PROCEDURE — 76770 US EXAM ABDO BACK WALL COMP: CPT

## 2018-04-13 RX ORDER — GLIPIZIDE 10 MG/1
20 TABLET, FILM COATED, EXTENDED RELEASE ORAL DAILY
Qty: 180 TAB | Refills: 3 | Status: CANCELLED | OUTPATIENT
Start: 2018-04-13

## 2018-04-13 NOTE — TELEPHONE ENCOUNTER
Disregard message I confirmed with Cathi Frye at the pharmacy that patient has a script on file with the correct dose. Patient has been notified and I informed him that he had been using the refills from his old script.

## 2018-04-13 NOTE — TELEPHONE ENCOUNTER
Patient's wife is calling to request a refill for Glipizide. Her pharmacy [facility] on [address]. Patient states that there is a discrepancy with the dosage. Contact number:[phone].

## 2018-06-04 ENCOUNTER — HOSPITAL ENCOUNTER (OUTPATIENT)
Dept: GENERAL RADIOLOGY | Age: 68
Discharge: HOME | End: 2018-06-04
Payer: MEDICARE

## 2018-06-04 DIAGNOSIS — R07.9 CHEST PAIN: ICD-10-CM

## 2018-06-04 DIAGNOSIS — R05.9 COUGH: ICD-10-CM

## 2018-06-04 PROCEDURE — 71046 X-RAY EXAM CHEST 2 VIEWS: CPT

## 2018-06-14 ENCOUNTER — APPOINTMENT (OUTPATIENT)
Dept: CT IMAGING | Age: 68
End: 2018-06-14
Attending: EMERGENCY MEDICINE
Payer: MEDICARE

## 2018-06-14 ENCOUNTER — HOSPITAL ENCOUNTER (OUTPATIENT)
Age: 68
Setting detail: OBSERVATION
Discharge: HOME | End: 2018-06-15
Attending: EMERGENCY MEDICINE | Admitting: INTERNAL MEDICINE
Payer: MEDICARE

## 2018-06-14 DIAGNOSIS — R42 ORTHOSTATIC DIZZINESS: ICD-10-CM

## 2018-06-14 DIAGNOSIS — N18.9 CHRONIC KIDNEY DISEASE, UNSPECIFIED CKD STAGE: ICD-10-CM

## 2018-06-14 DIAGNOSIS — N17.9 AKI (ACUTE KIDNEY INJURY) (HCC): Primary | ICD-10-CM

## 2018-06-14 LAB
ALBUMIN SERPL-MCNC: 2.3 G/DL (ref 3.5–5)
ALBUMIN/GLOB SERPL: 0.5 {RATIO} (ref 1.1–2.2)
ALP SERPL-CCNC: 87 U/L (ref 45–117)
ALT SERPL-CCNC: 25 U/L (ref 12–78)
ANION GAP SERPL CALC-SCNC: 12 MMOL/L (ref 5–15)
APPEARANCE UR: CLEAR
AST SERPL-CCNC: 51 U/L (ref 15–37)
ATRIAL RATE: 48 BPM
BACTERIA URNS QL MICRO: NEGATIVE /HPF
BASOPHILS # BLD: 0.1 K/UL (ref 0–0.1)
BASOPHILS NFR BLD: 1 % (ref 0–1)
BILIRUB SERPL-MCNC: 0.3 MG/DL (ref 0.2–1)
BILIRUB UR QL: NEGATIVE
BUN SERPL-MCNC: 57 MG/DL (ref 6–20)
BUN/CREAT SERPL: 13 (ref 12–20)
CALCIUM SERPL-MCNC: 9.2 MG/DL (ref 8.5–10.1)
CALCULATED P AXIS, ECG09: 8 DEGREES
CALCULATED R AXIS, ECG10: 5 DEGREES
CALCULATED T AXIS, ECG11: 49 DEGREES
CHLORIDE SERPL-SCNC: 103 MMOL/L (ref 97–108)
CO2 SERPL-SCNC: 25 MMOL/L (ref 21–32)
COLOR UR: ABNORMAL
CREAT SERPL-MCNC: 4.55 MG/DL (ref 0.7–1.3)
CREAT UR-MCNC: 42.2 MG/DL
DIAGNOSIS, 93000: NORMAL
DIFFERENTIAL METHOD BLD: ABNORMAL
EOSINOPHIL # BLD: 0.2 K/UL (ref 0–0.4)
EOSINOPHIL NFR BLD: 3 % (ref 0–7)
EPITH CASTS URNS QL MICRO: ABNORMAL /LPF
ERYTHROCYTE [DISTWIDTH] IN BLOOD BY AUTOMATED COUNT: 14.9 % (ref 11.5–14.5)
GLOBULIN SER CALC-MCNC: 4.8 G/DL (ref 2–4)
GLUCOSE BLD STRIP.AUTO-MCNC: 192 MG/DL (ref 65–100)
GLUCOSE BLD STRIP.AUTO-MCNC: 228 MG/DL (ref 65–100)
GLUCOSE BLD STRIP.AUTO-MCNC: 261 MG/DL (ref 65–100)
GLUCOSE SERPL-MCNC: 359 MG/DL (ref 65–100)
GLUCOSE UR STRIP.AUTO-MCNC: >1000 MG/DL
HCT VFR BLD AUTO: 29.6 % (ref 36.6–50.3)
HGB BLD-MCNC: 9.8 G/DL (ref 12.1–17)
HGB UR QL STRIP: ABNORMAL
HYALINE CASTS URNS QL MICRO: ABNORMAL /LPF (ref 0–5)
IMM GRANULOCYTES # BLD: 0 K/UL (ref 0–0.04)
IMM GRANULOCYTES NFR BLD AUTO: 1 % (ref 0–0.5)
KETONES UR QL STRIP.AUTO: NEGATIVE MG/DL
LEUKOCYTE ESTERASE UR QL STRIP.AUTO: NEGATIVE
LYMPHOCYTES # BLD: 1.3 K/UL (ref 0.8–3.5)
LYMPHOCYTES NFR BLD: 16 % (ref 12–49)
MCH RBC QN AUTO: 31.1 PG (ref 26–34)
MCHC RBC AUTO-ENTMCNC: 33.1 G/DL (ref 30–36.5)
MCV RBC AUTO: 94 FL (ref 80–99)
MONOCYTES # BLD: 0.9 K/UL (ref 0–1)
MONOCYTES NFR BLD: 10 % (ref 5–13)
NEUTS SEG # BLD: 5.9 K/UL (ref 1.8–8)
NEUTS SEG NFR BLD: 70 % (ref 32–75)
NITRITE UR QL STRIP.AUTO: NEGATIVE
NRBC # BLD: 0 K/UL (ref 0–0.01)
NRBC BLD-RTO: 0 PER 100 WBC
P-R INTERVAL, ECG05: 180 MS
PH UR STRIP: 6 [PH] (ref 5–8)
PLATELET # BLD AUTO: 333 K/UL (ref 150–400)
PMV BLD AUTO: 11.5 FL (ref 8.9–12.9)
POTASSIUM SERPL-SCNC: 4.1 MMOL/L (ref 3.5–5.1)
PROT SERPL-MCNC: 7.1 G/DL (ref 6.4–8.2)
PROT UR STRIP-MCNC: >300 MG/DL
Q-T INTERVAL, ECG07: 472 MS
QRS DURATION, ECG06: 100 MS
QTC CALCULATION (BEZET), ECG08: 421 MS
RBC # BLD AUTO: 3.15 M/UL (ref 4.1–5.7)
RBC #/AREA URNS HPF: ABNORMAL /HPF (ref 0–5)
SERVICE CMNT-IMP: ABNORMAL
SODIUM SERPL-SCNC: 140 MMOL/L (ref 136–145)
SODIUM UR-SCNC: 59 MMOL/L
SP GR UR REFRACTOMETRY: 1.02 (ref 1–1.03)
TROPONIN I SERPL-MCNC: <0.05 NG/ML
UA: UC IF INDICATED,UAUC: ABNORMAL
UROBILINOGEN UR QL STRIP.AUTO: 0.2 EU/DL (ref 0.2–1)
VENTRICULAR RATE, ECG03: 48 BPM
WBC # BLD AUTO: 8.4 K/UL (ref 4.1–11.1)
WBC URNS QL MICRO: ABNORMAL /HPF (ref 0–4)

## 2018-06-14 PROCEDURE — 36415 COLL VENOUS BLD VENIPUNCTURE: CPT | Performed by: EMERGENCY MEDICINE

## 2018-06-14 PROCEDURE — 96360 HYDRATION IV INFUSION INIT: CPT

## 2018-06-14 PROCEDURE — 74011636637 HC RX REV CODE- 636/637: Performed by: HOSPITALIST

## 2018-06-14 PROCEDURE — 96361 HYDRATE IV INFUSION ADD-ON: CPT

## 2018-06-14 PROCEDURE — 65390000012 HC CONDITION CODE 44 OBSERVATION

## 2018-06-14 PROCEDURE — 84484 ASSAY OF TROPONIN QUANT: CPT | Performed by: EMERGENCY MEDICINE

## 2018-06-14 PROCEDURE — 84300 ASSAY OF URINE SODIUM: CPT | Performed by: INTERNAL MEDICINE

## 2018-06-14 PROCEDURE — 74011250637 HC RX REV CODE- 250/637: Performed by: HOSPITALIST

## 2018-06-14 PROCEDURE — 82570 ASSAY OF URINE CREATININE: CPT | Performed by: INTERNAL MEDICINE

## 2018-06-14 PROCEDURE — 74011250636 HC RX REV CODE- 250/636: Performed by: HOSPITALIST

## 2018-06-14 PROCEDURE — 74011250636 HC RX REV CODE- 250/636: Performed by: EMERGENCY MEDICINE

## 2018-06-14 PROCEDURE — 99285 EMERGENCY DEPT VISIT HI MDM: CPT

## 2018-06-14 PROCEDURE — 93005 ELECTROCARDIOGRAM TRACING: CPT

## 2018-06-14 PROCEDURE — 65660000000 HC RM CCU STEPDOWN

## 2018-06-14 PROCEDURE — 82962 GLUCOSE BLOOD TEST: CPT

## 2018-06-14 PROCEDURE — 85025 COMPLETE CBC W/AUTO DIFF WBC: CPT | Performed by: EMERGENCY MEDICINE

## 2018-06-14 PROCEDURE — 70450 CT HEAD/BRAIN W/O DYE: CPT

## 2018-06-14 PROCEDURE — 81001 URINALYSIS AUTO W/SCOPE: CPT | Performed by: EMERGENCY MEDICINE

## 2018-06-14 PROCEDURE — 80053 COMPREHEN METABOLIC PANEL: CPT | Performed by: EMERGENCY MEDICINE

## 2018-06-14 RX ORDER — ACETAMINOPHEN 325 MG/1
650 TABLET ORAL
Status: DISCONTINUED | OUTPATIENT
Start: 2018-06-14 | End: 2018-06-15 | Stop reason: HOSPADM

## 2018-06-14 RX ORDER — CLONIDINE HYDROCHLORIDE 0.1 MG/1
0.3 TABLET ORAL 2 TIMES DAILY
COMMUNITY
End: 2018-08-23

## 2018-06-14 RX ORDER — INSULIN LISPRO 100 [IU]/ML
INJECTION, SOLUTION INTRAVENOUS; SUBCUTANEOUS
Status: DISCONTINUED | OUTPATIENT
Start: 2018-06-14 | End: 2018-06-15 | Stop reason: HOSPADM

## 2018-06-14 RX ORDER — LANOLIN ALCOHOL/MO/W.PET/CERES
325 CREAM (GRAM) TOPICAL 2 TIMES DAILY
Status: DISCONTINUED | OUTPATIENT
Start: 2018-06-14 | End: 2018-06-15 | Stop reason: HOSPADM

## 2018-06-14 RX ORDER — MAGNESIUM SULFATE 100 %
4 CRYSTALS MISCELLANEOUS AS NEEDED
Status: DISCONTINUED | OUTPATIENT
Start: 2018-06-14 | End: 2018-06-15 | Stop reason: HOSPADM

## 2018-06-14 RX ORDER — GLUCOSAM/CHONDRO/HERB 149/HYAL 750-100 MG
1 TABLET ORAL 2 TIMES DAILY
Status: DISCONTINUED | OUTPATIENT
Start: 2018-06-14 | End: 2018-06-15 | Stop reason: HOSPADM

## 2018-06-14 RX ORDER — DEXTROSE 50 % IN WATER (D50W) INTRAVENOUS SYRINGE
12.5-25 AS NEEDED
Status: DISCONTINUED | OUTPATIENT
Start: 2018-06-14 | End: 2018-06-15 | Stop reason: HOSPADM

## 2018-06-14 RX ORDER — GLIPIZIDE 5 MG/1
20 TABLET, FILM COATED, EXTENDED RELEASE ORAL DAILY
Status: DISCONTINUED | OUTPATIENT
Start: 2018-06-15 | End: 2018-06-15 | Stop reason: HOSPADM

## 2018-06-14 RX ORDER — CLONIDINE HYDROCHLORIDE 0.1 MG/1
0.3 TABLET ORAL 2 TIMES DAILY
Status: DISCONTINUED | OUTPATIENT
Start: 2018-06-14 | End: 2018-06-15 | Stop reason: HOSPADM

## 2018-06-14 RX ORDER — TAMSULOSIN HYDROCHLORIDE 0.4 MG/1
0.4 CAPSULE ORAL
Status: DISCONTINUED | OUTPATIENT
Start: 2018-06-14 | End: 2018-06-15 | Stop reason: HOSPADM

## 2018-06-14 RX ORDER — MECLIZINE HCL 12.5 MG 12.5 MG/1
25 TABLET ORAL
Status: COMPLETED | OUTPATIENT
Start: 2018-06-14 | End: 2018-06-14

## 2018-06-14 RX ORDER — HEPARIN SODIUM 5000 [USP'U]/ML
5000 INJECTION, SOLUTION INTRAVENOUS; SUBCUTANEOUS EVERY 8 HOURS
Status: DISCONTINUED | OUTPATIENT
Start: 2018-06-14 | End: 2018-06-15 | Stop reason: HOSPADM

## 2018-06-14 RX ORDER — NEBIVOLOL 10 MG/1
20 TABLET ORAL
Status: DISCONTINUED | OUTPATIENT
Start: 2018-06-14 | End: 2018-06-15 | Stop reason: HOSPADM

## 2018-06-14 RX ORDER — SODIUM CHLORIDE 9 MG/ML
75 INJECTION, SOLUTION INTRAVENOUS CONTINUOUS
Status: DISCONTINUED | OUTPATIENT
Start: 2018-06-14 | End: 2018-06-15

## 2018-06-14 RX ORDER — ACETAMINOPHEN 500 MG
1000 TABLET ORAL
COMMUNITY

## 2018-06-14 RX ORDER — ATORVASTATIN CALCIUM 40 MG/1
80 TABLET, FILM COATED ORAL
Status: DISCONTINUED | OUTPATIENT
Start: 2018-06-14 | End: 2018-06-15 | Stop reason: HOSPADM

## 2018-06-14 RX ORDER — SODIUM CHLORIDE 0.9 % (FLUSH) 0.9 %
5-10 SYRINGE (ML) INJECTION AS NEEDED
Status: DISCONTINUED | OUTPATIENT
Start: 2018-06-14 | End: 2018-06-15 | Stop reason: HOSPADM

## 2018-06-14 RX ORDER — AMLODIPINE BESYLATE 5 MG/1
10 TABLET ORAL
Status: DISCONTINUED | OUTPATIENT
Start: 2018-06-14 | End: 2018-06-15 | Stop reason: HOSPADM

## 2018-06-14 RX ORDER — SODIUM CHLORIDE 0.9 % (FLUSH) 0.9 %
5-10 SYRINGE (ML) INJECTION EVERY 8 HOURS
Status: DISCONTINUED | OUTPATIENT
Start: 2018-06-14 | End: 2018-06-15 | Stop reason: HOSPADM

## 2018-06-14 RX ORDER — ONDANSETRON 2 MG/ML
4 INJECTION INTRAMUSCULAR; INTRAVENOUS
Status: DISCONTINUED | OUTPATIENT
Start: 2018-06-14 | End: 2018-06-15 | Stop reason: HOSPADM

## 2018-06-14 RX ORDER — CLOPIDOGREL BISULFATE 75 MG/1
75 TABLET ORAL
Status: DISCONTINUED | OUTPATIENT
Start: 2018-06-14 | End: 2018-06-15 | Stop reason: HOSPADM

## 2018-06-14 RX ORDER — ALLOPURINOL 100 MG/1
200 TABLET ORAL
Status: DISCONTINUED | OUTPATIENT
Start: 2018-06-14 | End: 2018-06-15 | Stop reason: HOSPADM

## 2018-06-14 RX ADMIN — MECLIZINE 25 MG: 12.5 TABLET ORAL at 11:38

## 2018-06-14 RX ADMIN — Medication 10 ML: at 21:59

## 2018-06-14 RX ADMIN — SODIUM CHLORIDE 75 ML/HR: 900 INJECTION, SOLUTION INTRAVENOUS at 16:50

## 2018-06-14 RX ADMIN — AMLODIPINE BESYLATE 10 MG: 5 TABLET ORAL at 21:56

## 2018-06-14 RX ADMIN — INSULIN LISPRO 2 UNITS: 100 INJECTION, SOLUTION INTRAVENOUS; SUBCUTANEOUS at 22:08

## 2018-06-14 RX ADMIN — ACETAMINOPHEN 650 MG: 325 TABLET ORAL at 16:53

## 2018-06-14 RX ADMIN — ALLOPURINOL 200 MG: 100 TABLET ORAL at 21:57

## 2018-06-14 RX ADMIN — FERROUS SULFATE TAB 325 MG (65 MG ELEMENTAL FE) 325 MG: 325 (65 FE) TAB at 17:02

## 2018-06-14 RX ADMIN — CLONIDINE HYDROCHLORIDE 0.3 MG: 0.1 TABLET ORAL at 17:01

## 2018-06-14 RX ADMIN — OMEGA-3 FATTY ACIDS CAP 1000 MG 1 CAPSULE: 1000 CAP at 21:56

## 2018-06-14 RX ADMIN — CLOPIDOGREL BISULFATE 75 MG: 75 TABLET ORAL at 21:57

## 2018-06-14 RX ADMIN — TAMSULOSIN HYDROCHLORIDE 0.4 MG: 0.4 CAPSULE ORAL at 21:56

## 2018-06-14 RX ADMIN — Medication 5 ML: at 15:21

## 2018-06-14 RX ADMIN — ATORVASTATIN CALCIUM 80 MG: 40 TABLET, FILM COATED ORAL at 21:56

## 2018-06-14 RX ADMIN — SODIUM CHLORIDE 1000 ML: 900 INJECTION, SOLUTION INTRAVENOUS at 12:21

## 2018-06-14 NOTE — ED NOTES
Informed Dr. Shamar Hurd of patient being somewhat dizzy when standing and of patients orthostatic vitals.

## 2018-06-14 NOTE — IP AVS SNAPSHOT
[address] [facility] [address] [facility] 
[phone] Patient: Willi Fernández 
MRN: [id number] CJJ:[id number] About your hospitalization You were admitted on:  June 14, 2018 You last received care in the:  [facility] CARDIOPULMONARY CARE You were discharged on:  Yahaira 15, 2018 Why you were hospitalized Your primary diagnosis was:  Not on File Your diagnoses also included:  Leonel (Acute Kidney Injury) (Hcc), Orthostatic Dizziness Follow-up Information Follow up With Details Comments Contact Info Dale Dawn MD Schedule an appointment as soon as possible for a visit in 3 days Please call to schedule PCP follow-up appointment for next week. [address] [facility] 
[phone] Alena Deleon MD In 1 week MD's office will contact patient directly to schedule appointment. [address] [place] [address] 
[phone] Discharge Orders None A check griselda indicates which time of day the medication should be taken. My Medications START taking these medications Instructions Each Dose to Equal  
 Morning Noon Evening Bedtime  
 meclizine 12.5 mg tablet Commonly known as:  ANTIVERT Your last dose was: Your next dose is: Take 1 Tab by mouth two (2) times a day for 5 days. 12.5 mg  
    
   
   
   
  
  
CHANGE how you take these medications Instructions Each Dose to Equal  
 Morning Noon Evening Bedtime  
 cloNIDine HCl 0.1 mg tablet Commonly known as:  CATAPRES What changed:  Another medication with the same name was removed. Continue taking this medication, and follow the directions you see here. Your last dose was: Your next dose is: Take 0.3 mg by mouth two (2) times a day. 0.3 mg  
    
   
   
   
  
 furosemide 40 mg tablet Commonly known as:  LASIX Start taking on:  6/16/2018 What changed:   
- medication strength 
- how much to take Your last dose was: Your next dose is: Take 1 Tab by mouth daily. 40 mg CONTINUE taking these medications Instructions Each Dose to Equal  
 Morning Noon Evening Bedtime  
 acetaminophen 500 mg tablet Commonly known as:  TYLENOL Your last dose was: Your next dose is: Take 1,000 mg by mouth every six (6) hours as needed for Pain. 1000 mg  
    
   
   
   
  
 allopurinol 100 mg tablet Commonly known as:  Magalene Oyster Your last dose was: Your next dose is: Take 200 mg by mouth nightly. 200 mg  
    
   
   
   
  
 amLODIPine 10 mg tablet Commonly known as:  Unknown [place] Your last dose was: Your next dose is: Take 10 mg by mouth nightly. 10 mg  
    
   
   
   
  
 atorvastatin 80 mg tablet Commonly known as:  LIPITOR Your last dose was: Your next dose is: Take 80 mg by mouth nightly. 80 mg  
    
   
   
   
  
 BYSTOLIC 20 mg tablet Generic drug:  nebivolol Your last dose was: Your next dose is: Take 20 mg by mouth nightly. 20 mg  
    
   
   
   
  
 ferrous sulfate 325 mg (65 mg iron) Cper Your last dose was: Your next dose is: Take 1 Tab by mouth two (2) times a day. 1 Tab FISH OIL CONCENTRATE 1,000 mg Cap Generic drug:  omega-3 fatty acids Your last dose was: Your next dose is: Take 1,000 mg by mouth two (2) times a day. 1000 mg FLOMAX 0.4 mg capsule Generic drug:  tamsulosin Your last dose was: Your next dose is: Take 0.4 mg by mouth nightly. 0.4 mg  
    
   
   
   
  
 folic acid-vit Z5-TLL Y94 2.5-25-2 mg tablet Commonly known as:  Brittanie Serrano Your last dose was: Your next dose is: Take 1 Tab by mouth nightly. 1 Tab  
    
   
   
   
  
 glipiZIDE SR 10 mg CR tablet Commonly known as:  GLUCOTROL XL Your last dose was: Your next dose is: Take 2 Tabs by mouth daily. 20 mg  
    
   
   
   
  
 PLAVIX 75 mg Tab Generic drug:  clopidogrel Your last dose was: Your next dose is: Take 75 mg by mouth nightly. 75 mg PRALUENT PEN 75 mg/mL injector pen Generic drug:  alirocumab Your last dose was: Your next dose is: INJECT EVERY 2 WEEKS TYLENOL PM PO Your last dose was: Your next dose is: Take 1 Tab by mouth nightly as needed (pain/sleep). 1 Tab Where to Get Your Medications Information on where to get these meds will be given to you by the nurse or doctor. ! Ask your nurse or doctor about these medications  
  furosemide 40 mg tablet  
 meclizine 12.5 mg tablet Discharge Instructions HOSPITALIST DISCHARGE INSTRUCTIONS 
NAME: Gertrudis Plata :  1950 MRN:  [id number] Date/Time:  6/15/2018 11:17 AM 
 
ADMIT DATE: 2018 DISCHARGE DATE: 6/15/2018 DIAGNOSIS:  Orthostatic, Dizziness, VIVIEN on CKD Stage 4, HTN, Sinus Bradycardia, DM, HARDIK MEDICATIONS: 
  
 
         As per medication reconciliation · It is important that you take the medication exactly as they are prescribed. · Keep your medication in the bottles provided by the pharmacist and keep a list of the medication names, dosages, and times to be taken in your wallet. · Do not take other medications without consulting your doctor. Pain Management: per above medications [facility] to [facility] at [facility] Recommended diet:  Diabetic Diet and Renal Diet Recommended activity: Activity as tolerated If you experience any of the following symptoms then please call your primary care physician or return to the emergency room if you cannot get hold of your doctor: 
Fever, chills, nausea, vomiting, diarrhea, change in mentation, falling, bleeding, shortness of breath. Follow Up: 
 PCP you are to call and set up an appointment to see them in 1 week. F/U Nephrology Information obtained by : 
I understand that if any problems occur once I am at home I am to contact my physician. I understand and acknowledge receipt of the instructions indicated above. Physician's or R.N.'s Signature                                                                  Date/Time Patient or Representative Signature                                                          Date/Time Future Fleet Announcement We are excited to announce that we are making your provider's discharge notes available to you in Future Fleet. You will see these notes when they are completed and signed by the physician that discharged you from your recent hospital stay. If you have any questions or concerns about any information you see in Accessbiot, please call the Health Information Department where you were seen or reach out to your Primary Care Provider for more information about your plan of care. Introducing [facility]! Dear Thuan Zamarripa: Thank you for requesting a Future Fleet account. Our records indicate that you already have an active Future [employer] account. You can access your account anytime at [email or web address] Did you know that you can access your hospital and ER discharge instructions at any time in CircleUphart? You can also review all of your test results from your hospital stay or ER visit. Additional Information If you have questions, please visit the Frequently Asked Questions section of the Veoh website at [email or web address] Remember, LiquidCool Solutionst is NOT to be used for urgent needs. For medical emergencies, dial 911. Now available from your iPhone and Android! Introducing Walter Mitchell As a [facility] patient, I wanted to make you aware of our electronic visit tool called Waltre Paula. [facility] 24/7 allows you to connect within minutes with a medical provider 24 hours a day, seven days a week via a mobile device or tablet or logging into a secure website from your computer. You can access Walter Mitchell from anywhere in the [place]. A virtual visit might be right for you when you have a simple condition and feel like you just dont want to get out of bed, or cant get away from work for an appointment, when your regular [facility] provider is not available (evenings, weekends or holidays), or when youre out of town and need minor care. Electronic visits cost only $49 and if the [facility] 24/7 provider determines a prescription is needed to treat your condition, one can be electronically transmitted to a nearby pharmacy*. Please take a moment to enroll today if you have not already done so. The enrollment process is free and takes just a few minutes. To enroll, please download the CallYourPrice 24/7 papito to your tablet or phone, or visit [email or web address] to enroll on your computer. And, as an [address] [place] [address] patient with a [employer] Options account, the results of your visits will be scanned into your electronic medical record and your primary care provider will be able to view the scanned results.    
We urge you to continue to see your regular [facility] provider for your ongoing medical care. And while your primary care provider may not be the one available when you seek a Walter Mitchell virtual visit, the peace of mind you get from getting a real diagnosis real time can be priceless. For more information on Walter Mitchell, view our Frequently Asked Questions (FAQs) at [email or web address]. Sincerely, 
 
Sam Epperson MD 
Chief Medical Officer [employer] *:  certain medications cannot be prescribed via Walter Mitchell Unresulted tests-please follow up with your PCP on these results Procedure/Test Authorizing Provider CBC WITH AUTOMATED DIFF Gus Stone MD  
 CBC WITH AUTOMATED DIFF Chata Penn MD  
 CREATININE, UR, [address] [place] [address] III, MD Russ Cifuentes MD  
 EKG, 12 LEAD, INITIAL Xuan Gaines,   
 METABOLIC PANEL, Tobi Abbott MD  
 METABOLIC PANEL, Alcon Dias MD  
 METABOLIC PANEL, Rolando Orosco MD  
 SODIUM, UR, Vernon Chavis MD  
 TROPONIN I MD Sajan Gregorio MD  
  
Providers Seen During Your Hospitalization Provider Specialty Primary office phone Chata Penn MD Emergency Medicine [phone] Gus Stone MD Internal Medicine [phone] Stephani Ronquillo MD Hospitalist [phone] Your Primary Care Physician (PCP) Primary Care Physician Office Phone Office Fax 3 [address] [phone] You are allergic to the following No active allergies Recent Documentation Height Weight BMI Smoking Status 1.727 m 80.5 kg 26.97 kg/m2 Former Smoker Emergency Contacts Name Discharge Info Relation Home Work Mobile Nereida Clemons DISCHARGE CAREGIVER [3] Girlfriend [18] [phone] [facility] DISCHARGE CAREGIVER [3] Son [22] [phone] Jigna Lambert DISCHARGE CAREGIVER [3] Other Relative [6] [phone] Patient Belongings The following personal items are in your possession at time of discharge: 
  Dental Appliances: None  Visual Aid: Glasses      Home Medications: None   Jewelry: None  Clothing: At bedside    Other Valuables: Bevely Rast, Cell Phone Please provide this summary of care documentation to your next provider. Signatures-by signing, you are acknowledging that this After Visit Summary has been reviewed with you and you have received a copy. Patient Signature:  ____________________________________________________________ Date:  ____________________________________________________________  
  
Garrick Cart Provider Signature:  ____________________________________________________________ Date:  ____________________________________________________________

## 2018-06-14 NOTE — CONSULTS
Consultation Note    NAME: Danielle Llamas   :  1950   MRN:  [id number]     Date/Time:  2018 5:20 PM    I have been asked to see this patient by Dr. Ashley Khoury  for advice/opinion re: CKD-4/VIVIEN. Assessment :    Plan:  CKD-4-Dr. Phill Vang- creatinine  3.49  VIVIEN  Nephrotic range proteinuria  DM  HTN  SRINIVAS  Gout  DM Unclear what has caused his VIVIEN. His creatinine as an outpatient has been increasing. He has also had more proteinuria. Perhaps it is related to his recent antibiotic. Agree with IVF. Send urine studies. Subjective:   CHIEF COMPLAINT:  \"I feel fine. \"    HISTORY OF PRESENT ILLNESS:     Gertrudis is a 80 y.o.   male who has a history of CKD-4 followed by Dr. Phill Vang. He says that he has been having some trouble over thepast several weeks. He says that he has had bronchitis and has been on antibiotics. He doesn't remember the name. He says that he had labs with his PCP recently that showed his creatinine was 3.5. He denies any N/V. No diarrhea. He uses no NSAID's. He has had no dysuria. Last night he got up to go to the bathroom and had a spell of vertigo. He says that the room spun around a couple times and he went to sleep and it went away. He says that he was \"off balance\" this morning and went to [facility] who referred him to the ER where he has been evaluated and admitted.     Past Medical History:   Diagnosis Date    BPH (benign prostatic hypertrophy)     CAD (coronary artery disease)     s/p stents    GERD (gastroesophageal reflux disease)     Gout     Other and unspecified hyperlipidemia     PVD [facility]peripheral vascular disease) ([facility])     s/p PTCA of left femoral artery in 2014    Type II or unspecified type diabetes mellitus without mention of complication, uncontrolled     Unspecified essential hypertension     Unspecified vitamin D deficiency       Past Surgical History:   Procedure Laterality Date    CARDIAC SURG PROCEDURE UNLIST      HX KNEE ARTHROSCOPY      right x2, left x1    VASCULAR SURGERY PROCEDURE UNLIST      aorto-bifem bypass    VASCULAR SURGERY PROCEDURE UNLIST      left carotid endarterectomy    VASCULAR SURGERY PROCEDURE UNLIST      right femoral PTCA     Social History   Substance Use Topics    Smoking status: Former Smoker     Packs/day: 2.00     Years: 25.00     Quit date: 3/11/1991    Smokeless tobacco: Never Used    Alcohol use Yes      Comment: 5 beers on a Friday night      Family History   Problem Relation Age of Onset    Diabetes Mother     Heart Disease Mother     Heart Disease Maternal Grandmother     Diabetes Daughter      gestational   Mammie Muster Diabetes Sister     Stroke Sister     Cancer Father     Hypertension Father       No Known Allergies   Prior to Admission medications    Medication Sig Start Date End Date Taking? Authorizing Provider   cloNIDine HCl (CATAPRES) 0.1 mg tablet Take 0.3 mg by mouth two (2) times a day. Yes Corby Angel MD   acetaminophen (TYLENOL) 500 mg tablet Take 1,000 mg by mouth every six (6) hours as needed for Pain. Yes Corby Angel MD   acetaminophen/diphenhydramine (TYLENOL PM PO) Take 1 Tab by mouth nightly as needed (pain/sleep). Yes Corby Angel MD   folic acid-vit A2-RTX S29 (FOLTX) 2.5-25-2 mg tablet Take 1 Tab by mouth nightly. Yes Corby Angel MD   atorvastatin (LIPITOR) 80 mg tablet Take 80 mg by mouth nightly. 2/26/18  Yes Lucian Handley MD   omega-3 fatty acids (FISH OIL CONCENTRATE) 1,000 mg cap Take 1,000 mg by mouth two (2) times a day. Yes Historical Provider   glipiZIDE SR (GLUCOTROL XL) 10 mg CR tablet Take 2 Tabs by mouth daily. 1/31/18  Yes Lucian Handley MD   BYSTOLIC 20 mg tablet Take 20 mg by mouth nightly. 12/15/17  Yes Historical Provider   PRALUENT PEN 75 mg/mL injector pen INJECT EVERY 2 WEEKS 10/11/17  Yes Historical Provider   furosemide (LASIX) 80 mg tablet Take 80 mg by mouth daily.  7/27/17  Yes Lucian Handley MD   amLODIPine (NORVASC) 10 mg tablet Take 10 mg by mouth nightly. Yes Historical Provider   ferrous sulfate 325 mg (65 mg iron) cpER Take 1 Tab by mouth two (2) times a day. Yes Historical Provider   tamsulosin (FLOMAX) 0.4 mg capsule Take 0.4 mg by mouth nightly. Yes Historical Provider   clopidogrel (PLAVIX) 75 mg tablet Take 75 mg by mouth nightly. Yes Historical Provider   allopurinol (ZYLOPRIM) 100 mg tablet Take 200 mg by mouth nightly.    Yes Historical Provider     REVIEW OF SYSTEMS:     []  Unable to obtain reliable ROS due to  [] mental status  [] sedated   [] intubated   [x] Total of 12 systems reviewed as follows:  Constitutional: negative fever, negative chills, negative weight loss  Eyes:   negative visual changes  ENT:   negative sore throat, tongue or lip swelling  Respiratory:  negative cough, negative dyspnea  Cards:  negative for chest pain, palpitations, lower extremity edema  GI:   negative for nausea, vomiting, diarrhea, and abdominal pain  :  negative for frequency, dysuria  Integument:  negative for rash and pruritus  Heme:  negative for easy bruising and gum/nose bleeding  Musculoskel: negative for myalgias,  back pain and muscle weakness  Neuro:  negative for headaches, pos dizziness, pos vertigo  Psych:  negative for feelings of anxiety, depression   Travel?: none    Objective:   VITALS:    Visit Vitals    /53 (BP 1 Location: Right arm, BP Patient Position: At rest;Sitting)    Pulse (!) 50    Temp 98 °F (36.7 °C)    Resp 14    Ht 5' 8\" (1.727 m)    Wt 81.5 kg (179 lb 10.8 oz)    SpO2 100%    BMI 27.32 kg/m2     PHYSICAL EXAM:  Gen:  [x]  WD [x]  WN  [] cachectic []  thin []  obese []  disheveled             []  ill apearing  []   Critical  []   Chronic    [x]  No acute distress    HEENT:   [x] NC/AT/PERRL    [x] pink conjunctivae      [] pale conjunctivae                  PERRL  [] yes  [] no      [] moist mucosa    [] dry mucosa    hearing intact to voice [] yes  [] No                 NECK: supple [x] yes  [] no        masses [] yes  [x] No               thyroid  []  non tender  []  tender    RESP:   [x] CTA bilaterally/no wheezing/rhonchi/rales/crackles    [] rhonchi bilaterally - no dullness  [] wheezing   [] rhonchi   [] crackles     use of accessory muscles [] yes [] no    CARD:   [x]  regular rate and rhythm/No murmurs/rubs/gallops    murmur  [] yes ()  [] no      Rubs  [] yes  [] no       Gallops [] yes  [] no    Rate []  regular  []  irregular        carotid bruits  [] Right  []  Left                 LE edema [] yes  [x] no           JVP  []  yes   [x]  no    ABD:    [x] soft/non distended/non tender/+bowel sounds/no HSM    []  Rigid    tenderness [] yes [] no   Liver enlargement  []  yes []  no                Spleen enlargement  []  yes []  no     distended []  yes [] no     bowel sound  [] hypoactive   [] hyperactive    LYMPH:    Neck []  yes [x]  no       Axillae []  yes [x]  no    SKIN:   Rashes []  yes   [x]  no    Ulcers []  yes   [x]  no               [] tight to palpitation    skin turgor []  good  [] poor  [] decreased               Cyanosis/clubbing []  yes []  no    NEUR:   [x] cranial nerves II-XII grossly intact       [] Cranial nerves deficit                 []  facial droop    []  slurred speech   [] aphasic     [] Strength normal     []  weakness  []  LUE  []   RUE/ []  LLE  []   RLE    follows commands  [x]  yes []  no           PSYCH:   insight [] poor [x] good   Alert and Oriented to  [x] person  [x] place  [x]  time                    [] depressed [] anxious [] agitated  [] lethargic [] stuporous  [] sedated     LAB DATA REVIEWED:    Recent Labs      06/14/18   1140   WBC  8.4   HGB  9.8*   HCT  29.6*   PLT  333     Recent Labs      06/14/18   1140   NA  140   K  4.1   CL  103   CO2  25   BUN  57*   CREA  4.55*   GLU  359*   CA  9.2     Recent Labs      06/14/18   1140   SGOT  51*   ALT  25   AP  87   TBILI  0.3   ALB  2.3*   GLOB  4.8*     No results for input(s): INR, PTP, APTT in the last 72 hours. No lab exists for component: INREXT   No results for input(s): FE, TIBC, PSAT, FERR in the last 72 hours. No results for input(s): PH, PCO2, PO2 in the last 72 hours. No results for input(s): CPK, CKMB in the last 72 hours.     No lab exists for component: TROPONINI  Lab Results   Component Value Date/Time    Glucose (POC) 261 (H) 06/14/2018 03:38 PM    Glucose (POC) 183 (H) 02/11/2018 07:50 AM    Glucose (POC) 198 (H) 02/10/2018 09:16 PM    Glucose (POC) 224 (H) 02/10/2018 04:59 PM    Glucose (POC) 287 (H) 02/10/2018 11:59 AM       Procedures: see electronic medical records for all procedures/Xrays and details which were not copied into this note but were reviewed prior to creation of Plan.    ________________________________________________________________________       ___________________________________________________  Consulting Physician: Rowdy Partida MD

## 2018-06-14 NOTE — H&P
Hospitalist Admission Note    NAME: Iban Boykin   :  1950   MRN:  [id number]     Date/Time:  2018 3:31 PM    Patient PCP: George Reis MD   Renal:  [facility]  Cardiology:  Juan Carlos Ryan  ______________________________________________________________________   Assessment & Plan:  Orthostatic dizziness, POA  --may be over diuresed. Hold lasix. IVF. Recheck orthostatic in AM    VIVIEN on CKD stage 4  --Cr 4.55, previously 2.4 to 2.6 in February. Suspect prerenal due to orthostatic dizziness vs. Progression of DM nephropathy.  --hold lasix. IVF. --consult nephrology. Renal US  with b/l renal cysts, infrarenal AAA, no hydronephrosis so will not repeat. HTN  Sinus bradycardia  HR in 40 to 50s  Asymmetrical blood pressure in arms  --HR in 40 to 50s normal for him per patient. Continue bystolic, amlodipine, clonidine.  --he reports also 10-15 degree difference in blood pressure readings in arms and that BP cuff should be on right arm. Uncontrolled DM type 2  --A1c 8.9   --continue glipizide for now but high risk for hypoglycemia with VIVIEN. If BS trending down and VIVIEN not improving, would discontinue glipizide. --moderate dose SSI. HARDIK  --continue cpap    Body mass index is 27.32 kg/(m^2). Code: full  DVT prophylaxis: heparin  Surrogate decision maker:  Daiana Oh        Subjective:   CHIEF COMPLAINT:  dizziness    HISTORY OF PRESENT ILLNESS:     Gertrudis Barrientos is a 80 y.o.  male with DM type 2, CKD stage 4 baseline Cr 2.4 to 2.8, CAD, PAD, gout, hyperlipidemia, chf with preserved EF (EF 60-65%, moderate MR, mild AS ) who presents to ER with 1 day hx dizziness. Woke up early this morning to go to bathroom and was dizzy with room spinning but got better and went back to sleep. This morning, woke up and was dizzy again, lightheaded, room spinning causing him to stumble while walking and have to hold onto objects.   No headache, nausea, vomiting, acute vision change. In ER, noted to be orthostatic with supine /53, pulse 52 and standing /47 pulse 53. Labs significant for Cr 4.55, previously 2.5 in 2/18. New medicine = an antibiotic prescribed by pcp for URI/PNA which he took for 4-5 days. We were asked to admit for work up and evaluation of the above problems. Past Medical History:   Diagnosis Date    BPH (benign prostatic hypertrophy)     CAD (coronary artery disease)     s/p stents    GERD (gastroesophageal reflux disease)     Gout     Other and unspecified hyperlipidemia     PVD (peripheral vascular disease) ([facility])     s/p PTCA of left femoral artery in July 2014    Type II or unspecified type diabetes mellitus without mention of complication, uncontrolled     Unspecified essential hypertension     Unspecified vitamin D deficiency       Past Surgical History:   Procedure Laterality Date    CARDIAC SURG PROCEDURE UNLIST      HX KNEE ARTHROSCOPY      right x2, left x1    VASCULAR SURGERY PROCEDURE UNLIST      aorto-bifem bypass    VASCULAR SURGERY PROCEDURE UNLIST      left carotid endarterectomy    VASCULAR SURGERY PROCEDURE UNLIST      right femoral PTCA     Social History   Substance Use Topics    Smoking status: Former Smoker     Packs/day: 2.00     Years: 25.00     Quit date: 3/11/1991    Smokeless tobacco: Never Used    Alcohol use Yes      Comment: 5 beers on a Friday night   Lives with girlfriend. Runs [employer] company    Family History   Problem Relation Age of Onset    Diabetes Mother     Heart Disease Mother     Heart Disease Maternal Grandmother     Diabetes Daughter      gestational   [employer] Diabetes Sister     Stroke Sister     Cancer Father     Hypertension Father      No Known Allergies     Prior to Admission medications    Medication Sig Start Date End Date Taking? Authorizing Provider   cloNIDine HCl (CATAPRES) 0.1 mg tablet Take 0.3 mg by mouth two (2) times a day.    Yes Phys Stanley, MD   acetaminophen (TYLENOL) 500 mg tablet Take 1,000 mg by mouth every six (6) hours as needed for Pain. Yes Corby Angel MD   acetaminophen/diphenhydramine (TYLENOL PM PO) Take 1 Tab by mouth nightly as needed (pain/sleep). Yes Corby Angel MD   folic acid-vit [id number]-[id number] I77 (FOLTX) 2.5-25-2 mg tablet Take 1 Tab by mouth nightly. Yes Corby Angel MD   atorvastatin (LIPITOR) 80 mg tablet Take 80 mg by mouth nightly. 2/26/18  Yes Jevon Zapata MD   omega-3 fatty acids (FISH OIL CONCENTRATE) 1,000 mg cap Take 1,000 mg by mouth two (2) times a day. Yes Historical Provider   glipiZIDE SR (GLUCOTROL XL) 10 mg CR tablet Take 2 Tabs by mouth daily. 1/31/18  Yes Jevon Zapata MD   BYSTOLIC 20 mg tablet Take 20 mg by mouth nightly. 12/15/17  Yes Historical Provider   PRALUENT PEN 75 mg/mL injector pen INJECT EVERY 2 WEEKS 10/11/17  Yes Historical Provider   furosemide (LASIX) 80 mg tablet Take 80 mg by mouth daily. 7/27/17  Yes Jevon Zapata MD   amLODIPine (NORVASC) 10 mg tablet Take 10 mg by mouth nightly. Yes Historical Provider   ferrous sulfate 325 mg (65 mg iron) cpER Take 1 Tab by mouth two (2) times a day. Yes Historical Provider   tamsulosin (FLOMAX) 0.4 mg capsule Take 0.4 mg by mouth nightly. Yes Historical Provider   clopidogrel (PLAVIX) 75 mg tablet Take 75 mg by mouth nightly. Yes Historical Provider   allopurinol (ZYLOPRIM) 100 mg tablet Take 200 mg by mouth nightly. Yes Historical Provider     REVIEW OF SYSTEMS:  POSITIVE= Bold.   Negative = normal text  General:  fever, chills, sweats, generalized weakness, weight loss/gain, loss of appetite  Eyes:  blurred vision, eye pain, loss of vision, diplopia  Ear Nose and Throat:  rhinorrhea, pharyngitis  Respiratory:   cough, sputum production, SOB, wheezing, RUBIO, pleuritic pain  Cardiology:  chest pain, palpitations, orthopnea, PND, edema, syncope   Gastrointestinal:  abdominal pain, N/V, dysphagia, diarrhea, constipation, bleeding  Genitourinary:  frequency, urgency, dysuria, hematuria, incontinence  Muskuloskeletal :  arthralgia, myalgia  Hematology:  easy bruising, bleeding, lymphadenopathy  Dermatological:  rash, ulceration, pruritis  Endocrine:  hot flashes or polydipsia  Neurological:  headache, dizziness, confusion, focal weakness, paresthesia, memory loss, gait disturbance  Psychological: anxiety, depression, agitation      Objective:   VITALS:    Visit Vitals    /50    Pulse (!) 51    Temp 98 °F (36.7 °C)    Resp 13    Ht 5' 8\" (1.727 m)    Wt 81.5 kg (179 lb 10.8 oz)    SpO2 96%    BMI 27.32 kg/m2     Temp (24hrs), Av °F (36.7 °C), Min:98 °F (36.7 °C), Max:98 °F (36.7 °C)    Body mass index is 27.32 kg/(m^2). PHYSICAL EXAM:    General:    Alert, overweight male, cooperative, no distress, appears stated age. HEENT: Atraumatic, anicteric sclerae, pink conjunctivae. Mild redness over nose and cheek in same distribution as cpap     No oral ulcers, mucosa dry, throat clear. Hearing intact. Neck:  Supple, symmetrical,  thyroid: non tender  Lungs:   Clear to auscultation bilaterally. No Wheezing or Rhonchi. No rales. Chest wall:  No tenderness  No Accessory muscle use. Heart:   Regular  rhythm,  No  murmur   No gallop. Trace edema on right lower leg, no edema on left  Abdomen:   Obese, soft, non-tender. Not distended. Bowel sounds normal. No masses  Extremities: No cyanosis. No clubbing  Skin:     Not pale Not Jaundiced  Mild seborrheic dermatitis on face   Psych:  Good insight. Not depressed. Not anxious or agitated. Neurologic: EOMs intact. No facial asymmetry. No aphasia or slurred speech. Symmetrical strength, Alert and oriented X 3.      IMAGING RESULTS:   []       I have personally reviewed the actual   []     CXR  []     CT scan  CXR:  CT :  EKG:   ________________________________________________________________________  Care Plan discussed with:    Comments   Patient y    Family      RN Care Manager                    Consultant:      ________________________________________________________________________  Prophylaxis:  GI none   DVT heparin   ________________________________________________________________________  Recommended Disposition:   Home with Family y   HH/PT/OT/RN    SNF/LTC    [facility]    ________________________________________________________________________  Code Status:  Full Code y   DNR/DNI    ________________________________________________________________________  TOTAL TIME:  50 minutes      Comments    y Reviewed previous records     ______________________________________________________________________  Christine Torres MD      Procedures: see electronic medical records for all procedures/Xrays and details which were not copied into this note but were reviewed prior to creation of Plan. LAB DATA REVIEWED:    Recent Results (from the past 24 hour(s))   EKG, 12 LEAD, INITIAL    Collection Time: 06/14/18 10:15 AM   Result Value Ref Range    Ventricular Rate 48 BPM    Atrial Rate 48 BPM    P-R Interval 180 ms    QRS Duration 100 ms    Q-T Interval 472 ms    QTC Calculation (Bezet) 421 ms    Calculated P Axis 8 degrees    Calculated R Axis 5 degrees    Calculated T Axis 49 degrees    Diagnosis       ** Poor data quality, interpretation may be adversely affected  Sinus bradycardia  Nonspecific T wave abnormality  When compared with ECG of 07-FEB-2018 08:52,  Vent.  rate has decreased BY  28 BPM  Confirmed by Adam Mojica P.V. ([id number]) on 6/14/2018 11:49:55 AM     CBC WITH AUTOMATED DIFF    Collection Time: 06/14/18 11:40 AM   Result Value Ref Range    WBC 8.4 4.1 - 11.1 K/uL    RBC 3.15 (L) 4.10 - 5.70 M/uL    HGB 9.8 (L) 12.1 - 17.0 g/dL    HCT 29.6 (L) 36.6 - 50.3 %    MCV 94.0 80.0 - 99.0 FL    MCH 31.1 26.0 - 34.0 PG    MCHC 33.1 30.0 - 36.5 g/dL    RDW 14.9 (H) 11.5 - 14.5 %    PLATELET 004 597 - 404 K/uL    MPV 11.5 8.9 - 12.9 FL    NRBC 0.0 0  WBC    ABSOLUTE NRBC 0.00 0.00 - 0.01 K/uL    NEUTROPHILS 70 32 - 75 %    LYMPHOCYTES 16 12 - 49 %    MONOCYTES 10 5 - 13 %    EOSINOPHILS 3 0 - 7 %    BASOPHILS 1 0 - 1 %    IMMATURE GRANULOCYTES 1 (H) 0.0 - 0.5 %    ABS. NEUTROPHILS 5.9 1.8 - 8.0 K/UL    ABS. LYMPHOCYTES 1.3 0.8 - 3.5 K/UL    ABS. MONOCYTES 0.9 0.0 - 1.0 K/UL    ABS. EOSINOPHILS 0.2 0.0 - 0.4 K/UL    ABS. BASOPHILS 0.1 0.0 - 0.1 K/UL    ABS. IMM. GRANS. 0.0 0.00 - 0.04 K/UL    DF AUTOMATED     METABOLIC PANEL, COMPREHENSIVE    Collection Time: 06/14/18 11:40 AM   Result Value Ref Range    Sodium 140 136 - 145 mmol/L    Potassium 4.1 3.5 - 5.1 mmol/L    Chloride 103 97 - 108 mmol/L    CO2 25 21 - 32 mmol/L    Anion gap 12 5 - 15 mmol/L    Glucose 359 (H) 65 - 100 mg/dL    BUN 57 (H) 6 - 20 MG/DL    Creatinine 4.55 (H) 0.70 - 1.30 MG/DL    BUN/Creatinine ratio 13 12 - 20      GFR est AA 16 (L) >60 ml/min/1.73m2    GFR est non-AA 13 (L) >60 ml/min/1.73m2    Calcium 9.2 8.5 - 10.1 MG/DL    Bilirubin, total 0.3 0.2 - 1.0 MG/DL    ALT (SGPT) 25 12 - 78 U/L    AST (SGOT) 51 (H) 15 - 37 U/L    Alk.  phosphatase 87 45 - 117 U/L    Protein, total 7.1 6.4 - 8.2 g/dL    Albumin 2.3 (L) 3.5 - 5.0 g/dL    Globulin 4.8 (H) 2.0 - 4.0 g/dL    A-G Ratio 0.5 (L) 1.1 - 2.2     TROPONIN I    Collection Time: 06/14/18 11:40 AM   Result Value Ref Range    Troponin-I, Qt. <0.05 <0.05 ng/mL   URINALYSIS W/ REFLEX CULTURE    Collection Time: 06/14/18 11:57 AM   Result Value Ref Range    Color YELLOW/STRAW      Appearance CLEAR CLEAR      Specific gravity 1.018 1.003 - 1.030      pH (UA) 6.0 5.0 - 8.0      Protein >300 (A) NEG mg/dL    Glucose >1000 (A) NEG mg/dL    Ketone NEGATIVE  NEG mg/dL    Bilirubin NEGATIVE  NEG      Blood TRACE (A) NEG      Urobilinogen 0.2 0.2 - 1.0 EU/dL    Nitrites NEGATIVE  NEG      Leukocyte Esterase NEGATIVE  NEG      WBC 0-4 0 - 4 /hpf    RBC 0-5 0 - 5 /hpf    Epithelial cells FEW FEW /lpf    Bacteria NEGATIVE  NEG /hpf    UA:UC IF INDICATED CULTURE NOT INDICATED BY UA RESULT CNI      Hyaline cast 0-2 0 - 5 /lpf

## 2018-06-14 NOTE — ED PROVIDER NOTES
EMERGENCY DEPARTMENT HISTORY AND PHYSICAL EXAM      Date: 6/14/2018  Patient Name: Cheryl Jarquin    History of Presenting Illness     Chief Complaint   Patient presents with    Dizziness     Ambulatory complaining of \"room spinning\" dizziness x last night Pt states this am he \"can't keep balance right\"        History Provided By: Patient    HPI: Cheryl Jarquin, 80 y.o. male with PMHx significant for NIDDM, CAD, gout, BPH, GERD, presents ambulatory to the ED with cc of lightheadedness and dizziness. Pt reports an episode of room spinning dizziness onset last night, that ultimately improved and pt was able to go to bed. He states upon waking this morning he had lightheadedness, causing his gait to be abnormal and \"wobbly\". Pt states he had to hold onto nearby objects while he was ambulating for assistance, but denies fall. While in the ED he states his sx's have improved. He notes over the past 4-5 months he has had intermittent episodes of lightheadedness, which are brought on with position change. Pt states he has been seen by cardiology for these sx's and was found to be orthostatic. Pt denies hx of vertigo. He specifically denies numbness, tingling, speech difficulty, weakness, chest pain, abdominal pain, n/v/d, dysuria, hematuria, and SOB. There are no other complaints, changes, or physical findings at this time.     PCP: Karen Stinson MD    Current Facility-Administered Medications   Medication Dose Route Frequency Provider Last Rate Last Dose    sodium chloride (NS) flush 5-10 mL  5-10 mL IntraVENous Q8H Susan Snell MD   5 mL at 06/14/18 1521    sodium chloride (NS) flush 5-10 mL  5-10 mL IntraVENous PRN Susan Snell MD        0.9% sodium chloride infusion  75 mL/hr IntraVENous CONTINUOUS Susan Snell MD 75 mL/hr at 06/14/18 1650 75 mL/hr at 06/14/18 1650    acetaminophen (TYLENOL) tablet 650 mg  650 mg Oral Q6H PRN Susan Snell MD   650 mg at 06/14/18 1653    ondansetron [facility] injection 4 mg  4 mg IntraVENous Q6H PRN Sherry Given, MD        heparin (porcine) injection 5,000 Units  5,000 Units SubCUTAneous Q8H Sherry Given, MD        insulin lispro (HUMALOG) injection   SubCUTAneous AC&HS Sherry Given, MD        glucose chewable tablet 16 g  4 Tab Oral PRN Sherry Given, MD        dextrose (D50W) injection syrg 12.5-25 g  12.5-25 g IntraVENous PRN Sherry Given, MD        glucagon [facility]) injection 1 mg  1 mg IntraMUSCular PRN Sherry Given, MD        cloNIDine HCl (CATAPRES) tablet 0.3 mg  0.3 mg Oral BID Sherry Given, MD   0.3 mg at 06/14/18 1701    atorvastatin (LIPITOR) tablet 80 mg  80 mg Oral QHS [facility] Given, MD Devin Carbajal ON 6/15/2018] B complex-vitaminC-folic acid (NEPHROCAP) cap  1 Cap Oral DAILY Sherry Given, MD        omega 3-DHA-EPA-fish oil 1,000 mg (120 mg-180 mg) capsule 1 Cap  1 Cap Oral BID Sherry Given, MD        nebivolol (BYSTOLIC) tablet 20 mg  20 mg Oral QHS Sherry Given, MD Devin Carbajal ON 6/15/2018] glipiZIDE SR (GLUCOTROL XL) tablet 20 mg  20 mg Oral DAILY Sherry Given, MD        amLODIPine (NORVASC) tablet 10 mg  10 mg Oral QHS [facility] Given, MD        ferrous sulfate tablet 325 mg  325 mg Oral BID Sherry Given, MD   325 mg at 06/14/18 1702    allopurinol (ZYLOPRIM) tablet 200 mg  200 mg Oral QHS [facility] Given, MD        clopidogrel (PLAVIX) tablet 75 mg  75 mg Oral QHS [facility] Given, MD        tamsulosin [facility]) capsule 0.4 mg  0.4 mg Oral QHS [facility] Given, MD           Past History     Past Medical History:  Past Medical History:   Diagnosis Date    BPH (benign prostatic hypertrophy)     CAD (coronary artery disease)     s/p stents    GERD (gastroesophageal reflux disease)     Gout     Other and unspecified hyperlipidemia     PVD (peripheral vascular disease) ([facility]     s/p PTCA of left femoral artery in July 2014    Type II or unspecified type diabetes mellitus without mention of complication, uncontrolled     Unspecified essential hypertension     Unspecified vitamin D deficiency        Past Surgical History:  Past Surgical History:   Procedure Laterality Date    CARDIAC SURG PROCEDURE UNLIST      HX KNEE ARTHROSCOPY      right x2, left x1    VASCULAR SURGERY PROCEDURE UNLIST      aorto-bifem bypass    VASCULAR SURGERY PROCEDURE UNLIST      left carotid endarterectomy    VASCULAR SURGERY PROCEDURE UNLIST      right femoral PTCA       Family History:  Family History   Problem Relation Age of Onset    Diabetes Mother     Heart Disease Mother     Heart Disease Maternal Grandmother     Diabetes Daughter      gestational   [place] Diabetes Sister     Stroke Sister     Cancer Father     Hypertension Father        Social History:  Social History   Substance Use Topics    Smoking status: Former Smoker     Packs/day: 2.00     Years: 25.00     Quit date: 3/11/1991    Smokeless tobacco: Never Used    Alcohol use Yes      Comment: 5 beers on a Friday night       Allergies:  No Known Allergies      Review of Systems   Review of Systems   Constitutional: Negative for chills, fatigue and fever. HENT: Negative for congestion, rhinorrhea and sore throat. Eyes: Negative for pain, discharge and visual disturbance. Respiratory: Negative for cough, chest tightness, shortness of breath and wheezing. Cardiovascular: Negative for chest pain, palpitations and leg swelling. Gastrointestinal: Negative for abdominal pain, constipation, diarrhea, nausea and vomiting. Genitourinary: Negative for dysuria, frequency and hematuria. Musculoskeletal: Positive for gait problem. Negative for arthralgias, back pain and myalgias. Skin: Negative for rash. Neurological: Positive for dizziness and light-headedness. Negative for speech difficulty, weakness, numbness and headaches. - tingling   Psychiatric/Behavioral: Negative. Physical Exam   Physical Exam   Constitutional: He is oriented to person, place, and time.  He appears well-developed and well-nourished. No distress. HENT:   Head: Normocephalic and atraumatic. Eyes: Pupils are equal, round, and reactive to light. Right eye exhibits no discharge. Left eye exhibits no discharge. No scleral icterus. Horizontal nystagmus   Neck: Normal range of motion. Neck supple. No tracheal deviation present. Cardiovascular: Normal rate, regular rhythm, normal heart sounds and intact distal pulses. Exam reveals no gallop and no friction rub. No murmur heard. Pulmonary/Chest: Effort normal and breath sounds normal. No respiratory distress. He has no wheezes. He has no rales. Abdominal: Soft. He exhibits no distension. There is no tenderness. Musculoskeletal: Normal range of motion. He exhibits no edema. Lymphadenopathy:     He has no cervical adenopathy. Neurological: He is alert and oriented to person, place, and time. Pt has normal speech and facial symmetry. 5/5 strength in all extremities. FNF and heel to shin intact b/l. Pronator drift and Romberg negative. Normal gait. Skin: Skin is warm and dry. No rash noted. Psychiatric: He has a normal mood and affect. Nursing note and vitals reviewed. Diagnostic Study Results     Labs -     Recent Results (from the past 12 hour(s))   EKG, 12 LEAD, INITIAL    Collection Time: 06/14/18 10:15 AM   Result Value Ref Range    Ventricular Rate 48 BPM    Atrial Rate 48 BPM    P-R Interval 180 ms    QRS Duration 100 ms    Q-T Interval 472 ms    QTC Calculation (Bezet) 421 ms    Calculated P Axis 8 degrees    Calculated R Axis 5 degrees    Calculated T Axis 49 degrees    Diagnosis       ** Poor data quality, interpretation may be adversely affected  Sinus bradycardia  Nonspecific T wave abnormality  When compared with ECG of 07-FEB-2018 08:52,  Vent.  rate has decreased BY  28 BPM  Confirmed by Daron Fierro, P.V. ([id number]) on 6/14/2018 11:49:55 AM     CBC WITH AUTOMATED DIFF    Collection Time: 06/14/18 11:40 AM   Result Value Ref Range    WBC 8.4 4.1 - 11.1 K/uL    RBC 3.15 (L) 4.10 - 5.70 M/uL    HGB 9.8 (L) 12.1 - 17.0 g/dL    HCT 29.6 (L) 36.6 - 50.3 %    MCV 94.0 80.0 - 99.0 FL    MCH 31.1 26.0 - 34.0 PG    MCHC 33.1 30.0 - 36.5 g/dL    RDW 14.9 (H) 11.5 - 14.5 %    PLATELET 699 930 - 862 K/uL    MPV 11.5 8.9 - 12.9 FL    NRBC 0.0 0  WBC    ABSOLUTE NRBC 0.00 0.00 - 0.01 K/uL    NEUTROPHILS 70 32 - 75 %    LYMPHOCYTES 16 12 - 49 %    MONOCYTES 10 5 - 13 %    EOSINOPHILS 3 0 - 7 %    BASOPHILS 1 0 - 1 %    IMMATURE GRANULOCYTES 1 (H) 0.0 - 0.5 %    ABS. NEUTROPHILS 5.9 1.8 - 8.0 K/UL    ABS. LYMPHOCYTES 1.3 0.8 - 3.5 K/UL    ABS. MONOCYTES 0.9 0.0 - 1.0 K/UL    ABS. EOSINOPHILS 0.2 0.0 - 0.4 K/UL    ABS. BASOPHILS 0.1 0.0 - 0.1 K/UL    ABS. IMM. GRANS. 0.0 0.00 - 0.04 K/UL    DF AUTOMATED     METABOLIC PANEL, COMPREHENSIVE    Collection Time: 06/14/18 11:40 AM   Result Value Ref Range    Sodium 140 136 - 145 mmol/L    Potassium 4.1 3.5 - 5.1 mmol/L    Chloride 103 97 - 108 mmol/L    CO2 25 21 - 32 mmol/L    Anion gap 12 5 - 15 mmol/L    Glucose 359 (H) 65 - 100 mg/dL    BUN 57 (H) 6 - 20 MG/DL    Creatinine 4.55 (H) 0.70 - 1.30 MG/DL    BUN/Creatinine ratio 13 12 - 20      GFR est AA 16 (L) >60 ml/min/1.73m2    GFR est non-AA 13 (L) >60 ml/min/1.73m2    Calcium 9.2 8.5 - 10.1 MG/DL    Bilirubin, total 0.3 0.2 - 1.0 MG/DL    ALT (SGPT) 25 12 - 78 U/L    AST (SGOT) 51 (H) 15 - 37 U/L    Alk.  phosphatase 87 45 - 117 U/L    Protein, total 7.1 6.4 - 8.2 g/dL    Albumin 2.3 (L) 3.5 - 5.0 g/dL    Globulin 4.8 (H) 2.0 - 4.0 g/dL    A-G Ratio 0.5 (L) 1.1 - 2.2     TROPONIN I    Collection Time: 06/14/18 11:40 AM   Result Value Ref Range    Troponin-I, Qt. <0.05 <0.05 ng/mL   URINALYSIS W/ REFLEX CULTURE    Collection Time: 06/14/18 11:57 AM   Result Value Ref Range    Color YELLOW/STRAW      Appearance CLEAR CLEAR      Specific gravity 1.018 1.003 - 1.030      pH (UA) 6.0 5.0 - 8.0      Protein >300 (A) NEG mg/dL    Glucose >1000 (A) NEG mg/dL    Ketone NEGATIVE  NEG mg/dL    Bilirubin NEGATIVE  NEG      Blood TRACE (A) NEG      Urobilinogen 0.2 0.2 - 1.0 EU/dL    Nitrites NEGATIVE  NEG      Leukocyte Esterase NEGATIVE  NEG      WBC 0-4 0 - 4 /hpf    RBC 0-5 0 - 5 /hpf    Epithelial cells FEW FEW /lpf    Bacteria NEGATIVE  NEG /hpf    UA:UC IF INDICATED CULTURE NOT INDICATED BY UA RESULT CNI      Hyaline cast 0-2 0 - 5 /lpf   GLUCOSE, POC    Collection Time: 06/14/18  3:38 PM   Result Value Ref Range    Glucose (POC) 261 (H) 65 - 100 mg/dL    Performed by Delbert Toth        Radiologic Studies -     CT Results  (Last 48 hours)               06/14/18 1123  CT HEAD WO CONT Final result    Impression:  IMPRESSION: No acute abnormality. Narrative:  EXAM:  CT HEAD WO CONT       INDICATION:   dizzy x this morning       COMPARISON: None. CONTRAST:  None. TECHNIQUE: Unenhanced CT of the head was performed using 5 mm images. Brain and   bone windows were generated. CT dose reduction was achieved through use of a   standardized protocol tailored for this examination and automatic exposure   control for dose modulation. FINDINGS:   The ventricles and sulci are normal in size, shape and configuration and   midline. There is no significant white matter disease. There is no intracranial   hemorrhage, extra-axial collection, mass, mass effect or midline shift. The   basilar cisterns are open. No acute infarct is identified. The bone windows   demonstrate no abnormalities. The visualized portions of the paranasal sinuses   and mastoid air cells are clear. Vascular calcification is noted. Medical Decision Making   I am the first provider for this patient. I reviewed the vital signs, available nursing notes, past medical history, past surgical history, family history and social history. Vital Signs-Reviewed the patient's vital signs.   Patient Vitals for the past 12 hrs:   Temp Pulse Resp BP SpO2 06/14/18 1613 98 °F (36.7 °C) (!) 50 14 179/53 100 %   06/14/18 1430 - (!) 51 13 152/50 96 %   06/14/18 1400 - (!) 50 13 163/53 97 %   06/14/18 1345 - (!) 50 10 162/54 99 %   06/14/18 1300 - (!) 50 14 166/51 100 %   06/14/18 1230 - (!) 51 16 167/58 100 %   06/14/18 1159 - (!) 53 18 140/47 100 %   06/14/18 1158 - (!) 53 16 158/49 100 %   06/14/18 1156 - (!) 52 14 169/53 97 %   06/14/18 1100 - (!) 55 20 176/56 100 %   06/14/18 1011 98 °F (36.7 °C) - 18 148/58 100 %       EKG interpretation: (Preliminary) 10:15  Rhythm: sinus bradycardia; and regular . Rate (approx.): 48; Axis: normal; MD interval: normal; QRS interval: normal; ST/T wave: non-specific T wave abnormality. Written by Joe Antunez ED Scribe, as dictated by Jocelyn Quintana MD.    Records Reviewed: Nursing Notes and Old Medical Records    Provider Notes (Medical Decision Making):   DDx: orthostatic hypotension, dehydration, electrolyte abnormality, VIVIEN, peripheral vertigo, TIA, CVA, ACS, arrhythmia    Disposition: Pt is a 79 y/o male who presents to the ED for lightheadedness and dizziness. He is well appearing and neurologically intact. He is orthostatic in ED. Labs indicate VIVIEN in the setting of CKD. Pt denies n/v/d. Will lightly hydrate pt and admit for further management. Nephrology consulted. ED Course:   Initial assessment performed. The patients presenting problems have been discussed, and they are in agreement with the care plan formulated and outlined with them. I have encouraged them to ask questions as they arise throughout their visit. CONSULT NOTE:   1:51 PM  Jocelyn Quintana MD spoke with Shweta Goldman MD   Specialty: Hospitalist  Discussed pt's hx, disposition, and available diagnostic and imaging results. Reviewed care plans. Consultant will evaluate pt for admission.   Written by Joe Fender, ED Scribe, as dictated by Jocelyn Quintana MD.    CONSULT NOTE:   2:32 PM  Jocelyn Quintana MD spoke with Christo Beaulieu, MD   Specialty: nephrology  Discussed pt's hx, disposition, and available diagnostic and imaging results. Reviewed care plans. Consultant agrees with plans as outlined. Dr. Rosalinda Arriola will evaluate. Written by ELMER Combsibe, as dictated by Reza Martin MD.    Critical Care Time: 0    Disposition:    ADMIT NOTE:  2:32 PM  The patient is being admitted to the hospital by Mahi Ferro MD.  The results of their tests and reasons for their admission have been discussed with the patient and/or available family. They convey agreement and understanding for the need to be admitted and for their admission diagnosis. PLAN:  1. Admit to hospitalist     Diagnosis     Clinical Impression:   1. VIVIEN (acute kidney injury) ([facility]    2. Chronic kidney disease, unspecified CKD stage    3. Orthostatic dizziness        Attestations: This note is prepared by Sky Hardin and Jermain Kwon, acting as Scribe for Reza Martin MD.    Reza Martin MD: The scribe's documentation has been prepared under my direction and personally reviewed by me in its entirety. I confirm that the note above accurately reflects all work, treatment, procedures, and medical decision making performed by me. This note will not be viewable in [address]

## 2018-06-14 NOTE — IP AVS SNAPSHOT
[address] [place] [address] 
[phone] Patient: Tawanna Thompson 
MRN: [id number] [facility]:[birth year] A check griselda indicates which time of day the medication should be taken. My Medications START taking these medications Instructions Each Dose to Equal  
 Morning Noon Evening Bedtime  
 meclizine 12.5 mg tablet Commonly known as:  ANTIVERT Your last dose was: Your next dose is: Take 1 Tab by mouth two (2) times a day for 5 days. 12.5 mg  
    
   
   
   
  
  
CHANGE how you take these medications Instructions Each Dose to Equal  
 Morning Noon Evening Bedtime  
 cloNIDine HCl 0.1 mg tablet Commonly known as:  CATAPRES What changed:  Another medication with the same name was removed. Continue taking this medication, and follow the directions you see here. Your last dose was: Your next dose is: Take 0.3 mg by mouth two (2) times a day. 0.3 mg  
    
   
   
   
  
 furosemide 40 mg tablet Commonly known as:  LASIX Start taking on:  6/16/2018 What changed:   
- medication strength 
- how much to take Your last dose was: Your next dose is: Take 1 Tab by mouth daily. 40 mg CONTINUE taking these medications Instructions Each Dose to Equal  
 Morning Noon Evening Bedtime  
 acetaminophen 500 mg tablet Commonly known as:  TYLENOL Your last dose was: Your next dose is: Take 1,000 mg by mouth every six (6) hours as needed for Pain. 1000 mg  
    
   
   
   
  
 allopurinol 100 mg tablet Commonly known as:  Kimberly Maile Your last dose was: Your next dose is: Take 200 mg by mouth nightly. 200 mg  
    
   
   
   
  
 amLODIPine 10 mg tablet Commonly known as:  Tawanna Thompson Your last dose was: Your next dose is: Take 10 mg by mouth nightly. 10 mg  
    
   
   
   
  
 atorvastatin 80 mg tablet Commonly known as:  LIPITOR Your last dose was: Your next dose is: Take 80 mg by mouth nightly. 80 mg  
    
   
   
   
  
 BYSTOLIC 20 mg tablet Generic drug:  nebivolol Your last dose was: Your next dose is: Take 20 mg by mouth nightly. 20 mg  
    
   
   
   
  
 ferrous sulfate 325 mg (65 mg iron) Cper Your last dose was: Your next dose is: Take 1 Tab by mouth two (2) times a day. 1 Tab FISH OIL CONCENTRATE 1,000 mg Cap Generic drug:  omega-3 fatty acids Your last dose was: Your next dose is: Take 1,000 mg by mouth two (2) times a day. 1000 mg FLOMAX 0.4 mg capsule Generic drug:  tamsulosin Your last dose was: Your next dose is: Take 0.4 mg by mouth nightly. 0.4 mg  
    
   
   
   
  
 folic acid-vit S9-LJJ O27 2.5-25-2 mg tablet Commonly known as:  David Petersen Your last dose was: Your next dose is: Take 1 Tab by mouth nightly. 1 Tab  
    
   
   
   
  
 glipiZIDE SR 10 mg CR tablet Commonly known as:  GLUCOTROL XL Your last dose was: Your next dose is: Take 2 Tabs by mouth daily. 20 mg  
    
   
   
   
  
 PLAVIX 75 mg Tab Generic drug:  clopidogrel Your last dose was: Your next dose is: Take 75 mg by mouth nightly. 75 mg PRALUENT PEN 75 mg/mL injector pen Generic drug:  alirocumab Your last dose was: Your next dose is: INJECT EVERY 2 WEEKS TYLENOL PM PO Your last dose was: Your next dose is: Take 1 Tab by mouth nightly as needed (pain/sleep). 1 Tab Where to Get Your Medications Information on where to get these meds will be given to you by the nurse or doctor. ! Ask your nurse or doctor about these medications  
  furosemide 40 mg tablet  
 meclizine 12.5 mg tablet

## 2018-06-14 NOTE — ROUTINE PROCESS
TRANSFER - OUT REPORT:    Verbal report given to [facility] RN(name) on [employer]  being transferred to [facility] (unit) for routine progression of care       Report consisted of patients Situation, Background, Assessment and   Recommendations(SBAR). Information from the following report(s) SBAR, Kardex, ED Summary, Intake/Output, MAR, Recent Results and Med Rec Status was reviewed with the receiving nurse. Lines:   Peripheral IV 06/14/18 Right Antecubital (Active)   Site Assessment Clean, dry, & intact 6/14/2018 11:41 AM   Phlebitis Assessment 0 6/14/2018 11:41 AM   Infiltration Assessment 0 6/14/2018 11:41 AM   Dressing Status Clean, dry, & intact 6/14/2018 11:41 AM   Dressing Type Tape;Transparent 6/14/2018 11:41 AM   Hub Color/Line Status Pink;Flushed;Patent 6/14/2018 11:41 AM   Action Taken Blood drawn 6/14/2018 11:41 AM        Opportunity for questions and clarification was provided.       Patient transported with:   Patient Transport

## 2018-06-14 NOTE — PROGRESS NOTES
Pharmacy Clarification of Prior to Admission Medication Regimen     The patient was interviewed regarding clarification of the prior to admission medication regimen and was questioned regarding use of any other inhalers, topical products, over the counter medications, herbal medications, vitamin products or ophthalmic/nasal/otic medication use. Information Obtained From: Patient, RX Query    Pertinent Pharmacy Findings: NONE    PTA medication list was corrected to the following:     Prior to Admission Medications   Prescriptions Last Dose Informant Patient Reported? Taking? BYSTOLIC 20 mg tablet 8/77/6430 at Unknown time Self Yes Yes   Sig: Take 20 mg by mouth nightly. PRALUENT PEN 75 mg/mL injector pen 6/7/2018 at Unknown time Self Yes Yes   Sig: INJECT EVERY 2 WEEKS   acetaminophen (TYLENOL) 500 mg tablet 6/12/2018 at Unknown time Self Yes Yes   Sig: Take 1,000 mg by mouth every six (6) hours as needed for Pain. acetaminophen/diphenhydramine (TYLENOL PM PO) 6/13/2018 at Unknown time Self Yes Yes   Sig: Take 1 Tab by mouth nightly as needed (pain/sleep). allopurinol (ZYLOPRIM) 100 mg tablet 6/13/2018 at Unknown time Self Yes Yes   Sig: Take 200 mg by mouth nightly. amLODIPine (NORVASC) 10 mg tablet 6/13/2018 at Unknown time Self Yes Yes   Sig: Take 10 mg by mouth nightly. atorvastatin (LIPITOR) 80 mg tablet 6/13/2018 at Unknown time Self Yes Yes   Sig: Take 80 mg by mouth nightly. cloNIDine HCl (CATAPRES) 0.1 mg tablet 6/14/2018 at Unknown time Self Yes Yes   Sig: Take 0.3 mg by mouth two (2) times a day. clopidogrel (PLAVIX) 75 mg tablet 6/13/2018 at Unknown time Self Yes Yes   Sig: Take 75 mg by mouth nightly. ferrous sulfate 325 mg (65 mg iron) cpER 6/14/2018 at Unknown time Self Yes Yes   Sig: Take 1 Tab by mouth two (2) times a day. folic acid-vit H5-IKI D59 (FOLTX) 2.5-25-2 mg tablet 6/13/2018 at Unknown time Self Yes Yes   Sig: Take 1 Tab by mouth nightly.    furosemide (LASIX) 80 mg tablet 6/14/2018 at Unknown time Self Yes Yes   Sig: Take 80 mg by mouth daily. glipiZIDE SR (GLUCOTROL XL) 10 mg CR tablet 6/14/2018 at Unknown time Self No Yes   Sig: Take 2 Tabs by mouth daily. omega-3 fatty acids (FISH OIL CONCENTRATE) 1,000 mg cap 6/14/2018 at Unknown time Self Yes Yes   Sig: Take 1,000 mg by mouth two (2) times a day. tamsulosin (FLOMAX) 0.4 mg capsule 6/13/2018 at Unknown time Self Yes Yes   Sig: Take 0.4 mg by mouth nightly.       Facility-Administered Medications: None          Thank you,  Rocio Omalley CPhT  Medication History Pharmacy Technician

## 2018-06-14 NOTE — PROGRESS NOTES
SHIFT SUMMARY:    TRANSFER - IN REPORT:    Verbal report received from [address] [place] [address] [place], RN(name) on Gertrudis Canela Jr  being received from ED(unit) for routine progression of care      Report consisted of patients Situation, Background, Assessment and   Recommendations(SBAR). Information from the following report(s) SBAR, Kardex, Intake/Output, MAR, Recent Results and Cardiac Rhythm SB was reviewed with the receiving nurse. Opportunity for questions and clarification was provided. Assessment completed upon patients arrival to unit and care assumed. 1600: Patient arrived to floor, no complaints of pain or dizziness when walking, vitals stable. IVF started at 75/hr. Call bell within reach, patient on bed alarm. Encouraged to use call bell before getting up. Primary Nurse Grey Felty and Eliane Glez RN performed a dual skin assessment on this patient No impairment noted  Gregorio score is 21    1900: Bedside report given to Liberty Brown NURSING NOTE   Admission Date 6/14/2018   Admission Diagnosis VIVIEN (acute kidney injury) ([facility] [address]  Orthostatic dizziness   Consults IP CONSULT TO NEPHROLOGY      Cardiac Monitoring [x] Yes [] No      Purposeful Hourly Rounding [x] Yes    Rosy Score Total Score: 1   Rosy score 3 or > [x] Bed Alarm [] Avasys [] 1:1 sitter [] Patient refused (Signed refusal form in chart)   Gregorio Score Gregorio Score: 21   Gregorio score 14 or < [] PMT consult [] Wound Care consult    []  Specialty bed  [] Nutrition consult      Influenza Vaccine Received Flu Vaccine for Current Season (usually Sept-March): Not Flu Season           Oxygen needs? [x] Room air Oxygen @  []1L    []2L    []3L   []4L    []5L   []6L via  NC   Chronic home O2 use?  [] Yes [] No  Perform O2 challenge test and document in progress note using smartphrase (.Homeoxygen)      Last bowel movement Last Bowel Movement Date: 06/13/18      Urinary Catheter             LDAs               Peripheral IV 06/14/18 Right Antecubital (Active)   Site Assessment Clean, dry, & intact 6/14/2018  4:54 PM   Phlebitis Assessment 0 6/14/2018  4:54 PM   Infiltration Assessment 0 6/14/2018  4:54 PM   Dressing Status Clean, dry, & intact 6/14/2018  4:54 PM   Dressing Type Tape;Transparent 6/14/2018  4:54 PM   Hub Color/Line Status Pink;Flushed;Patent 6/14/2018  4:54 PM   Action Taken Blood drawn 6/14/2018 11:41 AM                         Readmission Risk Assessment Tool Score High Risk            29       Total Score        3 Has Seen PCP in Last 6 Months (Yes=3, No=0)    4 IP Visits Last 12 Months (1-3=4, 4=9, >4=11)    5 Pt. Coverage (Medicare=5 , Medicaid, or Self-Pay=4)    17 Charlson Comorbidity Score (Age + Comorbid Conditions)        Criteria that do not apply:    . Living with Significant Other. Assisted Living. LTAC. SNF.  or   Rehab    Patient Length of Stay (>5 days = 3)       Expected Length of Stay - - -   Actual Length of Stay 0

## 2018-06-15 VITALS
WEIGHT: 177.4 LBS | OXYGEN SATURATION: 99 % | HEART RATE: 56 BPM | HEIGHT: 68 IN | TEMPERATURE: 98.1 F | RESPIRATION RATE: 16 BRPM | SYSTOLIC BLOOD PRESSURE: 114 MMHG | DIASTOLIC BLOOD PRESSURE: 67 MMHG | BODY MASS INDEX: 26.89 KG/M2

## 2018-06-15 LAB
ANION GAP SERPL CALC-SCNC: 10 MMOL/L (ref 5–15)
ANION GAP SERPL CALC-SCNC: 11 MMOL/L (ref 5–15)
BASOPHILS # BLD: 0.1 K/UL (ref 0–0.1)
BASOPHILS NFR BLD: 1 % (ref 0–1)
BUN SERPL-MCNC: 46 MG/DL (ref 6–20)
BUN SERPL-MCNC: 51 MG/DL (ref 6–20)
BUN/CREAT SERPL: 13 (ref 12–20)
BUN/CREAT SERPL: 13 (ref 12–20)
CALCIUM SERPL-MCNC: 8.4 MG/DL (ref 8.5–10.1)
CALCIUM SERPL-MCNC: 8.7 MG/DL (ref 8.5–10.1)
CHLORIDE SERPL-SCNC: 105 MMOL/L (ref 97–108)
CHLORIDE SERPL-SCNC: 108 MMOL/L (ref 97–108)
CO2 SERPL-SCNC: 24 MMOL/L (ref 21–32)
CO2 SERPL-SCNC: 26 MMOL/L (ref 21–32)
CREAT SERPL-MCNC: 3.67 MG/DL (ref 0.7–1.3)
CREAT SERPL-MCNC: 3.78 MG/DL (ref 0.7–1.3)
DIFFERENTIAL METHOD BLD: ABNORMAL
EOSINOPHIL # BLD: 0.4 K/UL (ref 0–0.4)
EOSINOPHIL NFR BLD: 4 % (ref 0–7)
ERYTHROCYTE [DISTWIDTH] IN BLOOD BY AUTOMATED COUNT: 14.8 % (ref 11.5–14.5)
GLUCOSE BLD STRIP.AUTO-MCNC: 156 MG/DL (ref 65–100)
GLUCOSE BLD STRIP.AUTO-MCNC: 261 MG/DL (ref 65–100)
GLUCOSE SERPL-MCNC: 152 MG/DL (ref 65–100)
GLUCOSE SERPL-MCNC: 256 MG/DL (ref 65–100)
HCT VFR BLD AUTO: 29.2 % (ref 36.6–50.3)
HGB BLD-MCNC: 9.5 G/DL (ref 12.1–17)
IMM GRANULOCYTES # BLD: 0.1 K/UL (ref 0–0.04)
IMM GRANULOCYTES NFR BLD AUTO: 1 % (ref 0–0.5)
LYMPHOCYTES # BLD: 1.7 K/UL (ref 0.8–3.5)
LYMPHOCYTES NFR BLD: 17 % (ref 12–49)
MCH RBC QN AUTO: 30.5 PG (ref 26–34)
MCHC RBC AUTO-ENTMCNC: 32.5 G/DL (ref 30–36.5)
MCV RBC AUTO: 93.9 FL (ref 80–99)
MONOCYTES # BLD: 1.1 K/UL (ref 0–1)
MONOCYTES NFR BLD: 11 % (ref 5–13)
NEUTS SEG # BLD: 6.6 K/UL (ref 1.8–8)
NEUTS SEG NFR BLD: 67 % (ref 32–75)
NRBC # BLD: 0 K/UL (ref 0–0.01)
NRBC BLD-RTO: 0 PER 100 WBC
PLATELET # BLD AUTO: 314 K/UL (ref 150–400)
PMV BLD AUTO: 11.2 FL (ref 8.9–12.9)
POTASSIUM SERPL-SCNC: 2.8 MMOL/L (ref 3.5–5.1)
POTASSIUM SERPL-SCNC: 3.4 MMOL/L (ref 3.5–5.1)
RBC # BLD AUTO: 3.11 M/UL (ref 4.1–5.7)
SERVICE CMNT-IMP: ABNORMAL
SERVICE CMNT-IMP: ABNORMAL
SODIUM SERPL-SCNC: 142 MMOL/L (ref 136–145)
SODIUM SERPL-SCNC: 142 MMOL/L (ref 136–145)
WBC # BLD AUTO: 9.8 K/UL (ref 4.1–11.1)

## 2018-06-15 PROCEDURE — G8980 MOBILITY D/C STATUS: HCPCS

## 2018-06-15 PROCEDURE — 74011250636 HC RX REV CODE- 250/636: Performed by: HOSPITALIST

## 2018-06-15 PROCEDURE — 99218 HC RM OBSERVATION: CPT

## 2018-06-15 PROCEDURE — G8978 MOBILITY CURRENT STATUS: HCPCS

## 2018-06-15 PROCEDURE — 80048 BASIC METABOLIC PNL TOTAL CA: CPT | Performed by: INTERNAL MEDICINE

## 2018-06-15 PROCEDURE — 36415 COLL VENOUS BLD VENIPUNCTURE: CPT | Performed by: HOSPITALIST

## 2018-06-15 PROCEDURE — 85025 COMPLETE CBC W/AUTO DIFF WBC: CPT | Performed by: HOSPITALIST

## 2018-06-15 PROCEDURE — 82962 GLUCOSE BLOOD TEST: CPT

## 2018-06-15 PROCEDURE — 65390000012 HC CONDITION CODE 44 OBSERVATION

## 2018-06-15 PROCEDURE — 96361 HYDRATE IV INFUSION ADD-ON: CPT

## 2018-06-15 PROCEDURE — 74011636637 HC RX REV CODE- 636/637: Performed by: HOSPITALIST

## 2018-06-15 PROCEDURE — 97162 PT EVAL MOD COMPLEX 30 MIN: CPT

## 2018-06-15 PROCEDURE — 97116 GAIT TRAINING THERAPY: CPT

## 2018-06-15 PROCEDURE — G8979 MOBILITY GOAL STATUS: HCPCS

## 2018-06-15 PROCEDURE — 80048 BASIC METABOLIC PNL TOTAL CA: CPT | Performed by: HOSPITALIST

## 2018-06-15 PROCEDURE — 74011250637 HC RX REV CODE- 250/637: Performed by: INTERNAL MEDICINE

## 2018-06-15 PROCEDURE — 74011250637 HC RX REV CODE- 250/637: Performed by: HOSPITALIST

## 2018-06-15 RX ORDER — FUROSEMIDE 40 MG/1
40 TABLET ORAL DAILY
Qty: 30 TAB | Refills: 1 | Status: SHIPPED | OUTPATIENT
Start: 2018-06-16 | End: 2018-08-23

## 2018-06-15 RX ORDER — POTASSIUM CHLORIDE 750 MG/1
40 TABLET, FILM COATED, EXTENDED RELEASE ORAL
Status: COMPLETED | OUTPATIENT
Start: 2018-06-15 | End: 2018-06-15

## 2018-06-15 RX ORDER — MECLIZINE HCL 12.5 MG 12.5 MG/1
12.5 TABLET ORAL 2 TIMES DAILY
Qty: 10 TAB | Refills: 0 | Status: SHIPPED | OUTPATIENT
Start: 2018-06-15 | End: 2018-06-20

## 2018-06-15 RX ORDER — HYDRALAZINE HYDROCHLORIDE 25 MG/1
25 TABLET, FILM COATED ORAL 3 TIMES DAILY
Status: DISCONTINUED | OUTPATIENT
Start: 2018-06-15 | End: 2018-06-15 | Stop reason: HOSPADM

## 2018-06-15 RX ADMIN — CLONIDINE HYDROCHLORIDE 0.3 MG: 0.1 TABLET ORAL at 09:24

## 2018-06-15 RX ADMIN — POTASSIUM CHLORIDE 40 MEQ: 750 TABLET, EXTENDED RELEASE ORAL at 07:23

## 2018-06-15 RX ADMIN — NEPHROCAP 1 CAPSULE: 1 CAP ORAL at 09:25

## 2018-06-15 RX ADMIN — SODIUM CHLORIDE 75 ML/HR: 900 INJECTION, SOLUTION INTRAVENOUS at 05:56

## 2018-06-15 RX ADMIN — ACETAMINOPHEN 650 MG: 325 TABLET ORAL at 05:52

## 2018-06-15 RX ADMIN — OMEGA-3 FATTY ACIDS CAP 1000 MG 1 CAPSULE: 1000 CAP at 09:24

## 2018-06-15 RX ADMIN — FERROUS SULFATE TAB 325 MG (65 MG ELEMENTAL FE) 325 MG: 325 (65 FE) TAB at 09:25

## 2018-06-15 RX ADMIN — GLIPIZIDE 20 MG: 5 TABLET, FILM COATED, EXTENDED RELEASE ORAL at 09:25

## 2018-06-15 RX ADMIN — INSULIN LISPRO 5 UNITS: 100 INJECTION, SOLUTION INTRAVENOUS; SUBCUTANEOUS at 12:45

## 2018-06-15 RX ADMIN — Medication 10 ML: at 05:56

## 2018-06-15 NOTE — DISCHARGE INSTRUCTIONS
HOSPITALIST DISCHARGE INSTRUCTIONS  NAME: Luciana Mcclellan   :  1950   MRN:  [id number]     Date/Time:  6/15/2018 11:17 AM    ADMIT DATE: 2018     DISCHARGE DATE: 6/15/2018     DIAGNOSIS:  Orthostatic, Dizziness, VIVIEN on CKD Stage 4, HTN, Sinus Bradycardia, DM, HARDIK    MEDICATIONS:                As per medication reconciliation    · It is important that you take the medication exactly as they are prescribed. · Keep your medication in the bottles provided by the pharmacist and keep a list of the medication names, dosages, and times to be taken in your wallet. · Do not take other medications without consulting your doctor. Pain Management: per above medications    What to do at Home    Recommended diet:  Diabetic Diet and Renal Diet    Recommended activity: Activity as tolerated    If you experience any of the following symptoms then please call your primary care physician or return to the emergency room if you cannot get hold of your doctor:  Fever, chills, nausea, vomiting, diarrhea, change in mentation, falling, bleeding, shortness of breath. Follow Up:   PCP you are to call and set up an appointment to see them in 1 week. F/U Nephrology      Information obtained by :  I understand that if any problems occur once I am at home I am to contact my physician. I understand and acknowledge receipt of the instructions indicated above.                                                                                                                                            Physician's or R.N.'s Signature                                                                  Date/Time                                                                                                                                              Patient or Representative Signature                                                          Date/Time

## 2018-06-15 NOTE — PROGRESS NOTES
NAME: Luciana Mcclellan        :  1950        MRN:  [id number]        Assessment :    Plan:  --CKD-4-Dr. Vickie Carney- creatinine  3.49  VIVIEN  Nephrotic range proteinuria  DM  HTN  SRINIVAS  Gout  DM --Creatinine nearing baseline. Taking po well. I'll stop IVF. OK for D/C from my point of view. Subjective:     Chief Complaint:  \" I feel better. \"  No N/V. No dyspnea. No pain. Review of Systems:    Symptom Y/N Comments  Symptom Y/N Comments   Fever/Chills    Chest Pain     Poor Appetite    Edema     Cough    Abdominal Pain     Sputum    Joint Pain     SOB/RUBIO    Pruritis/Rash     Nausea/vomit    Tolerating PT/OT     Diarrhea    Tolerating Diet     Constipation    Other       Could not obtain due to:      Objective:     VITALS:   Last 24hrs VS reviewed since prior progress note.  Most recent are:  Visit Vitals    /72 (BP 1 Location: Right arm, BP Patient Position: At rest)    Pulse (!) 56    Temp 98.4 °F (36.9 °C)    Resp 16    Ht 5' 8\" (1.727 m)    Wt 80.5 kg (177 lb 6.4 oz)    SpO2 98%    BMI 26.97 kg/m2       Intake/Output Summary (Last 24 hours) at 06/15/18 1003  Last data filed at 06/15/18 0748   Gross per 24 hour   Intake             1120 ml   Output             2015 ml   Net             -895 ml      Telemetry Reviewed:     PHYSICAL EXAM:  General: NAD  No edema      Lab Data Reviewed: (see below)    Medications Reviewed: (see below)    PMH/SH reviewed - no change compared to H&P  ________________________________________________________________________  Care Plan discussed with:  Patient     Family      RN     Care Manager                    Consultant:          Comments   >50% of visit spent in counseling and coordination of care       ________________________________________________________________________  Cole Villanueva MD     Procedures: see electronic medical records for all procedures/Xrays and details which  were not copied into this note but were reviewed prior to creation of Plan. LABS:  Recent Labs      06/15/18   0228  06/14/18   1140   WBC  9.8  8.4   HGB  9.5*  9.8*   HCT  29.2*  29.6*   PLT  314  333     Recent Labs      06/15/18   0228  06/14/18   1140   NA  142  140   K  2.8*  4.1   CL  108  103   CO2  24  25   BUN  51*  57*   CREA  3.78*  4.55*   GLU  152*  359*   CA  8.7  9.2     Recent Labs      06/14/18   1140   SGOT  51*   AP  87   TP  7.1   ALB  2.3*   GLOB  4.8*     No results for input(s): INR, PTP, APTT in the last 72 hours. No lab exists for component: INREXT   No results for input(s): FE, TIBC, PSAT, FERR in the last 72 hours. Lab Results   Component Value Date/Time    Folate 42.1 (H) 09/08/2012 10:24 AM      No results for input(s): PH, PCO2, PO2 in the last 72 hours. No results for input(s): CPK, CKMB in the last 72 hours.     No lab exists for component: TROPONINI  No components found for: [place]  Lab Results   Component Value Date/Time    Color YELLOW/STRAW 06/14/2018 11:57 AM    Appearance CLEAR 06/14/2018 11:57 AM    Specific gravity 1.018 06/14/2018 11:57 AM    pH (UA) 6.0 06/14/2018 11:57 AM    Protein >300 (A) 06/14/2018 11:57 AM    Glucose >1000 (A) 06/14/2018 11:57 AM    Ketone NEGATIVE  06/14/2018 11:57 AM    Bilirubin NEGATIVE  06/14/2018 11:57 AM    Urobilinogen 0.2 06/14/2018 11:57 AM    Nitrites NEGATIVE  06/14/2018 11:57 AM    Leukocyte Esterase NEGATIVE  06/14/2018 11:57 AM    Epithelial cells FEW 06/14/2018 11:57 AM    Bacteria NEGATIVE  06/14/2018 11:57 AM    WBC 0-4 06/14/2018 11:57 AM    RBC 0-5 06/14/2018 11:57 AM       MEDICATIONS:  Current Facility-Administered Medications   Medication Dose Route Frequency    sodium chloride (NS) flush 5-10 mL  5-10 mL IntraVENous Q8H    sodium chloride (NS) flush 5-10 mL  5-10 mL IntraVENous PRN    0.9% sodium chloride infusion  75 mL/hr IntraVENous CONTINUOUS    acetaminophen (TYLENOL) tablet 650 mg  650 mg Oral Q6H PRN    ondansetron (ZOFRAN) injection 4 mg  4 mg IntraVENous Q6H PRN    heparin (porcine) injection 5,000 Units  5,000 Units SubCUTAneous Q8H    insulin lispro (HUMALOG) injection   SubCUTAneous AC&HS    glucose chewable tablet 16 g  4 Tab Oral PRN    dextrose (D50W) injection syrg 12.5-25 g  12.5-25 g IntraVENous PRN    glucagon (GLUCAGEN) injection 1 mg  1 mg IntraMUSCular PRN    cloNIDine HCl (CATAPRES) tablet 0.3 mg  0.3 mg Oral BID    atorvastatin (LIPITOR) tablet 80 mg  80 mg Oral QHS    B complex-vitaminC-folic acid (NEPHROCAP) cap  1 Cap Oral DAILY    omega 3-DHA-EPA-fish oil 1,000 mg (120 mg-180 mg) capsule 1 Cap  1 Cap Oral BID    nebivolol (BYSTOLIC) tablet 20 mg  20 mg Oral QHS    glipiZIDE SR (GLUCOTROL XL) tablet 20 mg  20 mg Oral DAILY    amLODIPine (NORVASC) tablet 10 mg  10 mg Oral QHS    ferrous sulfate tablet 325 mg  325 mg Oral BID    allopurinol (ZYLOPRIM) tablet 200 mg  200 mg Oral QHS    clopidogrel (PLAVIX) tablet 75 mg  75 mg Oral QHS    tamsulosin (FLOMAX) capsule 0.4 mg  0.4 mg Oral QHS

## 2018-06-15 NOTE — PROGRESS NOTES
Pharmacist Discharge Medication Reconciliation    Significant PMH:   Past Medical History:   Diagnosis Date    BPH (benign prostatic hypertrophy)     CAD (coronary artery disease)     s/p stents    GERD (gastroesophageal reflux disease)     Gout     Other and unspecified hyperlipidemia     PVD [facility]peripheral vascular disease) ([facility])     s/p PTCA of left femoral artery in July 2014    Type II or unspecified type diabetes mellitus without mention of complication, uncontrolled     Unspecified essential hypertension     Unspecified vitamin D deficiency      Chief Complaint for this Admission:   Chief Complaint   Patient presents with    Dizziness     Ambulatory complaining of \"room spinning\" dizziness x last night Pt states this am he \"can't keep balance right\"      Allergies: Review of patient's allergies indicates no known allergies. Discharge Medications:   Current Discharge Medication List        CONTINUE these medications which have CHANGED    Details   furosemide (LASIX) 40 mg tablet Take 1 Tab by mouth daily. Qty: 30 Tab, Refills: 1           CONTINUE these medications which have NOT CHANGED    Details   cloNIDine HCl (CATAPRES) 0.1 mg tablet Take 0.3 mg by mouth two (2) times a day. acetaminophen (TYLENOL) 500 mg tablet Take 1,000 mg by mouth every six (6) hours as needed for Pain. acetaminophen/diphenhydramine (TYLENOL PM PO) Take 1 Tab by mouth nightly as needed (pain/sleep). folic acid-vit C0-RWU X99 (FOLTX) 2.5-25-2 mg tablet Take 1 Tab by mouth nightly. atorvastatin (LIPITOR) 80 mg tablet Take 80 mg by mouth nightly. Refills: 3      omega-3 fatty acids (FISH OIL CONCENTRATE) 1,000 mg cap Take 1,000 mg by mouth two (2) times a day. glipiZIDE SR (GLUCOTROL XL) 10 mg CR tablet Take 2 Tabs by mouth daily. Qty: 180 Tab, Refills: 3      BYSTOLIC 20 mg tablet Take 20 mg by mouth nightly.   Refills: 6      PRALUENT PEN 75 mg/mL injector pen INJECT EVERY 2 WEEKS  Refills: 3 amLODIPine (NORVASC) 10 mg tablet Take 10 mg by mouth nightly. ferrous sulfate 325 mg (65 mg iron) cpER Take 1 Tab by mouth two (2) times a day. tamsulosin (FLOMAX) 0.4 mg capsule Take 0.4 mg by mouth nightly. clopidogrel (PLAVIX) 75 mg tablet Take 75 mg by mouth nightly. allopurinol (ZYLOPRIM) 100 mg tablet Take 200 mg by mouth nightly.              The patient's chart, MAR and AVS were reviewed by Ofelia John PHARMD.    Discharging Provider: Dr. Steve Herbert      Thank You,     Ofelia John [place]

## 2018-06-15 NOTE — PROGRESS NOTES
physical Therapy EVALUATION/DISCHARGE  Patient: Iban Boykin (54 y.o. male)  Date: 6/15/2018  Primary Diagnosis: VIVIEN (acute kidney injury) ([facility] [address]  Orthostatic dizziness        Precautions:     ASSESSMENT :  Based on the objective data described below, the patient presents with good strength, balance and mobility skills following admission for above diagnosis. PTA he was independent in all areas of function and denies any falls in the lat 12 months. Currently demonstrates independent skill with bed mobility, transfers, and ambulation for 350 feet. Cleared 3 steps with rail independently. Tinetti score of 27/28 suggests low fall risk. Reinforced to call nursing for assistance if his lightheadedness returns - he verbalized understanding. Orthostatics taken and patient was asymptomatic in all positions. Vitals:    06/15/18 0741 06/15/18 1000 06/15/18 1022 06/15/18 1026   BP: 159/72 (P) 172/54 (P) 177/57 (P) 164/46   BP 1 Location: Right arm (P) Right arm (P) Right arm (P) Right arm   BP Patient Position: At rest (P) Supine (P) Sitting (P) Standing   Pulse: (!) 56 (!) (P) 53 (!) (P) 56 (!) (P) 57   Resp: 16 (P) 16     Temp: 98.4 °F (36.9 °C)      SpO2: 98% (P) 95%     Weight:       Height:       Patient is safe to ambulate in room and hoffman ad hiwot as tolerated. Recommend home with family upon discharge. Skilled physical therapy is not indicated at this time.      PLAN :  Discharge Recommendations: None  Further Equipment Recommendations for Discharge: None     SUBJECTIVE:   Patient stated I feel good    OBJECTIVE DATA SUMMARY:   HISTORY:    Past Medical History:   Diagnosis Date    BPH (benign prostatic hypertrophy)     CAD (coronary artery disease)     s/p stents    GERD (gastroesophageal reflux disease)     Gout     Other and unspecified hyperlipidemia     PVD (peripheral vascular disease) ([facility] [address]     s/p PTCA of left femoral artery in July 2014    Type II or unspecified type diabetes mellitus without mention of complication, uncontrolled     Unspecified essential hypertension     Unspecified vitamin D deficiency      Past Surgical History:   Procedure Laterality Date    CARDIAC SURG PROCEDURE UNLIST      HX KNEE ARTHROSCOPY      right x2, left x1    VASCULAR SURGERY PROCEDURE UNLIST      aorto-bifem bypass    VASCULAR SURGERY PROCEDURE UNLIST      left carotid endarterectomy    VASCULAR SURGERY PROCEDURE UNLIST      right femoral PTCA     Prior Level of Function/Home Situation: he was independent in all areas of function and denies any falls in the lat 12 months. Personal factors and/or comorbidities impacting plan of care: None    Home Situation  Home Environment: Private residence  # Steps to Enter: 3  Rails to Enter: Yes  Hand Rails : Bilateral  Wheelchair Ramp: No  One/Two Story Residence: One story  Living Alone: No  Support Systems: Family member(s)  Patient Expects to be Discharged to[de-identified] Private residence  Current DME Used/Available at Home: CPAP  Tub or Shower Type: Shower    EXAMINATION/PRESENTATION/DECISION MAKING:   Critical Behavior:  Neurologic State: Alert  Orientation Level: Disoriented X4  Cognition: Appropriate decision making, Appropriate for age attention/concentration, Appropriate safety awareness, Follows commands     Hearing: Auditory  Auditory Impairment: Hard of hearing, bilateral  Skin:    Edema:   Range Of Motion:  AROM: Within functional limits           PROM: Within functional limits           Strength:    Strength:  Within functional limits                    Tone & Sensation:   Tone: Normal              Sensation: Intact               Coordination:  Coordination: Within functional limits  Vision:      Functional Mobility:  Bed Mobility:  Rolling: Independent  Supine to Sit: Independent  Sit to Supine: Independent  Scooting: Independent  Transfers:  Sit to Stand: Independent  Stand to Sit: Independent        Bed to Chair: Independent              Balance:   Sitting: Intact  Standing: Intact  Ambulation/Gait Training:  Distance (ft): 350 Feet (ft)  Assistive Device: Gait belt  Ambulation - Level of Assistance: Independent     Gait Description (WDL): Exceptions to WDL  Gait Abnormalities:  (Normal)        Base of Support: Widened     Speed/Cari:  (Normal)                            Stairs:  Number of Stairs Trained: 3  Stairs - Level of Assistance: Modified independent   Rail Use: Left      Functional Measure:  Tinetti test:    Sitting Balance: 1  Arises: 2  Attempts to Rise: 2  Immediate Standing Balance: 2  Standing Balance: 2  Nudged: 2  Eyes Closed: 1  Turn 360 Degrees - Continuous/Discontinuous: 1  Turn 360 Degrees - Steady/Unsteady: 1  Sitting Down: 2  Balance Score: 16  Indication of Gait: 1  R Step Length/Height: 1  L Step Length/Height: 1  R Foot Clearance: 1  L Foot Clearance: 1  Step Symmetry: 1  Step Continuity: 1  Path: 2  Trunk: 2  Walking Time: 0  Gait Score: 11  Total Score: 27       Tinetti Test and G-code impairment scale:  Percentage of Impairment CH    0%   CI    1-19% CJ    20-39% CK    40-59% CL    60-79% CM    80-99% CN     100%   Tinetti  Score 0-28 28 23-27 17-22 12-16 6-11 1-5 0       Tinetti Tool Score Risk of Falls  <19 = High Fall Risk  19-24 = Moderate Fall Risk  25-28 = Low Fall Risk  Tinetti ME. Performance-Oriented Assessment of Mobility Problems in Elderly Patients. [facility] 66; [id number]. (Scoring Description: PT Bulletin Feb. 10, 1993)    Older adults: Steph Friedman et al, 2009; n = 1000 [facility] elderly evaluated with ABC, JARAD, ADL, and IADL)  · Mean JARAD score for males aged 69-68 years = 26.21(3.40)  · Mean JARAD score for females age 69-68 years = 25.16(4.30)  · Mean JARAD score for males over 80 years = 23.29(6.02)  · Mean JARAD score for females over 80 years = 17.20(8.32)         G codes:   In compliance with CMSs Claims Based Outcome Reporting, the following G-code set was chosen for this patient based on their primary functional limitation being treated: The outcome measure chosen to determine the severity of the functional limitation was the Tinetti with a score of 27/28 which was correlated with the impairment scale. ? Mobility - Walking and Moving Around:     - CURRENT STATUS: CI - 1%-19% impaired, limited or restricted    - GOAL STATUS: CI - 1%-19% impaired, limited or restricted    - D/C STATUS:  CI - 1%-19% impaired, limited or restricted        Physical Therapy Evaluation Charge Determination   History Examination Presentation Decision-Making   MEDIUM  Complexity : 1-2 comorbidities / personal factors will impact the outcome/ POC  MEDIUM Complexity : 3 Standardized tests and measures addressing body structure, function, activity limitation and / or participation in recreation  LOW Complexity : Stable, uncomplicated  Other outcome measures Tinetti  LOW       Based on the above components, the patient evaluation is determined to be of the following complexity level: LOW     Pain:  Pain Scale 1: Numeric (0 - 10)  Pain Intensity 1: 0  Pain Location 1: Shoulder  Activity Tolerance:   Excellent    Please refer to the flowsheet for vital signs taken during this treatment. After treatment:   [x]   Patient left in no apparent distress sitting up in chair  []   Patient left in no apparent distress in bed  [x]   Call bell left within reach  [x]   Nursing notified  []   Caregiver present  []   Bed alarm activated    COMMUNICATION/EDUCATION:   Communication/Collaboration:  [x]   Fall prevention education was provided and the patient/caregiver indicated understanding. [x]   Patient/family have participated as able and agree with findings and recommendations. []   Patient is unable to participate in plan of care at this time.   Findings and recommendations were discussed with: Registered Nurse    Thank you for this referral.  Ekta Aiken, PT   Time Calculation: 28 mins

## 2018-06-15 NOTE — DISCHARGE SUMMARY
Hospitalist Discharge Summary     Patient ID:  Rosalio Melgar  [id number]  80 y.o.  1950    PCP on record: Adam Villarreal MD    Admit date: 6/14/2018  Discharge date and time: 6/15/2018      DISCHARGE DIAGNOSIS:    Orthostatic, Dizziness, VIVIEN on CKD Stage 4, HTN, Sinus Bradycardia, DM, HARDIK      CONSULTATIONS:  IP CONSULT TO NEPHROLOGY    Excerpted HPI from H&P of Madeline Devries MD:  Rosalio Melgar is a 80 y.o.  male with DM type 2, CKD stage 4 baseline Cr 2.4 to 2.8, CAD, PAD, gout, hyperlipidemia, chf with preserved EF (EF 60-65%, moderate MR, mild AS 2/18) who presents to ER with 1 day hx dizziness.     Woke up early this morning to go to bathroom and was dizzy with room spinning but got better and went back to sleep. This morning, woke up and was dizzy again, lightheaded, room spinning causing him to stumble while walking and have to hold onto objects. No headache, nausea, vomiting, acute vision change.     In ER, noted to be orthostatic with supine /53, pulse 52 and standing /47 pulse 53. Labs significant for Cr 4.55, previously 2.5 in 2/18. New medicine = an antibiotic prescribed by pcp for URI/PNA which he took for 4-5 days. We were asked to admit for work up and evaluation of the above problems. ______________________________________________________________________  DISCHARGE SUMMARY/HOSPITAL COURSE:  for full details see H&P, daily progress notes, labs, consult notes. Orthostatic dizziness, POA  --may be over diuresed. Hold lasix. IVF. so far improved  VIVIEN on CKD stage 4  Creatinine trending down, clear by Renal for D/c , decrease dose of lasix  HTN c/w clonidine, bystolic and Norvasc  Sinus bradycardia  HR in 40 to 50s  Uncontrolled DM type 2  --A1c 8.9 4/18  --continue glipizide for now but high risk for hypoglycemia with VIVIEN. If BS trending down and VIVIEN not improving, would discontinue glipizide. --moderate dose SSI.   HARDIK  --continue cpap  Body mass index is 27.32 kg/(m^2). Chronic Diastolic Heart Failure: decreased ose of diuretics, monitor I/o and weight  Code: full  DVT prophylaxis: heparin  Surrogate decision maker:  Chito All    D/c Home and F/U with PCP and Nephrology as outpatient  _______________________________________________________________________  Patient seen and examined by me on discharge day. Pertinent Findings:  Gen:    Not in distress  Chest: Clear lungs  CVS:   Regular rhythm. No edema  Abd:  Soft, not distended, not tender  Neuro:  Alert, GCS 15  _______________________________________________________________________  DISCHARGE MEDICATIONS:   Current Discharge Medication List      START taking these medications    Details   meclizine (ANTIVERT) 12.5 mg tablet Take 1 Tab by mouth two (2) times a day for 5 days. Qty: 10 Tab, Refills: 0         CONTINUE these medications which have CHANGED    Details   furosemide (LASIX) 40 mg tablet Take 1 Tab by mouth daily. Qty: 30 Tab, Refills: 1         CONTINUE these medications which have NOT CHANGED    Details   cloNIDine HCl (CATAPRES) 0.1 mg tablet Take 0.3 mg by mouth two (2) times a day. acetaminophen (TYLENOL) 500 mg tablet Take 1,000 mg by mouth every six (6) hours as needed for Pain. acetaminophen/diphenhydramine (TYLENOL PM PO) Take 1 Tab by mouth nightly as needed (pain/sleep). folic acid-vit R6-QDN O61 (FOLTX) 2.5-25-2 mg tablet Take 1 Tab by mouth nightly. atorvastatin (LIPITOR) 80 mg tablet Take 80 mg by mouth nightly. Refills: 3      omega-3 fatty acids (FISH OIL CONCENTRATE) 1,000 mg cap Take 1,000 mg by mouth two (2) times a day. glipiZIDE SR (GLUCOTROL XL) 10 mg CR tablet Take 2 Tabs by mouth daily. Qty: 180 Tab, Refills: 3      BYSTOLIC 20 mg tablet Take 20 mg by mouth nightly.   Refills: 6      PRALUENT PEN 75 mg/mL injector pen INJECT EVERY 2 WEEKS  Refills: 3      amLODIPine (NORVASC) 10 mg tablet Take 10 mg by mouth nightly. ferrous sulfate 325 mg (65 mg iron) cpER Take 1 Tab by mouth two (2) times a day. tamsulosin (FLOMAX) 0.4 mg capsule Take 0.4 mg by mouth nightly. clopidogrel (PLAVIX) 75 mg tablet Take 75 mg by mouth nightly. allopurinol (ZYLOPRIM) 100 mg tablet Take 200 mg by mouth nightly. My Recommended Diet, Activity, Wound Care, and follow-up labs are listed in the patient's Discharge Insturctions which I have personally completed and reviewed.     ______________________________________________________________________    Risk of deterioration: Moderate    Condition at Discharge:  Stable  ______________________________________________________________________    Disposition  Home with family, no needs  ______________________________________________________________________    Care Plan discussed with:   Patient, RN, Care Manager, Consultant    ______________________________________________________________________    Code Status: Full Code  ______________________________________________________________________      Follow up with:   PCP : Pam Singh MD  Follow-up Information     Follow up With Details Comments Contact Info    Pam Singh MD In 3 days  [address] [place] [address] [address] [place] [address]  [phone]          F/U PCP  F/U Nephrology      Total time in minutes spent coordinating this discharge (includes going over instructions, follow-up, prescriptions, and preparing report for sign off to her PCP) :  35 minutes    Signed:  Мария Teran MD

## 2018-06-15 NOTE — PROGRESS NOTES
1915-Bedside shift change report given to VIRGIL Grimaldo  by VIRGIL Goode. Report included the following information SBAR and Kardex.

## 2018-06-15 NOTE — PROGRESS NOTES
DISCHARGE SUMMARY from Nurse    Patient stable for discharge. I have reviewed discharge instructions with the patient. The patient verbalized understanding. All questions fully answered. Prescriptions and medication handouts given to patient. Telemonitor and PIV removed. No personal belongings, home medications and valuables left at patients bedside//safe. Patient verbalized no complaints.

## 2018-06-15 NOTE — PROGRESS NOTES
Occupational Therapy  Chart reviewed. Pt is at his baseline for ADLs with anticipated discharge home today without any further services. Will sign off.  Shonda Meraz OT

## 2018-06-15 NOTE — PROGRESS NOTES
Reason for Admission:  VIVIEN, Orthostatic dizziness                    RRAT Score: 29                    Plan for utilizing home health: No needs at this time                        Likelihood of Readmission: Low                          Transition of Care Plan: Follow-up Care Appointments    Pt is a 79 yo  male admitted on 6/14/18 for VIVIEN, orthostatic dizziness. Pt lives in a one-story house ([address]3 [address]) with ex-wife and 2 adult step children. Pt reported relationship is healthy, no concerns. Pt's girlfriend Flor De La Torre) lives nearby in [place], stays with her sometimes as well. Pt is independent in ADLs/IADLs to include driving. Pt has no hx of HH, SNF, or acute inpatient rehab. Pt has a CPAP machine at home. Pt to dc home by private vehicle with girlfriend today after finishing his lunch and labs come back per nursing. Pt to transport self or use family support for follow-up care appointments. Pt's preferred Rx is [facility] ([address]). CM met with pt to verify demographic info and complete initial assessment, dc planning. Pt is alert and oriented x 4. PT/OT consulted - however pt discharging home today, no needs at this time. Pt sees Dr. Carolyn Lin (PCP) and Dr. Jerman Reynolds (Nephrology) OP. Specialist attempted to schedule f/u appointments - informed pt must call practice directly to schedule and Nephrology office will contact pt regarding f/u. All information entered into pt AVS.     Pt has no additional CM needs at this time. Floor nurse notified. Care Management Interventions  PCP Verified by CM: Yes  Palliative Care Criteria Met (RRAT>21 & CHF Dx)?: No  Mode of Transport at Discharge:  Other (see comment) (By private vehicle with girlfriend)  Transition of Care Consult (CM Consult): Discharge Planning  Discharge Durable Medical Equipment: No (CPAP at home)  Health Maintenance Reviewed: Yes  Physical Therapy Consult: Yes  Occupational Therapy Consult: Yes  Speech Therapy Consult: No  Current Support Network: Lives with Spouse, Family Lives [place], Other ([address] ([address]) with ex-wife, 2 step kids; GF lives nearby)  Confirm Follow Up Transport: Self  Plan discussed with Pt/Family/Caregiver: Yes  Discharge Location  Discharge Placement: Home with family assistance    [place] [facility], [address] [place] [address]  [phone]

## 2018-06-15 NOTE — PHYSICIAN ADVISORY
Letter of admission status determination     Gertrudis Wan   Age: 80 y.o. MRN: [id number]  [facility]:  [id number]    Date of admission: 6/14/2018    I have reviewed this case as it involves a Medicare patient admitted as inpatient that has not been hospitalized for at least two midnights and has a discharge order placed. Patient's condition and the documented plan of care at the time of admission do not fully support the expectation that the patient would require medically necessary hospital care for two or more midnights. Therefore, I recommend downgrade to Outpatient OBSERVATION. The final decision regarding the patient's hospitalization status depends on the attending physician's judgment.       Modesto Banegas MD, [facility], [address] [facility]  Physician Sam Pena.  [phone]    Yahaira 15, 2018   11:39 AM

## 2018-07-07 LAB — CREATININE, EXTERNAL: 4.01

## 2018-08-17 LAB
25(OH)D3+25(OH)D2 SERPL-MCNC: 10.4 NG/ML (ref 30–100)
ALBUMIN SERPL-MCNC: 3.1 G/DL (ref 3.6–4.8)
ALBUMIN/CREAT UR: 8148 MG/G CREAT (ref 0–30)
ALBUMIN/GLOB SERPL: 1.3 {RATIO} (ref 1.2–2.2)
ALP SERPL-CCNC: 76 IU/L (ref 39–117)
ALT SERPL-CCNC: 20 IU/L (ref 0–44)
AST SERPL-CCNC: 19 IU/L (ref 0–40)
BILIRUB SERPL-MCNC: <0.2 MG/DL (ref 0–1.2)
BUN SERPL-MCNC: 45 MG/DL (ref 8–27)
BUN/CREAT SERPL: 10 (ref 10–24)
CALCIUM SERPL-MCNC: 8.1 MG/DL (ref 8.6–10.2)
CHLORIDE SERPL-SCNC: 103 MMOL/L (ref 96–106)
CHOLEST SERPL-MCNC: 110 MG/DL (ref 100–199)
CO2 SERPL-SCNC: 23 MMOL/L (ref 20–29)
CREAT SERPL-MCNC: 4.55 MG/DL (ref 0.76–1.27)
CREAT UR-MCNC: 56.2 MG/DL
ERYTHROCYTE [DISTWIDTH] IN BLOOD BY AUTOMATED COUNT: 15.9 % (ref 12.3–15.4)
EST. AVERAGE GLUCOSE BLD GHB EST-MCNC: 177 MG/DL
GLOBULIN SER CALC-MCNC: 2.4 G/DL (ref 1.5–4.5)
GLUCOSE SERPL-MCNC: 219 MG/DL (ref 65–99)
HBA1C MFR BLD: 7.8 % (ref 4.8–5.6)
HCT VFR BLD AUTO: 28.2 % (ref 37.5–51)
HDLC SERPL-MCNC: 38 MG/DL
HGB BLD-MCNC: 9.3 G/DL (ref 13–17.7)
INTERPRETATION, 910389: NORMAL
INTERPRETATION: NORMAL
LDLC SERPL CALC-MCNC: 37 MG/DL (ref 0–99)
Lab: NORMAL
MCH RBC QN AUTO: 31.6 PG (ref 26.6–33)
MCHC RBC AUTO-ENTMCNC: 33 G/DL (ref 31.5–35.7)
MCV RBC AUTO: 96 FL (ref 79–97)
MICROALBUMIN UR-MCNC: 4579.2 UG/ML
PDF IMAGE, 910387: NORMAL
PLATELET # BLD AUTO: 296 X10E3/UL (ref 150–379)
POTASSIUM SERPL-SCNC: 3.4 MMOL/L (ref 3.5–5.2)
PROT SERPL-MCNC: 5.5 G/DL (ref 6–8.5)
RBC # BLD AUTO: 2.94 X10E6/UL (ref 4.14–5.8)
SODIUM SERPL-SCNC: 142 MMOL/L (ref 134–144)
TRIGL SERPL-MCNC: 176 MG/DL (ref 0–149)
VLDLC SERPL CALC-MCNC: 35 MG/DL (ref 5–40)
WBC # BLD AUTO: 7.2 X10E3/UL (ref 3.4–10.8)

## 2018-08-23 ENCOUNTER — OFFICE VISIT (OUTPATIENT)
Dept: ENDOCRINOLOGY | Age: 68
End: 2018-08-23

## 2018-08-23 VITALS
HEART RATE: 46 BPM | DIASTOLIC BLOOD PRESSURE: 62 MMHG | BODY MASS INDEX: 27.61 KG/M2 | HEIGHT: 68 IN | SYSTOLIC BLOOD PRESSURE: 166 MMHG | WEIGHT: 182.2 LBS

## 2018-08-23 DIAGNOSIS — E78.5 HYPERLIPIDEMIA LDL GOAL <70: ICD-10-CM

## 2018-08-23 DIAGNOSIS — D62 ACUTE BLOOD LOSS ANEMIA: ICD-10-CM

## 2018-08-23 DIAGNOSIS — I10 ESSENTIAL HYPERTENSION, BENIGN: ICD-10-CM

## 2018-08-23 DIAGNOSIS — E55.9 VITAMIN D DEFICIENCY: ICD-10-CM

## 2018-08-23 DIAGNOSIS — E66.9 OBESITY, CLASS I, BMI 30-34.9: ICD-10-CM

## 2018-08-23 DIAGNOSIS — D35.01 ADENOMA OF RIGHT ADRENAL GLAND: ICD-10-CM

## 2018-08-23 RX ORDER — CLONIDINE HYDROCHLORIDE 0.3 MG/1
TABLET ORAL
Refills: 3 | COMMUNITY
Start: 2018-07-09 | End: 2018-10-22 | Stop reason: SDUPTHER

## 2018-08-23 RX ORDER — FUROSEMIDE 80 MG/1
80 TABLET ORAL 3 TIMES DAILY
Refills: 3 | Status: ON HOLD | COMMUNITY
Start: 2018-06-05 | End: 2019-01-01 | Stop reason: SDUPTHER

## 2018-08-23 RX ORDER — ATORVASTATIN CALCIUM 40 MG/1
40 TABLET, FILM COATED ORAL EVERY EVENING
COMMUNITY

## 2018-08-23 NOTE — PATIENT INSTRUCTIONS
1) Your vitamin D is low at 10 down from 20 at last check and your calcium is low so please be sure to take 2000 of D3 daily. 2) Your A1c is better than last time and likely will continue to improve with watching corn intake. 3) Your cholesterol is excellent even though your lipitor was changed from 80 to 40 so stay on this lower dose. 4) Your creatinine (kidney test) was 4.5 which is up from 4.0 with Dr. Victor Hugo Braxton in 7/18 but stable from June when you were in the ER so continue to follow up with Dr. Victor Hugo Braxton for this.

## 2018-08-23 NOTE — PROGRESS NOTES
Chief Complaint   Patient presents with    Diabetes     pcp and pharmacy confirmed    Diabetic Foot Exam     due     History of Present Illness: Kacie Quijano is a 80 y.o. male here for follow up of diabetes. Weight down 7 lbs since last visit in 2/18. Has been on the lower dose of lipitor 40 mg instead of 80 mg and remains on praluent and thinks his leg cramps are better. Was admitted overnight for dizziness and VIVIEN in 6/18 and creatinine was up to 4.5. Was down to 3.6 on discharge and up to 4.01 in 7/18 with Dr. Corinne Schuller and now back to 4.5 on my labs. Has been off minoxidil and taking clonidine 0.3 mg bid. Home BP readings are mostly in the 350-761U systolic. Mostly takes lasix 80 mg in the morning but occ will take a dose at noon if he has more swelling. Only checking 1-2 times a week and readings have mostly been under 200 but has had more corn this summer and this has raised his sugar over 200 and rarely over 300. States he is getting prepared for peritoneal dialysis. Was apparently told by Dr. Corinne Schuller to stop all OTC meds so he has been off the vitamin D but his calcium is dropping and so is his D level so I advised him to go back on D3 2000 units daily. Current Outpatient Prescriptions   Medication Sig    atorvastatin (LIPITOR) 40 mg tablet Take  by mouth daily.  acetaminophen (TYLENOL) 500 mg tablet Take 1,000 mg by mouth every six (6) hours as needed for Pain.  acetaminophen/diphenhydramine (TYLENOL PM PO) Take 1 Tab by mouth nightly as needed (pain/sleep).  folic acid-vit P5-SAH P18 (FOLTX) 2.5-25-2 mg tablet Take 1 Tab by mouth nightly.  omega-3 fatty acids (FISH OIL CONCENTRATE) 1,000 mg cap Take 1,000 mg by mouth two (2) times a day.  glipiZIDE SR (GLUCOTROL XL) 10 mg CR tablet Take 2 Tabs by mouth daily.  BYSTOLIC 20 mg tablet Take 20 mg by mouth nightly.     PRALUENT PEN 75 mg/mL injector pen INJECT EVERY 2 WEEKS    amLODIPine (NORVASC) 10 mg tablet Take 10 mg by mouth nightly.  ferrous sulfate 325 mg (65 mg iron) cpER Take 1 Tab by mouth two (2) times a day.  tamsulosin (FLOMAX) 0.4 mg capsule Take 0.4 mg by mouth nightly.  clopidogrel (PLAVIX) 75 mg tablet Take 75 mg by mouth nightly.  allopurinol (ZYLOPRIM) 100 mg tablet Take 200 mg by mouth nightly.  cloNIDine HCl (CATAPRES) 0.3 mg tablet TK 1 T PO BID    furosemide (LASIX) 80 mg tablet TK 1 T PO BID FOR 90 DAYS     No current facility-administered medications for this visit. No Known Allergies     Review of Systems:  - Eyes: no blurry vision or double vision  - Cardiovascular: no chest pain  - Respiratory: no shortness of breath  - Musculoskeletal: no myalgias  - Neurological: no numbness/tingling in extremities    Physical Examination:  Blood pressure 166/62, pulse (!) 46, height 5' 8\" (1.727 m), weight 182 lb 3.2 oz (82.6 kg).   - General: pleasant, no distress, good eye contact   - Neck: (+) bilateral carotid bruits  - Cardiovascular: regular, bradycardic, nl s1 and s2, no m/r/g,   - Respiratory: clear bilaterally  - Integumentary: 1+ edema,   - Psychiatric: normal mood and affect    Diabetic foot exam:     Left Foot:   Visual Exam: callous - mild   Pulse DP: 2+ (normal)   Filament test: reduced sensation    Vibratory sensation: diminished      Right Foot:   Visual Exam: callous - mild   Pulse DP: 2+ (normal)   Filament test: reduced sensation    Vibratory sensation: diminished        Data Reviewed:   Component      Latest Ref Rng & Units 8/16/2018 8/16/2018 8/16/2018 8/16/2018           7:46 AM  7:46 AM  7:46 AM  7:46 AM   Glucose      65 - 99 mg/dL   219 (H)    BUN      8 - 27 mg/dL   45 (H)    Creatinine      0.76 - 1.27 mg/dL   4.55 (H)    GFR est non-AA      >59 mL/min/1.73   12 (L)    GFR est AA      >59 mL/min/1.73   14 (L)    BUN/Creatinine ratio      10 - 24   10    Sodium      134 - 144 mmol/L   142    Potassium      3.5 - 5.2 mmol/L   3.4 (L)    Chloride      96 - 106 mmol/L   103 CO2      20 - 29 mmol/L   23    Calcium      8.6 - 10.2 mg/dL   8.1 (L)    Protein, total      6.0 - 8.5 g/dL   5.5 (L)    Albumin      3.6 - 4.8 g/dL   3.1 (L)    GLOBULIN, TOTAL      1.5 - 4.5 g/dL   2.4    A-G Ratio      1.2 - 2.2   1.3    Bilirubin, total      0.0 - 1.2 mg/dL   <0.2    Alk. phosphatase      39 - 117 IU/L   76    AST      0 - 40 IU/L   19    ALT (SGPT)      0 - 44 IU/L   20    WBC      3.4 - 10.8 x10E3/uL  7.2     RBC      4.14 - 5.80 x10E6/uL  2.94 (L)     HGB      13.0 - 17.7 g/dL  9.3 (L)     HCT      37.5 - 51.0 %  28.2 (L)     MCV      79 - 97 fL  96     MCH      26.6 - 33.0 pg  31.6     MCHC      31.5 - 35.7 g/dL  33.0     RDW      12.3 - 15.4 %  15.9 (H)     PLATELET      584 - 442 x10E3/uL  296     Cholesterol, total      100 - 199 mg/dL    110   Triglyceride      0 - 149 mg/dL    176 (H)   HDL Cholesterol      >39 mg/dL    38 (L)   VLDL, calculated      5 - 40 mg/dL    35   LDL, calculated      0 - 99 mg/dL    37   Creatinine, urine      Not Estab. mg/dL 56.2      Microalbumin, urine      Not Estab. ug/mL 4579.2      Microalbumin/Creat. Ratio      0.0 - 30.0 mg/g creat 8148.0 (H)        Component      Latest Ref Rng & Units 8/16/2018 8/16/2018           7:46 AM  7:46 AM   Hemoglobin A1c, (calculated)      4.8 - 5.6 %  7.8 (H)   Estimated average glucose      mg/dL  177   VITAMIN D, 25-HYDROXY      30.0 - 100.0 ng/mL 10.4 (L)        Assessment/Plan:     1.  DM w/o complication type II, uncontrolled wit nephropathy: his most recent Hgb A1c was 7.8% in 8/18 down from 8.9% in 2/18 down from 9.5% in 10/17 up from 9.2% in 7/17 up from 6.1% in 1/17 down from 6.5% in 7/16 down from 6.6% in 2/16 up from 6% in 9/15 down from 6.4% in 4/15 up from 6.3% in 12/14 down from 7.6% in 8/14 up from 8.3% in 3/14 up from 6.9% in November up from 6.3% in July down from 7.3% in Feb 2013 up from 6.7% in October down from 7% in June down from 8.3% in March up from 7.3% in December down from 8% in September down from 9.5% in June, which is the same as in January. A1c coming down but needs to watch portions of corn. Worsening kidney function will likely allow his A1c to continue to improve  - cont glipizide SR 10 mg 2 tabs daily  - check bs once daily at alternating times  - foot exam done 8/18  - optho UTD 11/17--will obtain report  - microalbumin 994 1/11 down to 897 9/11 up to 1383 in 10/12 (possibly due to protein shake) and down to 1083 in 2/13, stable at 1087 in 11/13, down to 453 in 4/15, up to 3402 in 7/16 and 4040 in 7/17 and 5421 in 10/17 and 8148 in 8/18  - check A1c and cmp prior to next visit     2. Unspecified essential hypertension (401.9) his BP was above goal < 140/90 on the right arm which is normally his higher arm. We have now learned that he has HTN on his right arm so we will only use from now on to measure his readings. Will keep everything the same for now. - cont bystolic 10 mg 2 tabs daily  - cont amlodipine 10 mg daily  - lasix 80 mg in the am and on occasion 80 mg at 2pm  - cont clonidine 0.3 mg bid       3. Other and unspecified hyperlipidemia (272.4) Given DM and CAD, Goal LDL < 70, non-HDL < 100, and TG < 150.  his lipids were all at goal in 3/14 and 12/14 and 4/15. LDL up to 84 in 2/16 due to more red meat.  and LDL 95 in 7/16 off the niaspan and with weight gain so hopefully weight loss will help. LDL 85 and  in 1/17.  and non- in 7/17. Lipitor doubled to 40 mg bid and praluent added in 10/17 and TG down to 306 and non-HDL down to 96 in 10/17. I cut back on lipitor to 40 mg daily to help with joint pains but it appears Dr. Sukumar Glover kept him on 80 mg and LDL 29 in 2/18 and still having joint pains so asked him again to cut back to 40 mg given how low his total cholesterol and LDL are and he did and LDL 37 in 8/18  - con lipitor 40 mg daily  - cont praluent 75 mg weekly  - check lipids prior to next visit     4.   Iron deficiency anemia:  Hgb 9.3 in 8/18 up from 8.8 in 2/18 down from 11.5 in 10/17 up from 11.4 in 7/17 up from 10.6 in 1/17 down from 11.3 in 7/16 up from 10.8 in 2/16 down from 11.4 in 9/15 up from 10.9 in 4/15 down from 11.4 in 12/14 down from 11.6 in 8/14 up from 11.1 in 3/14 from 12.5 in 11/13 up from 11.8 in 7/13. Has been on higher dose of iron 1 tab bid since 8/14   - cont iron twice daily  - check cbc w/o diff prior to next visit    5. Vitamin D deficiency: level was 29 in 9/15 on 2000 units of D3 daily so wanted to increase to 3000 units daily but his calcium was 10.6 in 9/15 and Dr. Lorena Lacy advised cutting back on his dose and he has been taking 1000 units daily and level down to 19 in 2/16 but calcium back to normal at 9.6. Level 20 in 7/16 but calcium 10.2 in 7/16. Now off 1000 units and level down to 18 in 1/17 so wanted him to restart this and he apparently did and then was told to stop by Dr. Lorena Lacy and down to 13.1 in 7/17 but calcium is normal at 9.7 so had him go back to this but level still 13 in 10/17 so increased to 2000 units daily and up to 20.5 in 2/18 and calcium still normal so stayed on this dose. Down to 10.4 in 8/18 as he stopped supplement this spring so will have him restart as calcium level is trending down. - cont vitamin D 2000 units daily  - check Vitamin D 25-OH level prior to next visit    6. Class I Obesity: Tried topamax as I wanted to avoid phentermine given his vascular disease but didn't feel well on this so stopped after 2-3 weeks. Had lost 13 lbs from 7/16 to 1/17 with doing a nutrimost program but had stopped this in 1/17 when his creatinine was high and I don't know if this could have contributed to higher creatinine. Wt up 3 lbs by 7/17 and 6 lbs by 10/17 likely due to fluid but down 10 lbs by 2/18 and 7 lbs by 8/18  - diet and exercise    7. Right adrenal gland adenoma: he was found to have a 1.9 cm right adrenal incidentaloma on MRI in 2/17.   His dex suppressed cortisol was 1.9 and plasma mets were normal and josue/renin ratio were normal in 8/17 so he doesn't appear to have a functioning adrenal gland.  - will need a repeat CT scan in 6-12 months to document stability in size of adenoma, if no change at that time, no need for further repeat imaging    Patient Instructions   1) Your vitamin D is low at 10 down from 20 at last check and your calcium is low so please be sure to take 2000 of D3 daily. 2) Your A1c is better than last time and likely will continue to improve with watching corn intake. 3) Your cholesterol is excellent even though your lipitor was changed from 80 to 40 so stay on this lower dose. 4) Your creatinine (kidney test) was 4.5 which is up from 4.0 with Dr. Nayeli Leal in 7/18 but stable from June when you were in the ER so continue to follow up with Dr. Nayeli Leal for this. Follow-up Disposition:  Return in about 5 months (around 1/23/2019).     Copy sent to:  Dr. Mk Myrick [phone]  Dr. Cata Gordon [phone]  Dr. Nayeli Leal [phone]  Dr. Ruperto Huber

## 2018-08-23 NOTE — MR AVS SNAPSHOT
[address] [facility] [address] [address] [phone] Patient: Yulisa Negron 
MRN: [id number] PYP:9/61/0106 Visit Information Date & Time Provider Department Dept. Phone Encounter #  
 8/23/2018  8:50 AM Joel Harrington, [address] [place] [address] Diabetes and Endocrinology [phone] Follow-up Instructions Return in about 5 months (around 1/23/2019). Upcoming Health Maintenance Date Due Hepatitis C Screening 1950 DTaP/Tdap/Td series (1 - Tdap) 4/25/1972 FOBT Q 1 YEAR AGE 50-75 4/25/2001 ZOSTER VACCINE AGE 60> 2/25/2011 EYE EXAM RETINAL OR DILATED Q1 10/21/2015 GLAUCOMA SCREENING Q2Y 4/25/2016 Pneumococcal 65+ Low/Medium Risk (1 of 2 - PCV13) 4/25/2016 MEDICARE YEARLY EXAM 3/14/2018 FOOT EXAM Q1 7/27/2018 Influenza Age 5 to Adult 8/1/2018 HEMOGLOBIN A1C Q6M 2/16/2019 MICROALBUMIN Q1 8/16/2019 LIPID PANEL Q1 8/16/2019 Allergies as of 8/23/2018  Review Complete On: 8/23/2018 By: Joel Harrington MD  
 No Known Allergies Current Immunizations  Never Reviewed No immunizations on file. Not reviewed this visit You Were Diagnosed With   
  
 Codes Comments Uncontrolled type 2 diabetes mellitus with diabetic nephropathy, without long-term current use of insulin ([facility] [address]    -  Primary ICD-10-CM: E11.21, E11.65 ICD-9-CM: 250.42, 583.81 Vitamin D deficiency     ICD-10-CM: E55.9 ICD-9-CM: 268.9 Acute blood loss anemia     ICD-10-CM: D62 
ICD-9-CM: 285.1 Hyperlipidemia LDL goal <70     ICD-10-CM: E78.5 ICD-9-CM: 272.4 Essential hypertension, benign     ICD-10-CM: I10 
ICD-9-CM: 401.1 Adenoma of right adrenal gland     ICD-10-CM: D35.01 
ICD-9-CM: 227.0 Obesity, Class I, BMI 30-34.9     ICD-10-CM: E66.9 ICD-9-CM: 278.00 Vitals BP Pulse Height(growth percentile) Weight(growth percentile) BMI Smoking Status 166/62 (!) 46 5' 8\" (1.727 m) 182 lb 3.2 oz (82.6 kg) 27.7 kg/m2 Former Smoker Vitals History BMI and BSA Data Body Mass Index Body Surface Area  
 27.7 kg/m 2 1.99 m 2 Preferred Pharmacy Pharmacy Name Phone [facility], [address] AT [address] [place] [address] Drive [phone] Your Updated Medication List  
  
   
This list is accurate as of 8/23/18  9:26 AM.  Always use your most recent med list.  
  
  
  
  
 acetaminophen 500 mg tablet Commonly known as:  TYLENOL Take 1,000 mg by mouth every six (6) hours as needed for Pain. allopurinol 100 mg tablet Commonly known as:  Katheen Dears Take 200 mg by mouth nightly. amLODIPine 10 mg tablet Commonly known as:  Jo-Ann Croon Take 10 mg by mouth nightly. atorvastatin 40 mg tablet Commonly known as:  LIPITOR Take  by mouth daily. BYSTOLIC 20 mg tablet Generic drug:  nebivolol Take 20 mg by mouth nightly. cloNIDine HCl 0.3 mg tablet Commonly known as:  CATAPRES TK 1 T PO BID  
  
 ferrous sulfate 325 mg (65 mg iron) Cper Take 1 Tab by mouth two (2) times a day. FISH OIL CONCENTRATE 1,000 mg Cap Generic drug:  omega-3 fatty acids Take 1,000 mg by mouth two (2) times a day. FLOMAX 0.4 mg capsule Generic drug:  tamsulosin Take 0.4 mg by mouth nightly. folic acid-vit S2-MTX V35 2.5-25-2 mg tablet Commonly known as:  Dortha Brothers Take 1 Tab by mouth nightly. furosemide 80 mg tablet Commonly known as:  LASIX TK 1 T PO BID FOR 90 DAYS  
  
 glipiZIDE SR 10 mg CR tablet Commonly known as:  GLUCOTROL XL Take 2 Tabs by mouth daily. PLAVIX 75 mg Tab Generic drug:  clopidogrel Take 75 mg by mouth nightly. PRALUENT PEN 75 mg/mL injector pen Generic drug:  alirocumab INJECT EVERY 2 WEEKS TYLENOL PM PO Take 1 Tab by mouth nightly as needed (pain/sleep). We Performed the Following CBC W/O DIFF [02578 CPT(R)] HEMOGLOBIN A1C WITH EAG [77114 CPT(R)] LIPID PANEL [02436 CPT(R)] METABOLIC PANEL, COMPREHENSIVE [12443 CPT(R)] VITAMIN D, 25 HYDROXY [id number] CPT(R)] Follow-up Instructions Return in about 5 months (around 1/23/2019). Patient Instructions 1) Your vitamin D is low at 10 down from 20 at last check and your calcium is low so please be sure to take 2000 of D3 daily. 2) Your A1c is better than last time and likely will continue to improve with watching corn intake. 3) Your cholesterol is excellent even though your lipitor was changed from 80 to 40 so stay on this lower dose. 4) Your creatinine (kidney test) was 4.5 which is from 4.0 with Dr. Dilshad Washington in 7/18 but stable from June when you were in the ER so continue to follow up with Dr. Dilshad Washington for this. Introducing [facility]! Dear Lizz Mckeon: Thank you for requesting a [employer] account. Our records indicate that you already have an active [employer] account. You can access your account anytime at [email or web address] Did you know that you can access your hospital and ER discharge instructions at any time in [employer]? You can also review all of your test results from your hospital stay or ER visit. Additional Information If you have questions, please visit the Frequently Asked Questions section of the [employer] website at [email or web address] Remember, [employer] is NOT to be used for urgent needs. For medical emergencies, dial 911. Now available from your iPhone and Android! Please provide this summary of care documentation to your next provider. Your primary care clinician is listed as Cori Enrique. If you have any questions after today's visit, please call [phone].

## 2018-09-04 ENCOUNTER — HOSPITAL ENCOUNTER (OUTPATIENT)
Dept: PREADMISSION TESTING | Age: 68
Discharge: HOME | End: 2018-09-04
Attending: SURGERY
Payer: MEDICARE

## 2018-09-04 ENCOUNTER — HOSPITAL ENCOUNTER (OUTPATIENT)
Dept: GENERAL RADIOLOGY | Age: 68
Discharge: HOME | End: 2018-09-04
Attending: SURGERY
Payer: MEDICARE

## 2018-09-04 VITALS
BODY MASS INDEX: 28.1 KG/M2 | DIASTOLIC BLOOD PRESSURE: 52 MMHG | SYSTOLIC BLOOD PRESSURE: 100 MMHG | HEIGHT: 68 IN | TEMPERATURE: 97.7 F | RESPIRATION RATE: 18 BRPM | WEIGHT: 185.41 LBS | HEART RATE: 50 BPM | OXYGEN SATURATION: 98 %

## 2018-09-04 LAB
ALBUMIN SERPL-MCNC: 2.2 G/DL (ref 3.5–5)
ALBUMIN/GLOB SERPL: 0.5 {RATIO} (ref 1.1–2.2)
ALP SERPL-CCNC: 74 U/L (ref 45–117)
ALT SERPL-CCNC: 27 U/L (ref 12–78)
ANION GAP SERPL CALC-SCNC: 12 MMOL/L (ref 5–15)
AST SERPL-CCNC: 18 U/L (ref 15–37)
BILIRUB SERPL-MCNC: 0.3 MG/DL (ref 0.2–1)
BUN SERPL-MCNC: 61 MG/DL (ref 6–20)
BUN/CREAT SERPL: 12 (ref 12–20)
CALCIUM SERPL-MCNC: 8.9 MG/DL (ref 8.5–10.1)
CHLORIDE SERPL-SCNC: 102 MMOL/L (ref 97–108)
CO2 SERPL-SCNC: 24 MMOL/L (ref 21–32)
CREAT SERPL-MCNC: 4.94 MG/DL (ref 0.7–1.3)
ERYTHROCYTE [DISTWIDTH] IN BLOOD BY AUTOMATED COUNT: 14.8 % (ref 11.5–14.5)
GLOBULIN SER CALC-MCNC: 4.2 G/DL (ref 2–4)
GLUCOSE SERPL-MCNC: 421 MG/DL (ref 65–100)
HCT VFR BLD AUTO: 29.1 % (ref 36.6–50.3)
HGB BLD-MCNC: 9.6 G/DL (ref 12.1–17)
MCH RBC QN AUTO: 31.8 PG (ref 26–34)
MCHC RBC AUTO-ENTMCNC: 33 G/DL (ref 30–36.5)
MCV RBC AUTO: 96.4 FL (ref 80–99)
NRBC # BLD: 0 K/UL (ref 0–0.01)
NRBC BLD-RTO: 0 PER 100 WBC
PLATELET # BLD AUTO: 301 K/UL (ref 150–400)
PMV BLD AUTO: 10.9 FL (ref 8.9–12.9)
POTASSIUM SERPL-SCNC: 2.8 MMOL/L (ref 3.5–5.1)
PROT SERPL-MCNC: 6.4 G/DL (ref 6.4–8.2)
RBC # BLD AUTO: 3.02 M/UL (ref 4.1–5.7)
SODIUM SERPL-SCNC: 138 MMOL/L (ref 136–145)
WBC # BLD AUTO: 9.3 K/UL (ref 4.1–11.1)

## 2018-09-04 PROCEDURE — 36415 COLL VENOUS BLD VENIPUNCTURE: CPT | Performed by: SURGERY

## 2018-09-04 PROCEDURE — 85027 COMPLETE CBC AUTOMATED: CPT | Performed by: SURGERY

## 2018-09-04 PROCEDURE — 80053 COMPREHEN METABOLIC PANEL: CPT | Performed by: SURGERY

## 2018-09-04 PROCEDURE — 71046 X-RAY EXAM CHEST 2 VIEWS: CPT

## 2018-09-04 RX ORDER — POTASSIUM CHLORIDE 750 MG/1
10 TABLET, FILM COATED, EXTENDED RELEASE ORAL 2 TIMES DAILY
COMMUNITY
Start: 2018-09-04 | End: 2018-09-07

## 2018-09-04 RX ORDER — CEFAZOLIN SODIUM/WATER 2 G/20 ML
2 SYRINGE (ML) INTRAVENOUS ONCE
Status: CANCELLED | OUTPATIENT
Start: 2018-09-07 | End: 2018-09-07

## 2018-09-04 RX ORDER — SODIUM CHLORIDE, SODIUM LACTATE, POTASSIUM CHLORIDE, CALCIUM CHLORIDE 600; 310; 30; 20 MG/100ML; MG/100ML; MG/100ML; MG/100ML
25 INJECTION, SOLUTION INTRAVENOUS CONTINUOUS
Status: CANCELLED | OUTPATIENT
Start: 2018-09-07

## 2018-09-04 NOTE — ADVANCED PRACTICE NURSE
K+ 2.8 in PAT assessment. PC to PCP office regarding results and recommendations for pt prior to surgery. Pt on Lasix 80 mg daily. Results faxed to PCP office with confirmation of fax received.

## 2018-09-04 NOTE — ADVANCED PRACTICE NURSE
Pt to be treated with KCL 10 mEq BID x 3 days by PCP, Dr. Sharita Cabrera. PCP office to contact pt directly. Requests repeat K+ level on DOS. PTA medlist updated. Results faxed to Dr. Nikole Cervantes for review. Confirmation of fax received.

## 2018-09-04 NOTE — PERIOP NOTES
[facility] Preoperative Instructions Surgery Date 9/7/2018          Time of Arrival 8:00 a.m. 
 
1. On the day of your surgery, please report to the Surgical Services Registration Desk and sign in at your designated time. The Surgery Center is located to the right of the Emergency Room. 2. You must have someone with you to drive you home. You should not drive a car for 24 hours following surgery. Please make arrangements for a friend or family member to stay with you for the first 24 hours after your surgery. 3. Do not have anything to eat or drink (including water, gum, mints, coffee, juice) after midnight. ?This may not apply to medications prescribed by your physician. ?(Please note below the special instructions with medications to take the morning of your procedure.) 4. We recommend you do not drink any alcoholic beverages for 24 hours before and after your surgery. 5. Contact your surgeons office for instructions on the following medications: non-steroidal anti-inflammatory drugs (i.e. Advil, Aleve), vitamins, and supplements. (Some surgeons will want you to stop these medications prior to surgery and others may allow you to take them) **If you are currently taking Plavix, Coumadin, Aspirin and/or other blood-thinning agents, contact your surgeon for instructions. ** Your surgeon will partner with the physician prescribing these medications to determine if it is safe to stop or if you need to continue taking. Please do not stop taking these medications without instructions from your surgeon 6. Wear comfortable clothes. Wear glasses instead of contacts. Do not bring any money or jewelry. Please bring picture ID, insurance card, and any prearranged co-payment or hospital payment. Do not wear make-up, particularly mascara the morning of your surgery. Do not wear nail polish, particularly if you are having foot /hand surgery.   Wear your hair loose or down, no ponytails, buns, terence pins or clips. All body piercings must be removed. Please shower with antibacterial soap for three consecutive days before and on the morning of surgery, but do not apply any lotions, powders or deodorants after the shower on the day of surgery. Please use a fresh towels after each shower. Please sleep in clean clothes and change bed linens the night before surgery. Please do not shave for 48 hours prior to surgery. Shaving of the face is acceptable. 7. You should understand that if you do not follow these instructions your surgery may be cancelled. If your physical condition changes (I.e. fever, cold or flu) please contact your surgeon as soon as possible. 8. It is important that you be on time. If a situation occurs where you may be late, please call [phone] (OR Holding Area). 9. If you have any questions and or problems, please call [phone] (Pre-admission Testing). 10. Your surgery time may be subject to change. You will receive a phone call the evening prior if your time changes. 11.  If having outpatient surgery, you must have someone to drive you here, stay with you during the duration of your stay, and to drive you home at time of discharge. 12.   In an effort to improve the efficiency, privacy, and safety for all of our Pre-op patients visitors are not allowed in the Holding area. Once you arrive and are registered your family/visitors will be asked to remain in the waiting room. The Pre-op staff will get you from the Surgical Waiting Area and will explain to you and your family/visitors that the Pre-op phase is beginning. The staff will answer any questions and provide instructions for tracking of the patient, by use of the existing tracking number and color-coded status board in the waiting room.   At this time the staff will also ask for your designated spokesperson information in the event that the physician or staff need to provide an update or obtain any pertinent information. The designated spokesperson will be notified if the physician needs to speak to family during the pre-operative phase. If at any time your family/visitors has questions or concerns they may approach the volunteer desk in the waiting area for assistance. Special Instructions: Follow Plavix instructions from Dr. Magaly Jaramillo. MEDICATIONS TO TAKE THE MORNING OF SURGERY WITH A SIP OF WATER: clonidine I understand a pre-operative phone call will be made to verify my surgery time. In the event that I am not available, I give permission for a message to be left on my answering service and/or with another person? Yes [phone] 
 
 
 
 ___________________      __________   _________ 
  (Signature of Patient)             (Witness)                (Date and Time)

## 2018-09-06 NOTE — PERIOP NOTES
Cardiology notes and EKG from 9/4/2018 with Dr. Doc Green received and placed on patient's paper chart.

## 2018-09-07 ENCOUNTER — ANESTHESIA (OUTPATIENT)
Dept: SURGERY | Age: 68
End: 2018-09-07
Payer: MEDICARE

## 2018-09-07 ENCOUNTER — ANESTHESIA EVENT (OUTPATIENT)
Dept: SURGERY | Age: 68
End: 2018-09-07
Payer: MEDICARE

## 2018-09-07 ENCOUNTER — HOSPITAL ENCOUNTER (OUTPATIENT)
Age: 68
Setting detail: OUTPATIENT SURGERY
Discharge: HOME | End: 2018-09-07
Attending: SURGERY | Admitting: SURGERY
Payer: MEDICARE

## 2018-09-07 VITALS
RESPIRATION RATE: 18 BRPM | BODY MASS INDEX: 28.03 KG/M2 | HEIGHT: 68 IN | TEMPERATURE: 98 F | HEART RATE: 69 BPM | DIASTOLIC BLOOD PRESSURE: 61 MMHG | OXYGEN SATURATION: 99 % | SYSTOLIC BLOOD PRESSURE: 127 MMHG | WEIGHT: 184.97 LBS

## 2018-09-07 DIAGNOSIS — N18.5 CKD (CHRONIC KIDNEY DISEASE) STAGE 5, GFR LESS THAN 15 ML/MIN (HCC): Primary | ICD-10-CM

## 2018-09-07 LAB
ANION GAP BLD CALC-SCNC: 15 MMOL/L (ref 10–20)
BUN BLD-MCNC: 53 MG/DL (ref 9–20)
CA-I BLD-MCNC: 1.11 MMOL/L (ref 1.12–1.32)
CHLORIDE BLD-SCNC: 103 MMOL/L (ref 98–107)
CO2 BLD-SCNC: 26 MMOL/L (ref 21–32)
CREAT BLD-MCNC: 4.8 MG/DL (ref 0.6–1.3)
GLUCOSE BLD STRIP.AUTO-MCNC: 245 MG/DL (ref 65–100)
GLUCOSE BLD-MCNC: 292 MG/DL (ref 65–100)
HCT VFR BLD CALC: 28 % (ref 36.6–50.3)
POTASSIUM BLD-SCNC: 3.3 MMOL/L (ref 3.5–5.1)
SERVICE CMNT-IMP: ABNORMAL
SERVICE CMNT-IMP: ABNORMAL
SODIUM BLD-SCNC: 140 MMOL/L (ref 136–145)

## 2018-09-07 PROCEDURE — 74011250636 HC RX REV CODE- 250/636: Performed by: SURGERY

## 2018-09-07 PROCEDURE — 74011000272 HC RX REV CODE- 272: Performed by: SURGERY

## 2018-09-07 PROCEDURE — 80047 BASIC METABLC PNL IONIZED CA: CPT

## 2018-09-07 PROCEDURE — 76060000033 HC ANESTHESIA 1 TO 1.5 HR: Performed by: SURGERY

## 2018-09-07 PROCEDURE — C1750 CATH, HEMODIALYSIS,LONG-TERM: HCPCS | Performed by: SURGERY

## 2018-09-07 PROCEDURE — 74011250636 HC RX REV CODE- 250/636

## 2018-09-07 PROCEDURE — 77030002933 HC SUT MCRYL J&J -A: Performed by: SURGERY

## 2018-09-07 PROCEDURE — 74011000250 HC RX REV CODE- 250: Performed by: SURGERY

## 2018-09-07 PROCEDURE — 74011250636 HC RX REV CODE- 250/636: Performed by: ANESTHESIOLOGY

## 2018-09-07 PROCEDURE — 77030026438 HC STYL ET INTUB CARD -A: Performed by: ANESTHESIOLOGY

## 2018-09-07 PROCEDURE — 82962 GLUCOSE BLOOD TEST: CPT

## 2018-09-07 PROCEDURE — 76210000016 HC OR PH I REC 1 TO 1.5 HR: Performed by: SURGERY

## 2018-09-07 PROCEDURE — 77030011640 HC PAD GRND REM COVD -A: Performed by: SURGERY

## 2018-09-07 PROCEDURE — 77030020782 HC GWN BAIR PAWS FLX 3M -B

## 2018-09-07 PROCEDURE — 74011000250 HC RX REV CODE- 250

## 2018-09-07 PROCEDURE — 74011250637 HC RX REV CODE- 250/637

## 2018-09-07 PROCEDURE — 77030008684 HC TU ET CUF COVD -B: Performed by: ANESTHESIOLOGY

## 2018-09-07 PROCEDURE — 77030031139 HC SUT VCRL2 J&J -A: Performed by: SURGERY

## 2018-09-07 PROCEDURE — 77030019908 HC STETH ESOPH SIMS -A: Performed by: ANESTHESIOLOGY

## 2018-09-07 PROCEDURE — 76010000149 HC OR TIME 1 TO 1.5 HR: Performed by: SURGERY

## 2018-09-07 PROCEDURE — 77030039266 HC ADH SKN EXOFIN S2SG -A: Performed by: SURGERY

## 2018-09-07 PROCEDURE — 76210000020 HC REC RM PH II FIRST 0.5 HR: Performed by: SURGERY

## 2018-09-07 PROCEDURE — 74011636637 HC RX REV CODE- 636/637: Performed by: ANESTHESIOLOGY

## 2018-09-07 PROCEDURE — 77030004495 HC ADPT PERI DLYS BAXT -C: Performed by: SURGERY

## 2018-09-07 PROCEDURE — 77030002986 HC SUT PROL J&J -A: Performed by: SURGERY

## 2018-09-07 RX ORDER — FENTANYL CITRATE 50 UG/ML
INJECTION, SOLUTION INTRAMUSCULAR; INTRAVENOUS
Status: COMPLETED
Start: 2018-09-07 | End: 2018-09-07

## 2018-09-07 RX ORDER — FENTANYL CITRATE 50 UG/ML
25 INJECTION, SOLUTION INTRAMUSCULAR; INTRAVENOUS
Status: COMPLETED | OUTPATIENT
Start: 2018-09-07 | End: 2018-09-07

## 2018-09-07 RX ORDER — EPHEDRINE SULFATE 50 MG/ML
INJECTION, SOLUTION INTRAVENOUS AS NEEDED
Status: DISCONTINUED | OUTPATIENT
Start: 2018-09-07 | End: 2018-09-07 | Stop reason: HOSPADM

## 2018-09-07 RX ORDER — ONDANSETRON 2 MG/ML
4 INJECTION INTRAMUSCULAR; INTRAVENOUS AS NEEDED
Status: DISCONTINUED | OUTPATIENT
Start: 2018-09-07 | End: 2018-09-07 | Stop reason: HOSPADM

## 2018-09-07 RX ORDER — SODIUM CHLORIDE, SODIUM LACTATE, POTASSIUM CHLORIDE, CALCIUM CHLORIDE 600; 310; 30; 20 MG/100ML; MG/100ML; MG/100ML; MG/100ML
25 INJECTION, SOLUTION INTRAVENOUS CONTINUOUS
Status: DISCONTINUED | OUTPATIENT
Start: 2018-09-07 | End: 2018-09-07 | Stop reason: HOSPADM

## 2018-09-07 RX ORDER — SODIUM CHLORIDE 9 MG/ML
INJECTION, SOLUTION INTRAVENOUS
Status: DISCONTINUED | OUTPATIENT
Start: 2018-09-07 | End: 2018-09-07 | Stop reason: HOSPADM

## 2018-09-07 RX ORDER — SODIUM CHLORIDE 0.9 % (FLUSH) 0.9 %
5-10 SYRINGE (ML) INJECTION EVERY 8 HOURS
Status: CANCELLED | OUTPATIENT
Start: 2018-09-07

## 2018-09-07 RX ORDER — MORPHINE SULFATE 10 MG/ML
2 INJECTION, SOLUTION INTRAMUSCULAR; INTRAVENOUS
Status: DISCONTINUED | OUTPATIENT
Start: 2018-09-07 | End: 2018-09-07 | Stop reason: HOSPADM

## 2018-09-07 RX ORDER — LIDOCAINE HYDROCHLORIDE 20 MG/ML
INJECTION, SOLUTION EPIDURAL; INFILTRATION; INTRACAUDAL; PERINEURAL AS NEEDED
Status: DISCONTINUED | OUTPATIENT
Start: 2018-09-07 | End: 2018-09-07 | Stop reason: HOSPADM

## 2018-09-07 RX ORDER — GLYCOPYRROLATE 0.2 MG/ML
INJECTION INTRAMUSCULAR; INTRAVENOUS AS NEEDED
Status: DISCONTINUED | OUTPATIENT
Start: 2018-09-07 | End: 2018-09-07 | Stop reason: HOSPADM

## 2018-09-07 RX ORDER — SODIUM CHLORIDE, SODIUM LACTATE, POTASSIUM CHLORIDE, CALCIUM CHLORIDE 600; 310; 30; 20 MG/100ML; MG/100ML; MG/100ML; MG/100ML
25 INJECTION, SOLUTION INTRAVENOUS CONTINUOUS
Status: CANCELLED | OUTPATIENT
Start: 2018-09-07 | End: 2018-09-08

## 2018-09-07 RX ORDER — SODIUM CHLORIDE 0.9 % (FLUSH) 0.9 %
5-10 SYRINGE (ML) INJECTION AS NEEDED
Status: CANCELLED | OUTPATIENT
Start: 2018-09-07

## 2018-09-07 RX ORDER — PROPOFOL 10 MG/ML
INJECTION, EMULSION INTRAVENOUS AS NEEDED
Status: DISCONTINUED | OUTPATIENT
Start: 2018-09-07 | End: 2018-09-07 | Stop reason: HOSPADM

## 2018-09-07 RX ORDER — DIPHENHYDRAMINE HYDROCHLORIDE 50 MG/ML
12.5 INJECTION, SOLUTION INTRAMUSCULAR; INTRAVENOUS AS NEEDED
Status: DISCONTINUED | OUTPATIENT
Start: 2018-09-07 | End: 2018-09-07 | Stop reason: HOSPADM

## 2018-09-07 RX ORDER — ROCURONIUM BROMIDE 10 MG/ML
INJECTION, SOLUTION INTRAVENOUS AS NEEDED
Status: DISCONTINUED | OUTPATIENT
Start: 2018-09-07 | End: 2018-09-07 | Stop reason: HOSPADM

## 2018-09-07 RX ORDER — ONDANSETRON 2 MG/ML
INJECTION INTRAMUSCULAR; INTRAVENOUS AS NEEDED
Status: DISCONTINUED | OUTPATIENT
Start: 2018-09-07 | End: 2018-09-07 | Stop reason: HOSPADM

## 2018-09-07 RX ORDER — SODIUM CHLORIDE 9 MG/ML
25 INJECTION, SOLUTION INTRAVENOUS ONCE
Status: COMPLETED | OUTPATIENT
Start: 2018-09-07 | End: 2018-09-07

## 2018-09-07 RX ORDER — MIDAZOLAM HYDROCHLORIDE 1 MG/ML
INJECTION, SOLUTION INTRAMUSCULAR; INTRAVENOUS AS NEEDED
Status: DISCONTINUED | OUTPATIENT
Start: 2018-09-07 | End: 2018-09-07 | Stop reason: HOSPADM

## 2018-09-07 RX ORDER — SUCCINYLCHOLINE CHLORIDE 20 MG/ML
INJECTION INTRAMUSCULAR; INTRAVENOUS AS NEEDED
Status: DISCONTINUED | OUTPATIENT
Start: 2018-09-07 | End: 2018-09-07 | Stop reason: HOSPADM

## 2018-09-07 RX ORDER — SODIUM CHLORIDE 0.9 % (FLUSH) 0.9 %
5-10 SYRINGE (ML) INJECTION AS NEEDED
Status: DISCONTINUED | OUTPATIENT
Start: 2018-09-07 | End: 2018-09-07 | Stop reason: HOSPADM

## 2018-09-07 RX ORDER — HYDROMORPHONE HYDROCHLORIDE 1 MG/ML
.2-.5 INJECTION, SOLUTION INTRAMUSCULAR; INTRAVENOUS; SUBCUTANEOUS
Status: DISCONTINUED | OUTPATIENT
Start: 2018-09-07 | End: 2018-09-07 | Stop reason: HOSPADM

## 2018-09-07 RX ORDER — OXYCODONE AND ACETAMINOPHEN 5; 325 MG/1; MG/1
1-2 TABLET ORAL
Qty: 30 TAB | Refills: 0 | Status: SHIPPED | OUTPATIENT
Start: 2018-09-07 | End: 2019-01-21

## 2018-09-07 RX ORDER — CEFAZOLIN SODIUM/WATER 2 G/20 ML
2 SYRINGE (ML) INTRAVENOUS ONCE
Status: COMPLETED | OUTPATIENT
Start: 2018-09-07 | End: 2018-09-07

## 2018-09-07 RX ORDER — FENTANYL CITRATE 50 UG/ML
INJECTION, SOLUTION INTRAMUSCULAR; INTRAVENOUS AS NEEDED
Status: DISCONTINUED | OUTPATIENT
Start: 2018-09-07 | End: 2018-09-07 | Stop reason: HOSPADM

## 2018-09-07 RX ORDER — OXYCODONE AND ACETAMINOPHEN 5; 325 MG/1; MG/1
TABLET ORAL
Status: COMPLETED
Start: 2018-09-07 | End: 2018-09-07

## 2018-09-07 RX ORDER — OXYCODONE AND ACETAMINOPHEN 5; 325 MG/1; MG/1
1-2 TABLET ORAL ONCE
Status: COMPLETED | OUTPATIENT
Start: 2018-09-07 | End: 2018-09-07

## 2018-09-07 RX ADMIN — FENTANYL CITRATE 25 MCG: 50 INJECTION, SOLUTION INTRAMUSCULAR; INTRAVENOUS at 13:05

## 2018-09-07 RX ADMIN — FENTANYL CITRATE 25 MCG: 50 INJECTION, SOLUTION INTRAMUSCULAR; INTRAVENOUS at 12:40

## 2018-09-07 RX ADMIN — Medication 2 G: at 11:11

## 2018-09-07 RX ADMIN — INSULIN HUMAN 6 UNITS: 100 INJECTION, SOLUTION PARENTERAL at 09:44

## 2018-09-07 RX ADMIN — EPHEDRINE SULFATE 10 MG: 50 INJECTION, SOLUTION INTRAVENOUS at 11:20

## 2018-09-07 RX ADMIN — SUCCINYLCHOLINE CHLORIDE 120 MG: 20 INJECTION INTRAMUSCULAR; INTRAVENOUS at 11:06

## 2018-09-07 RX ADMIN — FENTANYL CITRATE 50 MCG: 50 INJECTION, SOLUTION INTRAMUSCULAR; INTRAVENOUS at 12:13

## 2018-09-07 RX ADMIN — SODIUM CHLORIDE 25 ML/HR: 900 INJECTION, SOLUTION INTRAVENOUS at 09:00

## 2018-09-07 RX ADMIN — PROPOFOL 150 MG: 10 INJECTION, EMULSION INTRAVENOUS at 11:06

## 2018-09-07 RX ADMIN — FENTANYL CITRATE 25 MCG: 50 INJECTION, SOLUTION INTRAMUSCULAR; INTRAVENOUS at 12:35

## 2018-09-07 RX ADMIN — FENTANYL CITRATE 25 MCG: 50 INJECTION, SOLUTION INTRAMUSCULAR; INTRAVENOUS at 12:25

## 2018-09-07 RX ADMIN — FENTANYL CITRATE 25 MCG: 50 INJECTION, SOLUTION INTRAMUSCULAR; INTRAVENOUS at 12:55

## 2018-09-07 RX ADMIN — OXYCODONE AND ACETAMINOPHEN 2 TABLET: 5; 325 TABLET ORAL at 13:39

## 2018-09-07 RX ADMIN — FENTANYL CITRATE 25 MCG: 50 INJECTION, SOLUTION INTRAMUSCULAR; INTRAVENOUS at 11:28

## 2018-09-07 RX ADMIN — GLYCOPYRROLATE 0.2 MG: 0.2 INJECTION INTRAMUSCULAR; INTRAVENOUS at 11:19

## 2018-09-07 RX ADMIN — ONDANSETRON 4 MG: 2 INJECTION INTRAMUSCULAR; INTRAVENOUS at 11:40

## 2018-09-07 RX ADMIN — LIDOCAINE HYDROCHLORIDE 80 MG: 20 INJECTION, SOLUTION EPIDURAL; INFILTRATION; INTRACAUDAL; PERINEURAL at 11:06

## 2018-09-07 RX ADMIN — MIDAZOLAM HYDROCHLORIDE 2 MG: 1 INJECTION, SOLUTION INTRAMUSCULAR; INTRAVENOUS at 10:59

## 2018-09-07 RX ADMIN — FENTANYL CITRATE 25 MCG: 50 INJECTION, SOLUTION INTRAMUSCULAR; INTRAVENOUS at 13:10

## 2018-09-07 RX ADMIN — ROCURONIUM BROMIDE 5 MG: 10 INJECTION, SOLUTION INTRAVENOUS at 11:06

## 2018-09-07 RX ADMIN — SODIUM CHLORIDE: 9 INJECTION, SOLUTION INTRAVENOUS at 10:30

## 2018-09-07 RX ADMIN — FENTANYL CITRATE 25 MCG: 50 INJECTION, SOLUTION INTRAMUSCULAR; INTRAVENOUS at 13:00

## 2018-09-07 RX ADMIN — OXYCODONE HYDROCHLORIDE AND ACETAMINOPHEN 2 TABLET: 5; 325 TABLET ORAL at 13:39

## 2018-09-07 RX ADMIN — FENTANYL CITRATE 50 MCG: 50 INJECTION, SOLUTION INTRAMUSCULAR; INTRAVENOUS at 11:06

## 2018-09-07 RX ADMIN — FENTANYL CITRATE 50 MCG: 50 INJECTION, SOLUTION INTRAMUSCULAR; INTRAVENOUS at 12:17

## 2018-09-07 RX ADMIN — FENTANYL CITRATE 25 MCG: 50 INJECTION, SOLUTION INTRAMUSCULAR; INTRAVENOUS at 12:30

## 2018-09-07 RX ADMIN — EPHEDRINE SULFATE 10 MG: 50 INJECTION, SOLUTION INTRAVENOUS at 11:16

## 2018-09-07 NOTE — DISCHARGE INSTRUCTIONS
Patient Discharge Instructions    Summer Johnson / [id number] : 1950    Admitted 2018 Discharged: 2018     Take Home Medications     · It is important that you take the medication exactly as they are prescribed. · Keep your medication in the bottles provided by the pharmacist and keep a list of the medication names, dosages, and times to be taken in your wallet. · Do not take other medications without consulting your doctor. What to do at [address] [place] [address] [place]: Do not shower until you have followed up with PD nurse educator and talked with her about this. PD nurse educator can teach you how to bandage the exit site of the catheter. Recommended diet: Renal diabetic    Recommended activity: As Tolerated. No Strenuous activity or heavy lifting for 4 weeks. If you experience any of the following symptoms severe abdominal pain, persistent nausea and vommitting, or fever with temp greater than 101.5, please go to ER or follow up with Dr Do Simon ASAP. Follow-up with Dr Do Simon if the PD nurse educator has concerns ([phone]). You do not need routine surgical follow up if everything is going well. Notify the PD nurse educator that you have the catheter and schedule a visit with her to begin flushing and teaching about catheter care        Information obtained by :  I understand that if any problems occur once I am at home I am to contact my physician. I understand and acknowledge receipt of the instructions indicated above.                                                                                                                                            Physician's or R.N.'s Signature                                                                  Date/Time                                                                                                                                              Patient or Representative Signature Date/Time       Narcotic-Analgesic/Acetaminophen (Percocet, Norco, Lorcet HD, Lortab 10/325) - (By mouth)   Why this medicine is used:   Relieves pain. Contact a nurse or doctor right away if you have:  · Extreme weakness, shallow breathing, slow heartbeat  · Severe confusion, lightheadedness, dizziness, fainting  · Yellow skin or eyes, dark urine or pale stools  · Severe constipation, severe stomach pain, nausea, vomiting, loss of appetite  · Sweating or cold, clammy skin     Common side effects:  · Mild constipation, nausea, vomiting  · Sleepiness, tiredness  · Itching, rash  © 2017 [address] [employer] Information is for End User's use only and may not be sold, redistributed or otherwise used for commercial purposes. DISCHARGE SUMMARY from Nurse    PATIENT INSTRUCTIONS:    After general anesthesia or intravenous sedation, for 24 hours or while taking prescription Narcotics:  · Limit your activities  · Do not drive and operate hazardous machinery  · Do not make important personal or business decisions  · Do  not drink alcoholic beverages  · If you have not urinated within 8 hours after discharge, please contact your surgeon on call. Report the following to your surgeon:  · Excessive pain, swelling, redness or odor of or around the surgical area  · Temperature over 100.5  · Nausea and vomiting lasting longer than 4 hours or if unable to take medications  · Any signs of decreased circulation or nerve impairment to extremity: change in color, persistent  numbness, tingling, coldness or increase pain  · Any questions    These are general instructions for a healthy lifestyle:    No smoking/ No tobacco products/ Avoid exposure to second hand smoke  Surgeon General's Warning:  Quitting smoking now greatly reduces serious risk to your health.     Obesity, smoking, and sedentary lifestyle greatly increases your risk for illness    A healthy diet, regular physical exercise & weight monitoring are important for maintaining a healthy lifestyle    You may be retaining fluid if you have a history of heart failure or if you experience any of the following symptoms:  Weight gain of 3 pounds or more overnight or 5 pounds in a week, increased swelling in our hands or feet or shortness of breath while lying flat in bed. Please call your doctor as soon as you notice any of these symptoms; do not wait until your next office visit. Recognize signs and symptoms of STROKE:    F-face looks uneven    A-arms unable to move or move unevenly    S-speech slurred or non-existent    T-time-call 911 as soon as signs and symptoms begin-DO NOT go       Back to bed or wait to see if you get better-TIME IS BRAIN. Warning Signs of HEART ATTACK     Call 911 if you have these symptoms:   Chest discomfort. Most heart attacks involve discomfort in the center of the chest that lasts more than a few minutes, or that goes away and comes back. It can feel like uncomfortable pressure, squeezing, fullness, or pain.  Discomfort in other areas of the upper body. Symptoms can include pain or discomfort in one or both arms, the back, neck, jaw, or stomach.  Shortness of breath with or without chest discomfort.  Other signs may include breaking out in a cold sweat, nausea, or lightheadedness. Don't wait more than five minutes to call 911 - MINUTES MATTER! Fast action can save your life. Calling 911 is almost always the fastest way to get lifesaving treatment. Emergency Medical Services staff can begin treatment when they arrive -- up to an hour sooner than if someone gets to the hospital by car. The discharge information has been reviewed with the {PATIENT PARENT GUARDIAN:20182}. The {PATIENT PARENT GUARDIAN:67022} verbalized understanding.   Discharge medications reviewed with the {Dishcarge meds reviewed IWQT:24491} and appropriate educational materials and side effects teaching were provided.   ___________________________________________________________________________________________________________________________________

## 2018-09-07 NOTE — PERIOP NOTES
Dr. Violeta Honeycutt notified of patient's blood glucose of 245; no orders received that this time.

## 2018-09-07 NOTE — IP AVS SNAPSHOT
[address] [facility] 
[phone] Patient: Miriam Buck 
MRN: [id number] SLI:[id number] About your hospitalization You were admitted on:  September 7, 2018 You last received care in the:  [facility] PACU You were discharged on:  September 7, 2018 Why you were hospitalized Your primary diagnosis was:  Not on File Follow-up Information Follow up With Details Comments Contact Info Cori Enrique MD   [address] [facility] 
[phone] Discharge Orders None A check griselda indicates which time of day the medication should be taken. My Medications START taking these medications Instructions Each Dose to Equal  
 Morning Noon Evening Bedtime  
 oxyCODONE-acetaminophen 5-325 mg per tablet Commonly known as:  PERCOCET Your last dose was: Your next dose is: Take 1-2 Tabs by mouth every four (4) hours as needed for Pain. Max Daily Amount: 12 Tabs. 1-2 Tab CONTINUE taking these medications Instructions Each Dose to Equal  
 Morning Noon Evening Bedtime  
 acetaminophen 500 mg tablet Commonly known as:  TYLENOL Your last dose was: Your next dose is: Take 1,000 mg by mouth every six (6) hours as needed for Pain. 1000 mg  
    
   
   
   
  
 allopurinol 100 mg tablet Commonly known as:  Juan Night Your last dose was: Your next dose is: Take 200 mg by mouth nightly. 200 mg  
    
   
   
   
  
 amLODIPine 10 mg tablet Commonly known as:  Miriam Jimenezer Your last dose was: Your next dose is: Take 10 mg by mouth nightly. 10 mg  
    
   
   
   
  
 atorvastatin 40 mg tablet Commonly known as:  LIPITOR Your last dose was: Your next dose is: Take 40 mg by mouth every evening. 40 mg BYSTOLIC 20 mg tablet Generic drug:  nebivolol Your last dose was: Your next dose is: Take 20 mg by mouth nightly. 20 mg  
    
   
   
   
  
 cloNIDine HCl 0.3 mg tablet Commonly known as:  CATAPRES Your last dose was: Your next dose is:    
   
   
 TK 1 T PO BID  
     
   
   
   
  
 ferrous sulfate 325 mg (65 mg iron) Cper Your last dose was: Your next dose is: Take 1 Tab by mouth two (2) times a day. 1 Tab FISH OIL CONCENTRATE 1,000 mg Cap Generic drug:  omega-3 fatty acids Your last dose was: Your next dose is: Take 1,000 mg by mouth two (2) times a day. 1000 mg FLOMAX 0.4 mg capsule Generic drug:  tamsulosin Your last dose was: Your next dose is: Take 0.4 mg by mouth nightly. 0.4 mg  
    
   
   
   
  
 folic acid-vit Z5-AJP X55 2.5-25-2 mg tablet Commonly known as:  Billie Morley Your last dose was: Your next dose is: Take 1 Tab by mouth nightly. 1 Tab  
    
   
   
   
  
 furosemide 80 mg tablet Commonly known as:  LASIX Your last dose was: Your next dose is: Take 80 mg by mouth daily. Patient takes one tab daily, then a second tab PRN  
 80 mg  
    
   
   
   
  
 glipiZIDE SR 10 mg CR tablet Commonly known as:  GLUCOTROL XL Your last dose was: Your next dose is: Take 2 Tabs by mouth daily. 20 mg  
    
   
   
   
  
 KLOR-CON 10 10 mEq tablet Generic drug:  potassium chloride SR Your last dose was: Your next dose is: Take 10 mEq by mouth two (2) times a day. 10 mEq PLAVIX 75 mg Tab Generic drug:  clopidogrel Your last dose was: Your next dose is: Take 75 mg by mouth nightly.   
 75 mg  
    
   
   
   
  
 PRALUENT PEN 75 mg/mL injector pen Generic drug:  alirocumab Your last dose was: Your next dose is: INJECT EVERY 2 WEEKS TYLENOL PM PO Your last dose was: Your next dose is: Take 1 Tab by mouth nightly as needed (pain/sleep). 1 Tab Where to Get Your Medications Information on where to get these meds will be given to you by the nurse or doctor. ! Ask your nurse or doctor about these medications  
  oxyCODONE-acetaminophen 5-325 mg per tablet Opioid Education Prescription Opioids: What You Need to Know: 
 
Prescription opioids can be used to help relieve moderate-to-severe pain and are often prescribed following a surgery or injury, or for certain health conditions. These medications can be an important part of treatment but also come with serious risks. Opioids are strong pain medicines. Examples include hydrocodone, oxycodone, fentanyl, and morphine. Heroin is an example of an illegal opioid. It is important to work with your health care provider to make sure you are getting the safest, most effective care. WHAT ARE THE RISKS AND SIDE EFFECTS OF OPIOID USE? Prescription opioids carry serious risks of addiction and overdose, especially with prolonged use. An opioid overdose, often marked by slow breathing, can cause sudden death. The use of prescription opioids can have a number of side effects as well, even when taken as directed. · Tolerance-meaning you might need to take more of a medication for the same pain relief · Physical dependence-meaning you have symptoms of withdrawal when the medication is stopped. Withdrawal symptoms can include nausea, sweating, chills, diarrhea, stomach cramps, and muscle aches. Withdrawal can last up to several weeks, depending on which drug you took and how long you took it. · Increased sensitivity to pain · Constipation · Nausea, vomiting, and dry mouth · Sleepiness and dizziness · Confusion · Depression · Low levels of testosterone that can result in lower sex drive, energy, and strength · Itching and sweating RISKS ARE GREATER WITH:      
· History of drug misuse, substance use disorder, or overdose · Mental health conditions (such as depression or anxiety) · Sleep apnea · Older age (72 years or older) · Pregnancy Avoid alcohol while taking prescription opioids. Also, unless specifically advised by your health care provider, medications to avoid include: · Benzodiazepines (such as Xanax or Valium) · Muscle relaxants (such as Soma or Flexeril) · Hypnotics (such as Ambien or Lunesta) · Other prescription opioids KNOW YOUR OPTIONS Talk to your health care provider about ways to manage your pain that don't involve prescription opioids. Some of these options may actually work better and have fewer risks and side effects. Options may include: 
· Pain relievers such as acetaminophen, ibuprofen, and naproxen · Some medications that are also used for depression or seizures · Physical therapy and exercise · Counseling to help patients learn how to cope better with triggers of pain and stress. · Application of heat or cold compress · Massage therapy · Relaxation techniques Be Informed Make sure you know the name of your medication, how much and how often to take it, and its potential risks & side effects. IF YOU ARE PRESCRIBED OPIOIDS FOR PAIN: 
· Never take opioids in greater amounts or more often than prescribed. Remember the goal is not to be pain-free but to manage your pain at a tolerable level. · Follow up with your primary care provider to: · Work together to create a plan on how to manage your pain. · Talk about ways to help manage your pain that don't involve prescription opioids. · Talk about any and all concerns and side effects. · Help prevent misuse and abuse. · Never sell or share prescription opioids · Help prevent misuse and abuse. · Store prescription opioids in a secure place and out of reach of others (this may include visitors, children, friends, and family). · Safely dispose of unused/unwanted prescription opioids: Find your community drug take-back program or your pharmacy mail-back program, or flush them down the toilet, following guidance from the Food and Drug Administration (www.fda.gov/Drugs/ResourcesForYou). · Visit www.cdc.gov/drugoverdose to learn about the risks of opioid abuse and overdose. · If you believe you may be struggling with addiction, tell your health care provider and ask for guidance or call [facility] at 9-738-513-KRZR. Discharge Instructions Patient Discharge Instructions Gertrudis Mosher / [id number] : 1950 Admitted 2018 Discharged: 2018 Take Home Medications · It is important that you take the medication exactly as they are prescribed. · Keep your medication in the bottles provided by the pharmacist and keep a list of the medication names, dosages, and times to be taken in your wallet. · Do not take other medications without consulting your doctor. What to do at [facility] Emergency Room Wound Care: Do not shower until you have followed up with PD nurse educator and talked with her about this. PD nurse educator can teach you how to bandage the exit site of the catheter. Recommended diet: Renal diabetic Recommended activity: As Tolerated. No Strenuous activity or heavy lifting for 4 weeks. If you experience any of the following symptoms severe abdominal pain, persistent nausea and vommitting, or fever with temp greater than 101.5, please go to ER or follow up with Dr Griffin Vizcarra ASA. Follow-up with Dr Griffin Vizcarra if the PD nurse educator has concerns ([phone]).  You do not need routine surgical follow up if everything is going well. Notify the PD nurse educator that you have the catheter and schedule a visit with her to begin flushing and teaching about catheter care Information obtained by : 
I understand that if any problems occur once I am at home I am to contact my physician. I understand and acknowledge receipt of the instructions indicated above. Physician's or R.N.'s Signature                                                                  Date/Time Patient or Representative Signature                                                          Date/Time Narcotic-Analgesic/Acetaminophen (Percocet, Norco, Lorcet HD, Lortab 10/325) - (By mouth) Why this medicine is used:  
Relieves pain. Contact a nurse or doctor right away if you have: 
· Extreme weakness, shallow breathing, slow heartbeat · Severe confusion, lightheadedness, dizziness, fainting · Yellow skin or eyes, dark urine or pale stools · Severe constipation, severe stomach pain, nausea, vomiting, loss of appetite · Sweating or cold, clammy skin Common side effects: · Mild constipation, nausea, vomiting · Sleepiness, tiredness · Itching, rash © 2017 [facility] Information is for End User's use only and may not be sold, redistributed or otherwise used for commercial purposes. DISCHARGE SUMMARY from Nurse PATIENT INSTRUCTIONS: 
 
After general anesthesia or intravenous sedation, for 24 hours or while taking prescription Narcotics: · Limit your activities · Do not drive and operate hazardous machinery · Do not make important personal or business decisions · Do  not drink alcoholic beverages · If you have not urinated within 8 hours after discharge, please contact your surgeon on call. Report the following to your surgeon: 
· Excessive pain, swelling, redness or odor of or around the surgical area · Temperature over 100.5 · Nausea and vomiting lasting longer than 4 hours or if unable to take medications · Any signs of decreased circulation or nerve impairment to extremity: change in color, persistent  numbness, tingling, coldness or increase pain · Any questions These are general instructions for a healthy lifestyle: No smoking/ No tobacco products/ Avoid exposure to second hand smoke Surgeon General's Warning:  Quitting smoking now greatly reduces serious risk to your health. Obesity, smoking, and sedentary lifestyle greatly increases your risk for illness A healthy diet, regular physical exercise & weight monitoring are important for maintaining a healthy lifestyle You may be retaining fluid if you have a history of heart failure or if you experience any of the following symptoms:  Weight gain of 3 pounds or more overnight or 5 pounds in a week, increased swelling in our hands or feet or shortness of breath while lying flat in bed. Please call your doctor as soon as you notice any of these symptoms; do not wait until your next office visit. Recognize signs and symptoms of STROKE: 
 
F-face looks uneven A-arms unable to move or move unevenly S-speech slurred or non-existent T-time-call 911 as soon as signs and symptoms begin-DO NOT go Back to bed or wait to see if you get better-TIME IS BRAIN. Warning Signs of HEART ATTACK Call 911 if you have these symptoms: 
? Chest discomfort. Most heart attacks involve discomfort in the center of the chest that lasts more than a few minutes, or that goes away and comes back. It can feel like uncomfortable pressure, squeezing, fullness, or pain. ? Discomfort in other areas of the upper body.  Symptoms can include pain or discomfort in one or both arms, the back, neck, jaw, or stomach. ? Shortness of breath with or without chest discomfort. ? Other signs may include breaking out in a cold sweat, nausea, or lightheadedness. Don't wait more than five minutes to call [address]! Fast action can save your life. Calling 911 is almost always the fastest way to get lifesaving treatment. Emergency Medical Services staff can begin treatment when they arrive  up to an hour sooner than if someone gets to the hospital by car. The discharge information has been reviewed with the {PATIENT PARENT GUARDIAN:22652}. The {PATIENT PARENT GUARDIAN:55717} verbalized understanding. Discharge medications reviewed with the {Dishcarge meds reviewed IZBV:27488} and appropriate educational materials and side effects teaching were provided. ___________________________________________________________________________________________________________________________________ Introducing [facility]! Dear Cari Mcneil: Thank you for requesting a [employer] account. Our records indicate that you already have an active Viva[employer]Cell account. You can access your account anytime at [email or web address] Did you know that you can access your hospital and ER discharge instructions at any time in Viva[employer]Cell? You can also review all of your test results from your hospital stay or ER visit. Additional Information If you have questions, please visit the Frequently Asked Questions section of the [employer]iva[employer] website at [email or web address] Remember, VivaBioCell is NOT to be used for urgent needs. For medical emergencies, dial 911. Now available from your iPhone and Android! Introducing Walter Mitchell As a Mariella Faden patient, I wanted to make you aware of our electronic visit tool called Walter Mitchell. Mariella Nichole 24/7 allows you to connect within minutes with a medical provider 24 hours a day, seven days a week via a mobile device or tablet or logging into a secure website from your computer. You can access Citel[employer] from anywhere in the [place]. A virtual visit might be right for you when you have a simple condition and feel like you just dont want to get out of bed, or cant get away from work for an appointment, when your regular [facility] provider is not available (evenings, weekends or holidays), or when youre out of town and need minor care. Electronic visits cost only $49 and if the [employer] vascular solution 24/Hepa Wash provider determines a prescription is needed to treat your condition, one can be electronically transmitted to a nearby pharmacy*. Please take a moment to enroll today if you have not already done so. The enrollment process is free and takes just a few minutes. To enroll, please download the MashMango papito to your tablet or phone, or visit [email or web address] to enroll on your computer. And, as an [address] [place] [address] patient with a Stalkthis account, the results of your visits will be scanned into your electronic medical record and your primary care provider will be able to view the scanned results. We urge you to continue to see your regular [facility] provider for your ongoing medical care. And while your primary care provider may not be the one available when you seek a Walter Mitchell virtual visit, the peace of mind you get from getting a real diagnosis real time can be priceless. For more information on Walter [employer] Systemsdemarcofin, view our Frequently Asked Questions (FAQs) at [email or web address]. Sincerely, 
 
Octavio Rae MD 
Chief Medical Officer Yola Fuchs *:  certain medications cannot be prescribed via Walter BTI Systems[employer]iel Providers Seen During Your Hospitalization Provider Specialty Primary office phone Eros Bowden MD Vascular Surgery [phone] Your Primary Care Physician (PCP) Primary Care Physician Office Phone Office Fax [address], [address] [facility] [phone] You are allergic to the following No active allergies Recent Documentation Height Weight BMI Smoking Status 1.727 m 83.9 kg 28.12 kg/m2 Former Smoker Emergency Contacts Name Discharge Info Relation Home Work Mobile Nereida Clemons DISCHARGE CAREGIVER [3] Girlfriend [18] [phone] [facility] DISCHARGE CAREGIVER [3] Son [22] [phone] Jigna Lambert DISCHARGE CAREGIVER [3] Other Relative [6] [phone] Milli Canela  Spouse [3] [phone] Patient Belongings The following personal items are in your possession at time of discharge: 
  Dental Appliances: None         Home Medications: None   Jewelry: None  Clothing:  (clothing and shoes)    Other Valuables: None  Personal Items Sent to Safe: declined Please provide this summary of care documentation to your next provider. Signatures-by signing, you are acknowledging that this After Visit Summary has been reviewed with you and you have received a copy. Patient Signature:  ____________________________________________________________ Date:  ____________________________________________________________  
  
[facility] Provider Signature:  ____________________________________________________________ Date:  ____________________________________________________________

## 2018-09-07 NOTE — BRIEF OP NOTE
BRIEF OPERATIVE NOTE Date of Procedure: 9/7/2018 Preoperative Diagnosis: END STAGE RENAL Postoperative Diagnosis: END STAGE RENAL Procedure(s): 
INSERTION LAPAROSCOPIC PERITNEAL DIALYSIS CATHETER Surgeon(s) and Role: Selena Longo MD - Primary Surgical Assistant: None Surgical Staff: 
Circ-1: Aimee Vickers Circ-Relief: Zaynab Garcia RN Scrub Tech-1: Bessie Vale Surg Asst-1: Lisa Goldberg Event Time In Incision Start 1120 Incision Close 1156 Anesthesia: General  
Estimated Blood Loss: 10 cc Specimens: * No specimens in log * Findings: Lap PD catheter placement Complications: None Implants: * No implants in log *

## 2018-09-07 NOTE — ANESTHESIA PREPROCEDURE EVALUATION
Anesthetic History No history of anesthetic complications Review of Systems / Medical History Patient summary reviewed, nursing notes reviewed and pertinent labs reviewed Pulmonary Sleep apnea: CPAP Smoker Neuro/Psych Within defined limits Cardiovascular Hypertension CAD, PAD and cardiac stents Exercise tolerance: >4 METS 
  
GI/Hepatic/Renal 
  
GERD Renal disease: ESRD 
PUD Endo/Other Diabetes Morbid obesity Other Findings Comments: Gout 
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 
 
Airway Mallampati: II 
TM Distance: 4 - 6 cm Neck ROM: normal range of motion Mouth opening: Normal 
 
 Cardiovascular Rate: abnormal 
 
 
 
Comments: Sinus ida (40) with occ PAC Dental 
 
Dentition: Poor dentition Pulmonary Breath sounds clear to auscultation Abdominal 
GI exam deferred Other Findings Anesthetic Plan ASA: 3 Anesthesia type: general 
 
 
 
 
 
Anesthetic plan and risks discussed with: Patient

## 2018-09-07 NOTE — PERIOP NOTES
Handoff Report from Operating Room to PACU Report received from Kodak Casey RN and Ortiz Navas CRNA regarding Rajan Singh. Surgeon(s): Rand Gaitan MD  And Procedure(s) (LRB): 
[address] [place] [address] (N/A)  confirmed  
with allergies and dressings discussed. Anesthesia type, drugs, patient history, complications, estimated blood loss, vital signs, intake and output, and last pain medication, lines, reversal medications and temperature were reviewed.

## 2018-09-07 NOTE — PERIOP NOTES
Discharge instructions reviewed w/ patient and patient's girlfriend; no questions or concerns verbalized at the time of d/c. Pt A&O x4. Respirations even, unlabored. Skin warm, dry; no drainage noted to surgical sites. Girlfriend verbalized understanding of contacting PD nurse to set up home health. Pt escorted to car via wheelchair. Hard copy of discharge paperwork signed.

## 2018-09-07 NOTE — ANESTHESIA POSTPROCEDURE EVALUATION
Post-Anesthesia Evaluation and Assessment Patient: Miriam Buck MRN: [id number]  SSN: [id number] YOB: 1950  Age: 80 y.o. Sex: male Cardiovascular Function/Vital Signs Visit Vitals  /59  Pulse 65  Temp 36.6 °C (97.9 °F)  Resp 14  
 Ht 5' 8\" (1.727 m)  Wt 83.9 kg (184 lb 15.5 oz)  SpO2 96%  BMI 28.12 kg/m2 Patient is status post general anesthesia for Procedure(s): 
INSERTION LAPAROSCOPIC 128 Levine, Susan. \Hospital Has a New Name and Outlook.\"". Nausea/Vomiting: None Postoperative hydration reviewed and adequate. Pain: 
Pain Scale 1: Numeric (0 - 10) (09/07/18 1316) Pain Intensity 1: 3 (09/07/18 1316) Managed Neurological Status:  
Neuro (WDL): Within Defined Limits (09/07/18 1205) Neuro Neurologic State: Alert (09/07/18 1205) Orientation Level: Oriented to person;Oriented to place;Oriented to situation (09/07/18 1205) Cognition: Follows commands (09/07/18 1205) Speech: Clear (09/07/18 1205) LUE Motor Response: Purposeful (09/07/18 1205) LLE Motor Response: Purposeful (09/07/18 1205) RUE Motor Response: Purposeful (09/07/18 1205) RLE Motor Response: Purposeful (09/07/18 1205) At baseline Mental Status and Level of Consciousness: Arousable Pulmonary Status:  
O2 Device: Room air (09/07/18 1315) Adequate oxygenation and airway patent Complications related to anesthesia: None Post-anesthesia assessment completed. No concerns Signed By: Peter Duque MD   
 September 7, 2018

## 2018-09-07 NOTE — OP NOTES
[address] 
OPERATIVE REPORT Name:JAYJAY Domínguez 
MR#: [id number] : 1950 ACCOUNT #: [de-identified] DATE OF SERVICE: 2018 SURGEON:  RANJANA Acosta MD  
 
ASSISTANT:  None. PREOPERATIVE DIAGNOSIS:  End-stage renal disease. POSTOPERATIVE DIAGNOSIS:  End-stage renal disease. PROCEDURE PERFORMED:  Laparoscopic insertion of peritoneal dialysis catheter. ANESTHESIA:  General. 
 
ESTIMATED BLOOD LOSS:  10 mL. SPECIMENS REMOVED:  None. DRAINS:  None. IMPLANTS:  Peritoneal dialysis catheter. COMPLICATIONS:  None. INDICATIONS:  The patient is a 76-year-old male with stage V chronic kidney disease. He requires a dialysis access and has chosen to do peritoneal dialysis. Patient has a previous history of an aortobifemoral bypass. He presents for laparoscopic peritoneal dialysis catheter insertion assuming that there are not prohibitive adhesions within the peritoneal cavity. PROCEDURE:  After informed consent was obtained, the patient was given preoperative intravenous antibiotics within 1 hour of the incision. He was taken to the operating room and after induction of adequate general anesthesia, the abdomen was prepped and draped as a sterile field. A small transverse incision was made about 10 cm above the level of the umbilicus just to the left of the midline and dissection was carried down to the anterior rectus sheath, which was incised. The rectus fibers were spread atraumatically. A 2-0 Prolene pursestring suture was placed in the posterior rectus sheath and under direct vision while elevating the posterior rectus sheath, a small incision was made in the center of the pursestring to enter the peritoneal cavity under direct vision. A 5 mm trocar was placed and pneumoperitoneum was established.   Insertion of the laparoscope revealed relatively extensive adhesions throughout the upper abdomen and along the midline incision, but the lower abdomen and pelvis were completely clear, so it appeared safe to proceed with placement of the peritoneal dialysis catheter. Under direct vision, a 5 mm trocar was placed in the right upper quadrant. About 2-3 cm of omental adhesions were mobilized under direct vision from the midline of the posterior abdominal wall inferior to the umbilicus. This was done using a grasper and electrocautery. No bowel was in the area of this dissection. Next, a preperitoneal tunnel was created from the first incision down just to the left of the midline at the level of the pelvic inlet. The peritoneal cavity was entered, creating a defect in the peritoneum at that level with a [place] dissector. A standard double cuffed curled-tip peritoneal dialysis catheter was then advanced down the preperitoneal tunnel using a stylet and was introduced through the small defect in the peritoneum into the peritoneal cavity and placed directly into the true pelvis with the patient in Trendelenburg position. The patient was then returned to the reverse Trendelenburg position, while pneumoperitoneum was gradually deflated and the catheter was visualized to ensure that it remained in the true pelvis. The catheter flushed and aspirated well. It drained well under gravity. It was flushed with heparinized saline. The pneumoperitoneum was released. Both trocars were removed. The pursestring suture was used to close the small defect in the peritoneum where the first trocar had been inserted and this same suture was used to secure the inner cuff of the peritoneal dialysis catheter to the posterior rectus sheath. The rectus fibers were then allowed to relax around the inner cuff. The anterior rectus sheath was closed with 0 Prolene suture, taking care not to kink the catheter.   The catheter was then brought out through an exit site just inferior and lateral to the initial incision and the outer cuff was allowed to rest in subcutaneous fat. The course of the catheter was inspected. There was no kinking or twisting. The catheter was again flushed with heparinized saline. It flushed and aspirated with a syringe easily. It drained under gravity. Titanium connectors were applied. The catheter was flushed a final time with heparinized saline and clamped. The subcutaneous tissue was closed with 3-0 Vicryl suture and the skin with 4-0 Monocryl, and Dermabond was applied as a dressing. A drain sponge was applied at the catheter exit site and the patient was extubated and transferred to the PACU in stable condition having tolerated the procedure well. He will follow up with the peritoneal dialysis nurse educator to begin flushing and catheter care teaching. MD CHICHI Aguilar / LN 
D: 09/07/2018 12:17    
T: 09/07/2018 13:29 
JOB #: [id number] CC: Beryle Peoples MD 
CC: Opal Pablo MD

## 2018-10-15 ENCOUNTER — HOSPITAL ENCOUNTER (OUTPATIENT)
Dept: GENERAL RADIOLOGY | Age: 68
Discharge: HOME | End: 2018-10-15
Payer: MEDICARE

## 2018-10-15 DIAGNOSIS — R52 PAIN: ICD-10-CM

## 2018-10-15 PROCEDURE — 74018 RADEX ABDOMEN 1 VIEW: CPT

## 2018-10-18 NOTE — PERIOP NOTES
[facility] Preoperative Instructions Surgery Date 10/22/18        Time of Arrival 13:00 
 
1. On the day of your surgery, please report to the Surgical Services Registration Desk and sign in at your designated time. The Surgery Center is located to the right of the Emergency Room. 2. You must have someone with you to drive you home. You should not drive a car for 24 hours following surgery. Please make arrangements for a friend or family member to stay with you for the first 24 hours after your surgery. 3. Do not have anything to eat or drink (including water, gum, mints, coffee, juice) after midnight 10/21/18     . ? This may not apply to medications prescribed by your physician. ?(Please note below the special instructions with medications to take the morning of your procedure.) 4. We recommend you do not drink any alcoholic beverages for 24 hours before and after your surgery. 5. Contact your surgeons office for instructions on the following medications: non-steroidal anti-inflammatory drugs (i.e. Advil, Aleve), vitamins, and supplements. (Some surgeons will want you to stop these medications prior to surgery and others may allow you to take them) **If you are currently taking Plavix, Coumadin, Aspirin and/or other blood-thinning agents, contact your surgeon for instructions. ** Your surgeon will partner with the physician prescribing these medications to determine if it is safe to stop or if you need to continue taking. Please do not stop taking these medications without instructions from your surgeon 6. Wear comfortable clothes. Wear glasses instead of contacts. Do not bring any money or jewelry. Please bring picture ID, insurance card, and any prearranged co-payment or hospital payment. Do not wear make-up, particularly mascara the morning of your surgery. Do not wear nail polish, particularly if you are having foot /hand surgery.   Wear your hair loose or down, no ponytails, buns, terence pins or clips. All body piercings must be removed. Please shower with antibacterial soap for three consecutive days before and on the morning of surgery, but do not apply any lotions, powders or deodorants after the shower on the day of surgery. Please use a fresh towels after each shower. Please sleep in clean clothes and change bed linens the night before surgery. Please do not shave for 48 hours prior to surgery. Shaving of the face is acceptable. 7. You should understand that if you do not follow these instructions your surgery may be cancelled. If your physical condition changes (I.e. fever, cold or flu) please contact your surgeon as soon as possible. 8. It is important that you be on time. If a situation occurs where you may be late, please call [phone] (OR Holding Area). 9. If you have any questions and or problems, please call [phone] (Pre-admission Testing). 10. Your surgery time may be subject to change. You will receive a phone call the evening prior if your time changes. 11.  If having outpatient surgery, you must have someone to drive you here, stay with you during the duration of your stay, and to drive you home at time of discharge. 12.   In an effort to improve the efficiency, privacy, and safety for all of our Pre-op patients visitors are not allowed in the Holding area. Once you arrive and are registered your family/visitors will be asked to remain in the waiting room. The Pre-op staff will get you from the Surgical Waiting Area and will explain to you and your family/visitors that the Pre-op phase is beginning. The staff will answer any questions and provide instructions for tracking of the patient, by use of the existing tracking number and color-coded status board in the waiting room.   At this time the staff will also ask for your designated spokesperson information in the event that the physician or staff need to provide an update or obtain any pertinent information. The designated spokesperson will be notified if the physician needs to speak to family during the pre-operative phase. If at any time your family/visitors has questions or concerns they may approach the volunteer desk in the waiting area for assistance. Special Instructions: MEDICATIONS TO TAKE THE MORNING OF SURGERY WITH A SIP OF WATER: Clonidine, Lasix. Also Percocet or Tylenol if needed. I understand a pre-operative phone call will be made to verify my surgery time. In the event that I am not available, I give permission for a message to be left on my answering service and/or with another person? Yes  
 
 
 
 ___________________      __________   _________ 
  (Signature of Patient)             (Witness)                (Date and Time)

## 2018-10-22 ENCOUNTER — ANESTHESIA (OUTPATIENT)
Dept: SURGERY | Age: 68
End: 2018-10-22
Payer: MEDICARE

## 2018-10-22 ENCOUNTER — ANESTHESIA EVENT (OUTPATIENT)
Dept: SURGERY | Age: 68
End: 2018-10-22
Payer: MEDICARE

## 2018-10-22 ENCOUNTER — HOSPITAL ENCOUNTER (OUTPATIENT)
Age: 68
Setting detail: OUTPATIENT SURGERY
Discharge: HOME | End: 2018-10-22
Attending: SURGERY | Admitting: SURGERY
Payer: MEDICARE

## 2018-10-22 VITALS
WEIGHT: 179.68 LBS | RESPIRATION RATE: 20 BRPM | HEART RATE: 58 BPM | TEMPERATURE: 98.7 F | BODY MASS INDEX: 27.23 KG/M2 | OXYGEN SATURATION: 99 % | HEIGHT: 68 IN | DIASTOLIC BLOOD PRESSURE: 50 MMHG | SYSTOLIC BLOOD PRESSURE: 159 MMHG

## 2018-10-22 DIAGNOSIS — N18.6 ESRD (END STAGE RENAL DISEASE) (HCC): Primary | ICD-10-CM

## 2018-10-22 LAB
ANION GAP BLD CALC-SCNC: 14 MMOL/L (ref 10–20)
BUN BLD-MCNC: 61 MG/DL (ref 9–20)
CA-I BLD-MCNC: 1.03 MMOL/L (ref 1.12–1.32)
CHLORIDE BLD-SCNC: 103 MMOL/L (ref 98–107)
CO2 BLD-SCNC: 28 MMOL/L (ref 21–32)
CREAT BLD-MCNC: 5.2 MG/DL (ref 0.6–1.3)
GLUCOSE BLD STRIP.AUTO-MCNC: 169 MG/DL (ref 65–100)
GLUCOSE BLD-MCNC: 186 MG/DL (ref 65–100)
HCT VFR BLD CALC: 26 % (ref 36.6–50.3)
POTASSIUM BLD-SCNC: 4.8 MMOL/L (ref 3.5–5.1)
SERVICE CMNT-IMP: ABNORMAL
SERVICE CMNT-IMP: ABNORMAL
SODIUM BLD-SCNC: 139 MMOL/L (ref 136–145)

## 2018-10-22 PROCEDURE — 77030020782 HC GWN BAIR PAWS FLX 3M -B

## 2018-10-22 PROCEDURE — 74011250637 HC RX REV CODE- 250/637

## 2018-10-22 PROCEDURE — 74011250636 HC RX REV CODE- 250/636: Performed by: ANESTHESIOLOGY

## 2018-10-22 PROCEDURE — 74011250636 HC RX REV CODE- 250/636

## 2018-10-22 PROCEDURE — 76210000006 HC OR PH I REC 0.5 TO 1 HR: Performed by: SURGERY

## 2018-10-22 PROCEDURE — 77030026438 HC STYL ET INTUB CARD -A: Performed by: NURSE ANESTHETIST, CERTIFIED REGISTERED

## 2018-10-22 PROCEDURE — 82962 GLUCOSE BLOOD TEST: CPT

## 2018-10-22 PROCEDURE — 77030011640 HC PAD GRND REM COVD -A: Performed by: SURGERY

## 2018-10-22 PROCEDURE — 74011250636 HC RX REV CODE- 250/636: Performed by: SURGERY

## 2018-10-22 PROCEDURE — 77030008603 HC TRCR ENDOSC EPATH J&J -C: Performed by: SURGERY

## 2018-10-22 PROCEDURE — 77030034154 HC SHR COAG HARM ACE J&J -F: Performed by: SURGERY

## 2018-10-22 PROCEDURE — C1769 GUIDE WIRE: HCPCS | Performed by: SURGERY

## 2018-10-22 PROCEDURE — 74011000250 HC RX REV CODE- 250: Performed by: SURGERY

## 2018-10-22 PROCEDURE — 74011000250 HC RX REV CODE- 250

## 2018-10-22 PROCEDURE — 80047 BASIC METABLC PNL IONIZED CA: CPT

## 2018-10-22 PROCEDURE — 76010000138 HC OR TIME 0.5 TO 1 HR: Performed by: SURGERY

## 2018-10-22 PROCEDURE — 77030008602 HC TRCR ENDOSC EPATH J&J -B: Performed by: SURGERY

## 2018-10-22 PROCEDURE — 76060000032 HC ANESTHESIA 0.5 TO 1 HR: Performed by: SURGERY

## 2018-10-22 PROCEDURE — 77030019908 HC STETH ESOPH SIMS -A: Performed by: NURSE ANESTHETIST, CERTIFIED REGISTERED

## 2018-10-22 PROCEDURE — 76210000020 HC REC RM PH II FIRST 0.5 HR: Performed by: SURGERY

## 2018-10-22 PROCEDURE — 77030008684 HC TU ET CUF COVD -B: Performed by: NURSE ANESTHETIST, CERTIFIED REGISTERED

## 2018-10-22 RX ORDER — SODIUM CHLORIDE 9 MG/ML
25 INJECTION, SOLUTION INTRAVENOUS CONTINUOUS
Status: DISCONTINUED | OUTPATIENT
Start: 2018-10-22 | End: 2018-10-22 | Stop reason: HOSPADM

## 2018-10-22 RX ORDER — SUCCINYLCHOLINE CHLORIDE 20 MG/ML
INJECTION INTRAMUSCULAR; INTRAVENOUS AS NEEDED
Status: DISCONTINUED | OUTPATIENT
Start: 2018-10-22 | End: 2018-10-22 | Stop reason: HOSPADM

## 2018-10-22 RX ORDER — SODIUM CHLORIDE 0.9 % (FLUSH) 0.9 %
5-10 SYRINGE (ML) INJECTION EVERY 8 HOURS
Status: DISCONTINUED | OUTPATIENT
Start: 2018-10-22 | End: 2018-10-22 | Stop reason: HOSPADM

## 2018-10-22 RX ORDER — HYDROCODONE BITARTRATE AND ACETAMINOPHEN 5; 325 MG/1; MG/1
TABLET ORAL
Status: COMPLETED
Start: 2018-10-22 | End: 2018-10-22

## 2018-10-22 RX ORDER — DIPHENHYDRAMINE HYDROCHLORIDE 50 MG/ML
12.5 INJECTION, SOLUTION INTRAMUSCULAR; INTRAVENOUS AS NEEDED
Status: DISCONTINUED | OUTPATIENT
Start: 2018-10-22 | End: 2018-10-22 | Stop reason: HOSPADM

## 2018-10-22 RX ORDER — ROCURONIUM BROMIDE 10 MG/ML
INJECTION, SOLUTION INTRAVENOUS AS NEEDED
Status: DISCONTINUED | OUTPATIENT
Start: 2018-10-22 | End: 2018-10-22 | Stop reason: HOSPADM

## 2018-10-22 RX ORDER — SODIUM CHLORIDE 0.9 % (FLUSH) 0.9 %
5-10 SYRINGE (ML) INJECTION AS NEEDED
Status: DISCONTINUED | OUTPATIENT
Start: 2018-10-22 | End: 2018-10-22 | Stop reason: HOSPADM

## 2018-10-22 RX ORDER — ONDANSETRON 2 MG/ML
INJECTION INTRAMUSCULAR; INTRAVENOUS AS NEEDED
Status: DISCONTINUED | OUTPATIENT
Start: 2018-10-22 | End: 2018-10-22 | Stop reason: HOSPADM

## 2018-10-22 RX ORDER — HYDROCODONE BITARTRATE AND ACETAMINOPHEN 5; 325 MG/1; MG/1
2 TABLET ORAL ONCE
Status: COMPLETED | OUTPATIENT
Start: 2018-10-22 | End: 2018-10-22

## 2018-10-22 RX ORDER — HYDROCODONE BITARTRATE AND ACETAMINOPHEN 5; 325 MG/1; MG/1
1-2 TABLET ORAL
Qty: 25 TAB | Refills: 0 | Status: SHIPPED | OUTPATIENT
Start: 2018-10-22 | End: 2019-01-21

## 2018-10-22 RX ORDER — CLONIDINE HYDROCHLORIDE 0.1 MG/1
TABLET ORAL
Qty: 180 TAB | Refills: 0 | OUTPATIENT
Start: 2018-10-22

## 2018-10-22 RX ORDER — HYDRALAZINE HYDROCHLORIDE 20 MG/ML
INJECTION INTRAMUSCULAR; INTRAVENOUS AS NEEDED
Status: DISCONTINUED | OUTPATIENT
Start: 2018-10-22 | End: 2018-10-22 | Stop reason: HOSPADM

## 2018-10-22 RX ORDER — CEFAZOLIN SODIUM/WATER 2 G/20 ML
2 SYRINGE (ML) INTRAVENOUS ONCE
Status: COMPLETED | OUTPATIENT
Start: 2018-10-22 | End: 2018-10-22

## 2018-10-22 RX ORDER — SODIUM CHLORIDE, SODIUM LACTATE, POTASSIUM CHLORIDE, CALCIUM CHLORIDE 600; 310; 30; 20 MG/100ML; MG/100ML; MG/100ML; MG/100ML
25 INJECTION, SOLUTION INTRAVENOUS CONTINUOUS
Status: DISCONTINUED | OUTPATIENT
Start: 2018-10-22 | End: 2018-10-22 | Stop reason: HOSPADM

## 2018-10-22 RX ORDER — LIDOCAINE HYDROCHLORIDE 10 MG/ML
0.1 INJECTION, SOLUTION EPIDURAL; INFILTRATION; INTRACAUDAL; PERINEURAL AS NEEDED
Status: DISCONTINUED | OUTPATIENT
Start: 2018-10-22 | End: 2018-10-22 | Stop reason: HOSPADM

## 2018-10-22 RX ORDER — CLONIDINE HYDROCHLORIDE 0.3 MG/1
TABLET ORAL
Qty: 180 TAB | Refills: 3 | Status: ON HOLD | OUTPATIENT
Start: 2018-10-22 | End: 2019-01-01 | Stop reason: DRUGHIGH

## 2018-10-22 RX ORDER — FENTANYL CITRATE 50 UG/ML
25 INJECTION, SOLUTION INTRAMUSCULAR; INTRAVENOUS
Status: DISCONTINUED | OUTPATIENT
Start: 2018-10-22 | End: 2018-10-22 | Stop reason: HOSPADM

## 2018-10-22 RX ORDER — HYDROMORPHONE HYDROCHLORIDE 1 MG/ML
0.2 INJECTION, SOLUTION INTRAMUSCULAR; INTRAVENOUS; SUBCUTANEOUS
Status: DISCONTINUED | OUTPATIENT
Start: 2018-10-22 | End: 2018-10-22 | Stop reason: HOSPADM

## 2018-10-22 RX ORDER — FENTANYL CITRATE 50 UG/ML
INJECTION, SOLUTION INTRAMUSCULAR; INTRAVENOUS AS NEEDED
Status: DISCONTINUED | OUTPATIENT
Start: 2018-10-22 | End: 2018-10-22 | Stop reason: HOSPADM

## 2018-10-22 RX ORDER — GLYCOPYRROLATE 0.2 MG/ML
INJECTION INTRAMUSCULAR; INTRAVENOUS AS NEEDED
Status: DISCONTINUED | OUTPATIENT
Start: 2018-10-22 | End: 2018-10-22 | Stop reason: HOSPADM

## 2018-10-22 RX ORDER — NEOSTIGMINE METHYLSULFATE 1 MG/ML
INJECTION INTRAVENOUS AS NEEDED
Status: DISCONTINUED | OUTPATIENT
Start: 2018-10-22 | End: 2018-10-22 | Stop reason: HOSPADM

## 2018-10-22 RX ORDER — BUPIVACAINE HYDROCHLORIDE 2.5 MG/ML
INJECTION, SOLUTION EPIDURAL; INFILTRATION; INTRACAUDAL AS NEEDED
Status: DISCONTINUED | OUTPATIENT
Start: 2018-10-22 | End: 2018-10-22 | Stop reason: HOSPADM

## 2018-10-22 RX ORDER — LIDOCAINE HYDROCHLORIDE 20 MG/ML
INJECTION, SOLUTION EPIDURAL; INFILTRATION; INTRACAUDAL; PERINEURAL AS NEEDED
Status: DISCONTINUED | OUTPATIENT
Start: 2018-10-22 | End: 2018-10-22 | Stop reason: HOSPADM

## 2018-10-22 RX ORDER — PROPOFOL 10 MG/ML
INJECTION, EMULSION INTRAVENOUS AS NEEDED
Status: DISCONTINUED | OUTPATIENT
Start: 2018-10-22 | End: 2018-10-22 | Stop reason: HOSPADM

## 2018-10-22 RX ADMIN — ROCURONIUM BROMIDE 10 MG: 10 INJECTION, SOLUTION INTRAVENOUS at 15:32

## 2018-10-22 RX ADMIN — GLYCOPYRROLATE 0.4 MG: 0.2 INJECTION INTRAMUSCULAR; INTRAVENOUS at 16:12

## 2018-10-22 RX ADMIN — FENTANYL CITRATE 25 MCG: 50 INJECTION, SOLUTION INTRAMUSCULAR; INTRAVENOUS at 16:34

## 2018-10-22 RX ADMIN — MEPERIDINE HYDROCHLORIDE 12.5 MG: 25 INJECTION, SOLUTION INTRAMUSCULAR; INTRAVENOUS; SUBCUTANEOUS at 16:25

## 2018-10-22 RX ADMIN — PROPOFOL 150 MG: 10 INJECTION, EMULSION INTRAVENOUS at 15:32

## 2018-10-22 RX ADMIN — SODIUM CHLORIDE, POTASSIUM CHLORIDE, SODIUM LACTATE AND CALCIUM CHLORIDE: 600; 310; 30; 20 INJECTION, SOLUTION INTRAVENOUS at 15:15

## 2018-10-22 RX ADMIN — FENTANYL CITRATE 25 MCG: 50 INJECTION, SOLUTION INTRAMUSCULAR; INTRAVENOUS at 16:25

## 2018-10-22 RX ADMIN — HYDRALAZINE HYDROCHLORIDE 10 MG: 20 INJECTION INTRAMUSCULAR; INTRAVENOUS at 16:19

## 2018-10-22 RX ADMIN — FENTANYL CITRATE 25 MCG: 50 INJECTION, SOLUTION INTRAMUSCULAR; INTRAVENOUS at 16:28

## 2018-10-22 RX ADMIN — FENTANYL CITRATE 100 MCG: 50 INJECTION, SOLUTION INTRAMUSCULAR; INTRAVENOUS at 15:32

## 2018-10-22 RX ADMIN — HYDROCODONE BITARTRATE AND ACETAMINOPHEN 2 TABLET: 5; 325 TABLET ORAL at 16:53

## 2018-10-22 RX ADMIN — SODIUM CHLORIDE 25 ML/HR: 900 INJECTION, SOLUTION INTRAVENOUS at 14:20

## 2018-10-22 RX ADMIN — LIDOCAINE HYDROCHLORIDE 80 MG: 20 INJECTION, SOLUTION EPIDURAL; INFILTRATION; INTRACAUDAL; PERINEURAL at 15:32

## 2018-10-22 RX ADMIN — SUCCINYLCHOLINE CHLORIDE 120 MG: 20 INJECTION INTRAMUSCULAR; INTRAVENOUS at 15:32

## 2018-10-22 RX ADMIN — Medication 2 G: at 15:35

## 2018-10-22 RX ADMIN — MEPERIDINE HYDROCHLORIDE 12.5 MG: 25 INJECTION, SOLUTION INTRAMUSCULAR; INTRAVENOUS; SUBCUTANEOUS at 16:37

## 2018-10-22 RX ADMIN — FENTANYL CITRATE 25 MCG: 50 INJECTION, SOLUTION INTRAMUSCULAR; INTRAVENOUS at 16:31

## 2018-10-22 RX ADMIN — GLYCOPYRROLATE 0.2 MG: 0.2 INJECTION INTRAMUSCULAR; INTRAVENOUS at 15:46

## 2018-10-22 RX ADMIN — NEOSTIGMINE METHYLSULFATE 3 MG: 1 INJECTION INTRAVENOUS at 16:12

## 2018-10-22 RX ADMIN — ONDANSETRON 4 MG: 2 INJECTION INTRAMUSCULAR; INTRAVENOUS at 15:45

## 2018-10-22 NOTE — H&P
History and Physical 
 
Subjective:  
 
Gertrudis Archibald is a 80 y.o. male who had a PD catheter placed 6 weeks ago. It will flush but not drain. Past Medical History:  
Diagnosis Date  [facility]abdominal aortic [facility] 2017  Anemia   
 gets shots from Dr. Yogesh Fragoso - last dose 2018  BPH (benign prostatic hypertrophy)  CAD (coronary artery disease) s/p stents, sees Dr. Timothy Conte  Cancer ([facility] skin cancer on leg  Chronic kidney disease  End stage renal disease ([facility] Dr. Yogesh Fragoso  GERD (gastroesophageal reflux disease)  Gout  Other and unspecified hyperlipidemia  PUD (peptic ulcer disease)  PVD (peripheral vascular disease) ([facility]   
 s/p PTCA of left femoral artery in 2014  Sleep apnea CPAP  Type II or unspecified type diabetes mellitus without mention of complication, uncontrolled Dr. Rosa M Moulton  Unspecified essential hypertension  Unspecified vitamin D deficiency Past Surgical History:  
Procedure Laterality Date  CARDIAC SURG PROCEDURE UNLIST  HX CATARACT REMOVAL    
 HX COLONOSCOPY    
 HX CORONARY STENT PLACEMENT    
 HX ENDOSCOPY    
 HX KNEE ARTHROSCOPY    
 right x2, left x1  
 HX OTHER SURGICAL    
 skin cancer removed from leg  VASCULAR SURGERY PROCEDURE UNLIST    
 aorto-bifem bypass  VASCULAR SURGERY PROCEDURE UNLIST    
 left carotid endarterectomy  VASCULAR SURGERY PROCEDURE UNLIST    
 right femoral PTCA Family History Problem Relation Age of Onset  Diabetes Mother  Heart Disease Mother  Heart Disease Maternal Grandmother  Diabetes Daughter   
     gestational  
Jackie Ab Diabetes Sister  Stroke Sister  Cancer Father  Hypertension Father Social History Tobacco Use  Smoking status: Former Smoker Packs/day: 2.00 Years: 25.00 Pack years: 50.00 Last attempt to quit: 3/11/1991 Years since quittin.6  Smokeless tobacco: Never Used Substance Use Topics  Alcohol use: Yes Comment: very occasional  
   
Prior to Admission medications Medication Sig Start Date End Date Taking? Authorizing Provider  
cloNIDine HCl (CATAPRES) 0.3 mg tablet Take 1 tab by mouth twice daily--delete 0.1 mg tabs from profile 10/22/18  Yes Mallory Martin MD  
oxyCODONE-acetaminophen (PERCOCET) 5-325 mg per tablet Take 1-2 Tabs by mouth every four (4) hours as needed for Pain. Max Daily Amount: 12 Tabs. 9/7/18  Yes Marie Daniel MD  
atorvastatin (LIPITOR) 40 mg tablet Take 40 mg by mouth every evening. Yes Provider, Historical  
furosemide (LASIX) 80 mg tablet Take 80 mg by mouth daily. Patient takes one tab daily, then a second tab PRN 6/5/18  Yes Provider, Historical  
acetaminophen (TYLENOL) 500 mg tablet Take 1,000 mg by mouth every six (6) hours as needed for Pain. Yes Other, MD Corby  
acetaminophen/diphenhydramine (TYLENOL PM PO) Take 1 Tab by mouth nightly as needed (pain/sleep). Yes Other, MD Corby  
folic acid-vit I9-DQV V27 (FOLTX) 2.5-25-2 mg tablet Take 1 Tab by mouth nightly. Yes Other, MD Corby  
omega-3 fatty acids (FISH OIL CONCENTRATE) 1,000 mg cap Take 1,000 mg by mouth two (2) times a day. Yes Provider, Historical  
glipiZIDE SR (GLUCOTROL XL) 10 mg CR tablet Take 2 Tabs by mouth daily. 1/31/18  Yes Mallory Martin MD  
BYSTOLIC 20 mg tablet Take 20 mg by mouth nightly. 12/15/17  Yes Provider, Historical  
PRALUENT PEN 75 mg/mL injector pen INJECT EVERY 2 WEEKS 10/11/17  Yes Provider, Historical  
amLODIPine (NORVASC) 10 mg tablet Take 10 mg by mouth nightly. Yes Provider, Historical  
ferrous sulfate 325 mg (65 mg iron) cpER Take 1 Tab by mouth two (2) times a day. Yes Provider, Historical  
tamsulosin (FLOMAX) 0.4 mg capsule Take 0.4 mg by mouth nightly. Yes Provider, Historical  
allopurinol (ZYLOPRIM) 100 mg tablet Take 200 mg by mouth nightly.    Yes Provider, Historical  
clopidogrel (PLAVIX) 75 mg tablet Take 75 mg by mouth nightly. Provider, Historical  
 
No Known Allergies Review of Systems: 
Denies CP/SOB Objective:  
 
Physical Exam:  
Visit Vitals /65 (BP 1 Location: Right arm, BP Patient Position: At rest) Pulse (!) 50 Temp 98.4 °F (36.9 °C) Resp 19 Ht 5' 8\" (1.727 m) Wt 81.5 kg (179 lb 10.8 oz) SpO2 98% BMI 27.32 kg/m² General:  Alert, cooperative, no distress, appears stated age. Head:  Normocephalic, without obvious abnormality, atraumatic. Neck: Supple, symmetrical, trachea midline, no adenopathy, thyroid: no enlargement/tenderness/nodules, no carotid bruit and no JVD. Lungs:   Clear to auscultation bilaterally. Heart:  Regular rate and rhythm, S1, S2 normal, no murmur, click, rub or gallop. Abdomen:   Soft, non-tender. Bowel sounds normal. No masses,  No organomegaly. PD catheter Extremities: Extremities normal, atraumatic, no cyanosis or edema. Pulses: 2+ and symmetric all extremities. Neurologic: Normal strength, sensation throughout. Assessment:  
 
Nonfunctional PD catheter Plan:  
 
Laparoscopic repositioning Signed By: Connor Man MD   
 October 22, 2018

## 2018-10-22 NOTE — DISCHARGE INSTRUCTIONS
Patient Discharge Instructions    Reggie Yñaez / [id number] : 1950    Admitted 10/22/2018 Discharged: 10/22/2018     Take Home Medications     · It is important that you take the medication exactly as they are prescribed. · Keep your medication in the bottles provided by the pharmacist and keep a list of the medication names, dosages, and times to be taken in your wallet. · Do not take other medications without consulting your doctor. What to do at [address] [place] [address] [place]: Routine catheter care    Recommended diet: Renal    Recommended activity: As Tolerated. No Strenuous activity or heavy lifting    If you experience any of the following symptoms fevers, chill, or severe abdominal pain, or nausea and vomitting, please follow up with Dr Ioana Reece immediately. Follow-up with the PD nurse educator in 1-2 weeks to begin flushing and training again. Follow-up with Dr. Ioana Reece in 2 weeks    . AVOID TOPICAL MEDICATIONS   Do not apply liquid or ointment medications or any other product to your wound while the  DERMABOND adhesive film is in place. These may loosen the film before your wound is healed. KEEP WOUND DRY AND PROTECTED   You may occasionally and briefly wet your wound in the shower or bath. Do not soak or scrub  your wound, do not swim, and avoid periods of heavy perspiration until the DERMABOND  adhesive has naturally fallen off. After showering or bathing, gently blot your wound dry with a  soft towel. If a protective dressing is being used, apply a fresh, dry bandage, being sure to keep  the tape off the DERMABOND adhesive film.  Apply a clean, dry bandage over the wound if necessary to protect it.  Protect your wound from injury until the skin has had sufficient time to heal.   Do not scratch, rub, or pick at the DERMABOND adhesive film. This may loosen the film before  your wound is healed.    Protect the wound from prolonged exposure to sunlight or tanning lamps while the film is in  place. If you have any questions or concerns about this product, please consult your doctor. *Trademark ©[employer] 2002       Hydrocodone/Acetaminophen (Vicodin, Norco, Lortab) - (By mouth)   Why this medicine is used:   Treats pain. Contact a nurse or doctor right away if you have:  · Blistering, peeling, red skin rash  · Fast or slow heartbeat, shallow breathing, blue lips, fingernails, or skin  · Anxiety, restlessness, muscle spasms, twitching, seeing or hearing things that are not there  · Dark urine or pale stools, yellow skin or eyes  · Extreme weakness, sweating, seizures, cold or clammy skin  · Lightheadedness, dizziness, fainting, fever, sweating     Common side effects:  · Constipation, nausea, vomiting, loss of appetite, stomach pain  · Tiredness or sleepiness  © 2017 [address]  Information is for End User's use only and may not be sold, redistributed or otherwise used for commercial purposes.

## 2018-10-22 NOTE — ANESTHESIA POSTPROCEDURE EVALUATION
Procedure(s): LAPAROSCOPIC PERITONEAL DIALYSIS CATHETER REPOSITIONG AND LYSIS OF ADHESIONS. Anesthesia Post Evaluation Patient location during evaluation: PACU Note status: Adequate. Level of consciousness: responsive to verbal stimuli and sleepy but conscious Pain management: satisfactory to patient Airway patency: patent Anesthetic complications: no 
Cardiovascular status: acceptable Respiratory status: acceptable Hydration status: acceptable Comments: +Post-Anesthesia Evaluation and Assessment Patient: Chelly Mahajan MRN: [id number]  SSN: [id number] YOB: 1950  Age: 80 y.o. Sex: male Cardiovascular Function/Vital Signs /49 (BP 1 Location: Right arm, BP Patient Position: At rest)   Pulse 60   Temp 37 °C (98.6 °F)   Resp 17   Ht 5' 8\" (1.727 m)   Wt 81.5 kg (179 lb 10.8 oz)   SpO2 100%   BMI 27.32 kg/m² Patient is status post Procedure(s): LAPAROSCOPIC PERITONEAL DIALYSIS CATHETER REPOSITIONG AND LYSIS OF ADHESIONS. Nausea/Vomiting: Controlled. Postoperative hydration reviewed and adequate. Pain: 
Pain Scale 1: Numeric (0 - 10) (10/22/18 1700) Pain Intensity 1: 3 (10/22/18 1700) Managed. Neurological Status:  
Neuro (WDL): Exceptions to WDL (10/22/18 1700) At baseline. Mental Status and Level of Consciousness: Arousable. Pulmonary Status:  
O2 Device: Room air (10/22/18 1700) Adequate oxygenation and airway patent. Complications related to anesthesia: None Post-anesthesia assessment completed. No concerns. Signed By: Brandi Mcclain MD  
 10/22/2018 Visit Vitals /49 (BP 1 Location: Right arm, BP Patient Position: At rest) Pulse 60 Temp 37 °C (98.6 °F) Resp 17 Ht 5' 8\" (1.727 m) Wt 81.5 kg (179 lb 10.8 oz) SpO2 100% BMI 27.32 kg/m²

## 2018-10-22 NOTE — BRIEF OP NOTE
BRIEF OPERATIVE NOTE Date of Procedure: 10/22/2018 Preoperative Diagnosis: END STAGE RENAL DISEASE Postoperative Diagnosis: END STAGE RENAL DISEASE Procedure(s): LAPAROSCOPIC PERITONEAL DIALYSIS CATHETER REPOSITIONG AND LYSIS OF ADHESIONS Surgeon(s) and Role: Bárbara Urbina MD - Primary Surgical Assistant: None Surgical Staff: 
Circ-1: Isabell Rodriguez RN Scrub Tech-1: Bran Gomez Surg Asst-1: Isabel Gilliam Event Time In Time Out Incision Start 9380 Incision Close 1610 Anesthesia: General  
Estimated Blood Loss: Min 
Specimens: * No specimens in log * Findings: Catheter caught up in adhesions. Lysis of adhesions. Catheter placed in true pelvis. Flushes and drains well Complications: None Implants: * No implants in log *

## 2018-10-22 NOTE — ANESTHESIA PREPROCEDURE EVALUATION
Anesthetic History No history of anesthetic complications Review of Systems / Medical History Patient summary reviewed, nursing notes reviewed and pertinent labs reviewed Pulmonary Sleep apnea: CPAP Smoker Neuro/Psych Within defined limits Cardiovascular Hypertension CAD, PAD and cardiac stents Exercise tolerance: >4 METS Comments: AAA < 4cm GI/Hepatic/Renal 
  
GERD Renal disease: ESRD 
PUD Endo/Other Diabetes: poorly controlled, type 2 Morbid obesity Other Findings Comments: Gout Physical Exam 
 
Airway Mallampati: II 
TM Distance: 4 - 6 cm Neck ROM: normal range of motion Mouth opening: Normal 
 
 Cardiovascular Rate: abnormal 
 
 
 
Comments: Sinus ida (40) with occ PAC Dental 
 
Dentition: Poor dentition Pulmonary Breath sounds clear to auscultation Abdominal 
GI exam deferred Other Findings Anesthetic Plan ASA: 4 Anesthesia type: general 
 
 
 
 
Induction: Intravenous Anesthetic plan and risks discussed with: Patient

## 2018-10-22 NOTE — ROUTINE PROCESS
TRANSFER - IN REPORT: 
 
Verbal report received from SUSAN Hutchinson RN(name) on [employer]  being received from [facility]) for routine post - op Report consisted of patients Situation, Background, Assessment and  
Recommendations(SBAR). Information from the following report(s) OR Summary was reviewed with the receiving nurse. Opportunity for questions and clarification was provided. Assessment completed upon patients arrival to unit and care assumed.

## 2018-10-24 ENCOUNTER — PATIENT OUTREACH (OUTPATIENT)
Dept: ENDOCRINOLOGY | Age: 68
End: 2018-10-24

## 2018-10-24 NOTE — PROGRESS NOTES
10/24/18 Attempted to contact patient, follow up for chronic condition of diabetes with blood sugar readings. No answer, left voicemail message to return telephone call.

## 2018-11-02 NOTE — OP NOTES
[facility] 
OPERATIVE REPORT Name:JAYJAY Horn 
MR#: [id number] : 1950 ACCOUNT #: [de-identified] DATE OF SERVICE: 10/22/2018 SURGEON:  RANJANA Malone MD 
 
ASSISTANT:  None. PREOPERATIVE DIAGNOSIS:  End-stage renal disease. POSTOPERATIVE DIAGNOSIS:  End-stage renal disease. PROCEDURE PERFORMED:  Laparoscopic peritoneal dialysis catheter repositioning and lysis of adhesions. ANESTHESIA:  General. 
 
ESTIMATED BLOOD LOSS:  Minimal. 
 
SPECIMENS REMOVED:  None. DRAINS:  None. IMPLANTS:  None. COMPLICATIONS:  None. INDICATIONS:  Patient is a 72-year-old male who has a peritoneal dialysis catheter which will flush but not drain. He presents for laparoscopic evaluation and probable repositioning of the catheter. PROCEDURE:  After informed consent was obtained, the patient was given preoperative intravenous antibiotics within 1 hour of the incision. He was taken to the operating room and after induction of adequate general anesthesia, the abdomen and the catheter were prepped and draped as a sterile field. The peritoneal cavity was entered in the right upper quadrant using a 5 mm Optiport with a 0-degree scope. This was done without difficulty and pneumoperitoneum was established. Inspection of the peritoneum revealed that the catheter was almost completely encased in adhesions from omentum. The catheter was no longer in the true pelvis despite the fact that it has a long preperitoneal tunnel prior to entering the peritoneal cavity at the level of the pelvic inlet. A second 5 mm trocar site was placed in the right upper abdomen. The catheter was grabbed with a grasper, but it would not pull free from the adhesions easily, which had basically trapped the catheter against the abdominal wall. A laparoscopic Harmonic was then used to lyse all of the adhesions surrounding the catheter.   This was done taking care to avoid any possible area of bowel. The catheter was completely skeletonized from where it entered the peritoneal cavity from the preperitoneal tunnel until the end of the catheter. Catheter was flushed aggressively under direct vision with heparinized saline. The true pelvis was free of adhesions. The catheter was then placed in the true pelvis with the patient in Trendelenburg position. It was held in the true pelvis while the patient was taken out of Trendelenburg and the pneumoperitoneum was slowly released. The catheter remained in perfect position and the grasper and camera were removed. The catheter flushed nicely. It aspirated with a syringe and drained nicely under gravity. The catheter was flushed with heparinized saline and clamped. Local anesthesia was injected at both trocar sites. The skin was closed with 4-0 Monocryl subcuticular suture and Dermabond was applied as a dressing. Dry bandage was applied at the catheter exit site. Patient was extubated and transferred to the PACU in stable condition. All counts were correct. MD CHICHI Kent / JAMIN 
D: 11/02/2018 01:05    
T: 11/02/2018 08:54 JOB #: [id number]

## 2019-01-01 ENCOUNTER — APPOINTMENT (OUTPATIENT)
Dept: GENERAL RADIOLOGY | Age: 69
DRG: 981 | End: 2019-01-01
Attending: INTERNAL MEDICINE
Payer: MEDICARE

## 2019-01-01 ENCOUNTER — OFFICE VISIT (OUTPATIENT)
Dept: ENDOCRINOLOGY | Age: 69
End: 2019-01-01

## 2019-01-01 ENCOUNTER — HOME HEALTH ADMISSION (OUTPATIENT)
Dept: HOME HEALTH SERVICES | Facility: HOME HEALTH | Age: 69
End: 2019-01-01

## 2019-01-01 ENCOUNTER — HOSPITAL ENCOUNTER (INPATIENT)
Age: 69
LOS: 7 days | Discharge: HOME HEALTH | DRG: 981 | End: 2019-09-17
Attending: EMERGENCY MEDICINE | Admitting: INTERNAL MEDICINE
Payer: MEDICARE

## 2019-01-01 ENCOUNTER — HOSPITAL ENCOUNTER (OUTPATIENT)
Age: 69
Setting detail: OUTPATIENT SURGERY
Discharge: HOME | End: 2019-10-17
Attending: SURGERY | Admitting: SURGERY
Payer: MEDICARE

## 2019-01-01 ENCOUNTER — ANESTHESIA (OUTPATIENT)
Dept: SURGERY | Age: 69
DRG: 981 | End: 2019-01-01
Payer: MEDICARE

## 2019-01-01 ENCOUNTER — HOME CARE VISIT (OUTPATIENT)
Dept: SCHEDULING | Facility: HOME HEALTH | Age: 69
End: 2019-01-01

## 2019-01-01 ENCOUNTER — ANESTHESIA EVENT (OUTPATIENT)
Dept: SURGERY | Age: 69
End: 2019-01-01
Payer: MEDICARE

## 2019-01-01 ENCOUNTER — ANESTHESIA (OUTPATIENT)
Dept: SURGERY | Age: 69
End: 2019-01-01
Payer: MEDICARE

## 2019-01-01 ENCOUNTER — APPOINTMENT (OUTPATIENT)
Dept: GENERAL RADIOLOGY | Age: 69
DRG: 981 | End: 2019-01-01
Attending: EMERGENCY MEDICINE
Payer: MEDICARE

## 2019-01-01 ENCOUNTER — ANESTHESIA EVENT (OUTPATIENT)
Dept: SURGERY | Age: 69
DRG: 981 | End: 2019-01-01
Payer: MEDICARE

## 2019-01-01 ENCOUNTER — HOSPITAL ENCOUNTER (OUTPATIENT)
Dept: INTERVENTIONAL RADIOLOGY/VASCULAR | Age: 69
Discharge: HOME | DRG: 981 | End: 2019-09-11
Attending: INTERNAL MEDICINE
Payer: MEDICARE

## 2019-01-01 ENCOUNTER — HOSPITAL ENCOUNTER (INPATIENT)
Dept: INTERVENTIONAL RADIOLOGY/VASCULAR | Age: 69
Discharge: HOME | DRG: 981 | End: 2019-09-16
Attending: INTERNAL MEDICINE
Payer: MEDICARE

## 2019-01-01 VITALS
RESPIRATION RATE: 16 BRPM | SYSTOLIC BLOOD PRESSURE: 108 MMHG | OXYGEN SATURATION: 91 % | WEIGHT: 166.4 LBS | HEIGHT: 68 IN | DIASTOLIC BLOOD PRESSURE: 34 MMHG | HEART RATE: 53 BPM | BODY MASS INDEX: 25.22 KG/M2

## 2019-01-01 VITALS
HEART RATE: 61 BPM | SYSTOLIC BLOOD PRESSURE: 173 MMHG | RESPIRATION RATE: 19 BRPM | TEMPERATURE: 98.7 F | DIASTOLIC BLOOD PRESSURE: 51 MMHG | WEIGHT: 170.86 LBS | HEIGHT: 68 IN | OXYGEN SATURATION: 97 % | BODY MASS INDEX: 25.89 KG/M2

## 2019-01-01 VITALS
HEART RATE: 64 BPM | RESPIRATION RATE: 16 BRPM | TEMPERATURE: 98.5 F | HEIGHT: 69 IN | DIASTOLIC BLOOD PRESSURE: 37 MMHG | SYSTOLIC BLOOD PRESSURE: 161 MMHG | WEIGHT: 173.72 LBS | OXYGEN SATURATION: 97 % | BODY MASS INDEX: 25.73 KG/M2

## 2019-01-01 DIAGNOSIS — N17.9 ACUTE RENAL FAILURE, UNSPECIFIED ACUTE RENAL FAILURE TYPE (HCC): Primary | ICD-10-CM

## 2019-01-01 DIAGNOSIS — R41.82 ALTERED MENTAL STATUS, UNSPECIFIED ALTERED MENTAL STATUS TYPE: ICD-10-CM

## 2019-01-01 DIAGNOSIS — E55.9 VITAMIN D DEFICIENCY: ICD-10-CM

## 2019-01-01 DIAGNOSIS — D62 ACUTE BLOOD LOSS ANEMIA: ICD-10-CM

## 2019-01-01 DIAGNOSIS — T85.71XA PERITONITIS ASSOCIATED WITH PERITONEAL DIALYSIS, INITIAL ENCOUNTER (HCC): ICD-10-CM

## 2019-01-01 DIAGNOSIS — E66.9 OBESITY, CLASS I, BMI 30-34.9: ICD-10-CM

## 2019-01-01 DIAGNOSIS — I10 ESSENTIAL HYPERTENSION, BENIGN: ICD-10-CM

## 2019-01-01 DIAGNOSIS — E78.5 HYPERLIPIDEMIA LDL GOAL <70: ICD-10-CM

## 2019-01-01 LAB
ABO + RH BLD: NORMAL
ALBUMIN SERPL-MCNC: 1.7 G/DL (ref 3.5–5)
ALBUMIN SERPL-MCNC: 1.8 G/DL (ref 3.5–5)
ALBUMIN SERPL-MCNC: 1.9 G/DL (ref 3.5–5)
ALBUMIN SERPL-MCNC: 2 G/DL (ref 3.5–5)
ALBUMIN SERPL-MCNC: 2.1 G/DL (ref 3.5–5)
ALBUMIN SERPL-MCNC: 2.2 G/DL (ref 3.5–5)
ALBUMIN/GLOB SERPL: 0.3 {RATIO} (ref 1.1–2.2)
ALBUMIN/GLOB SERPL: 0.4 {RATIO} (ref 1.1–2.2)
ALBUMIN/GLOB SERPL: 0.5 {RATIO} (ref 1.1–2.2)
ALBUMIN/GLOB SERPL: 0.5 {RATIO} (ref 1.1–2.2)
ALP SERPL-CCNC: 104 U/L (ref 45–117)
ALP SERPL-CCNC: 104 U/L (ref 45–117)
ALP SERPL-CCNC: 78 U/L (ref 45–117)
ALP SERPL-CCNC: 87 U/L (ref 45–117)
ALP SERPL-CCNC: 88 U/L (ref 45–117)
ALP SERPL-CCNC: 90 U/L (ref 45–117)
ALP SERPL-CCNC: 94 U/L (ref 45–117)
ALP SERPL-CCNC: 99 U/L (ref 45–117)
ALT SERPL-CCNC: 13 U/L (ref 12–78)
ALT SERPL-CCNC: 13 U/L (ref 12–78)
ALT SERPL-CCNC: 14 U/L (ref 12–78)
ALT SERPL-CCNC: 16 U/L (ref 12–78)
ALT SERPL-CCNC: 17 U/L (ref 12–78)
ALT SERPL-CCNC: 17 U/L (ref 12–78)
ALT SERPL-CCNC: 23 U/L (ref 12–78)
ALT SERPL-CCNC: 44 U/L (ref 12–78)
ANION GAP BLD CALC-SCNC: 12 MMOL/L (ref 10–20)
ANION GAP BLD CALC-SCNC: 14 MMOL/L (ref 10–20)
ANION GAP SERPL CALC-SCNC: 10 MMOL/L (ref 5–15)
ANION GAP SERPL CALC-SCNC: 11 MMOL/L (ref 5–15)
ANION GAP SERPL CALC-SCNC: 11 MMOL/L (ref 5–15)
ANION GAP SERPL CALC-SCNC: 12 MMOL/L (ref 5–15)
ANION GAP SERPL CALC-SCNC: 7 MMOL/L (ref 5–15)
ANION GAP SERPL CALC-SCNC: 8 MMOL/L (ref 5–15)
ANION GAP SERPL CALC-SCNC: 8 MMOL/L (ref 5–15)
ANION GAP SERPL CALC-SCNC: 9 MMOL/L (ref 5–15)
APPEARANCE FLD: ABNORMAL
APPEARANCE UR: CLEAR
ARTERIAL PATENCY WRIST A: ABNORMAL
AST SERPL-CCNC: 10 U/L (ref 15–37)
AST SERPL-CCNC: 10 U/L (ref 15–37)
AST SERPL-CCNC: 14 U/L (ref 15–37)
AST SERPL-CCNC: 21 U/L (ref 15–37)
AST SERPL-CCNC: 23 U/L (ref 15–37)
AST SERPL-CCNC: 28 U/L (ref 15–37)
AST SERPL-CCNC: 55 U/L (ref 15–37)
AST SERPL-CCNC: 8 U/L (ref 15–37)
ATRIAL RATE: 138 BPM
ATRIAL RATE: 82 BPM
BACTERIA SPEC CULT: ABNORMAL
BACTERIA SPEC CULT: NORMAL
BACTERIA URNS QL MICRO: NEGATIVE /HPF
BASE EXCESS BLD CALC-SCNC: 10 MMOL/L
BASOPHILS # BLD: 0 K/UL (ref 0–0.1)
BASOPHILS # BLD: 0.3 K/UL (ref 0–0.1)
BASOPHILS NFR BLD: 0 % (ref 0–1)
BASOPHILS NFR BLD: 2 % (ref 0–1)
BDY SITE: ABNORMAL
BILIRUB SERPL-MCNC: 0.4 MG/DL (ref 0.2–1)
BILIRUB SERPL-MCNC: 0.4 MG/DL (ref 0.2–1)
BILIRUB SERPL-MCNC: 0.5 MG/DL (ref 0.2–1)
BILIRUB SERPL-MCNC: 0.6 MG/DL (ref 0.2–1)
BILIRUB SERPL-MCNC: 0.6 MG/DL (ref 0.2–1)
BILIRUB SERPL-MCNC: 0.7 MG/DL (ref 0.2–1)
BILIRUB UR QL: NEGATIVE
BLOOD GROUP ANTIBODIES SERPL: NORMAL
BNP SERPL-MCNC: 5451 PG/ML
BUN BLD-MCNC: 21 MG/DL (ref 9–20)
BUN BLD-MCNC: 29 MG/DL (ref 9–20)
BUN SERPL-MCNC: 19 MG/DL (ref 6–20)
BUN SERPL-MCNC: 22 MG/DL (ref 6–20)
BUN SERPL-MCNC: 31 MG/DL (ref 6–20)
BUN SERPL-MCNC: 34 MG/DL (ref 6–20)
BUN SERPL-MCNC: 40 MG/DL (ref 6–20)
BUN SERPL-MCNC: 42 MG/DL (ref 6–20)
BUN SERPL-MCNC: 43 MG/DL (ref 6–20)
BUN SERPL-MCNC: 46 MG/DL (ref 6–20)
BUN/CREAT SERPL: 4 (ref 12–20)
BUN/CREAT SERPL: 5 (ref 12–20)
BUN/CREAT SERPL: 6 (ref 12–20)
CA-I BLD-MCNC: 1.11 MMOL/L (ref 1.12–1.32)
CA-I BLD-MCNC: 1.11 MMOL/L (ref 1.12–1.32)
CALCIUM SERPL-MCNC: 7.7 MG/DL (ref 8.5–10.1)
CALCIUM SERPL-MCNC: 7.8 MG/DL (ref 8.5–10.1)
CALCIUM SERPL-MCNC: 7.8 MG/DL (ref 8.5–10.1)
CALCIUM SERPL-MCNC: 8 MG/DL (ref 8.5–10.1)
CALCIUM SERPL-MCNC: 8.2 MG/DL (ref 8.5–10.1)
CALCIUM SERPL-MCNC: 8.5 MG/DL (ref 8.5–10.1)
CALCIUM SERPL-MCNC: 8.6 MG/DL (ref 8.5–10.1)
CALCIUM SERPL-MCNC: 9 MG/DL (ref 8.5–10.1)
CALCULATED P AXIS, ECG09: 27 DEGREES
CALCULATED R AXIS, ECG10: 13 DEGREES
CALCULATED R AXIS, ECG10: 19 DEGREES
CALCULATED T AXIS, ECG11: 171 DEGREES
CALCULATED T AXIS, ECG11: 61 DEGREES
CHLORIDE BLD-SCNC: 102 MMOL/L (ref 98–107)
CHLORIDE BLD-SCNC: 102 MMOL/L (ref 98–107)
CHLORIDE SERPL-SCNC: 100 MMOL/L (ref 97–108)
CHLORIDE SERPL-SCNC: 102 MMOL/L (ref 97–108)
CHLORIDE SERPL-SCNC: 103 MMOL/L (ref 97–108)
CHLORIDE SERPL-SCNC: 103 MMOL/L (ref 97–108)
CHLORIDE SERPL-SCNC: 89 MMOL/L (ref 97–108)
CHLORIDE SERPL-SCNC: 91 MMOL/L (ref 97–108)
CHLORIDE SERPL-SCNC: 93 MMOL/L (ref 97–108)
CHLORIDE SERPL-SCNC: 97 MMOL/L (ref 97–108)
CK MB CFR SERPL CALC: NORMAL % (ref 0–2.5)
CK MB SERPL-MCNC: <1 NG/ML (ref 5–25)
CK SERPL-CCNC: 88 U/L (ref 39–308)
CO2 BLD-SCNC: 29 MMOL/L (ref 21–32)
CO2 BLD-SCNC: 32 MMOL/L (ref 21–32)
CO2 SERPL-SCNC: 25 MMOL/L (ref 21–32)
CO2 SERPL-SCNC: 25 MMOL/L (ref 21–32)
CO2 SERPL-SCNC: 28 MMOL/L (ref 21–32)
CO2 SERPL-SCNC: 29 MMOL/L (ref 21–32)
CO2 SERPL-SCNC: 29 MMOL/L (ref 21–32)
CO2 SERPL-SCNC: 33 MMOL/L (ref 21–32)
CO2 SERPL-SCNC: 37 MMOL/L (ref 21–32)
CO2 SERPL-SCNC: 38 MMOL/L (ref 21–32)
COLOR FLD: COLORLESS
COLOR UR: ABNORMAL
CREAT BLD-MCNC: 5.7 MG/DL (ref 0.6–1.3)
CREAT BLD-MCNC: 5.8 MG/DL (ref 0.6–1.3)
CREAT SERPL-MCNC: 3.81 MG/DL (ref 0.7–1.3)
CREAT SERPL-MCNC: 4.87 MG/DL (ref 0.7–1.3)
CREAT SERPL-MCNC: 5.27 MG/DL (ref 0.7–1.3)
CREAT SERPL-MCNC: 5.97 MG/DL (ref 0.7–1.3)
CREAT SERPL-MCNC: 7.42 MG/DL (ref 0.7–1.3)
CREAT SERPL-MCNC: 7.83 MG/DL (ref 0.7–1.3)
CREAT SERPL-MCNC: 8.71 MG/DL (ref 0.7–1.3)
CREAT SERPL-MCNC: 9.09 MG/DL (ref 0.7–1.3)
DIAGNOSIS, 93000: NORMAL
DIAGNOSIS, 93000: NORMAL
DIFFERENTIAL METHOD BLD: ABNORMAL
EOSINOPHIL # BLD: 0 K/UL (ref 0–0.4)
EOSINOPHIL # BLD: 0.1 K/UL (ref 0–0.4)
EOSINOPHIL # BLD: 0.1 K/UL (ref 0–0.4)
EOSINOPHIL # BLD: 0.4 K/UL (ref 0–0.4)
EOSINOPHIL # BLD: 0.4 K/UL (ref 0–0.4)
EOSINOPHIL NFR BLD: 0 % (ref 0–7)
EOSINOPHIL NFR BLD: 1 % (ref 0–7)
EOSINOPHIL NFR BLD: 1 % (ref 0–7)
EOSINOPHIL NFR BLD: 3 % (ref 0–7)
EOSINOPHIL NFR BLD: 3 % (ref 0–7)
EPITH CASTS URNS QL MICRO: ABNORMAL /LPF
ERYTHROCYTE [DISTWIDTH] IN BLOOD BY AUTOMATED COUNT: 15.3 % (ref 11.5–14.5)
ERYTHROCYTE [DISTWIDTH] IN BLOOD BY AUTOMATED COUNT: 15.5 % (ref 11.5–14.5)
ERYTHROCYTE [DISTWIDTH] IN BLOOD BY AUTOMATED COUNT: 15.6 % (ref 11.5–14.5)
ERYTHROCYTE [DISTWIDTH] IN BLOOD BY AUTOMATED COUNT: 15.8 % (ref 11.5–14.5)
ERYTHROCYTE [DISTWIDTH] IN BLOOD BY AUTOMATED COUNT: 16.1 % (ref 11.5–14.5)
ERYTHROCYTE [DISTWIDTH] IN BLOOD BY AUTOMATED COUNT: 16.1 % (ref 11.5–14.5)
ERYTHROCYTE [DISTWIDTH] IN BLOOD BY AUTOMATED COUNT: 16.3 % (ref 11.5–14.5)
ERYTHROCYTE [DISTWIDTH] IN BLOOD BY AUTOMATED COUNT: 16.4 % (ref 11.5–14.5)
EST. AVERAGE GLUCOSE BLD GHB EST-MCNC: 128 MG/DL
GAS FLOW.O2 O2 DELIVERY SYS: ABNORMAL L/MIN
GLOBULIN SER CALC-MCNC: 4.2 G/DL (ref 2–4)
GLOBULIN SER CALC-MCNC: 4.4 G/DL (ref 2–4)
GLOBULIN SER CALC-MCNC: 4.5 G/DL (ref 2–4)
GLOBULIN SER CALC-MCNC: 4.6 G/DL (ref 2–4)
GLOBULIN SER CALC-MCNC: 4.8 G/DL (ref 2–4)
GLOBULIN SER CALC-MCNC: 5.1 G/DL (ref 2–4)
GLOBULIN SER CALC-MCNC: 5.7 G/DL (ref 2–4)
GLOBULIN SER CALC-MCNC: 5.9 G/DL (ref 2–4)
GLUCOSE BLD STRIP.AUTO-MCNC: 107 MG/DL (ref 65–100)
GLUCOSE BLD STRIP.AUTO-MCNC: 108 MG/DL (ref 65–100)
GLUCOSE BLD STRIP.AUTO-MCNC: 111 MG/DL (ref 65–100)
GLUCOSE BLD STRIP.AUTO-MCNC: 112 MG/DL (ref 65–100)
GLUCOSE BLD STRIP.AUTO-MCNC: 113 MG/DL (ref 65–100)
GLUCOSE BLD STRIP.AUTO-MCNC: 113 MG/DL (ref 65–100)
GLUCOSE BLD STRIP.AUTO-MCNC: 121 MG/DL (ref 65–100)
GLUCOSE BLD STRIP.AUTO-MCNC: 122 MG/DL (ref 65–100)
GLUCOSE BLD STRIP.AUTO-MCNC: 126 MG/DL (ref 65–100)
GLUCOSE BLD STRIP.AUTO-MCNC: 132 MG/DL (ref 65–100)
GLUCOSE BLD STRIP.AUTO-MCNC: 132 MG/DL (ref 65–100)
GLUCOSE BLD STRIP.AUTO-MCNC: 136 MG/DL (ref 65–100)
GLUCOSE BLD STRIP.AUTO-MCNC: 136 MG/DL (ref 65–100)
GLUCOSE BLD STRIP.AUTO-MCNC: 139 MG/DL (ref 65–100)
GLUCOSE BLD STRIP.AUTO-MCNC: 148 MG/DL (ref 65–100)
GLUCOSE BLD STRIP.AUTO-MCNC: 185 MG/DL (ref 65–100)
GLUCOSE BLD STRIP.AUTO-MCNC: 189 MG/DL (ref 65–100)
GLUCOSE BLD STRIP.AUTO-MCNC: 197 MG/DL (ref 65–100)
GLUCOSE BLD STRIP.AUTO-MCNC: 69 MG/DL (ref 65–100)
GLUCOSE BLD STRIP.AUTO-MCNC: 70 MG/DL (ref 65–100)
GLUCOSE BLD STRIP.AUTO-MCNC: 73 MG/DL (ref 65–100)
GLUCOSE BLD STRIP.AUTO-MCNC: 76 MG/DL (ref 65–100)
GLUCOSE BLD STRIP.AUTO-MCNC: 84 MG/DL (ref 65–100)
GLUCOSE BLD STRIP.AUTO-MCNC: 89 MG/DL (ref 65–100)
GLUCOSE BLD STRIP.AUTO-MCNC: 89 MG/DL (ref 65–100)
GLUCOSE BLD STRIP.AUTO-MCNC: 91 MG/DL (ref 65–100)
GLUCOSE BLD STRIP.AUTO-MCNC: 95 MG/DL (ref 65–100)
GLUCOSE BLD STRIP.AUTO-MCNC: 97 MG/DL (ref 65–100)
GLUCOSE BLD STRIP.AUTO-MCNC: 99 MG/DL (ref 65–100)
GLUCOSE BLD STRIP.AUTO-MCNC: 99 MG/DL (ref 65–100)
GLUCOSE BLD-MCNC: 147 MG/DL (ref 65–100)
GLUCOSE BLD-MCNC: 149 MG/DL (ref 65–100)
GLUCOSE SERPL-MCNC: 100 MG/DL (ref 65–100)
GLUCOSE SERPL-MCNC: 114 MG/DL (ref 65–100)
GLUCOSE SERPL-MCNC: 120 MG/DL (ref 65–100)
GLUCOSE SERPL-MCNC: 124 MG/DL (ref 65–100)
GLUCOSE SERPL-MCNC: 137 MG/DL (ref 65–100)
GLUCOSE SERPL-MCNC: 74 MG/DL (ref 65–100)
GLUCOSE SERPL-MCNC: 83 MG/DL (ref 65–100)
GLUCOSE SERPL-MCNC: 95 MG/DL (ref 65–100)
GLUCOSE UR STRIP.AUTO-MCNC: 100 MG/DL
GRAM STN SPEC: ABNORMAL
GRAM STN SPEC: ABNORMAL
HBA1C MFR BLD: 6.1 % (ref 4.2–6.3)
HBV SURFACE AB SER QL: REACTIVE
HBV SURFACE AB SER-ACNC: 19.61 MIU/ML
HBV SURFACE AG SER QL: <0.1 INDEX
HBV SURFACE AG SER QL: NEGATIVE
HCO3 BLD-SCNC: 32.6 MMOL/L (ref 22–26)
HCT VFR BLD AUTO: 20.9 % (ref 36.6–50.3)
HCT VFR BLD AUTO: 21.9 % (ref 36.6–50.3)
HCT VFR BLD AUTO: 22.5 % (ref 36.6–50.3)
HCT VFR BLD AUTO: 22.9 % (ref 36.6–50.3)
HCT VFR BLD AUTO: 24.1 % (ref 36.6–50.3)
HCT VFR BLD AUTO: 24.5 % (ref 36.6–50.3)
HCT VFR BLD AUTO: 25.6 % (ref 36.6–50.3)
HCT VFR BLD AUTO: 26.3 % (ref 36.6–50.3)
HCT VFR BLD CALC: 32 % (ref 36.6–50.3)
HCT VFR BLD CALC: 32 % (ref 36.6–50.3)
HGB BLD-MCNC: 7.1 G/DL (ref 12.1–17)
HGB BLD-MCNC: 7.2 G/DL (ref 12.1–17)
HGB BLD-MCNC: 7.4 G/DL (ref 12.1–17)
HGB BLD-MCNC: 7.5 G/DL (ref 12.1–17)
HGB BLD-MCNC: 8.1 G/DL (ref 12.1–17)
HGB BLD-MCNC: 8.2 G/DL (ref 12.1–17)
HGB BLD-MCNC: 8.4 G/DL (ref 12.1–17)
HGB BLD-MCNC: 8.6 G/DL (ref 12.1–17)
HGB UR QL STRIP: NEGATIVE
HYALINE CASTS URNS QL MICRO: ABNORMAL /LPF (ref 0–5)
IMM GRANULOCYTES # BLD AUTO: 0 K/UL (ref 0–0.04)
IMM GRANULOCYTES # BLD AUTO: 0.1 K/UL (ref 0–0.04)
IMM GRANULOCYTES # BLD AUTO: 0.1 K/UL (ref 0–0.04)
IMM GRANULOCYTES NFR BLD AUTO: 0 % (ref 0–0.5)
IMM GRANULOCYTES NFR BLD AUTO: 1 % (ref 0–0.5)
IMM GRANULOCYTES NFR BLD AUTO: 1 % (ref 0–0.5)
INR PPP: 1.1 (ref 0.9–1.1)
KETONES UR QL STRIP.AUTO: NEGATIVE MG/DL
LACTATE BLD-SCNC: 4.39 MMOL/L (ref 0.4–2)
LACTATE SERPL-SCNC: 0.5 MMOL/L (ref 0.4–2)
LACTATE SERPL-SCNC: 2.6 MMOL/L (ref 0.4–2)
LACTATE SERPL-SCNC: 3.3 MMOL/L (ref 0.4–2)
LEUKOCYTE ESTERASE UR QL STRIP.AUTO: NEGATIVE
LYMPHOCYTES # BLD: 0.8 K/UL (ref 0.8–3.5)
LYMPHOCYTES # BLD: 0.9 K/UL (ref 0.8–3.5)
LYMPHOCYTES # BLD: 1 K/UL (ref 0.8–3.5)
LYMPHOCYTES # BLD: 1 K/UL (ref 0.8–3.5)
LYMPHOCYTES # BLD: 1.1 K/UL (ref 0.8–3.5)
LYMPHOCYTES # BLD: 1.4 K/UL (ref 0.8–3.5)
LYMPHOCYTES # BLD: 2.4 K/UL (ref 0.8–3.5)
LYMPHOCYTES NFR BLD: 10 % (ref 12–49)
LYMPHOCYTES NFR BLD: 10 % (ref 12–49)
LYMPHOCYTES NFR BLD: 11 % (ref 12–49)
LYMPHOCYTES NFR BLD: 19 % (ref 12–49)
LYMPHOCYTES NFR BLD: 7 % (ref 12–49)
LYMPHOCYTES NFR BLD: 8 % (ref 12–49)
LYMPHOCYTES NFR BLD: 8 % (ref 12–49)
LYMPHOCYTES NFR FLD: 2 %
MAGNESIUM SERPL-MCNC: 1.7 MG/DL (ref 1.6–2.4)
MAGNESIUM SERPL-MCNC: 1.8 MG/DL (ref 1.6–2.4)
MAGNESIUM SERPL-MCNC: 2 MG/DL (ref 1.6–2.4)
MAGNESIUM SERPL-MCNC: 2 MG/DL (ref 1.6–2.4)
MAGNESIUM SERPL-MCNC: 2.2 MG/DL (ref 1.6–2.4)
MAGNESIUM SERPL-MCNC: 2.2 MG/DL (ref 1.6–2.4)
MAGNESIUM SERPL-MCNC: 2.3 MG/DL (ref 1.6–2.4)
MCH RBC QN AUTO: 32.5 PG (ref 26–34)
MCH RBC QN AUTO: 32.6 PG (ref 26–34)
MCH RBC QN AUTO: 32.6 PG (ref 26–34)
MCH RBC QN AUTO: 32.8 PG (ref 26–34)
MCH RBC QN AUTO: 33.3 PG (ref 26–34)
MCH RBC QN AUTO: 33.3 PG (ref 26–34)
MCH RBC QN AUTO: 33.5 PG (ref 26–34)
MCH RBC QN AUTO: 33.6 PG (ref 26–34)
MCHC RBC AUTO-ENTMCNC: 32.3 G/DL (ref 30–36.5)
MCHC RBC AUTO-ENTMCNC: 32.7 G/DL (ref 30–36.5)
MCHC RBC AUTO-ENTMCNC: 32.8 G/DL (ref 30–36.5)
MCHC RBC AUTO-ENTMCNC: 32.9 G/DL (ref 30–36.5)
MCHC RBC AUTO-ENTMCNC: 33.1 G/DL (ref 30–36.5)
MCHC RBC AUTO-ENTMCNC: 33.3 G/DL (ref 30–36.5)
MCHC RBC AUTO-ENTMCNC: 34 G/DL (ref 30–36.5)
MCHC RBC AUTO-ENTMCNC: 34 G/DL (ref 30–36.5)
MCV RBC AUTO: 100.4 FL (ref 80–99)
MCV RBC AUTO: 100.4 FL (ref 80–99)
MCV RBC AUTO: 100.8 FL (ref 80–99)
MCV RBC AUTO: 101.4 FL (ref 80–99)
MCV RBC AUTO: 97.8 FL (ref 80–99)
MCV RBC AUTO: 98.6 FL (ref 80–99)
MCV RBC AUTO: 98.8 FL (ref 80–99)
MCV RBC AUTO: 99.2 FL (ref 80–99)
METAMYELOCYTES NFR BLD MANUAL: 1 %
METAMYELOCYTES NFR BLD MANUAL: 1 %
MONOCYTES # BLD: 0.6 K/UL (ref 0–1)
MONOCYTES # BLD: 1 K/UL (ref 0–1)
MONOCYTES # BLD: 1.1 K/UL (ref 0–1)
MONOCYTES # BLD: 1.1 K/UL (ref 0–1)
MONOCYTES # BLD: 1.4 K/UL (ref 0–1)
MONOCYTES # BLD: 1.4 K/UL (ref 0–1)
MONOCYTES # BLD: 1.5 K/UL (ref 0–1)
MONOCYTES NFR BLD: 10 % (ref 5–13)
MONOCYTES NFR BLD: 11 % (ref 5–13)
MONOCYTES NFR BLD: 12 % (ref 5–13)
MONOCYTES NFR BLD: 16 % (ref 5–13)
MONOCYTES NFR BLD: 5 % (ref 5–13)
MONOCYTES NFR BLD: 8 % (ref 5–13)
MONOCYTES NFR BLD: 9 % (ref 5–13)
MONOS+MACROS NFR FLD: 6 %
MYELOCYTES NFR BLD MANUAL: 1 %
MYELOCYTES NFR BLD MANUAL: 3 %
NEUTROPHILS NFR FLD: 92 %
NEUTS BAND NFR BLD MANUAL: 1 %
NEUTS BAND NFR BLD MANUAL: 10 %
NEUTS BAND NFR BLD MANUAL: 2 %
NEUTS BAND NFR BLD MANUAL: 2 %
NEUTS BAND NFR BLD MANUAL: 6 %
NEUTS SEG # BLD: 10 K/UL (ref 1.8–8)
NEUTS SEG # BLD: 10.9 K/UL (ref 1.8–8)
NEUTS SEG # BLD: 7.1 K/UL (ref 1.8–8)
NEUTS SEG # BLD: 8.3 K/UL (ref 1.8–8)
NEUTS SEG # BLD: 8.6 K/UL (ref 1.8–8)
NEUTS SEG # BLD: 8.7 K/UL (ref 1.8–8)
NEUTS SEG # BLD: 9.7 K/UL (ref 1.8–8)
NEUTS SEG NFR BLD: 64 % (ref 32–75)
NEUTS SEG NFR BLD: 69 % (ref 32–75)
NEUTS SEG NFR BLD: 73 % (ref 32–75)
NEUTS SEG NFR BLD: 74 % (ref 32–75)
NEUTS SEG NFR BLD: 77 % (ref 32–75)
NEUTS SEG NFR BLD: 79 % (ref 32–75)
NEUTS SEG NFR BLD: 81 % (ref 32–75)
NITRITE UR QL STRIP.AUTO: NEGATIVE
NRBC # BLD: 0.02 K/UL (ref 0–0.01)
NRBC # BLD: 0.04 K/UL (ref 0–0.01)
NRBC # BLD: 0.06 K/UL (ref 0–0.01)
NRBC # BLD: 0.07 K/UL (ref 0–0.01)
NRBC # BLD: 0.24 K/UL (ref 0–0.01)
NRBC # BLD: 0.25 K/UL (ref 0–0.01)
NRBC # BLD: 0.32 K/UL (ref 0–0.01)
NRBC # BLD: 0.54 K/UL (ref 0–0.01)
NRBC BLD-RTO: 0.2 PER 100 WBC
NRBC BLD-RTO: 0.3 PER 100 WBC
NRBC BLD-RTO: 0.5 PER 100 WBC
NRBC BLD-RTO: 0.6 PER 100 WBC
NRBC BLD-RTO: 2.1 PER 100 WBC
NRBC BLD-RTO: 2.1 PER 100 WBC
NRBC BLD-RTO: 2.5 PER 100 WBC
NRBC BLD-RTO: 4.3 PER 100 WBC
NUC CELL # FLD: ABNORMAL /CU MM
O2/TOTAL GAS SETTING VFR VENT: 50 %
P-R INTERVAL, ECG05: 168 MS
PCO2 BLD: 39.6 MMHG (ref 35–45)
PH BLD: 7.52 [PH] (ref 7.35–7.45)
PH UR STRIP: 8 [PH] (ref 5–8)
PHOSPHATE SERPL-MCNC: 2.9 MG/DL (ref 2.6–4.7)
PHOSPHATE SERPL-MCNC: 4.6 MG/DL (ref 2.6–4.7)
PHOSPHATE SERPL-MCNC: 5.7 MG/DL (ref 2.6–4.7)
PLATELET # BLD AUTO: 288 K/UL (ref 150–400)
PLATELET # BLD AUTO: 326 K/UL (ref 150–400)
PLATELET # BLD AUTO: 327 K/UL (ref 150–400)
PLATELET # BLD AUTO: 328 K/UL (ref 150–400)
PLATELET # BLD AUTO: 359 K/UL (ref 150–400)
PLATELET # BLD AUTO: 374 K/UL (ref 150–400)
PLATELET # BLD AUTO: 387 K/UL (ref 150–400)
PLATELET # BLD AUTO: 389 K/UL (ref 150–400)
PLATELET COMMENTS,PCOM: ABNORMAL
PMV BLD AUTO: 11.3 FL (ref 8.9–12.9)
PMV BLD AUTO: 11.6 FL (ref 8.9–12.9)
PMV BLD AUTO: 11.7 FL (ref 8.9–12.9)
PMV BLD AUTO: 11.7 FL (ref 8.9–12.9)
PMV BLD AUTO: 12 FL (ref 8.9–12.9)
PMV BLD AUTO: 12.1 FL (ref 8.9–12.9)
PMV BLD AUTO: 12.1 FL (ref 8.9–12.9)
PMV BLD AUTO: 12.3 FL (ref 8.9–12.9)
PO2 BLD: 51 MMHG (ref 80–100)
POTASSIUM BLD-SCNC: 4.3 MMOL/L (ref 3.5–5.1)
POTASSIUM BLD-SCNC: 5.9 MMOL/L (ref 3.5–5.1)
POTASSIUM SERPL-SCNC: 3.1 MMOL/L (ref 3.5–5.1)
POTASSIUM SERPL-SCNC: 3.2 MMOL/L (ref 3.5–5.1)
POTASSIUM SERPL-SCNC: 3.7 MMOL/L (ref 3.5–5.1)
POTASSIUM SERPL-SCNC: 3.7 MMOL/L (ref 3.5–5.1)
POTASSIUM SERPL-SCNC: 3.8 MMOL/L (ref 3.5–5.1)
POTASSIUM SERPL-SCNC: 4 MMOL/L (ref 3.5–5.1)
POTASSIUM SERPL-SCNC: 4.1 MMOL/L (ref 3.5–5.1)
POTASSIUM SERPL-SCNC: 4.2 MMOL/L (ref 3.5–5.1)
PROCALCITONIN SERPL-MCNC: 1.6 NG/ML
PROT FLD-MCNC: 0.7 G/DL
PROT SERPL-MCNC: 6.1 G/DL (ref 6.4–8.2)
PROT SERPL-MCNC: 6.2 G/DL (ref 6.4–8.2)
PROT SERPL-MCNC: 6.3 G/DL (ref 6.4–8.2)
PROT SERPL-MCNC: 6.6 G/DL (ref 6.4–8.2)
PROT SERPL-MCNC: 7 G/DL (ref 6.4–8.2)
PROT SERPL-MCNC: 7 G/DL (ref 6.4–8.2)
PROT SERPL-MCNC: 7.8 G/DL (ref 6.4–8.2)
PROT SERPL-MCNC: 7.9 G/DL (ref 6.4–8.2)
PROT UR STRIP-MCNC: 300 MG/DL
PROTHROMBIN TIME: 10.8 SEC (ref 9–11.1)
Q-T INTERVAL, ECG07: 340 MS
Q-T INTERVAL, ECG07: 420 MS
QRS DURATION, ECG06: 100 MS
QRS DURATION, ECG06: 86 MS
QTC CALCULATION (BEZET), ECG08: 490 MS
QTC CALCULATION (BEZET), ECG08: 503 MS
RBC # BLD AUTO: 2.12 M/UL (ref 4.1–5.7)
RBC # BLD AUTO: 2.16 M/UL (ref 4.1–5.7)
RBC # BLD AUTO: 2.28 M/UL (ref 4.1–5.7)
RBC # BLD AUTO: 2.3 M/UL (ref 4.1–5.7)
RBC # BLD AUTO: 2.43 M/UL (ref 4.1–5.7)
RBC # BLD AUTO: 2.44 M/UL (ref 4.1–5.7)
RBC # BLD AUTO: 2.58 M/UL (ref 4.1–5.7)
RBC # BLD AUTO: 2.62 M/UL (ref 4.1–5.7)
RBC # FLD: 82 /CU MM
RBC #/AREA URNS HPF: ABNORMAL /HPF (ref 0–5)
RBC MORPH BLD: ABNORMAL
SAO2 % BLD: 90 % (ref 92–97)
SERVICE CMNT-IMP: ABNORMAL
SERVICE CMNT-IMP: NORMAL
SODIUM BLD-SCNC: 139 MMOL/L (ref 136–145)
SODIUM BLD-SCNC: 140 MMOL/L (ref 136–145)
SODIUM SERPL-SCNC: 134 MMOL/L (ref 136–145)
SODIUM SERPL-SCNC: 135 MMOL/L (ref 136–145)
SODIUM SERPL-SCNC: 137 MMOL/L (ref 136–145)
SODIUM SERPL-SCNC: 137 MMOL/L (ref 136–145)
SODIUM SERPL-SCNC: 138 MMOL/L (ref 136–145)
SODIUM SERPL-SCNC: 139 MMOL/L (ref 136–145)
SP GR UR REFRACTOMETRY: 1.02 (ref 1–1.03)
SPECIMEN EXP DATE BLD: NORMAL
SPECIMEN SOURCE FLD: ABNORMAL
SPECIMEN SOURCE FLD: NORMAL
SPECIMEN TYPE: ABNORMAL
TOTAL RESP. RATE, ITRR: 16
TROPONIN I SERPL-MCNC: 0.05 NG/ML
TROPONIN I SERPL-MCNC: 0.08 NG/ML
TROPONIN I SERPL-MCNC: 0.09 NG/ML
TROPONIN I SERPL-MCNC: 0.09 NG/ML
UA: UC IF INDICATED,UAUC: ABNORMAL
UROBILINOGEN UR QL STRIP.AUTO: 0.2 EU/DL (ref 0.2–1)
VANCOMYCIN SERPL-MCNC: 18.6 UG/ML
VENTRICULAR RATE, ECG03: 132 BPM
VENTRICULAR RATE, ECG03: 82 BPM
WBC # BLD AUTO: 10.9 K/UL (ref 4.1–11.1)
WBC # BLD AUTO: 11.5 K/UL (ref 4.1–11.1)
WBC # BLD AUTO: 12.1 K/UL (ref 4.1–11.1)
WBC # BLD AUTO: 12.1 K/UL (ref 4.1–11.1)
WBC # BLD AUTO: 12.5 K/UL (ref 4.1–11.1)
WBC # BLD AUTO: 12.6 K/UL (ref 4.1–11.1)
WBC # BLD AUTO: 12.9 K/UL (ref 4.1–11.1)
WBC # BLD AUTO: 9.8 K/UL (ref 4.1–11.1)
WBC MORPH BLD: ABNORMAL
WBC URNS QL MICRO: ABNORMAL /HPF (ref 0–4)

## 2019-01-01 PROCEDURE — 83735 ASSAY OF MAGNESIUM: CPT

## 2019-01-01 PROCEDURE — 74011000250 HC RX REV CODE- 250: Performed by: NURSE ANESTHETIST, CERTIFIED REGISTERED

## 2019-01-01 PROCEDURE — 36415 COLL VENOUS BLD VENIPUNCTURE: CPT

## 2019-01-01 PROCEDURE — 74011250637 HC RX REV CODE- 250/637: Performed by: INTERNAL MEDICINE

## 2019-01-01 PROCEDURE — 81001 URINALYSIS AUTO W/SCOPE: CPT

## 2019-01-01 PROCEDURE — 02PYX3Z REMOVAL OF INFUSION DEVICE FROM GREAT VESSEL, EXTERNAL APPROACH: ICD-10-PCS | Performed by: RADIOLOGY

## 2019-01-01 PROCEDURE — 94761 N-INVAS EAR/PLS OXIMETRY MLT: CPT

## 2019-01-01 PROCEDURE — 74011250637 HC RX REV CODE- 250/637: Performed by: EMERGENCY MEDICINE

## 2019-01-01 PROCEDURE — 86706 HEP B SURFACE ANTIBODY: CPT

## 2019-01-01 PROCEDURE — 74011250636 HC RX REV CODE- 250/636: Performed by: SURGERY

## 2019-01-01 PROCEDURE — 77030008463 HC STPLR SKN PROX J&J -B: Performed by: SURGERY

## 2019-01-01 PROCEDURE — 80053 COMPREHEN METABOLIC PANEL: CPT

## 2019-01-01 PROCEDURE — 74011250636 HC RX REV CODE- 250/636: Performed by: INTERNAL MEDICINE

## 2019-01-01 PROCEDURE — 90935 HEMODIALYSIS ONE EVALUATION: CPT

## 2019-01-01 PROCEDURE — 74011000258 HC RX REV CODE- 258: Performed by: EMERGENCY MEDICINE

## 2019-01-01 PROCEDURE — 65270000029 HC RM PRIVATE

## 2019-01-01 PROCEDURE — 74011250636 HC RX REV CODE- 250/636: Performed by: RADIOLOGY

## 2019-01-01 PROCEDURE — 77030031139 HC SUT VCRL2 J&J -A: Performed by: SURGERY

## 2019-01-01 PROCEDURE — 85027 COMPLETE CBC AUTOMATED: CPT

## 2019-01-01 PROCEDURE — 74011250636 HC RX REV CODE- 250/636: Performed by: ANESTHESIOLOGY

## 2019-01-01 PROCEDURE — 77030031139 HC SUT VCRL2 J&J -A

## 2019-01-01 PROCEDURE — 74011636637 HC RX REV CODE- 636/637: Performed by: INTERNAL MEDICINE

## 2019-01-01 PROCEDURE — 74011250636 HC RX REV CODE- 250/636: Performed by: STUDENT IN AN ORGANIZED HEALTH CARE EDUCATION/TRAINING PROGRAM

## 2019-01-01 PROCEDURE — 85025 COMPLETE CBC W/AUTO DIFF WBC: CPT

## 2019-01-01 PROCEDURE — 76060000034 HC ANESTHESIA 1.5 TO 2 HR: Performed by: SURGERY

## 2019-01-01 PROCEDURE — 82962 GLUCOSE BLOOD TEST: CPT

## 2019-01-01 PROCEDURE — 74011000250 HC RX REV CODE- 250: Performed by: ANESTHESIOLOGY

## 2019-01-01 PROCEDURE — 77030002933 HC SUT MCRYL J&J -A: Performed by: SURGERY

## 2019-01-01 PROCEDURE — 77030002986 HC SUT PROL J&J -A: Performed by: SURGERY

## 2019-01-01 PROCEDURE — 97116 GAIT TRAINING THERAPY: CPT

## 2019-01-01 PROCEDURE — 77030039266 HC ADH SKN EXOFIN S2SG -A: Performed by: SURGERY

## 2019-01-01 PROCEDURE — 77030003601 HC NDL NRV BLK BBMI -A: Performed by: NURSE ANESTHETIST, CERTIFIED REGISTERED

## 2019-01-01 PROCEDURE — 84484 ASSAY OF TROPONIN QUANT: CPT

## 2019-01-01 PROCEDURE — 74011250636 HC RX REV CODE- 250/636: Performed by: EMERGENCY MEDICINE

## 2019-01-01 PROCEDURE — 77030002996 HC SUT SLK J&J -A: Performed by: SURGERY

## 2019-01-01 PROCEDURE — 77030040922 HC BLNKT HYPOTHRM STRY -A

## 2019-01-01 PROCEDURE — 71045 X-RAY EXAM CHEST 1 VIEW: CPT

## 2019-01-01 PROCEDURE — 51798 US URINE CAPACITY MEASURE: CPT

## 2019-01-01 PROCEDURE — 77030002924 HC SUT GORTX WLGO -B: Performed by: SURGERY

## 2019-01-01 PROCEDURE — 94660 CPAP INITIATION&MGMT: CPT

## 2019-01-01 PROCEDURE — 96374 THER/PROPH/DIAG INJ IV PUSH: CPT

## 2019-01-01 PROCEDURE — 89050 BODY FLUID CELL COUNT: CPT

## 2019-01-01 PROCEDURE — 76210000006 HC OR PH I REC 0.5 TO 1 HR: Performed by: SURGERY

## 2019-01-01 PROCEDURE — 36600 WITHDRAWAL OF ARTERIAL BLOOD: CPT

## 2019-01-01 PROCEDURE — 84100 ASSAY OF PHOSPHORUS: CPT

## 2019-01-01 PROCEDURE — 77010033678 HC OXYGEN DAILY

## 2019-01-01 PROCEDURE — 77030039266 HC ADH SKN EXOFIN S2SG -A

## 2019-01-01 PROCEDURE — 83605 ASSAY OF LACTIC ACID: CPT

## 2019-01-01 PROCEDURE — 02HV33Z INSERTION OF INFUSION DEVICE INTO SUPERIOR VENA CAVA, PERCUTANEOUS APPROACH: ICD-10-PCS | Performed by: STUDENT IN AN ORGANIZED HEALTH CARE EDUCATION/TRAINING PROGRAM

## 2019-01-01 PROCEDURE — 36556 INSERT NON-TUNNEL CV CATH: CPT

## 2019-01-01 PROCEDURE — 74011250636 HC RX REV CODE- 250/636: Performed by: NURSE PRACTITIONER

## 2019-01-01 PROCEDURE — 77030002916 HC SUT ETHLN J&J -A: Performed by: SURGERY

## 2019-01-01 PROCEDURE — 80047 BASIC METABLC PNL IONIZED CA: CPT

## 2019-01-01 PROCEDURE — C1750 CATH, HEMODIALYSIS,LONG-TERM: HCPCS

## 2019-01-01 PROCEDURE — 65660000000 HC RM CCU STEPDOWN

## 2019-01-01 PROCEDURE — 87040 BLOOD CULTURE FOR BACTERIA: CPT

## 2019-01-01 PROCEDURE — 77030008684 HC TU ET CUF COVD -B: Performed by: NURSE ANESTHETIST, CERTIFIED REGISTERED

## 2019-01-01 PROCEDURE — 76010000153 HC OR TIME 1.5 TO 2 HR: Performed by: SURGERY

## 2019-01-01 PROCEDURE — 74011000250 HC RX REV CODE- 250: Performed by: INTERNAL MEDICINE

## 2019-01-01 PROCEDURE — 36558 INSERT TUNNELED CV CATH: CPT

## 2019-01-01 PROCEDURE — 85610 PROTHROMBIN TIME: CPT

## 2019-01-01 PROCEDURE — 87205 SMEAR GRAM STAIN: CPT

## 2019-01-01 PROCEDURE — 76060000033 HC ANESTHESIA 1 TO 1.5 HR: Performed by: SURGERY

## 2019-01-01 PROCEDURE — 0JH63XZ INSERTION OF TUNNELED VASCULAR ACCESS DEVICE INTO CHEST SUBCUTANEOUS TISSUE AND FASCIA, PERCUTANEOUS APPROACH: ICD-10-PCS | Performed by: RADIOLOGY

## 2019-01-01 PROCEDURE — 97530 THERAPEUTIC ACTIVITIES: CPT

## 2019-01-01 PROCEDURE — 74011250636 HC RX REV CODE- 250/636: Performed by: NURSE ANESTHETIST, CERTIFIED REGISTERED

## 2019-01-01 PROCEDURE — 76010000149 HC OR TIME 1 TO 1.5 HR: Performed by: SURGERY

## 2019-01-01 PROCEDURE — 64415 NJX AA&/STRD BRCH PLXS IMG: CPT | Performed by: NURSE ANESTHETIST, CERTIFIED REGISTERED

## 2019-01-01 PROCEDURE — 76210000063 HC OR PH I REC FIRST 0.5 HR: Performed by: SURGERY

## 2019-01-01 PROCEDURE — 97110 THERAPEUTIC EXERCISES: CPT

## 2019-01-01 PROCEDURE — 77030040133 HC BLANKET THRM DISP CSZ -A

## 2019-01-01 PROCEDURE — 87340 HEPATITIS B SURFACE AG IA: CPT

## 2019-01-01 PROCEDURE — 77030026438 HC STYL ET INTUB CARD -A: Performed by: NURSE ANESTHETIST, CERTIFIED REGISTERED

## 2019-01-01 PROCEDURE — 93005 ELECTROCARDIOGRAM TRACING: CPT

## 2019-01-01 PROCEDURE — 5A1D70Z PERFORMANCE OF URINARY FILTRATION, INTERMITTENT, LESS THAN 6 HOURS PER DAY: ICD-10-PCS | Performed by: INTERNAL MEDICINE

## 2019-01-01 PROCEDURE — 77030011640 HC PAD GRND REM COVD -A: Performed by: SURGERY

## 2019-01-01 PROCEDURE — 90945 DIALYSIS ONE EVALUATION: CPT

## 2019-01-01 PROCEDURE — 77030002987 HC SUT PROL J&J -B: Performed by: SURGERY

## 2019-01-01 PROCEDURE — 97535 SELF CARE MNGMENT TRAINING: CPT

## 2019-01-01 PROCEDURE — 87077 CULTURE AEROBIC IDENTIFY: CPT

## 2019-01-01 PROCEDURE — 74011000250 HC RX REV CODE- 250: Performed by: SURGERY

## 2019-01-01 PROCEDURE — 77030031753 HC SHR ENDO COAG HARM J&J -E: Performed by: SURGERY

## 2019-01-01 PROCEDURE — 71046 X-RAY EXAM CHEST 2 VIEWS: CPT

## 2019-01-01 PROCEDURE — C1752 CATH,HEMODIALYSIS,SHORT-TERM: HCPCS

## 2019-01-01 PROCEDURE — 84145 PROCALCITONIN (PCT): CPT

## 2019-01-01 PROCEDURE — 88300 SURGICAL PATH GROSS: CPT

## 2019-01-01 PROCEDURE — 99285 EMERGENCY DEPT VISIT HI MDM: CPT

## 2019-01-01 PROCEDURE — 97161 PT EVAL LOW COMPLEX 20 MIN: CPT

## 2019-01-01 PROCEDURE — 74011000272 HC RX REV CODE- 272: Performed by: SURGERY

## 2019-01-01 PROCEDURE — 74011000258 HC RX REV CODE- 258: Performed by: INTERNAL MEDICINE

## 2019-01-01 PROCEDURE — 94760 N-INVAS EAR/PLS OXIMETRY 1: CPT

## 2019-01-01 PROCEDURE — 82803 BLOOD GASES ANY COMBINATION: CPT

## 2019-01-01 PROCEDURE — 02H633Z INSERTION OF INFUSION DEVICE INTO RIGHT ATRIUM, PERCUTANEOUS APPROACH: ICD-10-PCS | Performed by: RADIOLOGY

## 2019-01-01 PROCEDURE — 77030019908 HC STETH ESOPH SIMS -A: Performed by: NURSE ANESTHETIST, CERTIFIED REGISTERED

## 2019-01-01 PROCEDURE — 74011000250 HC RX REV CODE- 250: Performed by: STUDENT IN AN ORGANIZED HEALTH CARE EDUCATION/TRAINING PROGRAM

## 2019-01-01 PROCEDURE — 77030008467 HC STPLR SKN COVD -B: Performed by: SURGERY

## 2019-01-01 PROCEDURE — 80202 ASSAY OF VANCOMYCIN: CPT

## 2019-01-01 PROCEDURE — C1892 INTRO/SHEATH,FIXED,PEEL-AWAY: HCPCS

## 2019-01-01 PROCEDURE — 82550 ASSAY OF CK (CPK): CPT

## 2019-01-01 PROCEDURE — 76210000020 HC REC RM PH II FIRST 0.5 HR: Performed by: SURGERY

## 2019-01-01 PROCEDURE — 83880 ASSAY OF NATRIURETIC PEPTIDE: CPT

## 2019-01-01 PROCEDURE — 0WPG03Z REMOVAL OF INFUSION DEVICE FROM PERITONEAL CAVITY, OPEN APPROACH: ICD-10-PCS | Performed by: SURGERY

## 2019-01-01 PROCEDURE — 84157 ASSAY OF PROTEIN OTHER: CPT

## 2019-01-01 PROCEDURE — 97165 OT EVAL LOW COMPLEX 30 MIN: CPT

## 2019-01-01 PROCEDURE — 86900 BLOOD TYPING SEROLOGIC ABO: CPT

## 2019-01-01 PROCEDURE — 83036 HEMOGLOBIN GLYCOSYLATED A1C: CPT

## 2019-01-01 PROCEDURE — A4722 DIALYS SOL FLD VOL > 1999CC: HCPCS | Performed by: INTERNAL MEDICINE

## 2019-01-01 PROCEDURE — 77030020256 HC SOL INJ NACL 0.9%  500ML: Performed by: SURGERY

## 2019-01-01 PROCEDURE — 87186 SC STD MICRODIL/AGAR DIL: CPT

## 2019-01-01 PROCEDURE — 74011000250 HC RX REV CODE- 250: Performed by: RADIOLOGY

## 2019-01-01 RX ORDER — MIDAZOLAM HYDROCHLORIDE 1 MG/ML
0.5 INJECTION, SOLUTION INTRAMUSCULAR; INTRAVENOUS
Status: DISCONTINUED | OUTPATIENT
Start: 2019-01-01 | End: 2019-01-01 | Stop reason: HOSPADM

## 2019-01-01 RX ORDER — MIDAZOLAM HYDROCHLORIDE 1 MG/ML
INJECTION, SOLUTION INTRAMUSCULAR; INTRAVENOUS AS NEEDED
Status: DISCONTINUED | OUTPATIENT
Start: 2019-01-01 | End: 2019-01-01 | Stop reason: HOSPADM

## 2019-01-01 RX ORDER — MINOXIDIL 2.5 MG/1
5 TABLET ORAL 2 TIMES DAILY
Status: DISCONTINUED | OUTPATIENT
Start: 2019-01-01 | End: 2019-01-01

## 2019-01-01 RX ORDER — FUROSEMIDE 80 MG/1
80 TABLET ORAL 3 TIMES DAILY
Status: DISCONTINUED | OUTPATIENT
Start: 2019-01-01 | End: 2019-01-01

## 2019-01-01 RX ORDER — SODIUM CHLORIDE 9 MG/ML
25 INJECTION, SOLUTION INTRAVENOUS CONTINUOUS
Status: DISCONTINUED | OUTPATIENT
Start: 2019-01-01 | End: 2019-01-01 | Stop reason: HOSPADM

## 2019-01-01 RX ORDER — DILTIAZEM HYDROCHLORIDE 120 MG/1
120 CAPSULE, COATED, EXTENDED RELEASE ORAL DAILY
Qty: 30 CAP | Refills: 1 | Status: SHIPPED | OUTPATIENT
Start: 2019-01-01 | End: 2019-01-01 | Stop reason: CLARIF

## 2019-01-01 RX ORDER — ACETAMINOPHEN 325 MG/1
650 TABLET ORAL
Status: DISCONTINUED | OUTPATIENT
Start: 2019-01-01 | End: 2019-01-01 | Stop reason: HOSPADM

## 2019-01-01 RX ORDER — LIDOCAINE HYDROCHLORIDE 20 MG/ML
INJECTION, SOLUTION EPIDURAL; INFILTRATION; INTRACAUDAL; PERINEURAL AS NEEDED
Status: DISCONTINUED | OUTPATIENT
Start: 2019-01-01 | End: 2019-01-01 | Stop reason: HOSPADM

## 2019-01-01 RX ORDER — SODIUM CHLORIDE, SODIUM LACTATE, POTASSIUM CHLORIDE, CALCIUM CHLORIDE 600; 310; 30; 20 MG/100ML; MG/100ML; MG/100ML; MG/100ML
100 INJECTION, SOLUTION INTRAVENOUS CONTINUOUS
Status: DISCONTINUED | OUTPATIENT
Start: 2019-01-01 | End: 2019-01-01 | Stop reason: HOSPADM

## 2019-01-01 RX ORDER — CLONIDINE HYDROCHLORIDE 0.1 MG/1
0.2 TABLET ORAL 2 TIMES DAILY
Status: DISCONTINUED | OUTPATIENT
Start: 2019-01-01 | End: 2019-01-01 | Stop reason: HOSPADM

## 2019-01-01 RX ORDER — TAMSULOSIN HYDROCHLORIDE 0.4 MG/1
0.4 CAPSULE ORAL
Status: DISCONTINUED | OUTPATIENT
Start: 2019-01-01 | End: 2019-01-01

## 2019-01-01 RX ORDER — POTASSIUM CHLORIDE 750 MG/1
40 TABLET, FILM COATED, EXTENDED RELEASE ORAL
Status: COMPLETED | OUTPATIENT
Start: 2019-01-01 | End: 2019-01-01

## 2019-01-01 RX ORDER — SODIUM CHLORIDE, SODIUM LACTATE, POTASSIUM CHLORIDE, CALCIUM CHLORIDE 600; 310; 30; 20 MG/100ML; MG/100ML; MG/100ML; MG/100ML
25 INJECTION, SOLUTION INTRAVENOUS CONTINUOUS
Status: DISCONTINUED | OUTPATIENT
Start: 2019-01-01 | End: 2019-01-01 | Stop reason: HOSPADM

## 2019-01-01 RX ORDER — LEVOFLOXACIN 750 MG/1
750 TABLET ORAL ONCE
Status: COMPLETED | OUTPATIENT
Start: 2019-01-01 | End: 2019-01-01

## 2019-01-01 RX ORDER — LIDOCAINE HYDROCHLORIDE 20 MG/ML
20 INJECTION, SOLUTION INFILTRATION; PERINEURAL ONCE
Status: COMPLETED | OUTPATIENT
Start: 2019-01-01 | End: 2019-01-01

## 2019-01-01 RX ORDER — ROPIVACAINE HYDROCHLORIDE 5 MG/ML
30 INJECTION, SOLUTION EPIDURAL; INFILTRATION; PERINEURAL AS NEEDED
Status: DISCONTINUED | OUTPATIENT
Start: 2019-01-01 | End: 2019-01-01 | Stop reason: HOSPADM

## 2019-01-01 RX ORDER — MINOXIDIL 10 MG/1
5 TABLET ORAL 2 TIMES DAILY
COMMUNITY

## 2019-01-01 RX ORDER — DILTIAZEM HYDROCHLORIDE 120 MG/1
120 CAPSULE, COATED, EXTENDED RELEASE ORAL DAILY
Status: DISCONTINUED | OUTPATIENT
Start: 2019-01-01 | End: 2019-01-01 | Stop reason: HOSPADM

## 2019-01-01 RX ORDER — TAMSULOSIN HYDROCHLORIDE 0.4 MG/1
0.8 CAPSULE ORAL
Status: DISCONTINUED | OUTPATIENT
Start: 2019-01-01 | End: 2019-01-01 | Stop reason: HOSPADM

## 2019-01-01 RX ORDER — MIDAZOLAM HYDROCHLORIDE 1 MG/ML
1 INJECTION, SOLUTION INTRAMUSCULAR; INTRAVENOUS AS NEEDED
Status: DISCONTINUED | OUTPATIENT
Start: 2019-01-01 | End: 2019-01-01 | Stop reason: HOSPADM

## 2019-01-01 RX ORDER — LEVOFLOXACIN 500 MG/1
500 TABLET, FILM COATED ORAL
Qty: 3 TAB | Refills: 0 | Status: SHIPPED | OUTPATIENT
Start: 2019-01-01 | End: 2019-01-01

## 2019-01-01 RX ORDER — PROPOFOL 10 MG/ML
INJECTION, EMULSION INTRAVENOUS AS NEEDED
Status: DISCONTINUED | OUTPATIENT
Start: 2019-01-01 | End: 2019-01-01 | Stop reason: HOSPADM

## 2019-01-01 RX ORDER — MORPHINE SULFATE 10 MG/ML
2 INJECTION, SOLUTION INTRAMUSCULAR; INTRAVENOUS
Status: DISCONTINUED | OUTPATIENT
Start: 2019-01-01 | End: 2019-01-01 | Stop reason: HOSPADM

## 2019-01-01 RX ORDER — FENTANYL CITRATE 50 UG/ML
25 INJECTION, SOLUTION INTRAMUSCULAR; INTRAVENOUS
Status: DISCONTINUED | OUTPATIENT
Start: 2019-01-01 | End: 2019-01-01

## 2019-01-01 RX ORDER — NEOSTIGMINE METHYLSULFATE 1 MG/ML
INJECTION INTRAVENOUS AS NEEDED
Status: DISCONTINUED | OUTPATIENT
Start: 2019-01-01 | End: 2019-01-01 | Stop reason: HOSPADM

## 2019-01-01 RX ORDER — SODIUM CHLORIDE 9 MG/ML
25 INJECTION, SOLUTION INTRAVENOUS CONTINUOUS
Status: DISCONTINUED | OUTPATIENT
Start: 2019-01-01 | End: 2019-01-01 | Stop reason: ALTCHOICE

## 2019-01-01 RX ORDER — LIDOCAINE HYDROCHLORIDE 20 MG/ML
18 INJECTION, SOLUTION INFILTRATION; PERINEURAL ONCE
Status: COMPLETED | OUTPATIENT
Start: 2019-01-01 | End: 2019-01-01

## 2019-01-01 RX ORDER — HEPARIN SODIUM 5000 [USP'U]/ML
5000 INJECTION, SOLUTION INTRAVENOUS; SUBCUTANEOUS EVERY 12 HOURS
Status: DISCONTINUED | OUTPATIENT
Start: 2019-01-01 | End: 2019-01-01 | Stop reason: HOSPADM

## 2019-01-01 RX ORDER — HEPARIN SODIUM 200 [USP'U]/100ML
400 INJECTION, SOLUTION INTRAVENOUS ONCE
Status: DISPENSED | OUTPATIENT
Start: 2019-01-01 | End: 2019-01-01

## 2019-01-01 RX ORDER — CLONIDINE HYDROCHLORIDE 0.1 MG/1
0.3 TABLET ORAL 2 TIMES DAILY
Status: DISCONTINUED | OUTPATIENT
Start: 2019-01-01 | End: 2019-01-01

## 2019-01-01 RX ORDER — LIDOCAINE HYDROCHLORIDE 10 MG/ML
0.1 INJECTION, SOLUTION EPIDURAL; INFILTRATION; INTRACAUDAL; PERINEURAL AS NEEDED
Status: DISCONTINUED | OUTPATIENT
Start: 2019-01-01 | End: 2019-01-01 | Stop reason: HOSPADM

## 2019-01-01 RX ORDER — DILTIAZEM HYDROCHLORIDE 5 MG/ML
10 INJECTION INTRAVENOUS ONCE
Status: COMPLETED | OUTPATIENT
Start: 2019-01-01 | End: 2019-01-01

## 2019-01-01 RX ORDER — METOPROLOL TARTRATE 5 MG/5ML
INJECTION INTRAVENOUS
Status: DISCONTINUED
Start: 2019-01-01 | End: 2019-01-01 | Stop reason: WASHOUT

## 2019-01-01 RX ORDER — HEPARIN SODIUM 1000 [USP'U]/ML
10000 INJECTION, SOLUTION INTRAVENOUS; SUBCUTANEOUS ONCE
Status: COMPLETED | OUTPATIENT
Start: 2019-01-01 | End: 2019-01-01

## 2019-01-01 RX ORDER — SODIUM CHLORIDE 0.9 % (FLUSH) 0.9 %
5-40 SYRINGE (ML) INJECTION AS NEEDED
Status: DISCONTINUED | OUTPATIENT
Start: 2019-01-01 | End: 2019-01-01 | Stop reason: HOSPADM

## 2019-01-01 RX ORDER — OXYCODONE HYDROCHLORIDE 5 MG/1
5 TABLET ORAL
Qty: 20 TAB | Refills: 0 | Status: SHIPPED | OUTPATIENT
Start: 2019-01-01 | End: 2019-01-01

## 2019-01-01 RX ORDER — CLONIDINE HYDROCHLORIDE 0.2 MG/1
0.4 TABLET ORAL 2 TIMES DAILY
COMMUNITY

## 2019-01-01 RX ORDER — LANOLIN ALCOHOL/MO/W.PET/CERES
3 CREAM (GRAM) TOPICAL
Status: DISCONTINUED | OUTPATIENT
Start: 2019-01-01 | End: 2019-01-01 | Stop reason: HOSPADM

## 2019-01-01 RX ORDER — HEPARIN SODIUM 5000 [USP'U]/ML
5000 INJECTION, SOLUTION INTRAVENOUS; SUBCUTANEOUS EVERY 8 HOURS
Status: DISCONTINUED | OUTPATIENT
Start: 2019-01-01 | End: 2019-01-01

## 2019-01-01 RX ORDER — VANCOMYCIN 2 GRAM/500 ML IN 0.9 % SODIUM CHLORIDE INTRAVENOUS
2000 ONCE
Status: COMPLETED | OUTPATIENT
Start: 2019-01-01 | End: 2019-01-01

## 2019-01-01 RX ORDER — LIDOCAINE HYDROCHLORIDE 20 MG/ML
JELLY TOPICAL ONCE
Status: COMPLETED | OUTPATIENT
Start: 2019-01-01 | End: 2019-01-01

## 2019-01-01 RX ORDER — FENTANYL CITRATE 50 UG/ML
50 INJECTION, SOLUTION INTRAMUSCULAR; INTRAVENOUS AS NEEDED
Status: DISCONTINUED | OUTPATIENT
Start: 2019-01-01 | End: 2019-01-01 | Stop reason: HOSPADM

## 2019-01-01 RX ORDER — HYDROMORPHONE HYDROCHLORIDE 1 MG/ML
0.5 INJECTION, SOLUTION INTRAMUSCULAR; INTRAVENOUS; SUBCUTANEOUS
Status: DISCONTINUED | OUTPATIENT
Start: 2019-01-01 | End: 2019-01-01 | Stop reason: HOSPADM

## 2019-01-01 RX ORDER — DEXAMETHASONE SODIUM PHOSPHATE 4 MG/ML
INJECTION, SOLUTION INTRA-ARTICULAR; INTRALESIONAL; INTRAMUSCULAR; INTRAVENOUS; SOFT TISSUE AS NEEDED
Status: DISCONTINUED | OUTPATIENT
Start: 2019-01-01 | End: 2019-01-01 | Stop reason: HOSPADM

## 2019-01-01 RX ORDER — ACETAMINOPHEN 500 MG
1000 TABLET ORAL ONCE
Status: DISCONTINUED | OUTPATIENT
Start: 2019-01-01 | End: 2019-01-01

## 2019-01-01 RX ORDER — NIFEDIPINE 30 MG/1
60 TABLET, FILM COATED, EXTENDED RELEASE ORAL 2 TIMES DAILY
Status: DISCONTINUED | OUTPATIENT
Start: 2019-01-01 | End: 2019-01-01

## 2019-01-01 RX ORDER — FENTANYL CITRATE 50 UG/ML
25 INJECTION, SOLUTION INTRAMUSCULAR; INTRAVENOUS
Status: DISCONTINUED | OUTPATIENT
Start: 2019-01-01 | End: 2019-01-01 | Stop reason: HOSPADM

## 2019-01-01 RX ORDER — KETAMINE HYDROCHLORIDE 10 MG/ML
INJECTION, SOLUTION INTRAMUSCULAR; INTRAVENOUS AS NEEDED
Status: DISCONTINUED | OUTPATIENT
Start: 2019-01-01 | End: 2019-01-01 | Stop reason: HOSPADM

## 2019-01-01 RX ORDER — POTASSIUM CHLORIDE 7.45 MG/ML
10 INJECTION INTRAVENOUS ONCE
Status: DISCONTINUED | OUTPATIENT
Start: 2019-01-01 | End: 2019-01-01

## 2019-01-01 RX ORDER — LIDOCAINE HYDROCHLORIDE 20 MG/ML
JELLY TOPICAL ONCE
Status: DISPENSED | OUTPATIENT
Start: 2019-01-01 | End: 2019-01-01

## 2019-01-01 RX ORDER — HYDROMORPHONE HYDROCHLORIDE 2 MG/ML
1 INJECTION, SOLUTION INTRAMUSCULAR; INTRAVENOUS; SUBCUTANEOUS
Status: DISCONTINUED | OUTPATIENT
Start: 2019-01-01 | End: 2019-01-01

## 2019-01-01 RX ORDER — NALOXONE HYDROCHLORIDE 0.4 MG/ML
0.4 INJECTION, SOLUTION INTRAMUSCULAR; INTRAVENOUS; SUBCUTANEOUS AS NEEDED
Status: DISCONTINUED | OUTPATIENT
Start: 2019-01-01 | End: 2019-01-01 | Stop reason: HOSPADM

## 2019-01-01 RX ORDER — CLOPIDOGREL BISULFATE 75 MG/1
75 TABLET ORAL
Status: DISCONTINUED | OUTPATIENT
Start: 2019-01-01 | End: 2019-01-01 | Stop reason: HOSPADM

## 2019-01-01 RX ORDER — AMLODIPINE BESYLATE 5 MG/1
5 TABLET ORAL DAILY
Status: DISCONTINUED | OUTPATIENT
Start: 2019-01-01 | End: 2019-01-01

## 2019-01-01 RX ORDER — SODIUM CHLORIDE 0.9 % (FLUSH) 0.9 %
5-40 SYRINGE (ML) INJECTION EVERY 8 HOURS
Status: DISCONTINUED | OUTPATIENT
Start: 2019-01-01 | End: 2019-01-01 | Stop reason: HOSPADM

## 2019-01-01 RX ORDER — HEPARIN SODIUM 200 [USP'U]/100ML
200 INJECTION, SOLUTION INTRAVENOUS ONCE
Status: DISPENSED | OUTPATIENT
Start: 2019-01-01 | End: 2019-01-01

## 2019-01-01 RX ORDER — FENTANYL CITRATE 50 UG/ML
INJECTION, SOLUTION INTRAMUSCULAR; INTRAVENOUS AS NEEDED
Status: DISCONTINUED | OUTPATIENT
Start: 2019-01-01 | End: 2019-01-01 | Stop reason: HOSPADM

## 2019-01-01 RX ORDER — CEFAZOLIN SODIUM/WATER 2 G/20 ML
2 SYRINGE (ML) INTRAVENOUS ONCE
Status: COMPLETED | OUTPATIENT
Start: 2019-01-01 | End: 2019-01-01

## 2019-01-01 RX ORDER — LABETALOL HYDROCHLORIDE 5 MG/ML
10 INJECTION, SOLUTION INTRAVENOUS
Status: DISCONTINUED | OUTPATIENT
Start: 2019-01-01 | End: 2019-01-01 | Stop reason: HOSPADM

## 2019-01-01 RX ORDER — PROPOFOL 10 MG/ML
INJECTION, EMULSION INTRAVENOUS
Status: DISCONTINUED | OUTPATIENT
Start: 2019-01-01 | End: 2019-01-01 | Stop reason: HOSPADM

## 2019-01-01 RX ORDER — ONDANSETRON 2 MG/ML
INJECTION INTRAMUSCULAR; INTRAVENOUS AS NEEDED
Status: DISCONTINUED | OUTPATIENT
Start: 2019-01-01 | End: 2019-01-01 | Stop reason: HOSPADM

## 2019-01-01 RX ORDER — LORAZEPAM 0.5 MG/1
0.5 TABLET ORAL 2 TIMES DAILY
Status: DISCONTINUED | OUTPATIENT
Start: 2019-01-01 | End: 2019-01-01 | Stop reason: HOSPADM

## 2019-01-01 RX ORDER — ONDANSETRON 2 MG/ML
4 INJECTION INTRAMUSCULAR; INTRAVENOUS AS NEEDED
Status: DISCONTINUED | OUTPATIENT
Start: 2019-01-01 | End: 2019-01-01 | Stop reason: HOSPADM

## 2019-01-01 RX ORDER — METOPROLOL TARTRATE 5 MG/5ML
5 INJECTION INTRAVENOUS ONCE
Status: DISCONTINUED | OUTPATIENT
Start: 2019-01-01 | End: 2019-01-01

## 2019-01-01 RX ORDER — SUCCINYLCHOLINE CHLORIDE 20 MG/ML
INJECTION INTRAMUSCULAR; INTRAVENOUS AS NEEDED
Status: DISCONTINUED | OUTPATIENT
Start: 2019-01-01 | End: 2019-01-01 | Stop reason: HOSPADM

## 2019-01-01 RX ORDER — ACETAMINOPHEN 325 MG/1
650 TABLET ORAL ONCE
Status: DISCONTINUED | OUTPATIENT
Start: 2019-01-01 | End: 2019-01-01 | Stop reason: HOSPADM

## 2019-01-01 RX ORDER — DEXTROSE MONOHYDRATE 100 MG/ML
0-250 INJECTION, SOLUTION INTRAVENOUS AS NEEDED
Status: DISCONTINUED | OUTPATIENT
Start: 2019-01-01 | End: 2019-01-01 | Stop reason: HOSPADM

## 2019-01-01 RX ORDER — INSULIN GLARGINE 100 [IU]/ML
8 INJECTION, SOLUTION SUBCUTANEOUS DAILY
Qty: 15 ML | Refills: 11
Start: 2019-01-01 | End: 2020-01-01

## 2019-01-01 RX ORDER — FUROSEMIDE 80 MG/1
80 TABLET ORAL
Status: DISCONTINUED | OUTPATIENT
Start: 2019-01-01 | End: 2019-01-01 | Stop reason: HOSPADM

## 2019-01-01 RX ORDER — NEBIVOLOL 10 MG/1
20 TABLET ORAL
Status: DISCONTINUED | OUTPATIENT
Start: 2019-01-01 | End: 2019-01-01 | Stop reason: HOSPADM

## 2019-01-01 RX ORDER — GLUCOSAM/CHONDRO/HERB 149/HYAL 750-100 MG
1 TABLET ORAL DAILY
Status: DISCONTINUED | OUTPATIENT
Start: 2019-01-01 | End: 2019-01-01 | Stop reason: HOSPADM

## 2019-01-01 RX ORDER — FUROSEMIDE 10 MG/ML
60 INJECTION INTRAMUSCULAR; INTRAVENOUS ONCE
Status: COMPLETED | OUTPATIENT
Start: 2019-01-01 | End: 2019-01-01

## 2019-01-01 RX ORDER — LEVOFLOXACIN 500 MG/1
500 TABLET, FILM COATED ORAL
Status: DISCONTINUED | OUTPATIENT
Start: 2019-01-01 | End: 2019-01-01 | Stop reason: HOSPADM

## 2019-01-01 RX ORDER — MINOXIDIL 2.5 MG/1
5 TABLET ORAL 3 TIMES DAILY
Status: DISCONTINUED | OUTPATIENT
Start: 2019-01-01 | End: 2019-01-01 | Stop reason: HOSPADM

## 2019-01-01 RX ORDER — FUROSEMIDE 80 MG/1
80 TABLET ORAL 2 TIMES DAILY
Qty: 60 TAB | Refills: 0 | Status: SHIPPED
Start: 2019-01-01

## 2019-01-01 RX ORDER — OXYCODONE HYDROCHLORIDE 5 MG/1
5 TABLET ORAL
Status: DISCONTINUED | OUTPATIENT
Start: 2019-01-01 | End: 2019-01-01 | Stop reason: HOSPADM

## 2019-01-01 RX ORDER — LIDOCAINE HYDROCHLORIDE 10 MG/ML
INJECTION, SOLUTION EPIDURAL; INFILTRATION; INTRACAUDAL; PERINEURAL AS NEEDED
Status: DISCONTINUED | OUTPATIENT
Start: 2019-01-01 | End: 2019-01-01 | Stop reason: HOSPADM

## 2019-01-01 RX ORDER — INSULIN GLARGINE 100 [IU]/ML
12.5 INJECTION, SOLUTION SUBCUTANEOUS DAILY
Status: DISCONTINUED | OUTPATIENT
Start: 2019-01-01 | End: 2019-01-01 | Stop reason: HOSPADM

## 2019-01-01 RX ORDER — MIDAZOLAM HYDROCHLORIDE 1 MG/ML
5 INJECTION, SOLUTION INTRAMUSCULAR; INTRAVENOUS
Status: DISCONTINUED | OUTPATIENT
Start: 2019-01-01 | End: 2019-01-01 | Stop reason: ALTCHOICE

## 2019-01-01 RX ORDER — HYDROMORPHONE HYDROCHLORIDE 1 MG/ML
1 INJECTION, SOLUTION INTRAMUSCULAR; INTRAVENOUS; SUBCUTANEOUS
Status: DISCONTINUED | OUTPATIENT
Start: 2019-01-01 | End: 2019-01-01

## 2019-01-01 RX ORDER — MAGNESIUM SULFATE 100 %
4 CRYSTALS MISCELLANEOUS AS NEEDED
Status: DISCONTINUED | OUTPATIENT
Start: 2019-01-01 | End: 2019-01-01 | Stop reason: HOSPADM

## 2019-01-01 RX ORDER — HEPARIN 100 UNIT/ML
500 SYRINGE INTRAVENOUS ONCE
Status: COMPLETED | OUTPATIENT
Start: 2019-01-01 | End: 2019-01-01

## 2019-01-01 RX ORDER — GLYCOPYRROLATE 0.2 MG/ML
INJECTION INTRAMUSCULAR; INTRAVENOUS AS NEEDED
Status: DISCONTINUED | OUTPATIENT
Start: 2019-01-01 | End: 2019-01-01 | Stop reason: HOSPADM

## 2019-01-01 RX ORDER — EPHEDRINE SULFATE/0.9% NACL/PF 50 MG/5 ML
SYRINGE (ML) INTRAVENOUS AS NEEDED
Status: DISCONTINUED | OUTPATIENT
Start: 2019-01-01 | End: 2019-01-01 | Stop reason: HOSPADM

## 2019-01-01 RX ORDER — DIPHENHYDRAMINE HYDROCHLORIDE 50 MG/ML
12.5 INJECTION, SOLUTION INTRAMUSCULAR; INTRAVENOUS
Status: DISCONTINUED | OUTPATIENT
Start: 2019-01-01 | End: 2019-01-01 | Stop reason: HOSPADM

## 2019-01-01 RX ORDER — PHENYLEPHRINE HCL IN 0.9% NACL 0.4MG/10ML
SYRINGE (ML) INTRAVENOUS AS NEEDED
Status: DISCONTINUED | OUTPATIENT
Start: 2019-01-01 | End: 2019-01-01 | Stop reason: HOSPADM

## 2019-01-01 RX ORDER — ONDANSETRON 2 MG/ML
4 INJECTION INTRAMUSCULAR; INTRAVENOUS
Status: DISCONTINUED | OUTPATIENT
Start: 2019-01-01 | End: 2019-01-01 | Stop reason: HOSPADM

## 2019-01-01 RX ORDER — DIPHENHYDRAMINE HYDROCHLORIDE 50 MG/ML
12.5 INJECTION, SOLUTION INTRAMUSCULAR; INTRAVENOUS AS NEEDED
Status: DISCONTINUED | OUTPATIENT
Start: 2019-01-01 | End: 2019-01-01 | Stop reason: HOSPADM

## 2019-01-01 RX ORDER — INSULIN GLARGINE 100 [IU]/ML
10 INJECTION, SOLUTION SUBCUTANEOUS DAILY
Qty: 15 ML | Refills: 11 | Status: SHIPPED
Start: 2019-01-01 | End: 2019-01-01

## 2019-01-01 RX ORDER — HYDROMORPHONE HYDROCHLORIDE 2 MG/ML
1 INJECTION, SOLUTION INTRAMUSCULAR; INTRAVENOUS; SUBCUTANEOUS
Status: DISCONTINUED | OUTPATIENT
Start: 2019-01-01 | End: 2019-01-01 | Stop reason: HOSPADM

## 2019-01-01 RX ORDER — HEPARIN SODIUM 1000 [USP'U]/ML
INJECTION, SOLUTION INTRAVENOUS; SUBCUTANEOUS AS NEEDED
Status: DISCONTINUED | OUTPATIENT
Start: 2019-01-01 | End: 2019-01-01 | Stop reason: HOSPADM

## 2019-01-01 RX ORDER — ATORVASTATIN CALCIUM 40 MG/1
40 TABLET, FILM COATED ORAL
Status: DISCONTINUED | OUTPATIENT
Start: 2019-01-01 | End: 2019-01-01 | Stop reason: HOSPADM

## 2019-01-01 RX ORDER — ROCURONIUM BROMIDE 10 MG/ML
INJECTION, SOLUTION INTRAVENOUS AS NEEDED
Status: DISCONTINUED | OUTPATIENT
Start: 2019-01-01 | End: 2019-01-01 | Stop reason: HOSPADM

## 2019-01-01 RX ORDER — ATROPA BELLADONNA AND OPIUM 16.2; 3 MG/1; MG/1
1 SUPPOSITORY RECTAL
Status: DISCONTINUED | OUTPATIENT
Start: 2019-01-01 | End: 2019-01-01 | Stop reason: HOSPADM

## 2019-01-01 RX ORDER — INSULIN LISPRO 100 [IU]/ML
INJECTION, SOLUTION INTRAVENOUS; SUBCUTANEOUS
Status: DISCONTINUED | OUTPATIENT
Start: 2019-01-01 | End: 2019-01-01 | Stop reason: HOSPADM

## 2019-01-01 RX ORDER — INSULIN GLARGINE 100 [IU]/ML
25 INJECTION, SOLUTION SUBCUTANEOUS DAILY
Status: DISCONTINUED | OUTPATIENT
Start: 2019-01-01 | End: 2019-01-01

## 2019-01-01 RX ORDER — MELATONIN
1000 DAILY
Status: DISCONTINUED | OUTPATIENT
Start: 2019-01-01 | End: 2019-01-01 | Stop reason: HOSPADM

## 2019-01-01 RX ORDER — FENTANYL CITRATE 50 UG/ML
100 INJECTION, SOLUTION INTRAMUSCULAR; INTRAVENOUS
Status: DISCONTINUED | OUTPATIENT
Start: 2019-01-01 | End: 2019-01-01 | Stop reason: ALTCHOICE

## 2019-01-01 RX ORDER — HYDROMORPHONE HYDROCHLORIDE 1 MG/ML
0.5 INJECTION, SOLUTION INTRAMUSCULAR; INTRAVENOUS; SUBCUTANEOUS
Status: DISCONTINUED | OUTPATIENT
Start: 2019-01-01 | End: 2019-01-01

## 2019-01-01 RX ADMIN — EPOETIN ALFA-EPBX 10000 UNITS: 10000 INJECTION, SOLUTION INTRAVENOUS; SUBCUTANEOUS at 22:00

## 2019-01-01 RX ADMIN — Medication 10 ML: at 21:32

## 2019-01-01 RX ADMIN — ASCORBIC ACID, THIAMINE MONONITRATE,RIBOFLAVIN, NIACINAMIDE, PYRIDOXINE HYDROCHLORIDE, FOLIC ACID, CYANOCOBALAMIN, BIOTIN, CALCIUM PANTOTHENATE, 1 CAPSULE: 100; 1.5; 1.7; 20; 10; 1; 6000; 150000; 5 CAPSULE, LIQUID FILLED ORAL at 08:20

## 2019-01-01 RX ADMIN — TAMSULOSIN HYDROCHLORIDE 0.8 MG: 0.4 CAPSULE ORAL at 21:01

## 2019-01-01 RX ADMIN — FUROSEMIDE 80 MG: 80 TABLET ORAL at 23:49

## 2019-01-01 RX ADMIN — Medication 10 ML: at 20:13

## 2019-01-01 RX ADMIN — MIDAZOLAM 1 MG: 1 INJECTION INTRAMUSCULAR; INTRAVENOUS at 10:03

## 2019-01-01 RX ADMIN — FUROSEMIDE 60 MG: 10 INJECTION, SOLUTION INTRAMUSCULAR; INTRAVENOUS at 09:24

## 2019-01-01 RX ADMIN — Medication 10 ML: at 10:00

## 2019-01-01 RX ADMIN — MINOXIDIL 5 MG: 2.5 TABLET ORAL at 08:41

## 2019-01-01 RX ADMIN — ACETAMINOPHEN 650 MG: 325 TABLET ORAL at 16:11

## 2019-01-01 RX ADMIN — MELATONIN TAB 3 MG 3 MG: 3 TAB at 21:55

## 2019-01-01 RX ADMIN — Medication 10 ML: at 21:53

## 2019-01-01 RX ADMIN — PROPOFOL 10 MG: 10 INJECTION, EMULSION INTRAVENOUS at 14:45

## 2019-01-01 RX ADMIN — Medication 10 ML: at 06:15

## 2019-01-01 RX ADMIN — HEPARIN SODIUM 5000 UNITS: 5000 INJECTION INTRAVENOUS; SUBCUTANEOUS at 14:21

## 2019-01-01 RX ADMIN — HEPARIN SODIUM 5000 UNITS: 5000 INJECTION INTRAVENOUS; SUBCUTANEOUS at 21:40

## 2019-01-01 RX ADMIN — DILTIAZEM HYDROCHLORIDE 120 MG: 120 CAPSULE, COATED, EXTENDED RELEASE ORAL at 08:41

## 2019-01-01 RX ADMIN — HYDROMORPHONE HYDROCHLORIDE 1 MG: 1 INJECTION, SOLUTION INTRAMUSCULAR; INTRAVENOUS; SUBCUTANEOUS at 06:00

## 2019-01-01 RX ADMIN — ACETAMINOPHEN 650 MG: 325 TABLET ORAL at 16:21

## 2019-01-01 RX ADMIN — LABETALOL HYDROCHLORIDE 10 MG: 5 INJECTION INTRAVENOUS at 16:15

## 2019-01-01 RX ADMIN — CLONIDINE HYDROCHLORIDE 0.2 MG: 0.1 TABLET ORAL at 17:19

## 2019-01-01 RX ADMIN — PIPERACILLIN SODIUM,TAZOBACTAM SODIUM 3.38 G: 3; .375 INJECTION, POWDER, FOR SOLUTION INTRAVENOUS at 09:08

## 2019-01-01 RX ADMIN — FUROSEMIDE 80 MG: 80 TABLET ORAL at 08:20

## 2019-01-01 RX ADMIN — ACETAMINOPHEN 650 MG: 325 TABLET ORAL at 19:59

## 2019-01-01 RX ADMIN — PIPERACILLIN SODIUM,TAZOBACTAM SODIUM 3.38 G: 3; .375 INJECTION, POWDER, FOR SOLUTION INTRAVENOUS at 19:58

## 2019-01-01 RX ADMIN — HYDROMORPHONE HYDROCHLORIDE 0.5 MG: 1 INJECTION, SOLUTION INTRAMUSCULAR; INTRAVENOUS; SUBCUTANEOUS at 20:13

## 2019-01-01 RX ADMIN — Medication 10 ML: at 15:32

## 2019-01-01 RX ADMIN — Medication 40 MCG: at 15:39

## 2019-01-01 RX ADMIN — FUROSEMIDE 80 MG: 80 TABLET ORAL at 16:11

## 2019-01-01 RX ADMIN — HEPARIN SODIUM 5000 UNITS: 5000 INJECTION INTRAVENOUS; SUBCUTANEOUS at 17:19

## 2019-01-01 RX ADMIN — LORAZEPAM 0.5 MG: 0.5 TABLET ORAL at 11:08

## 2019-01-01 RX ADMIN — Medication 10 ML: at 21:34

## 2019-01-01 RX ADMIN — FENTANYL CITRATE 25 MCG: 50 INJECTION, SOLUTION INTRAMUSCULAR; INTRAVENOUS at 21:42

## 2019-01-01 RX ADMIN — LABETALOL HYDROCHLORIDE 10 MG: 5 INJECTION INTRAVENOUS at 20:10

## 2019-01-01 RX ADMIN — VITAMIN D, TAB 1000IU (100/BT) 1000 UNITS: 25 TAB at 08:20

## 2019-01-01 RX ADMIN — PIPERACILLIN SODIUM,TAZOBACTAM SODIUM 3.38 G: 3; .375 INJECTION, POWDER, FOR SOLUTION INTRAVENOUS at 08:17

## 2019-01-01 RX ADMIN — DILTIAZEM HYDROCHLORIDE 10 MG: 5 INJECTION INTRAVENOUS at 10:33

## 2019-01-01 RX ADMIN — MELATONIN TAB 3 MG 3 MG: 3 TAB at 20:58

## 2019-01-01 RX ADMIN — ATORVASTATIN CALCIUM 40 MG: 40 TABLET, FILM COATED ORAL at 20:00

## 2019-01-01 RX ADMIN — HYDROMORPHONE HYDROCHLORIDE 1 MG: 2 INJECTION, SOLUTION INTRAMUSCULAR; INTRAVENOUS; SUBCUTANEOUS at 23:45

## 2019-01-01 RX ADMIN — CLONIDINE HYDROCHLORIDE 0.2 MG: 0.1 TABLET ORAL at 17:04

## 2019-01-01 RX ADMIN — AMLODIPINE BESYLATE 5 MG: 5 TABLET ORAL at 09:24

## 2019-01-01 RX ADMIN — Medication 10 ML: at 17:21

## 2019-01-01 RX ADMIN — OMEGA-3 FATTY ACIDS CAP 1000 MG 1 CAPSULE: 1000 CAP at 08:20

## 2019-01-01 RX ADMIN — ROCURONIUM BROMIDE 10 MG: 10 INJECTION INTRAVENOUS at 10:19

## 2019-01-01 RX ADMIN — CLONIDINE HYDROCHLORIDE 0.2 MG: 0.1 TABLET ORAL at 09:24

## 2019-01-01 RX ADMIN — TAMSULOSIN HYDROCHLORIDE 0.4 MG: 0.4 CAPSULE ORAL at 21:40

## 2019-01-01 RX ADMIN — EPOETIN ALFA-EPBX 10000 UNITS: 10000 INJECTION, SOLUTION INTRAVENOUS; SUBCUTANEOUS at 20:02

## 2019-01-01 RX ADMIN — ASCORBIC ACID, THIAMINE MONONITRATE,RIBOFLAVIN, NIACINAMIDE, PYRIDOXINE HYDROCHLORIDE, FOLIC ACID, CYANOCOBALAMIN, BIOTIN, CALCIUM PANTOTHENATE, 1 CAPSULE: 100; 1.5; 1.7; 20; 10; 1; 6000; 150000; 5 CAPSULE, LIQUID FILLED ORAL at 09:24

## 2019-01-01 RX ADMIN — HYDROMORPHONE HYDROCHLORIDE 1 MG: 2 INJECTION, SOLUTION INTRAMUSCULAR; INTRAVENOUS; SUBCUTANEOUS at 16:14

## 2019-01-01 RX ADMIN — VITAMIN D, TAB 1000IU (100/BT) 1000 UNITS: 25 TAB at 08:13

## 2019-01-01 RX ADMIN — DEXAMETHASONE SODIUM PHOSPHATE 8 MG: 4 INJECTION, SOLUTION INTRAMUSCULAR; INTRAVENOUS at 10:34

## 2019-01-01 RX ADMIN — VANCOMYCIN HYDROCHLORIDE 2000 MG: 10 INJECTION, POWDER, LYOPHILIZED, FOR SOLUTION INTRAVENOUS at 02:32

## 2019-01-01 RX ADMIN — HYDROMORPHONE HYDROCHLORIDE 1 MG: 2 INJECTION, SOLUTION INTRAMUSCULAR; INTRAVENOUS; SUBCUTANEOUS at 14:53

## 2019-01-01 RX ADMIN — CLOPIDOGREL BISULFATE 75 MG: 75 TABLET ORAL at 21:31

## 2019-01-01 RX ADMIN — CLONIDINE HYDROCHLORIDE 0.2 MG: 0.1 TABLET ORAL at 08:41

## 2019-01-01 RX ADMIN — FENTANYL CITRATE 50 MCG: 50 INJECTION, SOLUTION INTRAMUSCULAR; INTRAVENOUS at 10:28

## 2019-01-01 RX ADMIN — LABETALOL HYDROCHLORIDE 10 MG: 5 INJECTION INTRAVENOUS at 09:05

## 2019-01-01 RX ADMIN — SODIUM CHLORIDE 25 ML/HR: 900 INJECTION, SOLUTION INTRAVENOUS at 10:07

## 2019-01-01 RX ADMIN — HYDROMORPHONE HYDROCHLORIDE 1 MG: 1 INJECTION, SOLUTION INTRAMUSCULAR; INTRAVENOUS; SUBCUTANEOUS at 13:15

## 2019-01-01 RX ADMIN — Medication 10 ML: at 05:20

## 2019-01-01 RX ADMIN — PIPERACILLIN SODIUM,TAZOBACTAM SODIUM 3.38 G: 3; .375 INJECTION, POWDER, FOR SOLUTION INTRAVENOUS at 20:12

## 2019-01-01 RX ADMIN — HEPARIN SODIUM 5000 UNITS: 5000 INJECTION INTRAVENOUS; SUBCUTANEOUS at 20:31

## 2019-01-01 RX ADMIN — PIPERACILLIN SODIUM,TAZOBACTAM SODIUM 3.38 G: 3; .375 INJECTION, POWDER, FOR SOLUTION INTRAVENOUS at 20:51

## 2019-01-01 RX ADMIN — FUROSEMIDE 80 MG: 80 TABLET ORAL at 17:20

## 2019-01-01 RX ADMIN — LIDOCAINE HYDROCHLORIDE: 20 JELLY TOPICAL at 14:41

## 2019-01-01 RX ADMIN — ATORVASTATIN CALCIUM 40 MG: 40 TABLET, FILM COATED ORAL at 20:31

## 2019-01-01 RX ADMIN — OMEGA-3 FATTY ACIDS CAP 1000 MG 1 CAPSULE: 1000 CAP at 08:14

## 2019-01-01 RX ADMIN — PIPERACILLIN SODIUM,TAZOBACTAM SODIUM 3.38 G: 3; .375 INJECTION, POWDER, FOR SOLUTION INTRAVENOUS at 21:31

## 2019-01-01 RX ADMIN — ASCORBIC ACID, THIAMINE MONONITRATE,RIBOFLAVIN, NIACINAMIDE, PYRIDOXINE HYDROCHLORIDE, FOLIC ACID, CYANOCOBALAMIN, BIOTIN, CALCIUM PANTOTHENATE, 1 CAPSULE: 100; 1.5; 1.7; 20; 10; 1; 6000; 150000; 5 CAPSULE, LIQUID FILLED ORAL at 08:14

## 2019-01-01 RX ADMIN — PIPERACILLIN SODIUM,TAZOBACTAM SODIUM 3.38 G: 3; .375 INJECTION, POWDER, FOR SOLUTION INTRAVENOUS at 08:42

## 2019-01-01 RX ADMIN — Medication 10 ML: at 21:01

## 2019-01-01 RX ADMIN — FUROSEMIDE 80 MG: 80 TABLET ORAL at 21:00

## 2019-01-01 RX ADMIN — FUROSEMIDE 80 MG: 80 TABLET ORAL at 21:31

## 2019-01-01 RX ADMIN — MIDAZOLAM HYDROCHLORIDE 0.5 MG: 1 INJECTION INTRAMUSCULAR; INTRAVENOUS at 14:53

## 2019-01-01 RX ADMIN — KETAMINE HYDROCHLORIDE 10 MG: 10 INJECTION, SOLUTION INTRAMUSCULAR; INTRAVENOUS at 15:00

## 2019-01-01 RX ADMIN — PROPOFOL 25 MCG/KG/MIN: 10 INJECTION, EMULSION INTRAVENOUS at 14:42

## 2019-01-01 RX ADMIN — INSULIN GLARGINE 12 UNITS: 100 INJECTION, SOLUTION SUBCUTANEOUS at 09:00

## 2019-01-01 RX ADMIN — MINOXIDIL 5 MG: 2.5 TABLET ORAL at 17:20

## 2019-01-01 RX ADMIN — NEBIVOLOL HYDROCHLORIDE 20 MG: 10 TABLET ORAL at 21:45

## 2019-01-01 RX ADMIN — MINOXIDIL 5 MG: 2.5 TABLET ORAL at 16:37

## 2019-01-01 RX ADMIN — HEPARIN SODIUM 5000 UNITS: 5000 INJECTION INTRAVENOUS; SUBCUTANEOUS at 05:19

## 2019-01-01 RX ADMIN — INSULIN GLARGINE 25 UNITS: 100 INJECTION, SOLUTION SUBCUTANEOUS at 08:14

## 2019-01-01 RX ADMIN — PHENYLEPHRINE HYDROCHLORIDE 30 MCG/MIN: 10 INJECTION INTRAVENOUS at 10:19

## 2019-01-01 RX ADMIN — LIDOCAINE HYDROCHLORIDE 10 MG: 10; .005 INJECTION, SOLUTION EPIDURAL; INFILTRATION; INTRACAUDAL; PERINEURAL at 14:33

## 2019-01-01 RX ADMIN — HYDROMORPHONE HYDROCHLORIDE 1 MG: 2 INJECTION, SOLUTION INTRAMUSCULAR; INTRAVENOUS; SUBCUTANEOUS at 06:08

## 2019-01-01 RX ADMIN — NEBIVOLOL HYDROCHLORIDE 20 MG: 10 TABLET ORAL at 21:43

## 2019-01-01 RX ADMIN — ACETAMINOPHEN 650 MG: 325 TABLET ORAL at 15:43

## 2019-01-01 RX ADMIN — CLONIDINE HYDROCHLORIDE 0.2 MG: 0.1 TABLET ORAL at 08:14

## 2019-01-01 RX ADMIN — HYDROMORPHONE HYDROCHLORIDE 1 MG: 1 INJECTION, SOLUTION INTRAMUSCULAR; INTRAVENOUS; SUBCUTANEOUS at 00:11

## 2019-01-01 RX ADMIN — MIDAZOLAM HYDROCHLORIDE 1 MG: 1 INJECTION INTRAMUSCULAR; INTRAVENOUS at 10:17

## 2019-01-01 RX ADMIN — NEBIVOLOL HYDROCHLORIDE 20 MG: 10 TABLET ORAL at 21:00

## 2019-01-01 RX ADMIN — PIPERACILLIN SODIUM,TAZOBACTAM SODIUM 3.38 G: 3; .375 INJECTION, POWDER, FOR SOLUTION INTRAVENOUS at 09:15

## 2019-01-01 RX ADMIN — ASCORBIC ACID, THIAMINE MONONITRATE,RIBOFLAVIN, NIACINAMIDE, PYRIDOXINE HYDROCHLORIDE, FOLIC ACID, CYANOCOBALAMIN, BIOTIN, CALCIUM PANTOTHENATE, 1 CAPSULE: 100; 1.5; 1.7; 20; 10; 1; 6000; 150000; 5 CAPSULE, LIQUID FILLED ORAL at 08:41

## 2019-01-01 RX ADMIN — ACETAMINOPHEN 650 MG: 325 TABLET ORAL at 20:51

## 2019-01-01 RX ADMIN — PIPERACILLIN SODIUM,TAZOBACTAM SODIUM 3.38 G: 3; .375 INJECTION, POWDER, FOR SOLUTION INTRAVENOUS at 08:14

## 2019-01-01 RX ADMIN — HEPARIN SODIUM 5000 UNITS: 1000 INJECTION, SOLUTION INTRAVENOUS; SUBCUTANEOUS at 15:23

## 2019-01-01 RX ADMIN — MINOXIDIL 5 MG: 2.5 TABLET ORAL at 18:40

## 2019-01-01 RX ADMIN — ROCURONIUM BROMIDE 10 MG: 10 INJECTION INTRAVENOUS at 10:24

## 2019-01-01 RX ADMIN — Medication 1 AMPULE: at 13:04

## 2019-01-01 RX ADMIN — Medication 1 AMPULE: at 20:01

## 2019-01-01 RX ADMIN — MELATONIN TAB 3 MG 3 MG: 3 TAB at 21:46

## 2019-01-01 RX ADMIN — GLYCOPYRROLATE 0.4 MG: 0.2 INJECTION, SOLUTION INTRAMUSCULAR; INTRAVENOUS at 11:03

## 2019-01-01 RX ADMIN — INSULIN GLARGINE 25 UNITS: 100 INJECTION, SOLUTION SUBCUTANEOUS at 09:21

## 2019-01-01 RX ADMIN — HYDROMORPHONE HYDROCHLORIDE 1 MG: 2 INJECTION, SOLUTION INTRAMUSCULAR; INTRAVENOUS; SUBCUTANEOUS at 19:31

## 2019-01-01 RX ADMIN — PIPERACILLIN SODIUM,TAZOBACTAM SODIUM 3.38 G: 3; .375 INJECTION, POWDER, FOR SOLUTION INTRAVENOUS at 06:57

## 2019-01-01 RX ADMIN — VITAMIN D, TAB 1000IU (100/BT) 1000 UNITS: 25 TAB at 08:17

## 2019-01-01 RX ADMIN — DILTIAZEM HYDROCHLORIDE 10 MG/HR: 5 INJECTION INTRAVENOUS at 10:49

## 2019-01-01 RX ADMIN — HEPARIN SODIUM 5000 UNITS: 5000 INJECTION INTRAVENOUS; SUBCUTANEOUS at 20:57

## 2019-01-01 RX ADMIN — INSULIN LISPRO 2 UNITS: 100 INJECTION, SOLUTION INTRAVENOUS; SUBCUTANEOUS at 09:26

## 2019-01-01 RX ADMIN — PIPERACILLIN SODIUM,TAZOBACTAM SODIUM 3.38 G: 3; .375 INJECTION, POWDER, FOR SOLUTION INTRAVENOUS at 19:31

## 2019-01-01 RX ADMIN — ATORVASTATIN CALCIUM 40 MG: 40 TABLET, FILM COATED ORAL at 20:57

## 2019-01-01 RX ADMIN — LIDOCAINE HYDROCHLORIDE 360 MG: 20 INJECTION, SOLUTION INFILTRATION; PERINEURAL at 09:45

## 2019-01-01 RX ADMIN — HYDROMORPHONE HYDROCHLORIDE 1 MG: 2 INJECTION, SOLUTION INTRAMUSCULAR; INTRAVENOUS; SUBCUTANEOUS at 20:04

## 2019-01-01 RX ADMIN — Medication 5 ML: at 21:44

## 2019-01-01 RX ADMIN — HYDROMORPHONE HYDROCHLORIDE 1 MG: 2 INJECTION, SOLUTION INTRAMUSCULAR; INTRAVENOUS; SUBCUTANEOUS at 20:31

## 2019-01-01 RX ADMIN — MINOXIDIL 5 MG: 2.5 TABLET ORAL at 09:24

## 2019-01-01 RX ADMIN — VANCOMYCIN HYDROCHLORIDE 750 MG: 750 INJECTION, POWDER, LYOPHILIZED, FOR SOLUTION INTRAVENOUS at 14:53

## 2019-01-01 RX ADMIN — OXYCODONE HYDROCHLORIDE 5 MG: 5 TABLET ORAL at 11:46

## 2019-01-01 RX ADMIN — VITAMIN D, TAB 1000IU (100/BT) 1000 UNITS: 25 TAB at 11:08

## 2019-01-01 RX ADMIN — LABETALOL HYDROCHLORIDE 10 MG: 5 INJECTION INTRAVENOUS at 16:11

## 2019-01-01 RX ADMIN — MINOXIDIL 5 MG: 2.5 TABLET ORAL at 11:09

## 2019-01-01 RX ADMIN — TAMSULOSIN HYDROCHLORIDE 0.4 MG: 0.4 CAPSULE ORAL at 21:45

## 2019-01-01 RX ADMIN — VANCOMYCIN HYDROCHLORIDE 750 MG: 750 INJECTION, POWDER, LYOPHILIZED, FOR SOLUTION INTRAVENOUS at 17:35

## 2019-01-01 RX ADMIN — OXYCODONE HYDROCHLORIDE 5 MG: 5 TABLET ORAL at 21:33

## 2019-01-01 RX ADMIN — FUROSEMIDE 80 MG: 80 TABLET ORAL at 08:17

## 2019-01-01 RX ADMIN — FUROSEMIDE 80 MG: 80 TABLET ORAL at 21:41

## 2019-01-01 RX ADMIN — PROPOFOL 10 MG: 10 INJECTION, EMULSION INTRAVENOUS at 15:15

## 2019-01-01 RX ADMIN — HYDROMORPHONE HYDROCHLORIDE 0.5 MG: 1 INJECTION, SOLUTION INTRAMUSCULAR; INTRAVENOUS; SUBCUTANEOUS at 13:06

## 2019-01-01 RX ADMIN — LABETALOL HYDROCHLORIDE 10 MG: 5 INJECTION INTRAVENOUS at 05:20

## 2019-01-01 RX ADMIN — SODIUM CHLORIDE 25 ML/HR: 900 INJECTION, SOLUTION INTRAVENOUS at 14:00

## 2019-01-01 RX ADMIN — FENTANYL CITRATE 25 MCG: 50 INJECTION, SOLUTION INTRAMUSCULAR; INTRAVENOUS at 10:06

## 2019-01-01 RX ADMIN — FUROSEMIDE 80 MG: 80 TABLET ORAL at 15:49

## 2019-01-01 RX ADMIN — OMEGA-3 FATTY ACIDS CAP 1000 MG 1 CAPSULE: 1000 CAP at 09:25

## 2019-01-01 RX ADMIN — Medication 5 MG: at 15:26

## 2019-01-01 RX ADMIN — FENTANYL CITRATE 25 MCG: 50 INJECTION, SOLUTION INTRAMUSCULAR; INTRAVENOUS at 11:46

## 2019-01-01 RX ADMIN — HEPARIN SODIUM 5000 UNITS: 5000 INJECTION INTRAVENOUS; SUBCUTANEOUS at 12:48

## 2019-01-01 RX ADMIN — PROPOFOL 10 MG: 10 INJECTION, EMULSION INTRAVENOUS at 14:56

## 2019-01-01 RX ADMIN — LORAZEPAM 0.5 MG: 0.5 TABLET ORAL at 17:19

## 2019-01-01 RX ADMIN — Medication 2 G: at 14:44

## 2019-01-01 RX ADMIN — NEBIVOLOL HYDROCHLORIDE 20 MG: 10 TABLET ORAL at 21:33

## 2019-01-01 RX ADMIN — TAMSULOSIN HYDROCHLORIDE 0.8 MG: 0.4 CAPSULE ORAL at 21:34

## 2019-01-01 RX ADMIN — CLOPIDOGREL BISULFATE 75 MG: 75 TABLET ORAL at 21:45

## 2019-01-01 RX ADMIN — SODIUM CHLORIDE 25 ML/HR: 900 INJECTION, SOLUTION INTRAVENOUS at 09:13

## 2019-01-01 RX ADMIN — POTASSIUM CHLORIDE 40 MEQ: 750 TABLET, EXTENDED RELEASE ORAL at 11:03

## 2019-01-01 RX ADMIN — Medication 10 ML: at 14:22

## 2019-01-01 RX ADMIN — PROPOFOL 10 MG: 10 INJECTION, EMULSION INTRAVENOUS at 15:05

## 2019-01-01 RX ADMIN — FUROSEMIDE 80 MG: 80 TABLET ORAL at 21:46

## 2019-01-01 RX ADMIN — Medication 10 ML: at 20:04

## 2019-01-01 RX ADMIN — SUCCINYLCHOLINE CHLORIDE 140 MG: 20 INJECTION, SOLUTION INTRAMUSCULAR; INTRAVENOUS at 10:19

## 2019-01-01 RX ADMIN — MORPHINE SULFATE 2 MG: 10 INJECTION INTRAVENOUS at 11:58

## 2019-01-01 RX ADMIN — FENTANYL CITRATE 100 MCG: 50 INJECTION, SOLUTION INTRAMUSCULAR; INTRAVENOUS at 14:25

## 2019-01-01 RX ADMIN — SODIUM CHLORIDE, PRESERVATIVE FREE 300 UNITS: 5 INJECTION INTRAVENOUS at 12:26

## 2019-01-01 RX ADMIN — ONDANSETRON HYDROCHLORIDE 4 MG: 2 INJECTION, SOLUTION INTRAMUSCULAR; INTRAVENOUS at 11:02

## 2019-01-01 RX ADMIN — LEVOFLOXACIN 500 MG: 500 TABLET, FILM COATED ORAL at 12:25

## 2019-01-01 RX ADMIN — TAMSULOSIN HYDROCHLORIDE 0.4 MG: 0.4 CAPSULE ORAL at 21:31

## 2019-01-01 RX ADMIN — MIDAZOLAM 1 MG: 1 INJECTION INTRAMUSCULAR; INTRAVENOUS at 10:06

## 2019-01-01 RX ADMIN — HYDROMORPHONE HYDROCHLORIDE 1 MG: 2 INJECTION, SOLUTION INTRAMUSCULAR; INTRAVENOUS; SUBCUTANEOUS at 08:18

## 2019-01-01 RX ADMIN — CLONIDINE HYDROCHLORIDE 0.2 MG: 0.1 TABLET ORAL at 11:08

## 2019-01-01 RX ADMIN — SODIUM CHLORIDE, SODIUM LACTATE, CALCIUM CHLORIDE, MAGNESIUM CHLORIDE AND DEXTROSE 2000 ML: 2.5; 538; 448; 18.3; 5.08 INJECTION, SOLUTION INTRAPERITONEAL at 01:28

## 2019-01-01 RX ADMIN — VANCOMYCIN HYDROCHLORIDE 750 MG: 750 INJECTION, POWDER, LYOPHILIZED, FOR SOLUTION INTRAVENOUS at 18:48

## 2019-01-01 RX ADMIN — MELATONIN TAB 3 MG 3 MG: 3 TAB at 21:33

## 2019-01-01 RX ADMIN — LEVOFLOXACIN 750 MG: 750 TABLET, FILM COATED ORAL at 11:46

## 2019-01-01 RX ADMIN — CLOPIDOGREL BISULFATE 75 MG: 75 TABLET ORAL at 21:00

## 2019-01-01 RX ADMIN — Medication 5 ML: at 05:26

## 2019-01-01 RX ADMIN — HEPARIN SODIUM 5000 UNITS: 5000 INJECTION INTRAVENOUS; SUBCUTANEOUS at 15:49

## 2019-01-01 RX ADMIN — FENTANYL CITRATE 50 MCG: 50 INJECTION, SOLUTION INTRAMUSCULAR; INTRAVENOUS at 09:58

## 2019-01-01 RX ADMIN — INSULIN GLARGINE 25 UNITS: 100 INJECTION, SOLUTION SUBCUTANEOUS at 08:18

## 2019-01-01 RX ADMIN — HYDROMORPHONE HYDROCHLORIDE 1 MG: 2 INJECTION, SOLUTION INTRAMUSCULAR; INTRAVENOUS; SUBCUTANEOUS at 16:50

## 2019-01-01 RX ADMIN — Medication 10 ML: at 05:15

## 2019-01-01 RX ADMIN — CLOPIDOGREL BISULFATE 75 MG: 75 TABLET ORAL at 21:34

## 2019-01-01 RX ADMIN — INSULIN LISPRO 2 UNITS: 100 INJECTION, SOLUTION INTRAVENOUS; SUBCUTANEOUS at 12:25

## 2019-01-01 RX ADMIN — Medication 10 ML: at 04:59

## 2019-01-01 RX ADMIN — FUROSEMIDE 80 MG: 80 TABLET ORAL at 15:43

## 2019-01-01 RX ADMIN — MINOXIDIL 5 MG: 2.5 TABLET ORAL at 17:04

## 2019-01-01 RX ADMIN — PIPERACILLIN SODIUM,TAZOBACTAM SODIUM 3.38 G: 3; .375 INJECTION, POWDER, FOR SOLUTION INTRAVENOUS at 08:19

## 2019-01-01 RX ADMIN — MIDAZOLAM HYDROCHLORIDE 0.5 MG: 1 INJECTION INTRAMUSCULAR; INTRAVENOUS at 14:56

## 2019-01-01 RX ADMIN — HYDROMORPHONE HYDROCHLORIDE 1 MG: 2 INJECTION, SOLUTION INTRAMUSCULAR; INTRAVENOUS; SUBCUTANEOUS at 04:58

## 2019-01-01 RX ADMIN — ATORVASTATIN CALCIUM 40 MG: 40 TABLET, FILM COATED ORAL at 20:18

## 2019-01-01 RX ADMIN — NEBIVOLOL HYDROCHLORIDE 20 MG: 10 TABLET ORAL at 21:55

## 2019-01-01 RX ADMIN — LORAZEPAM 0.5 MG: 0.5 TABLET ORAL at 08:41

## 2019-01-01 RX ADMIN — FENTANYL CITRATE 25 MCG: 50 INJECTION, SOLUTION INTRAMUSCULAR; INTRAVENOUS at 02:54

## 2019-01-01 RX ADMIN — FUROSEMIDE 80 MG: 80 TABLET ORAL at 11:08

## 2019-01-01 RX ADMIN — VITAMIN D, TAB 1000IU (100/BT) 1000 UNITS: 25 TAB at 08:41

## 2019-01-01 RX ADMIN — EPOETIN ALFA-EPBX 10000 UNITS: 10000 INJECTION, SOLUTION INTRAVENOUS; SUBCUTANEOUS at 21:46

## 2019-01-01 RX ADMIN — HEPARIN SODIUM 5000 UNITS: 5000 INJECTION INTRAVENOUS; SUBCUTANEOUS at 06:15

## 2019-01-01 RX ADMIN — OMEGA-3 FATTY ACIDS CAP 1000 MG 1 CAPSULE: 1000 CAP at 11:08

## 2019-01-01 RX ADMIN — HYDROMORPHONE HYDROCHLORIDE 1 MG: 2 INJECTION, SOLUTION INTRAMUSCULAR; INTRAVENOUS; SUBCUTANEOUS at 09:22

## 2019-01-01 RX ADMIN — CLONIDINE HYDROCHLORIDE 0.2 MG: 0.1 TABLET ORAL at 17:20

## 2019-01-01 RX ADMIN — MINOXIDIL 5 MG: 2.5 TABLET ORAL at 08:14

## 2019-01-01 RX ADMIN — CLONIDINE HYDROCHLORIDE 0.2 MG: 0.1 TABLET ORAL at 18:40

## 2019-01-01 RX ADMIN — PROPOFOL 20 MG: 10 INJECTION, EMULSION INTRAVENOUS at 14:55

## 2019-01-01 RX ADMIN — PIPERACILLIN SODIUM,TAZOBACTAM SODIUM 3.38 G: 3; .375 INJECTION, POWDER, FOR SOLUTION INTRAVENOUS at 19:52

## 2019-01-01 RX ADMIN — MIDAZOLAM HYDROCHLORIDE 1 MG: 1 INJECTION INTRAMUSCULAR; INTRAVENOUS at 10:25

## 2019-01-01 RX ADMIN — LIDOCAINE HYDROCHLORIDE 100 MG: 20 INJECTION, SOLUTION INTRAVENOUS at 10:19

## 2019-01-01 RX ADMIN — FUROSEMIDE 80 MG: 80 TABLET ORAL at 09:25

## 2019-01-01 RX ADMIN — TAMSULOSIN HYDROCHLORIDE 0.4 MG: 0.4 CAPSULE ORAL at 23:49

## 2019-01-01 RX ADMIN — CLOPIDOGREL BISULFATE 75 MG: 75 TABLET ORAL at 23:49

## 2019-01-01 RX ADMIN — PIPERACILLIN SODIUM,TAZOBACTAM SODIUM 3.38 G: 3; .375 INJECTION, POWDER, FOR SOLUTION INTRAVENOUS at 20:21

## 2019-01-01 RX ADMIN — FENTANYL CITRATE 25 MCG: 50 INJECTION, SOLUTION INTRAMUSCULAR; INTRAVENOUS at 15:48

## 2019-01-01 RX ADMIN — Medication 10 ML: at 14:00

## 2019-01-01 RX ADMIN — HEPARIN SODIUM 5000 UNITS: 5000 INJECTION INTRAVENOUS; SUBCUTANEOUS at 05:26

## 2019-01-01 RX ADMIN — INSULIN GLARGINE 25 UNITS: 100 INJECTION, SOLUTION SUBCUTANEOUS at 09:26

## 2019-01-01 RX ADMIN — FENTANYL CITRATE 25 MCG: 50 INJECTION, SOLUTION INTRAMUSCULAR; INTRAVENOUS at 10:03

## 2019-01-01 RX ADMIN — MINOXIDIL 5 MG: 2.5 TABLET ORAL at 21:54

## 2019-01-01 RX ADMIN — FUROSEMIDE 80 MG: 80 TABLET ORAL at 08:41

## 2019-01-01 RX ADMIN — OXYCODONE HYDROCHLORIDE 5 MG: 5 TABLET ORAL at 21:54

## 2019-01-01 RX ADMIN — Medication 10 ML: at 23:47

## 2019-01-01 RX ADMIN — VITAMIN D, TAB 1000IU (100/BT) 1000 UNITS: 25 TAB at 09:24

## 2019-01-01 RX ADMIN — CLOPIDOGREL BISULFATE 75 MG: 75 TABLET ORAL at 21:41

## 2019-01-01 RX ADMIN — ASCORBIC ACID, THIAMINE MONONITRATE,RIBOFLAVIN, NIACINAMIDE, PYRIDOXINE HYDROCHLORIDE, FOLIC ACID, CYANOCOBALAMIN, BIOTIN, CALCIUM PANTOTHENATE, 1 CAPSULE: 100; 1.5; 1.7; 20; 10; 1; 6000; 150000; 5 CAPSULE, LIQUID FILLED ORAL at 08:17

## 2019-01-01 RX ADMIN — Medication 40 MCG: at 15:30

## 2019-01-01 RX ADMIN — PROPOFOL 150 MG: 10 INJECTION, EMULSION INTRAVENOUS at 10:19

## 2019-01-01 RX ADMIN — DILTIAZEM HYDROCHLORIDE 5 MG/HR: 5 INJECTION INTRAVENOUS at 10:42

## 2019-01-01 RX ADMIN — FUROSEMIDE 80 MG: 80 TABLET ORAL at 08:13

## 2019-01-01 RX ADMIN — CLONIDINE HYDROCHLORIDE 0.2 MG: 0.1 TABLET ORAL at 10:38

## 2019-01-01 RX ADMIN — DILTIAZEM HYDROCHLORIDE 120 MG: 120 CAPSULE, COATED, EXTENDED RELEASE ORAL at 11:08

## 2019-01-01 RX ADMIN — LORAZEPAM 0.5 MG: 0.5 TABLET ORAL at 17:20

## 2019-01-01 RX ADMIN — INSULIN GLARGINE 25 UNITS: 100 INJECTION, SOLUTION SUBCUTANEOUS at 11:10

## 2019-01-01 RX ADMIN — OMEGA-3 FATTY ACIDS CAP 1000 MG 1 CAPSULE: 1000 CAP at 08:41

## 2019-01-01 RX ADMIN — Medication 10 ML: at 13:08

## 2019-01-01 RX ADMIN — OXYCODONE HYDROCHLORIDE 5 MG: 5 TABLET ORAL at 08:40

## 2019-01-01 RX ADMIN — Medication 10 ML: at 21:46

## 2019-01-01 RX ADMIN — NEBIVOLOL HYDROCHLORIDE 20 MG: 10 TABLET ORAL at 21:31

## 2019-01-01 RX ADMIN — SODIUM CHLORIDE, POTASSIUM CHLORIDE, SODIUM LACTATE AND CALCIUM CHLORIDE: 600; 310; 30; 20 INJECTION, SOLUTION INTRAVENOUS at 09:40

## 2019-01-01 RX ADMIN — Medication 1 AMPULE: at 08:19

## 2019-01-01 RX ADMIN — HYDROMORPHONE HYDROCHLORIDE 1 MG: 2 INJECTION, SOLUTION INTRAMUSCULAR; INTRAVENOUS; SUBCUTANEOUS at 23:21

## 2019-01-01 RX ADMIN — FENTANYL CITRATE 50 MCG: 50 INJECTION, SOLUTION INTRAMUSCULAR; INTRAVENOUS at 10:17

## 2019-01-01 RX ADMIN — NEOSTIGMINE METHYLSULFATE 3 MG: 1 INJECTION INTRAVENOUS at 11:03

## 2019-01-01 RX ADMIN — LIDOCAINE HYDROCHLORIDE 10 MG: 20 INJECTION, SOLUTION INFILTRATION; PERINEURAL at 12:26

## 2019-01-01 RX ADMIN — LIDOCAINE HYDROCHLORIDE 40 MG: 20 INJECTION, SOLUTION EPIDURAL; INFILTRATION; INTRACAUDAL; PERINEURAL at 14:42

## 2019-01-01 RX ADMIN — HYDROMORPHONE HYDROCHLORIDE 0.5 MG: 1 INJECTION, SOLUTION INTRAMUSCULAR; INTRAVENOUS; SUBCUTANEOUS at 02:46

## 2019-01-01 RX ADMIN — MIDAZOLAM 1 MG: 1 INJECTION INTRAMUSCULAR; INTRAVENOUS at 09:58

## 2019-01-01 RX ADMIN — TAMSULOSIN HYDROCHLORIDE 0.8 MG: 0.4 CAPSULE ORAL at 21:55

## 2019-01-01 RX ADMIN — Medication 5 MG: at 15:30

## 2019-01-01 RX ADMIN — LORAZEPAM 0.5 MG: 0.5 TABLET ORAL at 11:46

## 2019-01-01 RX ADMIN — PROPOFOL 20 MG: 10 INJECTION, EMULSION INTRAVENOUS at 14:42

## 2019-01-01 RX ADMIN — AMLODIPINE BESYLATE 5 MG: 5 TABLET ORAL at 11:00

## 2019-01-01 RX ADMIN — HEPARIN SODIUM 4000 UNITS: 1000 INJECTION, SOLUTION INTRAVENOUS; SUBCUTANEOUS at 09:43

## 2019-01-01 RX ADMIN — CLOPIDOGREL BISULFATE 75 MG: 75 TABLET ORAL at 21:54

## 2019-01-01 RX ADMIN — Medication 10 ML: at 06:08

## 2019-01-01 RX ADMIN — FUROSEMIDE 80 MG: 80 TABLET ORAL at 16:37

## 2019-01-01 RX ADMIN — HEPARIN SODIUM 5000 UNITS: 5000 INJECTION INTRAVENOUS; SUBCUTANEOUS at 06:08

## 2019-01-01 RX ADMIN — ROPIVACAINE HYDROCHLORIDE 30 ML: 5 INJECTION, SOLUTION EPIDURAL; INFILTRATION; PERINEURAL at 14:33

## 2019-01-01 RX ADMIN — ASCORBIC ACID, THIAMINE MONONITRATE,RIBOFLAVIN, NIACINAMIDE, PYRIDOXINE HYDROCHLORIDE, FOLIC ACID, CYANOCOBALAMIN, BIOTIN, CALCIUM PANTOTHENATE, 1 CAPSULE: 100; 1.5; 1.7; 20; 10; 1; 6000; 150000; 5 CAPSULE, LIQUID FILLED ORAL at 11:08

## 2019-01-01 RX ADMIN — Medication 10 ML: at 06:04

## 2019-01-09 LAB
CREATININE, EXTERNAL: 6.91
HBA1C MFR BLD HPLC: 10.5 %
LDL-C, EXTERNAL: 21

## 2019-01-17 LAB
25(OH)D3+25(OH)D2 SERPL-MCNC: 14.4 NG/ML (ref 30–100)
ALBUMIN SERPL-MCNC: 3.2 G/DL (ref 3.6–4.8)
ALBUMIN/GLOB SERPL: 1.4 {RATIO} (ref 1.2–2.2)
ALP SERPL-CCNC: 86 IU/L (ref 39–117)
ALT SERPL-CCNC: 18 IU/L (ref 0–44)
AST SERPL-CCNC: 13 IU/L (ref 0–40)
BILIRUB SERPL-MCNC: <0.2 MG/DL (ref 0–1.2)
BUN SERPL-MCNC: 58 MG/DL (ref 8–27)
BUN/CREAT SERPL: 8 (ref 10–24)
CALCIUM SERPL-MCNC: 8.6 MG/DL (ref 8.6–10.2)
CHLORIDE SERPL-SCNC: 98 MMOL/L (ref 96–106)
CHOLEST SERPL-MCNC: 116 MG/DL (ref 100–199)
CO2 SERPL-SCNC: 21 MMOL/L (ref 20–29)
CREAT SERPL-MCNC: 6.99 MG/DL (ref 0.76–1.27)
ERYTHROCYTE [DISTWIDTH] IN BLOOD BY AUTOMATED COUNT: 15.5 % (ref 12.3–15.4)
EST. AVERAGE GLUCOSE BLD GHB EST-MCNC: 266 MG/DL
GLOBULIN SER CALC-MCNC: 2.3 G/DL (ref 1.5–4.5)
GLUCOSE SERPL-MCNC: 306 MG/DL (ref 65–99)
HBA1C MFR BLD: 10.9 % (ref 4.8–5.6)
HCT VFR BLD AUTO: 31 % (ref 37.5–51)
HDLC SERPL-MCNC: 33 MG/DL
HGB BLD-MCNC: 10.2 G/DL (ref 13–17.7)
INTERPRETATION, 910389: NORMAL
INTERPRETATION: NORMAL
LDLC SERPL CALC-MCNC: 30 MG/DL (ref 0–99)
Lab: NORMAL
MCH RBC QN AUTO: 32.2 PG (ref 26.6–33)
MCHC RBC AUTO-ENTMCNC: 32.9 G/DL (ref 31.5–35.7)
MCV RBC AUTO: 98 FL (ref 79–97)
PDF IMAGE, 910387: NORMAL
PLATELET # BLD AUTO: 291 X10E3/UL (ref 150–379)
POTASSIUM SERPL-SCNC: 5.1 MMOL/L (ref 3.5–5.2)
PROT SERPL-MCNC: 5.5 G/DL (ref 6–8.5)
RBC # BLD AUTO: 3.17 X10E6/UL (ref 4.14–5.8)
SODIUM SERPL-SCNC: 140 MMOL/L (ref 134–144)
TRIGL SERPL-MCNC: 263 MG/DL (ref 0–149)
VLDLC SERPL CALC-MCNC: 53 MG/DL (ref 5–40)
WBC # BLD AUTO: 7.5 X10E3/UL (ref 3.4–10.8)

## 2019-01-21 ENCOUNTER — OFFICE VISIT (OUTPATIENT)
Dept: ENDOCRINOLOGY | Age: 69
End: 2019-01-21

## 2019-01-21 VITALS
SYSTOLIC BLOOD PRESSURE: 208 MMHG | WEIGHT: 181.6 LBS | BODY MASS INDEX: 27.52 KG/M2 | HEIGHT: 68 IN | DIASTOLIC BLOOD PRESSURE: 78 MMHG | HEART RATE: 59 BPM

## 2019-01-21 DIAGNOSIS — E55.9 VITAMIN D DEFICIENCY: ICD-10-CM

## 2019-01-21 DIAGNOSIS — D35.01 ADENOMA OF RIGHT ADRENAL GLAND: ICD-10-CM

## 2019-01-21 DIAGNOSIS — E66.9 OBESITY, CLASS I, BMI 30-34.9: ICD-10-CM

## 2019-01-21 DIAGNOSIS — I10 ESSENTIAL HYPERTENSION, BENIGN: ICD-10-CM

## 2019-01-21 DIAGNOSIS — E78.5 HYPERLIPIDEMIA LDL GOAL <70: ICD-10-CM

## 2019-01-21 DIAGNOSIS — D62 ACUTE BLOOD LOSS ANEMIA: ICD-10-CM

## 2019-01-21 RX ORDER — CHOLECALCIFEROL TAB 125 MCG (5000 UNIT) 125 MCG
TAB ORAL DAILY
COMMUNITY
End: 2020-01-01

## 2019-01-21 RX ORDER — PEN NEEDLE, DIABETIC 31 GX3/16"
NEEDLE, DISPOSABLE MISCELLANEOUS
Qty: 100 PEN NEEDLE | Refills: 3 | Status: SHIPPED | OUTPATIENT
Start: 2019-01-21 | End: 2020-01-01

## 2019-01-21 RX ORDER — INSULIN GLARGINE 100 [IU]/ML
INJECTION, SOLUTION SUBCUTANEOUS
Qty: 15 ML | Refills: 11 | Status: SHIPPED | OUTPATIENT
Start: 2019-01-21 | End: 2019-04-26

## 2019-01-21 RX ORDER — INSULIN GLARGINE 100 [IU]/ML
INJECTION, SOLUTION SUBCUTANEOUS
Qty: 1 PEN | Refills: 0 | Status: SHIPPED | OUTPATIENT
Start: 2019-01-21 | End: 2019-01-21 | Stop reason: SDUPTHER

## 2019-01-21 RX ORDER — INSULIN GLARGINE 100 [IU]/ML
INJECTION, SOLUTION SUBCUTANEOUS
Qty: 1 PEN | Refills: 0 | Status: SHIPPED | COMMUNITY
Start: 2019-01-21 | End: 2019-01-21 | Stop reason: SDUPTHER

## 2019-01-21 RX ORDER — NIFEDIPINE 60 MG/1
60 TABLET, EXTENDED RELEASE ORAL EVERY 12 HOURS
COMMUNITY

## 2019-01-21 NOTE — PATIENT INSTRUCTIONS
1) Stop the glipizide. 2) We will use basaglar to control your sugars now that you are on dialysis. You gave yourself 10 units of insulin this morning. Do this again in the morning for the next 3 days. On Friday morning if your sugar is still over 130, then plan on increasing by 2 units every 4 days until your sugars stays under 130 and then stay on this dose. 3) You can place a new pen needle on the tip of the pen prior to each injection but if you would like to use the same needle for more than one injection, do not use this for more than 3 injections in a row. When you place a new pen needle on the pen, you need to prime the needle first to get out the air. To do this, turn the pen dial to 2 units and then push down on the end of the pen to waste this insulin to get out any air bubbles. Then dial to the desired dose and inject. 4) Use your abdomen or upper thigh as sites for injection and rotate sites. Do not inject within 1 inch of your belly button. Be sure to pinch the skin up and inject perpendicular to the skin and hold the needle in place for at least 5 seconds before withdrawing the needle to make sure that all of the insulin has been injected under the skin. 5) If your blood pressure is staying up over 150/90 over the next week then let Dr. Katarzyna Dawson know. 6) Your vitamin D is still low at 14 but hopefully with increasing to 5000 units daily, this will come back up. 7) Write down your sugars and how much insulin you took and my nurse navigator, Paula Ludwig, will call you in 2 weeks to obtain your blood sugars so we can start adjusting your doses of insulin. Check your sugar before dialysis at night and first thing in the morning.

## 2019-01-21 NOTE — PROGRESS NOTES
Chief Complaint   Patient presents with    Diabetes     pcp and pharmacy confirmed     History of Present Illness: Shana Cohen is a 80 y.o. male here for follow up of diabetes. Weight down 1 lbs since last visit in 8/18. Started on peritoneal dialysis in 10/18 and had to have a redo surgery for catheter replacement and has not had any trouble since then. He is no longer on oral iron but is now receiving iron injections through the dialysis center. His BP was running higher and his amlodipine was stopped by  [facility] and changed to nifedipine 60 mg bid. BP was up today and has taken his morning pills. Still taking 2 tabs of glipizide daily but since starting peritoneal dialysis his blood sugars are running higher and has seen readings in the 137-282 range over the past month. Has been eating more protein recently. States he was taking 1000 units of D3 but just last week picked up a bottle of 5000 units to take one daily. Current Outpatient Medications   Medication Sig    NIFEdipine ER (ADALAT CC) 60 mg ER tablet Take 60 mg by mouth two (2) times a day.  polyethylene glycol 3350 (MIRALAX PO) Take  by mouth.  Insulin Needles, Disposable, (BD ULTRA-FINE MINI PEN NEEDLE) 31 gauge x 3/16\" ndle Use as directed once daily    BASAGLAR KWIKPEN U-100 INSULIN 100 unit/mL (3 mL) inpn Inject 10 units every morning and increase as directed to a max of 50 units per day    cholecalciferol, VITAMIN D3, (VITAMIN D3) 5,000 unit tab tablet Take  by mouth daily.  cloNIDine HCl (CATAPRES) 0.3 mg tablet Take 1 tab by mouth twice daily--delete 0.1 mg tabs from profile    atorvastatin (LIPITOR) 40 mg tablet Take 40 mg by mouth every evening.  furosemide (LASIX) 80 mg tablet Take 80 mg by mouth daily. Patient takes one tab daily, then a second tab PRN    acetaminophen (TYLENOL) 500 mg tablet Take 1,000 mg by mouth every six (6) hours as needed for Pain.     acetaminophen/diphenhydramine (TYLENOL PM PO) Take 1 Tab by mouth nightly as needed (pain/sleep).  folic acid-vit L3-AER J20 (FOLTX) 2.5-25-2 mg tablet Take 1 Tab by mouth nightly.  omega-3 fatty acids (FISH OIL CONCENTRATE) 1,000 mg cap Take 1,000 mg by mouth two (2) times a day.  BYSTOLIC 20 mg tablet Take 20 mg by mouth nightly.  PRALUENT PEN 75 mg/mL injector pen INJECT EVERY 2 WEEKS    tamsulosin (FLOMAX) 0.4 mg capsule Take 0.4 mg by mouth nightly.  clopidogrel (PLAVIX) 75 mg tablet Take 75 mg by mouth nightly.  allopurinol (ZYLOPRIM) 100 mg tablet Take 100 mg by mouth nightly. No current facility-administered medications for this visit. No Known Allergies     Review of Systems:  - Eyes: no blurry vision or double vision  - Cardiovascular: no chest pain  - Respiratory: no shortness of breath  - Musculoskeletal: no myalgias  - Neurological: no numbness/tingling in extremities    Physical Examination:  Blood pressure (!) 208/78, pulse (!) 59, height 5' 8\" (1.727 m), weight 181 lb 9.6 oz (82.4 kg).   Repeat manually 198/60 in left arm.  - General: pleasant, no distress, good eye contact   - Neck: (+) carotid bruits  - Cardiovascular: regular, normal rate, nl s1 and s2, no m/r/g,   - Respiratory: clear bilaterally  - Integumentary: 1+ edema,   - Psychiatric: normal mood and affect    Data Reviewed:   Component      Latest Ref Rng & Units 1/16/2019 1/16/2019 1/16/2019 1/16/2019           8:48 AM  8:48 AM  8:48 AM  8:48 AM   Glucose      65 - 99 mg/dL  306 (H)     BUN      8 - 27 mg/dL  58 (H)     Creatinine      0.76 - 1.27 mg/dL  6.99 (H)     GFR est non-AA      >59 mL/min/1.73  7 (L)     GFR est AA      >59 mL/min/1.73  9 (L)     BUN/Creatinine ratio      10 - 24  8 (L)     Sodium      134 - 144 mmol/L  140     Potassium      3.5 - 5.2 mmol/L  5.1     Chloride      96 - 106 mmol/L  98     CO2      20 - 29 mmol/L  21     Calcium      8.6 - 10.2 mg/dL  8.6     Protein, total      6.0 - 8.5 g/dL  5.5 (L)     Albumin      3.6 - 4.8 g/dL 3.2 (L)     GLOBULIN, TOTAL      1.5 - 4.5 g/dL  2.3     A-G Ratio      1.2 - 2.2  1.4     Bilirubin, total      0.0 - 1.2 mg/dL  <0.2     Alk. phosphatase      39 - 117 IU/L  86     AST      0 - 40 IU/L  13     ALT (SGPT)      0 - 44 IU/L  18     WBC      3.4 - 10.8 x10E3/uL 7.5      RBC      4.14 - 5.80 x10E6/uL 3.17 (L)      HGB      13.0 - 17.7 g/dL 10.2 (L)      HCT      37.5 - 51.0 % 31.0 (L)      MCV      79 - 97 fL 98 (H)      MCH      26.6 - 33.0 pg 32.2      MCHC      31.5 - 35.7 g/dL 32.9      RDW      12.3 - 15.4 % 15.5 (H)      PLATELET      214 - 938 x10E3/uL 291      Cholesterol, total      100 - 199 mg/dL   116    Triglyceride      0 - 149 mg/dL   263 (H)    HDL Cholesterol      >39 mg/dL   33 (L)    VLDL, calculated      5 - 40 mg/dL   53 (H)    LDL, calculated      0 - 99 mg/dL   30    Hemoglobin A1c, (calculated)      4.8 - 5.6 %    10.9 (H)   Estimated average glucose      mg/dL    266     Component      Latest Ref Rng & Units 1/16/2019           8:48 AM   VITAMIN D, 25-HYDROXY      30.0 - 100.0 ng/mL 14.4 (L)       Assessment/Plan:     1. DM w/o complication type II, uncontrolled wit nephropathy: his most recent Hgb A1c was 10.9% in 1/19 up from 7.8% in 8/18 down from 8.9% in 2/18 down from 9.5% in 10/17 up from 9.2% in 7/17 up from 6.1% in 1/17 down from 6.5% in 7/16 down from 6.6% in 2/16 up from 6% in 9/15 down from 6.4% in 4/15 up from 6.3% in 12/14 down from 7.6% in 8/14 up from 8.3% in 3/14 up from 6.9% in November up from 6.3% in July down from 7.3% in Feb 2013 up from 6.7% in October down from 7% in June down from 8.3% in March up from 7.3% in December down from 8% in September down from 9.5% in June, which is the same as in January. A1c is higher now that he has started peritoneal dialysis in 10/18 so will stop the glipizide and use basaglar instead. Gave himself 10 units in the office today.   - begin basaglar 10 units in the morning  - stop glipizide SR 10 mg 2 tabs daily  - check bs 2 times before breakfast and at night prior to dialysis  - foot exam done 8/18  - optho UTD 6/18  - microalbumin 994 1/11 down to 897 9/11 up to 1383 in 10/12 (possibly due to protein shake) and down to 1083 in 2/13, stable at 1087 in 11/13, down to 453 in 4/15, up to 3402 in 7/16 and 4040 in 7/17 and 5421 in 10/17 and 8148 in 8/18--will no longer follow since starting dialysis in 10/18  - check A1c at dialysis     2. Unspecified essential hypertension (401.9) his BP was above goal < 140/90 on the right arm which is normally his higher arm. We have now learned that he has HTN on his right arm so we will only use from now on to measure his readings. Will defer to Dr. Indiana Sams at this time  - cont bystolic 10 mg 2 tabs daily  - cont nifedipine 60 mg bid  - lasix 80 mg in the am and on occasion 80 mg at 2pm  - cont clonidine 0.3 mg bid       3. Other and unspecified hyperlipidemia (272.4) Given DM and CAD, Goal LDL < 70, non-HDL < 100, and TG < 150.  his lipids were all at goal in 3/14 and 12/14 and 4/15. LDL up to 84 in 2/16 due to more red meat.  and LDL 95 in 7/16 off the niaspan and with weight gain so hopefully weight loss will help. LDL 85 and  in 1/17.  and non- in 7/17. Lipitor doubled to 40 mg bid and praluent added in 10/17 and TG down to 306 and non-HDL down to 96 in 10/17. I cut back on lipitor to 40 mg daily to help with joint pains but it appears Dr. Indiana Sams kept him on 80 mg and LDL 29 in 2/18 and still having joint pains so asked him again to cut back to 40 mg given how low his total cholesterol and LDL are and he did and LDL 37 in 8/18 and 30 in 1/19  - cont lipitor 40 mg daily  - cont praluent 75 mg weekly  - check lipids at dialysis     4.   Iron deficiency anemia:  Hgb 10.2 in 1/19 up from 9.3 in 8/18 up from 8.8 in 2/18 down from 11.5 in 10/17 up from 11.4 in 7/17 up from 10.6 in 1/17 down from 11.3 in 7/16 up from 10.8 in 2/16 down from 11.4 in 9/15 up from 10.9 in 4/15 down from 11.4 in 12/14 down from 11.6 in 8/14 up from 11.1 in 3/14 from 12.5 in 11/13 up from 11.8 in 7/13. Has been on higher dose of iron 1 tab bid since 8/14   - receiving iron through dialysis  - check hgb at dialysis    5. Vitamin D deficiency: level was 29 in 9/15 on 2000 units of D3 daily so wanted to increase to 3000 units daily but his calcium was 10.6 in 9/15 and Dr. Mark Nath advised cutting back on his dose and he has been taking 1000 units daily and level down to 19 in 2/16 but calcium back to normal at 9.6. Level 20 in 7/16 but calcium 10.2 in 7/16. Now off 1000 units and level down to 18 in 1/17 so wanted him to restart this and he apparently did and then was told to stop by Dr. Mark Nath and down to 13.1 in 7/17 but calcium is normal at 9.7 so had him go back to this but level still 13 in 10/17 so increased to 2000 units daily and up to 20.5 in 2/18 and calcium still normal so stayed on this dose. Down to 10.4 in 8/18 as he stopped supplement this spring so had him restart as calcium level was trending down but was only taking 1000 units daily and up to 14.4 in 1/19. Just picked up 5000 units daily so will have him continue this dose  - cont vitamin D 5000 units daily  - check Vitamin D 25-OH level at next visit    6. Class I Obesity: Tried topamax as I wanted to avoid phentermine given his vascular disease but didn't feel well on this so stopped after 2-3 weeks. Had lost 13 lbs from 7/16 to 1/17 with doing a nutrimost program but had stopped this in 1/17 when his creatinine was high and I don't know if this could have contributed to higher creatinine. Wt up 3 lbs by 7/17 and 6 lbs by 10/17 likely due to fluid but down 10 lbs by 2/18 and 7 lbs by 8/18 and 1 lb by 1/19  - diet and exercise    7. Right adrenal gland adenoma: he was found to have a 1.9 cm right adrenal incidentaloma on MRI in 2/17.   His dex suppressed cortisol was 1.9 and plasma mets were normal and josue/renin ratio were normal in 8/17 so he doesn't appear to have a functioning adrenal gland. Repeat scan in 5/17 didn't show any change   - no need for further repeat imaging      Patient Instructions   1) Stop the glipizide. 2) We will use basaglar to control your sugars now that you are on dialysis. You gave yourself 10 units of insulin this morning. Do this again in the morning for the next 3 days. On Friday morning if your sugar is still over 130, then plan on increasing by 2 units every 4 days until your sugars stays under 130 and then stay on this dose. 3) You can place a new pen needle on the tip of the pen prior to each injection but if you would like to use the same needle for more than one injection, do not use this for more than 3 injections in a row. When you place a new pen needle on the pen, you need to prime the needle first to get out the air. To do this, turn the pen dial to 2 units and then push down on the end of the pen to waste this insulin to get out any air bubbles. Then dial to the desired dose and inject. 4) Use your abdomen or upper thigh as sites for injection and rotate sites. Do not inject within 1 inch of your belly button. Be sure to pinch the skin up and inject perpendicular to the skin and hold the needle in place for at least 5 seconds before withdrawing the needle to make sure that all of the insulin has been injected under the skin. 5) If your blood pressure is staying up over 150/90 over the next week then let Dr. Karla Agrawal know. 6) Your vitamin D is still low at 14 but hopefully with increasing to 5000 units daily, this will come back up. 7) Write down your sugars and how much insulin you took and my nurse navigator, Danilo, will call you in 2 weeks to obtain your blood sugars so we can start adjusting your doses of insulin. Check your sugar before dialysis at night and first thing in the morning.               Follow-up Disposition:  Return in about 3 months (around 4/21/2019) for ok to use 12:10 slot.     Copy sent to:  Dr. Jennifer Zarate [phone]  Dr. Jessica Saavedra [phone]  Dr. Melquiades Lopez [phone]  Dr. John Nunes

## 2019-02-05 ENCOUNTER — PATIENT OUTREACH (OUTPATIENT)
Dept: ENDOCRINOLOGY | Age: 69
End: 2019-02-05

## 2019-02-05 NOTE — PROGRESS NOTES
Diabetes Care Management Note Was asked by Dr. Baldev Joaquin to see patient for Diabetes management. Last A1C was 10.9 on 1/16/19. Provided a brief overview of:  the disease process and progression, explanation of what an A1C measures, the importance of cholesterol, BP and blood glucose management, the importance of testing blood sugars, blood glucose goals for fasting and after meal testing, how diet effects blood glucose, the impact of sugars and starches Presentation/Accompanied by:  ambulatory Social History: patient lives alone History/Family History: patient has been diabetic since 2012, patient has new diagnosis of kidney failure and is on peritoneal dialysis daily Symptoms of high blood sugar:  neuropathy, low energy, increased thirst, kidney failure Motivation: patient expresses he wants to control blood sugars Self Care Behaviors 1) Healthy Eating/Food Recall- 2/4/19 BK - breaded fish LN - salad w/ chicken DN - steak or beef stew SN - n/a 
DR - water, tea (no sugar) 2) Being Active- patient is not active at this time, wants to learn about [facility] diabetes program 
 
3) Self Monitoring Blood Glucose (SMBG)- patient states blood sugar has been averaging in the 200s, decrease from the 300s last week, checks blood sugar prior to breakfast and dinner How to treat a low blood sugar - discussed in depth, treat blood sugar below 70 with 1/2 cup regular juice/soda or 4-5 glucose tablets or hard candy, retest, if above 70 eat next meal or snack, if still below 70, repeat 4) Taking Medication- patient taking 28 units of Basaglar daily, to increase to 30 units in the next 4 days, if blood sugar greater than 130, see MD instruction (1/21/19) 5) Problem Solving- patient is eager and willing to learn how to manage high and low blood sugar by making adjustments to his treatment plan. 6) Reducing Risk-  
Tobacco - n/a HTN - taking medication HLD - taking medication Aspirin - n/a Discussed possible complications of uncontrolled diabetes. 7) Healthy Coping-  
Support system - patient will take advantage of the support and education provided today and of the support of his healthcare team 
 
Resources provided during this call: [facility] diabetes program 
 
Future Appointments:  
Future Appointments Date Time Provider Dionisio Nicolas 4/26/2019  9:10 AM Kiley Jones MD RDE [facility] [address] Goals Addressed This Visit's Progress  Knowledge and adherence of medication (ie. action, side effects, missed dose, etc.)     
  2/5/19 Patient will take his diabetes medication and titrate as directed in MD note on 1/21/19. Patient is at 28 units of Kavitha Harrington today and will increase to 30 units in the next 4 days, if blood sugars are greater than 130. YHW 
  
  Patient verbalizes understanding of self -management goals of living with Diabetes. 2/5/19 Patient will test blood sugar 2 times daily, record and review with NN in 1 week.   Christian Vital

## 2019-02-05 NOTE — LETTER
2/5/2019 2:30 PM 
 
Mr. Gertrudis High 
[address] [address] Dear  Rola Foss Vijay: I am a RN Nurse Navigator working with [address] [place] [address] Endocrinology. Part of my job is to follow up with patients who have been in the emergency department, hospital, or have a chronic disease. I check to see how you are feeling, answer any questions you may have about your health and check to see if you have a follow-up appointment to see your primary care physician. If you have changed your Primary Care Provider, let us know so we can update our records. Otherwise, I am available to help provide education and resources for your needs. I can be reached directly Monday thru Friday at [phone] or [phone]. Your next appointment with Dr. Cnydee Merrill is scheduled for Friday, April 26, 2019 at 9:10 am. 
 
Thank you for allowing me to participate in your care! Sincerely, Burgess Gail RN Enclosure(s) Home Blood Sugar Monitoring Record [facility] Diabetes Program

## 2019-02-05 NOTE — Clinical Note
Spoke to patient today, patient was away from home and did not have his blood sugar with him to report. I will be contacting this patient weekly for diabetes management, so I will have blood sugar readings for you next Wednesday. Patient states as of today, he is taking 28 units of Basaglar, but states his blood sugar are still greater than 130.   Patient knows to increase insulin by 2 units every 4 days until reaches his goalThank you

## 2019-02-13 ENCOUNTER — PATIENT OUTREACH (OUTPATIENT)
Dept: ENDOCRINOLOGY | Age: 69
End: 2019-02-13

## 2019-02-13 NOTE — PROGRESS NOTES
2/13/19 Patient did increased his insulin, Basaglar to 32 units on 2/8/19. Patient states his blood sugars have been averaging between 170s-240s. Patient expressed concerns about insulin and fear of low blood sugar. Nurse navigator discussed in depth the impact of having high blood sugars on the body, the risk of having a low blood sugar, what is a low blood sugar, and its treatment. NN advised patient to not skip meals while on insulin and to watch dietary intake of sugar and starches to help manage his diabetes. Patient states the following blood sugar readings: 
  
2/7/19 
244 before breakfast; 30 units 2/8/19 
234 before breakfast, 32 units 2/9/18 
175 before breakfast, 30 units 2/10/19 
173 before breakfast, 32 units 2/11/19 
179 before breakfast, 32 units 2/12/19 
109 before breakfast, 32 units 2/13/19 
244 before breakfast, 32 units Patient will increase Basaglar to 34 unit starting tomorrow, 2/14/19 and report blood sugar readings to nurse navigator in 1 week.

## 2019-02-21 NOTE — ED NOTES
Informed Leonela Duran, that patient BS was 261 and the patient took glipizide this morning and is not scheduled for insulin until before meals. Patient was given a liter a fluid as well. fair plus None known

## 2019-02-22 ENCOUNTER — PATIENT OUTREACH (OUTPATIENT)
Dept: ENDOCRINOLOGY | Age: 69
End: 2019-02-22

## 2019-02-22 NOTE — PROGRESS NOTES
19 Nurse navigator spoke to patient today, verified patient's name, address and . Patient states he is doing good, no problems or concerns noted. Patient states he increased his Basaglar to 36 units on 19 and reports his blood sugars are still not at goal of under 150. Patient will increase Basaglar to 38 units on 19 and report his most recent blood sugar readings to nurse navigator on 3/1/19. Goals Addressed This Visit's Progress  Knowledge and adherence of medication (ie. action, side effects, missed dose, etc.)   On track 19 Patient will take his diabetes medication and titrate as directed in MD note on 19. Patient is at 28 units of Felton Latch today and will increase to 30 units in the next 4 days, if blood sugars are greater than 130. YHW 
  
  Patient verbalizes understanding of self -management goals of living with Diabetes. 19 Patient will test blood sugar 2-3 times daily, record and review with NN in 1 week. [facility] 
 
19 Patient will complete a 3 day food log to discuss with nurse navigator in 1 week.   [facility]

## 2019-03-04 ENCOUNTER — PATIENT OUTREACH (OUTPATIENT)
Dept: ENDOCRINOLOGY | Age: 69
End: 2019-03-04

## 2019-03-04 NOTE — PROGRESS NOTES
3/4/19 Nurse navigator spoke to patient today, verified patient's name, address and . Patient states he is doing okay, no problems or concerns noted. Patient states he has increased his Basaglar insulin to 42 units daily. Patient states he started the 42 units 3 days ago. Patient seemed reluctant to increase to 44 units at this time. Patient states he is taking his blood sugar prior to breakfast daily only. Patient states he has been having trouble sleeping at night and is going to have a sleep study at the end of the month. NN explained to patient that insufficient sleep can contribute to higher than normal blood sugars, but patient also needs to watch his dietary sugars and starches. NN encouraged patient to limit the carbohydrates in his diet by using portion control and measuring. NN sent patient a diabetes meal planing guide and diabetes friendly meals samples for his review and discussion at next contact. Patient states the following blood sugar readings: 
 
19  
149 Breakfast: egg, sausage biscuit Lunch: oysters, salad Dinner: oyster, salad 19 
247 Breakfast: grits Lunch: salad bar Dinner: salad bar 
 
19 
124 Breakfast: sausage biscuit Lunch: fish cameron Dinner: fish cameron 19 
249 Breakfast: grits Lunch: spaghetti Dinner: salad bar 
 
19 
317 Breakfast: chicken biscuit Lunch: fish cameron Dinner: spaghetti 19 
217 Breakfast: grits Lunch: fish cameron Dinner: 
 
3/1/19 
212 Breakfast: grits Lunch: hamburger, no bun Dinner: peanut butter w/ slice bread 3/2/19 
327 Breakfast: grits Lunch: 
Dinner: 
 
3/3/19 
197 Breakfast: grits Lunch: 
Dinner: 
 
Goals Addressed This Visit's Progress  Knowledge and adherence of medication (ie. action, side effects, missed dose, etc.)   On track 19 Patient will take his diabetes medication and titrate as directed in MD note on 1/21/19. Patient is at 28 units of [place] today and will increase to 30 units in the next 4 days, if blood sugars are greater than 130. [facility] 
 
3/4/19 Patient at 42 units of Basaglar insulin today, patient will increase insulin by 2 units every 4 days in order to obtain blood sugar less than 150. [facility] 
  
  Patient verbalizes understanding of self -management goals of living with Diabetes. On track 2/5/19 Patient will test blood sugar 2-3 times daily, record and review with NN in 1 week. [facility] 
 
2/22/19 Patient will complete a 3 day food log to discuss with nurse navigator in 1 week.   [facility]

## 2019-03-06 NOTE — PROGRESS NOTES
Thanks for the update. Please make sure he keeps increasing his dose as directed to keep his sugars under 130 in the morning.

## 2019-03-20 ENCOUNTER — PATIENT OUTREACH (OUTPATIENT)
Dept: ENDOCRINOLOGY | Age: 69
End: 2019-03-20

## 2019-03-20 NOTE — PROGRESS NOTES
3/20/19 Nurse navigator spoke to patient today, verified patient's name, address and . Patient states he is doing good, no problems or concerns noted. Patient states his blood sugars have been averaging below 150 for the last 2 weeks. Patient states he test his blood sugar 1 time daily, prior to breakfast. Patient states he is watching his dietary sugars and starches. Patient states he understands what a carbohydrate is and how it affects his blood sugar since his last contact with nurse navigator on 3/4/19. Patient states he is taking 44 units of Basaglar insulin daily. Patient states the following blood sugar readings: 
 
3/6/19 
234 
 
3/7/19 
217 
 
3/8/19 
127 
 
3/9/19   
127 
 
3/10/19 
147 
 
3/11/19 
147 
 
3/12/19 No reading 3/13/19 No reading 3/14/19 
127 
 
3/15/19 
129 
 
3/16/19 
101 
 
3/17/19 
152 
 
3/18/19 
129 
 
3/19/19 
172 Patient's blood sugar are averaging about 154. Nurse navigator encouraged patient to take the 44 unit of insulin daily and continue to monitor his carbohydrates. MD notified for recommendations. Goals Addressed This Visit's Progress  COMPLETED: Knowledge and adherence of medication (ie. action, side effects, missed dose, etc.)     
  19 Patient will take his diabetes medication and titrate as directed in MD note on 19. Patient is at 28 units of [address] today and will increase to 30 units in the next 4 days, if blood sugars are greater than 130. [facility] 
 
3/4/19 Patient at 42 units of Basaglar insulin today, patient will increase insulin by 2 units every 4 days in order to obtain blood sugar less than 150. [facility] 
 
 
  
  Patient verbalizes understanding of self -management goals of living with Diabetes. On track 3/20/19 Patient will take 44 units of Basaglar daily, monitor his dietary carbohydrates and maintain a blood sugar of less than 150 in the next 2 weeks.   [facility]

## 2019-04-03 ENCOUNTER — PATIENT OUTREACH (OUTPATIENT)
Dept: ENDOCRINOLOGY | Age: 69
End: 2019-04-03

## 2019-04-03 NOTE — PROGRESS NOTES
Goals Addressed This Visit's Progress  Patient verbalizes understanding of self -management goals of living with Diabetes. On track 3/20/19 Patient will take 44 units of Basaglar daily, monitor his dietary carbohydrates and maintain a blood sugar of less than 150 in the next 2 weeks. [facility] 
 
4/3/19 Patient states he is doing good today, no problems or concern. Patient states his blood sugar have been in the 200s and believe it is coming from his peritoneal dialysis solution. Patient states he talked with his nephrologist about his concerns with his blood sugars and the solution. Patient states his nephrologist changed his peritoneal dialysis solution to a lower concentration of glucose. Patient will test his blood sugar 1-2 times daily in the next 2 weeks, patient will review blood sugar with nurse navigator.   [facility]

## 2019-04-26 ENCOUNTER — OFFICE VISIT (OUTPATIENT)
Dept: ENDOCRINOLOGY | Age: 69
End: 2019-04-26

## 2019-04-26 VITALS
SYSTOLIC BLOOD PRESSURE: 134 MMHG | BODY MASS INDEX: 29.73 KG/M2 | WEIGHT: 196.2 LBS | DIASTOLIC BLOOD PRESSURE: 50 MMHG | HEIGHT: 68 IN | HEART RATE: 55 BPM

## 2019-04-26 DIAGNOSIS — D35.01 ADENOMA OF RIGHT ADRENAL GLAND: ICD-10-CM

## 2019-04-26 DIAGNOSIS — D62 ACUTE BLOOD LOSS ANEMIA: ICD-10-CM

## 2019-04-26 DIAGNOSIS — I10 ESSENTIAL HYPERTENSION, BENIGN: ICD-10-CM

## 2019-04-26 DIAGNOSIS — E55.9 VITAMIN D DEFICIENCY: ICD-10-CM

## 2019-04-26 DIAGNOSIS — E78.5 HYPERLIPIDEMIA LDL GOAL <70: ICD-10-CM

## 2019-04-26 DIAGNOSIS — E66.9 OBESITY, CLASS I, BMI 30-34.9: ICD-10-CM

## 2019-04-26 RX ORDER — INSULIN GLARGINE 100 [IU]/ML
INJECTION, SOLUTION SUBCUTANEOUS
Qty: 15 ML | Refills: 11 | Status: ON HOLD
Start: 2019-04-26 | End: 2019-01-01 | Stop reason: SDUPTHER

## 2019-04-26 NOTE — PROGRESS NOTES
Chief Complaint   Patient presents with    Diabetes     pcp and pharmacy confirmed    Other     consent form signed to obtain eye report     History of Present Illness: Alina Mann is a 77 y.o. male here for follow up of diabetes. Weight up 15 lbs since last visit in 1/19. Has been titrating up his basaglar and currently is taking 44 units daily and this is keeping his morning sugars in the 120s for the most part. Did have one reading in the 76s. Has been over 200 if he uses the red bag at night which has more sugar in it. Compliant with BP and lipid regimen. States his A1c is down to the 8s so he'll get us a copy of this. Current Outpatient Medications   Medication Sig    BASAGLAR KWIKPEN U-100 INSULIN 100 unit/mL (3 mL) inpn Inject 44 units every morning and increase as directed to a max of 50 units per day--Dose change 4/26/19--updated med list--did not send prescription to the pharmacy    NIFEdipine ER (ADALAT CC) 60 mg ER tablet Take 60 mg by mouth two (2) times a day.  polyethylene glycol 3350 (MIRALAX PO) Take  by mouth.  Insulin Needles, Disposable, (BD ULTRA-FINE MINI PEN NEEDLE) 31 gauge x 3/16\" ndle Use as directed once daily    cholecalciferol, VITAMIN D3, (VITAMIN D3) 5,000 unit tab tablet Take  by mouth daily.  cloNIDine HCl (CATAPRES) 0.3 mg tablet Take 1 tab by mouth twice daily--delete 0.1 mg tabs from profile    atorvastatin (LIPITOR) 40 mg tablet Take 40 mg by mouth every evening.  furosemide (LASIX) 80 mg tablet Take 80 mg by mouth three (3) times daily.  acetaminophen (TYLENOL) 500 mg tablet Take 1,000 mg by mouth every six (6) hours as needed for Pain.  folic acid-vit U7-LJU P40 (FOLTX) 2.5-25-2 mg tablet Take 1 Tab by mouth nightly.  omega-3 fatty acids (FISH OIL CONCENTRATE) 1,000 mg cap Take 1,000 mg by mouth two (2) times a day.  BYSTOLIC 20 mg tablet Take 20 mg by mouth nightly.     PRALUENT PEN 75 mg/mL injector pen INJECT EVERY 2 WEEKS    tamsulosin (FLOMAX) 0.4 mg capsule Take 0.4 mg by mouth nightly.  clopidogrel (PLAVIX) 75 mg tablet Take 75 mg by mouth nightly.  allopurinol (ZYLOPRIM) 100 mg tablet Take 100 mg by mouth nightly. No current facility-administered medications for this visit. No Known Allergies     Review of Systems:  - Eyes: no blurry vision or double vision  - Cardiovascular: no chest pain  - Respiratory: no shortness of breath  - Musculoskeletal: no myalgias  - Neurological: no numbness/tingling in extremities    Physical Examination:  Blood pressure 134/50, pulse (!) 55, height 5' 8\" (1.727 m), weight 196 lb 3.2 oz (89 kg). - General: pleasant, no distress, good eye contact   - Neck: (+) b/l carotid bruits  - Cardiovascular: regular, normal rate, nl s1 and s2, no m/r/g,   - Respiratory: clear bilaterally  - Integumentary: 1+ edema,   - Psychiatric: normal mood and affect    Data Reviewed:   - none new for review    Assessment/Plan:     1. DM w/o complication type II, uncontrolled wit nephropathy: his most recent Hgb A1c was 10.9% in 1/19 up from 7.8% in 8/18 down from 8.9% in 2/18 down from 9.5% in 10/17 up from 9.2% in 7/17 up from 6.1% in 1/17 down from 6.5% in 7/16 down from 6.6% in 2/16 up from 6% in 9/15 down from 6.4% in 4/15 up from 6.3% in 12/14 down from 7.6% in 8/14 up from 8.3% in 3/14 up from 6.9% in November up from 6.3% in July down from 7.3% in Feb 2013 up from 6.7% in October down from 7% in June down from 8.3% in March up from 7.3% in December down from 8% in September down from 9.5% in June, which is the same as in January.   A1c is coming down per his report and his home readings are doing much better so gave him some parameters of how to adjust his insulin based on his readings and what bag he is using.  - cont basaglar 44 units in the morning (42 units if bs < 100, 46 units if > 150, add 6 units at night for red bags)  - check bs 2 times before breakfast and at night prior to dialysis  - foot exam done 8/18  - optho UTD 6/18  - microalbumin 994 1/11 down to 897 9/11 up to 1383 in 10/12 (possibly due to protein shake) and down to 1083 in 2/13, stable at 1087 in 11/13, down to 453 in 4/15, up to 3402 in 7/16 and 4040 in 7/17 and 5421 in 10/17 and 8148 in 8/18--will no longer follow since starting dialysis in 10/18  - check A1c at dialysis     2. Unspecified essential hypertension (401.9) his BP was at goal < 140/90 on the right arm which is normally his higher arm. We have now learned that he has HTN on his right arm so we will only use from now on to measure his readings. Will defer to Dr. Jacky Oconnell at this time  - cont bystolic 10 mg 2 tabs daily  - cont nifedipine 60 mg bid  - lasix 80 mg tid  - cont clonidine 0.3 mg bid       3. Other and unspecified hyperlipidemia (272.4) Given DM and CAD, Goal LDL < 70, non-HDL < 100, and TG < 150.  his lipids were all at goal in 3/14 and 12/14 and 4/15. LDL up to 84 in 2/16 due to more red meat.  and LDL 95 in 7/16 off the niaspan and with weight gain so hopefully weight loss will help. LDL 85 and  in 1/17.  and non- in 7/17. Lipitor doubled to 40 mg bid and praluent added in 10/17 and TG down to 306 and non-HDL down to 96 in 10/17. I cut back on lipitor to 40 mg daily to help with joint pains but it appears Dr. Jacky Oconnell kept him on 80 mg and LDL 29 in 2/18 and still having joint pains so asked him again to cut back to 40 mg given how low his total cholesterol and LDL are and he did and LDL 37 in 8/18 and 30 in 1/19  - cont lipitor 40 mg daily  - cont praluent 75 mg weekly  - check lipids at dialysis     4.   Iron deficiency anemia:  Hgb 10.2 in 1/19 up from 9.3 in 8/18 up from 8.8 in 2/18 down from 11.5 in 10/17 up from 11.4 in 7/17 up from 10.6 in 1/17 down from 11.3 in 7/16 up from 10.8 in 2/16 down from 11.4 in 9/15 up from 10.9 in 4/15 down from 11.4 in 12/14 down from 11.6 in 8/14 up from 11.1 in 3/14 from 12.5 in 11/13 up from 11.8 in 7/13. Has been on higher dose of iron 1 tab bid since 8/14   - receiving iron through dialysis  - check hgb at dialysis    5. Vitamin D deficiency: level was 29 in 9/15 on 2000 units of D3 daily so wanted to increase to 3000 units daily but his calcium was 10.6 in 9/15 and Dr. Lavonne Garza advised cutting back on his dose and he has been taking 1000 units daily and level down to 19 in 2/16 but calcium back to normal at 9.6. Level 20 in 7/16 but calcium 10.2 in 7/16. Now off 1000 units and level down to 18 in 1/17 so wanted him to restart this and he apparently did and then was told to stop by Dr. Lavonne Garza and down to 13.1 in 7/17 but calcium is normal at 9.7 so had him go back to this but level still 13 in 10/17 so increased to 2000 units daily and up to 20.5 in 2/18 and calcium still normal so stayed on this dose. Down to 10.4 in 8/18 as he stopped supplement this spring so had him restart as calcium level was trending down but was only taking 1000 units daily and up to 14.4 in 1/19. Just picked up 5000 units daily so will have him continue this dose  - cont vitamin D 5000 units daily  - check Vitamin D 25-OH level at next visit    6. Class I Obesity: Tried topamax as I wanted to avoid phentermine given his vascular disease but didn't feel well on this so stopped after 2-3 weeks. Had lost 13 lbs from 7/16 to 1/17 with doing a nutrimost program but had stopped this in 1/17 when his creatinine was high and I don't know if this could have contributed to higher creatinine. Wt up 3 lbs by 7/17 and 6 lbs by 10/17 likely due to fluid but down 10 lbs by 2/18 and 7 lbs by 8/18 and 1 lb by 1/19.  Up 15 lbs by 4/19  - diet and exercise    7. Right adrenal gland adenoma: he was found to have a 1.9 cm right adrenal incidentaloma on MRI in 2/17. His dex suppressed cortisol was 1.9 and plasma mets were normal and josue/renin ratio were normal in 8/17 so he doesn't appear to have a functioning adrenal gland.   Repeat scan in 5/17 didn't show any change   - no need for further repeat imaging    Patient Instructions   1) Stay on 44 units of basaglar for the most part. -  If your sugar is under 100, just take 42 units   - and if over 150, take 46 units. 2) If you have to use the red bag that has more sugar in it, plan on taking 6 units at night in addition to the 44 you took during the morning. 3) Your blood pressure is at goal.    4) Make sure to bring your readings and labs from dialysis to your next visit. Follow-up and Dispositions    · Return in about 6 months (around 10/26/2019).                Copy sent to:  Dr. Kari Hoffman [phone]  Dr. Stewart King [phone]  Dr. Luis Harrington [phone]  Dr. Inocencia Starkey

## 2019-04-26 NOTE — PATIENT INSTRUCTIONS
1) Stay on 44 units of basaglar for the most part. -  If your sugar is under 100, just take 42 units   - and if over 150, take 46 units. 2) If you have to use the red bag that has more sugar in it, plan on taking 6 units at night in addition to the 44 you took during the morning. 3) Your blood pressure is at goal.    4) Make sure to bring your readings and labs from dialysis to your next visit.

## 2019-04-27 ENCOUNTER — TELEPHONE (OUTPATIENT)
Dept: ENDOCRINOLOGY | Age: 69
End: 2019-04-27

## 2019-04-27 NOTE — TELEPHONE ENCOUNTER
Please call Mariann Nolan with nephrology specialists at [phone] to see if they have any Hemoglobin A1c values in the past 3-6 months (not hemoglobin values which were sent to me). Thanks.

## 2019-09-10 PROBLEM — N17.9 ACUTE ON CHRONIC RENAL FAILURE (HCC): Status: ACTIVE | Noted: 2019-01-01

## 2019-09-10 PROBLEM — N18.9 ACUTE ON CHRONIC RENAL FAILURE (HCC): Status: ACTIVE | Noted: 2019-01-01

## 2019-09-10 NOTE — PROGRESS NOTES
PD pt admitted with CP and SOB. Per ER MD, family reports there has been trouble with \"low drain. \"  . Will try PD tonight.   Full note in AM.

## 2019-09-10 NOTE — ED PROVIDER NOTES
EMERGENCY DEPARTMENT HISTORY AND PHYSICAL EXAM      Date: 9/10/2019  Patient Name: Yassine Call    History of Presenting Illness     Chief Complaint   Patient presents with    Chest Pain     cp x 1 week off and on with gen. weakness       History Provided By: Patient    HPI: Yassine Call, 77 y.o. male with PMHx significant for end-stage renal disease on peritoneal dialysis, coronary disease status post stent placement, diabetes, hypertension, history of skin cancer, presents to the ED with cc of moderate to severe chest pain, shortness of breath, subjective chills, and altered mental status over the last 48 to 72 hours. Son reports the patient has had milder symptoms over the last 10 days and saw his cardiologist Dr. Fatuma Rodriguez for evaluation. At the time an echocardiogram was done and he was told his heart looked \"fine\" however, there was some small amount of fluid around the heart. Patient reported his chest pain is substernal and pressure-like in sensation. The pain does not radiate to his neck, arms, or back. His shortness of breath and chest pain are both worse with exertion and better at rest.  He is also had significant severe distention to his abdominal region. He reports that his peritoneal dialysis machine is reading \"low drain\". They believe that is not draining the fluid adequately. There is no other associated symptoms. No other exacerbating or militating factors. Patient reports that his nephrologist Dr. Carolina Arevalo. There are no other complaints, changes, or physical findings at this time. PCP: Shira Garnett MD    No current facility-administered medications on file prior to encounter.       Current Outpatient Medications on File Prior to Encounter   Medication Sig Dispense Refill    cloNIDine HCl (CATAPRES) 0.3 mg tablet Take 1 tab by mouth twice daily--delete 0.1 mg tabs from profile 180 Tab 3    BASAGLAR KWIKPEN U-100 INSULIN 100 unit/mL (3 mL) inpn Inject 44 units every morning and increase as directed to a max of 50 units per day--Dose change 4/26/19--updated med list--did not send prescription to the pharmacy 15 mL 11    NIFEdipine ER (ADALAT CC) 60 mg ER tablet Take 60 mg by mouth two (2) times a day.  polyethylene glycol 3350 (MIRALAX PO) Take  by mouth.  Insulin Needles, Disposable, (BD ULTRA-FINE MINI PEN NEEDLE) 31 gauge x 3/16\" ndle Use as directed once daily 100 Pen Needle 3    cholecalciferol, VITAMIN D3, (VITAMIN D3) 5,000 unit tab tablet Take  by mouth daily.  atorvastatin (LIPITOR) 40 mg tablet Take 40 mg by mouth every evening.  furosemide (LASIX) 80 mg tablet Take 80 mg by mouth three (3) times daily. 3    acetaminophen (TYLENOL) 500 mg tablet Take 1,000 mg by mouth every six (6) hours as needed for Pain.  folic acid-vit V7-YIC I30 (FOLTX) 2.5-25-2 mg tablet Take 1 Tab by mouth nightly.  omega-3 fatty acids (FISH OIL CONCENTRATE) 1,000 mg cap Take 1,000 mg by mouth two (2) times a day.  BYSTOLIC 20 mg tablet Take 20 mg by mouth nightly. 6    PRALUENT PEN 75 mg/mL injector pen INJECT EVERY 2 WEEKS  3    tamsulosin (FLOMAX) 0.4 mg capsule Take 0.4 mg by mouth nightly.  clopidogrel (PLAVIX) 75 mg tablet Take 75 mg by mouth nightly.  allopurinol (ZYLOPRIM) 100 mg tablet Take 100 mg by mouth nightly.          Past History     Past Medical History:  Past Medical History:   Diagnosis Date    AAA (abdominal aortic aneurysm) ([facility]     34mm 2/2017    Anemia     gets shots from Dr. Chyna Cantu - last dose 8/31/2018    BPH (benign prostatic hypertrophy)     CAD (coronary artery disease)     s/p stents, sees Dr. Salazar Nip [facility])     skin cancer on leg    Chronic kidney disease     End stage renal disease ([facility]     Dr. Brandi Ambrose GERD (gastroesophageal reflux disease)     Gout     Other and unspecified hyperlipidemia     PUD (peptic ulcer disease)     PVD (peripheral vascular disease) ([facility] s/p PTCA of left femoral artery in 2014    Sleep apnea     CPAP    Type II or unspecified type diabetes mellitus without mention of complication, uncontrolled     Dr. Sudhir Phillips essential hypertension     Unspecified vitamin D deficiency        Past Surgical History:  Past Surgical History:   Procedure Laterality Date    CARDIAC SURG PROCEDURE UNLIST      HX CATARACT REMOVAL      HX COLONOSCOPY      HX CORONARY STENT PLACEMENT      HX ENDOSCOPY      HX KNEE ARTHROSCOPY      right x2, left x1    HX OTHER SURGICAL      skin cancer removed from leg    VASCULAR SURGERY PROCEDURE UNLIST      aorto-bifem bypass    VASCULAR SURGERY PROCEDURE UNLIST      left carotid endarterectomy    VASCULAR SURGERY PROCEDURE UNLIST      right femoral PTCA       Family History:  Family History   Problem Relation Age of Onset    Diabetes Mother     Heart Disease Mother     Heart Disease Maternal Grandmother     Diabetes Daughter         gestational   [place] Diabetes Sister     Stroke Sister     Cancer Father     Hypertension Father        Social History:  Social History     Tobacco Use    Smoking status: Former Smoker     Packs/day: 2.00     Years: 25.00     Pack years: 50.00     Last attempt to quit: 3/11/1991     Years since quittin.5    Smokeless tobacco: Never Used   Substance Use Topics    Alcohol use: Yes     Comment: very occasional    Drug use: No       Allergies:  No Known Allergies      Review of Systems   Review of Systems   Constitutional: Positive for chills, fatigue and fever. Negative for diaphoresis. HENT: Negative for ear pain and sore throat. Eyes: Negative for pain and redness. Respiratory: Positive for chest tightness and shortness of breath. Negative for cough. Cardiovascular: Positive for chest pain. Negative for leg swelling. Gastrointestinal: Positive for abdominal distention and abdominal pain. Negative for diarrhea, nausea and vomiting.    Endocrine: Negative for cold intolerance and heat intolerance. Genitourinary: Negative for flank pain and hematuria. Musculoskeletal: Negative for back pain and neck stiffness. Skin: Negative for rash and wound. Neurological: Negative for dizziness, syncope and headaches. All other systems reviewed and are negative. Physical Exam   Physical Exam   Constitutional: He is oriented to person, place, and time. He appears well-developed and well-nourished. Elderly male who appears in moderate distress secondary to pain and shortness of breath. He is alert and oriented x3. He does appear slightly lethargic. HENT:   Head: Normocephalic and atraumatic. Mouth/Throat: Oropharynx is clear and moist. No oropharyngeal exudate. Eyes: Pupils are equal, round, and reactive to light. Conjunctivae and EOM are normal.   Neck: Normal range of motion. Cardiovascular: Normal rate and regular rhythm. No murmur heard. Pulmonary/Chest: Effort normal and breath sounds normal. No respiratory distress. He has no wheezes. Abdominal: Soft. Bowel sounds are normal. He exhibits distension. There is tenderness. Severe abdominal distention with mild tenderness palpation all 4 abdominal quadrants. No diffuse signs of peritonitis or rebound. Musculoskeletal: Normal range of motion. He exhibits no edema or deformity. Neurological: He is alert and oriented to person, place, and time. Coordination normal.   Skin: Skin is warm and dry. No rash noted. Psychiatric: He has a normal mood and affect. His behavior is normal.   Nursing note and vitals reviewed.       Diagnostic Study Results     Labs -     Recent Results (from the past 24 hour(s))   EKG, 12 LEAD, INITIAL    Collection Time: 09/10/19  4:40 PM   Result Value Ref Range    Ventricular Rate 82 BPM    Atrial Rate 82 BPM    P-R Interval 168 ms    QRS Duration 100 ms    Q-T Interval 420 ms    QTC Calculation (Bezet) 490 ms    Calculated P Axis 27 degrees    Calculated R Axis 13 degrees    Calculated T Axis 171 degrees    Diagnosis       Sinus rhythm with fusion complexes and premature atrial complexes  ST & T wave abnormality, consider inferolateral ischemia  Prolonged QT  When compared with ECG of 14-JUN-2018 10:15,  Significant changes have occurred     METABOLIC PANEL, COMPREHENSIVE    Collection Time: 09/10/19  5:15 PM   Result Value Ref Range    Sodium 139 136 - 145 mmol/L    Potassium 3.2 (L) 3.5 - 5.1 mmol/L    Chloride 91 (L) 97 - 108 mmol/L    CO2 38 (H) 21 - 32 mmol/L    Anion gap 10 5 - 15 mmol/L    Glucose 124 (H) 65 - 100 mg/dL    BUN 40 (H) 6 - 20 MG/DL    Creatinine 8.71 (H) 0.70 - 1.30 MG/DL    BUN/Creatinine ratio 5 (L) 12 - 20      GFR est AA 7 (L) >60 ml/min/1.73m2    GFR est non-AA 6 (L) >60 ml/min/1.73m2    Calcium 8.0 (L) 8.5 - 10.1 MG/DL    Bilirubin, total 0.5 0.2 - 1.0 MG/DL    ALT (SGPT) 17 12 - 78 U/L    AST (SGOT) 10 (L) 15 - 37 U/L    Alk. phosphatase 88 45 - 117 U/L    Protein, total 7.0 6.4 - 8.2 g/dL    Albumin 2.2 (L) 3.5 - 5.0 g/dL    Globulin 4.8 (H) 2.0 - 4.0 g/dL    A-G Ratio 0.5 (L) 1.1 - 2.2     CBC WITH AUTOMATED DIFF    Collection Time: 09/10/19  5:15 PM   Result Value Ref Range    WBC 10.9 4.1 - 11.1 K/uL    RBC 2.43 (L) 4.10 - 5.70 M/uL    HGB 8.1 (L) 12.1 - 17.0 g/dL    HCT 24.5 (L) 36.6 - 50.3 %    .8 (H) 80.0 - 99.0 FL    MCH 33.3 26.0 - 34.0 PG    MCHC 33.1 30.0 - 36.5 g/dL    RDW 16.1 (H) 11.5 - 14.5 %    PLATELET 292 226 - 499 K/uL    MPV 12.1 8.9 - 12.9 FL    NRBC 0.5 (H) 0  WBC    ABSOLUTE NRBC 0.06 (H) 0.00 - 0.01 K/uL    NEUTROPHILS 79 (H) 32 - 75 %    LYMPHOCYTES 10 (L) 12 - 49 %    MONOCYTES 10 5 - 13 %    EOSINOPHILS 0 0 - 7 %    BASOPHILS 0 0 - 1 %    IMMATURE GRANULOCYTES 1 (H) 0.0 - 0.5 %    ABS. NEUTROPHILS 8.7 (H) 1.8 - 8.0 K/UL    ABS. LYMPHOCYTES 1.1 0.8 - 3.5 K/UL    ABS. MONOCYTES 1.1 (H) 0.0 - 1.0 K/UL    ABS. EOSINOPHILS 0.0 0.0 - 0.4 K/UL    ABS. BASOPHILS 0.0 0.0 - 0.1 K/UL    ABS. IMM.  GRANS. 0.1 (H) 0.00 - 0.04 K/UL    DF AUTOMATED     TROPONIN I    Collection Time: 09/10/19  5:15 PM   Result Value Ref Range    Troponin-I, Qt. 0.05 (H) <0.05 ng/mL   NT-PRO BNP    Collection Time: 09/10/19  5:15 PM   Result Value Ref Range    NT pro-BNP 5,451 (H) <125 PG/ML   CK W/ CKMB & INDEX    Collection Time: 09/10/19  5:15 PM   Result Value Ref Range    CK 88 39 - 308 U/L    CK - MB <1.0 <3.6 NG/ML    CK-MB Index Cannot be calculated 0.0 - 2.5     MAGNESIUM    Collection Time: 09/10/19  5:15 PM   Result Value Ref Range    Magnesium 1.7 1.6 - 2.4 mg/dL   PROCALCITONIN    Collection Time: 09/10/19  5:15 PM   Result Value Ref Range    Procalcitonin 1.6 ng/mL   PROTHROMBIN TIME + INR    Collection Time: 09/10/19  5:15 PM   Result Value Ref Range    INR 1.1 0.9 - 1.1      Prothrombin time 10.8 9.0 - 11.1 sec   URINALYSIS W/ REFLEX CULTURE    Collection Time: 09/10/19  6:26 PM   Result Value Ref Range    Color YELLOW/STRAW      Appearance CLEAR CLEAR      Specific gravity 1.024 1.003 - 1.030      pH (UA) 8.0 5.0 - 8.0      Protein 300 (A) NEG mg/dL    Glucose 100 (A) NEG mg/dL    Ketone NEGATIVE  NEG mg/dL    Bilirubin NEGATIVE  NEG      Blood NEGATIVE  NEG      Urobilinogen 0.2 0.2 - 1.0 EU/dL    Nitrites NEGATIVE  NEG      Leukocyte Esterase NEGATIVE  NEG      WBC 0-4 0 - 4 /hpf    RBC 0-5 0 - 5 /hpf    Epithelial cells FEW FEW /lpf    Bacteria NEGATIVE  NEG /hpf    UA:UC IF INDICATED CULTURE NOT INDICATED BY UA RESULT CNI      Hyaline cast 0-2 0 - 5 /lpf   POC LACTIC ACID    Collection Time: 09/10/19  6:26 PM   Result Value Ref Range    Lactic Acid (POC) 4.39 (HH) 0.40 - 2.00 mmol/L       Radiologic Studies -   XR CHEST PORT   Final Result   IMPRESSION:      New mild CHF pattern of pulmonary edema since last year. Consider PA and lateral   chest views when the patient can better tolerate.             CT Results  (Last 48 hours)    None        CXR Results  (Last 48 hours)               09/10/19 1800  XR CHEST PORT Final result Impression:  IMPRESSION:       New mild CHF pattern of pulmonary edema since last year. Consider PA and lateral   chest views when the patient can better tolerate. Narrative:  EXAM: XR CHEST PORT       INDICATION: Abdominal distention and confusion. Hypotension. COMPARISON: Chest views on 9/4/2018       TECHNIQUE: Upright portable chest AP view       FINDINGS: Cardiac monitoring wires overlie the thorax. Cardiomegaly is new and   accentuated by technique. Aortic arch is not well evaluated. Ulnar vascular   prominence is mild. Mild bilateral pulmonary edema is new. Left lung base opacity most likely   represent subsegmental atelectasis. No pneumothorax. Low lung volumes. Lordotic   positioning. Upper abdomen is within normal limits. Medical Decision Making   I am the first provider for this patient. I reviewed the vital signs, available nursing notes, past medical history, past surgical history, family history and social history. Vital Signs-Reviewed the patient's vital signs. Patient Vitals for the past 24 hrs:   Temp Pulse Resp BP SpO2   09/10/19 1630 99.3 °F (37.4 °C) 80 18 132/43 95 %       Pulse Oximetry Analysis -95 % on room air    Cardiac Monitor:   Rate: 80 bpm  Rhythm: Normal Sinus Rhythm        Records Reviewed: Nursing Notes and Old Medical Records    Differential Diagnosis:    Acute coronary syndrome versus acute congestive heart failure versus cardiogenic shock versus sepsis versus spontaneous bacterial peritonitis    Provider Notes (Medical Decision Making):   Patient found to be in acute renal failure likely contributed patient's altered mental status. At this time peritoneal fluid cultures are pending and so with the analysis. We will treat her for SBP and admit. Nephrology will schedule patient for PD tonight and possible hemodialysis in the morning if this is unsuccessful.   Dr. Jenna Reyes also see from as an inpatient from cardiology standpoint. ED Course:     Initial assessment performed. The patients presenting problems have been discussed, and they are in agreement with the care plan formulated and outlined with them. I have encouraged them to ask questions as they arise throughout their visit. ED Course as of Sep 10 1935   Tue Sep 10, 2019   1751 Case discussed with Dr. Robert Valero. He reports that he feels patient is being under dialyzed. I would agree with this assessment. He recommends admission. He reviewed the echo results with me and said he has a pericardial effusion but he did not see any evidence of cardiac tamponade.    [CC]      ED Course User Index  [CC] Glenna Thacker MD       Critical Care Time:     None    Disposition:  Admit Note:  7:34 PM  Pt is being admitted by hospitalist. The results of their tests and reason(s) for their admission have been discussed with pt and/or available family. They convey agreement and understanding for the need to be admitted and for admission diagnosis. PLAN:  1. Current Discharge Medication List        2. Follow-up Information    None       Return to ED if worse     Diagnosis     Clinical Impression:   1. Acute renal failure, unspecified acute renal failure type ([address]    2.  Altered mental status, unspecified altered mental status type

## 2019-09-10 NOTE — PROGRESS NOTES
Pharmacy Automatic Renal Dosing Protocol - Antimicrobials    Indication for Antimicrobials: Intra Abdominal Infection        Current Regimen of Each Antimicrobial:  Piperacillin tazobactam 3.375 grams Q8H (Start Date 9/10; Day # 1)    Previous Antimicrobial Therapy:    Vancomycin Goal Level: NA    Vancomycin Levels  Date Dose & Interval Measured (mcg/mL) Steady State (mcg/mL)                       Date & time of next level:     Significant Cultures:     Radiology / Imaging results: (X-ray, CT scan or MRI):       Labs:  Recent Labs     09/10/19  1715   CREA 8.71*   BUN 40*   WBC 10.9     Temp (24hrs), Av.3 °F (37.4 °C), Min:99.3 °F (37.4 °C), Max:99.3 °F (37.4 °C)      Paralysis, amputations, malnutrition:   Creatinine Clearance (mL/min) or Dialysis: PD    Impression/Plan:   Antibiotic as above adjusted per renal dosing protocol for PD dosing. Pharmacy will follow daily and adjust medications as appropriate for renal function and/or serum levels. Thank you,  Skylar Acosta, [facility]      Recommended duration of therapy  [email or web address]/[facility]/Pharmacy/Clinical%20Companion/[email or web address]. [email or web address]    Renal Dosing  [email or web address]/[facility]/Pharmacy/Clinical%20Companion/Renal%[id number]. pdf

## 2019-09-10 NOTE — ED TRIAGE NOTES
Came in via ems from home cc of cp off and on. Pt. With confusion, distended abd. With bp 65/47 to lt. Arm, MD aware.  With iv 20 g to lac per ems, asa 324 given, [id number] at the scene

## 2019-09-11 NOTE — CONSULTS
Consultation Note    NAME: Kash Jimenez   :  1950   MRN:  [id number]     Date/Time:  2019 9:43 AM    I have been asked to see this patient by Dr. Mihaela Bean  for advice/opinion re: esrd. Assessment :    Plan:  ESRD  Peritonitis/sepsis  Volume overload  Hypokalemia  Anemia of esrd     Switch to HD today (Chalo Tobin for now); I think PD is working poorly from peritonitis, might need PD cath out if not improved in the next 24 hours (placed by Dr. Rush Pemberton in ); on vanco/zosyn iv    4 k bath; UF with HD today; HD/UF again tomorrow    Combined alkalosis    Will give epo 10 k sc tiw; prn prbc's    PD fluid - 13,210 wbc's (92 % neuts)    I'll come see him again later today         Subjective:   CHIEF COMPLAINT:  esrd    HISTORY OF PRESENT ILLNESS:     Gertrudis is a 77 y.o.   male who has a history of the below. He is currently uncomfortable, sob and a bit confused. He has done well with PD (good clearance of small molecules and fluid). Apparently over the last few days increased volume, abdominal pain and ams. He currently is a poor historian. I discussed the above with him and he was not opposed, but I am not sure he comprehended either. I spoke with his son, Gauri Murdock over the phone and he was agreeable to the plan. I spoke with his outpatient PD nurse. I told his nurse to drain the PD fluid that was instilled last night and suspend PD for now.      Past Medical History:   Diagnosis Date    AAA (abdominal aortic aneurysm) ([facility] [address]     34mm 2017    Anemia     gets shots from Dr. Yesenia Urbina - last dose 2018    BPH (benign prostatic hypertrophy)     CAD (coronary artery disease)     s/p stents, sees Dr. Becka Weller [facility])     skin cancer on leg    Chronic kidney disease     End stage renal disease ([facility] [address]     Dr. Darshan Steinberg GERD (gastroesophageal reflux disease)     Gout     Other and unspecified hyperlipidemia     PUD (peptic ulcer disease)     PVD (peripheral vascular disease) ([facility] [address]     s/p PTCA of left femoral artery in 2014    Sleep apnea     CPAP    Type II or unspecified type diabetes mellitus without mention of complication, uncontrolled     Dr. Tania Hernandez essential hypertension     Unspecified vitamin D deficiency       Past Surgical History:   Procedure Laterality Date    CARDIAC SURG PROCEDURE UNLIST      HX CATARACT REMOVAL      HX COLONOSCOPY      HX CORONARY STENT PLACEMENT      HX ENDOSCOPY      HX KNEE ARTHROSCOPY      right x2, left x1    HX OTHER SURGICAL      skin cancer removed from leg    VASCULAR SURGERY PROCEDURE UNLIST      aorto-bifem bypass    VASCULAR SURGERY PROCEDURE UNLIST      left carotid endarterectomy    VASCULAR SURGERY PROCEDURE UNLIST      right femoral PTCA     Social History     Tobacco Use    Smoking status: Former Smoker     Packs/day: 2.00     Years: 25.00     Pack years: 50.00     Last attempt to quit: 3/11/1991     Years since quittin.5    Smokeless tobacco: Never Used   Substance Use Topics    Alcohol use: Yes     Comment: very occasional      Family History   Problem Relation Age of Onset    Diabetes Mother     Heart Disease Mother     Heart Disease Maternal Grandmother     Diabetes Daughter         [facility] Diabetes Sister     Stroke Sister     Cancer Father     Hypertension Father       No Known Allergies   Prior to Admission medications    Medication Sig Start Date End Date Taking? Authorizing Provider   Tana Ribera U-100 INSULIN 100 unit/mL (3 mL) in Inject 44 units every morning and increase as directed to a max of 50 units per day--Dose change 19--updated med list--did not send prescription to the pharmacy 19  Yes Benja Carrasco MD   cholecalciferol, VITAMIN D3, (VITAMIN D3) 5,000 unit tab tablet Take  by mouth daily.    Yes Provider, Historical   cloNIDine HCl (CATAPRES) 0.3 mg tablet Take 1 tab by mouth twice daily--delete 0.1 mg tabs from profile 10/22/18  Yes Raffi Schreiber MD   atorvastatin (LIPITOR) 40 mg tablet Take 40 mg by mouth every evening. Yes Provider, Historical   BYSTOLIC 20 mg tablet Take 20 mg by mouth nightly. 12/15/17  Yes Provider, Historical   clopidogrel (PLAVIX) 75 mg tablet Take 75 mg by mouth nightly. Yes Provider, Historical   allopurinol (ZYLOPRIM) 100 mg tablet Take 100 mg by mouth nightly. Yes Provider, Historical   NIFEdipine ER (ADALAT CC) 60 mg ER tablet Take 60 mg by mouth two (2) times a day. Provider, Historical   polyethylene glycol 3350 (MIRALAX PO) Take  by mouth. Provider, Historical   Insulin Needles, Disposable, (BD ULTRA-FINE MINI PEN NEEDLE) 31 gauge x 3/16\" ndle Use as directed once daily 1/21/19   Raffi Schreiber MD   furosemide (LASIX) 80 mg tablet Take 80 mg by mouth three (3) times daily. 6/5/18   Provider, Historical   acetaminophen (TYLENOL) 500 mg tablet Take 1,000 mg by mouth every six (6) hours as needed for Pain. Other, MD Corby   folic acid-vit K9-FZN V53 (FOLTX) 2.5-25-2 mg tablet Take 1 Tab by mouth nightly. Other, MD Corby   omega-3 fatty acids (FISH OIL CONCENTRATE) 1,000 mg cap Take 1,000 mg by mouth two (2) times a day. Provider, Historical   PRALUENT PEN 75 mg/mL injector pen INJECT EVERY 2 WEEKS 10/11/17   Provider, Historical   tamsulosin (FLOMAX) 0.4 mg capsule Take 0.4 mg by mouth nightly.     Provider, Historical     REVIEW OF SYSTEMS:     [x]  Unable to obtain reliable ROS due to  [x] mental status  [] sedated   [] intubated   [] Total of 12 systems reviewed as follows:  Constitutional: negative fever, negative chills, negative weight loss  Eyes:   negative visual changes  ENT:   negative sore throat, tongue or lip swelling  Respiratory:  negative cough, negative dyspnea  Cards:  negative for chest pain, palpitations, lower extremity edema  GI:   negative for nausea, vomiting, diarrhea, and abdominal pain  :  negative for frequency, dysuria  Integument:  negative for rash and pruritus  Heme:  negative for easy bruising and gum/nose bleeding  Musculoskel: negative for myalgias,  back pain and muscle weakness  Neuro:  negative for headaches, dizziness, vertigo  Psych:  negative for feelings of anxiety, depression   Travel?: none    Objective:   VITALS:    Visit Vitals  /67 (BP 1 Location: Right arm, BP Patient Position: At rest)   Pulse 74   Temp 98.7 °F (37.1 °C)   Resp 24   Ht 5' 9\" (1.753 m)   Wt 88.9 kg (196 lb)   SpO2 91%   BMI 28.94 kg/m²     PHYSICAL EXAM:  Gen:  []  WD []  WN  [] cachectic []  thin [x]  obese []  disheveled             [x]  ill apearing  [x]   Critical  []   Chronic    []  No acute distress    HEENT:   [x] NC/AT/PERRLA/EOMI    [] pink conjunctivae      [] pale conjunctivae                  PERRL  [] yes  [] no      [] moist mucosa    [] dry mucosa    hearing intact to voice [x] yes  [] No                 NECK:   supple [x] yes  [] no        masses [] yes  [] No               thyroid  []  non tender  []  tender    RESP:   [] CTA bilaterally/no wheezing/rhonchi/rales/crackles    [] rhonchi bilaterally - no dullness  [] wheezing   [] rhonchi   [x] crackles     use of accessory muscles [x] yes [] no    CARD:   [x]  regular rate and rhythm/No murmurs/rubs/gallops    murmur  [] yes ()  [] no      Rubs  [] yes  [] no       Gallops [] yes  [] no    Rate []  regular  []  irregular        carotid bruits  [] Right  []  Left                 LE edema [x] yes  [] no           JVP  []  yes   []  no    ABD:    [] soft/non distended/non tender/+bowel sounds/no HSM    []  Rigid    tenderness [x] yes [] no   Liver enlargement  []  yes []  no                Spleen enlargement  []  yes []  no     distended [x]  yes [] no     bowel sound  [] hypoactive   [] hyperactive    LYMPH:    Neck []  yes [x]  no       Axillae []  yes []  no    SKIN:   Rashes []  yes   [x]  no    Ulcers []  yes   []  no               [] tight to palpitation    skin turgor []  good  [] poor  [] decreased               Cyanosis/clubbing []  yes []  no    NEUR:   [x] cranial nerves II-XII grossly intact       [] Cranial nerves deficit                 []  facial droop    []  slurred speech   [] aphasic     [] Strength normal     []  weakness  []  LUE  []   RUE/ []  LLE  []   RLE    follows commands  []  yes []  no           PSYCH:   insight [] poor [] good   Alert and Oriented to  [x] person  [] place  []  time                    [] depressed [] anxious [] agitated  [] lethargic [] stuporous  [] sedated     LAB DATA REVIEWED:    Recent Labs     09/11/19  0135 09/10/19  1715   WBC 12.5* 10.9   HGB 7.2* 8.1*   HCT 21.9* 24.5*    326     Recent Labs     09/11/19  0135 09/10/19  1715    139   K 3.1* 3.2*   CL 89* 91*   CO2 37* 38*   BUN 46* 40*   CREA 9.09* 8.71*   GLU 95 124*   CA 7.7* 8.0*   MG  --  1.7     Recent Labs     09/11/19  0135 09/10/19  1715   SGOT 8* 10*   ALT 14 17   AP 78 88   TBILI 0.5 0.5   ALB 2.0* 2.2*   GLOB 4.2* 4.8*     Recent Labs     09/10/19  1715   INR 1.1   PTP 10.8      No results for input(s): FE, TIBC, PSAT, FERR in the last 72 hours. No results for input(s): PH, PCO2, PO2 in the last 72 hours. Recent Labs     09/10/19  1715   CPK 88   CKMB <1.0     Lab Results   Component Value Date/Time    Glucose (POC) 197 (H) 09/11/2019 07:33 AM    Glucose (POC) 111 (H) 09/10/2019 11:46 PM    Glucose (POC) 169 (H) 10/22/2018 04:20 PM    Glucose (POC) 186 (H) 10/22/2018 02:18 PM    Glucose (POC) 245 (H) 09/07/2018 12:09 PM    Glucose (POC) 292 (H) 09/07/2018 09:06 AM    Glucose (POC) 261 (H) 06/15/2018 11:41 AM       Procedures: see electronic medical records for all procedures/Xrays and details which were not copied into this note but were reviewed prior to creation of Plan.    ________________________________________________________________________       ___________________________________________________  Consulting Physician:  Anjelica Mcmillan MD

## 2019-09-11 NOTE — PROGRESS NOTES
1120 Report received from Dionisio Waldron patient received via bed from medical telemetry as an urgent transfer for respiratory distress and needing hemodialysis. Patient alert and oriented, states he is having 10/10 abdominal tenderness. Currently on venti mask, sats 90-93%. 1220 IR at bedside placing Garry    1310 Patient insistent on getting out of bed to attempt to urinate. Patient expressed a great deal of abdominal pain/tenderness. Medicated with Dilaudid 0.5 mg via IV for 10/10 pain. 1350 Patient resting with eyes closed, xray confirmed placement of Garry, dialysis nurse aware and will begin HD at bedside. 1540 Patient receiving dialysis at this time. Patient restless and holding his abdomen. Spoke with Dr. Tracee Maher, orders written. Medicated patient with Fentanyl 25mcg IV for 10/10 pain. 1630 Patient remains restless, said \"last medication helped but just bring me some tylenol, please\"  Medicated with Tylenol 650 mg PO.    1700 HD complete, patient resting with eyes closed. 1800 Patient feeling \"much better\"    1900 Report given to on coming nurse, Mague Shine RN.

## 2019-09-11 NOTE — PROGRESS NOTES
TRANSFER - OUT REPORT:    Verbal report given to Sharon(name) on Gertrudis Canela Jr  being transferred to CCU(unit) for change in patient condition(Sepsis; SOB, Hypoxic)       Report consisted of patients Situation, Background, Assessment and   Recommendations(SBAR). Information from the following report(s) SBAR, Kardex, Intake/Output, MAR, Accordion and Recent Results was reviewed with the receiving nurse. Lines:   Peripheral IV 09/10/19 Left Antecubital (Active)   Site Assessment Clean, dry, & intact 9/11/2019  8:37 AM   Phlebitis Assessment 0 9/11/2019  8:37 AM   Infiltration Assessment 0 9/11/2019  8:37 AM   Dressing Status Clean, dry, & intact 9/11/2019  8:37 AM   Dressing Type Transparent 9/11/2019  8:37 AM   Hub Color/Line Status Green; Infusing 9/11/2019  8:37 AM   Alcohol Cap Used Yes 9/11/2019  3:35 AM       Peripheral IV 09/10/19 Right Antecubital (Active)   Site Assessment Clean, dry, & intact 9/11/2019  8:37 AM   Phlebitis Assessment 0 9/11/2019  8:37 AM   Infiltration Assessment 0 9/11/2019  8:37 AM   Dressing Status Clean, dry, & intact 9/11/2019  8:37 AM   Dressing Type Transparent 9/11/2019  8:37 AM   Hub Color/Line Status Pink;Capped 9/11/2019  8:37 AM   Alcohol Cap Used Yes 9/11/2019  8:37 AM        Opportunity for questions and clarification was provided.       Patient transported with:   Monitor  O2 @ 12 liters  Registered Nurse

## 2019-09-11 NOTE — PROGRESS NOTES
TRANSFER - IN REPORT:    Verbal report received from [address] (name) on Yassine Call  being received from ED (unit) for routine progression of care      Report consisted of patients Situation, Background, Assessment and   Recommendations(SBAR). Information from the following report(s) SBAR, Kardex, ED Summary, Procedure Summary, Intake/Output, MAR, Recent Results and Cardiac Rhythm NSR was reviewed with the receiving nurse. Opportunity for questions and clarification was provided. Assessment completed upon patients arrival to unit and care assumed.

## 2019-09-11 NOTE — PROCEDURES
[facility]  [phone]    Vitals   Pre   Post   Assessment   Pre   Post     Temp  Temp: 100.2 °F (37.9 °C) (09/11/19 1400)  99.6   LOC  A/ O x4 A X O  4   HR   Pulse (Heart Rate): 79 (09/11/19 1400) 95 Lungs   Non-rebreather   Mask/Diminished  Diminished  On O2 NC at 6L   B/P   BP: 120/44 (09/11/19 1400) 124/39 Cardiac   Regular  Regular   Resp   Resp Rate: 24 (09/11/19 1400) 16 Skin   Warm/dry   Warm/dry   Pain level  Level 10  Given pain medication by  Primary RN 8 Edema  BLE 4 +     BLE 4+   Orders:    Duration:   Start:    1400 End:    1600 Total:   2 hrs   Dialyzer:   Dialyzer/Set Up Inspection: Revaclear (09/11/19 1400)   K Bath:   Dialysate K (mEq/L): 4 (09/11/19 1400)   Ca Bath:   Dialysate CA (mEq/L): 2.5 (09/11/19 1400)   Na/Bicarb:   Dialysate NA (mEq/L): 138 (09/11/19 1400)   Target Fluid Removal:   Goal/Amount of Fluid to Remove (mL): 2.5 mL (09/11/19 1400)   Access     Type & Location:   Newly placed RIJ Garry catheter aspirated and flushed with NS with no problems Prepped with Prevantics as per policy. Site dry and intact. Placement check done   Labs     Obtained/Reviewed   Critical Results Called   Date when labs were drawn-  Hgb-    HGB   Date Value Ref Range Status   09/11/2019 7.2 (L) 12.1 - 17.0 g/dL Final     K-    Potassium   Date Value Ref Range Status   09/11/2019 3.1 (L) 3.5 - 5.1 mmol/L Final     Ca-   Calcium   Date Value Ref Range Status   09/11/2019 7.7 (L) 8.5 - 10.1 MG/DL Final     Bun-   BUN   Date Value Ref Range Status   09/11/2019 46 (H) 6 - 20 MG/DL Final     Creat-   Creatinine   Date Value Ref Range Status   09/11/2019 9.09 (H) 0.70 - 1.30 MG/DL Final        Medications/ Blood Products Given     Name   Dose   Route and Time     Vancomycin 750 mg  IV at 1455             Blood Volume Processed (BVP):    41.9 Net Fluid   Removed:  2500 ml   Comments   Time Out Done: 1350    Primary Nurse Rpt Ruby Marie RN  Primary Nurse Rpt Post:Tiffanie Walter RN  Pt Education:HD Procedure  Care Plan:Continue HD plan of care  Tx Summary:Arrived at Pt's room X-ray tech in the room, waited to set up HD  HD RN assessment done by Stacia Fernandez RN. After timeout done HD started  1400:HD started with no issues  1415:Pt grimacing in pain and shivering pt complained of abdominal pain and stated not feeling okay. Called the primary RN and pt after 5 mins calmed down and stated feeling better  1430: Access secured Lines visible  1455: Vancomycin 750 mg IV given  1530:Pt resting no complaints  1600:Completed HD all possible blood returned with NS rinsed back. Tolerated first time treatment. Catheter blocked with NS and capped Report given to Linwood Phillips RN    HD Related Medications Reviewed  [facility] on Chronic Renal Failure  Pt's previous clinic-  Consent signed - Informed Consent Verified: Yes (09/11/19 1400)  [facility] Consent - Obtained  Hepatitis Status- Hep B antigen Unknown  Machine #- Machine Number: [id number]/ [id number] (09/11/19 1400)  Telemetry status-NNSR  Pre-dialysis wt. -

## 2019-09-11 NOTE — H&P
Hospitalist Admission Note    NAME: Jayden Somers   :  1950   MRN:  [id number]     Date/Time:  9/10/2019 10:28 PM    Patient PCP: Siri Jacques MD  ______________________________________________________________________  Given the patient's current clinical presentation, I have a high level of concern for decompensation if discharged from the emergency department. Complex decision making was performed, which includes reviewing the patient's available past medical records, laboratory results, and x-ray films. My assessment of this patient's clinical condition and my plan of care is as follows. Assessment / Plan:  Abdominal pain and tenderness concerning for SBP  -Blood cultures sent, await peritoneal fluid cell count and cultures  -Empirically given Zosyn in ED; will add vancomycin, pharmacy assisting with dosing  -No IVF given patient's diffusely overloaded state    ESRD on PD with possible failure of PD  -Nephrology contacted by ED -- to attempt PD tonight, consider HD     Acute toxic and metabolic encephalopathy secondary to the above     Diabetes mellitus   -Patient currently eating little, will reduce dose of long-acting insulin  -Lispro as per sliding scale    Anemia secondary to CKD    Essential hypertension  -Holding several home medications as MAP has been borderline (primarily secondary to low diastolic BP)    GERD    PUD    Code Status: DNR  Surrogate Decision Maker: Charli Vargas    DVT Prophylaxis: heparin SQ  GI Prophylaxis: not indicated      Subjective:   CHIEF COMPLAINT: Diffuse pain, worsening abdominal distention and diffuse swelling, altered mental status    HISTORY OF PRESENT ILLNESS:     Dinora Walton is a 77 y.o.  male who presents with complaint of abdominal pain, increased abdominal and lower extremity swelling, and altered mental status.  He is unable to provide history at this time secondary to his acute encephalopathy and as such history is obtained from his son at bedside. Per his son, as of last week the patient began to complain of increased swelling and pain in his chest and abdomen. The patient was seen by his cardiologist, Dr. Bear Lang, who performed an EKG and obtained an echocardiogram, and apparently felt that the symptoms were not cardiac in nature; a pericardial effusion was reportedly identified at that time. Over the course of the ensuing days, the patient has continued to complain of pain and has been less responsive to family. His abdominal distention and edema has been visibly worsening. He undergoes peritoneal dialysis and has been getting alarms indicating \"low drain. \" He has had some subjective fevers and chills at home. We were asked to admit for work up and evaluation of the above problems.      Past Medical History:   Diagnosis Date    AAA (abdominal aortic aneurysm) ([facility]     34mm 2/2017    Anemia     gets shots from Dr. Gibson Whitney - last dose 8/31/2018    BPH (benign prostatic hypertrophy)     CAD (coronary artery disease)     s/p stents, [facility])     skin cancer on leg    Chronic kidney disease     End stage renal disease ([facility]     Dr. Deejay Bae GERD (gastroesophageal reflux disease)     Gout     Other and unspecified hyperlipidemia     PUD (peptic ulcer disease)     PVD (peripheral vascular disease) ([facility]     s/p PTCA of left femoral artery in July 2014    Sleep apnea     CPAP    Type II or unspecified type diabetes mellitus without mention of complication, uncontrolled     Dr. Robb Calderon essential hypertension     Unspecified vitamin D deficiency       Past Surgical History:   Procedure Laterality Date    CARDIAC SURG PROCEDURE UNLIST      HX CATARACT REMOVAL      HX COLONOSCOPY      HX CORONARY STENT PLACEMENT      HX ENDOSCOPY      HX KNEE ARTHROSCOPY      right x2, left x1    HX OTHER SURGICAL      skin cancer removed from leg    VASCULAR SURGERY PROCEDURE UNLIST      aorto-bifem bypass    VASCULAR SURGERY PROCEDURE UNLIST      left carotid endarterectomy    VASCULAR SURGERY PROCEDURE UNLIST      right femoral PTCA       Social History     Tobacco Use    Smoking status: Former Smoker     Packs/day: 2.00     Years: 25.00     Pack years: 50.00     Last attempt to quit: 3/11/1991     Years since quittin.5    Smokeless tobacco: Never Used   Substance Use Topics    Alcohol use: Yes     Comment: very occasional        Family History   Problem Relation Age of Onset    Diabetes Mother     Heart Disease Mother     Heart Disease Maternal Grandmother     Diabetes Daughter         gestational   [address] [facility] Diabetes Sister     Stroke Sister     Cancer Father     Hypertension Father      No Known Allergies     Prior to Admission medications    Medication Sig Start Date End Date Taking? Authorizing Provider   cloNIDine HCl (CATAPRES) 0.3 mg tablet Take 1 tab by mouth twice daily--delete 0.1 mg tabs from profile 10/22/18  Yes Mike Betancur MD   [facility] U-100 INSULIN 100 unit/mL (3 mL) inpn Inject 44 units every morning and increase as directed to a max of 50 units per day--Dose change 19--updated med list--did not send prescription to the pharmacy 19   Mike Betancur MD   NIFEdipine ER (ADALAT CC) 60 mg ER tablet Take 60 mg by mouth two (2) times a day. Provider, Historical   polyethylene glycol 3350 (MIRALAX PO) Take  by mouth. Provider, Historical   Insulin Needles, Disposable, (BD ULTRA-FINE MINI PEN NEEDLE) 31 gauge x 3/16\" ndle Use as directed once daily 19   Mike Betancur MD   cholecalciferol, VITAMIN D3, (VITAMIN D3) 5,000 unit tab tablet Take  by mouth daily. Provider, Historical   atorvastatin (LIPITOR) 40 mg tablet Take 40 mg by mouth every evening. Provider, Historical   furosemide (LASIX) 80 mg tablet Take 80 mg by mouth three (3) times daily.  18   Provider, Historical   acetaminophen (TYLENOL) 500 mg tablet Take 1,000 mg by mouth every six (6) hours as needed for Pain. Other, MD Corby   folic acid-vit V8-HVV E43 (FOLTX) 2.5-25-2 mg tablet Take 1 Tab by mouth nightly. Other, MD Corby   omega-3 fatty acids (FISH OIL CONCENTRATE) 1,000 mg cap Take 1,000 mg by mouth two (2) times a day. Provider, Historical   BYSTOLIC 20 mg tablet Take 20 mg by mouth nightly. 12/15/17   Provider, Historical   PRALUENT PEN 75 mg/mL injector pen INJECT EVERY 2 WEEKS 10/11/17   Provider, Historical   tamsulosin (FLOMAX) 0.4 mg capsule Take 0.4 mg by mouth nightly. Provider, Historical   clopidogrel (PLAVIX) 75 mg tablet Take 75 mg by mouth nightly. Provider, Historical   allopurinol (ZYLOPRIM) 100 mg tablet Take 100 mg by mouth nightly. Provider, Historical       REVIEW OF SYSTEMS:     I am not able to complete the review of systems because:    The patient is intubated and sedated   x The patient has altered mental status due to his acute medical problems    The patient has baseline aphasia from prior stroke(s)    The patient has baseline dementia and is not reliable historian    The patient is in acute medical distress and unable to provide information           Total of 12 systems reviewed as follows:       POSITIVE= underlined text  Negative = text not underlined  General:  fever, chills, sweats, generalized weakness, weight loss/gain,      loss of appetite   Eyes:    blurred vision, eye pain, loss of vision, double vision  ENT:    rhinorrhea, pharyngitis   Respiratory:   cough, sputum production, SOB, RUBIO, wheezing, pleuritic pain   Cardiology:   chest pain, palpitations, orthopnea, PND, edema, syncope   Gastrointestinal:  abdominal pain , N/V, diarrhea, dysphagia, constipation, bleeding   Genitourinary:  frequency, urgency, dysuria, hematuria, incontinence   Muskuloskeletal :  arthralgia, myalgia, back pain  Hematology:  easy bruising, nose or gum bleeding, lymphadenopathy   Dermatological: rash, ulceration, pruritis, color change / jaundice  Endocrine:   hot flashes or polydipsia   Neurological:  headache, dizziness, confusion, focal weakness, paresthesia,     Speech difficulties, memory loss, gait difficulty  Psychological: Feelings of anxiety, depression, agitation    Objective:   VITALS:    Visit Vitals  BP (!) 131/38   Pulse 81   Temp 99.3 °F (37.4 °C)   Resp 20   Ht 5' 9\" (1.753 m)   Wt 88.9 kg (196 lb)   SpO2 96%   BMI 28.94 kg/m²       PHYSICAL EXAM:    General:    Asleep, appears chronically ill, rouses briefly to voice  HEENT: Atraumatic, anicteric sclerae, pink conjunctivae     No oral ulcers, mucosa moist, throat clear, dentition fair  Neck:  Supple, symmetrical,  thyroid: non tender  Lungs:   Faint basilar rales. No Wheezing or Rhonchi. Chest wall:  No tenderness  No Accessory muscle use. Heart:   Regular  rhythm,  No  murmur   Diffuse anasarca 3+  Abdomen:   Soft, distended, diffusely tender but without guarding or rebound tenderness. PD catheter is in place with no erythema or discharge. Extremities: No cyanosis. No clubbing,      Skin turgor normal, Capillary refill normal, Radial dial pulse 2+  Skin:     +pale. Not Jaundiced  No rashes   Psych:  Cannot currently assess. Neurologic: Following some commands, requires repeated prompting, falls back to sleep easily. Moving all extremities.      _______________________________________________________________________  Care Plan discussed with:    Comments   Patient x    Family  x    RN     Care Manager                    Consultant:  x ED physician   _______________________________________________________________________  Expected  Disposition:   Home with Family x   HH/PT/OT/RN    SNF/LTC    [facility]    ________________________________________________________________________  TOTAL TIME:  39 Minutes    Critical Care Provided     Minutes non procedure based      Comments    x Reviewed previous records   >50% of visit spent in counseling and coordination of care x Discussion with patient and/or family and questions answered       ________________________________________________________________________  Signed: Otoniel Alves MD    Procedures: see electronic medical records for all procedures/Xrays and details which were not copied into this note but were reviewed prior to creation of Plan. LAB DATA REVIEWED:    Recent Results (from the past 24 hour(s))   EKG, 12 LEAD, INITIAL    Collection Time: 09/10/19  4:40 PM   Result Value Ref Range    Ventricular Rate 82 BPM    Atrial Rate 82 BPM    P-R Interval 168 ms    QRS Duration 100 ms    Q-T Interval 420 ms    QTC Calculation (Bezet) 490 ms    Calculated P Axis 27 degrees    Calculated R Axis 13 degrees    Calculated T Axis 171 degrees    Diagnosis       Sinus rhythm with fusion complexes and premature atrial complexes  ST & T wave abnormality, consider inferolateral ischemia  Prolonged QT  When compared with ECG of 14-JUN-2018 10:15,  Significant changes have occurred     METABOLIC PANEL, COMPREHENSIVE    Collection Time: 09/10/19  5:15 PM   Result Value Ref Range    Sodium 139 136 - 145 mmol/L    Potassium 3.2 (L) 3.5 - 5.1 mmol/L    Chloride 91 (L) 97 - 108 mmol/L    CO2 38 (H) 21 - 32 mmol/L    Anion gap 10 5 - 15 mmol/L    Glucose 124 (H) 65 - 100 mg/dL    BUN 40 (H) 6 - 20 MG/DL    Creatinine 8.71 (H) 0.70 - 1.30 MG/DL    BUN/Creatinine ratio 5 (L) 12 - 20      GFR est AA 7 (L) >60 ml/min/1.73m2    GFR est non-AA 6 (L) >60 ml/min/1.73m2    Calcium 8.0 (L) 8.5 - 10.1 MG/DL    Bilirubin, total 0.5 0.2 - 1.0 MG/DL    ALT (SGPT) 17 12 - 78 U/L    AST (SGOT) 10 (L) 15 - 37 U/L    Alk.  phosphatase 88 45 - 117 U/L    Protein, total 7.0 6.4 - 8.2 g/dL    Albumin 2.2 (L) 3.5 - 5.0 g/dL    Globulin 4.8 (H) 2.0 - 4.0 g/dL    A-G Ratio 0.5 (L) 1.1 - 2.2     CBC WITH AUTOMATED DIFF    Collection Time: 09/10/19  5:15 PM   Result Value Ref Range    WBC 10.9 4.1 - 11.1 K/uL    RBC 2.43 (L) 4.10 - 5.70 M/uL    HGB 8.1 (L) 12.1 - 17.0 g/dL    HCT 24.5 (L) 36.6 - 50.3 %    .8 (H) 80.0 - 99.0 FL    MCH 33.3 26.0 - 34.0 PG    MCHC 33.1 30.0 - 36.5 g/dL    RDW 16.1 (H) 11.5 - 14.5 %    PLATELET 544 105 - 717 K/uL    MPV 12.1 8.9 - 12.9 FL    NRBC 0.5 (H) 0  WBC    ABSOLUTE NRBC 0.06 (H) 0.00 - 0.01 K/uL    NEUTROPHILS 79 (H) 32 - 75 %    LYMPHOCYTES 10 (L) 12 - 49 %    MONOCYTES 10 5 - 13 %    EOSINOPHILS 0 0 - 7 %    BASOPHILS 0 0 - 1 %    IMMATURE GRANULOCYTES 1 (H) 0.0 - 0.5 %    ABS. NEUTROPHILS 8.7 (H) 1.8 - 8.0 K/UL    ABS. LYMPHOCYTES 1.1 0.8 - 3.5 K/UL    ABS. MONOCYTES 1.1 (H) 0.0 - 1.0 K/UL    ABS. EOSINOPHILS 0.0 0.0 - 0.4 K/UL    ABS. BASOPHILS 0.0 0.0 - 0.1 K/UL    ABS. IMM.  GRANS. 0.1 (H) 0.00 - 0.04 K/UL    DF AUTOMATED     TROPONIN I    Collection Time: 09/10/19  5:15 PM   Result Value Ref Range    Troponin-I, Qt. 0.05 (H) <0.05 ng/mL   NT-PRO BNP    Collection Time: 09/10/19  5:15 PM   Result Value Ref Range    NT pro-BNP 5,451 (H) <125 PG/ML   CK W/ CKMB & INDEX    Collection Time: 09/10/19  5:15 PM   Result Value Ref Range    CK 88 39 - 308 U/L    CK - MB <1.0 <3.6 NG/ML    CK-MB Index Cannot be calculated 0.0 - 2.5     MAGNESIUM    Collection Time: 09/10/19  5:15 PM   Result Value Ref Range    Magnesium 1.7 1.6 - 2.4 mg/dL   PROCALCITONIN    Collection Time: 09/10/19  5:15 PM   Result Value Ref Range    Procalcitonin 1.6 ng/mL   PROTHROMBIN TIME + INR    Collection Time: 09/10/19  5:15 PM   Result Value Ref Range    INR 1.1 0.9 - 1.1      Prothrombin time 10.8 9.0 - 11.1 sec   URINALYSIS W/ REFLEX CULTURE    Collection Time: 09/10/19  6:26 PM   Result Value Ref Range    Color YELLOW/STRAW      Appearance CLEAR CLEAR      Specific gravity 1.024 1.003 - 1.030      pH (UA) 8.0 5.0 - 8.0      Protein 300 (A) NEG mg/dL    Glucose 100 (A) NEG mg/dL    Ketone NEGATIVE  NEG mg/dL    Bilirubin NEGATIVE  NEG      Blood NEGATIVE  NEG      Urobilinogen 0.2 0.2 - 1.0 EU/dL    Nitrites NEGATIVE  NEG      Leukocyte Esterase NEGATIVE  NEG      WBC 0-4 0 - 4 /hpf    RBC 0-5 0 - 5 /hpf    Epithelial cells FEW FEW /lpf    Bacteria NEGATIVE  NEG /hpf    UA:UC IF INDICATED CULTURE NOT INDICATED BY UA RESULT CNI      Hyaline cast 0-2 0 - 5 /lpf   POC LACTIC ACID    Collection Time: 09/10/19  6:26 PM   Result Value Ref Range    Lactic Acid (POC) 4.39 (HH) 0.40 - 2.00 mmol/L

## 2019-09-11 NOTE — PROGRESS NOTES
Hospitalist Progress Note    NAME: Gloria Hoff   :  1950   MRN:  [id number]       Assessment / Plan:  Acute Hypoxic Respiratory Failure POA: due to pulmonary edema, now 85% on 6LNC, will start BiPAP transfer to Step down unit  Sepsis POA: leukocytosis, acidosis due to SBP. Abdominal pain and tenderness concerning for SBP: patient is on PD and seems that catheter is infected, will c/w Zosyn and Vancomycin F/U cultures, catheter ultimately needs to be D/C, Renal in the case. ESRD on PD with possible failure of PD  Renal making arrangements to do HD emergently. Acute on Chronic Diastolic Heart Failure POA: EF 16%, with diastolic dysfunction, c/w diuretic but needs HD for fluid removal, D/W Renal and Cardiology, monitor I/O and weight. Acute toxic and metabolic encephalopathy secondary to the above, monitor  Diabetes mellitus : c/w Lantus, SSI, check HbA1C. Anemia secondary to CKD  Essential hypertension Holding several home medications as MAP has been borderline (primarily secondary to low diastolic BP)  GERD/ PUD  Overweight: BMI 28.94 counseled. Surrogate Decision Maker: Leslie Mar  Code status: DNR  Prophylaxis: Hep SQ  Recommended Disposition: Home w/Family     Critically ill patient high risk for de-compensation     Subjective:     Chief Complaint / Reason for Physician Visit  \"I feel weak and SOB\". Discussed with RN events overnight. Review of Systems:  Symptom Y/N Comments  Symptom Y/N Comments   Fever/Chills    Chest Pain y    Poor Appetite    Edema     Cough    Abdominal Pain     Sputum    Joint Pain     SOB/RUBIO y   Pruritis/Rash     Nausea/vomit    Tolerating PT/OT     Diarrhea    Tolerating Diet     Constipation    Other       Could NOT obtain due to:      Objective:     VITALS:   Last 24hrs VS reviewed since prior progress note.  Most recent are:  Patient Vitals for the past 24 hrs:   Temp Pulse Resp BP SpO2   19 0914   24  91 %   19 0900 98.7 °F (37.1 °C) 74 22 140/67 (!) 86 %   09/11/19 0727 98.8 °F (37.1 °C) 69 19 125/49 92 %   09/11/19 0335 98 °F (36.7 °C) 71 20 126/42 92 %   09/10/19 2348 98.2 °F (36.8 °C) 72 22 122/41 97 %   09/10/19 2100  81 20 (!) 131/38 96 %   09/10/19 2030  85  122/46 96 %   09/10/19 2000  81 26 111/43 95 %   09/10/19 1930  80  132/42 (!) 84 %   09/10/19 1900  82  131/50 92 %   09/10/19 1830  85  138/42 95 %   09/10/19 1730  76  114/41 97 %   09/10/19 1715  78  115/42 91 %   09/10/19 1700  78 21 110/86 97 %   09/10/19 1645  81 15 133/40 96 %   09/10/19 1641  80 20 132/43 94 %   09/10/19 1639  81 19 (!) 65/48 95 %   09/10/19 1630 99.3 °F (37.4 °C) 80 18 132/43 95 %       Intake/Output Summary (Last 24 hours) at 9/11/2019 0928  Last data filed at 9/11/2019 0854  Gross per 24 hour   Intake 2000 ml   Output 1475 ml   Net 525 ml        PHYSICAL EXAM:  General: WD, WN. Alert, cooperative, SOB  EENT:  EOMI. Anicteric sclerae. MMM  Resp:  Coarse BS, crackles in both sides  CV:  Regular  rhythm,  trace edema  GI:  Soft, Non distended, Non tender.  +Bowel sounds  Neurologic:  Alert and oriented X 3, normal speech,   Psych:   Good insight. Not anxious nor agitated  Skin:  No rashes. No jaundice    Reviewed most current lab test results and cultures  YES  Reviewed most current radiology test results   YES  Review and summation of old records today    NO  Reviewed patient's current orders and MAR    YES  PMH/SH reviewed - no change compared to H&P  ________________________________________________________________________  Care Plan discussed with:    Comments   Patient y    Family      RN y    Care Manager     Consultant  y Cardiology, Nephrology                     Multidiciplinary team rounds were held today with , nursing, pharmacist and clinical coordinator. Patient's plan of care was discussed; medications were reviewed and discharge planning was addressed. ________________________________________________________________________  Total NON critical care TIME:     Minutes    Total CRITICAL CARE TIME Spent: 35  Minutes non procedure based      Comments   >50% of visit spent in counseling and coordination of care y    ________________________________________________________________________  Thomas Morales MD     Procedures: see electronic medical records for all procedures/Xrays and details which were not copied into this note but were reviewed prior to creation of Plan. LABS:  I reviewed today's most current labs and imaging studies.   Pertinent labs include:  Recent Labs     09/11/19  0135 09/10/19  1715   WBC 12.5* 10.9   HGB 7.2* 8.1*   HCT 21.9* 24.5*    326     Recent Labs     09/11/19  0135 09/10/19  1715    139   K 3.1* 3.2*   CL 89* 91*   CO2 37* 38*   GLU 95 124*   BUN 46* 40*   CREA 9.09* 8.71*   CA 7.7* 8.0*   MG  --  1.7   ALB 2.0* 2.2*   TBILI 0.5 0.5   SGOT 8* 10*   ALT 14 17   INR  --  1.1       Signed: Thomas Morales MD

## 2019-09-11 NOTE — PROGRESS NOTES
Pharmacy Automatic Renal Dosing Protocol - Antimicrobials    Indication for Antimicrobials: peritonitis    Current Regimen of Each Antimicrobial:  Piperacillin tazobactam 3.375 grams Q12H (Start Date 9/10; Day # 2)  Vancomycin - pharmacy dosing (Start Date 9/10; Day # 2)    Previous Antimicrobial Therapy:    Vancomycin Goal Level: 15-20 mcg/ml    Vancomycin Levels  Date Dose & Interval Measured (mcg/mL) Steady State (mcg/mL)                       Date & time of next level: pending HD schedule    Significant Cultures:   9/10 paired blood: pending   peritoneal fluid: pending    Radiology / Imaging results: (X-ray, CT scan or MRI):     Labs:  Recent Labs     19  0135 09/10/19  1715   CREA 9.09* 8.71*   BUN 46* 40*   WBC 12.5* 10.9     Temp (24hrs), Av.7 °F (37.1 °C), Min:98 °F (36.7 °C), Max:99.6 °F (37.6 °C)    Paralysis, amputations, malnutrition: none noted  Creatinine Clearance (mL/min) or Dialysis: HD    Impression/Plan:   Patient changed to HD. HD scheduled for today (not on scheduled yet)  Vancomycin 750 mg after HD  Continue Zosyn 3.375 gm every 12 hours  Duration of therapy: TBD     Pharmacy will follow daily and adjust medications as appropriate for renal function and/or serum levels.     Thank you,  Feliciano Kumar, PHARMD

## 2019-09-11 NOTE — PROGRESS NOTES
Pharmacy Automatic Renal Dosing Protocol - Antimicrobials    Indication for Antimicrobials: Intra Abdominal Infection    Current Regimen of Each Antimicrobial:  Piperacillin tazobactam 3.375 grams Q12H (Start Date 9/10; Day # 1)  Vancomycin 2000 mg x1, 1250 mg IV every 5-7 days (Start Date 9/10; Day # 1)    Vancomycin Goal Level: 15-20 mcg/ml    Vancomycin Levels  Date Dose & Interval Measured (mcg/mL) Steady State (mcg/mL)                       Date & time of next level:     Significant Cultures:   9/10 paired blood: pending    Radiology / Imaging results: (X-ray, CT scan or MRI):     Labs:  Recent Labs     09/10/19  1715   CREA 8.71*   BUN 40*   WBC 10.9     Temp (24hrs), Av.3 °F (37.4 °C), Min:99.3 °F (37.4 °C), Max:99.3 °F (37.4 °C)    Paralysis, amputations, malnutrition: none noted  Creatinine Clearance (mL/min) or Dialysis: PD    Impression/Plan:   Antibiotic as above adjusted per renal dosing protocol for PD dosing. Duration of therapy: TBD     Pharmacy will follow daily and adjust medications as appropriate for renal function and/or serum levels.     Thank you,  Vanessa Chauhan, PharmD

## 2019-09-11 NOTE — CONSULTS
[facility]    Name:  Mitzy Salazar  MR#:  [id number]  :  1950  ACCOUNT #:  [de-identified]  DATE OF SERVICE:  2019    HISTORY OF PRESENT ILLNESS:  The patient is a 36-year-old gentleman with end-stage renal disease, who has been on peritoneal dialysis. He was seen earlier this month with increasing difficulty with peripheral edema and hypertension. He now presents to the emergency room with chest pain and EKG changes, which are more pronounced than previously noted. His troponin has been unremarkable. His weight has gone up. He was up 6 pounds when seen in September. An echocardiogram was done on 09/10/2019. At that time, the patient had moderate aortic valve insufficiency. The patient had concentric left ventricular hypertrophy and moderate pulmonary hypertension with PA pressure of 50 mmHg. PAST MEDICAL HISTORY:  He has a history of hyperkalemia related to Aldactone. He has a history of sleep apnea and has been on CPAP. He has a history of diabetes mellitus and severe hypertension. Remotely, the patient had PDA, PCI in , and left-carotid endarterectomy in  by Dr. Pancho Spence. MEDICATIONS:  Medications are believed to be the following:  Clonidine 0.4 twice daily, nifedipine 60 twice daily, minoxidil 5 twice daily, Plavix 75 mg daily, Lipitor 40, Basaglar 44 units, Praluent 75 every 2 weeks. Lipids have been under excellent control since last year, at which time his total cholesterol was 110, HDL 38, and LDL of 37, on Praluent. SOCIAL HISTORY:  Tobacco and alcohol:  None. He is doing business for himself. FAMILY HISTORY:  Positive for diabetes and heart disease. REVIEW OF SYSTEMS:  Essentially noncontributory 12 points per the history and physical examination. Review of systems is incomplete due to the fact that the patient is very somnolent. PHYSICAL EXAMINATION:  GENERAL:  He is very sleepy and not verbally responsive.   He is very plethoric. VITAL SIGNS:  Vital signs are noted to be 130/38, pulse 80 and regular. CHEST:  Reveals few basilar rales. HEART:  Regular rhythm with harsh systolic murmur, left sternal border, grade III. ABDOMEN:  Distended. EXTREMITIES:  3 to 4+ edema. EKG:  Regular sinus rhythm, nonspecific ST changes, which are all more pronounced than the previous ECG. LABORATORY DATA:  Hemoglobin 7.2. His troponin is negative. BNP is 4000 plus . Troponin 0.05. PROBLEMS:  1. Chest pain. 2.  End-stage renal disease, on peritoneal dialysis. 3.  Severe atherosclerotic heart disease. 4.  History of abdominal aortic aneurysm endograft. 5.  History of carotid endarterectomy. 6.  Dyslipidemia - on Praluent. DISCUSSION AND RECOMMENDATIONS:  I believe this gentleman's discomfort is related to pericardial effusion. The patient, I believe, will require hemodialysis for correction of his volume overload. His systolic blood pressure is high. His diastolic blood pressure is low. This is related to his aortic insufficiency, which is significant. At this point, I believe that he will require hemodialysis for correction of his volume expansion. The pericardial effusion suggests that his peritoneal dialysis is inadequate. He also may be septic and this needs to be evaluated more clearly by the hospitalist team.    Thank you for asking me to see this pleasant gentleman.       Lam Carver MD      [id number]  D:  09/11/2019 10:03  T:  09/11/2019 11:44  JOB #:  [id number]  CC:  MD Kassandra Mosher MD Debby Lemme, MD

## 2019-09-11 NOTE — PROGRESS NOTES
0900- Pt SOB and O2 sats in 84% on 4L. O2 increased to 6L. Dr. Mihaela Bean notified of O2 status and pt lactic acid of 3.3 this AM. Order for one time 60mg of IV lasix. Nephrology came by and decided to place a Garry and discontinue peritoneal dialysis exchanges. I drained the remaining PD fluid from pt and got 2100 mL out. Pt O2 sats continue to drop and pt place on venturi mask at 12L and is sating at 91%. ABGs obtained, order to transfer to higher level of care, and PO potassium given. Report called to CCU nurse Sharon and pt to go to Rm 2534. Consent obtained for Garry placement and on placed on chart.  Bladder scan of pt revealed 350mL and pt was only able to void 25 mL into the urinal.

## 2019-09-11 NOTE — PROGRESS NOTES
Primary Nurse Brando Rojas RN and Felicity Escalera RN performed a dual skin assessment on this patient No impairment noted  Gregorio score is 19    *Scar and PD cath to abdomen. Gluteal cleft red and blanchable.

## 2019-09-11 NOTE — PROGRESS NOTES
Reason for Admission:  Abdominal pain; Dx: Acute chronic respiratory failure; pulmonary edema; sepsis; ESRD on PD with possible failure of PD  for HD emergently. RRAT Score:     26             Resources/supports as identified by patient/family:   Lives in own home with the support of 5 other adults. Top Challenges facing patient (as identified by patient/family and CM): Finances/Medication cost?     Patient verifies Medicare and supplemental A[place]; Patient used [address] on [address]? Patient still drives on his own and has several other adults in home who drive as well. Support system or lack thereof? Good support system per patient. Living arrangements? Lives in home with other adults; Self-care/ADLs/Cognition? Patient A&O; Independent of ADL and IADLs          Current Advanced Directive/Advance Care Plan:  DNR on file                          Plan for utilizing home health:    Patient reports he has not had [facility] [id number] services in the past.                   Transition of Care Plan:       Patient admitted for acute on chronic renal failure. He has been performing peritoneal dialysis nightly at home. Per physician concern for possible catheter infection and possible failure of PD. Order was placed by physician to set up HD @ [facility]. CM called and was advised by staff that they had spoken to physician and no further need for set up at this time. Care Management Interventions  PCP Verified by CM:  Yes  Palliative Care Criteria Met (RRAT>21 & CHF Dx)?: No  Transition of Care Consult (CM Consult): Discharge Planning(Undetermined)  Current Support Network: Own Home(Lives with 5 other adults uncluding X wife; X S-I-L, and son)  Confirm Follow Up Transport: Self([facility]-patient drives)  Plan discussed with Pt/Family/Caregiver: Yes  Discharge Location  Discharge Placement: (Anticipate home)      CM will continue to following for RYAN planning.     Nik Sheikh RN, CHPN, [address]

## 2019-09-11 NOTE — CONSULTS
[facility] Pulmonary Consult Service Note  Pulmonary, Critical Care, and Sleep Medicine    Name: Patrick Yi MRN: [id number]   : 1950 Hospital: [facility]   Date: 2019  Admission date: 9/10/2019 Hospital Day: 2       Subjective/Interval History:   Seen earlier today on rounds. Pt is unstable and acutely ill. Medical records and data reviewed. Hospital Problems  Date Reviewed: 2019          Codes Class Noted POA    Acute on chronic renal failure ([facility] [address] ICD-10-CM: N17.9, N18.9  ICD-9-CM: 584.9, 585.9  9/10/2019 Unknown              IMPRESSION:   1. Acute chronic respiratory failure with Hypoxia ; sats 85% on 6 LPm  2. Acute pulmonary edema, cardiomegaly, elevate proBNP  3. Sepsis POA: leukocytosis, acidosis due to SBP. 4. Abdominal pain and tenderness concerning for SBP: patient is on PD and seems that catheter is infected  5. ESRD on PD with possible failure of PD  for HD emergently. 6. Acute on Chronic Diastolic Heart Failure POA: EF 80%, with diastolic dysfunction, c/w diuretic but needs HD for fluid removal, followed by Dr. Shahla Lanza with recetn office echo per pt; last echo ED [facility] 2018 LVEF 60%   7. PAD h/o stents  8. H/o AAA   9. Obstructive sleep apnea  10. Diabetes mellitus : c/w Lantus, SSI, check HbA1C.  11. Anemia secondary to CKD  12. GERD/ PUD   13. Remote ex smoker 25 pack yrs  14. Body mass index is 28.94 kg/m². 13. Multiorgan dysfunction as outlined above: Pt has one or more acute or chronic illnesses with severe exacerbation with progression or side effects of treatment that poses a threat to life or bodily function  16. Additional workup outlined below  17. Pt is requiring Drug therapy requiring intensive monitoring for toxicity  18.  Pt is unstable, unpredictable needing inpatient monitoring; is acutely ill and at high risk of sudden decline and decompensation with severe consequenses and continued end organ dysfunction and failure  19. Prognosis guarded       RECOMMENDATIONS/PLAN:   1. PCU  2. NIV BIPAP prn  3. venti mask, O2 to keep sats > 93%  4. HD per nephrology  5. Aspiration precautions  6. Labs to follow electrolytes, renal function and and blood counts  7. Glucose monitoring and SSI  8. Bronchial hygiene with respiratory therapy techniques, bronchodilators  9. DVT, SUP prophylaxis  10. Pt needs IV fluids with additives and Drug therapy requiring intensive monitoring for toxicity  11. Prescription drug management with home med reconciliation reviewed          [x] High complexity decision making was performed  [x] See my orders for details      Subjective/Initial History:     I was asked by Summer Stein MD to see Hugo Bhat  a 77 y.o.  male in consultation for a chief complaint of  Acute respiraotry failure    Excerpts from admission 9/10/2019 or consult notes as follows:     \" Josh Daniel is a 77 y.o.  male who presents with complaint of abdominal pain, increased abdominal and lower extremity swelling, and altered mental status. He is unable to provide history at this time secondary to his acute encephalopathy and as such history is obtained from his son at bedside. Per his son, as of last week the patient began to complain of increased swelling and pain in his chest and abdomen. The patient was seen by his cardiologist, Dr. Neli Simpson, who performed an EKG and obtained an echocardiogram, and apparently felt that the symptoms were not cardiac in nature; a pericardial effusion was reportedly identified at that time. Over the course of the ensuing days, the patient has continued to complain of pain and has been less responsive to family. His abdominal distention and edema has been visibly worsening. He undergoes peritoneal dialysis and has been getting alarms indicating \"low drain. \" He has had some subjective fevers and chills at home. \"    Today during rounds: \"Pt SOB and O2 sats in 84% on 4L.  O2 increased to 6L. Dr. Carl Mullen notified of O2 status and pt lactic acid of 3.3 this AM. Order for one time 60mg of IV lasix. Nephrology came by and decided to place a Garry and discontinue peritoneal dialysis exchanges. I drained the remaining PD fluid from pt and got 2100 mL out. Pt O2 sats continue to drop and pt place on venturi mask at 12L and is sating at 91%. ABGs obtained, order to transfer to higher level of care, and PO potassium given. \"    Pt has not smoked for 25 yrs. Did heavy construction and concrete wwork. No atshma or allergies. No sinus draiange. PD for a year and never infected in the past. Stomach still hurts. No wheeze. No dysphagia. No nausea or vomiting. Sees dr. Godfrey Beal and had echo recently. No cough or congestion. Has venti adilene and breathing ok. CXr reviewed personally. Has cardiomegaly and pulmonary edema. No obvious effusion. Pt pretty stoic.d/w nurisng before going from floor to CCU as PCU overflow. No Known Allergies     MAR reviewed and pertinent medications noted or modified as needed     Current Facility-Administered Medications   Medication    epoetin dilan-epbx (RETACRIT) injection 10,000 Units    alcohol 62% (NOZIN) nasal  1 Ampule    piperacillin-tazobactam (ZOSYN) 3.375 g in 0.9% sodium chloride (MBP/ADV) 100 mL    acetaminophen (TYLENOL) tablet 650 mg    ondansetron (ZOFRAN) injection 4 mg    nebivolol (BYSTOLIC) tablet 20 mg    cholecalciferol (VITAMIN D3) tablet 1,000 Units    clopidogrel (PLAVIX) tablet 75 mg    B complex-vitaminC-folic acid (NEPHROCAP) cap    furosemide (LASIX) tablet 80 mg    omega 3-DHA-EPA-fish oil 1,000 mg (120 mg-180 mg) capsule 1 Cap    . PHARMACY TO SUBSTITUTE PER PROTOCOL (Reordered from: PRALUENT PEN 75 mg/mL injector pen)    tamsulosin (FLOMAX) capsule 0.4 mg    sodium chloride (NS) flush 5-40 mL    sodium chloride (NS) flush 5-40 mL    HYDROmorphone (PF) (DILAUDID) injection 0.5 mg    naloxone (NARCAN) injection 0.4 mg  heparin (porcine) injection 5,000 Units    insulin glargine (LANTUS) injection 25 Units    glucose chewable tablet 16 g    dextrose 10% infusion 0-250 mL    insulin lispro (HUMALOG) injection    Vancomycin - Pharmacy Dosing     Facility-Administered Medications Ordered in Other Encounters   Medication    heparin (porcine) pf 500 Units    heparinized saline 2 units/mL infusion 200 Units    lidocaine (XYLOCAINE) 20 mg/mL (2 %) injection 400 mg      Patient PCP: Bradley Romero MD  PMH:  has a past medical history of AAA (abdominal aortic aneurysm) ([facility] [address] Anemia, BPH (benign prostatic hypertrophy), CAD (coronary artery disease), Cancer ([facility] [address] Chronic kidney disease, End stage renal disease ([facility] [address] GERD (gastroesophageal reflux disease), Gout, Other and unspecified hyperlipidemia, PUD (peptic ulcer disease), PVD (peripheral vascular disease) ([facility] [address] Sleep apnea, Type II or unspecified type diabetes mellitus without mention of complication, uncontrolled, Unspecified essential hypertension, and Unspecified vitamin D deficiency. PSH:   has a past surgical history that includes pr cardiac surg procedure unlist; hx coronary stent placement; hx endoscopy; hx colonoscopy; hx cataract removal; hx other surgical; vascular surgery procedure unlist; vascular surgery procedure unlist; vascular surgery procedure unlist; and hx knee arthroscopy. FHX: family history includes Cancer in his father; Diabetes in his daughter, mother, and sister; Heart Disease in his maternal grandmother and mother; Hypertension in his father; Stroke in his sister. SHX:  reports that he quit smoking about 28 years ago. He has a 50.00 pack-year smoking history. He has never used smokeless tobacco. He reports that he drinks alcohol. He reports that he does not use drugs. ROS:A comprehensive review of systems was negative except for that written in the HPI.     Objective:     Vital Signs: Telemetry:    normal sinus rhythm Intake/Output: Visit Vitals  /45 (BP 1 Location: Right arm, BP Patient Position: At rest)   Pulse 79   Temp 98.2 °F (36.8 °C)   Resp 27   Ht 5' 9\" (1.753 m)   Wt 88.9 kg (196 lb)   SpO2 90%   BMI 28.94 kg/m²       Temp (24hrs), Av.7 °F (37.1 °C), Min:98 °F (36.7 °C), Max:99.6 °F (37.6 °C)        O2 Device: Ventimask O2 Flow Rate (L/min): 12 l/min       Wt Readings from Last 4 Encounters:   09/10/19 88.9 kg (196 lb)   19 89 kg (196 lb 3.2 oz)   19 82.4 kg (181 lb 9.6 oz)   10/22/18 81.5 kg (179 lb 10.8 oz)          Intake/Output Summary (Last 24 hours) at 2019 1217  Last data filed at 2019 1127  Gross per 24 hour   Intake 2700 ml   Output 3600 ml   Net -900 ml       Last shift:      701 -  190  In: 700 [I.V.:700]  Out: 2479 [Urine:125]  Last 3 shifts: 1901 -  0700  In: 2000   Out: 9601 [Urine:175]       Physical Exam:    General:   male; severely ill; VM;    HEAD: Normocephalic, without obvious abnormality, atraumatic   EYES: conjunctivae clear. PERRL,  AN Icteric sclerae   NOSE: nares normal, no drainage, no nasal flaring,    THROAT: ; Lips, mucosa dry; No Thrush;  crowded airway; tongue midline   Neck: Supple, symmetrical, trachea midline, 1+ accessory mm use; No Stridor/ cuff leak, No goiter or thyroid tenderness   LYMPH: No abnormally enlarged lymph nodes. in cervical or supraclavicular regions   Chest: increased AP diameter   Lungs: decreased air exchange bibasilar and bilaterally   Heart: Regular rate and rhythm; 2+ edema   Abdomen: distended, tenderness mild - in the entire abdomen; PD cath; guarding no rebound   : No Major    Musculoskeletal:  negative, cyanosis, clubbing; no joint swelling or erythema   Neuro: alert; speech fluent ; withdraws to pain; unable to check gait and station;  following simple commands   Psych: oriented to time, place and person;   No agitation;  normal affect;    Skin: Pale;    Pulses:Bilateral, Radial, 2+   Capillary refill: normal; warm, pale,               Labs:    Recent Labs     09/11/19  0135 09/10/19  1715   WBC 12.5* 10.9   HGB 7.2* 8.1*    326   INR  --  1.1     Recent Labs     09/11/19  0606 09/11/19  0146 09/11/19  0135 09/10/19  1715   NA  --   --  137 139   K  --   --  3.1* 3.2*   CL  --   --  89* 91*   CO2  --   --  37* 38*   GLU  --   --  95 124*   BUN  --   --  46* 40*   CREA  --   --  9.09* 8.71*   CA  --   --  7.7* 8.0*   MG  --   --   --  1.7   LAC 3.3* 2.6*  --   --    ALB  --   --  2.0* 2.2*   SGOT  --   --  8* 10*   ALT  --   --  14 17     No results for input(s): PH, PCO2, PO2, HCO3, FIO2 in the last 72 hours. Recent Labs     09/10/19  1715   CPK 88   CKNDX Cannot be calculated   TROIQ 0.05*     No results found for: BNPP, BNP   Lab Results   Component Value Date/Time    Culture result: PENDING 09/11/2019 01:35 AM    Culture result: NO GROWTH AFTER 14 HOURS 09/10/2019 05:15 PM    Culture result: MODERATE NORMAL RESPIRATORY [place] 02/08/2018 01:35 PM     Lab Results   Component Value Date/Time    TSH 0.44 09/09/2012 02:15 AM       Imaging:    CXR Results  (Last 48 hours)               09/10/19 1800  XR CHEST PORT Final result    Impression:  IMPRESSION:       New mild CHF pattern of pulmonary edema since last year. Consider PA and lateral   chest views when the patient can better tolerate. Narrative:  EXAM: XR CHEST PORT       INDICATION: Abdominal distention and confusion. Hypotension. COMPARISON: Chest views on 9/4/2018       TECHNIQUE: Upright portable chest AP view       FINDINGS: Cardiac monitoring wires overlie the thorax. Cardiomegaly is new and   accentuated by technique. Aortic arch is not well evaluated. Ulnar vascular   prominence is mild. Mild bilateral pulmonary edema is new. Left lung base opacity most likely   represent subsegmental atelectasis. No pneumothorax. Low lung volumes. Lordotic   positioning. Upper abdomen is within normal limits.                Results from Hospital Encounter encounter on 09/10/19   XR CHEST PORT    Narrative EXAM: XR CHEST PORT    INDICATION: Abdominal distention and confusion. Hypotension. COMPARISON: Chest views on 9/4/2018    TECHNIQUE: Upright portable chest AP view    FINDINGS: Cardiac monitoring wires overlie the thorax. Cardiomegaly is new and  accentuated by technique. Aortic arch is not well evaluated. Ulnar vascular  prominence is mild. Mild bilateral pulmonary edema is new. Left lung base opacity most likely  represent subsegmental atelectasis. No pneumothorax. Low lung volumes. Lordotic  positioning. Upper abdomen is within normal limits. Impression IMPRESSION:    New mild CHF pattern of pulmonary edema since last year. Consider PA and lateral  chest views when the patient can better tolerate. Results from Hospital Encounter encounter on 10/15/18   XR ABD (KUB)    Narrative EXAM: KUB    INDICATION: Abdomen pain  Pain, unspecified    A single frontal view of the abdomen shows normal small bowel gas pattern. There  is mild diffuse colorectal stool without significant colorectal distention. There are vascular calcifications. There is no apparent organomegaly. Peritoneal  dialysis catheter is present with tip coiled in the left hemipelvis. Impression IMPRESSION: No acute findings. Results from [place] encounter on 09/04/18   XR CHEST PA LAT    Narrative Clinical indication: Preop. Coronary artery disease, diabetes, anemia skin  cancer sleep apnea AAA. Frontal and lateral views of the chest obtained, comparison June 4. The a heart  size is normal. There is no acute infiltrate. Impression  impression: No acute changes. Results from [place] encounter on 06/14/18   CT HEAD WO CONT    Narrative EXAM:  CT HEAD WO CONT    INDICATION:   dizzy x this morning    COMPARISON: None. CONTRAST:  None. TECHNIQUE: Unenhanced CT of the head was performed using 5 mm images.  Brain and  bone windows were generated. CT dose reduction was achieved through use of a  standardized protocol tailored for this examination and automatic exposure  control for dose modulation. FINDINGS:  The ventricles and sulci are normal in size, shape and configuration and  midline. There is no significant white matter disease. There is no intracranial  hemorrhage, extra-axial collection, mass, mass effect or midline shift. The  basilar cisterns are open. No acute infarct is identified. The bone windows  demonstrate no abnormalities. The visualized portions of the paranasal sinuses  and mastoid air cells are clear. Vascular calcification is noted. Impression IMPRESSION: No acute abnormality.            This care involved high complexity medical decision making: I personally:  · Reviewed the flowsheet and previous days notes  · Reviewed and summarized records or history from previous days note or discussions with staff, family  · High Risk Drug therapy requiring intensive monitoring for toxicity: eg steroids, pressors, antibiotics  · Reviewed and/or ordered Clinical lab tests  · Reviewed images and/or ordered Radiology tests  · Reviewed the patients ECG / Telemetry  · discussed my assessment/management with : Nursing, Family for coordination of care    Donte Goodwin MD

## 2019-09-11 NOTE — DIABETES MGMT
[facility]        Recommendations/ Comments:  Chart reviewed for A1c elevated over 3 months. Given HGB of 7.2, if A1c drawn would likely not be reflective of home management. Note Lantus to start this am for hyperglycemia      A1c:   Lab Results   Component Value Date/Time    Hemoglobin A1c 10.9 (H) 01/16/2019 08:48 AM    Hemoglobin A1c 7.8 (H) 08/16/2018 07:46 AM    Hemoglobin A1c, External 10.5 01/09/2019       Will continue to follow as needed. Thank you.   Donavan Bajwa RD, [facility]   Office:  [phone]            Time spent: 5 minutes

## 2019-09-11 NOTE — ED NOTES
Pt. Admitted to floor drowsy, alert at times, oriented to name and place, with episodes of confusion, no distress.  Report given to [facility]

## 2019-09-11 NOTE — PROGRESS NOTES
0030: Paged ED hospitalist regarding a repeat lactic. Awaiting response. Bedside shift change report given to VIRGIL Andrews (oncoming nurse) by VIRGIL Zarate (offgoing nurse). Report included the following information SBAR, Kardex, Procedure Summary, Intake/Output, MAR and Recent Results. Critical lactic of 3.3.

## 2019-09-11 NOTE — PROGRESS NOTES
TRANSFER - IN REPORT:    Verbal report received from [place] [address] on [employer]  being received from CCU 1426     Report consisted of patients Situation, Background, Assessment and   Recommendations(SBAR). Information from the following report(s) Kardex was reviewed with the receiving nurse. Opportunity for questions and clarification was provided. Assessment completed upon patients arrival to unit and care assumed.

## 2019-09-12 NOTE — BRIEF OP NOTE
BRIEF OPERATIVE NOTE    Date of Procedure: 9/12/2019   Preoperative Diagnosis: sepsis  Postoperative Diagnosis: sepsis    Procedure(s):  PERITONEAL DIALYSIS CATHETER REMOVAL  Surgeon(s) and Role:     * Rajani Shoemaker MD - Primary    Surgical Staff:  Circ-1: Jin Freedman RN  Scrub Tech-1: Yaz Villegas  Surg Asst-1: Sergio Greene RN  Event Time In Time Out   Incision Start 09/12/2019 1042    Incision Close 09/12/2019 1105      Anesthesia: General   Estimated Blood Loss: 5  Specimens:   ID Type Source Tests Collected by Time Destination   1 : Catheter Fresh Peritoneum  Rajani Shoemaker MD 9/12/2019 1053 Pathology      Findings: no gross purulence, PD cath removed in its entirety   Complications: none  Implants: * No implants in log *

## 2019-09-12 NOTE — PROGRESS NOTES
[facility] Pulmonary Consult Service Note  Pulmonary, Critical Care, and Sleep Medicine    Name: Rosenda Ryan MRN: [id number]   : 1950 Hospital: [facility]   Date: 2019  Admission date: 9/10/2019 Hospital Day: 3       Subjective/Interval History:   PT is feeling better today. NO chest pain, no back pain, no leg pain. Breathing is easier. Hospital Problems  Date Reviewed: 2019          Codes Class Noted POA    Acute on chronic renal failure ([facility] [address] ICD-10-CM: N17.9, N18.9  ICD-9-CM: 584.9, 585.9  9/10/2019 Unknown              IMPRESSION:   1. Acute chronic respiratory failure with Hypoxia, seems better this am. Was taken off bipap this am. Placed on NC oxygen this am.   2. Acute pulmonary edema, cardiomegaly, elevate proBNP  3. Sepsis POA: leukocytosis, acidosis due to SBP. 4. Abdominal pain and tenderness concerning for SBP: patient is on PD and seems that catheter is infected  5. ESRD on PD with possible failure of PD  for HD emergently. Will most likely continue on HD for now. 6. Acute on Chronic Diastolic Heart Failure POA: EF 89%, with diastolic dysfunction, c/w diuretic but needs HD for fluid removal, followed by Dr. Mario Alberto Cox with recetn office echo per pt; last echo ED [facility] 2018 LVEF 60%   7. PAD h/o stents  8. H/o AAA   9. Obstructive sleep apnea  10. Diabetes mellitus : c/w Lantus, SSI, check HbA1C.  11. Anemia secondary to CKD  12. GERD/ PUD   13. Remote ex smoker 25 pack yrs  Body mass index is 28.94 kg/m². 15. Multiorgan dysfunction as outlined above: Pt has one or more acute or chronic illnesses with severe exacerbation with progression or side effects of treatment that poses a threat to life or bodily function  15. Additional workup outlined below  16. Pt is requiring Drug therapy requiring intensive monitoring for toxicity  17.  Pt is unstable, unpredictable needing inpatient monitoring; is acutely ill and at high risk of sudden decline and decompensation with severe consequenses and continued end organ dysfunction and failure  18. Prognosis guarded       RECOMMENDATIONS/PLAN:   1. NIV BIPAP prn  2. NC oxygen to keep sats > 93%  3. HD per nephrology  4. Aspiration precautions  5. Labs to follow electrolytes, renal function and and blood counts  6. Glucose monitoring and SSI  7. Bronchial hygiene with respiratory therapy techniques, bronchodilators  8. DVT, SUP prophylaxis  9. Pt needs IV fluids with additives and Drug therapy requiring intensive monitoring for toxicity  10. Prescription drug management with home med reconciliation reviewed          [x] High complexity decision making was performed  [x] See my orders for details      Subjective/Initial History:     I was asked by Nancy Mustafa MD to see Jayden Somers  a 77 y.o.  male in consultation for a chief complaint of  Acute respiraotry failure    Excerpts from admission 9/10/2019 or consult notes as follows:     \" Dinora Walton is a 77 y.o.  male who presents with complaint of abdominal pain, increased abdominal and lower extremity swelling, and altered mental status. He is unable to provide history at this time secondary to his acute encephalopathy and as such history is obtained from his son at bedside. Per his son, as of last week the patient began to complain of increased swelling and pain in his chest and abdomen. The patient was seen by his cardiologist, Dr. Jacqueline Hoskins, who performed an EKG and obtained an echocardiogram, and apparently felt that the symptoms were not cardiac in nature; a pericardial effusion was reportedly identified at that time. Over the course of the ensuing days, the patient has continued to complain of pain and has been less responsive to family. His abdominal distention and edema has been visibly worsening. He undergoes peritoneal dialysis and has been getting alarms indicating \"low drain. \" He has had some subjective fevers and chills at home. \"    Today during rounds: \"Pt SOB and O2 sats in 84% on 4L. O2 increased to 6L. Dr. Meghan Del Rio notified of O2 status and pt lactic acid of 3.3 this AM. Order for one time 60mg of IV lasix. Nephrology came by and decided to place a Garry and discontinue peritoneal dialysis exchanges. I drained the remaining PD fluid from pt and got 2100 mL out. Pt O2 sats continue to drop and pt place on venturi mask at 12L and is sating at 91%. ABGs obtained, order to transfer to higher level of care, and PO potassium given. \"    Pt has not smoked for 25 yrs. Did heavy construction and concrete wwork. No atshma or allergies. No sinus draiange. PD for a year and never infected in the past. Stomach still hurts. No wheeze. No dysphagia. No nausea or vomiting. Sees dr. Mario Alberto Cox and had echo recently. No cough or congestion. Has venti adilene and breathing ok. CXr reviewed personally. Has cardiomegaly and pulmonary edema. No obvious effusion. Pt pretty stoic.d/w nurisng before going from floor to CCU as PCU overflow. No Known Allergies     MAR reviewed and pertinent medications noted or modified as needed     Current Facility-Administered Medications   Medication    epoetin dilan-epbx (RETACRIT) injection 10,000 Units    alcohol 62% (NOZIN) nasal  1 Ampule    VANCOMYCIN INFORMATION NOTE    vancomycin (VANCOCIN) 750 mg in 0.9% sodium chloride (MBP/ADV) 250 mL    fentaNYL citrate (PF) injection 25 mcg    HYDROmorphone (PF) (DILAUDID) injection 1 mg    piperacillin-tazobactam (ZOSYN) 3.375 g in 0.9% sodium chloride (MBP/ADV) 100 mL    acetaminophen (TYLENOL) tablet 650 mg    ondansetron (ZOFRAN) injection 4 mg    nebivolol (BYSTOLIC) tablet 20 mg    cholecalciferol (VITAMIN D3) tablet 1,000 Units    clopidogrel (PLAVIX) tablet 75 mg    B complex-vitaminC-folic acid (NEPHROCAP) cap    furosemide (LASIX) tablet 80 mg    omega 3-DHA-EPA-fish oil 1,000 mg (120 mg-180 mg) capsule 1 Cap    . PHARMACY TO SUBSTITUTE PER PROTOCOL (Reordered from: PRALUENT PEN 75 mg/mL injector pen)    tamsulosin (FLOMAX) capsule 0.4 mg    sodium chloride (NS) flush 5-40 mL    sodium chloride (NS) flush 5-40 mL    naloxone (NARCAN) injection 0.4 mg    heparin (porcine) injection 5,000 Units    insulin glargine (LANTUS) injection 25 Units    glucose chewable tablet 16 g    dextrose 10% infusion 0-250 mL    insulin lispro (HUMALOG) injection      Patient PCP: Abbi Munguia MD  PMH:  has a past medical history of AAA (abdominal aortic aneurysm) ([facility] [address] Anemia, BPH (benign prostatic hypertrophy), CAD (coronary artery disease), Cancer ([facility] [address] Chronic kidney disease, End stage renal disease ([facility] [address] GERD (gastroesophageal reflux disease), Gout, Other and unspecified hyperlipidemia, PUD (peptic ulcer disease), PVD (peripheral vascular disease) ([facility] [address] Sleep apnea, Type II or unspecified type diabetes mellitus without mention of complication, uncontrolled, Unspecified essential hypertension, and Unspecified vitamin D deficiency. PSH:   has a past surgical history that includes pr cardiac surg procedure unlist; hx coronary stent placement; hx endoscopy; hx colonoscopy; hx cataract removal; hx other surgical; vascular surgery procedure unlist; vascular surgery procedure unlist; vascular surgery procedure unlist; hx knee arthroscopy; and ir insert non tunl cvc over 5 yrs (9/11/2019). FHX: family history includes Cancer in his father; Diabetes in his daughter, mother, and sister; Heart Disease in his maternal grandmother and mother; Hypertension in his father; Stroke in his sister. SHX:  reports that he quit smoking about 28 years ago. He has a 50.00 pack-year smoking history. He has never used smokeless tobacco. He reports that he drinks alcohol. He reports that he does not use drugs. ROS:A comprehensive review of systems was negative except for that written in the HPI.     Objective:     Vital Signs: Telemetry:    normal sinus rhythm Intake/Output:   Visit Vitals  /46 (BP 1 Location: Right arm, BP Patient Position: At rest)   Pulse 81   Temp 98.2 °F (36.8 °C)   Resp 25   Ht 5' 9\" (1.753 m)   Wt 88.9 kg (196 lb)   SpO2 95%   BMI 28.94 kg/m²       Temp (24hrs), Av.9 °F (37.2 °C), Min:98.2 °F (36.8 °C), Max:100.2 °F (37.9 °C)        O2 Device: BIPAP O2 Flow Rate (L/min): 5 l/min       Wt Readings from Last 4 Encounters:   09/10/19 88.9 kg (196 lb)   19 89 kg (196 lb 3.2 oz)   19 82.4 kg (181 lb 9.6 oz)   10/22/18 81.5 kg (179 lb 10.8 oz)          Intake/Output Summary (Last 24 hours) at 2019 0706  Last data filed at 2019 0253  Gross per 24 hour   Intake 700 ml   Output 5300 ml   Net -4600 ml       Last shift:      No intake/output data recorded. Last 3 shifts: 09/10 1901 -  0700  In: 2700 [I.V.:700]  Out: 6675 [Urine:875]       Physical Exam:    General:   male; severely ill; VM;    HEAD: Normocephalic, without obvious abnormality, atraumatic   EYES: conjunctivae clear. PERRL,  AN Icteric sclerae   NOSE: nares normal, no drainage, no nasal flaring,    THROAT: ; Lips, mucosa dry; No Thrush;  crowded airway; tongue midline   Neck: Supple, symmetrical, trachea midline, 1+ accessory mm use; No Stridor/ cuff leak, No goiter or thyroid tenderness   LYMPH: No abnormally enlarged lymph nodes. in cervical or supraclavicular regions   Chest: increased AP diameter   Lungs: decreased air exchange bibasilar and bilaterally   Heart: Regular rate and rhythm; 2+ edema   Abdomen: distended, tenderness mild - in the entire abdomen; PD cath; guarding no rebound   : No Major    Musculoskeletal:  negative, cyanosis, clubbing; no joint swelling or erythema   Neuro: alert; speech fluent ; withdraws to pain; unable to check gait and station;  following simple commands   Psych: oriented to time, place and person;   No agitation;  normal affect;    Skin: Pale;    Pulses:Bilateral, Radial, 2+   Capillary refill: normal; warm, pale,               Labs:    Recent Labs     09/12/19  0547 09/11/19  0135 09/10/19  1715   WBC 9.8 12.5* 10.9   HGB 8.4* 7.2* 8.1*    288 326   INR  --   --  1.1     Recent Labs     09/12/19  0547 09/11/19  0606 09/11/19  0146 09/11/19  0135 09/10/19  1715   *  --   --  137 139   K 3.7  --   --  3.1* 3.2*   CL 93*  --   --  89* 91*   CO2 33*  --   --  37* 38*   *  --   --  95 124*   BUN 43*  --   --  46* 40*   CREA 7.42*  --   --  9.09* 8.71*   CA 8.5  --   --  7.7* 8.0*   MG 1.8  --   --   --  1.7   PHOS 4.6  --   --   --   --    LAC  --  3.3* 2.6*  --   --    ALB 2.1*  --   --  2.0* 2.2*   SGOT 10*  --   --  8* 10*   ALT 13  --   --  14 17     No results for input(s): PH, PCO2, PO2, HCO3, FIO2 in the last 72 hours. Recent Labs     09/10/19  1715   CPK 88   CKNDX Cannot be calculated   TROIQ 0.05*     No results found for: BNPP, BNP   Lab Results   Component Value Date/Time    Culture result: PENDING 09/11/2019 01:35 AM    Culture result: NO GROWTH AFTER 14 HOURS 09/10/2019 05:15 PM    Culture result: MODERATE NORMAL RESPIRATORY [place] 02/08/2018 01:35 PM     Lab Results   Component Value Date/Time    TSH 0.44 09/09/2012 02:15 AM       Imaging:    CXR Results  (Last 48 hours)               09/11/19 1306  XR CHEST PORT Final result    Impression:  IMPRESSION: Right jugular dialysis catheter tip over the right atrium. No   pneumothorax. Narrative:  EXAM:  XR CHEST PORT. INDICATION: Garry placement. COMPARISON: 9/10/2019. FINDINGS:    A portable AP radiograph of the chest was obtained at 1243 hours. Lines and tubes: The patient is on a cardiac monitor. There is a new right   jugular dialysis catheter with tip projecting over the right atrium. Lungs: There is interstitial edema throughout the lungs which is increased with   some atelectasis at the lung bases. Pleura: There is no pneumothorax or pleural effusion.    Mediastinum: The cardiac and mediastinal contours and pulmonary vascularity are   normal. The aorta is atherosclerotic and mildly tortuous. Bones and soft tissues: The bones and soft tissues are grossly within normal   limits. 09/10/19 1800  XR CHEST PORT Final result    Impression:  IMPRESSION:       New mild CHF pattern of pulmonary edema since last year. Consider PA and lateral   chest views when the patient can better tolerate. Narrative:  EXAM: XR CHEST PORT       INDICATION: Abdominal distention and confusion. Hypotension. COMPARISON: Chest views on 9/4/2018       TECHNIQUE: Upright portable chest AP view       FINDINGS: Cardiac monitoring wires overlie the thorax. Cardiomegaly is new and   accentuated by technique. Aortic arch is not well evaluated. Ulnar vascular   prominence is mild. Mild bilateral pulmonary edema is new. Left lung base opacity most likely   represent subsegmental atelectasis. No pneumothorax. Low lung volumes. Lordotic   positioning. Upper abdomen is within normal limits. Results from [place] encounter on 09/10/19   XR CHEST PORT    Narrative EXAM:  XR CHEST PORT. INDICATION: Garry placement. COMPARISON: 9/10/2019. FINDINGS:   A portable AP radiograph of the chest was obtained at 1243 hours. Lines and tubes: The patient is on a cardiac monitor. There is a new right  jugular dialysis catheter with tip projecting over the right atrium. Lungs: There is interstitial edema throughout the lungs which is increased with  some atelectasis at the lung bases. Pleura: There is no pneumothorax or pleural effusion. Mediastinum: The cardiac and mediastinal contours and pulmonary vascularity are  normal. The aorta is atherosclerotic and mildly tortuous. Bones and soft tissues: The bones and soft tissues are grossly within normal  limits. Impression IMPRESSION: Right jugular dialysis catheter tip over the right atrium. No  pneumothorax.      XR CHEST PORT    Narrative EXAM: XR CHEST PORT    INDICATION: Abdominal distention and confusion. Hypotension. COMPARISON: Chest views on 9/4/2018    TECHNIQUE: Upright portable chest AP view    FINDINGS: Cardiac monitoring wires overlie the thorax. Cardiomegaly is new and  accentuated by technique. Aortic arch is not well evaluated. Ulnar vascular  prominence is mild. Mild bilateral pulmonary edema is new. Left lung base opacity most likely  represent subsegmental atelectasis. No pneumothorax. Low lung volumes. Lordotic  positioning. Upper abdomen is within normal limits. Impression IMPRESSION:    New mild CHF pattern of pulmonary edema since last year. Consider PA and lateral  chest views when the patient can better tolerate. Results from Hospital Encounter encounter on 10/15/18   XR ABD (KUB)    Narrative EXAM: KUB    INDICATION: Abdomen pain  Pain, unspecified    A single frontal view of the abdomen shows normal small bowel gas pattern. There  is mild diffuse colorectal stool without significant colorectal distention. There are vascular calcifications. There is no apparent organomegaly. Peritoneal  dialysis catheter is present with tip coiled in the left hemipelvis. Impression IMPRESSION: No acute findings. Results from [place] encounter on 06/14/18   CT HEAD WO CONT    Narrative EXAM:  CT HEAD WO CONT    INDICATION:   dizzy x this morning    COMPARISON: None. CONTRAST:  None. TECHNIQUE: Unenhanced CT of the head was performed using 5 mm images. Brain and  bone windows were generated. CT dose reduction was achieved through use of a  standardized protocol tailored for this examination and automatic exposure  control for dose modulation. FINDINGS:  The ventricles and sulci are normal in size, shape and configuration and  midline. There is no significant white matter disease. There is no intracranial  hemorrhage, extra-axial collection, mass, mass effect or midline shift.   The  basilar cisterns are open. No acute infarct is identified. The bone windows  demonstrate no abnormalities. The visualized portions of the paranasal sinuses  and mastoid air cells are clear. Vascular calcification is noted. Impression IMPRESSION: No acute abnormality.            This care involved high complexity medical decision making: I personally:  · Reviewed the flowsheet and previous days notes  · Reviewed and summarized records or history from previous days note or discussions with staff, family  · High Risk Drug therapy requiring intensive monitoring for toxicity: eg steroids, pressors, antibiotics  · Reviewed and/or ordered Clinical lab tests  · Reviewed images and/or ordered Radiology tests  · Reviewed the patients ECG / Telemetry  · discussed my assessment/management with : Nursing, Family for coordination of care    Zacarias Walker MD

## 2019-09-12 NOTE — OP NOTES
[facility]  OPERATIVE REPORT     Name: Tania Diane  MR#:  [id number]  :    DATE OF SERVICE:  2019        PREOPERATIVE DIAGNOSIS:  Infected Peritoneal Dialysis Catheter     POSTOPERATIVE DIAGNOSIS:  Infected Peritoneal Dialysis Catheter     PROCEDURE PERFORMED:  1.  Removal of Peritoneal Dialysis Catheter     SURGEON: Fernie Restrepo MD.     ANESTHESIA:  General.     COMPLICATIONS:  None.     SPECIMENS REMOVED:  Peritoneal Dialysis catheter     IMPLANTS:  None.     ESTIMATED BLOOD LOSS:  5 mL.     INDICATIONS:  The patient is an 72-year-old male who was found to have an infected peritoneal dialysis catheter and peritonitis. We were consulted for removal. Consent was obtained from patient     PROCEDURE:  The patient was prepped and draped in sterile fashion including the PD catheter. An incision with a 15 blade was made over the prior periumbilical transverse incision. Bovie electrocautery was used to dissect down to the dialysis catheter and this was followed down to the fascia. The dialysis catheter was was controlled with a hemostat. The deep cuff of the PD catheter was dissected free sharply from the surrounding fascia and the PD catheter was removed in its entirety. The fascia was closed with a running 0-vicryl suture. The distal portion of the catheter was then transected and sent for culture. The proximal part of the catheter was then dissected free and removed from the entrance site. There was no gross purulence. The wound was copiously irrigated with antibiotic solution. The transverse skin incision was closed in layers with 3-0 vicryl followed by staples and the entrance site was left open.  Dressings were applied and the patient was returned to the recovery room in stable condition.  Rose Nissen, MD

## 2019-09-12 NOTE — PROGRESS NOTES
2325Patient sitting up on side of bed and complaining of needing to urinate but unable to do so. Patient stated that he needed to stand up to try and void. Explained to patient that additional assistance was needed to help him stand because he was unsteady and tremoring really bad. Patient became upset and angry when I told him that he needed to also try and help us help him stand. Explained to him that in order to prevent anyone from getting injured everyone needed to help. Patient was still unable to void. Patient was bladder scanned. Bladder scanned showed >279 in bladder. Patient assisted back to bed. Abdomen noted to be more distended than it was at the beginning of the shift and patient is reporting more pain than previous despite getting Dilaudid and Fentanyl. Tele-hospitalist to be paged and notified. 0011 Patient given Dilaudid 1mg IV for pain   0030 In and out cath patient with Piper Cole RN present at bedside. Drained 300ml of bud colored urine. Pt reports immediate relief after procedure. No other needs expressed.

## 2019-09-12 NOTE — PROGRESS NOTES
Hospitalist Progress Note    NAME: Osorio Andrew   :  1950   MRN:  [id number]       Assessment / Plan:  Acute Hypoxic Respiratory Failure POA: due to pulmonary edema, now 85% on 6LNC, off BiPAP, tolerating NC 4L  Sepsis POA: leukocytosis, acidosis due to SBP. Abdominal pain and tenderness concerning for SBP: patient is on PD and seems that catheter is infected, will c/w Zosyn and Vancomycin F/U cultures so far GNR isoalted,  Renal in the case. Peritoneal Catheter was d/c  ESRD on PD with possible failure of PD  Renal help appreciated, had HD yesterday, and due for HD today. Acute on Chronic Diastolic Heart Failure POA: EF 77%, with diastolic dysfunction, D/W Renal and Cardiology, monitor I/O and weight. Will check new CXR on AM, but clinically much better  Acute toxic and metabolic encephalopathy secondary to the above, monitor, so far back to baseline  Diabetes mellitus : c/w Lantus, SSI, F/U  HbA1C. Anemia secondary to CKD  Essential hypertension Holding several home medications as MAP has been borderline (primarily secondary to low diastolic BP)  GERD/ PUD  Overweight: BMI 28.94 counseled. Surrogate Decision Maker: Reyes Patten  Code status: DNR  Prophylaxis: Hep SQ  Recommended Disposition: Home w/Family      Subjective:     Chief Complaint / Reason for Physician Visit  \"I feel better, still weak though\". Discussed with RN events overnight. Review of Systems:  Symptom Y/N Comments  Symptom Y/N Comments   Fever/Chills    Chest Pain n    Poor Appetite    Edema     Cough    Abdominal Pain     Sputum    Joint Pain     SOB/RUBIO n   Pruritis/Rash     Nausea/vomit    Tolerating PT/OT     Diarrhea    Tolerating Diet y    Constipation    Other y Weak and tired     Could NOT obtain due to:      Objective:     VITALS:   Last 24hrs VS reviewed since prior progress note.  Most recent are:  Patient Vitals for the past 24 hrs:   Temp Pulse Resp BP SpO2   19 1300  65  143/46 99 %   19 1215  70 17 134/49 94 %   09/12/19 1200 98.2 °F (36.8 °C) 72 18 141/45 94 %   09/12/19 1145  70 9 133/44 94 %   09/12/19 1130  76 16 139/50 96 %   09/12/19 1125  74 20 135/43 97 %   09/12/19 1122 98.8 °F (37.1 °C) 78 14 127/64 96 %   09/12/19 1120  76 15 134/49 95 %   09/12/19 1116  73  127/43 92 %   09/12/19 0946 99.3 °F (37.4 °C) 75 18 145/54 95 %   09/12/19 0813     96 %   09/12/19 0800  72 20 127/59 95 %   09/12/19 0700  75 15 135/53    09/12/19 0624     95 %   09/12/19 0600  81 25 148/46 92 %   09/12/19 0500  75 23 157/50 96 %   09/12/19 0400  66 19 107/40 96 %   09/12/19 0300 98.2 °F (36.8 °C) 75 17 153/50 95 %   09/12/19 0200  72 19 119/40 96 %   09/12/19 0100  77 16 117/40 95 %   09/12/19 0000  84   94 %   09/11/19 2300 98.4 °F (36.9 °C) 85 25 151/66 92 %   09/11/19 2200  74 22 127/41 94 %   09/11/19 2100  75 18 127/41 93 %   09/11/19 2000 98.7 °F (37.1 °C) 78 21 131/44 96 %   09/11/19 1900  78 17 134/43 93 %   09/11/19 1800  76 16 117/42 96 %   09/11/19 1700  87 24 137/41 96 %   09/11/19 1600 99.6 °F (37.6 °C) 85 8 (!) 124/39 96 %   09/11/19 1545  93 27 155/41 96 %   09/11/19 1530  90 21 144/47 97 %   09/11/19 1515  92 25 (!) 145/25 95 %   09/11/19 1500  94 24 159/58 96 %   09/11/19 1445  82 24 147/42 98 %       Intake/Output Summary (Last 24 hours) at 9/12/2019 1433  Last data filed at 9/12/2019 1115  Gross per 24 hour   Intake 200 ml   Output 3080 ml   Net -2880 ml        PHYSICAL EXAM:  General: WD, WN. Alert, cooperative, SOB  EENT:  EOMI. Anicteric sclerae. MMM  Resp:  Coarse BS, rhonchi  CV:  Regular  rhythm,  trace edema  GI:  Soft, Non distended, Non tender.  +Bowel sounds  Neurologic:  Alert and oriented X 3, normal speech,   Psych:   Good insight. Not anxious nor agitated  Skin:  No rashes.   No jaundice    Reviewed most current lab test results and cultures  YES  Reviewed most current radiology test results   YES  Review and summation of old records today NO  Reviewed patient's current orders and MAR    YES  PMH/SH reviewed - no change compared to H&P  ________________________________________________________________________  Care Plan discussed with:    Comments   Patient y    Family  y Son, sister   RN y    Care Manager     Consultant  y Cardiology, Nephrology                     Multidiciplinary team rounds were held today with , nursing, pharmacist and clinical coordinator. Patient's plan of care was discussed; medications were reviewed and discharge planning was addressed. ________________________________________________________________________  Total NON critical care TIME:  35   Minutes    Total CRITICAL CARE TIME Spent:   Minutes non procedure based      Comments   >50% of visit spent in counseling and coordination of care y    ________________________________________________________________________  Greer Landin MD     Procedures: see electronic medical records for all procedures/Xrays and details which were not copied into this note but were reviewed prior to creation of Plan. LABS:  I reviewed today's most current labs and imaging studies.   Pertinent labs include:  Recent Labs     09/12/19  0547 09/11/19  0135 09/10/19  1715   WBC 9.8 12.5* 10.9   HGB 8.4* 7.2* 8.1*   HCT 25.6* 21.9* 24.5*    288 326     Recent Labs     09/12/19  0547 09/11/19  0135 09/10/19  1715   * 137 139   K 3.7 3.1* 3.2*   CL 93* 89* 91*   CO2 33* 37* 38*   * 95 124*   BUN 43* 46* 40*   CREA 7.42* 9.09* 8.71*   CA 8.5 7.7* 8.0*   MG 1.8  --  1.7   PHOS 4.6  --   --    ALB 2.1* 2.0* 2.2*   TBILI 0.5 0.5 0.5   SGOT 10* 8* 10*   ALT 13 14 17   INR  --   --  1.1       Signed: Greer Landin MD

## 2019-09-12 NOTE — DIALYSIS
[facility] Team [facility]  [phone]    Vitals   Pre   Post   Assessment   Pre   Post     Temp  Temp: 98.6 °F (37 °C) (09/12/19 1534)  98 LOC  A&Ox2 A&OX2   HR   Pulse (Heart Rate): 65 (09/12/19 1650) 73 Lungs   diminished  diminished   B/P   BP: 153/58 (09/12/19 1650) 167/53 Cardiac   HRR  HRR   Resp   Resp Rate: 18 (09/12/19 1650) 18 Skin   intact  intact   Pain level  Pain Intensity 1: 8 (09/12/19 1534) 1 Edema  abdominal     abdominal   Orders:    Duration:   Start:    8860 End:    1950 Total:   3 hours   Dialyzer:   Dialyzer/Set Up Inspection: Revaclear (09/12/19 1650)   K Bath:   Dialysate K (mEq/L): 4 (09/12/19 1650)   Ca Bath:   Dialysate CA (mEq/L): 2.5 (09/12/19 1650)   Na/Bicarb:   Dialysate NA (mEq/L): 138 (09/12/19 1650)   Target Fluid Removal:   Goal/Amount of Fluid to Remove (mL): 2500 mL (09/12/19 1650)   Access     Type & Location:   FRANSICO dawson. Each catheter limb disinfected for 60 seconds per limb with alcohol swabs. Caps removed, dialysis CVC hub scrubbed with Prevantics for 5 seconds, followed by a 5 second dry time per Hospital P&P. Each catheter limb disinfected for 60 seconds per limb with alcohol swabs. Dialysis CVC hubs scrubbed with Prevantics for 5 seconds, followed by a 5 second dry time per Hospital P&P, red and blue dialysis caps applied to ports.      Labs     Obtained/Reviewed   Critical Results Called   Date when labs were drawn-  Hgb-    HGB   Date Value Ref Range Status   09/12/2019 8.4 (L) 12.1 - 17.0 g/dL Final     K-    Potassium   Date Value Ref Range Status   09/12/2019 3.7 3.5 - 5.1 mmol/L Final     Ca-   Calcium   Date Value Ref Range Status   09/12/2019 8.5 8.5 - 10.1 MG/DL Final     Bun-   BUN   Date Value Ref Range Status   09/12/2019 43 (H) 6 - 20 MG/DL Final     Creat-   Creatinine   Date Value Ref Range Status   09/12/2019 7.42 (H) 0.70 - 1.30 MG/DL Final        Medications/ Blood Products Given     Name   Dose   Route and Time     Vancomycin 750 mg  ivpb/1850             Blood Volume Processed (BVP):    51.9 liters Net Fluid   Removed:  2500 ml   Comments Patient tolerated tx well, no complaints during or after. Spent time during tx explaining hemodialysis to patient and his son. Report to [facility] RN using SBAR. Time Out Done: yes, 1850  Primary Nurse Rpt Pre: Antoine Cantor RN  Primary Nurse Rpt Post: [facility] RN  Pt Education: Procedural  Care Plan: HD with volume removal and Vanc  Tx Summary: 3 hours, 4 K 2.5 Ca removed 2500 ml. Admiting Diagnosis:  Pt's previous clinic-n/a  Consent signed - Informed Consent Verified: Yes (09/12/19 1650)  [facility] Consent - yes  Hepatitis Status- antigen negative 9-12-19  Machine #- Machine Number: [id number] (09/12/19 1650)  Telemetry status-on tele  Pre-dialysis wt. -

## 2019-09-12 NOTE — PERIOP NOTES
TRANSFER - IN REPORT:    Verbal report received from [facility]) on Gertrudis Bautista  being received from 2534(unit) for ordered procedure      Report consisted of patients Situation, Background, Assessment and   Recommendations(SBAR). Information from the following report(s) SBAR, Kardex, Procedure Summary, Intake/Output, Recent Results, Alarm Parameters , Pre Procedure Checklist and Procedure Verification was reviewed with the receiving nurse. Opportunity for questions and clarification was provided. Assessment completed upon patients arrival to unit and care assumed.

## 2019-09-12 NOTE — PROGRESS NOTES
TRANSFER - IN REPORT:    Verbal report received from Daniel(name) on Gertrudis Canela Jr  being received from [facility](unit) for routine progression of care      Report consisted of patients Situation, Background, Assessment and   Recommendations(SBAR). Information from the following report(s) SBAR, Kardex, ED Summary, OR Summary, Recent Results and Cardiac Rhythm nsr was reviewed with the receiving nurse. Opportunity for questions and clarification was provided. Assessment completed upon patients arrival to unit and care assumed.

## 2019-09-12 NOTE — PERIOP NOTES
TRANSFER - OUT REPORT:    Verbal report given to Marcelle Hannon RN (name) on [facility] [address]  being transferred to [facility](unit) for routine post - op       Report consisted of patients Situation, Background, Assessment and   Recommendations(SBAR). Information from the following report(s) SBAR, Kardex, OR Summary, Intake/Output, MAR, Recent Results and Cardiac Rhythm Sinus Arrhythmia with PACs was reviewed with the receiving nurse. Opportunity for questions and clarification was provided.       Patient transported with:   Monitor  O2 @ 4 liters  Registered Nurse  Tech   Seen by Dr. Austine Bumpers (anesthesia), cleared for transfer to [facility]/ICU

## 2019-09-12 NOTE — PROGRESS NOTES
NAME: Yazan Morales        :  1950        MRN:  [id number]        Assessment :    Plan:  --ESRD  Peritonitis/sepsis  Volume overload  Hypokalemia  Anemia of esrd HD yesterday; pd on hold (I had hoped to be able to switch back to PD, but still severe abd pain and septic. Apparently Mr Deysi Shea has indicated that he would not want to do HD for long term. Dr. Bentley Genna to see him and remove pd cath. Will have further discussions about continuation of HD once clear of this acute delirium/illness - I spoke to him today and he was not opposed to this plan)    HD again today with UF as able    On bipap now    retacrit 10 k sc tiw       Subjective:     Chief Complaint:  In ICU on Bipap. Still some belly pain. I spoke with his nurse. I spoke with his son, Laverne Aguila, over the phone. We had a long discussion about the above. We discussed that I am worried that he will not improve from this with the PD catheter in place. We discussed that he will need HD for now, but that down the road we can look to trying PD again. He was agreeable to PD catheter removal today. Review of Systems:    Symptom Y/N Comments  Symptom Y/N Comments   Fever/Chills    Chest Pain     Poor Appetite    Edema     Cough    Abdominal Pain y    Sputum    Joint Pain     SOB/RUBIO y   Pruritis/Rash     Nausea/vomit    Tolerating PT/OT     Diarrhea    Tolerating Diet     Constipation    Other       Could not obtain due to:      Objective:     VITALS:   Last 24hrs VS reviewed since prior progress note.  Most recent are:  Visit Vitals  /46 (BP 1 Location: Right arm, BP Patient Position: At rest)   Pulse 81   Temp 98.2 °F (36.8 °C)   Resp 25   Ht 5' 9\" (1.753 m)   Wt 88.9 kg (196 lb)   SpO2 95%   BMI 28.94 kg/m²       Intake/Output Summary (Last 24 hours) at 2019 0775  Last data filed at 2019 0253  Gross per 24 hour   Intake 700 ml   Output 5300 ml   Net -4600 ml Telemetry Reviewed:     PHYSICAL EXAM:  General: On bipap in icu  Resp:  Rhonchi/Rales. No access. muscle use  CV:  Regular  rhythm,  No murmur (), No Rubs, No Gallops. + edema  GI:  Soft, distended,  tender.  +Bowel sounds, no HSM      Lab Data Reviewed: (see below)    Medications Reviewed: (see below)    PMH/SH reviewed - no change compared to H&P  ________________________________________________________________________  Care Plan discussed with:  Patient y    Family  y    RN y    Care Manager                    Consultant:          Comments   >50% of visit spent in counseling and coordination of care       ________________________________________________________________________  Selwyn Cornell MD     Procedures: see electronic medical records for all procedures/Xrays and details which  were not copied into this note but were reviewed prior to creation of Plan. LABS:  Recent Labs     09/12/19 0547 09/11/19 0135   WBC 9.8 12.5*   HGB 8.4* 7.2*   HCT 25.6* 21.9*    288     Recent Labs     09/12/19  0547 09/11/19  0135 09/10/19  1715   * 137 139   K 3.7 3.1* 3.2*   CL 93* 89* 91*   CO2 33* 37* 38*   BUN 43* 46* 40*   CREA 7.42* 9.09* 8.71*   * 95 124*   CA 8.5 7.7* 8.0*   MG 1.8  --  1.7   PHOS 4.6  --   --      Recent Labs     09/12/19  0547 09/11/19  0135 09/10/19  1715   SGOT 10* 8* 10*    78 88   TP 7.8 6.2* 7.0   ALB 2.1* 2.0* 2.2*   GLOB 5.7* 4.2* 4.8*     Recent Labs     09/10/19  1715   INR 1.1   PTP 10.8      No results for input(s): FE, TIBC, PSAT, FERR in the last 72 hours. Lab Results   Component Value Date/Time    Folate 42.1 (H) 09/08/2012 10:24 AM      No results for input(s): PH, PCO2, PO2 in the last 72 hours.   Recent Labs     09/10/19  1715   CPK 88   CKMB <1.0     No components found for: [place]  Lab Results   Component Value Date/Time    Color YELLOW/STRAW 09/10/2019 06:26 PM    Appearance CLEAR 09/10/2019 06:26 PM    Specific gravity 1.024 09/10/2019 06:26 PM pH (UA) 8.0 09/10/2019 06:26 PM    Protein 300 (A) 09/10/2019 06:26 PM    Glucose 100 (A) 09/10/2019 06:26 PM    Ketone NEGATIVE  09/10/2019 06:26 PM    Bilirubin NEGATIVE  09/10/2019 06:26 PM    Urobilinogen 0.2 09/10/2019 06:26 PM    Nitrites NEGATIVE  09/10/2019 06:26 PM    Leukocyte Esterase NEGATIVE  09/10/2019 06:26 PM    Epithelial cells FEW 09/10/2019 06:26 PM    Bacteria NEGATIVE  09/10/2019 06:26 PM    WBC 0-4 09/10/2019 06:26 PM    RBC 0-5 09/10/2019 06:26 PM       MEDICATIONS:  Current Facility-Administered Medications   Medication Dose Route Frequency    epoetin dilan-epbx (RETACRIT) injection 10,000 Units  10,000 Units SubCUTAneous Q MON, WED & FRI    alcohol 62% (NOZIN) nasal  1 Ampule  1 Ampule Topical Q12H    VANCOMYCIN INFORMATION NOTE   Other DAILY    vancomycin (VANCOCIN) 750 mg in 0.9% sodium chloride (MBP/ADV) 250 mL  750 mg IntraVENous DIALYSIS PRN    fentaNYL citrate (PF) injection 25 mcg  25 mcg IntraVENous Q4H PRN    HYDROmorphone (PF) (DILAUDID) injection 1 mg  1 mg IntraVENous Q3H PRN    piperacillin-tazobactam (ZOSYN) 3.375 g in 0.9% sodium chloride (MBP/ADV) 100 mL  3.375 g IntraVENous Q12H    acetaminophen (TYLENOL) tablet 650 mg  650 mg Oral Q6H PRN    ondansetron (ZOFRAN) injection 4 mg  4 mg IntraVENous Q4H PRN    nebivolol (BYSTOLIC) tablet 20 mg  20 mg Oral QHS    cholecalciferol (VITAMIN D3) tablet 1,000 Units  1,000 Units Oral DAILY    clopidogrel (PLAVIX) tablet 75 mg  75 mg Oral QHS    B complex-vitaminC-folic acid (NEPHROCAP) cap  1 Cap Oral DAILY    furosemide (LASIX) tablet 80 mg  80 mg Oral TID    omega 3-DHA-EPA-fish oil 1,000 mg (120 mg-180 mg) capsule 1 Cap  1 Cap Oral DAILY    . PHARMACY TO SUBSTITUTE PER PROTOCOL (Reordered from: PRALUENT PEN 75 mg/mL injector pen)    Per Protocol    tamsulosin (FLOMAX) capsule 0.4 mg  0.4 mg Oral QHS    sodium chloride (NS) flush 5-40 mL  5-40 mL IntraVENous Q8H    sodium chloride (NS) flush 5-40 mL  5-40 mL IntraVENous PRN    naloxone (NARCAN) injection 0.4 mg  0.4 mg IntraVENous PRN    heparin (porcine) injection 5,000 Units  5,000 Units SubCUTAneous Q8H    insulin glargine (LANTUS) injection 25 Units  25 Units SubCUTAneous DAILY    glucose chewable tablet 16 g  4 Tab Oral PRN    dextrose 10% infusion 0-250 mL  0-250 mL IntraVENous PRN    insulin lispro (HUMALOG) injection   SubCUTAneous AC&HS

## 2019-09-12 NOTE — CONSULTS
Peritonitis: We will plan for PD catheter removal later today. Hold [facility] and continue NPO. Obtain consent.

## 2019-09-12 NOTE — ANESTHESIA POSTPROCEDURE EVALUATION
Procedure(s):  PERITONEAL DIALYSIS CATHETER REMOVAL. general    Anesthesia Post Evaluation        Patient location during evaluation: PACU  Note status: Adequate. Level of consciousness: responsive to verbal stimuli and sleepy but conscious  Pain management: satisfactory to patient  Airway patency: patent  Anesthetic complications: no  Cardiovascular status: acceptable  Respiratory status: acceptable  Hydration status: acceptable  Comments: +Post-Anesthesia Evaluation and Assessment    Patient: Rosenda Ryan MRN: [id number]  SSN: [id number]   YOB: 1950  Age: 77 y.o. Sex: male          Cardiovascular Function/Vital Signs    /49   Pulse 70   Temp 36.8 °C (98.2 °F)   Resp 17   Ht 5' 9\" (1.753 m)   Wt 88.9 kg (196 lb)   SpO2 94%   BMI 28.94 kg/m²     Patient is status post Procedure(s):  PERITONEAL DIALYSIS CATHETER REMOVAL. Nausea/Vomiting: Controlled. Postoperative hydration reviewed and adequate. Pain:  Pain Scale 1: Numeric (0 - 10) (09/12/19 1200)  Pain Intensity 1: 1 (09/12/19 1200)   Managed. Neurological Status:   Neuro (WDL): Within Defined Limits (09/12/19 1200)   At baseline. Mental Status and Level of Consciousness: Arousable. Pulmonary Status:   O2 Device: Nasal cannula (09/12/19 1200)   Adequate oxygenation and airway patent. Complications related to anesthesia: None    Post-anesthesia assessment completed. No concerns. I have evaluated the patient and the patient is stable and ready to be discharged from PACU . Signed By: Bela Mccarty MD    9/12/2019        Vitals Value Taken Time   /49 9/12/2019 12:15 PM   Temp 36.8 °C (98.2 °F) 9/12/2019 12:00 PM   Pulse 67 9/12/2019 12:17 PM   Resp 17 9/12/2019 12:17 PM   SpO2 94 % 9/12/2019 12:17 PM   Vitals shown include unvalidated device data.

## 2019-09-12 NOTE — ANESTHESIA PREPROCEDURE EVALUATION
Relevant Problems   No relevant active problems       Anesthetic History   No history of anesthetic complications            Review of Systems / Medical History  Patient summary reviewed, nursing notes reviewed and pertinent labs reviewed    Pulmonary  Within defined limits      Sleep apnea           Neuro/Psych   Within defined limits           Cardiovascular  Within defined limits  Hypertension          CAD    Exercise tolerance: >4 METS     GI/Hepatic/Renal  Within defined limits   GERD    Renal disease: dialysis  PUD     Endo/Other  Within defined limits  Diabetes    Obesity and anemia     Other Findings   Comments: sepsis           Physical Exam    Airway  Mallampati: II  TM Distance: 4 - 6 cm  Neck ROM: normal range of motion   Mouth opening: Normal     Cardiovascular  Regular rate and rhythm,  S1 and S2 normal,  no murmur, click, rub, or gallop             Dental    Dentition: Poor dentition     Pulmonary  Breath sounds clear to auscultation               Abdominal  GI exam deferred       Other Findings            Anesthetic Plan    ASA: 3, emergent  Anesthesia type: general          Induction: Intravenous  Anesthetic plan and risks discussed with: Patient

## 2019-09-12 NOTE — PROGRESS NOTES
Cardiology Progress Note  9/12/2019 9:07 AM  Admit Date: 9/10/2019  Admit Diagnosis/CC: Acute on chronic renal failure (HCC) [N17.9, N18.9]  Subjective:   Patient reports:  Chest Pain:  [x] none,  consistent with [] non-cardiac  [] atypical  []  anginal chest pain             [x] none now    []  on-going  Dyspnea: [x] none  [] at rest  [] with exertion  [] improved  [] unchanged [] worsening  PND:       [x] none  [] overnight   [] current  Orthopnea: [x] none  [] improved  [] unchanged  [] worsening  Presyncope: [x] none  [] improved  [] unchanged  [] worsening  Ambulated in hallway without symptoms  [] Yes  Ambulated in room without symptoms  [x] Yes  ROS(2+other systems)   Hematuria: [] Yes  [x] No.   Dysuria: [] Yes  [x] No                                           Cough:       [] Yes  [x] No.   Sputum: [] Yes  [x] No                                            Hematochezia: [] Yes  [x] No.   Melena: [] Yes  [x] No                                            No change in family and social history from H&P/Consult note.     Current Facility-Administered Medications   Medication Dose Route Frequency    [START ON 9/13/2019] heparin (porcine) injection 5,000 Units  5,000 Units SubCUTAneous Q8H    epoetin dilan-epbx (RETACRIT) injection 10,000 Units  10,000 Units SubCUTAneous Q MON, WED & FRI    alcohol 62% (NOZIN) nasal  1 Ampule  1 Ampule Topical Q12H    VANCOMYCIN INFORMATION NOTE   Other DAILY    vancomycin (VANCOCIN) 750 mg in 0.9% sodium chloride (MBP/ADV) 250 mL  750 mg IntraVENous DIALYSIS PRN    fentaNYL citrate (PF) injection 25 mcg  25 mcg IntraVENous Q4H PRN    HYDROmorphone (PF) (DILAUDID) injection 1 mg  1 mg IntraVENous Q3H PRN    piperacillin-tazobactam (ZOSYN) 3.375 g in 0.9% sodium chloride (MBP/ADV) 100 mL  3.375 g IntraVENous Q12H    acetaminophen (TYLENOL) tablet 650 mg  650 mg Oral Q6H PRN    ondansetron (ZOFRAN) injection 4 mg  4 mg IntraVENous Q4H PRN    nebivolol (BYSTOLIC) tablet 20 mg  20 mg Oral QHS    cholecalciferol (VITAMIN D3) tablet 1,000 Units  1,000 Units Oral DAILY    clopidogrel (PLAVIX) tablet 75 mg  75 mg Oral QHS    B complex-vitaminC-folic acid (NEPHROCAP) cap  1 Cap Oral DAILY    furosemide (LASIX) tablet 80 mg  80 mg Oral TID    omega 3-DHA-EPA-fish oil 1,000 mg (120 mg-180 mg) capsule 1 Cap  1 Cap Oral DAILY    . PHARMACY TO SUBSTITUTE PER PROTOCOL (Reordered from: PRALUENT PEN 75 mg/mL injector pen)    Per Protocol    tamsulosin (FLOMAX) capsule 0.4 mg  0.4 mg Oral QHS    sodium chloride (NS) flush 5-40 mL  5-40 mL IntraVENous Q8H    sodium chloride (NS) flush 5-40 mL  5-40 mL IntraVENous PRN    naloxone (NARCAN) injection 0.4 mg  0.4 mg IntraVENous PRN    insulin glargine (LANTUS) injection 25 Units  25 Units SubCUTAneous DAILY    glucose chewable tablet 16 g  4 Tab Oral PRN    dextrose 10% infusion 0-250 mL  0-250 mL IntraVENous PRN    insulin lispro (HUMALOG) injection   SubCUTAneous AC&HS       Objective:    Physical Exam:  Last VS:   Visit Vitals  /59   Pulse 72   Temp 98.2 °F (36.8 °C)   Resp 20   Ht 5' 9\" (1.753 m)   Wt 88.9 kg (196 lb)   SpO2 96%   BMI 28.94 kg/m²    Temp (24hrs), Av °F (37.2 °C), Min:98.2 °F (36.8 °C), Max:100.2 °F (37.9 °C)    24 hr VS reviewed. General Appearance:   [x] well developed, well nourished,  [x] NAD. [] agitated   [] lethargic but arousable  [] obtunded   ENT, Palate:    [x] WNL   [] dry palate/MM        Respiratory:    [x] CTA bilateral  [] rales  [] rhonchi  [] normal resp effort      [] similar to yesterday   [] worse    [] improved   Cardiovascular:   [x] RRR   [] Irregular rate and rhythm   [x] Normal S1, S2   [x] No gallop or rub.    [] no murmur   [x] no new murmur  [] murmur c/w:   [x] no edema  RLE: []1+ []2+ [] 3+;  LLE: []1+ []2+ []3+      [] edema similar to yesterday   [] worse    [] improved   [x] normal JVP   [] Elevated JVP    [x] JVP similar to yesterday   [] worse    [] improved [x] carotid upstroke unchanged   [x] abd aorta not palpated   [x] no stigmata of peripheral emboli   GI:    [x] abd soft, non-distented,bowel sounds present, no                     organomegaly appreciated   Skin:  Neuro:    Cath Site:  [x] warm and dry [] cold extremities   [x] A/O x 3, grossly non-focal      [] intact w/o hematoma or bruit; distal pulse unchanged. Data Review:   Labs:    Recent Results (from the past 24 hour(s))   POC G3 - PUL    Collection Time: 09/11/19 10:12 AM   Result Value Ref Range    FIO2 (POC) 50 %    pH (POC) 7.524 (H) 7.35 - 7.45      pCO2 (POC) 39.6 35.0 - 45.0 MMHG    pO2 (POC) 51 (L) 80 - 100 MMHG    HCO3 (POC) 32.6 (H) 22 - 26 MMOL/L    sO2 (POC) 90 (L) 92 - 97 %    Base excess (POC) 10 mmol/L    Site RIGHT BRACHIAL      Device: VENTURI MASK      Allens test (POC) N/A      Specimen type (POC) ARTERIAL      Total resp. rate 16     HEP B SURFACE AG    Collection Time: 09/11/19 10:58 AM   Result Value Ref Range    Hepatitis B surface Ag <0.10 Index    Hep B surface Ag Interp. NEGATIVE  NEG     HEP B SURFACE AB    Collection Time: 09/11/19 10:58 AM   Result Value Ref Range    Hepatitis B surface Ab 19.61 mIU/mL    Hep B surface Ab Interp.  REACTIVE (A) NR     GLUCOSE, POC    Collection Time: 09/11/19 11:06 AM   Result Value Ref Range    Glucose (POC) 189 (H) 65 - 100 mg/dL    Performed by [facility], POC    Collection Time: 09/11/19  5:47 PM   Result Value Ref Range    Glucose (POC) 108 (H) 65 - 100 mg/dL    Performed by [facility]) [facility], POC    Collection Time: 09/11/19 10:05 PM   Result Value Ref Range    Glucose (POC) 89 65 - 100 mg/dL    Performed by Bienvenido Abel (Traveler)    CBC WITH AUTOMATED DIFF    Collection Time: 09/12/19  5:47 AM   Result Value Ref Range    WBC 9.8 4.1 - 11.1 K/uL    RBC 2.58 (L) 4.10 - 5.70 M/uL    HGB 8.4 (L) 12.1 - 17.0 g/dL    HCT 25.6 (L) 36.6 - 50.3 %    MCV 99.2 (H) 80.0 - 99.0 FL    MCH 32.6 26.0 - 34.0 PG    MCHC 32.8 30.0 - 36.5 g/dL    RDW 15.5 (H) 11.5 - 14.5 %    PLATELET 321 646 - 743 K/uL    MPV 12.1 8.9 - 12.9 FL    NRBC 0.2 (H) 0  WBC    ABSOLUTE NRBC 0.02 (H) 0.00 - 0.01 K/uL    NEUTROPHILS 73 32 - 75 %    LYMPHOCYTES 10 (L) 12 - 49 %    MONOCYTES 16 (H) 5 - 13 %    EOSINOPHILS 1 0 - 7 %    BASOPHILS 0 0 - 1 %    IMMATURE GRANULOCYTES 0 0.0 - 0.5 %    ABS. NEUTROPHILS 7.1 1.8 - 8.0 K/UL    ABS. LYMPHOCYTES 1.0 0.8 - 3.5 K/UL    ABS. MONOCYTES 1.5 (H) 0.0 - 1.0 K/UL    ABS. EOSINOPHILS 0.1 0.0 - 0.4 K/UL    ABS. BASOPHILS 0.0 0.0 - 0.1 K/UL    ABS. IMM. GRANS. 0.0 0.00 - 0.04 K/UL    DF AUTOMATED     MAGNESIUM    Collection Time: 09/12/19  5:47 AM   Result Value Ref Range    Magnesium 1.8 1.6 - 2.4 mg/dL   METABOLIC PANEL, COMPREHENSIVE    Collection Time: 09/12/19  5:47 AM   Result Value Ref Range    Sodium 135 (L) 136 - 145 mmol/L    Potassium 3.7 3.5 - 5.1 mmol/L    Chloride 93 (L) 97 - 108 mmol/L    CO2 33 (H) 21 - 32 mmol/L    Anion gap 9 5 - 15 mmol/L    Glucose 120 (H) 65 - 100 mg/dL    BUN 43 (H) 6 - 20 MG/DL    Creatinine 7.42 (H) 0.70 - 1.30 MG/DL    BUN/Creatinine ratio 6 (L) 12 - 20      GFR est AA 9 (L) >60 ml/min/1.73m2    GFR est non-AA 7 (L) >60 ml/min/1.73m2    Calcium 8.5 8.5 - 10.1 MG/DL    Bilirubin, total 0.5 0.2 - 1.0 MG/DL    ALT (SGPT) 13 12 - 78 U/L    AST (SGOT) 10 (L) 15 - 37 U/L    Alk.  phosphatase 104 45 - 117 U/L    Protein, total 7.8 6.4 - 8.2 g/dL    Albumin 2.1 (L) 3.5 - 5.0 g/dL    Globulin 5.7 (H) 2.0 - 4.0 g/dL    A-G Ratio 0.4 (L) 1.1 - 2.2     PHOSPHORUS    Collection Time: 09/12/19  5:47 AM   Result Value Ref Range    Phosphorus 4.6 2.6 - 4.7 MG/DL   GLUCOSE, POC    Collection Time: 09/12/19  8:05 AM   Result Value Ref Range    Glucose (POC) 132 (H) 65 - 100 mg/dL    Performed by Nati Long (traveler)        Provided Telemetry: [x] sinus  [] chronic afib   [] paroxysmal afib  [] NSVT      Assessment:     Active Problems:    Acute on chronic renal failure [facility]) (9/10/2019)        Plan:     Heart Failure improved    Continue current meds      For all other plans, see orders. [x] High complexity decision making was performed  Needs HD. He is better. Less edema.

## 2019-09-12 NOTE — PERIOP NOTES
Handoff Report from Operating Room to PACU    Report received from Jordin Cox  and Henry Jerome CRNA  regarding [address]. Surgeon(s):  Yair Shoemaker MD  And Procedure(s) (LRB):  PERITONEAL DIALYSIS CATHETER REMOVAL (N/A)  confirmed   with dressings discussed. Anesthesia type, drugs, patient history, complications, estimated blood loss, vital signs, intake and output, and last pain medication and reversal medications were reviewed.

## 2019-09-13 NOTE — PROGRESS NOTES
Hospitalist Progress Note    NAME: Yazan Morales   :  1950   MRN:  [id number]       Assessment / Plan:  Acute Hypoxic Respiratory Failure POA: due to pulmonary edema, now 85% on 6LNC, off BiPAP, tolerating NC 4L wean down as tolerated  Sepsis POA: leukocytosis, acidosis due to SBP. So far no fever  Abdominal pain and tenderness concerning for SBP: patient is on PD and seems that catheter is infected, will c/w Zosyn and Vancomycin F/U cultures so far GNR isoalted,  Renal in the case. Peritoneal Catheter was d/c, once culture is final can de-escalate  ABX  ESRD on PD with possible failure of PD  Renal help appreciated, had HD 2 days in a row, Renal planning for Permacath on Monday. Acute on Chronic Diastolic Heart Failure POA: EF 46%, with diastolic dysfunction, D/W Renal and Cardiology, monitor I/O and weight. F/U new CXR shows better aeration but still some congestion, but clinically much better  Acute toxic and metabolic encephalopathy secondary to the above, monitor, so far back to baseline  Diabetes mellitus : c/w Lantus, SSI, F/U  HbA1C 6.1. Anemia secondary to CKD  Essential hypertension resume minoxidil, c/w clonidine, add Norvasc, monitor  Urinary Retention: will ask for Urology evaluation  GERD/ PUD  Overweight: BMI 28.94 counseled. Surrogate Decision Maker: Laverne Aguila  Code status: DNR  Prophylaxis: Hep SQ  Recommended Disposition: Home w/Family      Subjective:     Chief Complaint / Reason for Physician Visit  \"I feel much better\". Discussed with RN events overnight.      Review of Systems:  Symptom Y/N Comments  Symptom Y/N Comments   Fever/Chills    Chest Pain n    Poor Appetite    Edema     Cough    Abdominal Pain     Sputum    Joint Pain     SOB/RUBIO n   Pruritis/Rash     Nausea/vomit    Tolerating PT/OT     Diarrhea    Tolerating Diet y    Constipation    Other y Weak and tired     Could NOT obtain due to:      Objective:     VITALS:   Last 24hrs VS reviewed since prior progress note. Most recent are:  Patient Vitals for the past 24 hrs:   Temp Pulse Resp BP SpO2   09/13/19 1038  76  167/60    09/13/19 1036 98.6 °F (37 °C) 73 19 167/60 96 %   09/13/19 0905  66  174/55    09/13/19 0818  73  173/52    09/13/19 0806 98.7 °F (37.1 °C) 68 19 173/52 96 %   09/13/19 0348     99 %   09/13/19 0303 98 °F (36.7 °C) (!) 56 19 151/53 99 %   09/12/19 2325 98.5 °F (36.9 °C) 70 11 161/62 99 %   09/12/19 2209     99 %   09/12/19 2000 98.4 °F (36.9 °C) 79 23 162/54 97 %   09/1950 98 °F (36.7 °C) 73  167/53    09/12/19 1930  76 18 161/54    09/12/19 1915  81 18 161/48    09/12/19 1900  68 18 160/55    09/12/19 1845  63 18 164/54    09/12/19 1831  70 18 144/51    09/12/19 1815  67 18 163/57    09/12/19 1800  78 18 151/53    09/12/19 1745  66 18 153/49    09/12/19 1731  65 18 141/49    09/12/19 1715  64 18 139/54    09/12/19 1700  69 18 150/48    09/12/19 1650  65 18 153/58    09/12/19 1534 98.6 °F (37 °C) 68 18 145/52 96 %   09/12/19 1400  68  141/50 95 %   09/12/19 1300  65  143/46 99 %   09/12/19 1215  70 17 134/49 94 %   09/12/19 1200 98.2 °F (36.8 °C) 72 18 141/45 94 %   09/12/19 1145  70 9 133/44 94 %   09/12/19 1130  76 16 139/50 96 %   09/12/19 1125  74 20 135/43 97 %   09/12/19 1122 98.8 °F (37.1 °C) 78 14 127/64 96 %   09/12/19 1120  76 15 134/49 95 %   09/12/19 1116  73  127/43 92 %       Intake/Output Summary (Last 24 hours) at 9/13/2019 1047  Last data filed at 9/13/2019 0735  Gross per 24 hour   Intake 300 ml   Output 2930 ml   Net -2630 ml        PHYSICAL EXAM:  General: WD, WN. Alert, cooperative, in no distress  EENT:  EOMI. Anicteric sclerae. MMM  Resp:  Coarse BS, rhonchi  CV:  Regular  rhythm,  trace edema  GI:  Soft, Non distended, Non tender.  +Bowel sounds  Neurologic:  Alert and oriented X 3, normal speech,   Psych:   Good insight. Not anxious nor agitated  Skin:  No rashes.   No jaundice    Reviewed most current lab test results and cultures  YES  Reviewed most current radiology test results   YES  Review and summation of old records today    NO  Reviewed patient's current orders and MAR    YES  PMH/SH reviewed - no change compared to H&P  ________________________________________________________________________  Care Plan discussed with:    Comments   Patient y    Family  y Son, sister   RN y    Care Manager     Consultant  y Cardiology, Nephrology                     Multidiciplinary team rounds were held today with , nursing, pharmacist and clinical coordinator. Patient's plan of care was discussed; medications were reviewed and discharge planning was addressed. ________________________________________________________________________  Total NON critical care TIME:  35   Minutes    Total CRITICAL CARE TIME Spent:   Minutes non procedure based      Comments   >50% of visit spent in counseling and coordination of care y    ________________________________________________________________________  Tomeka Flores MD     Procedures: see electronic medical records for all procedures/Xrays and details which were not copied into this note but were reviewed prior to creation of Plan. LABS:  I reviewed today's most current labs and imaging studies.   Pertinent labs include:  Recent Labs     09/13/19  0427 09/12/19  0547 09/11/19  0135   WBC 12.1* 9.8 12.5*   HGB 8.6* 8.4* 7.2*   HCT 26.3* 25.6* 21.9*    328 288     Recent Labs     09/13/19  0427 09/12/19  0547 09/11/19  0135 09/10/19  1715   * 135* 137 139   K 4.2 3.7 3.1* 3.2*   CL 97 93* 89* 91*   CO2 29 33* 37* 38*   * 120* 95 124*   BUN 31* 43* 46* 40*   CREA 5.27* 7.42* 9.09* 8.71*   CA 9.0 8.5 7.7* 8.0*   MG 2.0 1.8  --  1.7   PHOS 5.7* 4.6  --   --    ALB 2.0* 2.1* 2.0* 2.2*   TBILI 0.4 0.5 0.5 0.5   SGOT 14* 10* 8* 10*   ALT 13 13 14 17   INR  --   --   --  1.1       Signed: Tomeka Flores MD

## 2019-09-13 NOTE — PROGRESS NOTES
Cardiology Progress Note  9/13/2019 9:20 AM  Admit Date: 9/10/2019  Admit Diagnosis/CC: Acute on chronic renal failure ([address] [N17.9, N18.9]  Subjective:   Patient reports:  Chest Pain:  [x] none,  consistent with [] non-cardiac  [] atypical  []  anginal chest pain             [x] none now    []  on-going  Dyspnea: [x] none  [] at rest  [] with exertion  [] improved  [] unchanged [] worsening  PND:       [x] none  [] overnight   [] current  Orthopnea: [x] none  [] improved  [] unchanged  [] worsening  Presyncope: [x] none  [] improved  [] unchanged  [] worsening  Ambulated in hallway without symptoms  [] Yes  Ambulated in room without symptoms  [x] Yes  ROS(2+other systems)   Hematuria: [] Yes  [x] No.   Dysuria: [] Yes  [x] No                                           Cough:       [] Yes  [x] No.   Sputum: [] Yes  [x] No                                            Hematochezia: [] Yes  [x] No.   Melena: [] Yes  [x] No                                            No change in family and social history from H&P/Consult note.     Current Facility-Administered Medications   Medication Dose Route Frequency    cloNIDine HCl (CATAPRES) tablet 0.2 mg  0.2 mg Oral BID    heparin (porcine) injection 5,000 Units  5,000 Units SubCUTAneous Q8H    labetalol (NORMODYNE;TRANDATE) injection 10 mg  10 mg IntraVENous Q6H PRN    alirocumab (PRALUENT) 75 mg/mL injector pen 75 mg (Patient Supplied)  75 mg SubCUTAneous Once every 14 days    HYDROmorphone (PF) (DILAUDID) injection 1 mg  1 mg IntraVENous Q3H PRN    epoetin dilan-epbx (RETACRIT) injection 10,000 Units  10,000 Units SubCUTAneous Q MON, WED & FRI    alcohol 62% (NOZIN) nasal  1 Ampule  1 Ampule Topical Q12H    VANCOMYCIN INFORMATION NOTE   Other DAILY    vancomycin (VANCOCIN) 750 mg in 0.9% sodium chloride (MBP/ADV) 250 mL  750 mg IntraVENous DIALYSIS PRN    piperacillin-tazobactam (ZOSYN) 3.375 g in 0.9% sodium chloride (MBP/ADV) 100 mL  3.375 g IntraVENous Q12H  acetaminophen (TYLENOL) tablet 650 mg  650 mg Oral Q6H PRN    ondansetron (ZOFRAN) injection 4 mg  4 mg IntraVENous Q4H PRN    nebivolol (BYSTOLIC) tablet 20 mg  20 mg Oral QHS    cholecalciferol (VITAMIN D3) tablet 1,000 Units  1,000 Units Oral DAILY    clopidogrel (PLAVIX) tablet 75 mg  75 mg Oral QHS    B complex-vitaminC-folic acid (NEPHROCAP) cap  1 Cap Oral DAILY    furosemide (LASIX) tablet 80 mg  80 mg Oral TID    omega 3-DHA-EPA-fish oil 1,000 mg (120 mg-180 mg) capsule 1 Cap  1 Cap Oral DAILY    tamsulosin (FLOMAX) capsule 0.4 mg  0.4 mg Oral QHS    sodium chloride (NS) flush 5-40 mL  5-40 mL IntraVENous Q8H    sodium chloride (NS) flush 5-40 mL  5-40 mL IntraVENous PRN    naloxone (NARCAN) injection 0.4 mg  0.4 mg IntraVENous PRN    insulin glargine (LANTUS) injection 25 Units  25 Units SubCUTAneous DAILY    glucose chewable tablet 16 g  4 Tab Oral PRN    dextrose 10% infusion 0-250 mL  0-250 mL IntraVENous PRN    insulin lispro (HUMALOG) injection   SubCUTAneous AC&HS       Objective:    Physical Exam:  Last VS:   Visit Vitals  /55   Pulse 66   Temp 98.7 °F (37.1 °C)   Resp 19   Ht 5' 9\" (1.753 m)   Wt 84.1 kg (185 lb 6.5 oz)   SpO2 96%   BMI 27.38 kg/m²    Temp (24hrs), Av.5 °F (36.9 °C), Min:98 °F (36.7 °C), Max:99.3 °F (37.4 °C)    24 hr VS reviewed. General Appearance:   [x] well developed, well nourished,  [x] NAD. [] agitated   [] lethargic but arousable  [] obtunded   ENT, Palate:    [x] WNL   [] dry palate/MM        Respiratory:    [x] CTA bilateral  [] rales  [] rhonchi  [] normal resp effort      [] similar to yesterday   [] worse    [] improved   Cardiovascular:   [x] RRR   [] Irregular rate and rhythm   [x] Normal S1, S2   [x] No gallop or rub.    [] no murmur   [x] no new murmur  [] murmur c/w:   [x] no edema  RLE: []1+ []2+ [] 3+;  LLE: []1+ []2+ []3+      [] edema similar to yesterday   [] worse    [] improved   [x] normal JVP   [] Elevated JVP    [x] JVP similar to yesterday   [] worse    [] improved   [x] carotid upstroke unchanged   [x] abd aorta not palpated   [x] no stigmata of peripheral emboli   GI:    [x] abd soft, non-distented,bowel sounds present, no                     organomegaly appreciated   Skin:  Neuro:    Cath Site:  [x] warm and dry [] cold extremities   [x] A/O x 3, grossly non-focal      [] intact w/o hematoma or bruit; distal pulse unchanged. Data Review:   Labs:    Recent Results (from the past 24 hour(s))   GLUCOSE, POC    Collection Time: 09/12/19 11:19 AM   Result Value Ref Range    Glucose (POC) 136 (H) 65 - 100 mg/dL    Performed by Omar Rodriguez    LACTIC ACID    Collection Time: 09/12/19  1:55 PM   Result Value Ref Range    Lactic acid 0.5 0.4 - 2.0 MMOL/L   GLUCOSE, POC    Collection Time: 09/12/19  2:04 PM   Result Value Ref Range    Glucose (POC) 112 (H) 65 - 100 mg/dL    Performed by Brianda Andrade (traveler)    GLUCOSE, POC    Collection Time: 09/12/19  4:51 PM   Result Value Ref Range    Glucose (POC) 108 (H) 65 - 100 mg/dL    Performed by Shady Dumont    GLUCOSE, POC    Collection Time: 09/12/19  9:38 PM   Result Value Ref Range    Glucose (POC) 108 (H) 65 - 100 mg/dL    Performed by Zachary Reis (Traveler)    METABOLIC PANEL, COMPREHENSIVE    Collection Time: 09/13/19  4:27 AM   Result Value Ref Range    Sodium 134 (L) 136 - 145 mmol/L    Potassium 4.2 3.5 - 5.1 mmol/L    Chloride 97 97 - 108 mmol/L    CO2 29 21 - 32 mmol/L    Anion gap 8 5 - 15 mmol/L    Glucose 114 (H) 65 - 100 mg/dL    BUN 31 (H) 6 - 20 MG/DL    Creatinine 5.27 (H) 0.70 - 1.30 MG/DL    BUN/Creatinine ratio 6 (L) 12 - 20      GFR est AA 13 (L) >60 ml/min/1.73m2    GFR est non-AA 11 (L) >60 ml/min/1.73m2    Calcium 9.0 8.5 - 10.1 MG/DL    Bilirubin, total 0.4 0.2 - 1.0 MG/DL    ALT (SGPT) 13 12 - 78 U/L    AST (SGOT) 14 (L) 15 - 37 U/L    Alk.  phosphatase 104 45 - 117 U/L    Protein, total 7.9 6.4 - 8.2 g/dL    Albumin 2.0 (L) 3.5 - 5.0 g/dL Globulin 5.9 (H) 2.0 - 4.0 g/dL    A-G Ratio 0.3 (L) 1.1 - 2.2     PHOSPHORUS    Collection Time: 09/13/19  4:27 AM   Result Value Ref Range    Phosphorus 5.7 (H) 2.6 - 4.7 MG/DL   CBC W/O DIFF    Collection Time: 09/13/19  4:27 AM   Result Value Ref Range    WBC 12.1 (H) 4.1 - 11.1 K/uL    RBC 2.62 (L) 4.10 - 5.70 M/uL    HGB 8.6 (L) 12.1 - 17.0 g/dL    HCT 26.3 (L) 36.6 - 50.3 %    .4 (H) 80.0 - 99.0 FL    MCH 32.8 26.0 - 34.0 PG    MCHC 32.7 30.0 - 36.5 g/dL    RDW 15.3 (H) 11.5 - 14.5 %    PLATELET 415 523 - 007 K/uL    MPV 12.0 8.9 - 12.9 FL    NRBC 0.3 (H) 0  WBC    ABSOLUTE NRBC 0.04 (H) 0.00 - 0.01 K/uL   MAGNESIUM    Collection Time: 09/13/19  4:27 AM   Result Value Ref Range    Magnesium 2.0 1.6 - 2.4 mg/dL   VANCOMYCIN, RANDOM    Collection Time: 09/13/19  4:27 AM   Result Value Ref Range    Vancomycin, random 18.6 UG/ML   GLUCOSE, POC    Collection Time: 09/13/19  7:41 AM   Result Value Ref Range    Glucose (POC) 113 (H) 65 - 100 mg/dL    Performed by [place]        Provided Telemetry: [x] sinus  [] chronic afib   [] paroxysmal afib  [] NSVT      Assessment:     Active Problems:    Acute on chronic renal failure ([facility] [address] (9/10/2019)        Plan:     Heart Failure improved    Increase medical therapy  Edema better. Chf better. For all other plans, see orders.    [x] High complexity decision making was performed

## 2019-09-13 NOTE — PROGRESS NOTES
[facility] Consult Service Note  Pulmonary, Critical Care, and Sleep Medicine    Name: Jeremiah Zuñiga MRN: [id number]   : 1950 Hospital: [facility]   Date: 2019  Admission date: 9/10/2019 Hospital Day: 4       Subjective/Interval History:      PT is feeling better today. NO chest pain, no back pain, no leg pain. Breathing is easier.  No chest pain. Feels better. Has home CPAP but machine not here. GNR in peritoneal fluid    IMPRESSION:   1. Acute chronic respiratory failure with Hypoxia, seems better this am. Was taken off bipap this am. Placed on NC oxygen this am.   2. SBP gram negative peritonitis  3. Acute pulmonary edema, cardiomegaly, elevated proBNP- better  4. Sepsis POA: leukocytosis, acidosis due to SBP. 5. Abdominal pain and tenderness- catheter removed   6. ESRD on PD with possible failure of PD  for HD emergently. Will most likely continue on HD for now. 7. Acute on Chronic Diastolic Heart Failure POA: EF 54%, with diastolic dysfunction, c/w diuretic but needs HD for fluid removal, followed by  [facility] with recetn office echo per pt; last echo ED [facility] 2018 LVEF 60%   8. PAD h/o stents  9. H/o AAA   10. Obstructive sleep apnea- likes his machine  11. Diabetes mellitus : c/w Lantus, SSI, check HbA1C.  12. Anemia secondary to CKD  13. GERD/ PUD   14. Remote ex smoker 25 pack yrs  Body mass index is 27.38 kg/m². 15. Multiorgan dysfunction as outlined above:       RECOMMENDATIONS/PLAN:   Home CPAP when available  NIV BIPAP prn  NC oxygen to keep sats > 93%  HD per nephrology  IV abx  Follow cultures  Will be happy to see agian   Prescription drug management with home med reconciliation reviewed          [x] High complexity decision making was performed  [x] See my orders for details      Subjective/Initial History:     I was asked by Ale Abbott MD to see Jeremiah Zuñiga  a 77 y.o.    male in consultation for a chief complaint of  Acute respiraotry failure    Excerpts from admission 9/10/2019 or consult notes as follows:     \" Antione Blue is a 77 y.o.  male who presents with complaint of abdominal pain, increased abdominal and lower extremity swelling, and altered mental status. He is unable to provide history at this time secondary to his acute encephalopathy and as such history is obtained from his son at bedside. Per his son, as of last week the patient began to complain of increased swelling and pain in his chest and abdomen. The patient was seen by his cardiologist, Dr. Mario Alberto Cox, who performed an EKG and obtained an echocardiogram, and apparently felt that the symptoms were not cardiac in nature; a pericardial effusion was reportedly identified at that time. Over the course of the ensuing days, the patient has continued to complain of pain and has been less responsive to family. His abdominal distention and edema has been visibly worsening. He undergoes peritoneal dialysis and has been getting alarms indicating \"low drain. \" He has had some subjective fevers and chills at home. \"    Today during rounds: \"Pt SOB and O2 sats in 84% on 4L. O2 increased to 6L. Dr. Meghan Del Rio notified of O2 status and pt lactic acid of 3.3 this AM. Order for one time 60mg of IV lasix. Nephrology came by and decided to place a Garry and discontinue peritoneal dialysis exchanges. I drained the remaining PD fluid from pt and got 2100 mL out. Pt O2 sats continue to drop and pt place on venturi mask at 12L and is sating at 91%. ABGs obtained, order to transfer to higher level of care, and PO potassium given. \"    Pt has not smoked for 25 yrs. Did heavy construction and concrete wwork. No atshma or allergies. No sinus draiange. PD for a year and never infected in the past. Stomach still hurts. No wheeze. No dysphagia. No nausea or vomiting. Sees dr. Mario Alberto Cox and had echo recently. No cough or congestion.  Has venti adilene and breathing ok.CXr reviewed personally. Has cardiomegaly and pulmonary edema. No obvious effusion. Pt pretty stoic.d/w nurisng before going from floor to CCU as PCU overflow.       No Known Allergies     MAR reviewed and pertinent medications noted or modified as needed     Current Facility-Administered Medications   Medication    cloNIDine HCl (CATAPRES) tablet 0.2 mg    minoxidil (LONITEN) tablet 5 mg    amLODIPine (NORVASC) tablet 5 mg    heparin (porcine) injection 5,000 Units    labetalol (NORMODYNE;TRANDATE) injection 10 mg    alirocumab (PRALUENT) 75 mg/mL injector pen 75 mg (Patient Supplied)    HYDROmorphone (PF) (DILAUDID) injection 1 mg    epoetin dilan-epbx (RETACRIT) injection 10,000 Units    VANCOMYCIN INFORMATION NOTE    vancomycin (VANCOCIN) 750 mg in 0.9% sodium chloride (MBP/ADV) 250 mL    piperacillin-tazobactam (ZOSYN) 3.375 g in 0.9% sodium chloride (MBP/ADV) 100 mL    acetaminophen (TYLENOL) tablet 650 mg    ondansetron (ZOFRAN) injection 4 mg    nebivolol (BYSTOLIC) tablet 20 mg    cholecalciferol (VITAMIN D3) tablet 1,000 Units    clopidogrel (PLAVIX) tablet 75 mg    B complex-vitaminC-folic acid (NEPHROCAP) cap    furosemide (LASIX) tablet 80 mg    omega 3-DHA-EPA-fish oil 1,000 mg (120 mg-180 mg) capsule 1 Cap    tamsulosin (FLOMAX) capsule 0.4 mg    sodium chloride (NS) flush 5-40 mL    sodium chloride (NS) flush 5-40 mL    naloxone (NARCAN) injection 0.4 mg    insulin glargine (LANTUS) injection 25 Units    glucose chewable tablet 16 g    dextrose 10% infusion 0-250 mL    insulin lispro (HUMALOG) injection      Patient PCP: Devonte Ortega MD  [facility]:  has a past medical history of AAA (abdominal aortic aneurysm) ([facility] [address] Anemia, BPH (benign prostatic hypertrophy), CAD (coronary artery disease), Cancer ([facility] [address] Chronic kidney disease, End stage renal disease ([facility] [address] GERD (gastroesophageal reflux disease), Gout, Other and unspecified hyperlipidemia, PUD (peptic ulcer disease), PVD (peripheral vascular disease) ([facility] [address] Sleep apnea, Type II or unspecified type diabetes mellitus without mention of complication, uncontrolled, Unspecified essential hypertension, and Unspecified vitamin D deficiency. PSH:   has a past surgical history that includes pr cardiac surg procedure unlist; hx coronary stent placement; hx endoscopy; hx colonoscopy; hx cataract removal; hx other surgical; vascular surgery procedure unlist; vascular surgery procedure unlist; vascular surgery procedure unlist; hx knee arthroscopy; and ir insert non tunl cvc over 5 yrs (2019). FHX: family history includes Cancer in his father; Diabetes in his daughter, mother, and sister; Heart Disease in his maternal grandmother and mother; Hypertension in his father; Stroke in his sister. SHX:  reports that he quit smoking about 28 years ago. He has a 50.00 pack-year smoking history. He has never used smokeless tobacco. He reports that he drinks alcohol. He reports that he does not use drugs. ROS:A comprehensive review of systems was negative except for that written in the HPI.     Objective:     Vital Signs: Telemetry:    normal sinus rhythm Intake/Output:   Visit Vitals  /60   Pulse 76   Temp 98.6 °F (37 °C)   Resp 19   Ht 5' 9\" (1.753 m)   Wt 84.1 kg (185 lb 6.5 oz)   SpO2 96%   BMI 27.38 kg/m²       Temp (24hrs), Av.4 °F (36.9 °C), Min:98 °F (36.7 °C), Max:98.7 °F (37.1 °C)        O2 Device: Room air O2 Flow Rate (L/min): 2 l/min       Wt Readings from Last 4 Encounters:   19 84.1 kg (185 lb 6.5 oz)   19 89 kg (196 lb 3.2 oz)   19 82.4 kg (181 lb 9.6 oz)   10/22/18 81.5 kg (179 lb 10.8 oz)          Intake/Output Summary (Last 24 hours) at 2019 1210  Last data filed at 2019 0905  Gross per 24 hour   Intake 300 ml   Output 2925 ml   Net -2625 ml       Last shift:       07 -  1900  In: 200 [P.O.:200]  Out: 425 [Urine:425]  Last 3 shifts: 1901 -  0700  In: 300 [P.O.:100; I.V.:200]  Out: 2980 [Urine:475]       Physical Exam:    General:   male; severely ill; VM;    HEAD: Normocephalic, without obvious abnormality, atraumatic   EYES: conjunctivae clear. PERRL,  AN Icteric sclerae   NOSE: nares normal, no drainage, no nasal flaring,    THROAT: ; Lips, mucosa dry; No Thrush;  crowded airway; tongue midline   Neck: Supple, symmetrical, trachea midline, 1+ accessory mm use; No Stridor/ cuff leak, No goiter or thyroid tenderness   LYMPH: No abnormally enlarged lymph nodes. in cervical or supraclavicular regions   Chest: increased AP diameter   Lungs: decreased air exchange bibasilar and bilaterally   Heart: Regular rate and rhythm; 2+ edema   Abdomen: distended, tenderness mild - in the entire abdomen; PD cath; guarding no rebound   : No Major    Musculoskeletal:  negative, cyanosis, clubbing; no joint swelling or erythema   Neuro: alert; speech fluent ; withdraws to pain; unable to check gait and station;  following simple commands   Psych: oriented to time, place and person;   No agitation;  normal affect;    Skin: Pale;    Pulses:Bilateral, Radial, 2+   Capillary refill: normal; warm, pale,               Labs:    Recent Labs     09/13/19 0427 09/12/19  0547 09/11/19  0135 09/10/19  1715   WBC 12.1* 9.8 12.5* 10.9   HGB 8.6* 8.4* 7.2* 8.1*    328 288 326   INR  --   --   --  1.1     Recent Labs     09/13/19 0427 09/12/19  1355 09/12/19  0547 09/11/19  0606 09/11/19  0146 09/11/19  0135 09/10/19  1715   *  --  135*  --   --  137 139   K 4.2  --  3.7  --   --  3.1* 3.2*   CL 97  --  93*  --   --  89* 91*   CO2 29  --  33*  --   --  37* 38*   *  --  120*  --   --  95 124*   BUN 31*  --  43*  --   --  46* 40*   CREA 5.27*  --  7.42*  --   --  9.09* 8.71*   CA 9.0  --  8.5  --   --  7.7* 8.0*   MG 2.0  --  1.8  --   --   --  1.7   PHOS 5.7*  --  4.6  --   --   --   --    LAC  --  0.5  --  3.3* 2.6*  --   --    ALB 2.0*  --  2.1*  --   --  2.0* 2.2*   SGOT 14* --  10*  --   --  8* 10*   ALT 13  --  13  --   --  14 17     No results for input(s): PH, PCO2, PO2, HCO3, FIO2 in the last 72 hours. Recent Labs     09/10/19  1715   CPK 88   CKNDX Cannot be calculated   TROIQ 0.05*     No results found for: BNPP, BNP   Lab Results   Component Value Date/Time    Culture result: HEAVY GRAM NEGATIVE RODS (OXIDASE POSITIVE) (A) 09/11/2019 01:35 AM    Culture result: NO GROWTH 3 DAYS 09/10/2019 05:15 PM    Culture result: MODERATE NORMAL RESPIRATORY NJ 02/08/2018 01:35 PM     Lab Results   Component Value Date/Time    TSH 0.44 09/09/2012 02:15 AM       Imaging:    CXR Results  (Last 48 hours)               09/12/19 0535  XR CHEST PORT Final result    Impression:  IMPRESSION: No significant change. Narrative:  INDICATION:  respiratory failure       EXAM: CXR Portable. FINDINGS: Portable chest shows no significant change including CVL since   yesterday. There is no apparent pneumothorax. Lungs show persistent pulmonary   edema pattern. Heart size is stable. There is no midline shift.           09/11/19 1306  XR CHEST PORT Final result    Impression:  IMPRESSION: Right jugular dialysis catheter tip over the right atrium. No   pneumothorax. Narrative:  EXAM:  XR CHEST PORT. INDICATION: Garry placement. COMPARISON: 9/10/2019. FINDINGS:    A portable AP radiograph of the chest was obtained at 1243 hours. Lines and tubes: The patient is on a cardiac monitor. There is a new right   jugular dialysis catheter with tip projecting over the right atrium. Lungs: There is interstitial edema throughout the lungs which is increased with   some atelectasis at the lung bases. Pleura: There is no pneumothorax or pleural effusion. Mediastinum: The cardiac and mediastinal contours and pulmonary vascularity are   normal. The aorta is atherosclerotic and mildly tortuous. Bones and soft tissues:  The bones and soft tissues are grossly within normal limits. Results from [place] encounter on 09/10/19   XR CHEST PORT    Narrative EXAM:  XR CHEST PORT. INDICATION: Garry placement. COMPARISON: 9/10/2019. FINDINGS:   A portable AP radiograph of the chest was obtained at 1243 hours. Lines and tubes: The patient is on a cardiac monitor. There is a new right  jugular dialysis catheter with tip projecting over the right atrium. Lungs: There is interstitial edema throughout the lungs which is increased with  some atelectasis at the lung bases. Pleura: There is no pneumothorax or pleural effusion. Mediastinum: The cardiac and mediastinal contours and pulmonary vascularity are  normal. The aorta is atherosclerotic and mildly tortuous. Bones and soft tissues: The bones and soft tissues are grossly within normal  limits. Impression IMPRESSION: Right jugular dialysis catheter tip over the right atrium. No  pneumothorax. XR CHEST PORT    Narrative INDICATION:  respiratory failure    EXAM: CXR Portable. FINDINGS: Portable chest shows no significant change including CVL since  yesterday. There is no apparent pneumothorax. Lungs show persistent pulmonary  edema pattern. Heart size is stable. There is no midline shift. Impression IMPRESSION: No significant change. XR CHEST PORT    Narrative EXAM: XR CHEST PORT    INDICATION: Abdominal distention and confusion. Hypotension. COMPARISON: Chest views on 9/4/2018    TECHNIQUE: Upright portable chest AP view    FINDINGS: Cardiac monitoring wires overlie the thorax. Cardiomegaly is new and  accentuated by technique. Aortic arch is not well evaluated. Ulnar vascular  prominence is mild. Mild bilateral pulmonary edema is new. Left lung base opacity most likely  represent subsegmental atelectasis. No pneumothorax. Low lung volumes. Lordotic  positioning. Upper abdomen is within normal limits.       Impression IMPRESSION:    New mild CHF pattern of pulmonary edema since last year. Consider PA and lateral  chest views when the patient can better tolerate. Results from [place] encounter on 06/14/18   CT HEAD WO CONT    Narrative EXAM:  CT HEAD WO CONT    INDICATION:   dizzy x this morning    COMPARISON: None. CONTRAST:  None. TECHNIQUE: Unenhanced CT of the head was performed using 5 mm images. Brain and  bone windows were generated. CT dose reduction was achieved through use of a  standardized protocol tailored for this examination and automatic exposure  control for dose modulation. FINDINGS:  The ventricles and sulci are normal in size, shape and configuration and  midline. There is no significant white matter disease. There is no intracranial  hemorrhage, extra-axial collection, mass, mass effect or midline shift. The  basilar cisterns are open. No acute infarct is identified. The bone windows  demonstrate no abnormalities. The visualized portions of the paranasal sinuses  and mastoid air cells are clear. Vascular calcification is noted. Impression IMPRESSION: No acute abnormality.            This care involved high complexity medical decision making: I personally:  · Reviewed the flowsheet and previous days notes  · Reviewed and summarized records or history from previous days note or discussions with staff, family  · High Risk Drug therapy requiring intensive monitoring for toxicity: eg steroids, pressors, antibiotics  · Reviewed and/or ordered Clinical lab tests  · Reviewed images and/or ordered Radiology tests  · Reviewed the patients ECG / Telemetry  · discussed my assessment/management with : Nursing, Family for coordination of care    Jeanette Bond MD

## 2019-09-13 NOTE — PROGRESS NOTES
NAME: Yassine Call        :  1950        MRN:  [id number]        Assessment :    Plan:  --ESRD  Peritonitis/sepsis  Volume overload  Hypokalemia  Anemia of esrd HD initiated  and again on ; PD stopped  and PD cath removed      HD today with UF    Will work on outpatient HD at [facility] for now; perm cath on Monday. Hep b immune    On bipap prn    retacrit 10 k sc tiw       Subjective:     Chief Complaint: In PCU. Alert. Feeling much better. No current abdominal pain. We discussed the above and reviewed the last few days. He states that HD has not been so bad and for now he will keep doing if it is what is needed. Review of Systems:    Symptom Y/N Comments  Symptom Y/N Comments   Fever/Chills    Chest Pain     Poor Appetite    Edema     Cough    Abdominal Pain n    Sputum    Joint Pain     SOB/RUBIO n   Pruritis/Rash     Nausea/vomit    Tolerating PT/OT     Diarrhea    Tolerating Diet     Constipation    Other       Could not obtain due to:      Objective:     VITALS:   Last 24hrs VS reviewed since prior progress note. Most recent are:  Visit Vitals  /53   Pulse (!) 56   Temp 98 °F (36.7 °C)   Resp 19   Ht 5' 9\" (1.753 m)   Wt 84.1 kg (185 lb 6.5 oz)   SpO2 99%   BMI 27.38 kg/m²       Intake/Output Summary (Last 24 hours) at 2019 5206  Last data filed at 2019 2100  Gross per 24 hour   Intake 300 ml   Output 2580 ml   Net -2280 ml      Telemetry Reviewed:     PHYSICAL EXAM:  General: On nc oxygen, nad  Resp:  Cta. No access. muscle use  CV:  Regular  rhythm,  No murmur (), No Rubs, No Gallops.  mild edema  GI:  Soft, distended, non  tender.  +Bowel sounds, no HSM      Lab Data Reviewed: (see below)    Medications Reviewed: (see below)    PMH/SH reviewed - no change compared to H&P  ________________________________________________________________________  Care Plan discussed with:  Patient y    Family      RN y    Care Manager                    Consultant:          Comments   >50% of visit spent in counseling and coordination of care       ________________________________________________________________________  Joana Houser MD     Procedures: see electronic medical records for all procedures/Xrays and details which  were not copied into this note but were reviewed prior to creation of Plan. LABS:  Recent Labs     09/13/19 0427 09/12/19 0547   WBC 12.1* 9.8   HGB 8.6* 8.4*   HCT 26.3* 25.6*    328     Recent Labs     09/13/19  0427 09/12/19  0547 09/11/19  0135 09/10/19  1715   * 135* 137 139   K 4.2 3.7 3.1* 3.2*   CL 97 93* 89* 91*   CO2 29 33* 37* 38*   BUN 31* 43* 46* 40*   CREA 5.27* 7.42* 9.09* 8.71*   * 120* 95 124*   CA 9.0 8.5 7.7* 8.0*   MG 2.0 1.8  --  1.7   PHOS 5.7* 4.6  --   --      Recent Labs     09/13/19 0427 09/12/19 0547 09/11/19 0135   SGOT 14* 10* 8*    104 78   TP 7.9 7.8 6.2*   ALB 2.0* 2.1* 2.0*   GLOB 5.9* 5.7* 4.2*     Recent Labs     09/10/19  1715   INR 1.1   PTP 10.8      No results for input(s): FE, TIBC, PSAT, FERR in the last 72 hours. Lab Results   Component Value Date/Time    Folate 42.1 (H) 09/08/2012 10:24 AM      No results for input(s): PH, PCO2, PO2 in the last 72 hours.   Recent Labs     09/10/19  1715   CPK 88   CKMB <1.0     No components found for: [place]  Lab Results   Component Value Date/Time    Color YELLOW/STRAW 09/10/2019 06:26 PM    Appearance CLEAR 09/10/2019 06:26 PM    Specific gravity 1.024 09/10/2019 06:26 PM    pH (UA) 8.0 09/10/2019 06:26 PM    Protein 300 (A) 09/10/2019 06:26 PM    Glucose 100 (A) 09/10/2019 06:26 PM    Ketone NEGATIVE  09/10/2019 06:26 PM    Bilirubin NEGATIVE  09/10/2019 06:26 PM    Urobilinogen 0.2 09/10/2019 06:26 PM    Nitrites NEGATIVE  09/10/2019 06:26 PM    Leukocyte Esterase NEGATIVE  09/10/2019 06:26 PM    Epithelial cells FEW 09/10/2019 06:26 PM    Bacteria NEGATIVE  09/10/2019 06:26 PM    WBC 0-4 09/10/2019 06:26 PM    RBC 0-5 09/10/2019 06:26 PM       MEDICATIONS:  Current Facility-Administered Medications   Medication Dose Route Frequency    heparin (porcine) injection 5,000 Units  5,000 Units SubCUTAneous Q8H    labetalol (NORMODYNE;TRANDATE) injection 10 mg  10 mg IntraVENous Q6H PRN    alirocumab (PRALUENT) 75 mg/mL injector pen 75 mg (Patient Supplied)  75 mg SubCUTAneous Once every 14 days    HYDROmorphone (PF) (DILAUDID) injection 1 mg  1 mg IntraVENous Q3H PRN    epoetin dilan-epbx (RETACRIT) injection 10,000 Units  10,000 Units SubCUTAneous Q MON, WED & FRI    alcohol 62% (NOZIN) nasal  1 Ampule  1 Ampule Topical Q12H    VANCOMYCIN INFORMATION NOTE   Other DAILY    vancomycin (VANCOCIN) 750 mg in 0.9% sodium chloride (MBP/ADV) 250 mL  750 mg IntraVENous DIALYSIS PRN    piperacillin-tazobactam (ZOSYN) 3.375 g in 0.9% sodium chloride (MBP/ADV) 100 mL  3.375 g IntraVENous Q12H    acetaminophen (TYLENOL) tablet 650 mg  650 mg Oral Q6H PRN    ondansetron (ZOFRAN) injection 4 mg  4 mg IntraVENous Q4H PRN    nebivolol (BYSTOLIC) tablet 20 mg  20 mg Oral QHS    cholecalciferol (VITAMIN D3) tablet 1,000 Units  1,000 Units Oral DAILY    clopidogrel (PLAVIX) tablet 75 mg  75 mg Oral QHS    B complex-vitaminC-folic acid (NEPHROCAP) cap  1 Cap Oral DAILY    furosemide (LASIX) tablet 80 mg  80 mg Oral TID    omega 3-DHA-EPA-fish oil 1,000 mg (120 mg-180 mg) capsule 1 Cap  1 Cap Oral DAILY    tamsulosin (FLOMAX) capsule 0.4 mg  0.4 mg Oral QHS    sodium chloride (NS) flush 5-40 mL  5-40 mL IntraVENous Q8H    sodium chloride (NS) flush 5-40 mL  5-40 mL IntraVENous PRN    naloxone (NARCAN) injection 0.4 mg  0.4 mg IntraVENous PRN    insulin glargine (LANTUS) injection 25 Units  25 Units SubCUTAneous DAILY    glucose chewable tablet 16 g  4 Tab Oral PRN    dextrose 10% infusion 0-250 mL  0-250 mL IntraVENous PRN    insulin lispro (HUMALOG) injection   SubCUTAneous AC&HS

## 2019-09-13 NOTE — PROCEDURES
[facility]  [phone]    Vitals   Pre   Post   Assessment   Pre   Post     Temp  Temp: 97.5 °F (36.4 °C) (09/13/19 1519)  98.4 LOC  A&Ox4 A&Ox4   HR   Pulse (Heart Rate): 68 (09/13/19 1530) 72 Lungs   clear  clear   B/P   BP: 181/54 (09/13/19 1530) 109/55 Cardiac   Sinus to sinus ida  nsr   Resp   Resp Rate: 19 (09/13/19 1036) 16 Skin   Warm/dry, sutures in abdomen  warm/dry/ sutures in abd   Pain level  Pain Intensity 1: 8 (09/13/19 1454) 0 Edema  +2 BLE     +1/2 BLE   Orders:    Duration:   Start:    15:19 End:    18:49 Total:   3.5   Dialyzer:   [facility]ial[facility] Up [facility] (09/13/19 1519)   K Bath:   Dialysate K (mEq/L): 4 (09/13/19 1519)   Ca Bath:   Dialysate CA (mEq/L): 2.5 (09/13/19 1519)   Na/Bicarb:   Dialysate NA (mEq/L): 138 (09/13/19 1519)   Target Fluid Removal:   Goal/Amount of Fluid to Remove (mL): 3000 mL (09/13/19 1519)   Access     Type & Location:   Non tunneled  R CVC   Labs  None sent   Obtained/Reviewed   Critical Results Called   Date when labs were drawn-  Hgb-    HGB   Date Value Ref Range Status   09/13/2019 8.6 (L) 12.1 - 17.0 g/dL Final     K-    Potassium   Date Value Ref Range Status   09/13/2019 4.2 3.5 - 5.1 mmol/L Final     Ca-   Calcium   Date Value Ref Range Status   09/13/2019 9.0 8.5 - 10.1 MG/DL Final     Bun-   BUN   Date Value Ref Range Status   09/13/2019 31 (H) 6 - 20 MG/DL Final     Creat-   Creatinine   Date Value Ref Range Status   09/13/2019 5.27 (H) 0.70 - 1.30 MG/DL Final     Comment:     INVESTIGATED PER DELTA CHECK PROTOCOL        Medications/ Blood Products Given     Name   Dose   Route and Time     vancomycin 750 mg   Via HD last 1745             Blood Volume Processed (BVP):    66.6 Net Fluid   Removed:  3000   Comments   Time Out Done:  Yes 15:00  Primary Nurse Rpt Pre: Alok Jacobo RN  Primary Nurse Rpt Post: Betty Cash RN  Pt Education: access care/dialysis procedure explanation   Care Plan: continue current plan of care  Tx Summary: met patient in room. Time out and assessment performed and documented. Access site cleaned. Each catheter limb disinfected for 60 seconds per limb with alcohol swabs. Caps removed, dialysis CVC hub scrubbed with Prevantics for 5 seconds, followed by a 5 second dry time per Hospital P&P. Pt tolerated treatment well. All possible blood returned to patient. Each catheter limb disinfected for 60 seconds per limb with alcohol swabs. Dialysis CVC hubs scrubbed with Prevantics for 5 seconds, followed by a 5 second dry time per Hospital P&P, red and blue dialysis caps applied to ports. Admiting Diagnosis: acute on chronic renal failure  Pt's previous clinic- new to HD - previously PD  Consent signed - Informed Consent Verified: Yes (09/13/19 1519)  [place] Consent -  yes  Hepatitis Status-  Negative/immune  Machine #- Machine Number: [id number] (09/13/19 1519)  Telemetry status- bedside  Pre-dialysis wt. -

## 2019-09-13 NOTE — PROGRESS NOTES
HD TRANSFER - OUT REPORT:    Verbal report given to Isabella Keys RN on [employer]  Remaining in PCU 2267 for routine progression of care       Report consisted of patient's Situation, Background, Assessment and   Recommendations(SBAR). Information from the following report(s) SBAR, Kardex and Recent Results was reviewed with the receiving nurse. Method:  $$ Method: Hemodialysis (09/13/19 1519)    Fluid Removed  NET Fluid Removed (mL): 3000 ml (09/13/19 1849)     Patient response to treatment:  Improved    End Time  Hemodialysis End Time: 3895 (09/13/19 1849)  If not documented, dialysis nurse to update post-dialysis row in HD/Filtration flowsheet     Medications /Volume expansion agents or Fluid boluses administered during treatment? no    Post-dialysis medication administration due?  no  Remind nurse to administer post-HD medication upon return to unit. Opportunity for questions and clarification was provided.

## 2019-09-13 NOTE — PROGRESS NOTES
TRANSFER - IN REPORT:    Verbal report received from Janeen Ramirez RN on [facility] Kilpatrick  beingdialyzed in PCU 2267 for ordered procedure      Report consisted of patients Situation, Background, Assessment and   Recommendations(SBAR). Information from the following report(s) SBAR, Kardex and Recent Results was reviewed with the receiving nurse. Opportunity for questions and clarification was provided. Assessment completed upon patients arrival to unit and care assumed.

## 2019-09-13 NOTE — PROGRESS NOTES
S/p PD catheter removal yesterday.  Tolerating diet  Incision c/d/i; abd soft  Will arrange f/u for long term HD access as outpatient  Call with questions

## 2019-09-13 NOTE — PROGRESS NOTES
0710: Bedside report given to Nelly Ma RN. Pt required being straight cathed d/t urine retention of 436ml. No acute s/s of distress noted.

## 2019-09-13 NOTE — PROGRESS NOTES
Problem: Self Care Deficits Care Plan (Adult)  Goal: *Acute Goals and Plan of Care (Insert Text)  Description    FUNCTIONAL STATUS PRIOR TO ADMISSION: Pt typically independent with ADLs and functional mobility. HOME SUPPORT: Pt lives with family/coworkers on same property    Occupational Therapy Goals  Initiated 9/13/2019  1. Patient will perform grooming in standing >3 minutes with modified independence within 7 day(s). 2.  Patient will perform lower body dressing with modified independence within 7 day(s). 3.  Patient will perform toilet transfers with modified independence within 7 day(s). 4.  Patient will perform all aspects of toileting with modified independence within 7 day(s). 5.  Patient will participate in upper extremity therapeutic exercise/activities with independence for 5 minutes within 7 day(s). 6.  Patient will utilize energy conservation techniques during functional activities with verbal cues within 7 day(s). Outcome: Progressing Towards Goal  OCCUPATIONAL THERAPY EVALUATION  Patient: Mary Lockhart (77 y.o. male)  Date: 9/13/2019  Primary Diagnosis: Acute on chronic renal failure (HCC) [N17.9, N18.9]  Procedure(s) (LRB):  PERITONEAL DIALYSIS CATHETER REMOVAL (N/A) 1 Day Post-Op   Precautions:   DNR, Fall    ASSESSMENT  Based on the objective data described below, the patient presents with decreased balance, endurance and strength following admission for acute on chronic renal failure. At baseline pt lives with family and is I with ADLs and functional mobility. Pt presented supine in bed, agreeable to participate. Mod I for bed mobility with good sitting balance. Pt ambulated in hallway and room with SBA, noted decreased balance particularly with head turning/distraction. UE ROM and strength WFL, able to perform seated grooming and feeding ADLs without assistance. Increased difficulty reaching to distal LEs for dressing 2/2 edema.  Pt in chair at end of session with call bell in reach and needs met. VSS throughout on RA. Current Level of Function Impacting Discharge (ADLs/self-care): independent-CGA for UB ADLS, up to mod A LB ADLs, SBA functional mobility    Functional Outcome Measure: The patient scored 65/100 on the Barthel Index outcome measure. Other factors to consider for discharge: Pt reports he has assistance available at home as needed     Patient will benefit from skilled therapy intervention to address the above noted impairments. PLAN :  Recommendations and Planned Interventions: self care training, functional mobility training, therapeutic exercise, balance training, therapeutic activities, endurance activities, patient education, home safety training and family training/education    Frequency/Duration: Patient will be followed by occupational therapy 3 times a week to address goals. Recommendation for discharge: (in order for the patient to meet his/her long term goals)  Occupational therapy at least 2 days/week in the home     This discharge recommendation:  Has been made in collaboration with the attending provider and/or case management    Equipment recommendations for successful discharge (if) home: potentially LB AE        SUBJECTIVE:   Patient stated It feels good to be up.     OBJECTIVE DATA SUMMARY:   HISTORY:   Past Medical History:   Diagnosis Date    AAA (abdominal aortic aneurysm) ([facility]     34mm 2/2017    Anemia     gets shots from Dr. Rick Calderon - last dose 8/31/2018    BPH (benign prostatic hypertrophy)     CAD (coronary artery disease)     s/p stents, sees Dr. Vana Sandhoff [facility])     skin cancer on leg    Chronic kidney disease     End stage renal disease ([facility]     Dr. Rick Calderon    GERD (gastroesophageal reflux disease)     Gout     Other and unspecified hyperlipidemia     PUD (peptic ulcer disease)     PVD (peripheral vascular disease) ([facility])     s/p PTCA of left femoral artery in July 2014    Sleep apnea     CPAP    Type II or unspecified type diabetes mellitus without mention of complication, uncontrolled     Dr. Michael Greenberg    Unspecified essential hypertension     Unspecified vitamin D deficiency      Past Surgical History:   Procedure Laterality Date    CARDIAC SURG PROCEDURE UNLIST      HX CATARACT REMOVAL      HX COLONOSCOPY      HX CORONARY STENT PLACEMENT      HX ENDOSCOPY      HX KNEE ARTHROSCOPY      right x2, left x1    HX OTHER SURGICAL      skin cancer removed from leg    IR INSERT NON TUNL CVC OVER 5 YRS  9/11/2019    VASCULAR SURGERY PROCEDURE UNLIST      aorto-bifem bypass    VASCULAR SURGERY PROCEDURE UNLIST      left carotid endarterectomy    VASCULAR SURGERY PROCEDURE UNLIST      right femoral PTCA       Expanded or extensive additional review of patient history:     Home Situation  Home Environment: Private residence  # Steps to Enter: 2  Rails to Enter: Yes  Hand Rails : Bilateral  One/Two Story Residence: One story  Living Alone: No  Support Systems: Friends \ neighbors, Child(neisha), Family member(s)  Patient Expects to be Discharged to[de-identified] Private residence  Current DME Used/Available at Home: None  Tub or Shower Type: Tub/Shower combination    Hand dominance: Right    EXAMINATION OF PERFORMANCE DEFICITS:  Cognitive/Behavioral Status:  Neurologic State: Alert  Orientation Level: Oriented X4  Cognition: Follows commands  Perception: Appears intact  Perseveration: No perseveration noted  Safety/Judgement: Awareness of environment; Fall prevention      Hearing: Auditory  Auditory Impairment: Hard of hearing, bilateral, Hearing aid(s)(at home)  Hearing Aids/Status: Bilateral, At home    Vision/Perceptual:              Acuity: Within Defined Limits         Range of Motion:  AROM: Within functional limits  PROM: Within functional limits          Strength:  Strength: Generally decreased, functional          Coordination:  Coordination: Within functional limits  Fine Motor Skills-Upper: Left Intact; Right Intact    Gross Motor Skills-Upper: Left Intact; Right Intact    Tone & Sensation:  Tone: Normal  Sensation: Intact       Balance:  Sitting: Intact  Standing: Impaired  Standing - Static: Good  Standing - Dynamic : Fair(path deviations during gait)    Functional Mobility and Transfers for ADLs:  Bed Mobility:  Supine to Sit: Modified independent    Transfers:  Sit to Stand: Stand-by assistance  Stand to Sit: Stand-by assistance  Toilet Transfer : Stand-by assistance    ADL Assessment:  Feeding: Independent    Oral Facial Hygiene/Grooming: Stand-by assistance(standing)    Bathing: Minimum assistance    Upper Body Dressing: Setup    Lower Body Dressing: Moderate assistance    Toileting: Supervision       ADL Intervention and task modifications:   Educated on safe transfer technique and dressing in sitting for fall prevention. Pt may benefit from use of AE for LB dressing due to difficulty reaching/lifting distal LEs 2/2 edema. Cognitive Retraining  Safety/Judgement: Awareness of environment; Fall prevention    Functional Measure:  Barthel Index:    Bathin  Bladder: 10  Bowels: 10  Groomin  Dressin  Feeding: 10  Mobility: 10  Stairs: 0  Toilet Use: 5  Transfer (Bed to Chair and Back): 10  Total: 65/100        The Barthel ADL Index: Guidelines  1. The index should be used as a record of what a patient does, not as a record of what a patient could do. 2. The main aim is to establish degree of independence from any help, physical or verbal, however minor and for whatever reason. 3. The need for supervision renders the patient not independent. 4. A patient's performance should be established using the best available evidence. Asking the patient, friends/relatives and nurses are the usual sources, but direct observation and common sense are also important. However direct testing is not needed.   5. Usually the patient's performance over the preceding 24-48 hours is important, but occasionally longer periods will be relevant. 6. Middle categories imply that the patient supplies over 50 per cent of the effort. 7. Use of aids to be independent is allowed. Walker Matias., Barthel, D.W. (0619). Functional evaluation: the Barthel Index. [address] (14)2. Melody SHAKIRA Waite, Britt [place]., Nimisha Shanks, [place], [facility] (). Measuring the change indisability after inpatient rehabilitation; comparison of the responsiveness of the Barthel Index and Functional Harris Measure. Journal of Neurology, Neurosurgery, and Psychiatry, 66(4), 912-749. MORRIS Hernández.ALEA, OXANA Garcia, & Will Graves MMassimoA. (2004.) Assessment of post-stroke quality of life in cost-effectiveness studies: The usefulness of the Barthel Index and the EuroQoL-5D. Quality of Life Research, 15, 668-26      Occupational Therapy Evaluation Charge Determination   History Examination Decision-Making   LOW Complexity : Brief history review  LOW Complexity : 1-3 performance deficits relating to physical, cognitive , or psychosocial skils that result in activity limitations and / or participation restrictions  LOW Complexity : No comorbidities that affect functional and no verbal or physical assistance needed to complete eval tasks       Based on the above components, the patient evaluation is determined to be of the following complexity level: LOW   Pain Ratin/10    Activity Tolerance:   Good  Please refer to the flowsheet for vital signs taken during this treatment. After treatment patient left in no apparent distress:    Sitting in chair and Call bell within reach    COMMUNICATION/EDUCATION:   The patients plan of care was discussed with: Physical Therapist and Registered Nurse. Home safety education was provided and the patient/caregiver indicated understanding., Patient/family have participated as able in goal setting and plan of care. and Patient/family agree to work toward stated goals and plan of care.     This patients plan of care is appropriate for delegation to [facility].     Thank you for this referral.  Norma Seth, OT  Time Calculation: 29 mins

## 2019-09-13 NOTE — PROGRESS NOTES
RAPID RESPONSE TEAM-Follow up  Rounding on patient due to transfer from CCU. D/W Aroldo Fortune RN and Freddie Llanes RN. No concerns were expressed. No RRT interventions are currently indicated. Please call back if needed.   Angeles Ireland RN  Ext. [phone]    Vitals w/ MEWS Score (last day)     Date/Time MEWS Score Pulse Resp Temp BP Level of Consciousness SpO2    09/13/19 1545    65  18    172/56        09/13/19 1530    68  16    181/54        09/13/19 1519    74  18  97.5 °F (36.4 °C)  173/56        09/13/19 1225    73      172/57    94 %    09/13/19 1223    72      178/63    94 %    09/13/19 1038    76      167/60        09/13/19 1036  1  73  19  98.6 °F (37 °C)  167/60  Alert  96 %    09/13/19 0905    66      174/55        09/13/19 0818    73      173/52        09/13/19 0806  1  68  19  98.7 °F (37.1 °C)  173/52  Alert  96 %    09/13/19 0348              99 %    09/13/19 0303  1  (!) 56  19  98 °F (36.7 °C)  151/53  Alert  99 %    09/12/19 2325  0  70  11  98.5 °F (36.9 °C)  161/62  Alert  99 %    09/12/19 2209              99 %    09/12/19 2000  2  79  23  98.4 °F (36.9 °C)  162/54  Alert  97 %    09/1950    73    98 °F (36.7 °C)  167/53        09/12/19 1930    76  18    161/54        09/12/19 1915    81  18    161/48        09/12/19 1900    68  18    160/55        09/12/19 1845    63  18    164/54        09/12/19 1831    70  18    144/51        09/12/19 1815    67  18    163/57        09/12/19 1800    78  18    151/53        09/12/19 1745    66  18    153/49        09/12/19 1731    65  18    141/49        09/12/19 1715    64  18    139/54        09/12/19 1700    69  18    150/48        09/12/19 1650    65  18    153/58        09/12/19 1534  1  68  18  98.6 °F (37 °C)  145/52  Alert  96 %    09/12/19 1400    68      141/50    95 %    09/12/19 1300    65      143/46    99 %    09/12/19 1215    70  17    134/49    94 %    09/12/19 1200    72  18  98.2 °F (36.8 °C)  141/45    94 %    09/12/19 1146            Alert      09/12/19 1145    70  9    133/44    94 %    09/12/19 1130    76  16    139/50    96 %    09/12/19 1125    74  20    135/43    97 %    09/12/19 1122    78  14  98.8 °F (37.1 °C)  127/64    96 %    09/12/19 1120    76  15    134/49    95 %    09/12/19 1116    73      127/43    92 %    09/12/19 0946  1  75  18  99.3 °F (37.4 °C)  145/54  Alert  95 %    09/12/19 0813              96 %    09/12/19 0800    72  20    127/59    95 %    09/12/19 0700    75  15    135/53        09/12/19 0624              95 %    09/12/19 0600    81  25    148/46  Responds to Voice  92 %    09/12/19 0500    75  23    157/50    96 %    09/12/19 0400    66  19    107/40    96 %    09/12/19 0300  1  75  17  98.2 °F (36.8 °C)  153/50  Alert  95 %    09/12/19 0200    72  19    119/40    96 %    09/12/19 0100    77  16    117/40    95 %    09/12/19 0000    84          94 %

## 2019-09-13 NOTE — CONSULTS
Urology Consult    Subjective:     Date of Consultation:  September 13, 2019    Referring Physician: Richy Troy    Reason for Consultation:  retention    Patient Name: Willard Davis  MRN: [id number]    History of Present Illness:   Patient is a 77 y.o.male who is being seen for retention. He was admitted to the hospital for Acute on chronic renal failure ([facility] [N17.9, N18.9]. Admitted with abd pain, possible encephalopathy. On perit dialysis at home  [facility] prior  interaction  Does indicate he was on flomax at home  Now on hemodialysis in hospital-- indicates he makes little urine    PMH   CKD, DM heart failure    ROS  From chart--noted--recent void difficulty    Denies void trouble prior to admit.   Has had in and out cath x 2 with volumes ~ 436cc  Says he did have urge to void and felt full    WBC   12.1  Hbg  8.6  Cr  5.2    U/A negative    On flomax, vanc, zosyn    Exam  afeb  Awake alert---says he feels much better  Ext HEENT unremark, neck supple, resp w/o diff  abd soft  No penile/scrotal edema or tenderness          Past Medical History:   Diagnosis Date    AAA (abdominal aortic aneurysm) ([facility]     34mm 2/2017    Anemia     gets shots from Dr. Prachi French - last dose 8/31/2018    BPH (benign prostatic hypertrophy)     CAD (coronary artery disease)     s/p stents, sees Dr. Disha [facility])     skin cancer on leg    Chronic kidney disease     End stage renal disease ([facility]     Dr. Johnny Sandoval GERD (gastroesophageal reflux disease)     Gout     Other and unspecified hyperlipidemia     PUD (peptic ulcer disease)     PVD (peripheral vascular disease) ([facility]     s/p PTCA of left femoral artery in July 2014    Sleep apnea     CPAP    Type II or unspecified type diabetes mellitus without mention of complication, uncontrolled     Dr. Nasim Harmon essential hypertension     Unspecified vitamin D deficiency       Past Surgical History:   Procedure Laterality Date  CARDIAC SURG PROCEDURE UNLIST      HX CATARACT REMOVAL      HX COLONOSCOPY      HX CORONARY STENT PLACEMENT      HX ENDOSCOPY      HX KNEE ARTHROSCOPY      right x2, left x1    HX OTHER SURGICAL      skin cancer removed from leg    IR INSERT NON TUNL CVC OVER 5 YRS  2019    VASCULAR SURGERY PROCEDURE UNLIST      aorto-bifem bypass    VASCULAR SURGERY PROCEDURE UNLIST      left carotid endarterectomy    VASCULAR SURGERY PROCEDURE UNLIST      right femoral PTCA      Family History   Problem Relation Age of Onset    Diabetes Mother     Heart Disease Mother     Heart Disease Maternal Grandmother     Diabetes Daughter         gestational   Kelsea Branch Diabetes Sister     Stroke Sister     Cancer Father     Hypertension Father       Social History     Tobacco Use    Smoking status: Former Smoker     Packs/day: 2.00     Years: 25.00     Pack years: 50.00     Last attempt to quit: 3/11/1991     Years since quittin.5    Smokeless tobacco: Never Used   Substance Use Topics    Alcohol use: Yes     Comment: very occasional     No Known Allergies   Prior to Admission medications    Medication Sig Start Date End Date Taking? Authorizing Provider   cloNIDine HCl (CATAPRES) 0.2 mg tablet Take 0.4 mg by mouth two (2) times a day. Yes Provider, Historical   minoxidil (LONITEN) 10 mg tablet Take 5 mg by mouth two (2) times a day. Yes Provider, Historical   ferric citrate (AURYXIA) 210 mg iron tablet Take 420 mg by mouth three (3) times daily (with meals). Yes Provider, Historical   BASAGLAR KWIKPEN U-100 INSULIN 100 unit/mL (3 mL) inpn Inject 44 units every morning and increase as directed to a max of 50 units per day--Dose change 19--updated med list--did not send prescription to the pharmacy 19  Yes Sadiq Gutierrez MD   cholecalciferol, VITAMIN D3, (VITAMIN D3) 5,000 unit tab tablet Take  by mouth daily.    Yes Provider, Historical   atorvastatin (LIPITOR) 40 mg tablet Take 40 mg by mouth every evening. Yes Provider, Historical   BYSTOLIC 20 mg tablet Take 20 mg by mouth nightly. 12/15/17  Yes Provider, Historical   clopidogrel (PLAVIX) 75 mg tablet Take 75 mg by mouth nightly. Yes Provider, Historical   allopurinol (ZYLOPRIM) 100 mg tablet Take 100 mg by mouth nightly. Yes Provider, Historical   NIFEdipine ER (ADALAT CC) 60 mg ER tablet Take 60 mg by mouth two (2) times a day. Provider, Historical   polyethylene glycol 3350 (MIRALAX PO) Take  by mouth. Provider, Historical   Insulin Needles, Disposable, (BD ULTRA-FINE MINI PEN NEEDLE) 31 gauge x 3/16\" ndle Use as directed once daily 1/21/19   Fermín Ribera MD   furosemide (LASIX) 80 mg tablet Take 80 mg by mouth three (3) times daily. 6/5/18   Provider, Historical   acetaminophen (TYLENOL) 500 mg tablet Take 1,000 mg by mouth every six (6) hours as needed for Pain. Other, MD Corby   folic acid-vit W3-[facility] Z74 (FOLTX) 2.5-25-2 mg tablet Take 1 Tab by mouth nightly. Other, MD Corby   omega-3 fatty acids (FISH OIL CONCENTRATE) 1,000 mg cap Take 1,000 mg by mouth two (2) times a day. Provider, Historical   PRALUENT PEN 75 mg/mL injector pen INJECT EVERY 2 WEEKS 10/11/17   Provider, Historical   tamsulosin (FLOMAX) 0.4 mg capsule Take 0.4 mg by mouth nightly.     Provider, Historical             Objective:     Data Review (Labs):    Recent Labs     09/13/19  0427 09/12/19  0547 09/11/19  0135 09/10/19  1715   WBC 12.1* 9.8 12.5* 10.9   HGB 8.6* 8.4* 7.2* 8.1*    328 288 326   * 135* 137 139   K 4.2 3.7 3.1* 3.2*   CREA 5.27* 7.42* 9.09* 8.71*   BUN 31* 43* 46* 40*   INR  --   --   --  1.1       Patient Vitals for the past 8 hrs:   BP Temp Pulse Resp SpO2   09/13/19 1225 172/57  73  94 %   09/13/19 1223 178/63  72  94 %   09/13/19 1038 167/60  76     09/13/19 1036 167/60 98.6 °F (37 °C) 73 19 96 %   09/13/19 0905 174/55  66     09/13/19 0818 173/52  73     09/13/19 0806 173/52 98.7 °F (37.1 °C) 68 19 96 % Temp (24hrs), Av.4 °F (36.9 °C), Min:98 °F (36.7 °C), Max:98.7 °F (37.1 °C)      Intake and Output:    1901 -  0700  In: 300 [P.O.:100; I.V.:200]  Out: 2980 [Urine:475]        Assessment:     Active Problems:    Acute on chronic renal failure ([facility] (9/10/2019)          Probable underlying BPH exacerbated by illness, decreased mobility, etc    Plan:     Encourage ambulation and stand or sit in bathroom to void. Continue flomax  In and Out cath if unable to void---if this continues he prefers a temporary indwelling maldonado. Can consider flomax . 8 mg daily. If voiding and comfortable then OK with PVR ~ 350cc or less. He can follow up with us in office after D/C if still problems. We are available to see again as needed. Please call if new issues.       Signed By: Jose Francisco Wahl MD                         2019

## 2019-09-13 NOTE — PROGRESS NOTES
Problem: Mobility Impaired (Adult and Pediatric)  Goal: *Acute Goals and Plan of Care (Insert Text)  Description  FUNCTIONAL STATUS PRIOR TO ADMISSION: Patient was independent and active without use of DME.    HOME SUPPORT PRIOR TO ADMISSION: The patient lived alone with family to provide assistance. Physical Therapy Goals  Initiated 9/13/2019  2. Patient will transfer from bed to chair and chair to bed with modified independence using the least restrictive device within 7 day(s). 3.  Patient will perform sit to stand with modified independence within 7 day(s). 4.  Patient will ambulate with modified independence for 300 feet with the least restrictive device within 7 day(s). 5.  Patient will ascend/descend 2 stairs with 2 handrail(s) with modified independence within 7 day(s). Outcome: Progressing Towards Goal     PHYSICAL THERAPY EVALUATION  Patient: Amparo Montoya (77 y.o. male)  Date: 9/13/2019  Primary Diagnosis: Acute on chronic renal failure (HCC) [N17.9, N18.9]  Procedure(s) (LRB):  PERITONEAL DIALYSIS CATHETER REMOVAL (N/A) 1 Day Post-Op   Precautions:   DNR, Fall      ASSESSMENT  Based on the objective data described below, the patient presents with good strength although impaired balance during gait assessment following admission for acute on chronic renal failure. PTA patient was independent and lived although although family and friends are close by on the same property. He is overall SBA for transfers. Ambulated 150 feet without AD and CGA. Noted path deviations, veering to the R more than L this date. LOB also more present with head movements/distractions. Patient returned to sitting up in chair at end of session. VSS with all position changes. Recommend HHPT. Current Level of Function Impacting Discharge (mobility/balance): CGA and + path deviations during ambulation    Functional Outcome Measure:   The patient scored 12 seconds on the TUG outcome measure which is indicative of low fall risk. Other factors to consider for discharge:      Patient will benefit from skilled therapy intervention to address the above noted impairments. PLAN :  Recommendations and Planned Interventions: bed mobility training, transfer training, gait training, therapeutic exercises, neuromuscular re-education, patient and family training/education and therapeutic activities      Frequency/Duration: Patient will be followed by physical therapy:  3 times a week to address goals. Recommendation for discharge: (in order for the patient to meet his/her long term goals)  Physical therapy at least 2 days/week in the home     This discharge recommendation:  Has not yet been discussed the attending provider and/or case management    Equipment recommendations for successful discharge (if) home: none         SUBJECTIVE:   Patient stated I haven't been allowed to get up.     OBJECTIVE DATA SUMMARY:   HISTORY:    Past Medical History:   Diagnosis Date    AAA (abdominal aortic aneurysm) ([facility]     34mm 2/2017    Anemia     gets shots from Dr. David Otto - last dose 8/31/2018    BPH (benign prostatic hypertrophy)     CAD (coronary artery disease)     s/p stents, sees Dr. [facility])     skin cancer on leg    Chronic kidney disease     End stage renal disease ([facility]     Dr. David Otto    GERD (gastroesophageal reflux disease)     Gout     Other and unspecified hyperlipidemia     PUD (peptic ulcer disease)     PVD (peripheral vascular disease) ([facility])     s/p PTCA of left femoral artery in July 2014    Sleep apnea     CPAP    Type II or unspecified type diabetes mellitus without mention of complication, uncontrolled     Dr. Norma Arshad essential hypertension     Unspecified vitamin D deficiency      Past Surgical History:   Procedure Laterality Date    CARDIAC SURG PROCEDURE UNLIST      HX CATARACT REMOVAL      HX COLONOSCOPY      HX CORONARY STENT PLACEMENT      HX ENDOSCOPY      HX KNEE ARTHROSCOPY      right x2, left x1    HX OTHER SURGICAL      skin cancer removed from leg    IR INSERT NON TUNL CVC OVER 5 YRS  9/11/2019    VASCULAR SURGERY PROCEDURE UNLIST      aorto-bifem bypass    VASCULAR SURGERY PROCEDURE UNLIST      left carotid endarterectomy    VASCULAR SURGERY PROCEDURE UNLIST      right femoral PTCA       Personal factors and/or comorbidities impacting plan of care:     Home Situation  Home Environment: Private residence  # Steps to Enter: 2  Rails to Enter: Yes  Hand Rails : Bilateral  One/Two Story Residence: One story  Living Alone: No  Support Systems: Friends \ neighbors, Child(neisha), Family member(s)  Patient Expects to be Discharged to[de-identified] Private residence  Current DME Used/Available at Home: None  Tub or Shower Type: Tub/Shower combination    EXAMINATION/PRESENTATION/DECISION MAKING:   Critical Behavior:  Neurologic State: Alert  Orientation Level: Oriented X4  Cognition: Follows commands     Hearing: Auditory  Auditory Impairment: Hard of hearing, bilateral, Hearing aid(s)(at home)  Hearing Aids/Status: Bilateral, At home  Skin:    Edema:   Range Of Motion:  AROM: Within functional limits           PROM: Within functional limits           Strength:    Strength: Generally decreased, functional                    Tone & Sensation:                                  Coordination:     Vision:      Functional Mobility:  Bed Mobility:     Supine to Sit: Modified independent        Transfers:  Sit to Stand: Stand-by assistance  Stand to Sit: Stand-by assistance  Stand Pivot Transfers: Stand-by assistance                    Balance:   Sitting: Intact  Standing: Impaired  Standing - Static: Good  Standing - Dynamic : Fair(path deviations during gait)  Ambulation/Gait Training:  Distance (ft): 150 Feet (ft)  Assistive Device: Gait belt  Ambulation - Level of Assistance: Contact guard assistance        Gait Abnormalities: Altered arm swing;Decreased step clearance; Path deviations(keyshawn ANDREWS > L)        Base of Support: Widened     Speed/Cari: Pace decreased (<100 feet/min)  Step Length: Right shortened;Left shortened                     Stairs: Therapeutic Exercises:       Functional Measure:  Timed up and go:    Timed Get Up And Go Test: 12       < than 10 seconds=Normal  Greater then 13.5 seconds (in elderly)=Increased fall risk   Dee Pabon [place] M. Predicting the probability for falls in community dwelling older adults using the Timed Up and Go Test. Phys Ther. 2000;80:896-903. Activity Tolerance:   Good  Please refer to the flowsheet for vital signs taken during this treatment. After treatment patient left in no apparent distress:   Sitting in chair and Call bell within reach    COMMUNICATION/EDUCATION:   The patients plan of care was discussed with: Occupational Therapist and Registered Nurse. Fall prevention education was provided and the patient/caregiver indicated understanding., Patient/family have participated as able in goal setting and plan of care. and Patient/family agree to work toward stated goals and plan of care.     Thank you for this referral.  Jeremy Parish, PT   Time Calculation: 27 mins

## 2019-09-13 NOTE — PROGRESS NOTES
Problem: Falls - Risk of  Goal: *Absence of Falls  Description  Document Marcykary Andreianna Fall Risk and appropriate interventions in the flowsheet.   Outcome: Progressing Towards Goal  Note:   Fall Risk Interventions:  Mobility Interventions: Bed/chair exit alarm, Patient to call before getting OOB, PT Consult for mobility concerns, PT Consult for assist device competence, Strengthening exercises (ROM-active/passive), OT consult for ADLs         Medication Interventions: Bed/chair exit alarm, Patient to call before getting OOB, Teach patient to arise slowly    Elimination Interventions: Call light in reach, Urinal in reach, Bed/chair exit alarm, Stay With Me (per policy)    History of Falls Interventions: Bed/chair exit alarm

## 2019-09-13 NOTE — PROGRESS NOTES
9009 Bedside report given to Christy Carrillo RN by Josh Cazares RN. Report included SBAR, ED Report, Labs, and Cardiac Rhythm NSR. Patient in bed resting well. Vitals are stable. Carmencita Tsai 32 to Dr Leelee Sierra r/t patient being straight cath this morning which is the second time. He ordered a cather placed and place a call to urologist.    4756 I pulled the Saira Slate, and it was broken. Submitting another order.

## 2019-09-13 NOTE — PROGRESS NOTES
Orders received, chart reviewed and patient evaluated by physical therapy. Pending progression with skilled acute physical therapy, recommend:  Physical therapy at least 2 days/week in the home     Recommend with nursing patient to complete as able in order to maintain strength, endurance and independence: OOB to chair 3x/day with SBA and ambulating with CGA and no AD. Thank you for your assistance. Full evaluation to follow.

## 2019-09-14 NOTE — PROGRESS NOTES
Hospitalist Progress Note    NAME: Willard Davis   :  1950   MRN:  [id number]       Assessment / Plan:  Acute Hypoxic Respiratory Failure POA: due to pulmonary edema, now 85% on 6LNC, off BiPAP, tolerating room air today  Sepsis POA: leukocytosis, acidosis due to SBP. So far no fever  Abdominal pain and tenderness concerning for SBP: patient is on PD and seems that catheter is infected, will c/w Zosyn and D/c Vancomycin F/U cultures so far heavy Pseudomonas  isolated,  Renal in the case. Peritoneal Catheter was d/c  ESRD on PD with possible failure of PD  Renal help appreciated, had HD 2 days in a row, Renal planning for Permacath on Monday. A. Fib RVR: will try one dose of Diltiazem, if that does not work will do diltiazem drip, check serial troponin, notify Cardiology  Acute on Chronic Diastolic Heart Failure POA: EF 52%, with diastolic dysfunction, D/W Renal and Cardiology, monitor I/O and weight. F/U new CXR shows better aeration but still some congestion, but clinically much better  Acute toxic and metabolic encephalopathy secondary to the above, monitor, so far back to baseline  Diabetes mellitus : c/w Lantus, SSI, F/U  HbA1C 6.1. Anemia secondary to CKD  Essential hypertension resume minoxidil, c/w clonidine, and Cardizem, monitor  Urinary Retention: Urology evaluation appreciated, c/w Flomax, Major cath in place  GERD/ PUD  Overweight: BMI 28.94 counseled. Surrogate Decision Maker: Josafat Hdez  Code status: DNR  Prophylaxis: Hep SQ  Recommended Disposition: Home w/Family     Critically ill patient high risk for de-compensation     Subjective:     Chief Complaint / Reason for Physician Visit  \"My penis is sore\". Discussed with RN events overnight.      Review of Systems:  Symptom Y/N Comments  Symptom Y/N Comments   Fever/Chills    Chest Pain n    Poor Appetite    Edema     Cough    Abdominal Pain     Sputum    Joint Pain     SOB/RUBIO n   Pruritis/Rash     Nausea/vomit    Tolerating PT/OT Diarrhea    Tolerating Diet y    Constipation    Other y Weak and tired     Could NOT obtain due to:      Objective:     VITALS:   Last 24hrs VS reviewed since prior progress note. Most recent are:  Patient Vitals for the past 24 hrs:   Temp Pulse Resp BP SpO2   09/14/19 0937  (!) 128   98 %   09/14/19 0936    146/76    09/14/19 0852  (!) 133   96 %   09/14/19 0458 98.8 °F (37.1 °C) 77 16 180/56 100 %   09/14/19 0427    172/51    09/13/19 2336 98.6 °F (37 °C) (!) 55 16 129/41 97 %   09/13/19 2145  68  162/48    09/13/19 1924 99.2 °F (37.3 °C) 72 16 95/63 95 %   09/13/19 1845  63 16 109/55    09/13/19 1830  73 16 101/64    09/13/19 1815  65 16 107/58    09/13/19 1800  71 16 118/54    09/13/19 1745  68 16 123/57    09/13/19 1740  69  119/65    09/13/19 1730  71 18 192/66    09/13/19 1715  64 16 179/56    09/13/19 1700  71 16 180/55    09/13/19 1645  67 16 175/54    09/13/19 1630  70 16 187/53    09/13/19 1615  71 18 171/56    09/13/19 1600  71 18 183/59    09/13/19 1545  65 18 172/56    09/13/19 1530  68 16 181/54    09/13/19 1519 97.5 °F (36.4 °C) 74 18 173/56    09/13/19 1225  73  172/57 94 %   09/13/19 1223  72  178/63 94 %   09/13/19 1038  76  167/60    09/13/19 1036 98.6 °F (37 °C) 73 19 167/60 96 %       Intake/Output Summary (Last 24 hours) at 9/14/2019 1016  Last data filed at 9/14/2019 0458  Gross per 24 hour   Intake 500 ml   Output 3300 ml   Net -2800 ml        PHYSICAL EXAM:  General: WD, WN. Alert, cooperative, in no distress  EENT:  EOMI. Anicteric sclerae. MMM  Resp:  Coarse BS, rhonchi  CV:  Regular  rhythm,  trace edema  GI:  Soft, Non distended, Non tender.  +Bowel sounds  Neurologic:  Alert and oriented X 3, normal speech,   Psych:   Good insight. Not anxious nor agitated  Skin:  No rashes.   No jaundice    Reviewed most current lab test results and cultures  YES  Reviewed most current radiology test results   YES  Review and summation of old records today    NO  Reviewed patient's current orders and MAR    YES  PMH/SH reviewed - no change compared to H&P  ________________________________________________________________________  Care Plan discussed with:    Comments   Patient y    Family  y Son, sister   RN y    Care Manager     Consultant  y Cardiology, Nephrology                     Multidiciplinary team rounds were held today with , nursing, pharmacist and clinical coordinator. Patient's plan of care was discussed; medications were reviewed and discharge planning was addressed. ________________________________________________________________________  Total NON critical care TIME:    Minutes    Total CRITICAL CARE TIME Spent: 35   Minutes non procedure based      Comments   >50% of visit spent in counseling and coordination of care y    ________________________________________________________________________  Celio Shabazz MD     Procedures: see electronic medical records for all procedures/Xrays and details which were not copied into this note but were reviewed prior to creation of Plan. LABS:  I reviewed today's most current labs and imaging studies.   Pertinent labs include:  Recent Labs     09/14/19  0432 09/13/19  0427 09/12/19  0547   WBC 12.9* 12.1* 9.8   HGB 8.2* 8.6* 8.4*   HCT 24.1* 26.3* 25.6*    389 328     Recent Labs     09/14/19  0432 09/13/19  0427 09/12/19  0547    134* 135*   K 4.0 4.2 3.7    97 93*   CO2 25 29 33*    114* 120*   BUN 22* 31* 43*   CREA 3.81* 5.27* 7.42*   CA 8.6 9.0 8.5   MG 2.2 2.0 1.8   PHOS 2.9 5.7* 4.6   ALB 1.9* 2.0* 2.1*   TBILI 0.4 0.4 0.5   SGOT 23 14* 10*   ALT 16 13 13       Signed: Celio Shabazz MD

## 2019-09-14 NOTE — PROGRESS NOTES
NAME: Willard Davis        :  1950        MRN:  [id number]        Assessment :    Plan:  --ESRD  Peritonitis/sepsis  Volume overload  Hypokalemia  Anemia of esrd  Urine retention HD initiated ; last HD ; PD stopped  and PD cath removed      No HD today; next HD Monday    Work on outpatient HD at [facility] for now; perm cath on Monday. Hep b immune; currently he is not inclined to trying PD again    On cpap at night    retacrit 10 k sc tiw    PD fluid with PSEUDOMONAS AERUGINOSA     Major, Urology following; flomax       Subjective:     Chief Complaint: In PCU. Alert. Feeling much better. No current abdominal pain. We discussed the above. Review of Systems:    Symptom Y/N Comments  Symptom Y/N Comments   Fever/Chills    Chest Pain     Poor Appetite    Edema     Cough    Abdominal Pain n    Sputum    Joint Pain     SOB/RUBIO n   Pruritis/Rash     Nausea/vomit    Tolerating PT/OT     Diarrhea    Tolerating Diet     Constipation    Other       Could not obtain due to:      Objective:     VITALS:   Last 24hrs VS reviewed since prior progress note. Most recent are:  Visit Vitals  /50   Pulse 72   Temp 98.6 °F (37 °C)   Resp 16   Ht 5' 9\" (1.753 m)   Wt 80.6 kg (177 lb 11.1 oz)   SpO2 98%   BMI 26.24 kg/m²       Intake/Output Summary (Last 24 hours) at 2019 1143  Last data filed at 2019 0458  Gross per 24 hour   Intake 500 ml   Output 3300 ml   Net -2800 ml      Telemetry Reviewed:     PHYSICAL EXAM:  General: On nc oxygen, nad  Resp:  Cta. No access. muscle use  CV:  Regular  rhythm,  No murmur (), No Rubs, No Gallops.  mild edema  GI:  Soft, distended, non  tender.  +Bowel sounds, no HSM      Lab Data Reviewed: (see below)    Medications Reviewed: (see below)    PMH/SH reviewed - no change compared to H&P  ________________________________________________________________________  Care Plan discussed with:  Patient y    Family      RN y    Care Manager                    Consultant:          Comments   >50% of visit spent in counseling and coordination of care       ________________________________________________________________________  Olinda Slaughter MD     Procedures: see electronic medical records for all procedures/Xrays and details which  were not copied into this note but were reviewed prior to creation of Plan. LABS:  Recent Labs     09/14/19 0432 09/13/19 0427   WBC 12.9* 12.1*   HGB 8.2* 8.6*   HCT 24.1* 26.3*    389     Recent Labs     09/14/19 0432 09/13/19 0427 09/12/19  0547    134* 135*   K 4.0 4.2 3.7    97 93*   CO2 25 29 33*   BUN 22* 31* 43*   CREA 3.81* 5.27* 7.42*    114* 120*   CA 8.6 9.0 8.5   MG 2.2 2.0 1.8   PHOS 2.9 5.7* 4.6     Recent Labs     09/14/19 0432 09/13/19 0427 09/12/19  0547   SGOT 23 14* 10*   AP 94 104 104   TP 7.0 7.9 7.8   ALB 1.9* 2.0* 2.1*   GLOB 5.1* 5.9* 5.7*     No results for input(s): INR, PTP, APTT in the last 72 hours. No lab exists for component: INREXT, INREXT   No results for input(s): FE, TIBC, PSAT, FERR in the last 72 hours. Lab Results   Component Value Date/Time    Folate 42.1 (H) 09/08/2012 10:24 AM      No results for input(s): PH, PCO2, PO2 in the last 72 hours. No results for input(s): CPK, CKMB in the last 72 hours.     No lab exists for component: TROPONINI  No components found for: [place]  Lab Results   Component Value Date/Time    Color YELLOW/STRAW 09/10/2019 06:26 PM    Appearance CLEAR 09/10/2019 06:26 PM    Specific gravity 1.024 09/10/2019 06:26 PM    pH (UA) 8.0 09/10/2019 06:26 PM    Protein 300 (A) 09/10/2019 06:26 PM    Glucose 100 (A) 09/10/2019 06:26 PM    Ketone NEGATIVE  09/10/2019 06:26 PM    Bilirubin NEGATIVE  09/10/2019 06:26 PM    Urobilinogen 0.2 09/10/2019 06:26 PM    Nitrites NEGATIVE  09/10/2019 06:26 PM    Leukocyte Esterase NEGATIVE  09/10/2019 06:26 PM    Epithelial cells FEW 09/10/2019 06:26 PM    Bacteria NEGATIVE  09/10/2019 06:26 PM    WBC 0-4 09/10/2019 06:26 PM    RBC 0-5 09/10/2019 06:26 PM       MEDICATIONS:  Current Facility-Administered Medications   Medication Dose Route Frequency    opium-belladonna (B&O 15-A) 16.2-30 mg suppository 1 Suppository  1 Suppository Rectal Q6H PRN    tamsulosin (FLOMAX) capsule 0.8 mg  0.8 mg Oral QHS    dilTIAZem (CARDIZEM) 125 mg in dextrose 5% 125 mL infusion  0-15 mg/hr IntraVENous TITRATE    cloNIDine HCl (CATAPRES) tablet 0.2 mg  0.2 mg Oral BID    minoxidil (LONITEN) tablet 5 mg  5 mg Oral BID    atorvastatin (LIPITOR) tablet 40 mg  40 mg Oral QHS    melatonin tablet 3 mg  3 mg Oral QHS PRN    heparin (porcine) injection 5,000 Units  5,000 Units SubCUTAneous Q8H    labetalol (NORMODYNE;TRANDATE) injection 10 mg  10 mg IntraVENous Q6H PRN    alirocumab (PRALUENT) 75 mg/mL injector pen 75 mg (Patient Supplied)  75 mg SubCUTAneous Once every 14 days    HYDROmorphone (PF) (DILAUDID) injection 1 mg  1 mg IntraVENous Q3H PRN    epoetin dilan-epbx (RETACRIT) injection 10,000 Units  10,000 Units SubCUTAneous Q MON, WED & FRI    piperacillin-tazobactam (ZOSYN) 3.375 g in 0.9% sodium chloride (MBP/ADV) 100 mL  3.375 g IntraVENous Q12H    acetaminophen (TYLENOL) tablet 650 mg  650 mg Oral Q6H PRN    ondansetron (ZOFRAN) injection 4 mg  4 mg IntraVENous Q4H PRN    nebivolol (BYSTOLIC) tablet 20 mg  20 mg Oral QHS    cholecalciferol (VITAMIN D3) tablet 1,000 Units  1,000 Units Oral DAILY    clopidogrel (PLAVIX) tablet 75 mg  75 mg Oral QHS    B complex-vitaminC-folic acid (NEPHROCAP) cap  1 Cap Oral DAILY    furosemide (LASIX) tablet 80 mg  80 mg Oral TID    omega 3-DHA-EPA-fish oil 1,000 mg (120 mg-180 mg) capsule 1 Cap  1 Cap Oral DAILY    sodium chloride (NS) flush 5-40 mL  5-40 mL IntraVENous Q8H    sodium chloride (NS) flush 5-40 mL  5-40 mL IntraVENous PRN    naloxone (NARCAN) injection 0.4 mg  0.4 mg IntraVENous PRN    insulin glargine (LANTUS) injection 25 Units  25 Units SubCUTAneous DAILY    glucose chewable tablet 16 g  4 Tab Oral PRN    dextrose 10% infusion 0-250 mL  0-250 mL IntraVENous PRN    insulin lispro (HUMALOG) injection   SubCUTAneous AC&HS

## 2019-09-14 NOTE — PROGRESS NOTES
Catheter issues improved with Cath lock per RN. Patient AAO, comfortable. Spasms improved. Major yellow urine.

## 2019-09-14 NOTE — PROGRESS NOTES
Problem: Falls - Risk of  Goal: *Absence of Falls  Description  Document Kendell Bowling Fall Risk and appropriate interventions in the flowsheet.   Outcome: Progressing Towards Goal  Note:   Fall Risk Interventions:  Mobility Interventions: Patient to call before getting OOB, PT Consult for mobility concerns, PT Consult for assist device competence, Strengthening exercises (ROM-active/passive)         Medication Interventions: Patient to call before getting OOB, Teach patient to arise slowly    Elimination Interventions: Call light in reach, Patient to call for help with toileting needs    History of Falls Interventions: Bed/chair exit alarm

## 2019-09-14 NOTE — PROGRESS NOTES
Pt very upset about maldonado stated he is in a lot of pain felt that maybe was not in all the way. Had bladder scanned him and he has very little only 39.cc. Also meanwhile since he was so upset his heart rate up 130 to 140 asymptomatic. So I called Dr Yoan Be,  [facility] and also notified Dr Hosea Smith of problem. I also did maldonado care deflated balloon and made sure maldonado inserted all the way and then re inflated. Pt also given iv Dilaudid while waiting for MD's to call back. Pt found relief from my urinary intervention.  Next Dr Peyman Whatley came back we did a 12 lead and then gave 10 mg of Diltiazem and started the drip and titrated up to 10 mg since his heart rate was still fast.   [facility] re paged but Dr Hosea Smith handled the whole cardiac rhythm change

## 2019-09-14 NOTE — PROGRESS NOTES
0100: pt removed Bipap and stated that son brought home Cpap. Pt reported that the Bipap was \"to loud and blew to much air\" in his face. Contacted [facility]  to evaluate cpap. Voice mail message left. Bedside report given to Johanne Daniel RN. Pt had an uneventful night.

## 2019-09-14 NOTE — PROGRESS NOTES
Bedside shift change report given to [address] (oncoming nurse) by Sandhya Gaspar (offgoing nurse). Report included the following information SBAR.

## 2019-09-14 NOTE — PROGRESS NOTES
Diltiazem stopped due to sinus Bradycardia and Dr Daniel Lorenzana advised. Also report given to Nevaeh Cunningham RN to take over care.

## 2019-09-15 NOTE — PROGRESS NOTES
Problem: Falls - Risk of  Goal: *Absence of Falls  Description  Document Dulce Rodrigues Fall Risk and appropriate interventions in the flowsheet.   Outcome: Progressing Towards Goal  Note:   Fall Risk Interventions:  Mobility Interventions: Bed/chair exit alarm, Patient to call before getting OOB         Medication Interventions: Patient to call before getting OOB, Teach patient to arise slowly    Elimination Interventions: Call light in reach, Patient to call for help with toileting needs    History of Falls Interventions: Bed/chair exit alarm, Consult care management for discharge planning, Door open when patient unattended, Room close to nurse's station         Problem: Patient Education: Go to Patient Education Activity  Goal: Patient/Family Education  Outcome: Progressing Towards Goal     Problem: Pain  Goal: *Control of Pain  Outcome: Progressing Towards Goal  Goal: *PALLIATIVE CARE:  Alleviation of Pain  Outcome: Progressing Towards Goal     Problem: Breathing Pattern - Ineffective  Goal: *Absence of hypoxia  Outcome: Progressing Towards Goal  Goal: *Use of effective breathing techniques  Outcome: Progressing Towards Goal  Goal: *PALLIATIVE CARE:  Alleviation of Dyspnea  Outcome: Progressing Towards Goal

## 2019-09-15 NOTE — PROGRESS NOTES
Spiritual Care Assessment/Progress Note  [facility]      NAME: Radha Barrett      MRN: [id number]  AGE: 77 y.o. SEX: male  Faith Affiliation: Restorationist   Language: English     9/15/2019     Total Time (in minutes): 10     Spiritual Assessment begun in [facility] PROGRESSIVE CARE through conversation with:         []Patient        [] Family    [] Friend(s)        Reason for Consult: Initial/Spiritual assessment, patient floor     Spiritual beliefs: (Please include comment if needed)     [] Identifies with a riccardo tradition:         [] Supported by a riccardo community:            [] Claims no spiritual orientation:           [] Seeking spiritual identity:                [] Adheres to an individual form of spirituality:           [x] Not able to assess:                           Identified resources for coping:      [] Prayer                               [] Music                  [] Guided Imagery     [] Family/friends                 [] Pet visits     [] Devotional reading                         [x] Unknown     [] Other:                                              Interventions offered during this visit: (See comments for more details)    Patient Interventions: Initial/Spiritual assessment, patient floor, Initial visit           Plan of Care:     [x] Support spiritual and/or cultural needs    [] Support AMD and/or advance care planning process      [] Support grieving process   [] Coordinate Rites and/or Rituals    [] Coordination with community clergy   [] No spiritual needs identified at this time   [] Detailed Plan of Care below (See Comments)  [] Make referral to Music Therapy  [] Make referral to Pet Therapy     [] Make referral to Addiction services  [] Make referral to [facility]  [] Make referral to Spiritual Care Partner  [] No future visits requested        [x] Follow up visits as needed     Comments:  made initial visit to patients room for spiritual assessment.  When  entered the room the patient was lying in bed a sleep and did not make when  entered the room.  left a card on the table. Spiritual care will follow up as needed.     Phani Dorman MDiv  Pager: [phone]

## 2019-09-15 NOTE — PROGRESS NOTES
Hospitalist Progress Note    NAME: Rudy Chance   :  1950   MRN:  [id number]       Assessment / Plan:  Acute Hypoxic Respiratory Failure POA: due to pulmonary edema, now 85% on 6LNC, off BiPAP, tolerating room air for 24h  Sepsis POA: leukocytosis, acidosis due to SBP. So far no fever  Abdominal pain and tenderness concerning for SBP: patient is on PD and seems that catheter is infected, will c/w Zosyn and D/c Vancomycin F/U cultures so far heavy Pseudomonas  isolated,  Renal in the case. Peritoneal Catheter was d/c, will add LVQ PO for double coverage  ESRD on PD with possible failure of PD  Renal help appreciated, had HD 2 days in a row, Renal planning for Permacath tomorrow  A. Fib RVR: converted to NSR after Diltiazem injection, troponin is negative, Cardiology in the case. Acute on Chronic Diastolic Heart Failure POA: EF 28%, with diastolic dysfunction, D/W Renal and Cardiology, monitor I/O and weight. F/U new CXR shows better aeration but still some congestion, but clinically much better  Acute toxic and metabolic encephalopathy secondary to the above, monitor, so far back to baseline  Diabetes mellitus : c/w Lantus, SSI, F/U  HbA1C 6.1. Anemia secondary to CKD  Essential hypertension resume minoxidil, c/w clonidine, and Cardizem, monitor  Urinary Retention: Urology evaluation appreciated, c/w Flomax, Major cath in place  GERD/ PUD  Overweight: BMI 28.94 counseled. Surrogate Decision Maker: Beuford Saver  Code status: DNR  Prophylaxis: Hep SQ  Recommended Disposition: Home w/Family      Subjective:     Chief Complaint / Reason for Physician Visit  \"I feel better\". Discussed with RN events overnight.      Review of Systems:  Symptom Y/N Comments  Symptom Y/N Comments   Fever/Chills    Chest Pain n    Poor Appetite    Edema     Cough    Abdominal Pain     Sputum    Joint Pain     SOB/RUBIO n   Pruritis/Rash     Nausea/vomit    Tolerating PT/OT     Diarrhea    Tolerating Diet y    Constipation Other y Weak and tired     Could NOT obtain due to:      Objective:     VITALS:   Last 24hrs VS reviewed since prior progress note. Most recent are:  Patient Vitals for the past 24 hrs:   Temp Pulse Resp BP SpO2   09/15/19 0805 98.3 °F (36.8 °C) 63 16 174/45 98 %   09/15/19 0305 98.8 °F (37.1 °C) (!) 56 16 164/55 98 %   09/14/19 2310 98.3 °F (36.8 °C) 70 16 165/52 98 %   09/14/19 1915 99.8 °F (37.7 °C) 70 16 185/55 99 %   09/14/19 1606 99 °F (37.2 °C) 65 18 186/52 99 %   09/14/19 1136 98.6 °F (37 °C) 72 16 164/50 95 %       Intake/Output Summary (Last 24 hours) at 9/15/2019 1051  Last data filed at 9/15/2019 0614  Gross per 24 hour   Intake 100 ml   Output 350 ml   Net -250 ml        PHYSICAL EXAM:  General: WD, WN. Alert, cooperative, in no distress  EENT:  EOMI. Anicteric sclerae. MMM  Resp:  Coarse BS, rhonchi  CV:  Regular  rhythm,  trace edema  GI:  Soft, Non distended, Non tender.  +Bowel sounds  Neurologic:  Alert and oriented X 3, normal speech,   Psych:   Good insight. Not anxious nor agitated  Skin:  No rashes. No jaundice    Reviewed most current lab test results and cultures  YES  Reviewed most current radiology test results   YES  Review and summation of old records today    NO  Reviewed patient's current orders and MAR    YES  PMH/SH reviewed - no change compared to H&P  ________________________________________________________________________  Care Plan discussed with:    Comments   Patient y    Family  y Son, sister   RN y    Care Manager     Consultant  y Cardiology, Nephrology                     Multidiciplinary team rounds were held today with , nursing, pharmacist and clinical coordinator. Patient's plan of care was discussed; medications were reviewed and discharge planning was addressed.      ________________________________________________________________________  Total NON critical care TIME:  30  Minutes    Total CRITICAL CARE TIME Spent:   Minutes non procedure based Comments   >50% of visit spent in counseling and coordination of care y    ________________________________________________________________________  Clair Domínguez MD     Procedures: see electronic medical records for all procedures/Xrays and details which were not copied into this note but were reviewed prior to creation of Plan. LABS:  I reviewed today's most current labs and imaging studies.   Pertinent labs include:  Recent Labs     09/15/19  0311 09/14/19  0432 09/13/19  0427   WBC 12.6* 12.9* 12.1*   HGB 7.5* 8.2* 8.6*   HCT 22.5* 24.1* 26.3*    387 389     Recent Labs     09/15/19  0311 09/14/19  0432 09/13/19  0427    137 134*   K 3.7 4.0 4.2    100 97   CO2 28 25 29   GLU 74 100 114*   BUN 34* 22* 31*   CREA 5.97* 3.81* 5.27*   CA 7.8* 8.6 9.0   MG 2.3 2.2 2.0   PHOS  --  2.9 5.7*   ALB 1.8* 1.9* 2.0*   TBILI 0.7 0.4 0.4   SGOT 21 23 14*   ALT 17 16 13       Signed: Clair Domínguez MD

## 2019-09-15 NOTE — PROGRESS NOTES
NAME: Helder Reddy        :  1950        MRN:  [id number]        Assessment :    Plan:  --ESRD  Peritonitis/sepsis  Volume overload  Hypokalemia  Anemia of esrd  Urine retention HD initiated ; last HD ; PD stopped  and PD cath removed      No HD today; next HD Monday    Work on outpatient HD at [employer]; perm cath on Monday. Hep b immune; currently he is not inclined to trying PD again    On cpap at night    retacrit 10 k sc tiw; prn prbc's    PD fluid with PSEUDOMONAS AERUGINOSA     Major, Urology following; flomax       Subjective:     Chief Complaint: In PCU. Alert. Feeling much better. No current abdominal pain. We discussed the above. Review of Systems:    Symptom Y/N Comments  Symptom Y/N Comments   Fever/Chills    Chest Pain     Poor Appetite    Edema     Cough    Abdominal Pain n    Sputum    Joint Pain     SOB/RUBIO n   Pruritis/Rash     Nausea/vomit    Tolerating PT/OT     Diarrhea    Tolerating Diet     Constipation    Other       Could not obtain due to:      Objective:     VITALS:   Last 24hrs VS reviewed since prior progress note. Most recent are:  Visit Vitals  /55   Pulse (!) 56   Temp 98.8 °F (37.1 °C)   Resp 16   Ht 5' 9\" (1.753 m)   Wt 80.3 kg (177 lb 0.5 oz)   SpO2 98%   BMI 26.14 kg/m²       Intake/Output Summary (Last 24 hours) at 9/15/2019 0622  Last data filed at 9/15/2019 1848  Gross per 24 hour   Intake 100 ml   Output 350 ml   Net -250 ml      Telemetry Reviewed:     PHYSICAL EXAM:  General: On nc oxygen, nad  Resp:  Cta. No access. muscle use  CV:  Regular  rhythm,  No murmur (), No Rubs, No Gallops.  mild edema  GI:  Soft, distended, non  tender.  +Bowel sounds, no HSM      Lab Data Reviewed: (see below)    Medications Reviewed: (see below)    PMH/SH reviewed - no change compared to H&P  ________________________________________________________________________  Care Plan discussed with:  Patient y    Family      RN y    Care Manager                    Consultant:          Comments   >50% of visit spent in counseling and coordination of care       ________________________________________________________________________  Yareli Reeves MD     Procedures: see electronic medical records for all procedures/Xrays and details which  were not copied into this note but were reviewed prior to creation of Plan. LABS:  Recent Labs     09/15/19  0311 09/14/19  0432   WBC 12.6* 12.9*   HGB 7.5* 8.2*   HCT 22.5* 24.1*    387     Recent Labs     09/15/19  0311 09/14/19  0432 09/13/19  0427    137 134*   K 3.7 4.0 4.2    100 97   CO2 28 25 29   BUN 34* 22* 31*   CREA 5.97* 3.81* 5.27*   GLU 74 100 114*   CA 7.8* 8.6 9.0   MG 2.3 2.2 2.0   PHOS  --  2.9 5.7*     Recent Labs     09/15/19  0311 09/14/19  0432 09/13/19  0427   SGOT 21 23 14*   AP 87 94 104   TP 6.3* 7.0 7.9   ALB 1.8* 1.9* 2.0*   GLOB 4.5* 5.1* 5.9*     No results for input(s): INR, PTP, APTT in the last 72 hours. No lab exists for component: INREXT, INREXT   No results for input(s): FE, TIBC, PSAT, FERR in the last 72 hours. Lab Results   Component Value Date/Time    Folate 42.1 (H) 09/08/2012 10:24 AM      No results for input(s): PH, PCO2, PO2 in the last 72 hours. No results for input(s): CPK, CKMB in the last 72 hours.     No lab exists for component: TROPONINI  No components found for: [place]  Lab Results   Component Value Date/Time    Color YELLOW/STRAW 09/10/2019 06:26 PM    Appearance CLEAR 09/10/2019 06:26 PM    Specific gravity 1.024 09/10/2019 06:26 PM    pH (UA) 8.0 09/10/2019 06:26 PM    Protein 300 (A) 09/10/2019 06:26 PM    Glucose 100 (A) 09/10/2019 06:26 PM    Ketone NEGATIVE  09/10/2019 06:26 PM    Bilirubin NEGATIVE  09/10/2019 06:26 PM    Urobilinogen 0.2 09/10/2019 06:26 PM    Nitrites NEGATIVE  09/10/2019 06:26 PM    Leukocyte Esterase NEGATIVE  09/10/2019 06:26 PM    Epithelial cells FEW 09/10/2019 06:26 PM    Bacteria NEGATIVE  09/10/2019 06:26 PM    WBC 0-4 09/10/2019 06:26 PM    RBC 0-5 09/10/2019 06:26 PM       MEDICATIONS:  Current Facility-Administered Medications   Medication Dose Route Frequency    opium-belladonna (B&O 15-A) 16.2-30 mg suppository 1 Suppository  1 Suppository Rectal Q6H PRN    tamsulosin (FLOMAX) capsule 0.8 mg  0.8 mg Oral QHS    dilTIAZem (CARDIZEM) 125 mg in dextrose 5% 125 mL infusion  0-15 mg/hr IntraVENous TITRATE    cloNIDine HCl (CATAPRES) tablet 0.2 mg  0.2 mg Oral BID    minoxidil (LONITEN) tablet 5 mg  5 mg Oral BID    atorvastatin (LIPITOR) tablet 40 mg  40 mg Oral QHS    melatonin tablet 3 mg  3 mg Oral QHS PRN    heparin (porcine) injection 5,000 Units  5,000 Units SubCUTAneous Q8H    labetalol (NORMODYNE;TRANDATE) injection 10 mg  10 mg IntraVENous Q6H PRN    alirocumab (PRALUENT) 75 mg/mL injector pen 75 mg (Patient Supplied)  75 mg SubCUTAneous Once every 14 days    HYDROmorphone (PF) (DILAUDID) injection 1 mg  1 mg IntraVENous Q3H PRN    epoetin dilan-epbx (RETACRIT) injection 10,000 Units  10,000 Units SubCUTAneous Q MON, WED & FRI    piperacillin-tazobactam (ZOSYN) 3.375 g in 0.9% sodium chloride (MBP/ADV) 100 mL  3.375 g IntraVENous Q12H    acetaminophen (TYLENOL) tablet 650 mg  650 mg Oral Q6H PRN    ondansetron (ZOFRAN) injection 4 mg  4 mg IntraVENous Q4H PRN    nebivolol (BYSTOLIC) tablet 20 mg  20 mg Oral QHS    cholecalciferol (VITAMIN D3) tablet 1,000 Units  1,000 Units Oral DAILY    clopidogrel (PLAVIX) tablet 75 mg  75 mg Oral QHS    B complex-vitaminC-folic acid (NEPHROCAP) cap  1 Cap Oral DAILY    furosemide (LASIX) tablet 80 mg  80 mg Oral TID    omega 3-DHA-EPA-fish oil 1,000 mg (120 mg-180 mg) capsule 1 Cap  1 Cap Oral DAILY    sodium chloride (NS) flush 5-40 mL  5-40 mL IntraVENous Q8H    sodium chloride (NS) flush 5-40 mL  5-40 mL IntraVENous PRN    naloxone (NARCAN) injection 0.4 mg  0.4 mg IntraVENous PRN    insulin glargine (LANTUS) injection 25 Units  25 Units SubCUTAneous DAILY    glucose chewable tablet 16 g  4 Tab Oral PRN    dextrose 10% infusion 0-250 mL  0-250 mL IntraVENous PRN    insulin lispro (HUMALOG) injection   SubCUTAneous AC&HS

## 2019-09-16 NOTE — PROCEDURES
[place] [facility] Team [facility]  [phone]    Vitals   Pre   Post   Assessment   Pre   Post     Temp  Temp: 98.1 °F (36.7 °C) (09/16/19 1234)  98.1   LOC  aox4 No change   HR   Pulse (Heart Rate): 60 (09/16/19 1600) 60 Lungs   cta  no change   B/P   BP: 168/60 (09/16/19 1600) 171/47 Cardiac   regular  no change   Resp   Resp Rate: 16 (09/16/19 1600) 16 Skin   Warm dry  no change   Pain level  Pain Intensity 1: 0 (09/16/19 1014) 0 Edema  none     No change   Orders:    Duration:   Start:   5114 End:   1606 Total:   3.5   Dialyzer:   Dialyzer/Set Up Inspection: Revaclear (09/16/19 1234)   K Bath:   Dialysate K (mEq/L): 4 (09/16/19 1234)   Ca Bath:   Dialysate CA (mEq/L): 2.5 (09/16/19 1234)   Na/Bicarb:   Dialysate NA (mEq/L): 138 (09/16/19 1234)   Target Fluid Removal:   Goal/Amount of Fluid to Remove (mL): 3000 mL (09/16/19 1234)   Access     Type & Location:   RIJ HD CVC TUNNELED. DRESSING DATED 6/16/19-CDI. NO S/S OF INFECTION. +BR,FLUSHES WITH EASE. Each catheter limb disinfected for 60 seconds per limb with alcohol swabs. Caps removed, dialysis CVC hub scrubbed with Prevantics for 5 seconds, followed by a 5 second dry time per Hospital P&P.        Labs     Obtained/Reviewed   Critical Results Called   Date when labs were drawn-  Hgb-    HGB   Date Value Ref Range Status   09/16/2019 7.1 (L) 12.1 - 17.0 g/dL Final     K-    Potassium   Date Value Ref Range Status   09/16/2019 3.8 3.5 - 5.1 mmol/L Final     Ca-   Calcium   Date Value Ref Range Status   09/16/2019 7.8 (L) 8.5 - 10.1 MG/DL Final     Bun-   BUN   Date Value Ref Range Status   09/16/2019 42 (H) 6 - 20 MG/DL Final     Creat-   Creatinine   Date Value Ref Range Status   09/16/2019 7.83 (H) 0.70 - 1.30 MG/DL Final     Comment:     INVESTIGATED PER DELTA CHECK PROTOCOL        Medications/ Blood Products Given     Name   Dose   Route and Time           NONE          Blood Volume Processed (BVP):   73.5 Net Fluid   Removed:  3000   Comments Time Out Done: 1215  Primary Nurse Rpt Pre:osmin cruz   Primary Nurse Rpt Post:osmin cruz  Pt [address] Plan:CONT CURRENT HD POC  Tx Summary:  PATIENT TOLERATED WELL. NO ISSUES DURING HD. Each catheter limb disinfected for 60 seconds per limb with alcohol swabs. Dialysis CVC hubs scrubbed with Prevantics for 5 seconds, followed by a 5 second dry time per Hospital P&P, red and blue dialysis caps applied to ports. Admiting Diagnosis:  Pt's previous clinic-  Consent signed - Informed Consent Verified: Yes (09/16/19 1234)  [place] Consent -YES   Hepatitis Status- 9/11/19 AG NEG/IMMUNE   Machine #- Machine Number: [id number] (09/16/19 1234)  Telemetry status-ON  Pre-dialysis wt. -  NA

## 2019-09-16 NOTE — PROGRESS NOTES
Problem: Falls - Risk of  Goal: *Absence of Falls  Description  Document Krishna Fells Fall Risk and appropriate interventions in the flowsheet.   Outcome: Progressing Towards Goal  Note:   Fall Risk Interventions:  Mobility Interventions: Patient to call before getting OOB         Medication Interventions: Patient to call before getting OOB, Teach patient to arise slowly    Elimination Interventions: Call light in reach    History of Falls Interventions: Room close to nurse's station

## 2019-09-16 NOTE — PROGRESS NOTES
Cardiology Progress Note  9/16/2019 6:33 PM  Admit Date: 9/10/2019  Admit Diagnosis/CC: Acute on chronic renal failure (HCC) [N17.9, N18.9]  Subjective:   Patient reports:  Chest Pain:  [x] none,  consistent with [] non-cardiac  [] atypical  []  anginal chest pain             [x] none now    []  on-going  Dyspnea: [x] none  [] at rest  [] with exertion  [] improved  [] unchanged [] worsening  PND:       [x] none  [] overnight   [] current  Orthopnea: [x] none  [] improved  [] unchanged  [] worsening  Presyncope: [x] none  [] improved  [] unchanged  [] worsening  Ambulated in hallway without symptoms  [] Yes  Ambulated in room without symptoms  [x] Yes  ROS(2+other systems)   Hematuria: [] Yes  [x] No.   Dysuria: [] Yes  [x] No                                           Cough:       [] Yes  [x] No.   Sputum: [] Yes  [x] No                                            Hematochezia: [] Yes  [x] No.   Melena: [] Yes  [x] No                                            No change in family and social history from H&P/Consult note.     Current Facility-Administered Medications   Medication Dose Route Frequency    dilTIAZem CD (CARDIZEM CD) capsule 120 mg  120 mg Oral DAILY    minoxidil (LONITEN) tablet 5 mg  5 mg Oral TID    heparin (porcine) injection 5,000 Units  5,000 Units SubCUTAneous Q12H    furosemide (LASIX) tablet 80 mg  80 mg Oral ACB&D    HYDROmorphone (PF) (DILAUDID) injection 1 mg  1 mg IntraVENous Q6H PRN    oxyCODONE IR (ROXICODONE) tablet 5 mg  5 mg Oral Q4H PRN    LORazepam (ATIVAN) tablet 0.5 mg  0.5 mg Oral BID    [START ON 9/17/2019] levoFLOXacin (LEVAQUIN) tablet 500 mg  500 mg Oral Q48H    opium-belladonna (B&O 15-A) 16.2-30 mg suppository 1 Suppository  1 Suppository Rectal Q6H PRN    tamsulosin (FLOMAX) capsule 0.8 mg  0.8 mg Oral QHS    cloNIDine HCl (CATAPRES) tablet 0.2 mg  0.2 mg Oral BID    atorvastatin (LIPITOR) tablet 40 mg  40 mg Oral QHS    melatonin tablet 3 mg  3 mg Oral QHS PRN  labetalol (NORMODYNE;TRANDATE) injection 10 mg  10 mg IntraVENous Q6H PRN    alirocumab (PRALUENT) 75 mg/mL injector pen 75 mg (Patient Supplied)  75 mg SubCUTAneous Once every 14 days    epoetin dilan-epbx (RETACRIT) injection 10,000 Units  10,000 Units SubCUTAneous Q MON, WED & FRI    piperacillin-tazobactam (ZOSYN) 3.375 g in 0.9% sodium chloride (MBP/ADV) 100 mL  3.375 g IntraVENous Q12H    acetaminophen (TYLENOL) tablet 650 mg  650 mg Oral Q6H PRN    ondansetron (ZOFRAN) injection 4 mg  4 mg IntraVENous Q4H PRN    nebivolol (BYSTOLIC) tablet 20 mg  20 mg Oral QHS    cholecalciferol (VITAMIN D3) tablet 1,000 Units  1,000 Units Oral DAILY    clopidogrel (PLAVIX) tablet 75 mg  75 mg Oral QHS    B complex-vitaminC-folic acid (NEPHROCAP) cap  1 Cap Oral DAILY    omega 3-DHA-EPA-fish oil 1,000 mg (120 mg-180 mg) capsule 1 Cap  1 Cap Oral DAILY    sodium chloride (NS) flush 5-40 mL  5-40 mL IntraVENous Q8H    sodium chloride (NS) flush 5-40 mL  5-40 mL IntraVENous PRN    naloxone (NARCAN) injection 0.4 mg  0.4 mg IntraVENous PRN    insulin glargine (LANTUS) injection 25 Units  25 Units SubCUTAneous DAILY    glucose chewable tablet 16 g  4 Tab Oral PRN    dextrose 10% infusion 0-250 mL  0-250 mL IntraVENous PRN    insulin lispro (HUMALOG) injection   SubCUTAneous AC&HS     Facility-Administered Medications Ordered in Other Encounters   Medication Dose Route Frequency    heparinized saline 2 units/mL infusion 800 Units  400 mL Irrigation ONCE       Objective:    Physical Exam:  Last VS:   Visit Vitals  /60   Pulse 60   Temp 98.2 °F (36.8 °C)   Resp 16   Ht 5' 9\" (1.753 m)   Wt 82.4 kg (181 lb 9.6 oz)   SpO2 100%   BMI 26.82 kg/m²    Temp (24hrs), Av.3 °F (36.8 °C), Min:97.9 °F (36.6 °C), Max:98.7 °F (37.1 °C)    24 hr VS reviewed. General Appearance:   [x] well developed, well nourished,  [x] NAD.       [] agitated   [] lethargic but arousable  [] obtunded   ENT, Palate:    [x] WNL   [] dry palate/MM        Respiratory:    [x] CTA bilateral  [] rales  [] rhonchi  [] normal resp effort      [] similar to yesterday   [] worse    [] improved   Cardiovascular:   [x] RRR   [] Irregular rate and rhythm   [x] Normal S1, S2   [x] No gallop or rub. [] no murmur   [x] no new murmur  [] murmur c/w:   [x] no edema  RLE: []1+ []2+ [] 3+;  LLE: []1+ []2+ []3+      [] edema similar to yesterday   [] worse    [] improved   [x] normal JVP   [] Elevated JVP    [x] JVP similar to yesterday   [] worse    [] improved   [x] carotid upstroke unchanged   [x] abd aorta not palpated   [x] no stigmata of peripheral emboli   GI:    [x] abd soft, non-distented,bowel sounds present, no                     organomegaly appreciated   Skin:  Neuro:    Cath Site:  [x] warm and dry [] cold extremities   [x] A/O x 3, grossly non-focal      [] intact w/o hematoma or bruit; distal pulse unchanged. Data Review:   Labs:    Recent Results (from the past 24 hour(s))   GLUCOSE, POC    Collection Time: 09/15/19  9:04 PM   Result Value Ref Range    Glucose (POC) 113 (H) 65 - 100 mg/dL    Performed by Saima Marx (PCT)    CBC WITH AUTOMATED DIFF    Collection Time: 09/16/19  4:29 AM   Result Value Ref Range    WBC 12.1 (H) 4.1 - 11.1 K/uL    RBC 2.12 (L) 4.10 - 5.70 M/uL    HGB 7.1 (L) 12.1 - 17.0 g/dL    HCT 20.9 (L) 36.6 - 50.3 %    MCV 98.6 80.0 - 99.0 FL    MCH 33.5 26.0 - 34.0 PG    MCHC 34.0 30.0 - 36.5 g/dL    RDW 16.1 (H) 11.5 - 14.5 %    PLATELET 728 454 - 414 K/uL    MPV 11.3 8.9 - 12.9 FL    NRBC 2.1 (H) 0  WBC    ABSOLUTE NRBC 0.25 (H) 0.00 - 0.01 K/uL    NEUTROPHILS 81 (H) 32 - 75 %    BAND NEUTROPHILS 2 %    LYMPHOCYTES 7 (L) 12 - 49 %    MONOCYTES 8 5 - 13 %    EOSINOPHILS 0 0 - 7 %    BASOPHILS 0 0 - 1 %    METAMYELOCYTES 1 %    IMMATURE GRANULOCYTES 1 (H) 0.0 - 0.5 %    ABS. NEUTROPHILS 10.0 (H) 1.8 - 8.0 K/UL    ABS. LYMPHOCYTES 0.8 0.8 - 3.5 K/UL    ABS. MONOCYTES 1.0 0.0 - 1.0 K/UL    ABS. EOSINOPHILS 0.0 0.0 - 0.4 K/UL    ABS. BASOPHILS 0.0 0.0 - 0.1 K/UL    ABS. IMM. GRANS. 0.1 (H) 0.00 - 0.04 K/UL    DF MANUAL      PLATELET COMMENTS Large Platelets      RBC COMMENTS ANISOCYTOSIS  1+        RBC COMMENTS TEARDROP CELLS  1+        RBC COMMENTS OVALOCYTES  1+       MAGNESIUM    Collection Time: 09/16/19  4:29 AM   Result Value Ref Range    Magnesium 2.0 1.6 - 2.4 mg/dL   METABOLIC PANEL, COMPREHENSIVE    Collection Time: 09/16/19  4:29 AM   Result Value Ref Range    Sodium 139 136 - 145 mmol/L    Potassium 3.8 3.5 - 5.1 mmol/L    Chloride 103 97 - 108 mmol/L    CO2 25 21 - 32 mmol/L    Anion gap 11 5 - 15 mmol/L    Glucose 83 65 - 100 mg/dL    BUN 42 (H) 6 - 20 MG/DL    Creatinine 7.83 (H) 0.70 - 1.30 MG/DL    BUN/Creatinine ratio 5 (L) 12 - 20      GFR est AA 8 (L) >60 ml/min/1.73m2    GFR est non-AA 7 (L) >60 ml/min/1.73m2    Calcium 7.8 (L) 8.5 - 10.1 MG/DL    Bilirubin, total 0.6 0.2 - 1.0 MG/DL    ALT (SGPT) 23 12 - 78 U/L    AST (SGOT) 28 15 - 37 U/L    Alk.  phosphatase 90 45 - 117 U/L    Protein, total 6.1 (L) 6.4 - 8.2 g/dL    Albumin 1.7 (L) 3.5 - 5.0 g/dL    Globulin 4.4 (H) 2.0 - 4.0 g/dL    A-G Ratio 0.4 (L) 1.1 - 2.2     GLUCOSE, POC    Collection Time: 09/16/19  7:29 AM   Result Value Ref Range    Glucose (POC) 97 65 - 100 mg/dL    Performed by Narcisa Lopez (PCT)    GLUCOSE, POC    Collection Time: 09/16/19 11:18 AM   Result Value Ref Range    Glucose (POC) 95 65 - 100 mg/dL    Performed by Kojo Pittman (PCT)    GLUCOSE, POC    Collection Time: 09/16/19  4:45 PM   Result Value Ref Range    Glucose (POC) 70 65 - 100 mg/dL    Performed by Lance Sicard ([facility])*    GLUCOSE, POC    Collection Time: 09/16/19  5:02 PM   Result Value Ref Range    Glucose (POC) 84 65 - 100 mg/dL    Performed by Lance Sicard ([facility])*        Provided Telemetry: [x] sinus  [] chronic afib   [] paroxysmal afib  [] NSVT      Assessment:     Active Problems:    Acute on chronic renal failure ([facility] (9/10/2019)        Plan:     Heart Failure improved    Continue current meds  BP is up adjusted minoxidil    For all other plans, see orders.    [x] High complexity decision making was performed

## 2019-09-16 NOTE — PROGRESS NOTES
RYAN:    Outpatient Dialysis arranged  2nd IM Medicare Letter at d/c  Possible [address]  Family to transport home    CM: Guicho Fuentes is currently working with pt in the PCU unit. CM informed that pt has been arranged for outpatient dialysis at [facility]) at 11:00am.     CM will staffed case with MD to verify if [address] is needed. CM will enter referral deemed necessary. Pt will require 2nd IM Medicare letter at d/c. Pt will have transport home, provided by family.     Guicho Fuentes, [place], [address] [place] [address]

## 2019-09-16 NOTE — PROGRESS NOTES
Bedside shift change report given to Ivon Whitt (oncoming nurse) by Raghu Tristan (offgoing nurse). Report included the following information SBAR, Kardex, MAR and Recent Results.

## 2019-09-16 NOTE — PROGRESS NOTES
Name of procedure: Garry to [facility] catheter    Complications, if any, r/t procedure: none    Sedation medications given: versed 3mg / fentanyl 100mcg    Sedation tolerated: well    VS : Stable     Report called to primary RN for routine transfer of care. Transport requested. IV patent w/ scheduled ABX infusing via pump as ordered. Major draining. NC oxygen 2L. Post Procedure Care Needed/order sets in [facility].

## 2019-09-16 NOTE — PROGRESS NOTES
Occupational Therapy  Pt was off the floor earlier today for procedure and currently pt is receiving dialysis. Will defer and continue to follow.

## 2019-09-16 NOTE — PROGRESS NOTES
Pt arrived w/ transport via stretcher. Pt arrived w/ 2 covered IV SL - both kinked and dis-lodged. Sites cleaned and dressed. NEW IV established for IR procedure.

## 2019-09-16 NOTE — H&P
Radiology History and Physical    Patient: Erika Collet 77 y.o. male       Chief Complaint: Chest Pain (cp x 1 week off and on with gen.  weakness)      History of Present Illness: for perm    History:    Past Medical History:   Diagnosis Date    AAA (abdominal aortic aneurysm) ([facility]     34mm 2/2017    Anemia     gets shots from Dr. Laura Buckley - last dose 8/31/2018    BPH (benign prostatic hypertrophy)     CAD (coronary artery disease)     s/p stents, sees Dr. Wright [facility])     skin cancer on leg    Chronic kidney disease     End stage renal disease ([facility]     Dr. Blas Morgan GERD (gastroesophageal reflux disease)     Gout     Other and unspecified hyperlipidemia     PUD (peptic ulcer disease)     PVD (peripheral vascular disease) ([facility]     s/p PTCA of left femoral artery in July 2014    Sleep apnea     CPAP    Type II or unspecified type diabetes mellitus without mention of complication, uncontrolled     Dr. Nixon Decker essential hypertension     Unspecified vitamin D deficiency      Family History   Problem Relation Age of Onset    Diabetes Mother     Heart Disease Mother     Heart Disease Maternal Grandmother     Diabetes Daughter         gestational   Chas.Mais Diabetes Sister     Stroke Sister     Cancer Father     Hypertension Father      Social History     Socioeconomic History    Marital status:      Spouse name: Not on file    Number of children: Not on file    Years of education: Not on file    Highest education level: Not on file   Occupational History    Not on file   Social Needs    Financial resource strain: Not on file    Food insecurity:     Worry: Not on file     Inability: Not on file    Transportation needs:     Medical: Not on file     Non-medical: Not on file   Tobacco Use    Smoking status: Former Smoker     Packs/day: 2.00     Years: 25.00     Pack years: 50.00     Last attempt to quit: 3/11/1991     Years since quitting: 28.5    Smokeless tobacco: Never Used   Substance and Sexual Activity    Alcohol use: Yes     Comment: very occasional    Drug use: No    Sexual activity: Not on file   Lifestyle    Physical activity:     Days per week: Not on file     Minutes per session: Not on file    Stress: Not on file   Relationships    Social connections:     Talks on phone: Not on file     Gets together: Not on file     Attends [employer] service: Not on file     Active member of club or organization: Not on file     Attends meetings of clubs or organizations: Not on file     Relationship status: Not on file    Intimate partner violence:     Fear of current or ex partner: Not on file     Emotionally abused: Not on file     Physically abused: Not on file     Forced sexual activity: Not on file   Other Topics Concern    Not on file   Social History Narrative    Lives in [place] with wife of 28 years. Has 1 son and 1 daughter and 4 step-children. 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        Allergies: No Known Allergies    Current Medications:  Current Facility-Administered Medications   Medication Dose Route Frequency    dilTIAZem CD (CARDIZEM CD) capsule 120 mg  120 mg Oral DAILY    minoxidil (LONITEN) tablet 5 mg  5 mg Oral TID    HYDROmorphone (PF) (DILAUDID) injection 1 mg  1 mg IntraVENous Q6H PRN    oxyCODONE IR (ROXICODONE) tablet 5 mg  5 mg Oral Q4H PRN    LORazepam (ATIVAN) tablet 0.5 mg  0.5 mg Oral BID    [START ON 9/17/2019] levoFLOXacin (LEVAQUIN) tablet 500 mg  500 mg Oral Q48H    opium-belladonna (B&O 15-A) 16.2-30 mg suppository 1 Suppository  1 Suppository Rectal Q6H PRN    tamsulosin (FLOMAX) capsule 0.8 mg  0.8 mg Oral QHS    cloNIDine HCl (CATAPRES) tablet 0.2 mg  0.2 mg Oral BID    atorvastatin (LIPITOR) tablet 40 mg  40 mg Oral QHS    melatonin tablet 3 mg  3 mg Oral QHS PRN    heparin (porcine) injection 5,000 Units  5,000 Units SubCUTAneous Q8H    labetalol (NORMODYNE;TRANDATE) injection 10 mg  10 mg IntraVENous Q6H PRN    alirocumab (PRALUENT) 75 mg/mL injector pen 75 mg (Patient Supplied)  75 mg SubCUTAneous Once every 14 days    epoetin dilan-epbx (RETACRIT) injection 10,000 Units  10,000 Units SubCUTAneous Q MON, WED & FRI    piperacillin-tazobactam (ZOSYN) 3.375 g in 0.9% sodium chloride (MBP/ADV) 100 mL  3.375 g IntraVENous Q12H    acetaminophen (TYLENOL) tablet 650 mg  650 mg Oral Q6H PRN    ondansetron (ZOFRAN) injection 4 mg  4 mg IntraVENous Q4H PRN    nebivolol (BYSTOLIC) tablet 20 mg  20 mg Oral QHS    cholecalciferol (VITAMIN D3) tablet 1,000 Units  1,000 Units Oral DAILY    clopidogrel (PLAVIX) tablet 75 mg  75 mg Oral QHS    B complex-vitaminC-folic acid (NEPHROCAP) cap  1 Cap Oral DAILY    furosemide (LASIX) tablet 80 mg  80 mg Oral TID    omega 3-DHA-EPA-fish oil 1,000 mg (120 mg-180 mg) capsule 1 Cap  1 Cap Oral DAILY    sodium chloride (NS) flush 5-40 mL  5-40 mL IntraVENous Q8H    sodium chloride (NS) flush 5-40 mL  5-40 mL IntraVENous PRN    naloxone (NARCAN) injection 0.4 mg  0.4 mg IntraVENous PRN    insulin glargine (LANTUS) injection 25 Units  25 Units SubCUTAneous DAILY    glucose chewable tablet 16 g  4 Tab Oral PRN    dextrose 10% infusion 0-250 mL  0-250 mL IntraVENous PRN    insulin lispro (HUMALOG) injection   SubCUTAneous AC&HS     Facility-Administered Medications Ordered in Other Encounters   Medication Dose Route Frequency    heparinized saline 2 units/mL infusion 800 Units  400 mL Irrigation ONCE        Physical Exam:  Blood pressure 102/53, pulse 72, temperature 98.5 °F (36.9 °C), resp. rate 20, height 5' 9\" (1.753 m), weight 82.4 kg (181 lb 9.6 oz), SpO2 98 %.   LUNG: clear to auscultation bilaterally, HEART: regular rate and rhythm      Alerts:    Hospital Problems  Date Reviewed: 4/26/2019          Codes Class Noted POA    Acute on chronic renal failure (HCC) ICD-10-CM: N17.9, N18.9  ICD-9-CM: 584.9, 585.9  9/10/2019 Unknown              Laboratory:      Recent Labs     09/16/19  0429   HGB 7.1*   HCT 20.9*   WBC 12.1*      BUN 42*   CREA 7.83*   K 3.8         Plan of Care/Planned Procedure:  Risks, benefits, and alternatives reviewed with patient and he agrees to proceed with the procedure.        Shagufta Freeman MD

## 2019-09-16 NOTE — PROGRESS NOTES
Bedside shift change report given to Reba Gaitan (oncoming nurse) by Danilo (offgoing nurse). Report included the following information SBAR, ED Summary, Intake/Output, MAR, Recent Results and Cardiac Rhythm NSR.

## 2019-09-16 NOTE — PROGRESS NOTES
Hospitalist Progress Note    NAME: Rose Tabares   :  1950   MRN:  [id number]       Assessment / Plan:  Acute Hypoxic Respiratory Failure POA: due to pulmonary edema, now 85% on 6LNC, off BiPAP, tolerating room air for 48h  Sepsis POA: leukocytosis, acidosis due to SBP. So far no fever  Abdominal pain and tenderness concerning for SBP: patient is on PD and seems that catheter is infected, will c/w Zosyn and D/c Vancomycin F/U cultures so far heavy Pseudomonas  isolated,  Renal in the case. Peritoneal Catheter was d/c, c/w  LVQ PO for double coverage, at D/c may send him on LVQ alone  ESRD on PD with possible failure of PD  Renal help appreciated, had HD 2 days in a row, got Permacath today, getting HD now, CM working on HD center. A. Fib RVR: converted to NSR after Diltiazem injection, troponin is negative, Cardiology in the case. Will add low dose Diltiazem PO  Acute on Chronic Diastolic Heart Failure POA: EF 76%, with diastolic dysfunction, D/W Renal and Cardiology, monitor I/O and weight. F/U new CXR shows better aeration but still some congestion, but clinically much better  Acute toxic and metabolic encephalopathy secondary to the above, monitor, so far back to baseline  Diabetes mellitus : c/w Lantus, SSI, F/U  HbA1C 6.1. Anemia secondary to CKD  Essential hypertension resume minoxidil, c/w clonidine, and Cardizem, monitor  Urinary Retention: Urology evaluation appreciated, c/w Flomax, Major cath in place  GERD/ PUD  Overweight: BMI 28.94 counseled. Surrogate Decision Maker: Amrita Troncoso  Code status: DNR  Prophylaxis: Hep SQ  Recommended Disposition: Home w/Family     D/c plan likely for tomorrow consultants to provide final recommendations     Subjective:     Chief Complaint / Reason for Physician Visit  \"I feel much better\". Discussed with RN events overnight.      Review of Systems:  Symptom Y/N Comments  Symptom Y/N Comments   Fever/Chills    Chest Pain n    Poor Appetite    Edema Cough    Abdominal Pain     Sputum    Joint Pain     SOB/RUBIO n   Pruritis/Rash     Nausea/vomit    Tolerating PT/OT     Diarrhea    Tolerating Diet y    Constipation    Other y Weak and tired     Could NOT obtain due to:      Objective:     VITALS:   Last 24hrs VS reviewed since prior progress note. Most recent are:  Patient Vitals for the past 24 hrs:   Temp Pulse Resp BP SpO2   09/16/19 1333  (!) 58 16 170/48 97 %   09/16/19 1316  (!) 58 16 179/51 97 %   09/16/19 1300  69 16 162/52 98 %   09/16/19 1245  (!) 56 16 162/49 97 %   09/16/19 1234 98.1 °F (36.7 °C) 60 17 174/56 100 %   09/16/19 1050 97.9 °F (36.6 °C) 62 17 174/50 100 %   09/16/19 1035  65 18 163/51 98 %   09/16/19 1025  68 20 105/58 96 %   09/16/19 1014  71 23 97/66 99 %   09/16/19 1010  72 20 102/53 98 %   09/16/19 1005  72 22 103/57 99 %   09/16/19 1000  67 18 99/61 99 %   09/16/19 0955  67 11 114/60 99 %   09/16/19 0852  66 18 175/54 100 %   09/16/19 0730 98.5 °F (36.9 °C) 72 18 104/52 98 %   09/16/19 0433 98.2 °F (36.8 °C) 70 18 171/45 99 %   09/16/19 0432 98.2 °F (36.8 °C) 70 18 109/58 99 %   09/15/19 2307 98.4 °F (36.9 °C) 77 18 109/57 97 %   09/15/19 2010  77  185/51    09/15/19 2000  78  185/51 97 %   09/15/19 1946 98.7 °F (37.1 °C) 70 18 183/61 95 %   09/15/19 1945  70   95 %   09/15/19 1526 98.3 °F (36.8 °C) 67 18 179/51 98 %       Intake/Output Summary (Last 24 hours) at 9/16/2019 1346  Last data filed at 9/16/2019 0445  Gross per 24 hour   Intake 0 ml   Output 450 ml   Net -450 ml        PHYSICAL EXAM:  General: WD, WN. Alert, cooperative, in no distress  EENT:  EOMI. Anicteric sclerae. MMM  Resp:  Coarse BS, rhonchi  CV:  Regular  rhythm,  trace edema  GI:  Soft, Non distended, Non tender.  +Bowel sounds  Neurologic:  Alert and oriented X 3, normal speech,   Psych:   Good insight. Not anxious nor agitated  Skin:  No rashes.   No jaundice    Reviewed most current lab test results and cultures  YES  Reviewed most current radiology test results   YES  Review and summation of old records today    NO  Reviewed patient's current orders and MAR    YES  PMH/SH reviewed - no change compared to H&P  ________________________________________________________________________  Care Plan discussed with:    Comments   Patient y    Family  y Son, sister   RN y    Care Manager     Consultant  y Cardiology, Nephrology                     Multidiciplinary team rounds were held today with , nursing, pharmacist and clinical coordinator. Patient's plan of care was discussed; medications were reviewed and discharge planning was addressed. ________________________________________________________________________  Total NON critical care TIME:  30  Minutes    Total CRITICAL CARE TIME Spent:   Minutes non procedure based      Comments   >50% of visit spent in counseling and coordination of care y    ________________________________________________________________________  Pat Rivera MD     Procedures: see electronic medical records for all procedures/Xrays and details which were not copied into this note but were reviewed prior to creation of Plan. LABS:  I reviewed today's most current labs and imaging studies.   Pertinent labs include:  Recent Labs     09/16/19  0429 09/15/19  0311 09/14/19  0432   WBC 12.1* 12.6* 12.9*   HGB 7.1* 7.5* 8.2*   HCT 20.9* 22.5* 24.1*    359 387     Recent Labs     09/16/19  0429 09/15/19  0311 09/14/19  0432    138 137   K 3.8 3.7 4.0    103 100   CO2 25 28 25   GLU 83 74 100   BUN 42* 34* 22*   CREA 7.83* 5.97* 3.81*   CA 7.8* 7.8* 8.6   MG 2.0 2.3 2.2   PHOS  --   --  2.9   ALB 1.7* 1.8* 1.9*   TBILI 0.6 0.7 0.4   SGOT 28 21 23   ALT 23 17 16       Signed: Pat Rivera MD

## 2019-09-16 NOTE — PROGRESS NOTES
NAME: FRANKI Zaragoza        :  1950        MRN:  [id number]        Assessment :    Plan:  --ESRD  Peritonitis/sepsis  Volume overload  Hypokalemia  Anemia of esrd  Urine retention Seen on HD, bp stable  Has chair at [facility] for MWF second shift--will need to be there on wed at 10;30    On cpap at night    retacrit 10 k sc tiw; prn prbc's    PD fluid with PSEUDOMONAS AERUGINOSA     Major, Urology following; flomax       Subjective:     Chief Complaint:  None    Review of Systems:    Symptom Y/N Comments  Symptom Y/N Comments   Fever/Chills    Chest Pain     Poor Appetite    Edema     Cough    Abdominal Pain n    Sputum    Joint Pain     SOB/RUBIO n   Pruritis/Rash     Nausea/vomit    Tolerating PT/OT     Diarrhea    Tolerating Diet     Constipation    Other       Could not obtain due to:      Objective:     VITALS:   Last 24hrs VS reviewed since prior progress note. Most recent are:  Visit Vitals  /57   Pulse 66   Temp 98.1 °F (36.7 °C)   Resp 16   Ht 5' 9\" (1.753 m)   Wt 82.4 kg (181 lb 9.6 oz)   SpO2 98%   BMI 26.82 kg/m²       Intake/Output Summary (Last 24 hours) at 2019 1523  Last data filed at 2019 0445  Gross per 24 hour   Intake 0 ml   Output 450 ml   Net -450 ml      Telemetry Reviewed:     PHYSICAL EXAM:  General: On nc oxygen, nad  Resp:  Cta. No access. muscle use  CV:  Regular  rhythm,  No murmur (), No Rubs, No Gallops.  mild edema  GI:  Soft, distended, non  tender.  +Bowel sounds, no HSM      Lab Data Reviewed: (see below)    Medications Reviewed: (see below)    PMH/SH reviewed - no change compared to H&P  ________________________________________________________________________  Care Plan discussed with:  Patient y    Family      RN y    Care Manager                    Consultant:          Comments   >50% of visit spent in counseling and coordination of care ________________________________________________________________________  Nayeli Jang MD     Procedures: see electronic medical records for all procedures/Xrays and details which  were not copied into this note but were reviewed prior to creation of Plan. LABS:  Recent Labs     09/16/19  0429 09/15/19  0311   WBC 12.1* 12.6*   HGB 7.1* 7.5*   HCT 20.9* 22.5*    359     Recent Labs     09/16/19  0429 09/15/19  0311 09/14/19  0432    138 137   K 3.8 3.7 4.0    103 100   CO2 25 28 25   BUN 42* 34* 22*   CREA 7.83* 5.97* 3.81*   GLU 83 74 100   CA 7.8* 7.8* 8.6   MG 2.0 2.3 2.2   PHOS  --   --  2.9     Recent Labs     09/16/19  0429 09/15/19  0311 09/14/19  0432   SGOT 28 21 23   AP 90 87 94   TP 6.1* 6.3* 7.0   ALB 1.7* 1.8* 1.9*   GLOB 4.4* 4.5* 5.1*     No results for input(s): INR, PTP, APTT in the last 72 hours. No lab exists for component: INREXT, INREXT   No results for input(s): FE, TIBC, PSAT, FERR in the last 72 hours. Lab Results   Component Value Date/Time    Folate 42.1 (H) 09/08/2012 10:24 AM      No results for input(s): PH, PCO2, PO2 in the last 72 hours. No results for input(s): CPK, CKMB in the last 72 hours.     No lab exists for component: TROPONINI  No components found for: [place]  Lab Results   Component Value Date/Time    Color YELLOW/STRAW 09/10/2019 06:26 PM    Appearance CLEAR 09/10/2019 06:26 PM    Specific gravity 1.024 09/10/2019 06:26 PM    pH (UA) 8.0 09/10/2019 06:26 PM    Protein 300 (A) 09/10/2019 06:26 PM    Glucose 100 (A) 09/10/2019 06:26 PM    Ketone NEGATIVE  09/10/2019 06:26 PM    Bilirubin NEGATIVE  09/10/2019 06:26 PM    Urobilinogen 0.2 09/10/2019 06:26 PM    Nitrites NEGATIVE  09/10/2019 06:26 PM    Leukocyte Esterase NEGATIVE  09/10/2019 06:26 PM    Epithelial cells FEW 09/10/2019 06:26 PM    Bacteria NEGATIVE  09/10/2019 06:26 PM    WBC 0-4 09/10/2019 06:26 PM    RBC 0-5 09/10/2019 06:26 PM       MEDICATIONS:  Current Facility-Administered Medications   Medication Dose Route Frequency    dilTIAZem CD (CARDIZEM CD) capsule 120 mg  120 mg Oral DAILY    minoxidil (LONITEN) tablet 5 mg  5 mg Oral TID    [START ON 9/17/2019] heparin (porcine) injection 5,000 Units  5,000 Units SubCUTAneous Q12H    furosemide (LASIX) tablet 80 mg  80 mg Oral ACB&D    HYDROmorphone (PF) (DILAUDID) injection 1 mg  1 mg IntraVENous Q6H PRN    oxyCODONE IR (ROXICODONE) tablet 5 mg  5 mg Oral Q4H PRN    LORazepam (ATIVAN) tablet 0.5 mg  0.5 mg Oral BID    [START ON 9/17/2019] levoFLOXacin (LEVAQUIN) tablet 500 mg  500 mg Oral Q48H    opium-belladonna (B&O 15-A) 16.2-30 mg suppository 1 Suppository  1 Suppository Rectal Q6H PRN    tamsulosin (FLOMAX) capsule 0.8 mg  0.8 mg Oral QHS    cloNIDine HCl (CATAPRES) tablet 0.2 mg  0.2 mg Oral BID    atorvastatin (LIPITOR) tablet 40 mg  40 mg Oral QHS    melatonin tablet 3 mg  3 mg Oral QHS PRN    labetalol (NORMODYNE;TRANDATE) injection 10 mg  10 mg IntraVENous Q6H PRN    alirocumab (PRALUENT) 75 mg/mL injector pen 75 mg (Patient Supplied)  75 mg SubCUTAneous Once every 14 days    epoetin dilan-epbx (RETACRIT) injection 10,000 Units  10,000 Units SubCUTAneous Q MON, WED & FRI    piperacillin-tazobactam (ZOSYN) 3.375 g in 0.9% sodium chloride (MBP/ADV) 100 mL  3.375 g IntraVENous Q12H    acetaminophen (TYLENOL) tablet 650 mg  650 mg Oral Q6H PRN    ondansetron (ZOFRAN) injection 4 mg  4 mg IntraVENous Q4H PRN    nebivolol (BYSTOLIC) tablet 20 mg  20 mg Oral QHS    cholecalciferol (VITAMIN D3) tablet 1,000 Units  1,000 Units Oral DAILY    clopidogrel (PLAVIX) tablet 75 mg  75 mg Oral QHS    B complex-vitaminC-folic acid (NEPHROCAP) cap  1 Cap Oral DAILY    omega 3-DHA-EPA-fish oil 1,000 mg (120 mg-180 mg) capsule 1 Cap  1 Cap Oral DAILY    sodium chloride (NS) flush 5-40 mL  5-40 mL IntraVENous Q8H    sodium chloride (NS) flush 5-40 mL  5-40 mL IntraVENous PRN    naloxone (NARCAN) injection 0.4 mg  0.4 mg IntraVENous PRN    insulin glargine (LANTUS) injection 25 Units  25 Units SubCUTAneous DAILY    glucose chewable tablet 16 g  4 Tab Oral PRN    dextrose 10% infusion 0-250 mL  0-250 mL IntraVENous PRN    insulin lispro (HUMALOG) injection   SubCUTAneous AC&HS     Facility-Administered Medications Ordered in Other Encounters   Medication Dose Route Frequency    heparinized saline 2 units/mL infusion 800 Units  400 mL Irrigation ONCE

## 2019-09-17 NOTE — PROGRESS NOTES
Bedside shift change report given to Vaishali Thomas (oncoming nurse) by Veronica Arndt (offgoing nurse). Report included the following information SBAR, Procedure Summary, Intake/Output, Med Rec Status and Cardiac Rhythm NSR. No overnights events noted. 0700- Bedside shift change report given to Lio Louis (oncoming nurse) by Vaishali Thomas (offgoing nurse). Report included the following information SBAR, Procedure Summary, Intake/Output, Recent Results, Med Rec Status and Cardiac Rhythm NSR.

## 2019-09-17 NOTE — DISCHARGE INSTRUCTIONS
HOSPITALIST DISCHARGE INSTRUCTIONS    NAME: Mary Lockhart   :  1950   MRN:  [id number]     Date/Time:  2019 12:04 PM    ADMIT DATE: 9/10/2019     DISCHARGE DATE: 2019     DISCHARGE DIAGNOSIS:  Acute Hypoxic Respiratory Failure POA- now resolved, on RA  Sepsis POA- resolved  Abdominal pain and tenderness concerning for SBP POA- cont PO levaquin x 6 more days  ESRD on PD with  failure of PD POA- changed over to HD - OP chair set up on Wed AM at [facility]  Paroxsymal A. Fib RVR: converted to NSR now, cont cardizem PO as added  Causing Acute on Chronic Diastolic Heart Failure POA:  DM type 2- now needing less Insulin, lantus decreased to 10 units - discussed with pt & he understands, titrate up as needed  Urinary Retention - s/p maldonado here in hospital, outpatient urology follow up as needed if voiding trial failed  GERD/ PUD  DNR    Active Problems:    Acute on chronic renal failure ([address] (9/10/2019)         MEDICATIONS:  As per medication reconciliation  list  · It is important that you take the medication exactly as they are prescribed. · Keep your medication in the bottles provided by the pharmacist and keep a list of the medication names, dosages, and times to be taken in your wallet. · Do not take other medications without consulting your doctor. Pain Management: per above medications    What to do at Home    Recommended diet:  Cardiac Diet, Diabetic Diet, Low fat, Low cholesterol and Renal Diet    Recommended activity: Activity as tolerated    If you have questions regarding the hospital related prescriptions or hospital related issues please call [facility] .     If you experience any of the following symptoms then please call your primary care physician or return to the emergency room if you cannot get hold of your doctor:  Fever, chills, nausea, vomiting, diarrhea, change in mentation, falling, bleeding, shortness of breath,     Follow Up: Dr. Siri Jacques MD  you are to call and set up an appointment to see them in 7-10 days. Dr Bailey Ruiz (Cardiology) in 1-2 weeks for Afib management  Dr Sp Ward (Vascular surgery) for Fistulogram & staples removal of the Abd PD catheter site  Dr Beronica Acosta (Nephrology) at OP HD unit on Wednesday AM as arranged. Dr Cari Rodríguez [facility] Urology) as needed for Urinary retention issues    Information obtained by :  I understand that if any problems occur once I am at home I am to contact my physician. I understand and acknowledge receipt of the instructions indicated above. Physician's or R.N.'s Signature                                                                  Date/Time                                                                                                                                              Patient or Representative Signature                                                          Date/Time  [employer] Activation    Thank you for requesting access to [employer]. Please follow the instructions below to securely access and download your online medical record. [employer] allows you to send messages to your doctor, view your test results, renew your prescriptions, schedule appointments, and more. How Do I Sign Up? 1. In your internet browser, go to [email or web address]  2. Click on the First Time User? Click Here link in the Sign In box. You will be redirect to the New Member Sign Up page. 3. Enter your [employer] Access Code exactly as it appears below. You will not need to use this code after youve completed the sign-up process. If you do not sign up before the expiration date, you must request a new code. [employer] Access Code:  Activation code not generated  Current [employer] Status: Active (This is the date your [employer] access code will )    4. Enter the last four digits of your Social Security Number (xxxx) and Date of Birth (mm/dd/yyyy) as indicated and click Submit. You will be taken to the next sign-up page. 5. Create a [employer] ID. This will be your [employer] login ID and cannot be changed, so think of one that is secure and easy to remember. 6. Create a [employer] password. You can change your password at any time. 7. Enter your Password Reset Question and Answer. This can be used at a later time if you forget your password. 8. Enter your e-mail address. You will receive e-mail notification when new information is available in [address] 9. Click Sign Up. You can now view and download portions of your medical record. 10. Click the Download Summary menu link to download a portable copy of your medical information. Additional Information    If you have questions, please visit the Frequently Asked Questions section of the [employer] website at [email or web address] Remember, [employer] is NOT to be used for urgent needs. For medical emergencies, dial 911.

## 2019-09-17 NOTE — DISCHARGE SUMMARY
Hospitalist Discharge Summary     Patient ID:  Rudy Chance  [id number]  48 y.o.  1950  9/10/2019    PCP on record: Terrell Frey MD    Admit date: 9/10/2019  Discharge date and time: 9/17/2019    DISCHARGE DIAGNOSIS:    Acute Hypoxic Respiratory Failure POA- now resolved, on RA  Sepsis POA- resolved  Abdominal pain and tenderness concerning for SBP POA- cont PO levaquin x 6 more days  ESRD on PD with  failure of PD POA- changed over to HD [facility]  Paroxsymal A. Fib RVR: converted to NSR now, cont cardizem PO as added  Causing Acute on Chronic Diastolic Heart Failure POA:  DM type 2- now needing less Insulin, lantus decreased to 10 units - discussed with pt & he understands, titrate up as needed  Urinary Retention - s/p maldonado here in hospital, outpatient urology follow up as needed if voiding trial failed  GERD/ PUD  DNR      CONSULTATIONS:  IP CONSULT TO NEPHROLOGY  IP CONSULT TO UROLOGY  IP CONSULT TO UROLOGY  IP CONSULT TO PULMONOLOGY  IP CONSULT TO VASCULAR SURGERY    Excerpted HPI from H&P of Suzi Cook MD:   Padmini.[age] y.o.  male who presents with complaint of abdominal pain, increased abdominal and lower extremity swelling, and altered mental status. He is unable to provide history at this time secondary to his acute encephalopathy and as such history is obtained from his son at bedside. Per his son, as of last week the patient began to complain of increased swelling and pain in his chest and abdomen. The patient was seen by his cardiologist, Dr. Stanley Walker, who performed an EKG and obtained an echocardiogram, and apparently felt that the symptoms were not cardiac in nature; a pericardial effusion was reportedly identified at that time. Over the course of the ensuing days, the patient has continued to complain of pain and has been less responsive to family. His abdominal distention and edema has been visibly worsening.  He undergoes peritoneal dialysis and has been getting alarms indicating \"low drain. \" He has had some subjective fevers and chills at home.      We were asked to admit for work up and evaluation of the above problems\"    ______________________________________________________________________  DISCHARGE SUMMARY/HOSPITAL COURSE:  for full details see H&P, daily progress notes, labs, consult notes. Acute Hypoxic Respiratory Failure POA: due to pulmonary edema, now 85% on 6LNC, off BiPAP, tolerating room air for 48h  Sepsis POA: leukocytosis, acidosis due to SBP. So far no fever  Abdominal pain and tenderness concerning for SBP: patient is on PD and seems that catheter is infected, will c/w Zosyn and D/c Vancomycin F/U cultures so far heavy Pseudomonas  isolated,  Renal in the case. Peritoneal Catheter was d/c, c/w  LVQ PO for double coverage, at D/c may send him on LVQ alone  ESRD on PD with possible failure of PD  Renal help appreciated, had HD 2 days in a row, got Permacath today, getting HD now, CM working on HD center. A. Fib RVR: converted to NSR after Diltiazem injection, troponin is negative, Cardiology in the case. Will add low dose Diltiazem PO  Acute on Chronic Diastolic Heart Failure POA: EF 70%, with diastolic dysfunction, D/W Renal and Cardiology, monitor I/O and weight. F/U new CXR shows better aeration but still some congestion, but clinically much better  Acute toxic and metabolic encephalopathy secondary to the above, monitor, so far back to baseline  Diabetes mellitus : c/w Lantus, SSI, F/U  HbA1C 6.1. Anemia secondary to CKD  Essential hypertension resume minoxidil, c/w clonidine, and Cardizem, monitor  Urinary Retention: Urology evaluation appreciated, c/w Flomax, Major cath in place  GERD/ PUD  Overweight: BMI 28.94 counseled.   Surrogate Decision Jhony Jeancarlos  Code status: DNR        _______________________________________________________________________  Patient seen and examined by me on discharge day.  Pertinent Findings:  Gen:    Not in distress  Chest: Clear lungs  CVS:   Regular rhythm. No edema  Abd:  Soft, not distended, not tender  Neuro:  Alert, oriented x 3  _______________________________________________________________________  DISCHARGE MEDICATIONS:   Current Discharge Medication List      START taking these medications    Details   dilTIAZem CD (CARDIZEM CD) 120 mg ER capsule Take 1 Cap by mouth daily. Qty: 30 Cap, Refills: 1      levoFLOXacin (LEVAQUIN) 500 mg tablet Take 1 Tab by mouth every fourty-eight (48) hours for 6 days. Qty: 3 Tab, Refills: 0      oxyCODONE IR (ROXICODONE) 5 mg immediate release tablet Take 1 Tab by mouth every four (4) hours as needed for Pain for up to 3 days. Max Daily Amount: 30 mg.  Qty: 20 Tab, Refills: 0    Associated Diagnoses: Peritonitis associated with peritoneal dialysis, initial encounter ([facility] [address]         CONTINUE these medications which have CHANGED    Details   BASAGLAR KWIKPEN U-100 INSULIN 100 unit/mL (3 mL) inpn 10 Units by SubCUTAneous route daily. Qty: 15 mL, Refills: 11    Comments: Replaces previous prescription sent in error      furosemide (LASIX) 80 mg tablet Take 1 Tab by mouth two (2) times a day. Qty: 60 Tab, Refills: 0         CONTINUE these medications which have NOT CHANGED    Details   cloNIDine HCl (CATAPRES) 0.2 mg tablet Take 0.4 mg by mouth two (2) times a day. minoxidil (LONITEN) 10 mg tablet Take 5 mg by mouth two (2) times a day. ferric citrate (AURYXIA) 210 mg iron tablet Take 420 mg by mouth three (3) times daily (with meals). cholecalciferol, VITAMIN D3, (VITAMIN D3) 5,000 unit tab tablet Take  by mouth daily. atorvastatin (LIPITOR) 40 mg tablet Take 40 mg by mouth every evening. BYSTOLIC 20 mg tablet Take 20 mg by mouth nightly. Refills: 6      clopidogrel (PLAVIX) 75 mg tablet Take 75 mg by mouth nightly. allopurinol (ZYLOPRIM) 100 mg tablet Take 100 mg by mouth nightly.       NIFEdipine ER (ADALAT CC) 60 mg ER tablet Take 60 mg by mouth two (2) times a day. polyethylene glycol 3350 (MIRALAX PO) Take  by mouth. Insulin Needles, Disposable, (BD ULTRA-FINE MINI PEN NEEDLE) 31 gauge x 3/16\" ndle Use as directed once daily  Qty: 100 Pen Needle, Refills: 3      acetaminophen (TYLENOL) 500 mg tablet Take 1,000 mg by mouth every six (6) hours as needed for Pain. folic acid-vit J1-WNM N79 (FOLTX) 2.5-25-2 mg tablet Take 1 Tab by mouth nightly. omega-3 fatty acids (FISH OIL CONCENTRATE) 1,000 mg cap Take 1,000 mg by mouth two (2) times a day. PRALUENT PEN 75 mg/mL injector pen INJECT EVERY 2 WEEKS  Refills: 3      tamsulosin (FLOMAX) 0.4 mg capsule Take 0.4 mg by mouth nightly. Patient Follow Up Instructions:    Activity: Activity as tolerated  Diet: Cardiac Diet, Diabetic Diet and Renal Diet  Wound Care: Keep wound clean and dry    Follow-up with Dr Rush Pemberton (abdominal wound staples removal) in 1 week  Outpatient HD on Wed AM as arranged  Cardiology in 1-2 weeks for Afib management    Follow-up Information     Follow up With Specialties Details Why Contact Info    Sundeep Ash MD  In 2 weeks for fistulogram  [id number] [facility],[address]  [phone]     Brad Galdamez MD Family Practice   [address]  [address]  [facility]  [phone]          ________________________________________________________________    Risk of deterioration: Moderate    Condition at Discharge:  Stable  __________________________________________________________________    Disposition  Home with family and home health services    ____________________________________________________________________    Code Status: DNR/DNI  ___________________________________________________________________      Total time in minutes spent coordinating this discharge (includes going over instructions, follow-up, prescriptions, and preparing report for sign off to her PCP) :  36 minutes    Signed:  Magali Narayan MD

## 2019-09-17 NOTE — PROGRESS NOTES
Pt did not void yet so per MD instruction bladder scanned and was found to only have 58 cc , so MD dr Deedee Maki states ok for discharge.

## 2019-09-17 NOTE — PROGRESS NOTES
RYAN:    [facility]  Outpatient Dialysis Arranged  2nd IM Medicare Letter  Family to transport    CM completed room visit with pt, to discuss d/c needs and plans. Pt aware that outpatient dialysis has been arranged. CM discuss recommendations from MD: [facility] with SN/PT. Pt agreeable to services. CM informed pt of FOC document (signed) placed in chart. CM sent referral to [facility] Ellen Quiñonez pending). CM will inform pt of [facility] accepted once auth received. Pt will be informed of 2nd IM Medicare Letter (sigend) placed in chart. Pt will have family to transport him home at d/c.    UPDATE: 12:09PM    CM informed that pt has been accepted to [facility]. CM will inform pt and MD of the following.     Isabella Valenzuela, MSMARIA ALEJANDRA, [address] [place] [address]

## 2019-09-17 NOTE — PROGRESS NOTES
Problem: Mobility Impaired (Adult and Pediatric)  Goal: *Acute Goals and Plan of Care (Insert Text)  Description  FUNCTIONAL STATUS PRIOR TO ADMISSION: Patient was independent and active without use of DME.    HOME SUPPORT PRIOR TO ADMISSION: The patient lived alone with family to provide assistance. Physical Therapy Goals  Initiated 9/13/2019  2. Patient will transfer from bed to chair and chair to bed with modified independence using the least restrictive device within 7 day(s). 3.  Patient will perform sit to stand with modified independence within 7 day(s). 4.  Patient will ambulate with modified independence for 300 feet with the least restrictive device within 7 day(s). 5.  Patient will ascend/descend 2 stairs with 2 handrail(s) with modified independence within 7 day(s). Note:   PHYSICAL THERAPY TREATMENT  Patient: Osorio Andrew (77 y.o. male)  Date: 9/17/2019  Diagnosis: Acute on chronic renal failure (HCC) [N17.9, N18.9]     Procedure(s) (LRB):  PERITONEAL DIALYSIS CATHETER REMOVAL (N/A) 5 Days Post-Op    Precautions: DNR, Fall  Chart, physical therapy assessment, plan of care and goals were reviewed. ASSESSMENT  Based on the objective data described below, pt tolerated tx well, no LOB or SOB, did well with bed mob and transfers, good motivation, did well with ther-ex, vc's for safety and proper RW use. Current Level of Function Impacting Discharge (mobility/balance): Stand-by assistance;Contact guard assistance         PLAN :  Patient continues to benefit from skilled intervention to address the above impairments. Continue treatment per established plan of care. to address goals.     Recommendation for discharge: (in order for the patient to meet his/her long term goals)  Physical therapy at least 2 days/week in the home     This discharge recommendation:  Has been made in collaboration with the attending provider and/or case management    Equipment recommendations for successful discharge (if) home: no AD needed     OBJECTIVE DATA SUMMARY:     Critical Behavior:  Neurologic State: Alert  Orientation Level: Oriented X4  Cognition: Appropriate decision making, Appropriate for age attention/concentration, Appropriate safety awareness  Safety/Judgement: Awareness of environment, Fall prevention    Functional Mobility Training:  Bed Mobility:  Rolling: Modified independent  Supine to Sit: Modified independent  Scooting: Modified independent  Level of Assistance: Modified independent  Interventions: Verbal cues    Transfers:  Sit to Stand: Supervision  Stand to Sit: Supervision  Bed to Chair: Contact guard assistance;Stand-by assistance  Interventions: Verbal cues  Level of Assistance: Contact guard assistance    Balance:  Sitting: Intact; Without support  Standing: Intact; Without support  Standing - Static: Good; Unsupported  Standing - Dynamic : Good; Unsupported    Ambulation/Gait Training:  Distance (ft): 200 Feet (ft)  Assistive Device: Gait belt  Ambulation - Level of Assistance: Stand-by assistance;Contact guard assistance  Gait Abnormalities: Decreased step clearance;Trunk sway increased  Right Side Weight Bearing: Full  Left Side Weight Bearing: Full  Base of Support: Widened  Speed/Cari: Pace decreased (<100 feet/min)  Step Length: Left shortened;Right shortened    Therapeutic Exercises:   sitting  EXERCISE   Sets   Reps   Active Active Assist   Passive   Comments   Ankle pumps 1 10 ? ? ? bilat   Heel raises 1 10 ? ? ? \"   Toe tap 1 10 ? ? ? \"   Knee ext 1 10 ? ? ? \"   Hip flex 1 10 ? ? ? \"     Pain Rating: no c/o pain    Activity Tolerance: Good    After treatment patient left in no apparent distress: Sitting in chair, Call bell within reach and Caregiver / family present    COMMUNICATION/COLLABORATION:   The patients plan of care was discussed with: Registered Nurse    Shayan Quintero PTA   Time Calculation: 25 mins

## 2019-09-17 NOTE — FACE TO FACE
Home Health Care Discharge Planning: [facility]  Face to Face Encounter      NAME: Clare Geiger   :  1950   MRN:  [id number]     Primary Diagnosis: Sepsis, Peritonitis due to infected peritoneal dialysis catheter, P Afib    Date of Face to Face:  2019 3:44 PM                                  Face to Face Encounter findings are related to primary reason for home care:   YES    1. I certify that the patient needs intermittent skilled nursing care, physical therapy and/or speech therapy. I will not be following this patient in the Community and Dr. Valencia Hwang MD will be responsible for signing the [address] [facility] [address] 2. Initial Orders for Care: Skilled Nursing and Physical Therapy    3. I certify that this patient is homebound because of illness or injury, need the aid of supportive devices such as crutches, canes, wheelchairs, and walkers; the use of special transportation; or the assistance of another person in order to leave their place of residence. There exists a normal inability to leave home and leaving home requires a considerable and taxing effort. 4. I certify that this patient is under my care and that I had a Face-to-Face Encounter that meets the physician Face-to-Face Encounter requirements. Document the physical findings from the Face-to-Face Encounter that support the need for skilled services: Has wound that requires skilled nursing assessment and treatment , Has new diagnosis that requires skilled nursing teaching and intervention , Has new medications that requires skilled nursing teaching and monitoring for understanding and compliance  and Has new finding of weakness and altered mobility that requires skilled physical therapy services for evaluation and interventions.      Michell Sanders MD  Discharging Physician  Office: [phone]  Fax:   [phone]

## 2019-10-10 NOTE — PERIOP NOTES
[facility] Preoperative Instructions Surgery Date 10/17/19          Time of Arrival 1300 1. On the day of your surgery, please report to the Surgical Services Registration Desk and sign in at your designated time. The Surgery Center is located to the right of the Emergency Room. 2. You must have someone with you to drive you home. You should not drive a car for 24 hours following surgery. Please make arrangements for a friend or family member to stay with you for the first 24 hours after your surgery. 3. Do not have anything to eat or drink (including water, gum, mints, coffee, juice) after midnight 10/16/19? This may not apply to medications prescribed by your physician. ?(Please note below the special instructions with medications to take the morning of your procedure.) 4. We recommend you do not drink any alcoholic beverages for 24 hours before and after your surgery. 5. Contact your surgeons office for instructions on the following medications: non-steroidal anti-inflammatory drugs (i.e. Advil, Aleve), vitamins, and supplements. (Some surgeons will want you to stop these medications prior to surgery and others may allow you to take them) **If you are currently taking Plavix, Coumadin, Aspirin and/or other blood-thinning agents, contact your surgeon for instructions. ** Your surgeon will partner with the physician prescribing these medications to determine if it is safe to stop or if you need to continue taking. Please do not stop taking these medications without instructions from your surgeon 6. Wear comfortable clothes. Wear glasses instead of contacts. Do not bring any money or jewelry. Please bring picture ID, insurance card, and any prearranged co-payment or hospital payment. Do not wear make-up, particularly mascara the morning of your surgery. Do not wear nail polish, particularly if you are having foot /hand surgery.   Wear your hair loose or down, no ponytails, buns, terence pins or clips. All body piercings must be removed. Please shower with antibacterial soap for three consecutive days before and on the morning of surgery, but do not apply any lotions, powders or deodorants after the shower on the day of surgery. Please use a fresh towels after each shower. Please sleep in clean clothes and change bed linens the night before surgery. Please do not shave for 48 hours prior to surgery. Shaving of the face is acceptable. 7. You should understand that if you do not follow these instructions your surgery may be cancelled. If your physical condition changes (I.e. fever, cold or flu) please contact your surgeon as soon as possible. 8. It is important that you be on time. If a situation occurs where you may be late, please call [phone] (OR Holding Area). 9. If you have any questions and or problems, please call [phone] (Pre-admission Testing). 10. Your surgery time may be subject to change. You will receive a phone call the evening prior if your time changes. 11.  If having outpatient surgery, you must have someone to drive you here, stay with you during the duration of your stay, and to drive you home at time of discharge. 12.   In an effort to improve the efficiency, privacy, and safety for all of our Pre-op patients visitors are not allowed in the Holding area. Once you arrive and are registered your family/visitors will be asked to remain in the waiting room. The Pre-op staff will get you from the Surgical Waiting Area and will explain to you and your family/visitors that the Pre-op phase is beginning. The staff will answer any questions and provide instructions for tracking of the patient, by use of the existing tracking number and color-coded status board in the waiting room.   At this time the staff will also ask for your designated spokesperson information in the event that the physician or staff need to provide an update or obtain any pertinent information. The designated spokesperson will be notified if the physician needs to speak to family during the pre-operative phase. If at any time your family/visitors has questions or concerns they may approach the volunteer desk in the waiting area for assistance. Special Instructions: Take last dose of Plavix on 10/14/19 per Dr. Thea Mendez MEDICATIONS TO TAKE THE MORNING OF SURGERY WITH A SIP OF WATER: tylenol if needed, clonidine, lasix I understand a pre-operative phone call will be made to verify my surgery time. In the event that I am not available, I give permission for a message to be left on my answering service and/or with another person? Yes [phone] 
 
 
 
 ___________________      __________   _________ 
  (Signature of Patient)             (Witness)                (Date and Time)

## 2019-10-17 NOTE — H&P
History and Physical 
 
Subjective:  
 
Gertrudis Lynn is a 77 y.o. male who  Has ESRD and needs AV access. Past Medical History:  
Diagnosis Date  AAA (abdominal aortic aneurysm) ([facility] 34mm 2/2017  Anemia   
 gets shots from Dr. Lana Madrigal - last dose 8/31/2018  BPH (benign prostatic hypertrophy)  CAD (coronary artery disease) s/p stents, sees Dr. Lissett Alvares  Cancer ([facility] skin cancer on leg  Chronic kidney disease [facility]  End stage renal disease ([facility] Dr. Lana Madrigal  GERD (gastroesophageal reflux disease)  Gout  Other and unspecified hyperlipidemia  PUD (peptic ulcer disease)  PVD (peripheral vascular disease) ([facility]   
 s/p PTCA of left femoral artery in July 2014  Sleep apnea CPAP  Type II or unspecified type diabetes mellitus without mention of complication, uncontrolled Dr. Beti Ortiz  Unspecified essential hypertension  Unspecified vitamin D deficiency Past Surgical History:  
Procedure Laterality Date  CARDIAC SURG PROCEDURE UNLIST    
 stents  HX CATARACT REMOVAL    
 HX COLONOSCOPY    
 HX CORONARY STENT PLACEMENT    
 HX ENDOSCOPY    
 HX KNEE ARTHROSCOPY    
 right x2, left x1  
 HX OTHER SURGICAL    
 skin cancer removed from leg  IR INSERT NON TUNL CVC OVER 5 YRS  9/11/2019  IR INSERT TUNL CVC W/O PORT OVER 5 YR  9/16/2019  VASCULAR SURGERY PROCEDURE UNLIST    
 aorto-bifem bypass  VASCULAR SURGERY PROCEDURE UNLIST    
 left carotid endarterectomy  VASCULAR SURGERY PROCEDURE UNLIST    
 right femoral PTCA Family History Problem Relation Age of Onset  Diabetes Mother  Heart Disease Mother  Heart Disease Maternal Grandmother  Diabetes Daughter   
     [facility] Diabetes Sister  Stroke Sister  Cancer Father  Hypertension Father Social History Tobacco Use  Smoking status: Former Smoker Packs/day: 2.00   Years: 25.00  
 Pack years: 50.00 Last attempt to quit: 3/11/1991 Years since quittin.6  Smokeless tobacco: Never Used Substance Use Topics  Alcohol use: Yes Comment: very occasional  
   
Prior to Admission medications Medication Sig Start Date End Date Taking? Authorizing Provider  
furosemide (LASIX) 80 mg tablet Take 1 Tab by mouth two (2) times a day. 19  Yes Yolanda Valero MD  
cloNIDine HCl (CATAPRES) 0.2 mg tablet Take 0.4 mg by mouth two (2) times a day. Yes Provider, Historical  
minoxidil (LONITEN) 10 mg tablet Take 5 mg by mouth two (2) times a day. Yes Provider, Historical  
ferric citrate (AURYXIA) 210 mg iron tablet Take 420 mg by mouth three (3) times daily (with meals). Yes Provider, Historical  
NIFEdipine ER (ADALAT CC) 60 mg ER tablet Take 60 mg by mouth nightly. Yes Provider, Historical  
Insulin Needles, Disposable, (BD ULTRA-FINE MINI PEN NEEDLE) 31 gauge x 3/16\" ndle Use as directed once daily 19  Yes Romana Goldsmith, MD  
atorvastatin (LIPITOR) 40 mg tablet Take 40 mg by mouth every evening. Yes Provider, Historical  
acetaminophen (TYLENOL) 500 mg tablet Take 1,000 mg by mouth every six (6) hours as needed for Pain. Yes Other, MD Corby  
folic acid-vit C9-VOM M32 (FOLTX) 2.5-25-2 mg tablet Take 1 Tab by mouth nightly. Yes Other, MD Corby  
omega-3 fatty acids (FISH OIL CONCENTRATE) 1,000 mg cap Take 1,000 mg by mouth two (2) times a day. Yes Provider, Historical  
BYSTOLIC 20 mg tablet Take 20 mg by mouth nightly. 12/15/17  Yes Provider, Historical  
PRALUENT PEN 75 mg/mL injector pen INJECT EVERY 2 WEEKS 10/11/17  Yes Provider, Historical  
tamsulosin (FLOMAX) 0.4 mg capsule Take 0.4 mg by mouth nightly. Yes Provider, Historical  
clopidogrel (PLAVIX) 75 mg tablet Take 75 mg by mouth nightly. Yes Provider, Historical  
allopurinol (ZYLOPRIM) 100 mg tablet Take 100 mg by mouth nightly.    Yes Provider, Historical  
 BASAGLAR KWIKPEN U-100 INSULIN 100 unit/mL (3 mL) inpn 10 Units by SubCUTAneous route daily. 9/17/19   Awa Willson MD  
cholecalciferol, VITAMIN D3, (VITAMIN D3) 5,000 unit tab tablet Take  by mouth daily. Provider, Historical  
 
No Known Allergies Review of Systems: 
Denies CP/SOB Objective:  
 
Physical Exam:  
Visit Vitals Ht 5' 8\" (1.727 m) Wt 74.8 kg (165 lb) BMI 25.09 kg/m² General:  Alert, cooperative, no distress, appears stated age. Head:  Normocephalic, without obvious abnormality, atraumatic. Neck: Supple, symmetrical, trachea midline, no adenopathy, thyroid: no enlargement/tenderness/nodules, no carotid bruit and no JVD. Lungs:   Clear to auscultation bilaterally. Heart:  Regular rate and rhythm, S1, S2 normal, no murmur, click, rub or gallop. Abdomen:   Soft, non-tender. Bowel sounds normal. No masses,  No organomegaly. Extremities: Extremities normal, atraumatic, no cyanosis or edema. Pulses: 2+ and symmetric radial extremities. Neurologic: Normal strength, sensation throughout. Assessment:  
 
ESRD Plan: LUE AVF/AVG Signed By: Marie Be MD   
 October 17, 2019

## 2019-10-17 NOTE — PERIOP NOTES
Handoff Report from Operating Room to PACU Report received from KATEY Inman and VINNY Cortes regarding [employer]. Surgeon(s): 
Elder Morris MD  And Procedure(s) (LRB): 
CREATION LEFT ARM AV FISTULA/POSSIBLE GRAFT (AX BLOCK) (Left)  confirmed  
with allergies and dressings discussed. Anesthesia type, drugs, patient history, complications, estimated blood loss, vital signs, intake and output, and lines and temperature were reviewed.

## 2019-10-17 NOTE — BRIEF OP NOTE
BRIEF OPERATIVE NOTE Date of Procedure: 10/17/2019 Preoperative Diagnosis: END STAGE RENAL Postoperative Diagnosis: END STAGE RENAL Procedure(s): CREATION LEFT ARM BRACHIOCEPHALIC AV FISTULA Surgeon(s) and Role: Noah Brian MD - Primary Surgical Assistant: None Surgical Staff: 
Circ-1: Yeni Bassett RN Scrub Tech-1: Uyen Ram Scrub Tech-2: Coral Page Surg Asst-1: Janis Days Event Time In Time Out Incision Start [id number] Incision Close 1608 Anesthesia: Other Estimated Blood Loss: 10 cc Specimens: * No specimens in log * Findings: Brachiocephalic AVF. Good vein. Reasonable artery but reduced pulse Complications: None Implants: * No implants in log *

## 2019-10-17 NOTE — PERIOP NOTES
Reviewed d/c instructions with patients sister in law and provided written instructions, allowed time for questions, iv removed from patient and patient transported to car in wheelchair and assisted into vehicle

## 2019-10-17 NOTE — ANESTHESIA POSTPROCEDURE EVALUATION
Procedure(s): CREATION LEFT ARM AV FISTULA/POSSIBLE GRAFT (AX BLOCK). regional, MAC Anesthesia Post Evaluation Patient location during evaluation: PACU Note status: Adequate. Level of consciousness: responsive to verbal stimuli and sleepy but conscious Pain management: satisfactory to patient Airway patency: patent Anesthetic complications: no 
Cardiovascular status: acceptable Respiratory status: acceptable Hydration status: acceptable Comments: +Post-Anesthesia Evaluation and Assessment Patient: Keyon Suarez MRN: [id number]  SSN: [id number] YOB: 1950  Age: 77 y.o. Sex: male Cardiovascular Function/Vital Signs /51 (BP 1 Location: Right arm, BP Patient Position: At rest)   Pulse 61   Temp 37.2 °C (98.9 °F)   Resp 19   Ht 5' 8\" (1.727 m)   Wt 77.5 kg (170 lb 13.7 oz)   SpO2 97%   BMI 25.98 kg/m² Patient is status post Procedure(s): CREATION LEFT ARM AV FISTULA/POSSIBLE GRAFT (AX BLOCK). Nausea/Vomiting: Controlled. Postoperative hydration reviewed and adequate. Pain: 
Pain Scale 1: Numeric (0 - 10) (10/17/19 1630) Pain Intensity 1: 0 (10/17/19 1630) Managed. Neurological Status:  
Neuro (WDL): Within Defined Limits (10/17/19 1615) At baseline. Mental Status and Level of Consciousness: Arousable. Pulmonary Status:  
O2 Device: Room air (10/17/19 1630) Adequate oxygenation and airway patent. Complications related to anesthesia: None Post-anesthesia assessment completed. No concerns. Signed By: Marie Perez MD  
 10/17/2019 Post anesthesia nausea and vomiting:  controlled Vitals Value Taken Time /46 10/17/2019  4:35 PM  
Temp 37.2 °C (98.9 °F) 10/17/2019  4:15 PM  
Pulse 63 10/17/2019  4:39 PM  
Resp 23 10/17/2019  4:39 PM  
SpO2 98 % 10/17/2019  4:39 PM  
Vitals shown include unvalidated device data.

## 2019-10-17 NOTE — DISCHARGE INSTRUCTIONS
Patient Discharge Instructions    Jordan Ibarra / [id number] : 1950    Admitted 10/17/2019 Discharged: 10/17/2019     Take Home Medications       · It is important that you take the medication exactly as they are prescribed. · Keep your medication in the bottles provided by the pharmacist and keep a list of the medication names, dosages, and times to be taken in your wallet. · Do not take other medications without consulting your doctor. What to do at [address] [place] [address] [place]: Incision can be open to air unless there is drainage. Dry dressing to incisions daily if there is any drainage. Ok to wash area in 2 days. Recommended diet: Renal diabetic. Recommended activity: As Tolerated. No Strenuous activity or heavy lifting with left arm. If you experience any of the following symptoms severe left hand pain, numbness, weakness, or discoloration, please follow up with Dr Thea Mendez immediately. Follow-up with Dr Thea Mendez in 2-3 weeks at the [address] [place] [address] obtained by :  I understand that if any problems occur once I am at home I am to contact my physician. I understand and acknowledge receipt of the instructions indicated above.                                                                                                                                            Physician's or R.N.'s Signature                                                                  Date/Time                                                                                                                                              Patient or Representative Signature                                                          Date/Time      DISCHARGE SUMMARY from Nurse    PATIENT INSTRUCTIONS:    After general anesthesia or intravenous sedation, for 24 hours or while taking prescription Narcotics:  · Limit your activities  · Do not drive and operate hazardous machinery  · Do not make important personal or business decisions  · Do  not drink alcoholic beverages  · If you have not urinated within 8 hours after discharge, please contact your surgeon on call. Report the following to your surgeon:  · Excessive pain, swelling, redness or odor of or around the surgical area  · Temperature over 100.5  · Nausea and vomiting lasting longer than 4 hours or if unable to take medications  · Any signs of decreased circulation or nerve impairment to extremity: change in color, persistent  numbness, tingling, coldness or increase pain  · Any questions    What to do at Home:  Recommended activity: {discharge activity:32981}, ***    If you experience any of the following symptoms ***, please follow up with ***. *  Please give a list of your current medications to your Primary Care Provider. *  Please update this list whenever your medications are discontinued, doses are      changed, or new medications (including over-the-counter products) are added. *  Please carry medication information at all times in case of emergency situations. These are general instructions for a healthy lifestyle:    No smoking/ No tobacco products/ Avoid exposure to second hand smoke  Surgeon General's Warning:  Quitting smoking now greatly reduces serious risk to your health. Obesity, smoking, and sedentary lifestyle greatly increases your risk for illness    A healthy diet, regular physical exercise & weight monitoring are important for maintaining a healthy lifestyle    You may be retaining fluid if you have a history of heart failure or if you experience any of the following symptoms:  Weight gain of 3 pounds or more overnight or 5 pounds in a week, increased swelling in our hands or feet or shortness of breath while lying flat in bed. Please call your doctor as soon as you notice any of these symptoms; do not wait until your next office visit.         The discharge information has been reviewed with the {PATIENT PARENT GUARDIAN:25159}. The {PATIENT PARENT GUARDIAN:84133} verbalized understanding. Discharge medications reviewed with the {Dishcarge meds reviewed BKFE:52032} and appropriate educational materials and side effects teaching were provided.   ___________________________________________________________________________________________________________________________________

## 2019-10-17 NOTE — ANESTHESIA PROCEDURE NOTES
Peripheral Block Start time: 10/17/2019 2:33 PM 
End time: 10/17/2019 2:33 PM 
Performed by: Simon Simons MD 
Authorized by: Simon Simons MD  
 
 
Pre-procedure: Indications: at surgeon's request and post-op pain management Preanesthetic Checklist: patient identified, risks and benefits discussed, site marked, timeout performed and patient being monitored Timeout Time: 14:33 Block Type:  
Block Type: Interscalene Monitoring:  Standard ASA monitoring, continuous pulse ox, frequent vital sign checks, heart rate, responsive to questions and oxygen Injection Technique:  Single shot Procedures: ultrasound guided Patient Position: supine Prep: chlorhexidine Location:  Interscalene Needle Type:  Stimuplex Needle Gauge:  21 G Needle Localization:  Ultrasound guidance and anatomical landmarks Assessment: 
Number of attempts:  1 Injection Assessment:  Incremental injection every 5 mL, local visualized surrounding nerve on ultrasound, negative aspiration for blood, no paresthesia, no intravascular symptoms and ultrasound image on chart Patient tolerance:  Patient tolerated the procedure well with no immediate complications

## 2019-10-17 NOTE — ANESTHESIA PREPROCEDURE EVALUATION
Anesthetic History No history of anesthetic complications Review of Systems / Medical History Patient summary reviewed, nursing notes reviewed and pertinent labs reviewed Pulmonary Sleep apnea Neuro/Psych Within defined limits Cardiovascular Hypertension CAD Comments: S/p stenting on plavix Left ventricle: Systolic function was normal. Ejection fraction was 
estimated in the range of 60 % to 65 %. No obvious wall motion 
abnormalities identified in the views obtained. There was mild concentric 
hypertrophy. GI/Hepatic/Renal 
  
GERD Renal disease: ESRD 
PUD Endo/Other Diabetes Morbid obesity Other Findings Physical Exam 
 
Airway Mallampati: III 
TM Distance: 4 - 6 cm Neck ROM: normal range of motion Mouth opening: Normal 
 
 Cardiovascular Rhythm: regular Rate: normal 
 
 
 
 Dental 
No notable dental hx Pulmonary Breath sounds clear to auscultation Abdominal 
Abdominal exam normal 
 
 
 Other Findings Anesthetic Plan ASA: 3 Anesthesia type: regional and MAC - interscalene block Induction: Intravenous Anesthetic plan and risks discussed with: Patient

## 2019-10-22 NOTE — PROGRESS NOTES
Chief Complaint Patient presents with  Diabetes  Blood Pressure Check History of Present Illness: Gertrudis Monge is a 77 y.o. male here for follow up of diabetes. Weight down 30 lbs since last visit in 4/19. He was admitted in 9/19 for peritoneal dialysis catheter infection and this was removed and he was switched to HD and is now receiving dialysis at [facility] on Mon, Wed, and Fri. Went into a-fib and was put on diltiazem during his stay but states he is not currently on this. Has had lower BP readings at dialysis and some dizziness with standing and we agreed to cut back on the clonidine to see if this helps. Since discharge his insulin dose was decreased to 10 units daily but he has only been taking a dose of 10 units if his sugar is over 125 in the morning and if under, will hold the dose. During the week, he can sometimes have no readings over 125 and other weeks may take only 2-3 doses per week. Recalls having one reading of 35 in the middle of the night but can't recall if he took insulin that day but likely did. Sugar was 85 this morning. Just had AV fistula created on 10/17/19 by Dr. Ruel Pichardo. Still getting lipitor and praluent for cholesterol. Current Outpatient Medications Medication Sig  
 BASAGLAR KWIKPEN U-100 INSULIN 100 unit/mL (3 mL) inpn 10 Units by SubCUTAneous route daily.  furosemide (LASIX) 80 mg tablet Take 1 Tab by mouth two (2) times a day.  cloNIDine HCl (CATAPRES) 0.2 mg tablet Take 0.4 mg by mouth two (2) times a day.  minoxidil (LONITEN) 10 mg tablet Take 5 mg by mouth two (2) times a day.  NIFEdipine ER (ADALAT CC) 60 mg ER tablet Take 60 mg by mouth nightly.  Insulin Needles, Disposable, (BD ULTRA-FINE MINI PEN NEEDLE) 31 gauge x 3/16\" ndle Use as directed once daily  cholecalciferol, VITAMIN D3, (VITAMIN D3) 5,000 unit tab tablet Take  by mouth daily.  atorvastatin (LIPITOR) 40 mg tablet Take 40 mg by mouth every evening.  acetaminophen (TYLENOL) 500 mg tablet Take 1,000 mg by mouth every six (6) hours as needed for Pain.  folic acid-vit Y6-JYJ I13 (FOLTX) 2.5-25-2 mg tablet Take 1 Tab by mouth nightly.  omega-3 fatty acids (FISH OIL CONCENTRATE) 1,000 mg cap Take 1,000 mg by mouth two (2) times a day.  BYSTOLIC 20 mg tablet Take 20 mg by mouth nightly.  PRALUENT PEN 75 mg/mL injector pen 75 mg by SubCUTAneous route Once every 2 weeks.  tamsulosin (FLOMAX) 0.4 mg capsule Take 0.4 mg by mouth nightly.  clopidogrel (PLAVIX) 75 mg tablet Take 75 mg by mouth nightly.  allopurinol (ZYLOPRIM) 100 mg tablet Take 100 mg by mouth nightly. No current facility-administered medications for this visit. No Known Allergies Review of Systems: - Eyes: no blurry vision or double vision - Cardiovascular: no chest pain - Respiratory: no shortness of breath - Musculoskeletal: no myalgias - Neurological: no numbness/tingling in extremities Physical Examination: 
Blood pressure (!) 108/34, pulse (!) 53, resp. rate 16, height 5' 8\" (1.727 m), weight 166 lb 6.4 oz (75.5 kg), SpO2 91 %. - General: pleasant, no distress, good eye contact  
- Neck: (+) bilateral carotid bruits - Cardiovascular: regular, bradycardic, nl s1 and s2, no m/r/g,  
- Respiratory: clear bilaterally - Integumentary: no edema,  
- Psychiatric: normal mood and affect Diabetic foot exam:  
 
Left Foot: 
 Visual Exam: callous - mild Pulse DP: 2+ (normal) Filament test: reduced sensation Vibratory sensation: diminished Right Foot: 
 Visual Exam: callous - mild Pulse DP: 2+ (normal) Filament test: reduced sensation Vibratory sensation: diminished Data Reviewed:  
Component Latest Ref Rng & Units 9/12/2019  
 
      5:47 AM  
Hemoglobin A1c, (calculated) 4.2 - 6.3 % 6.1 Est. average glucose 
    mg/dL 128 Assessment/Plan: 1.  DM w/o complication type II, uncontrolled wit nephropathy: his most recent Hgb A1c was 6.1% in 9/19 down from 10.9% in 1/19 up from 7.8% in 8/18 down from 8.9% in 2/18 down from 9.5% in 10/17 up from 9.2% in 7/17 up from 6.1% in 1/17 down from 6.5% in 7/16 down from 6.6% in 2/16 up from 6% in 9/15 down from 6.4% in 4/15 up from 6.3% in 12/14 down from 7.6% in 8/14 up from 8.3% in 3/14 up from 6.9% in November up from 6.3% in July down from 7.3% in Feb 2013 up from 6.7% in October down from 7% in June down from 8.3% in March up from 7.3% in December down from 8% in September down from 9.5% in June, which is the same as in January. A1c is very good and now is at higher risk of hypoglycemia given 30 lb wt loss and no longer on PD and switched to HD. Will only have him take insulin if BS > 150 in the morning 
- take basaglar 8 units if fbs > 150, otherwise hold - check bs 2 times daily 
- foot exam done 10/19 
- optho UTD 6/18 
- microalbumin 994 1/11 down to 897 9/11 up to 1383 in 10/12 (possibly due to protein shake) and down to 1083 in 2/13, stable at 1087 in 11/13, down to 453 in 4/15, up to 3402 in 7/16 and 4040 in 7/17 and 5421 in 10/17 and 8148 in 8/18--will no longer follow since starting dialysis in 10/18 
- check A1c at dialysis 2. Unspecified essential hypertension (401.9) his BP was at goal < 140/90 on the right arm which is normally his higher arm. We have now learned that he has HTN on his right arm so we will only use from now on to measure his readings. Having more hypotension and dizziness so will decrease the clonidine especially as he is bradycardic today. - cont bystolic 20 mg daily 
- cont nifedipine 60 mg daily 
- lasix 80 mg tid 
- decrease clonidine to 0.2 mg bid 3. Other and unspecified hyperlipidemia (272.4) Given DM and CAD, Goal LDL < 70, non-HDL < 100, and TG < 150.  his lipids were all at goal in 3/14 and 12/14 and 4/15. LDL up to 84 in 2/16 due to more red meat.    and LDL 95 in 7/16 off the niaspan and with weight gain so hopefully weight loss will help. LDL 85 and  in 1/17.  and non- in 7/17. Lipitor doubled to 40 mg bid and praluent added in 10/17 and TG down to 306 and non-HDL down to 96 in 10/17. I cut back on lipitor to 40 mg daily to help with joint pains but it appears Dr. Remi Mcmillan kept him on 80 mg and LDL 29 in 2/18 and still having joint pains so asked him again to cut back to 40 mg given how low his total cholesterol and LDL are and he did and LDL 37 in 8/18 and 30 in 1/19 
- cont lipitor 40 mg daily 
- cont praluent 75 mg weekly - check lipids at dialysis 4. Iron deficiency anemia:  Hgb 10.2 in 1/19 up from 9.3 in 8/18 up from 8.8 in 2/18 down from 11.5 in 10/17 up from 11.4 in 7/17 up from 10.6 in 1/17 down from 11.3 in 7/16 up from 10.8 in 2/16 down from 11.4 in 9/15 up from 10.9 in 4/15 down from 11.4 in 12/14 down from 11.6 in 8/14 up from 11.1 in 3/14 from 12.5 in 11/13 up from 11.8 in 7/13. Has been on higher dose of iron 1 tab bid since 8/14  
- receiving iron through dialysis - check hgb at dialysis 5. Vitamin D deficiency: level was 29 in 9/15 on 2000 units of D3 daily so wanted to increase to 3000 units daily but his calcium was 10.6 in 9/15 and Dr. Esteban Patton advised cutting back on his dose and he has been taking 1000 units daily and level down to 19 in 2/16 but calcium back to normal at 9.6. Level 20 in 7/16 but calcium 10.2 in 7/16. Now off 1000 units and level down to 18 in 1/17 so wanted him to restart this and he apparently did and then was told to stop by Dr. Esteban Patton and down to 13.1 in 7/17 but calcium is normal at 9.7 so had him go back to this but level still 13 in 10/17 so increased to 2000 units daily and up to 20.5 in 2/18 and calcium still normal so stayed on this dose. Down to 10.4 in 8/18 as he stopped supplement this spring so had him restart as calcium level was trending down but was only taking 1000 units daily and up to 14.4 in 1/19. Just picked up 5000 units daily so will have him continue this dose 
- cont vitamin D 5000 units daily - check Vitamin D 25-OH level at next visit 6. Class I Obesity: Tried topamax as I wanted to avoid phentermine given his vascular disease but didn't feel well on this so stopped after 2-3 weeks. Had lost 13 lbs from 7/16 to 1/17 with doing a nutrimost program but had stopped this in 1/17 when his creatinine was high and I don't know if this could have contributed to higher creatinine. Wt up 3 lbs by 7/17 and 6 lbs by 10/17 likely due to fluid but down 10 lbs by 2/18 and 7 lbs by 8/18 and 1 lb by 1/19.  Up 15 lbs by 4/19. Down 30 lbs by 10/19 due to changing from PD to HD in 9/19. 
- diet and exercise Patient Instructions 1) Decrease the clonidine to 0.2 mg just 1 tab in the morning and evening to see if this helps with low blood pressure. 2) As long as your sugar is under 150, don't take any basaglar. If over 150, just take 8 units. 3) Your A1c is 6.1% last month which has greatly improved from 10.9% in 1/19. Follow-up and Dispositions · Return in about 6 months (around 4/22/2020). Copy sent to: Dr. Adam Osborne [phone] Dr. Mery Hartmann [phone] Dr. Anatoliy Meek [phone] Dr. Miriam Vyas

## 2019-10-22 NOTE — PATIENT INSTRUCTIONS
1) Decrease the clonidine to 0.2 mg just 1 tab in the morning and evening to see if this helps with low blood pressure. 2) As long as your sugar is under 150, don't take any basaglar. If over 150, just take 8 units. 3) Your A1c is 6.1% last month which has greatly improved from 10.9% in 1/19.
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
## 2019-10-28 NOTE — OP NOTES
[address] 
OPERATIVE REPORT Name:  Haim Cyr 
MR#:  [id number] :  1950 ACCOUNT #:  [de-identified] DATE OF SERVICE:  10/17/2019 PREOPERATIVE DIAGNOSIS:  End-stage renal disease. POSTOPERATIVE DIAGNOSIS:  End-stage renal disease. PROCEDURE PERFORMED:  Creation of left brachiocephalic arteriovenous fistula. SURGEON:  Raffy Reyes MD 
 
ASSISTANT:  None. ANESTHESIA:  Block. COMPLICATIONS:  None. SPECIMENS REMOVED:  None. IMPLANTS:  None. ESTIMATED BLOOD LOSS:  10 mL. DRAINS:  None. INDICATIONS:  The patient is a 46-year-old male with end-stage renal disease who requires permanent dialysis access. He presents for fistula creation. PROCEDURE:  After informed consent was obtained, the patient was given intravenous antibiotics within 1 hour of the incision. He was taken to the operating room with a satisfactory left upper extremity block in place. After establishing adequate sedation, the left arm was prepped and draped as a sterile field. A transverse incision was made just distal to the antecubital crease and the median cubital vein and brachial artery were dissected free. Side branches of the vein were divided between silk ties. The patient was given 5000 units of intravenous heparin. The vein was ligated at the distal extent of dissection and transected just proximal to this. The vein was flushed with heparinized saline and was a good conduit. The artery had a somewhat reduced pulse, but was a good conduit. An end-to-side anastomosis was created between the artery and the vein using running 5-0 Prolene suture. Upon completion, there was good flow in the fistula and a good Doppler signal at the wrist.  The wound was irrigated with antibiotic irrigation and closed with 3-0 Vicryl suture and 4-0 Monocryl suture and Dermabond was applied as a dressing. The patient was transferred to the PACU in stable condition.   All counts were correct. Allen Ireland MD 
 
 
[id number] 
D:  10/27/2019 23:45 
T:  10/28/2019 3:15 JOB #:  [id number]

## 2020-01-01 ENCOUNTER — APPOINTMENT (OUTPATIENT)
Dept: GENERAL RADIOLOGY | Age: 70
DRG: 853 | End: 2020-01-01
Attending: ORTHOPAEDIC SURGERY
Payer: MEDICARE

## 2020-01-01 ENCOUNTER — APPOINTMENT (OUTPATIENT)
Dept: INTERVENTIONAL RADIOLOGY/VASCULAR | Age: 70
DRG: 853 | End: 2020-01-01
Attending: INTERNAL MEDICINE
Payer: MEDICARE

## 2020-01-01 ENCOUNTER — HOSPICE ADMISSION (OUTPATIENT)
Dept: HOSPICE | Facility: HOSPICE | Age: 70
End: 2020-01-01
Payer: MEDICARE

## 2020-01-01 ENCOUNTER — APPOINTMENT (OUTPATIENT)
Dept: GENERAL RADIOLOGY | Age: 70
DRG: 853 | End: 2020-01-01
Attending: INTERNAL MEDICINE
Payer: MEDICARE

## 2020-01-01 ENCOUNTER — APPOINTMENT (OUTPATIENT)
Dept: CT IMAGING | Age: 70
DRG: 853 | End: 2020-01-01
Attending: EMERGENCY MEDICINE
Payer: MEDICARE

## 2020-01-01 ENCOUNTER — APPOINTMENT (OUTPATIENT)
Dept: GENERAL RADIOLOGY | Age: 70
DRG: 853 | End: 2020-01-01
Attending: EMERGENCY MEDICINE
Payer: MEDICARE

## 2020-01-01 ENCOUNTER — ANESTHESIA (OUTPATIENT)
Dept: SURGERY | Age: 70
DRG: 853 | End: 2020-01-01
Payer: MEDICARE

## 2020-01-01 ENCOUNTER — ANESTHESIA EVENT (OUTPATIENT)
Dept: SURGERY | Age: 70
DRG: 853 | End: 2020-01-01
Payer: MEDICARE

## 2020-01-01 ENCOUNTER — APPOINTMENT (OUTPATIENT)
Dept: NON INVASIVE DIAGNOSTICS | Age: 70
DRG: 853 | End: 2020-01-01
Attending: INTERNAL MEDICINE
Payer: MEDICARE

## 2020-01-01 ENCOUNTER — TELEPHONE (OUTPATIENT)
Dept: PALLATIVE CARE | Age: 70
End: 2020-01-01

## 2020-01-01 ENCOUNTER — HOSPITAL ENCOUNTER (OUTPATIENT)
Dept: GENERAL RADIOLOGY | Age: 70
Discharge: HOME | End: 2020-05-13
Payer: MEDICARE

## 2020-01-01 ENCOUNTER — HOSPITAL ENCOUNTER (INPATIENT)
Age: 70
LOS: 4 days | DRG: 951 | End: 2020-07-13
Attending: FAMILY MEDICINE | Admitting: FAMILY MEDICINE
Payer: OTHER MISCELLANEOUS

## 2020-01-01 ENCOUNTER — HOSPITAL ENCOUNTER (INPATIENT)
Age: 70
LOS: 8 days | Discharge: HOSPICE | DRG: 853 | End: 2020-07-09
Attending: EMERGENCY MEDICINE | Admitting: HOSPITALIST
Payer: MEDICARE

## 2020-01-01 VITALS
TEMPERATURE: 99.4 F | HEIGHT: 68 IN | HEART RATE: 133 BPM | OXYGEN SATURATION: 97 % | WEIGHT: 160.5 LBS | RESPIRATION RATE: 14 BRPM | BODY MASS INDEX: 24.32 KG/M2 | SYSTOLIC BLOOD PRESSURE: 185 MMHG | DIASTOLIC BLOOD PRESSURE: 68 MMHG

## 2020-01-01 VITALS
OXYGEN SATURATION: 98 % | DIASTOLIC BLOOD PRESSURE: 60 MMHG | RESPIRATION RATE: 18 BRPM | TEMPERATURE: 101.2 F | HEART RATE: 141 BPM | SYSTOLIC BLOOD PRESSURE: 156 MMHG

## 2020-01-01 DIAGNOSIS — G06.1 ABSCESS IN EPIDURAL SPACE OF CERVICAL SPINE: ICD-10-CM

## 2020-01-01 DIAGNOSIS — Z71.89 COUNSELING REGARDING ADVANCE CARE PLANNING AND GOALS OF CARE: ICD-10-CM

## 2020-01-01 DIAGNOSIS — M46.42 DISCITIS OF CERVICAL REGION: ICD-10-CM

## 2020-01-01 DIAGNOSIS — A40.9: ICD-10-CM

## 2020-01-01 DIAGNOSIS — D62 ACUTE BLOOD LOSS ANEMIA: ICD-10-CM

## 2020-01-01 DIAGNOSIS — G95.20 CERVICAL SPINAL CORD COMPRESSION (HCC): ICD-10-CM

## 2020-01-01 DIAGNOSIS — R06.03 RESPIRATORY DISTRESS: ICD-10-CM

## 2020-01-01 DIAGNOSIS — R45.1 RESTLESSNESS AND AGITATION: ICD-10-CM

## 2020-01-01 DIAGNOSIS — M54.2 NECK PAIN: ICD-10-CM

## 2020-01-01 DIAGNOSIS — I10 MALIGNANT HYPERTENSION: ICD-10-CM

## 2020-01-01 DIAGNOSIS — R06.82 TACHYPNEA: ICD-10-CM

## 2020-01-01 DIAGNOSIS — G93.41: ICD-10-CM

## 2020-01-01 DIAGNOSIS — N18.9 ACUTE RENAL FAILURE WITH ACUTE RENAL CORTICAL NECROSIS SUPERIMPOSED ON CHRONIC KIDNEY DISEASE, ON CHRONIC DIALYSIS (HCC): ICD-10-CM

## 2020-01-01 DIAGNOSIS — R06.02 SOB (SHORTNESS OF BREATH): ICD-10-CM

## 2020-01-01 DIAGNOSIS — J96.00 ACUTE RESPIRATORY FAILURE, UNSPECIFIED WHETHER WITH HYPOXIA OR HYPERCAPNIA (HCC): ICD-10-CM

## 2020-01-01 DIAGNOSIS — R06.02 SHORTNESS OF BREATH: ICD-10-CM

## 2020-01-01 DIAGNOSIS — J96.01 ACUTE RESPIRATORY FAILURE WITH HYPOXIA (HCC): ICD-10-CM

## 2020-01-01 DIAGNOSIS — Z51.5 END OF LIFE CARE: ICD-10-CM

## 2020-01-01 DIAGNOSIS — Z99.2 ACUTE RENAL FAILURE WITH ACUTE RENAL CORTICAL NECROSIS SUPERIMPOSED ON CHRONIC KIDNEY DISEASE, ON CHRONIC DIALYSIS (HCC): ICD-10-CM

## 2020-01-01 DIAGNOSIS — A49.8 STAPHYLOCOCCUS EPIDERMIDIS INFECTION: ICD-10-CM

## 2020-01-01 DIAGNOSIS — N17.1 ACUTE RENAL FAILURE WITH ACUTE RENAL CORTICAL NECROSIS SUPERIMPOSED ON CHRONIC KIDNEY DISEASE, ON CHRONIC DIALYSIS (HCC): ICD-10-CM

## 2020-01-01 DIAGNOSIS — R56.9 SEIZURE (HCC): Primary | ICD-10-CM

## 2020-01-01 DIAGNOSIS — G06.2 EPIDURAL ABSCESS: ICD-10-CM

## 2020-01-01 DIAGNOSIS — K11.7 INCREASED OROPHARYNGEAL SECRETIONS: ICD-10-CM

## 2020-01-01 DIAGNOSIS — G93.40 ACUTE ENCEPHALOPATHY: ICD-10-CM

## 2020-01-01 DIAGNOSIS — Z51.5 HOSPICE CARE: ICD-10-CM

## 2020-01-01 DIAGNOSIS — G93.40 ENCEPHALOPATHY: ICD-10-CM

## 2020-01-01 DIAGNOSIS — M46.42 CERVICAL DISCITIS: ICD-10-CM

## 2020-01-01 DIAGNOSIS — M46.22 ACUTE OSTEOMYELITIS OF CERVICAL SPINE (HCC): ICD-10-CM

## 2020-01-01 DIAGNOSIS — R52 GENERALIZED PAIN: ICD-10-CM

## 2020-01-01 DIAGNOSIS — R65.21: ICD-10-CM

## 2020-01-01 LAB
ALBUMIN SERPL-MCNC: 2.2 G/DL (ref 3.5–5)
ALBUMIN SERPL-MCNC: 2.5 G/DL (ref 3.5–5)
ALBUMIN SERPL-MCNC: 3.1 G/DL (ref 3.5–5)
ALBUMIN/GLOB SERPL: 0.5 {RATIO} (ref 1.1–2.2)
ALBUMIN/GLOB SERPL: 0.6 {RATIO} (ref 1.1–2.2)
ALBUMIN/GLOB SERPL: 0.6 {RATIO} (ref 1.1–2.2)
ALP SERPL-CCNC: 100 U/L (ref 45–117)
ALP SERPL-CCNC: 113 U/L (ref 45–117)
ALP SERPL-CCNC: 136 U/L (ref 45–117)
ALT SERPL-CCNC: 12 U/L (ref 12–78)
ALT SERPL-CCNC: 17 U/L (ref 12–78)
ALT SERPL-CCNC: 18 U/L (ref 12–78)
ANION GAP SERPL CALC-SCNC: 10 MMOL/L (ref 5–15)
ANION GAP SERPL CALC-SCNC: 12 MMOL/L (ref 5–15)
ANION GAP SERPL CALC-SCNC: 14 MMOL/L (ref 5–15)
ANION GAP SERPL CALC-SCNC: 14 MMOL/L (ref 5–15)
ANION GAP SERPL CALC-SCNC: 15 MMOL/L (ref 5–15)
ANION GAP SERPL CALC-SCNC: 17 MMOL/L (ref 5–15)
ANION GAP SERPL CALC-SCNC: 18 MMOL/L (ref 5–15)
ANION GAP SERPL CALC-SCNC: 22 MMOL/L (ref 5–15)
ANION GAP SERPL CALC-SCNC: 26 MMOL/L (ref 5–15)
APAP SERPL-MCNC: <2 UG/ML (ref 10–30)
APPEARANCE UR: CLEAR
APTT PPP: 27 SEC (ref 22.1–32)
ARTERIAL PATENCY WRIST A: ABNORMAL
ARTERIAL PATENCY WRIST A: ABNORMAL
ARTERIAL PATENCY WRIST A: YES
AST SERPL-CCNC: 21 U/L (ref 15–37)
AST SERPL-CCNC: 24 U/L (ref 15–37)
AST SERPL-CCNC: 29 U/L (ref 15–37)
ATRIAL RATE: 70 BPM
ATRIAL RATE: 91 BPM
BACTERIA SPEC CULT: ABNORMAL
BACTERIA SPEC CULT: NORMAL
BACTERIA URNS QL MICRO: NEGATIVE /HPF
BASE DEFICIT BLD-SCNC: 2 MMOL/L
BASE DEFICIT BLD-SCNC: 4 MMOL/L
BASE DEFICIT BLD-SCNC: 5 MMOL/L
BASE EXCESS BLD CALC-SCNC: 1 MMOL/L
BASE EXCESS BLD CALC-SCNC: 1 MMOL/L
BASE EXCESS BLD CALC-SCNC: 2 MMOL/L
BASE EXCESS BLD CALC-SCNC: 3 MMOL/L
BASOPHILS # BLD: 0 K/UL (ref 0–0.1)
BASOPHILS # BLD: 0.1 K/UL (ref 0–0.1)
BASOPHILS NFR BLD: 0 % (ref 0–1)
BASOPHILS NFR BLD: 1 % (ref 0–1)
BDY SITE: ABNORMAL
BILIRUB SERPL-MCNC: 0.4 MG/DL (ref 0.2–1)
BILIRUB SERPL-MCNC: 0.5 MG/DL (ref 0.2–1)
BILIRUB SERPL-MCNC: 0.8 MG/DL (ref 0.2–1)
BILIRUB UR QL: NEGATIVE
BODY TEMPERATURE: 37
BUN SERPL-MCNC: 34 MG/DL (ref 6–20)
BUN SERPL-MCNC: 40 MG/DL (ref 6–20)
BUN SERPL-MCNC: 41 MG/DL (ref 6–20)
BUN SERPL-MCNC: 45 MG/DL (ref 6–20)
BUN SERPL-MCNC: 48 MG/DL (ref 6–20)
BUN SERPL-MCNC: 52 MG/DL (ref 6–20)
BUN SERPL-MCNC: 55 MG/DL (ref 6–20)
BUN SERPL-MCNC: 68 MG/DL (ref 6–20)
BUN SERPL-MCNC: 69 MG/DL (ref 6–20)
BUN/CREAT SERPL: 5 (ref 12–20)
BUN/CREAT SERPL: 6 (ref 12–20)
BUN/CREAT SERPL: 7 (ref 12–20)
BUN/CREAT SERPL: 8 (ref 12–20)
BUN/CREAT SERPL: 9 (ref 12–20)
CA-I BLD-SCNC: 1.09 MMOL/L (ref 1.12–1.32)
CA-I BLD-SCNC: 1.15 MMOL/L (ref 1.12–1.32)
CA-I BLD-SCNC: 1.18 MMOL/L (ref 1.12–1.32)
CA-I BLD-SCNC: 1.19 MMOL/L (ref 1.12–1.32)
CA-I BLD-SCNC: 1.21 MMOL/L (ref 1.12–1.32)
CA-I BLD-SCNC: 1.22 MMOL/L (ref 1.12–1.32)
CA-I BLD-SCNC: 1.23 MMOL/L (ref 1.12–1.32)
CALCIUM SERPL-MCNC: 10 MG/DL (ref 8.5–10.1)
CALCIUM SERPL-MCNC: 8.5 MG/DL (ref 8.5–10.1)
CALCIUM SERPL-MCNC: 8.7 MG/DL (ref 8.5–10.1)
CALCIUM SERPL-MCNC: 8.8 MG/DL (ref 8.5–10.1)
CALCIUM SERPL-MCNC: 8.8 MG/DL (ref 8.5–10.1)
CALCIUM SERPL-MCNC: 9.5 MG/DL (ref 8.5–10.1)
CALCIUM SERPL-MCNC: 9.6 MG/DL (ref 8.5–10.1)
CALCIUM SERPL-MCNC: 9.8 MG/DL (ref 8.5–10.1)
CALCIUM SERPL-MCNC: 9.9 MG/DL (ref 8.5–10.1)
CALCULATED P AXIS, ECG09: 15 DEGREES
CALCULATED P AXIS, ECG09: 90 DEGREES
CALCULATED R AXIS, ECG10: 1 DEGREES
CALCULATED R AXIS, ECG10: 6 DEGREES
CALCULATED T AXIS, ECG11: 59 DEGREES
CALCULATED T AXIS, ECG11: 81 DEGREES
CHLORIDE SERPL-SCNC: 90 MMOL/L (ref 97–108)
CHLORIDE SERPL-SCNC: 94 MMOL/L (ref 97–108)
CHLORIDE SERPL-SCNC: 95 MMOL/L (ref 97–108)
CHLORIDE SERPL-SCNC: 95 MMOL/L (ref 97–108)
CHLORIDE SERPL-SCNC: 96 MMOL/L (ref 97–108)
CHLORIDE SERPL-SCNC: 97 MMOL/L (ref 97–108)
CHLORIDE SERPL-SCNC: 98 MMOL/L (ref 97–108)
CK MB CFR SERPL CALC: 1.8 % (ref 0–2.5)
CK MB SERPL-MCNC: 1.9 NG/ML (ref 5–25)
CK SERPL-CCNC: 103 U/L (ref 39–308)
CK SERPL-CCNC: 363 U/L (ref 39–308)
CO2 SERPL-SCNC: 16 MMOL/L (ref 21–32)
CO2 SERPL-SCNC: 16 MMOL/L (ref 21–32)
CO2 SERPL-SCNC: 18 MMOL/L (ref 21–32)
CO2 SERPL-SCNC: 19 MMOL/L (ref 21–32)
CO2 SERPL-SCNC: 20 MMOL/L (ref 21–32)
CO2 SERPL-SCNC: 23 MMOL/L (ref 21–32)
CO2 SERPL-SCNC: 24 MMOL/L (ref 21–32)
CO2 SERPL-SCNC: 24 MMOL/L (ref 21–32)
CO2 SERPL-SCNC: 25 MMOL/L (ref 21–32)
COLOR UR: ABNORMAL
CREAT SERPL-MCNC: 5.05 MG/DL (ref 0.7–1.3)
CREAT SERPL-MCNC: 5.61 MG/DL (ref 0.7–1.3)
CREAT SERPL-MCNC: 6.58 MG/DL (ref 0.7–1.3)
CREAT SERPL-MCNC: 6.68 MG/DL (ref 0.7–1.3)
CREAT SERPL-MCNC: 7.58 MG/DL (ref 0.7–1.3)
CREAT SERPL-MCNC: 7.7 MG/DL (ref 0.7–1.3)
CREAT SERPL-MCNC: 7.84 MG/DL (ref 0.7–1.3)
CREAT SERPL-MCNC: 8.66 MG/DL (ref 0.7–1.3)
CREAT SERPL-MCNC: 9.27 MG/DL (ref 0.7–1.3)
DIAGNOSIS, 93000: NORMAL
DIAGNOSIS, 93000: NORMAL
DIFFERENTIAL METHOD BLD: ABNORMAL
ECHO AO ROOT DIAM: 3.69 CM
ECHO AR MAX VEL PISA: 101.45 CM/S
ECHO AR MAX VEL PISA: 272.33 CM/S
ECHO AR MAX VEL PISA: 97.01 CM/S
ECHO AR MAX VEL PISA: 99.69 CM/S
ECHO AV AREA PEAK VELOCITY: 2.7 CM2
ECHO AV AREA PEAK VELOCITY: 2.82 CM2
ECHO AV AREA PEAK VELOCITY: 2.96 CM2
ECHO AV AREA PEAK VELOCITY: 3.1 CM2
ECHO AV AREA VTI: 2.62 CM2
ECHO AV AREA/BSA VTI: 1.4 CM2/M2
ECHO AV MEAN GRADIENT: 6.67 MMHG
ECHO AV PEAK GRADIENT: 10.65 MMHG
ECHO AV PEAK GRADIENT: 12.84 MMHG
ECHO AV PEAK VELOCITY: 163.15 CM/S
ECHO AV PEAK VELOCITY: 179.19 CM/S
ECHO AV REGURGITANT PHT: 0.61 S
ECHO AV VTI: 39.66 CM
ECHO LA AREA 4C: 19.17 CM2
ECHO LA MAJOR AXIS: 4.14 CM
ECHO LA MINOR AXIS: 2.24 CM
ECHO LA VOL 4C: 55.64 ML (ref 18–58)
ECHO LA VOLUME INDEX A4C: 30.09 ML/M2 (ref 16–28)
ECHO LV E' LATERAL VELOCITY: 5.62 CM/S
ECHO LV E' SEPTAL VELOCITY: 4.82 CM/S
ECHO LV EDV A4C: 179.07 ML
ECHO LV EDV INDEX A4C: 96.8 ML/M2
ECHO LV EJECTION FRACTION A4C: 49 PERCENT
ECHO LV ESV A4C: 91.17 ML
ECHO LV ESV INDEX A4C: 49.3 ML/M2
ECHO LV INTERNAL DIMENSION DIASTOLIC: 4.87 CM (ref 4.2–5.9)
ECHO LV INTERNAL DIMENSION SYSTOLIC: 3.28 CM
ECHO LV IVSD: 1.56 CM (ref 0.6–1)
ECHO LV MASS 2D: 333.7 G (ref 88–224)
ECHO LV MASS INDEX 2D: 180.5 G/M2 (ref 49–115)
ECHO LV POSTERIOR WALL DIASTOLIC: 1.59 CM (ref 0.6–1)
ECHO LVOT CARDIAC OUTPUT: 4.82 LITER/MINUTE
ECHO LVOT DIAM: 2.19 CM
ECHO LVOT PEAK GRADIENT: 6.56 MMHG
ECHO LVOT PEAK GRADIENT: 7.19 MMHG
ECHO LVOT PEAK VELOCITY: 128.09 CM/S
ECHO LVOT PEAK VELOCITY: 134.05 CM/S
ECHO LVOT SV: 103.8 ML
ECHO LVOT VTI: 27.51 CM
ECHO MV A VELOCITY: 49.61 CM/S
ECHO MV AREA PHT: 1.23 CM2
ECHO MV AREA VTI: 3.1 CM2
ECHO MV E DECELERATION TIME (DT): 0.44 S
ECHO MV E VELOCITY: 79.51 CM/S
ECHO MV E/A RATIO: 1.6
ECHO MV E/E' LATERAL: 14.15
ECHO MV E/E' RATIO (AVERAGED): 15.32
ECHO MV E/E' SEPTAL: 16.5
ECHO MV MEAN GRADIENT: 0.94 MMHG
ECHO MV MEAN GRADIENT: 2.74 MMHG
ECHO MV PEAK GRADIENT: 3.98 MMHG
ECHO MV PRESSURE HALF TIME (PHT): 0.18 S
ECHO MV VTI: 33.51 CM
ECHO PV MEAN GRADIENT: 1.96 MMHG
ECHO PV PEAK INSTANTANEOUS GRADIENT SYSTOLIC: 3.76 MMHG
ECHO PV PEAK INSTANTANEOUS GRADIENT SYSTOLIC: 4.12 MMHG
ECHO PV VTI: 21.84 CM
ECHO RA AREA 4C: 13.55 CM2
EOSINOPHIL # BLD: 0 K/UL (ref 0–0.4)
EOSINOPHIL # BLD: 0.1 K/UL (ref 0–0.4)
EOSINOPHIL # BLD: 0.5 K/UL (ref 0–0.4)
EOSINOPHIL # BLD: 0.5 K/UL (ref 0–0.4)
EOSINOPHIL # BLD: 0.6 K/UL (ref 0–0.4)
EOSINOPHIL NFR BLD: 0 % (ref 0–7)
EOSINOPHIL NFR BLD: 1 % (ref 0–7)
EOSINOPHIL NFR BLD: 4 % (ref 0–7)
EOSINOPHIL NFR BLD: 4 % (ref 0–7)
EOSINOPHIL NFR BLD: 6 % (ref 0–7)
EPITH CASTS URNS QL MICRO: ABNORMAL /LPF
ERYTHROCYTE [DISTWIDTH] IN BLOOD BY AUTOMATED COUNT: 14.5 % (ref 11.5–14.5)
ERYTHROCYTE [DISTWIDTH] IN BLOOD BY AUTOMATED COUNT: 14.6 % (ref 11.5–14.5)
EST. AVERAGE GLUCOSE BLD GHB EST-MCNC: 183 MG/DL
GAS FLOW.O2 O2 DELIVERY SYS: ABNORMAL L/MIN
GAS FLOW.O2 SETTING OXYMISER: 12 BPM
GLOBULIN SER CALC-MCNC: 4.4 G/DL (ref 2–4)
GLOBULIN SER CALC-MCNC: 4.7 G/DL (ref 2–4)
GLOBULIN SER CALC-MCNC: 5.4 G/DL (ref 2–4)
GLUCOSE BLD STRIP.AUTO-MCNC: 103 MG/DL (ref 65–100)
GLUCOSE BLD STRIP.AUTO-MCNC: 117 MG/DL (ref 65–100)
GLUCOSE BLD STRIP.AUTO-MCNC: 123 MG/DL (ref 65–100)
GLUCOSE BLD STRIP.AUTO-MCNC: 136 MG/DL (ref 65–100)
GLUCOSE BLD STRIP.AUTO-MCNC: 139 MG/DL (ref 65–100)
GLUCOSE BLD STRIP.AUTO-MCNC: 140 MG/DL (ref 65–100)
GLUCOSE BLD STRIP.AUTO-MCNC: 142 MG/DL (ref 65–100)
GLUCOSE BLD STRIP.AUTO-MCNC: 155 MG/DL (ref 65–100)
GLUCOSE BLD STRIP.AUTO-MCNC: 158 MG/DL (ref 65–100)
GLUCOSE BLD STRIP.AUTO-MCNC: 159 MG/DL (ref 65–100)
GLUCOSE BLD STRIP.AUTO-MCNC: 169 MG/DL (ref 65–100)
GLUCOSE BLD STRIP.AUTO-MCNC: 170 MG/DL (ref 65–100)
GLUCOSE BLD STRIP.AUTO-MCNC: 173 MG/DL (ref 65–100)
GLUCOSE BLD STRIP.AUTO-MCNC: 178 MG/DL (ref 65–100)
GLUCOSE BLD STRIP.AUTO-MCNC: 183 MG/DL (ref 65–100)
GLUCOSE BLD STRIP.AUTO-MCNC: 185 MG/DL (ref 65–100)
GLUCOSE BLD STRIP.AUTO-MCNC: 191 MG/DL (ref 65–100)
GLUCOSE BLD STRIP.AUTO-MCNC: 200 MG/DL (ref 65–100)
GLUCOSE BLD STRIP.AUTO-MCNC: 201 MG/DL (ref 65–100)
GLUCOSE BLD STRIP.AUTO-MCNC: 205 MG/DL (ref 65–100)
GLUCOSE BLD STRIP.AUTO-MCNC: 206 MG/DL (ref 65–100)
GLUCOSE BLD STRIP.AUTO-MCNC: 214 MG/DL (ref 65–100)
GLUCOSE BLD STRIP.AUTO-MCNC: 219 MG/DL (ref 65–100)
GLUCOSE BLD STRIP.AUTO-MCNC: 223 MG/DL (ref 65–100)
GLUCOSE BLD STRIP.AUTO-MCNC: 225 MG/DL (ref 65–100)
GLUCOSE BLD STRIP.AUTO-MCNC: 226 MG/DL (ref 65–100)
GLUCOSE BLD STRIP.AUTO-MCNC: 231 MG/DL (ref 65–100)
GLUCOSE BLD STRIP.AUTO-MCNC: 240 MG/DL (ref 65–100)
GLUCOSE BLD STRIP.AUTO-MCNC: 65 MG/DL (ref 65–100)
GLUCOSE BLD STRIP.AUTO-MCNC: 98 MG/DL (ref 65–100)
GLUCOSE SERPL-MCNC: 102 MG/DL (ref 65–100)
GLUCOSE SERPL-MCNC: 113 MG/DL (ref 65–100)
GLUCOSE SERPL-MCNC: 161 MG/DL (ref 65–100)
GLUCOSE SERPL-MCNC: 163 MG/DL (ref 65–100)
GLUCOSE SERPL-MCNC: 169 MG/DL (ref 65–100)
GLUCOSE SERPL-MCNC: 170 MG/DL (ref 65–100)
GLUCOSE SERPL-MCNC: 192 MG/DL (ref 65–100)
GLUCOSE SERPL-MCNC: 201 MG/DL (ref 65–100)
GLUCOSE SERPL-MCNC: 205 MG/DL (ref 65–100)
GLUCOSE UR STRIP.AUTO-MCNC: 100 MG/DL
GRAM STN SPEC: ABNORMAL
GRAM STN SPEC: ABNORMAL
HBA1C MFR BLD: 8 % (ref 4–5.6)
HCO3 BLD-SCNC: 21.7 MMOL/L (ref 22–26)
HCO3 BLD-SCNC: 22.5 MMOL/L (ref 22–26)
HCO3 BLD-SCNC: 24.1 MMOL/L (ref 22–26)
HCO3 BLD-SCNC: 24.1 MMOL/L (ref 22–26)
HCO3 BLD-SCNC: 26 MMOL/L (ref 22–26)
HCO3 BLD-SCNC: 26 MMOL/L (ref 22–26)
HCO3 BLD-SCNC: 27.7 MMOL/L (ref 22–26)
HCT VFR BLD AUTO: 28.8 % (ref 36.6–50.3)
HCT VFR BLD AUTO: 31.7 % (ref 36.6–50.3)
HCT VFR BLD AUTO: 32 % (ref 36.6–50.3)
HCT VFR BLD AUTO: 32.4 % (ref 36.6–50.3)
HCT VFR BLD AUTO: 32.7 % (ref 36.6–50.3)
HCT VFR BLD AUTO: 34.8 % (ref 36.6–50.3)
HCT VFR BLD AUTO: 35.7 % (ref 36.6–50.3)
HCT VFR BLD AUTO: 35.9 % (ref 36.6–50.3)
HCT VFR BLD AUTO: 43.1 % (ref 36.6–50.3)
HGB BLD-MCNC: 10 G/DL (ref 12.1–17)
HGB BLD-MCNC: 10 G/DL (ref 12.1–17)
HGB BLD-MCNC: 10.4 G/DL (ref 12.1–17)
HGB BLD-MCNC: 10.4 G/DL (ref 12.1–17)
HGB BLD-MCNC: 10.9 G/DL (ref 12.1–17)
HGB BLD-MCNC: 11.3 G/DL (ref 12.1–17)
HGB BLD-MCNC: 11.4 G/DL (ref 12.1–17)
HGB BLD-MCNC: 13.8 G/DL (ref 12.1–17)
HGB BLD-MCNC: 9.2 G/DL (ref 12.1–17)
HGB UR QL STRIP: NEGATIVE
IMM GRANULOCYTES # BLD AUTO: 0 K/UL (ref 0–0.04)
IMM GRANULOCYTES # BLD AUTO: 0.1 K/UL (ref 0–0.04)
IMM GRANULOCYTES NFR BLD AUTO: 0 % (ref 0–0.5)
IMM GRANULOCYTES NFR BLD AUTO: 1 % (ref 0–0.5)
INR PPP: 1 (ref 0.9–1.1)
KETONES UR QL STRIP.AUTO: 15 MG/DL
LACTATE SERPL-SCNC: 1 MMOL/L (ref 0.4–2)
LACTATE SERPL-SCNC: 2.8 MMOL/L (ref 0.4–2)
LEUKOCYTE ESTERASE UR QL STRIP.AUTO: NEGATIVE
LYMPHOCYTES # BLD: 0.9 K/UL (ref 0.8–3.5)
LYMPHOCYTES # BLD: 0.9 K/UL (ref 0.8–3.5)
LYMPHOCYTES # BLD: 1 K/UL (ref 0.8–3.5)
LYMPHOCYTES # BLD: 1 K/UL (ref 0.8–3.5)
LYMPHOCYTES # BLD: 2.3 K/UL (ref 0.8–3.5)
LYMPHOCYTES NFR BLD: 11 % (ref 12–49)
LYMPHOCYTES NFR BLD: 16 % (ref 12–49)
LYMPHOCYTES NFR BLD: 7 % (ref 12–49)
LYMPHOCYTES NFR BLD: 8 % (ref 12–49)
LYMPHOCYTES NFR BLD: 9 % (ref 12–49)
MAGNESIUM SERPL-MCNC: 3.1 MG/DL (ref 1.6–2.4)
MCH RBC QN AUTO: 32.5 PG (ref 26–34)
MCH RBC QN AUTO: 32.5 PG (ref 26–34)
MCH RBC QN AUTO: 32.7 PG (ref 26–34)
MCH RBC QN AUTO: 32.8 PG (ref 26–34)
MCH RBC QN AUTO: 32.9 PG (ref 26–34)
MCH RBC QN AUTO: 33.1 PG (ref 26–34)
MCH RBC QN AUTO: 33.3 PG (ref 26–34)
MCH RBC QN AUTO: 33.5 PG (ref 26–34)
MCH RBC QN AUTO: 33.5 PG (ref 26–34)
MCHC RBC AUTO-ENTMCNC: 31.3 G/DL (ref 30–36.5)
MCHC RBC AUTO-ENTMCNC: 31.3 G/DL (ref 30–36.5)
MCHC RBC AUTO-ENTMCNC: 31.5 G/DL (ref 30–36.5)
MCHC RBC AUTO-ENTMCNC: 31.5 G/DL (ref 30–36.5)
MCHC RBC AUTO-ENTMCNC: 31.8 G/DL (ref 30–36.5)
MCHC RBC AUTO-ENTMCNC: 31.9 G/DL (ref 30–36.5)
MCHC RBC AUTO-ENTMCNC: 31.9 G/DL (ref 30–36.5)
MCHC RBC AUTO-ENTMCNC: 32 G/DL (ref 30–36.5)
MCHC RBC AUTO-ENTMCNC: 32.1 G/DL (ref 30–36.5)
MCV RBC AUTO: 101.8 FL (ref 80–99)
MCV RBC AUTO: 102.6 FL (ref 80–99)
MCV RBC AUTO: 103.2 FL (ref 80–99)
MCV RBC AUTO: 104.1 FL (ref 80–99)
MCV RBC AUTO: 104.3 FL (ref 80–99)
MCV RBC AUTO: 104.5 FL (ref 80–99)
MCV RBC AUTO: 104.5 FL (ref 80–99)
MCV RBC AUTO: 105.5 FL (ref 80–99)
MCV RBC AUTO: 106 FL (ref 80–99)
MONOCYTES # BLD: 1.1 K/UL (ref 0–1)
MONOCYTES # BLD: 1.5 K/UL (ref 0–1)
MONOCYTES # BLD: 1.8 K/UL (ref 0–1)
MONOCYTES # BLD: 1.8 K/UL (ref 0–1)
MONOCYTES # BLD: 2.2 K/UL (ref 0–1)
MONOCYTES NFR BLD: 10 % (ref 5–13)
MONOCYTES NFR BLD: 16 % (ref 5–13)
MONOCYTES NFR BLD: 16 % (ref 5–13)
MONOCYTES NFR BLD: 18 % (ref 5–13)
MONOCYTES NFR BLD: 9 % (ref 5–13)
MYELOCYTES NFR BLD MANUAL: 2 %
NEUTS SEG # BLD: 10.7 K/UL (ref 1.8–8)
NEUTS SEG # BLD: 6.2 K/UL (ref 1.8–8)
NEUTS SEG # BLD: 8.1 K/UL (ref 1.8–8)
NEUTS SEG # BLD: 9.5 K/UL (ref 1.8–8)
NEUTS SEG # BLD: 9.6 K/UL (ref 1.8–8)
NEUTS SEG NFR BLD: 63 % (ref 32–75)
NEUTS SEG NFR BLD: 69 % (ref 32–75)
NEUTS SEG NFR BLD: 71 % (ref 32–75)
NEUTS SEG NFR BLD: 71 % (ref 32–75)
NEUTS SEG NFR BLD: 81 % (ref 32–75)
NITRITE UR QL STRIP.AUTO: NEGATIVE
NRBC # BLD: 0 K/UL (ref 0–0.01)
NRBC BLD-RTO: 0 PER 100 WBC
O2/TOTAL GAS SETTING VFR VENT: 30 %
O2/TOTAL GAS SETTING VFR VENT: 50 %
OTHER,OTHU: ABNORMAL
P-R INTERVAL, ECG05: 146 MS
P-R INTERVAL, ECG05: 168 MS
PCO2 BLD: 30.3 MMHG (ref 35–45)
PCO2 BLD: 37.7 MMHG (ref 35–45)
PCO2 BLD: 44.2 MMHG (ref 35–45)
PCO2 BLD: 45 MMHG (ref 35–45)
PCO2 BLD: 45.2 MMHG (ref 35–45)
PCO2 BLD: 45.4 MMHG (ref 35–45)
PCO2 BLD: 45.7 MMHG (ref 35–45)
PEEP RESPIRATORY: 6 CMH2O
PH BLD: 7.29 [PH] (ref 7.35–7.45)
PH BLD: 7.32 [PH] (ref 7.35–7.45)
PH BLD: 7.33 [PH] (ref 7.35–7.45)
PH BLD: 7.36 [PH] (ref 7.35–7.45)
PH BLD: 7.4 [PH] (ref 7.35–7.45)
PH BLD: 7.45 [PH] (ref 7.35–7.45)
PH BLD: 7.51 [PH] (ref 7.35–7.45)
PH UR STRIP: 8 [PH] (ref 5–8)
PHOSPHATE SERPL-MCNC: >13.5 MG/DL (ref 2.6–4.7)
PIP ISTAT,IPIP: 17
PIP ISTAT,IPIP: 18
PLATELET # BLD AUTO: 367 K/UL (ref 150–400)
PLATELET # BLD AUTO: 375 K/UL (ref 150–400)
PLATELET # BLD AUTO: 376 K/UL (ref 150–400)
PLATELET # BLD AUTO: 416 K/UL (ref 150–400)
PLATELET # BLD AUTO: 417 K/UL (ref 150–400)
PLATELET # BLD AUTO: 474 K/UL (ref 150–400)
PLATELET # BLD AUTO: 476 K/UL (ref 150–400)
PLATELET # BLD AUTO: 505 K/UL (ref 150–400)
PLATELET # BLD AUTO: 546 K/UL (ref 150–400)
PLATELET COMMENTS,PCOM: ABNORMAL
PMV BLD AUTO: 10.5 FL (ref 8.9–12.9)
PMV BLD AUTO: 10.6 FL (ref 8.9–12.9)
PMV BLD AUTO: 10.7 FL (ref 8.9–12.9)
PMV BLD AUTO: 10.7 FL (ref 8.9–12.9)
PMV BLD AUTO: 10.8 FL (ref 8.9–12.9)
PMV BLD AUTO: 11 FL (ref 8.9–12.9)
PO2 BLD: 113 MMHG (ref 80–100)
PO2 BLD: 119 MMHG (ref 80–100)
PO2 BLD: 136 MMHG (ref 80–100)
PO2 BLD: 140 MMHG (ref 80–100)
PO2 BLD: 179 MMHG (ref 80–100)
PO2 BLD: 96 MMHG (ref 80–100)
PO2 BLD: 97 MMHG (ref 80–100)
POTASSIUM SERPL-SCNC: 3.5 MMOL/L (ref 3.5–5.1)
POTASSIUM SERPL-SCNC: 3.9 MMOL/L (ref 3.5–5.1)
POTASSIUM SERPL-SCNC: 4.1 MMOL/L (ref 3.5–5.1)
POTASSIUM SERPL-SCNC: 4.5 MMOL/L (ref 3.5–5.1)
POTASSIUM SERPL-SCNC: 4.5 MMOL/L (ref 3.5–5.1)
POTASSIUM SERPL-SCNC: 4.7 MMOL/L (ref 3.5–5.1)
POTASSIUM SERPL-SCNC: 4.8 MMOL/L (ref 3.5–5.1)
POTASSIUM SERPL-SCNC: 4.8 MMOL/L (ref 3.5–5.1)
POTASSIUM SERPL-SCNC: 4.9 MMOL/L (ref 3.5–5.1)
PROT SERPL-MCNC: 6.9 G/DL (ref 6.4–8.2)
PROT SERPL-MCNC: 6.9 G/DL (ref 6.4–8.2)
PROT SERPL-MCNC: 8.5 G/DL (ref 6.4–8.2)
PROT UR STRIP-MCNC: 300 MG/DL
PROTHROMBIN TIME: 10 SEC (ref 9–11.1)
Q-T INTERVAL, ECG07: 436 MS
Q-T INTERVAL, ECG07: 482 MS
QRS DURATION, ECG06: 90 MS
QRS DURATION, ECG06: 98 MS
QTC CALCULATION (BEZET), ECG08: 520 MS
QTC CALCULATION (BEZET), ECG08: 536 MS
RBC # BLD AUTO: 2.83 M/UL (ref 4.1–5.7)
RBC # BLD AUTO: 3.02 M/UL (ref 4.1–5.7)
RBC # BLD AUTO: 3.04 M/UL (ref 4.1–5.7)
RBC # BLD AUTO: 3.1 M/UL (ref 4.1–5.7)
RBC # BLD AUTO: 3.1 M/UL (ref 4.1–5.7)
RBC # BLD AUTO: 3.33 M/UL (ref 4.1–5.7)
RBC # BLD AUTO: 3.48 M/UL (ref 4.1–5.7)
RBC # BLD AUTO: 3.48 M/UL (ref 4.1–5.7)
RBC # BLD AUTO: 4.14 M/UL (ref 4.1–5.7)
RBC #/AREA URNS HPF: ABNORMAL /HPF (ref 0–5)
RBC MORPH BLD: ABNORMAL
SALICYLATES SERPL-MCNC: 8 MG/DL (ref 2.8–20)
SAO2 % BLD: 100 % (ref 92–97)
SAO2 % BLD: 97 % (ref 92–97)
SAO2 % BLD: 98 % (ref 92–97)
SAO2 % BLD: 99 % (ref 92–97)
SAO2 % BLD: 99 % (ref 92–97)
SERVICE CMNT-IMP: ABNORMAL
SERVICE CMNT-IMP: NORMAL
SODIUM SERPL-SCNC: 128 MMOL/L (ref 136–145)
SODIUM SERPL-SCNC: 130 MMOL/L (ref 136–145)
SODIUM SERPL-SCNC: 130 MMOL/L (ref 136–145)
SODIUM SERPL-SCNC: 131 MMOL/L (ref 136–145)
SODIUM SERPL-SCNC: 133 MMOL/L (ref 136–145)
SODIUM SERPL-SCNC: 133 MMOL/L (ref 136–145)
SODIUM SERPL-SCNC: 134 MMOL/L (ref 136–145)
SODIUM SERPL-SCNC: 136 MMOL/L (ref 136–145)
SODIUM SERPL-SCNC: 139 MMOL/L (ref 136–145)
SP GR UR REFRACTOMETRY: 1.02 (ref 1–1.03)
SPECIMEN TYPE: ABNORMAL
THERAPEUTIC RANGE,PTTT: NORMAL SECS (ref 58–77)
TOTAL RESP. RATE, ITRR: 12
TOTAL RESP. RATE, ITRR: 12
TOTAL RESP. RATE, ITRR: 14
TOTAL RESP. RATE, ITRR: 14
TOTAL RESP. RATE, ITRR: 16
TOTAL RESP. RATE, ITRR: 24
TRIGL SERPL-MCNC: 771 MG/DL (ref ?–150)
TROPONIN I SERPL-MCNC: 0.19 NG/ML
TROPONIN I SERPL-MCNC: 0.88 NG/ML
UA: UC IF INDICATED,UAUC: ABNORMAL
UROBILINOGEN UR QL STRIP.AUTO: 0.2 EU/DL (ref 0.2–1)
VANCOMYCIN SERPL-MCNC: 23.4 UG/ML
VENTILATION MODE VENT: ABNORMAL
VENTRICULAR RATE, ECG03: 70 BPM
VENTRICULAR RATE, ECG03: 91 BPM
VT SETTING VENT: 500 ML
WBC # BLD AUTO: 10.5 K/UL (ref 4.1–11.1)
WBC # BLD AUTO: 11.2 K/UL (ref 4.1–11.1)
WBC # BLD AUTO: 11.6 K/UL (ref 4.1–11.1)
WBC # BLD AUTO: 11.6 K/UL (ref 4.1–11.1)
WBC # BLD AUTO: 12.5 K/UL (ref 4.1–11.1)
WBC # BLD AUTO: 13.5 K/UL (ref 4.1–11.1)
WBC # BLD AUTO: 14.7 K/UL (ref 4.1–11.1)
WBC # BLD AUTO: 9.3 K/UL (ref 4.1–11.1)
WBC # BLD AUTO: 9.8 K/UL (ref 4.1–11.1)
WBC URNS QL MICRO: ABNORMAL /HPF (ref 0–4)

## 2020-01-01 PROCEDURE — 80048 BASIC METABOLIC PNL TOTAL CA: CPT

## 2020-01-01 PROCEDURE — 77030003028 HC SUT VCRL J&J -A: Performed by: ORTHOPAEDIC SURGERY

## 2020-01-01 PROCEDURE — 74011250636 HC RX REV CODE- 250/636: Performed by: NURSE ANESTHETIST, CERTIFIED REGISTERED

## 2020-01-01 PROCEDURE — 74011250636 HC RX REV CODE- 250/636: Performed by: ORTHOPAEDIC SURGERY

## 2020-01-01 PROCEDURE — 74011000258 HC RX REV CODE- 258: Performed by: ORTHOPAEDIC SURGERY

## 2020-01-01 PROCEDURE — 77030002986 HC SUT PROL J&J -A: Performed by: ORTHOPAEDIC SURGERY

## 2020-01-01 PROCEDURE — 77030029099 HC BN WAX SSPC -A: Performed by: ORTHOPAEDIC SURGERY

## 2020-01-01 PROCEDURE — 31500 INSERT EMERGENCY AIRWAY: CPT

## 2020-01-01 PROCEDURE — 36600 WITHDRAWAL OF ARTERIAL BLOOD: CPT

## 2020-01-01 PROCEDURE — 0BH17EZ INSERTION OF ENDOTRACHEAL AIRWAY INTO TRACHEA, VIA NATURAL OR ARTIFICIAL OPENING: ICD-10-PCS | Performed by: INTERNAL MEDICINE

## 2020-01-01 PROCEDURE — C9113 INJ PANTOPRAZOLE SODIUM, VIA: HCPCS | Performed by: HOSPITALIST

## 2020-01-01 PROCEDURE — 83605 ASSAY OF LACTIC ACID: CPT

## 2020-01-01 PROCEDURE — 74011250636 HC RX REV CODE- 250/636: Performed by: INTERNAL MEDICINE

## 2020-01-01 PROCEDURE — 74011250637 HC RX REV CODE- 250/637: Performed by: INTERNAL MEDICINE

## 2020-01-01 PROCEDURE — 94003 VENT MGMT INPAT SUBQ DAY: CPT

## 2020-01-01 PROCEDURE — 36415 COLL VENOUS BLD VENIPUNCTURE: CPT

## 2020-01-01 PROCEDURE — 74011250636 HC RX REV CODE- 250/636: Performed by: FAMILY MEDICINE

## 2020-01-01 PROCEDURE — 74011000250 HC RX REV CODE- 250: Performed by: EMERGENCY MEDICINE

## 2020-01-01 PROCEDURE — 77010033678 HC OXYGEN DAILY

## 2020-01-01 PROCEDURE — 74011250637 HC RX REV CODE- 250/637: Performed by: HOSPITALIST

## 2020-01-01 PROCEDURE — 85025 COMPLETE CBC W/AUTO DIFF WBC: CPT

## 2020-01-01 PROCEDURE — 81001 URINALYSIS AUTO W/SCOPE: CPT

## 2020-01-01 PROCEDURE — 90935 HEMODIALYSIS ONE EVALUATION: CPT

## 2020-01-01 PROCEDURE — 95816 EEG AWAKE AND DROWSY: CPT | Performed by: EMERGENCY MEDICINE

## 2020-01-01 PROCEDURE — 94760 N-INVAS EAR/PLS OXIMETRY 1: CPT

## 2020-01-01 PROCEDURE — 72040 X-RAY EXAM NECK SPINE 2-3 VW: CPT

## 2020-01-01 PROCEDURE — 76000 FLUOROSCOPY <1 HR PHYS/QHP: CPT

## 2020-01-01 PROCEDURE — 77030012961 HC IRR KT CYSTO/TUR ICUM -A: Performed by: ORTHOPAEDIC SURGERY

## 2020-01-01 PROCEDURE — 74011636320 HC RX REV CODE- 636/320: Performed by: EMERGENCY MEDICINE

## 2020-01-01 PROCEDURE — 82803 BLOOD GASES ANY COMBINATION: CPT

## 2020-01-01 PROCEDURE — 0656 HSPC GENERAL INPATIENT

## 2020-01-01 PROCEDURE — C1769 GUIDE WIRE: HCPCS

## 2020-01-01 PROCEDURE — 74011000250 HC RX REV CODE- 250: Performed by: NURSE ANESTHETIST, CERTIFIED REGISTERED

## 2020-01-01 PROCEDURE — 85027 COMPLETE CBC AUTOMATED: CPT

## 2020-01-01 PROCEDURE — 74018 RADEX ABDOMEN 1 VIEW: CPT

## 2020-01-01 PROCEDURE — 0042T CT CODE NEURO PERF W CBF: CPT

## 2020-01-01 PROCEDURE — 65610000006 HC RM INTENSIVE CARE

## 2020-01-01 PROCEDURE — 87040 BLOOD CULTURE FOR BACTERIA: CPT

## 2020-01-01 PROCEDURE — 77030014650 HC SEAL MTRX FLOSEL BAXT -C: Performed by: ORTHOPAEDIC SURGERY

## 2020-01-01 PROCEDURE — 77030013079 HC BLNKT BAIR HGGR 3M -A: Performed by: ANESTHESIOLOGY

## 2020-01-01 PROCEDURE — 74011636637 HC RX REV CODE- 636/637: Performed by: ORTHOPAEDIC SURGERY

## 2020-01-01 PROCEDURE — 77030018798 HC PMP KT ENTRL FED COVD -A

## 2020-01-01 PROCEDURE — 74011250636 HC RX REV CODE- 250/636: Performed by: RADIOLOGY

## 2020-01-01 PROCEDURE — 74011636637 HC RX REV CODE- 636/637: Performed by: INTERNAL MEDICINE

## 2020-01-01 PROCEDURE — 0RG10A0 FUSION OF CERVICAL VERTEBRAL JOINT WITH INTERBODY FUSION DEVICE, ANTERIOR APPROACH, ANTERIOR COLUMN, OPEN APPROACH: ICD-10-PCS | Performed by: ORTHOPAEDIC SURGERY

## 2020-01-01 PROCEDURE — 0RG10K0 FUSION OF CERVICAL VERTEBRAL JOINT WITH NONAUTOLOGOUS TISSUE SUBSTITUTE, ANTERIOR APPROACH, ANTERIOR COLUMN, OPEN APPROACH: ICD-10-PCS | Performed by: ORTHOPAEDIC SURGERY

## 2020-01-01 PROCEDURE — 82962 GLUCOSE BLOOD TEST: CPT

## 2020-01-01 PROCEDURE — 77030040361 HC SLV COMPR DVT MDII -B: Performed by: ORTHOPAEDIC SURGERY

## 2020-01-01 PROCEDURE — 80202 ASSAY OF VANCOMYCIN: CPT

## 2020-01-01 PROCEDURE — 83036 HEMOGLOBIN GLYCOSYLATED A1C: CPT

## 2020-01-01 PROCEDURE — C1713 ANCHOR/SCREW BN/BN,TIS/BN: HCPCS | Performed by: ORTHOPAEDIC SURGERY

## 2020-01-01 PROCEDURE — 74011000258 HC RX REV CODE- 258: Performed by: INTERNAL MEDICINE

## 2020-01-01 PROCEDURE — 74011250636 HC RX REV CODE- 250/636: Performed by: EMERGENCY MEDICINE

## 2020-01-01 PROCEDURE — 74011000250 HC RX REV CODE- 250: Performed by: FAMILY MEDICINE

## 2020-01-01 PROCEDURE — 99223 1ST HOSP IP/OBS HIGH 75: CPT | Performed by: FAMILY MEDICINE

## 2020-01-01 PROCEDURE — 93306 TTE W/DOPPLER COMPLETE: CPT

## 2020-01-01 PROCEDURE — 76010000171 HC OR TIME 2 TO 2.5 HR INTENSV-TIER 1: Performed by: ORTHOPAEDIC SURGERY

## 2020-01-01 PROCEDURE — 74011000250 HC RX REV CODE- 250: Performed by: INTERNAL MEDICINE

## 2020-01-01 PROCEDURE — 80053 COMPREHEN METABOLIC PANEL: CPT

## 2020-01-01 PROCEDURE — 74011250637 HC RX REV CODE- 250/637: Performed by: ORTHOPAEDIC SURGERY

## 2020-01-01 PROCEDURE — P9047 ALBUMIN (HUMAN), 25%, 50ML: HCPCS | Performed by: INTERNAL MEDICINE

## 2020-01-01 PROCEDURE — 87186 SC STD MICRODIL/AGAR DIL: CPT

## 2020-01-01 PROCEDURE — 84478 ASSAY OF TRIGLYCERIDES: CPT

## 2020-01-01 PROCEDURE — 74011000250 HC RX REV CODE- 250: Performed by: HOSPITALIST

## 2020-01-01 PROCEDURE — 77030041248 HC GRFT BN OSTEOAMP BTUS -G: Performed by: ORTHOPAEDIC SURGERY

## 2020-01-01 PROCEDURE — 84484 ASSAY OF TROPONIN QUANT: CPT

## 2020-01-01 PROCEDURE — 93005 ELECTROCARDIOGRAM TRACING: CPT

## 2020-01-01 PROCEDURE — 72072 X-RAY EXAM THORAC SPINE 3VWS: CPT

## 2020-01-01 PROCEDURE — 85730 THROMBOPLASTIN TIME PARTIAL: CPT

## 2020-01-01 PROCEDURE — 74011250636 HC RX REV CODE- 250/636: Performed by: HOSPITALIST

## 2020-01-01 PROCEDURE — 77030011267 HC ELECTRD BLD COVD -A: Performed by: ORTHOPAEDIC SURGERY

## 2020-01-01 PROCEDURE — 99285 EMERGENCY DEPT VISIT HI MDM: CPT

## 2020-01-01 PROCEDURE — 76937 US GUIDE VASCULAR ACCESS: CPT

## 2020-01-01 PROCEDURE — 82550 ASSAY OF CK (CPK): CPT

## 2020-01-01 PROCEDURE — 77030002933 HC SUT MCRYL J&J -A: Performed by: ORTHOPAEDIC SURGERY

## 2020-01-01 PROCEDURE — 96374 THER/PROPH/DIAG INJ IV PUSH: CPT

## 2020-01-01 PROCEDURE — 94762 N-INVAS EAR/PLS OXIMTRY CONT: CPT

## 2020-01-01 PROCEDURE — C1752 CATH,HEMODIALYSIS,SHORT-TERM: HCPCS

## 2020-01-01 PROCEDURE — 77030018846 HC SOL IRR STRL H20 ICUM -A: Performed by: ORTHOPAEDIC SURGERY

## 2020-01-01 PROCEDURE — 84100 ASSAY OF PHOSPHORUS: CPT

## 2020-01-01 PROCEDURE — 51702 INSERT TEMP BLADDER CATH: CPT

## 2020-01-01 PROCEDURE — 74011636637 HC RX REV CODE- 636/637: Performed by: HOSPITALIST

## 2020-01-01 PROCEDURE — 77030019908 HC STETH ESOPH SIMS -A: Performed by: ANESTHESIOLOGY

## 2020-01-01 PROCEDURE — 71045 X-RAY EXAM CHEST 1 VIEW: CPT

## 2020-01-01 PROCEDURE — 77030040506 HC DRN WND MDII -A: Performed by: ORTHOPAEDIC SURGERY

## 2020-01-01 PROCEDURE — 77030020268 HC MISC GENERAL SUPPLY: Performed by: ORTHOPAEDIC SURGERY

## 2020-01-01 PROCEDURE — 96376 TX/PRO/DX INJ SAME DRUG ADON: CPT

## 2020-01-01 PROCEDURE — 87075 CULTR BACTERIA EXCEPT BLOOD: CPT

## 2020-01-01 PROCEDURE — 65270000015 HC RM PRIVATE ONCOLOGY

## 2020-01-01 PROCEDURE — 85610 PROTHROMBIN TIME: CPT

## 2020-01-01 PROCEDURE — 77030034475 HC MISC IMPL SPN: Performed by: ORTHOPAEDIC SURGERY

## 2020-01-01 PROCEDURE — 72050 X-RAY EXAM NECK SPINE 4/5VWS: CPT

## 2020-01-01 PROCEDURE — 75810000455 HC PLCMT CENT VENOUS CATH LVL 2 5182

## 2020-01-01 PROCEDURE — 87077 CULTURE AEROBIC IDENTIFY: CPT

## 2020-01-01 PROCEDURE — 0RB50ZZ EXCISION OF CERVICOTHORACIC VERTEBRAL DISC, OPEN APPROACH: ICD-10-PCS | Performed by: ORTHOPAEDIC SURGERY

## 2020-01-01 PROCEDURE — 0RG40K0 FUSION OF CERVICOTHORACIC VERTEBRAL JOINT WITH NONAUTOLOGOUS TISSUE SUBSTITUTE, ANTERIOR APPROACH, ANTERIOR COLUMN, OPEN APPROACH: ICD-10-PCS | Performed by: ORTHOPAEDIC SURGERY

## 2020-01-01 PROCEDURE — 70450 CT HEAD/BRAIN W/O DYE: CPT

## 2020-01-01 PROCEDURE — 83735 ASSAY OF MAGNESIUM: CPT

## 2020-01-01 PROCEDURE — 77030009868 HC PIN DISTR CASPR AESC -B: Performed by: ORTHOPAEDIC SURGERY

## 2020-01-01 PROCEDURE — 87205 SMEAR GRAM STAIN: CPT

## 2020-01-01 PROCEDURE — 74011000250 HC RX REV CODE- 250: Performed by: ORTHOPAEDIC SURGERY

## 2020-01-01 PROCEDURE — C1892 INTRO/SHEATH,FIXED,PEEL-AWAY: HCPCS

## 2020-01-01 PROCEDURE — C1894 INTRO/SHEATH, NON-LASER: HCPCS

## 2020-01-01 PROCEDURE — 0RG40A0 FUSION OF CERVICOTHORACIC VERTEBRAL JOINT WITH INTERBODY FUSION DEVICE, ANTERIOR APPROACH, ANTERIOR COLUMN, OPEN APPROACH: ICD-10-PCS | Performed by: ORTHOPAEDIC SURGERY

## 2020-01-01 PROCEDURE — 77030018836 HC SOL IRR NACL ICUM -A: Performed by: ORTHOPAEDIC SURGERY

## 2020-01-01 PROCEDURE — 77030002996 HC SUT SLK J&J -A: Performed by: ORTHOPAEDIC SURGERY

## 2020-01-01 PROCEDURE — 74011000250 HC RX REV CODE- 250: Performed by: RADIOLOGY

## 2020-01-01 PROCEDURE — 87176 TISSUE HOMOGENIZATION CULTR: CPT

## 2020-01-01 PROCEDURE — 77030033138 HC SUT PGA STRATFX J&J -B: Performed by: ORTHOPAEDIC SURGERY

## 2020-01-01 PROCEDURE — 74011250636 HC RX REV CODE- 250/636

## 2020-01-01 PROCEDURE — 94002 VENT MGMT INPAT INIT DAY: CPT

## 2020-01-01 PROCEDURE — 96366 THER/PROPH/DIAG IV INF ADDON: CPT

## 2020-01-01 PROCEDURE — 74011250637 HC RX REV CODE- 250/637: Performed by: EMERGENCY MEDICINE

## 2020-01-01 PROCEDURE — 80307 DRUG TEST PRSMV CHEM ANLYZR: CPT

## 2020-01-01 PROCEDURE — 5A1D70Z PERFORMANCE OF URINARY FILTRATION, INTERMITTENT, LESS THAN 6 HOURS PER DAY: ICD-10-PCS | Performed by: INTERNAL MEDICINE

## 2020-01-01 PROCEDURE — 96365 THER/PROPH/DIAG IV INF INIT: CPT

## 2020-01-01 PROCEDURE — 77030004402 HC BUR NEUR STRY -C: Performed by: ORTHOPAEDIC SURGERY

## 2020-01-01 PROCEDURE — 77030008462 HC STPLR SKN PROX J&J -A: Performed by: ORTHOPAEDIC SURGERY

## 2020-01-01 PROCEDURE — 70496 CT ANGIOGRAPHY HEAD: CPT

## 2020-01-01 PROCEDURE — 77030011229 HC DIL VESL COON COOK -A

## 2020-01-01 PROCEDURE — 3336500001 HSPC ELECTION

## 2020-01-01 PROCEDURE — 77030005513 HC CATH URETH FOL11 MDII -B

## 2020-01-01 PROCEDURE — 74011250636 HC RX REV CODE- 250/636: Performed by: NURSE PRACTITIONER

## 2020-01-01 PROCEDURE — 76060000035 HC ANESTHESIA 2 TO 2.5 HR: Performed by: ORTHOPAEDIC SURGERY

## 2020-01-01 PROCEDURE — 77030012407 HC DRN WND BARD -B: Performed by: ORTHOPAEDIC SURGERY

## 2020-01-01 PROCEDURE — 77030034479 HC ADH SKN CLSR PRINEO J&J -B: Performed by: ORTHOPAEDIC SURGERY

## 2020-01-01 PROCEDURE — 74011250637 HC RX REV CODE- 250/637: Performed by: FAMILY MEDICINE

## 2020-01-01 PROCEDURE — 74011000258 HC RX REV CODE- 258: Performed by: EMERGENCY MEDICINE

## 2020-01-01 PROCEDURE — 77030020291 HC FLEXSEAL FMS BMS -C

## 2020-01-01 PROCEDURE — 77030038933 HC CGE SPN FORTCR NANV -G: Performed by: ORTHOPAEDIC SURGERY

## 2020-01-01 PROCEDURE — 77030018842 HC SOL IRR SOD CL 9% BAXT -A: Performed by: ORTHOPAEDIC SURGERY

## 2020-01-01 PROCEDURE — 74011250637 HC RX REV CODE- 250/637: Performed by: NURSE PRACTITIONER

## 2020-01-01 PROCEDURE — 77030003029 HC SUT VCRL J&J -B: Performed by: ORTHOPAEDIC SURGERY

## 2020-01-01 PROCEDURE — 77030040356 HC CORD BPLR FRCP COVD -A: Performed by: ORTHOPAEDIC SURGERY

## 2020-01-01 DEVICE — FORTICORE® FLAT CERVICAL SPACER 7X16X14, (6°)
Type: IMPLANTABLE DEVICE | Site: SPINE CERVICAL | Status: FUNCTIONAL
Brand: FORTICORE®

## 2020-01-01 DEVICE — FORTIBRIDGE VARIABLE SCREW 4.5MM X 16MM
Type: IMPLANTABLE DEVICE | Site: SPINE CERVICAL | Status: FUNCTIONAL
Brand: FORTIBRIDGETM

## 2020-01-01 DEVICE — GRAFT BONE 2.5 CC OSTEOAMP: Type: IMPLANTABLE DEVICE | Site: SPINE CERVICAL | Status: FUNCTIONAL

## 2020-01-01 DEVICE — FORTICORE® FLAT CERVICAL SPACER 10X16X14, (6°)
Type: IMPLANTABLE DEVICE | Site: SPINE CERVICAL | Status: FUNCTIONAL
Brand: FORTICORE®

## 2020-01-01 RX ORDER — LORAZEPAM 2 MG/ML
1 INJECTION INTRAMUSCULAR ONCE
Status: DISCONTINUED | OUTPATIENT
Start: 2020-01-01 | End: 2020-01-01 | Stop reason: HOSPADM

## 2020-01-01 RX ORDER — CLONIDINE HYDROCHLORIDE 0.1 MG/1
0.3 TABLET ORAL 2 TIMES DAILY
Status: DISCONTINUED | OUTPATIENT
Start: 2020-01-01 | End: 2020-01-01

## 2020-01-01 RX ORDER — LORAZEPAM 2 MG/ML
4 INJECTION INTRAMUSCULAR
Status: DISCONTINUED | OUTPATIENT
Start: 2020-01-01 | End: 2020-01-01 | Stop reason: HOSPADM

## 2020-01-01 RX ORDER — LEVETIRACETAM 5 MG/ML
500 INJECTION INTRAVASCULAR EVERY 12 HOURS
Status: DISCONTINUED | OUTPATIENT
Start: 2020-01-01 | End: 2020-01-01

## 2020-01-01 RX ORDER — OXYCODONE HYDROCHLORIDE 5 MG/1
10 TABLET ORAL
Status: DISCONTINUED | OUTPATIENT
Start: 2020-01-01 | End: 2020-01-01

## 2020-01-01 RX ORDER — HYDROMORPHONE HYDROCHLORIDE 2 MG/ML
3 INJECTION, SOLUTION INTRAMUSCULAR; INTRAVENOUS; SUBCUTANEOUS
Status: DISCONTINUED | OUTPATIENT
Start: 2020-01-01 | End: 2020-01-01

## 2020-01-01 RX ORDER — LORAZEPAM 2 MG/ML
1 INJECTION INTRAMUSCULAR EVERY 6 HOURS
Status: DISCONTINUED | OUTPATIENT
Start: 2020-01-01 | End: 2020-01-01

## 2020-01-01 RX ORDER — INSULIN GLARGINE 100 [IU]/ML
15 INJECTION, SOLUTION SUBCUTANEOUS DAILY
Status: DISCONTINUED | OUTPATIENT
Start: 2020-01-01 | End: 2020-01-01

## 2020-01-01 RX ORDER — HYDRALAZINE HYDROCHLORIDE 20 MG/ML
10 INJECTION INTRAMUSCULAR; INTRAVENOUS
Status: DISCONTINUED | OUTPATIENT
Start: 2020-01-01 | End: 2020-01-01

## 2020-01-01 RX ORDER — HYDROMORPHONE HYDROCHLORIDE 2 MG/ML
2 INJECTION, SOLUTION INTRAMUSCULAR; INTRAVENOUS; SUBCUTANEOUS
Status: DISCONTINUED | OUTPATIENT
Start: 2020-01-01 | End: 2020-01-01

## 2020-01-01 RX ORDER — HEPARIN 100 UNIT/ML
500 SYRINGE INTRAVENOUS ONCE
Status: DISPENSED | OUTPATIENT
Start: 2020-01-01 | End: 2020-01-01

## 2020-01-01 RX ORDER — TAMSULOSIN HYDROCHLORIDE 0.4 MG/1
0.4 CAPSULE ORAL
Status: DISCONTINUED | OUTPATIENT
Start: 2020-01-01 | End: 2020-01-01

## 2020-01-01 RX ORDER — ACETAMINOPHEN 325 MG/1
650 TABLET ORAL
Status: DISCONTINUED | OUTPATIENT
Start: 2020-01-01 | End: 2020-01-01

## 2020-01-01 RX ORDER — CLONIDINE HYDROCHLORIDE 0.1 MG/1
0.3 TABLET ORAL 3 TIMES DAILY
Status: DISCONTINUED | OUTPATIENT
Start: 2020-01-01 | End: 2020-01-01

## 2020-01-01 RX ORDER — ACETAMINOPHEN 650 MG/1
650 SUPPOSITORY RECTAL
Status: DISCONTINUED | OUTPATIENT
Start: 2020-01-01 | End: 2020-01-01 | Stop reason: HOSPADM

## 2020-01-01 RX ORDER — MIDAZOLAM HYDROCHLORIDE 1 MG/ML
2 INJECTION, SOLUTION INTRAMUSCULAR; INTRAVENOUS
Status: DISCONTINUED | OUTPATIENT
Start: 2020-01-01 | End: 2020-01-01

## 2020-01-01 RX ORDER — HYDROMORPHONE HYDROCHLORIDE 2 MG/ML
4 INJECTION, SOLUTION INTRAMUSCULAR; INTRAVENOUS; SUBCUTANEOUS
Status: DISCONTINUED | OUTPATIENT
Start: 2020-01-01 | End: 2020-01-01 | Stop reason: HOSPADM

## 2020-01-01 RX ORDER — DIAZEPAM 5 MG/1
5 TABLET ORAL
Status: DISCONTINUED | OUTPATIENT
Start: 2020-01-01 | End: 2020-01-01

## 2020-01-01 RX ORDER — SODIUM CHLORIDE 0.9 % (FLUSH) 0.9 %
5-40 SYRINGE (ML) INJECTION EVERY 8 HOURS
Status: DISCONTINUED | OUTPATIENT
Start: 2020-01-01 | End: 2020-01-01 | Stop reason: HOSPADM

## 2020-01-01 RX ORDER — FERRIC CITRATE 210 MG/1
630 TABLET, COATED ORAL
COMMUNITY

## 2020-01-01 RX ORDER — ETOMIDATE 2 MG/ML
30 INJECTION INTRAVENOUS ONCE
Status: COMPLETED | OUTPATIENT
Start: 2020-01-01 | End: 2020-01-01

## 2020-01-01 RX ORDER — PROPOFOL 10 MG/ML
INJECTION, EMULSION INTRAVENOUS AS NEEDED
Status: DISCONTINUED | OUTPATIENT
Start: 2020-01-01 | End: 2020-01-01 | Stop reason: HOSPADM

## 2020-01-01 RX ORDER — FENTANYL CITRATE 50 UG/ML
INJECTION, SOLUTION INTRAMUSCULAR; INTRAVENOUS AS NEEDED
Status: DISCONTINUED | OUTPATIENT
Start: 2020-01-01 | End: 2020-01-01 | Stop reason: HOSPADM

## 2020-01-01 RX ORDER — HYDROMORPHONE HYDROCHLORIDE 1 MG/ML
1 INJECTION, SOLUTION INTRAMUSCULAR; INTRAVENOUS; SUBCUTANEOUS EVERY 4 HOURS
Status: DISCONTINUED | OUTPATIENT
Start: 2020-01-01 | End: 2020-01-01

## 2020-01-01 RX ORDER — HYDROMORPHONE HYDROCHLORIDE 2 MG/ML
4 INJECTION, SOLUTION INTRAMUSCULAR; INTRAVENOUS; SUBCUTANEOUS EVERY 4 HOURS
Status: DISCONTINUED | OUTPATIENT
Start: 2020-01-01 | End: 2020-01-01

## 2020-01-01 RX ORDER — LORAZEPAM 2 MG/ML
1 INJECTION INTRAMUSCULAR
Status: DISCONTINUED | OUTPATIENT
Start: 2020-01-01 | End: 2020-01-01

## 2020-01-01 RX ORDER — INSULIN GLARGINE 100 [IU]/ML
20 INJECTION, SOLUTION SUBCUTANEOUS DAILY
Status: DISCONTINUED | OUTPATIENT
Start: 2020-01-01 | End: 2020-01-01

## 2020-01-01 RX ORDER — SODIUM CHLORIDE 0.9 % (FLUSH) 0.9 %
5-10 SYRINGE (ML) INJECTION EVERY 8 HOURS
Status: DISCONTINUED | OUTPATIENT
Start: 2020-01-01 | End: 2020-01-01

## 2020-01-01 RX ORDER — LORAZEPAM 2 MG/ML
2 INJECTION INTRAMUSCULAR ONCE
Status: COMPLETED | OUTPATIENT
Start: 2020-01-01 | End: 2020-01-01

## 2020-01-01 RX ORDER — PHENYLEPHRINE HCL IN 0.9% NACL 0.4MG/10ML
SYRINGE (ML) INTRAVENOUS AS NEEDED
Status: DISCONTINUED | OUTPATIENT
Start: 2020-01-01 | End: 2020-01-01 | Stop reason: HOSPADM

## 2020-01-01 RX ORDER — HYDROMORPHONE HYDROCHLORIDE 1 MG/ML
0.5 INJECTION, SOLUTION INTRAMUSCULAR; INTRAVENOUS; SUBCUTANEOUS
Status: DISCONTINUED | OUTPATIENT
Start: 2020-01-01 | End: 2020-01-01

## 2020-01-01 RX ORDER — FACIAL-BODY WIPES
10 EACH TOPICAL DAILY PRN
Status: DISCONTINUED | OUTPATIENT
Start: 2020-01-01 | End: 2020-01-01

## 2020-01-01 RX ORDER — LORAZEPAM 2 MG/ML
2 INJECTION INTRAMUSCULAR AS NEEDED
Status: DISCONTINUED | OUTPATIENT
Start: 2020-01-01 | End: 2020-01-01

## 2020-01-01 RX ORDER — HEPARIN SODIUM 200 [USP'U]/100ML
200 INJECTION, SOLUTION INTRAVENOUS ONCE
Status: COMPLETED | OUTPATIENT
Start: 2020-01-01 | End: 2020-01-01

## 2020-01-01 RX ORDER — FENTANYL CITRATE 50 UG/ML
50 INJECTION, SOLUTION INTRAMUSCULAR; INTRAVENOUS
Status: DISCONTINUED | OUTPATIENT
Start: 2020-01-01 | End: 2020-01-01

## 2020-01-01 RX ORDER — ONDANSETRON 2 MG/ML
4 INJECTION INTRAMUSCULAR; INTRAVENOUS
Status: ACTIVE | OUTPATIENT
Start: 2020-01-01 | End: 2020-01-01

## 2020-01-01 RX ORDER — PEN NEEDLE, DIABETIC 31 GX3/16"
NEEDLE, DISPOSABLE MISCELLANEOUS
Qty: 100 PEN NEEDLE | Refills: 3 | Status: SHIPPED | OUTPATIENT
Start: 2020-01-01

## 2020-01-01 RX ORDER — PROPOFOL 10 MG/ML
0-50 VIAL (ML) INTRAVENOUS
Status: DISCONTINUED | OUTPATIENT
Start: 2020-01-01 | End: 2020-01-01

## 2020-01-01 RX ORDER — FENTANYL CITRATE 50 UG/ML
25 INJECTION, SOLUTION INTRAMUSCULAR; INTRAVENOUS
Status: CANCELLED | OUTPATIENT
Start: 2020-01-01

## 2020-01-01 RX ORDER — LORAZEPAM 2 MG/ML
2 INJECTION INTRAMUSCULAR EVERY 4 HOURS
Status: DISCONTINUED | OUTPATIENT
Start: 2020-01-01 | End: 2020-01-01 | Stop reason: HOSPADM

## 2020-01-01 RX ORDER — MIDAZOLAM HYDROCHLORIDE 1 MG/ML
INJECTION, SOLUTION INTRAMUSCULAR; INTRAVENOUS AS NEEDED
Status: DISCONTINUED | OUTPATIENT
Start: 2020-01-01 | End: 2020-01-01 | Stop reason: HOSPADM

## 2020-01-01 RX ORDER — METOCLOPRAMIDE HYDROCHLORIDE 5 MG/ML
10 INJECTION INTRAMUSCULAR; INTRAVENOUS EVERY 6 HOURS
Status: DISCONTINUED | OUTPATIENT
Start: 2020-01-01 | End: 2020-01-01

## 2020-01-01 RX ORDER — METOPROLOL TARTRATE 25 MG/1
12.5 TABLET, FILM COATED ORAL EVERY 12 HOURS
Status: DISCONTINUED | OUTPATIENT
Start: 2020-01-01 | End: 2020-01-01

## 2020-01-01 RX ORDER — HYDROMORPHONE HYDROCHLORIDE 2 MG/ML
INJECTION, SOLUTION INTRAMUSCULAR; INTRAVENOUS; SUBCUTANEOUS AS NEEDED
Status: DISCONTINUED | OUTPATIENT
Start: 2020-01-01 | End: 2020-01-01 | Stop reason: HOSPADM

## 2020-01-01 RX ORDER — LORAZEPAM 2 MG/ML
2 INJECTION INTRAMUSCULAR
Status: DISCONTINUED | OUTPATIENT
Start: 2020-01-01 | End: 2020-01-01 | Stop reason: HOSPADM

## 2020-01-01 RX ORDER — AMOXICILLIN 250 MG
1 CAPSULE ORAL 2 TIMES DAILY
Status: DISCONTINUED | OUTPATIENT
Start: 2020-01-01 | End: 2020-01-01

## 2020-01-01 RX ORDER — DEXTROSE 50 % IN WATER (D50W) INTRAVENOUS SYRINGE
12.5-25 AS NEEDED
Status: DISCONTINUED | OUTPATIENT
Start: 2020-01-01 | End: 2020-01-01

## 2020-01-01 RX ORDER — FACIAL-BODY WIPES
10 EACH TOPICAL DAILY PRN
Status: DISCONTINUED | OUTPATIENT
Start: 2020-01-01 | End: 2020-01-01 | Stop reason: HOSPADM

## 2020-01-01 RX ORDER — HYDROMORPHONE HYDROCHLORIDE 2 MG/ML
2 INJECTION, SOLUTION INTRAMUSCULAR; INTRAVENOUS; SUBCUTANEOUS EVERY 4 HOURS
Status: DISCONTINUED | OUTPATIENT
Start: 2020-01-01 | End: 2020-01-01

## 2020-01-01 RX ORDER — DEXMEDETOMIDINE HYDROCHLORIDE 4 UG/ML
.2-1.4 INJECTION, SOLUTION INTRAVENOUS
Status: DISCONTINUED | OUTPATIENT
Start: 2020-01-01 | End: 2020-01-01

## 2020-01-01 RX ORDER — DIPHENHYDRAMINE HYDROCHLORIDE 50 MG/ML
12.5 INJECTION, SOLUTION INTRAMUSCULAR; INTRAVENOUS AS NEEDED
Status: CANCELLED | OUTPATIENT
Start: 2020-01-01 | End: 2020-01-01

## 2020-01-01 RX ORDER — SODIUM CHLORIDE 0.9 % (FLUSH) 0.9 %
5-40 SYRINGE (ML) INJECTION AS NEEDED
Status: CANCELLED | OUTPATIENT
Start: 2020-01-01

## 2020-01-01 RX ORDER — HYDROMORPHONE HYDROCHLORIDE 1 MG/ML
0.2 INJECTION, SOLUTION INTRAMUSCULAR; INTRAVENOUS; SUBCUTANEOUS
Status: CANCELLED | OUTPATIENT
Start: 2020-01-01

## 2020-01-01 RX ORDER — SODIUM CHLORIDE 0.9 % (FLUSH) 0.9 %
5-40 SYRINGE (ML) INJECTION EVERY 8 HOURS
Status: CANCELLED | OUTPATIENT
Start: 2020-01-01

## 2020-01-01 RX ORDER — INSULIN GLARGINE 100 [IU]/ML
INJECTION, SOLUTION SUBCUTANEOUS
Qty: 15 ML | Refills: 11 | Status: SHIPPED | OUTPATIENT
Start: 2020-01-01

## 2020-01-01 RX ORDER — MIDAZOLAM HYDROCHLORIDE 1 MG/ML
2 INJECTION, SOLUTION INTRAMUSCULAR; INTRAVENOUS ONCE
Status: COMPLETED | OUTPATIENT
Start: 2020-01-01 | End: 2020-01-01

## 2020-01-01 RX ORDER — GLYCOPYRROLATE 0.2 MG/ML
0.2 INJECTION INTRAMUSCULAR; INTRAVENOUS EVERY 4 HOURS
Status: DISCONTINUED | OUTPATIENT
Start: 2020-01-01 | End: 2020-01-01

## 2020-01-01 RX ORDER — CLONIDINE HYDROCHLORIDE 0.1 MG/1
0.2 TABLET ORAL 2 TIMES DAILY
Status: DISCONTINUED | OUTPATIENT
Start: 2020-01-01 | End: 2020-01-01

## 2020-01-01 RX ORDER — VANCOMYCIN 2 GRAM/500 ML IN 0.9 % SODIUM CHLORIDE INTRAVENOUS
2000
Status: COMPLETED | OUTPATIENT
Start: 2020-01-01 | End: 2020-01-01

## 2020-01-01 RX ORDER — HYDROMORPHONE HYDROCHLORIDE 2 MG/ML
3 INJECTION, SOLUTION INTRAMUSCULAR; INTRAVENOUS; SUBCUTANEOUS EVERY 4 HOURS
Status: DISCONTINUED | OUTPATIENT
Start: 2020-01-01 | End: 2020-01-01

## 2020-01-01 RX ORDER — FAMOTIDINE 20 MG/1
20 TABLET, FILM COATED ORAL DAILY
Status: DISCONTINUED | OUTPATIENT
Start: 2020-01-01 | End: 2020-01-01

## 2020-01-01 RX ORDER — SODIUM CHLORIDE, SODIUM LACTATE, POTASSIUM CHLORIDE, CALCIUM CHLORIDE 600; 310; 30; 20 MG/100ML; MG/100ML; MG/100ML; MG/100ML
25 INJECTION, SOLUTION INTRAVENOUS CONTINUOUS
Status: CANCELLED | OUTPATIENT
Start: 2020-01-01

## 2020-01-01 RX ORDER — HYDROMORPHONE HYDROCHLORIDE 1 MG/ML
1 INJECTION, SOLUTION INTRAMUSCULAR; INTRAVENOUS; SUBCUTANEOUS
Status: ACTIVE | OUTPATIENT
Start: 2020-01-01 | End: 2020-01-01

## 2020-01-01 RX ORDER — SODIUM CHLORIDE 0.9 % (FLUSH) 0.9 %
5-40 SYRINGE (ML) INJECTION AS NEEDED
Status: DISCONTINUED | OUTPATIENT
Start: 2020-01-01 | End: 2020-01-01

## 2020-01-01 RX ORDER — GLYCOPYRROLATE 0.2 MG/ML
0.2 INJECTION INTRAMUSCULAR; INTRAVENOUS 3 TIMES DAILY
Status: DISCONTINUED | OUTPATIENT
Start: 2020-01-01 | End: 2020-01-01 | Stop reason: HOSPADM

## 2020-01-01 RX ORDER — HYDROXYZINE HYDROCHLORIDE 10 MG/1
10 TABLET, FILM COATED ORAL
Status: DISCONTINUED | OUTPATIENT
Start: 2020-01-01 | End: 2020-01-01

## 2020-01-01 RX ORDER — MIDAZOLAM HYDROCHLORIDE 1 MG/ML
INJECTION INTRAMUSCULAR; INTRAVENOUS
Status: COMPLETED
Start: 2020-01-01 | End: 2020-01-01

## 2020-01-01 RX ORDER — LIDOCAINE HYDROCHLORIDE 10 MG/ML
0.1 INJECTION, SOLUTION EPIDURAL; INFILTRATION; INTRACAUDAL; PERINEURAL AS NEEDED
Status: CANCELLED | OUTPATIENT
Start: 2020-01-01

## 2020-01-01 RX ORDER — GLYCOPYRROLATE 0.2 MG/ML
0.2 INJECTION INTRAMUSCULAR; INTRAVENOUS
Status: DISCONTINUED | OUTPATIENT
Start: 2020-01-01 | End: 2020-01-01 | Stop reason: HOSPADM

## 2020-01-01 RX ORDER — MINOXIDIL 2.5 MG/1
5 TABLET ORAL 2 TIMES DAILY
Status: DISCONTINUED | OUTPATIENT
Start: 2020-01-01 | End: 2020-01-01

## 2020-01-01 RX ORDER — INSULIN LISPRO 100 [IU]/ML
INJECTION, SOLUTION INTRAVENOUS; SUBCUTANEOUS EVERY 6 HOURS
Status: DISCONTINUED | OUTPATIENT
Start: 2020-01-01 | End: 2020-01-01

## 2020-01-01 RX ORDER — HYDROMORPHONE HYDROCHLORIDE 1 MG/ML
1 INJECTION, SOLUTION INTRAMUSCULAR; INTRAVENOUS; SUBCUTANEOUS
Status: DISCONTINUED | OUTPATIENT
Start: 2020-01-01 | End: 2020-01-01

## 2020-01-01 RX ORDER — KETOROLAC TROMETHAMINE 30 MG/ML
15 INJECTION, SOLUTION INTRAMUSCULAR; INTRAVENOUS
Status: DISCONTINUED | OUTPATIENT
Start: 2020-01-01 | End: 2020-01-01

## 2020-01-01 RX ORDER — LIDOCAINE HYDROCHLORIDE 20 MG/ML
20 INJECTION, SOLUTION INFILTRATION; PERINEURAL ONCE
Status: COMPLETED | OUTPATIENT
Start: 2020-01-01 | End: 2020-01-01

## 2020-01-01 RX ORDER — SODIUM CHLORIDE 0.9 % (FLUSH) 0.9 %
5-10 SYRINGE (ML) INJECTION AS NEEDED
Status: DISCONTINUED | OUTPATIENT
Start: 2020-01-01 | End: 2020-01-01 | Stop reason: HOSPADM

## 2020-01-01 RX ORDER — OXYCODONE HYDROCHLORIDE 5 MG/1
5 TABLET ORAL
Status: DISCONTINUED | OUTPATIENT
Start: 2020-01-01 | End: 2020-01-01

## 2020-01-01 RX ORDER — LORAZEPAM 2 MG/ML
1.5 INJECTION INTRAMUSCULAR
Status: DISCONTINUED | OUTPATIENT
Start: 2020-01-01 | End: 2020-01-01

## 2020-01-01 RX ORDER — ACETAMINOPHEN 500 MG
1000 TABLET ORAL EVERY 6 HOURS
Status: DISCONTINUED | OUTPATIENT
Start: 2020-01-01 | End: 2020-01-01

## 2020-01-01 RX ORDER — LORAZEPAM 2 MG/ML
1.5 INJECTION INTRAMUSCULAR EVERY 4 HOURS
Status: DISCONTINUED | OUTPATIENT
Start: 2020-01-01 | End: 2020-01-01

## 2020-01-01 RX ORDER — KETOROLAC TROMETHAMINE 30 MG/ML
15 INJECTION, SOLUTION INTRAMUSCULAR; INTRAVENOUS
Status: DISCONTINUED | OUTPATIENT
Start: 2020-01-01 | End: 2020-01-01 | Stop reason: HOSPADM

## 2020-01-01 RX ORDER — CHLORHEXIDINE GLUCONATE 0.12 MG/ML
15 RINSE ORAL EVERY 12 HOURS
Status: DISCONTINUED | OUTPATIENT
Start: 2020-01-01 | End: 2020-01-01

## 2020-01-01 RX ORDER — SODIUM CHLORIDE 0.9 % (FLUSH) 0.9 %
5-40 SYRINGE (ML) INJECTION AS NEEDED
Status: DISCONTINUED | OUTPATIENT
Start: 2020-01-01 | End: 2020-01-01 | Stop reason: HOSPADM

## 2020-01-01 RX ORDER — SCOLOPAMINE TRANSDERMAL SYSTEM 1 MG/1
1 PATCH, EXTENDED RELEASE TRANSDERMAL
Status: DISCONTINUED | OUTPATIENT
Start: 2020-01-01 | End: 2020-01-01 | Stop reason: HOSPADM

## 2020-01-01 RX ORDER — ROCURONIUM BROMIDE 10 MG/ML
100 INJECTION, SOLUTION INTRAVENOUS
Status: COMPLETED | OUTPATIENT
Start: 2020-01-01 | End: 2020-01-01

## 2020-01-01 RX ORDER — POLYETHYLENE GLYCOL 3350 17 G/17G
17 POWDER, FOR SOLUTION ORAL DAILY
Status: DISCONTINUED | OUTPATIENT
Start: 2020-01-01 | End: 2020-01-01

## 2020-01-01 RX ORDER — SODIUM CHLORIDE 0.9 % (FLUSH) 0.9 %
10 SYRINGE (ML) INJECTION
Status: COMPLETED | OUTPATIENT
Start: 2020-01-01 | End: 2020-01-01

## 2020-01-01 RX ORDER — SODIUM CHLORIDE 9 MG/ML
50 INJECTION, SOLUTION INTRAVENOUS CONTINUOUS
Status: DISCONTINUED | OUTPATIENT
Start: 2020-01-01 | End: 2020-01-01

## 2020-01-01 RX ORDER — CYCLOBENZAPRINE HCL 10 MG
10 TABLET ORAL
Status: DISCONTINUED | OUTPATIENT
Start: 2020-01-01 | End: 2020-01-01

## 2020-01-01 RX ORDER — SODIUM CHLORIDE, SODIUM LACTATE, POTASSIUM CHLORIDE, CALCIUM CHLORIDE 600; 310; 30; 20 MG/100ML; MG/100ML; MG/100ML; MG/100ML
INJECTION, SOLUTION INTRAVENOUS
Status: DISCONTINUED | OUTPATIENT
Start: 2020-01-01 | End: 2020-01-01 | Stop reason: HOSPADM

## 2020-01-01 RX ORDER — ROCURONIUM BROMIDE 10 MG/ML
INJECTION, SOLUTION INTRAVENOUS AS NEEDED
Status: DISCONTINUED | OUTPATIENT
Start: 2020-01-01 | End: 2020-01-01 | Stop reason: HOSPADM

## 2020-01-01 RX ORDER — LORAZEPAM 2 MG/ML
1 INJECTION INTRAMUSCULAR
Status: DISCONTINUED | OUTPATIENT
Start: 2020-01-01 | End: 2020-01-01 | Stop reason: HOSPADM

## 2020-01-01 RX ORDER — ALBUMIN HUMAN 250 G/1000ML
12.5 SOLUTION INTRAVENOUS EVERY 6 HOURS
Status: COMPLETED | OUTPATIENT
Start: 2020-01-01 | End: 2020-01-01

## 2020-01-01 RX ORDER — NALOXONE HYDROCHLORIDE 0.4 MG/ML
0.4 INJECTION, SOLUTION INTRAMUSCULAR; INTRAVENOUS; SUBCUTANEOUS AS NEEDED
Status: DISCONTINUED | OUTPATIENT
Start: 2020-01-01 | End: 2020-01-01

## 2020-01-01 RX ORDER — MIDAZOLAM HYDROCHLORIDE 1 MG/ML
2 INJECTION INTRAMUSCULAR; INTRAVENOUS ONCE
Status: COMPLETED | OUTPATIENT
Start: 2020-01-01 | End: 2020-01-01

## 2020-01-01 RX ORDER — EPHEDRINE SULFATE/0.9% NACL/PF 50 MG/5 ML
SYRINGE (ML) INTRAVENOUS AS NEEDED
Status: DISCONTINUED | OUTPATIENT
Start: 2020-01-01 | End: 2020-01-01 | Stop reason: HOSPADM

## 2020-01-01 RX ORDER — HYDROMORPHONE HYDROCHLORIDE 1 MG/ML
1 INJECTION, SOLUTION INTRAMUSCULAR; INTRAVENOUS; SUBCUTANEOUS
Status: DISCONTINUED | OUTPATIENT
Start: 2020-01-01 | End: 2020-01-01 | Stop reason: HOSPADM

## 2020-01-01 RX ORDER — HEPARIN SODIUM 1000 [USP'U]/ML
3800 INJECTION, SOLUTION INTRAVENOUS; SUBCUTANEOUS
Status: DISCONTINUED | OUTPATIENT
Start: 2020-01-01 | End: 2020-01-01

## 2020-01-01 RX ORDER — MAGNESIUM SULFATE 100 %
4 CRYSTALS MISCELLANEOUS AS NEEDED
Status: DISCONTINUED | OUTPATIENT
Start: 2020-01-01 | End: 2020-01-01

## 2020-01-01 RX ORDER — NEBIVOLOL 10 MG/1
20 TABLET ORAL
Status: DISCONTINUED | OUTPATIENT
Start: 2020-01-01 | End: 2020-01-01

## 2020-01-01 RX ORDER — ONDANSETRON 2 MG/ML
4 INJECTION INTRAMUSCULAR; INTRAVENOUS
Status: DISCONTINUED | OUTPATIENT
Start: 2020-01-01 | End: 2020-01-01

## 2020-01-01 RX ORDER — METOPROLOL SUCCINATE 25 MG/1
25 TABLET, EXTENDED RELEASE ORAL DAILY
Status: DISCONTINUED | OUTPATIENT
Start: 2020-01-01 | End: 2020-01-01

## 2020-01-01 RX ORDER — CRANBERRY FRUIT EXTRACT 650 MG
1 CAPSULE ORAL 2 TIMES DAILY
COMMUNITY

## 2020-01-01 RX ORDER — FAMOTIDINE 20 MG/1
20 TABLET, FILM COATED ORAL 2 TIMES DAILY
Status: DISCONTINUED | OUTPATIENT
Start: 2020-01-01 | End: 2020-01-01

## 2020-01-01 RX ORDER — HYDROMORPHONE HYDROCHLORIDE 1 MG/ML
2 INJECTION, SOLUTION INTRAMUSCULAR; INTRAVENOUS; SUBCUTANEOUS ONCE
Status: COMPLETED | OUTPATIENT
Start: 2020-01-01 | End: 2020-01-01

## 2020-01-01 RX ORDER — ASPIRIN 300 MG/1
300 SUPPOSITORY RECTAL
Status: COMPLETED | OUTPATIENT
Start: 2020-01-01 | End: 2020-01-01

## 2020-01-01 RX ADMIN — SODIUM CHLORIDE 40 MG: 9 INJECTION, SOLUTION INTRAMUSCULAR; INTRAVENOUS; SUBCUTANEOUS at 18:06

## 2020-01-01 RX ADMIN — KETOROLAC TROMETHAMINE 15 MG: 30 INJECTION, SOLUTION INTRAMUSCULAR at 16:41

## 2020-01-01 RX ADMIN — CLONIDINE HYDROCHLORIDE 0.3 MG: 0.1 TABLET ORAL at 17:22

## 2020-01-01 RX ADMIN — Medication 150 MCG/HR: at 19:42

## 2020-01-01 RX ADMIN — Medication 10 ML: at 06:00

## 2020-01-01 RX ADMIN — Medication 1 AMPULE: at 13:26

## 2020-01-01 RX ADMIN — LORAZEPAM 2 MG: 2 INJECTION INTRAMUSCULAR; INTRAVENOUS at 20:49

## 2020-01-01 RX ADMIN — METOPROLOL TARTRATE 12.5 MG: 25 TABLET, FILM COATED ORAL at 08:03

## 2020-01-01 RX ADMIN — DOCUSATE SODIUM - SENNOSIDES 1 TABLET: 50; 8.6 TABLET, FILM COATED ORAL at 09:18

## 2020-01-01 RX ADMIN — INSULIN LISPRO 2 UNITS: 100 INJECTION, SOLUTION INTRAVENOUS; SUBCUTANEOUS at 06:19

## 2020-01-01 RX ADMIN — DIAZEPAM 5 MG: 5 TABLET ORAL at 12:17

## 2020-01-01 RX ADMIN — HYDROMORPHONE HYDROCHLORIDE 4 MG: 2 INJECTION, SOLUTION INTRAMUSCULAR; INTRAVENOUS; SUBCUTANEOUS at 06:44

## 2020-01-01 RX ADMIN — PIPERACILLIN AND TAZOBACTAM 3.38 G: 3; .375 INJECTION, POWDER, LYOPHILIZED, FOR SOLUTION INTRAVENOUS at 04:39

## 2020-01-01 RX ADMIN — HYDROMORPHONE HYDROCHLORIDE 1 MG: 1 INJECTION, SOLUTION INTRAMUSCULAR; INTRAVENOUS; SUBCUTANEOUS at 19:26

## 2020-01-01 RX ADMIN — LEVETIRACETAM 500 MG: 5 INJECTION INTRAVENOUS at 03:35

## 2020-01-01 RX ADMIN — Medication 1 AMPULE: at 09:16

## 2020-01-01 RX ADMIN — MIDAZOLAM HYDROCHLORIDE 2 MG: 1 INJECTION, SOLUTION INTRAMUSCULAR; INTRAVENOUS at 18:35

## 2020-01-01 RX ADMIN — LEVETIRACETAM 500 MG: 5 INJECTION INTRAVENOUS at 15:40

## 2020-01-01 RX ADMIN — PROPOFOL 40 MCG/KG/MIN: 10 INJECTION, EMULSION INTRAVENOUS at 17:32

## 2020-01-01 RX ADMIN — GLYCOPYRROLATE 0.2 MG: 0.2 INJECTION, SOLUTION INTRAMUSCULAR; INTRAVENOUS at 08:06

## 2020-01-01 RX ADMIN — LORAZEPAM 2 MG: 2 INJECTION INTRAMUSCULAR; INTRAVENOUS at 03:28

## 2020-01-01 RX ADMIN — HYDROMORPHONE HYDROCHLORIDE 3 MG: 2 INJECTION, SOLUTION INTRAMUSCULAR; INTRAVENOUS; SUBCUTANEOUS at 10:11

## 2020-01-01 RX ADMIN — PROPOFOL 35 MCG/KG/MIN: 10 INJECTION, EMULSION INTRAVENOUS at 05:42

## 2020-01-01 RX ADMIN — DOCUSATE SODIUM - SENNOSIDES 1 TABLET: 50; 8.6 TABLET, FILM COATED ORAL at 08:10

## 2020-01-01 RX ADMIN — Medication 10 ML: at 14:35

## 2020-01-01 RX ADMIN — HYDROMORPHONE HYDROCHLORIDE 4 MG: 2 INJECTION, SOLUTION INTRAMUSCULAR; INTRAVENOUS; SUBCUTANEOUS at 15:31

## 2020-01-01 RX ADMIN — CHLORHEXIDINE GLUCONATE 15 ML: 0.12 RINSE ORAL at 09:35

## 2020-01-01 RX ADMIN — PROPOFOL 40 MCG/KG/MIN: 10 INJECTION, EMULSION INTRAVENOUS at 12:16

## 2020-01-01 RX ADMIN — CLONIDINE HYDROCHLORIDE 0.3 MG: 0.1 TABLET ORAL at 08:16

## 2020-01-01 RX ADMIN — HYDROMORPHONE HYDROCHLORIDE 1 MG: 1 INJECTION, SOLUTION INTRAMUSCULAR; INTRAVENOUS; SUBCUTANEOUS at 23:43

## 2020-01-01 RX ADMIN — Medication 10 ML: at 22:42

## 2020-01-01 RX ADMIN — Medication 1 AMPULE: at 09:35

## 2020-01-01 RX ADMIN — MIDAZOLAM HYDROCHLORIDE 2 MG: 1 INJECTION, SOLUTION INTRAMUSCULAR; INTRAVENOUS at 14:58

## 2020-01-01 RX ADMIN — PIPERACILLIN AND TAZOBACTAM 3.38 G: 3; .375 INJECTION, POWDER, LYOPHILIZED, FOR SOLUTION INTRAVENOUS at 17:06

## 2020-01-01 RX ADMIN — Medication 80 MCG: at 19:38

## 2020-01-01 RX ADMIN — Medication 10 ML: at 15:22

## 2020-01-01 RX ADMIN — Medication 150 MCG/HR: at 01:51

## 2020-01-01 RX ADMIN — LORAZEPAM 1.5 MG: 2 INJECTION INTRAMUSCULAR; INTRAVENOUS at 20:23

## 2020-01-01 RX ADMIN — PROPOFOL 35 MCG/KG/MIN: 10 INJECTION, EMULSION INTRAVENOUS at 16:33

## 2020-01-01 RX ADMIN — INSULIN LISPRO 2 UNITS: 100 INJECTION, SOLUTION INTRAVENOUS; SUBCUTANEOUS at 23:23

## 2020-01-01 RX ADMIN — CHLORHEXIDINE GLUCONATE 15 ML: 0.12 RINSE ORAL at 09:15

## 2020-01-01 RX ADMIN — Medication 1 AMPULE: at 08:04

## 2020-01-01 RX ADMIN — IOPAMIDOL 100 ML: 755 INJECTION, SOLUTION INTRAVENOUS at 13:25

## 2020-01-01 RX ADMIN — ACETAMINOPHEN 1000 MG: 500 TABLET ORAL at 06:49

## 2020-01-01 RX ADMIN — PIPERACILLIN AND TAZOBACTAM 3.38 G: 3; .375 INJECTION, POWDER, LYOPHILIZED, FOR SOLUTION INTRAVENOUS at 17:10

## 2020-01-01 RX ADMIN — PIPERACILLIN AND TAZOBACTAM 3.38 G: 3; .375 INJECTION, POWDER, LYOPHILIZED, FOR SOLUTION INTRAVENOUS at 17:37

## 2020-01-01 RX ADMIN — PROPOFOL 40 MCG/KG/MIN: 10 INJECTION, EMULSION INTRAVENOUS at 11:17

## 2020-01-01 RX ADMIN — SODIUM CHLORIDE 0.7 MCG/KG/HR: 9 INJECTION, SOLUTION INTRAVENOUS at 03:45

## 2020-01-01 RX ADMIN — GLYCOPYRROLATE 0.2 MG: 0.2 INJECTION, SOLUTION INTRAMUSCULAR; INTRAVENOUS at 23:43

## 2020-01-01 RX ADMIN — INSULIN LISPRO 2 UNITS: 100 INJECTION, SOLUTION INTRAVENOUS; SUBCUTANEOUS at 17:22

## 2020-01-01 RX ADMIN — Medication 5 ML: at 22:08

## 2020-01-01 RX ADMIN — ACETAMINOPHEN 1000 MG: 500 TABLET ORAL at 13:15

## 2020-01-01 RX ADMIN — WATER 2 G: 1 INJECTION INTRAMUSCULAR; INTRAVENOUS; SUBCUTANEOUS at 00:51

## 2020-01-01 RX ADMIN — FENTANYL CITRATE 50 MCG: 0.05 INJECTION, SOLUTION INTRAMUSCULAR; INTRAVENOUS at 04:54

## 2020-01-01 RX ADMIN — HYDRALAZINE HYDROCHLORIDE 10 MG: 20 INJECTION INTRAMUSCULAR; INTRAVENOUS at 03:56

## 2020-01-01 RX ADMIN — Medication 10 ML: at 15:42

## 2020-01-01 RX ADMIN — Medication 150 MCG/HR: at 09:30

## 2020-01-01 RX ADMIN — VANCOMYCIN HYDROCHLORIDE 500 MG: 500 INJECTION, POWDER, LYOPHILIZED, FOR SOLUTION INTRAVENOUS at 13:07

## 2020-01-01 RX ADMIN — HYDROMORPHONE HYDROCHLORIDE 1 MG: 2 INJECTION, SOLUTION INTRAMUSCULAR; INTRAVENOUS; SUBCUTANEOUS at 20:55

## 2020-01-01 RX ADMIN — Medication 10 ML: at 21:04

## 2020-01-01 RX ADMIN — PIPERACILLIN AND TAZOBACTAM 3.38 G: 3; .375 INJECTION, POWDER, LYOPHILIZED, FOR SOLUTION INTRAVENOUS at 04:15

## 2020-01-01 RX ADMIN — SODIUM CHLORIDE 0.9 MCG/KG/HR: 9 INJECTION, SOLUTION INTRAVENOUS at 12:22

## 2020-01-01 RX ADMIN — ACETAMINOPHEN 1000 MG: 500 TABLET ORAL at 12:27

## 2020-01-01 RX ADMIN — FENTANYL CITRATE 50 MCG: 50 INJECTION, SOLUTION INTRAMUSCULAR; INTRAVENOUS at 19:16

## 2020-01-01 RX ADMIN — ACETAMINOPHEN 1000 MG: 500 TABLET ORAL at 12:13

## 2020-01-01 RX ADMIN — PROPOFOL 30 MCG/KG/MIN: 10 INJECTION, EMULSION INTRAVENOUS at 00:08

## 2020-01-01 RX ADMIN — Medication 10 ML: at 13:25

## 2020-01-01 RX ADMIN — LEVETIRACETAM 500 MG: 5 INJECTION INTRAVENOUS at 15:41

## 2020-01-01 RX ADMIN — HYDROMORPHONE HYDROCHLORIDE 4 MG: 2 INJECTION, SOLUTION INTRAMUSCULAR; INTRAVENOUS; SUBCUTANEOUS at 18:13

## 2020-01-01 RX ADMIN — Medication 10 ML: at 05:48

## 2020-01-01 RX ADMIN — Medication 10 ML: at 15:36

## 2020-01-01 RX ADMIN — GLYCOPYRROLATE 0.2 MG: 0.2 INJECTION, SOLUTION INTRAMUSCULAR; INTRAVENOUS at 03:30

## 2020-01-01 RX ADMIN — ACETAMINOPHEN 650 MG: 650 SUPPOSITORY RECTAL at 23:03

## 2020-01-01 RX ADMIN — FENTANYL CITRATE 50 MCG: 0.05 INJECTION, SOLUTION INTRAMUSCULAR; INTRAVENOUS at 00:13

## 2020-01-01 RX ADMIN — LORAZEPAM 1.5 MG: 2 INJECTION INTRAMUSCULAR; INTRAVENOUS at 10:08

## 2020-01-01 RX ADMIN — CEFEPIME HYDROCHLORIDE 2 G: 2 INJECTION, POWDER, FOR SOLUTION INTRAVENOUS at 15:25

## 2020-01-01 RX ADMIN — FENTANYL CITRATE 50 MCG: 50 INJECTION, SOLUTION INTRAMUSCULAR; INTRAVENOUS at 20:29

## 2020-01-01 RX ADMIN — ACETAMINOPHEN 1000 MG: 500 TABLET ORAL at 23:27

## 2020-01-01 RX ADMIN — HYDROMORPHONE HYDROCHLORIDE 2 MG: 1 INJECTION, SOLUTION INTRAMUSCULAR; INTRAVENOUS; SUBCUTANEOUS at 14:45

## 2020-01-01 RX ADMIN — CHLORHEXIDINE GLUCONATE 15 ML: 0.12 RINSE ORAL at 21:37

## 2020-01-01 RX ADMIN — Medication 1 AMPULE: at 21:59

## 2020-01-01 RX ADMIN — HYDROMORPHONE HYDROCHLORIDE 1 MG: 1 INJECTION, SOLUTION INTRAMUSCULAR; INTRAVENOUS; SUBCUTANEOUS at 13:40

## 2020-01-01 RX ADMIN — PIPERACILLIN AND TAZOBACTAM 3.38 G: 3; .375 INJECTION, POWDER, LYOPHILIZED, FOR SOLUTION INTRAVENOUS at 04:42

## 2020-01-01 RX ADMIN — PROPOFOL 50 MCG/KG/MIN: 10 INJECTION, EMULSION INTRAVENOUS at 21:17

## 2020-01-01 RX ADMIN — Medication 10 ML: at 21:09

## 2020-01-01 RX ADMIN — ACETAMINOPHEN 1000 MG: 500 TABLET ORAL at 14:24

## 2020-01-01 RX ADMIN — HYDROMORPHONE HYDROCHLORIDE 1 MG: 1 INJECTION, SOLUTION INTRAMUSCULAR; INTRAVENOUS; SUBCUTANEOUS at 20:07

## 2020-01-01 RX ADMIN — LORAZEPAM 1 MG: 2 INJECTION INTRAMUSCULAR; INTRAVENOUS at 19:27

## 2020-01-01 RX ADMIN — FAMOTIDINE 20 MG: 20 TABLET, FILM COATED ORAL at 09:36

## 2020-01-01 RX ADMIN — MINOXIDIL 5 MG: 2.5 TABLET ORAL at 17:20

## 2020-01-01 RX ADMIN — Medication 150 MCG/HR: at 11:49

## 2020-01-01 RX ADMIN — MINOXIDIL 5 MG: 2.5 TABLET ORAL at 17:35

## 2020-01-01 RX ADMIN — LEVETIRACETAM 500 MG: 500 SOLUTION ORAL at 12:00

## 2020-01-01 RX ADMIN — Medication 10 MG: at 20:27

## 2020-01-01 RX ADMIN — Medication 1 AMPULE: at 20:49

## 2020-01-01 RX ADMIN — DOCUSATE SODIUM - SENNOSIDES 1 TABLET: 50; 8.6 TABLET, FILM COATED ORAL at 17:14

## 2020-01-01 RX ADMIN — CLONIDINE HYDROCHLORIDE 0.3 MG: 0.1 TABLET ORAL at 17:14

## 2020-01-01 RX ADMIN — Medication 10 ML: at 14:24

## 2020-01-01 RX ADMIN — MINOXIDIL 5 MG: 2.5 TABLET ORAL at 08:19

## 2020-01-01 RX ADMIN — Medication 150 MCG/HR: at 01:33

## 2020-01-01 RX ADMIN — Medication 10 ML: at 05:56

## 2020-01-01 RX ADMIN — SODIUM CHLORIDE, POTASSIUM CHLORIDE, SODIUM LACTATE AND CALCIUM CHLORIDE: 600; 310; 30; 20 INJECTION, SOLUTION INTRAVENOUS at 18:36

## 2020-01-01 RX ADMIN — KETOROLAC TROMETHAMINE 15 MG: 30 INJECTION, SOLUTION INTRAMUSCULAR at 10:15

## 2020-01-01 RX ADMIN — Medication 80 MCG: at 19:33

## 2020-01-01 RX ADMIN — PROPOFOL 30 MCG/KG/MIN: 10 INJECTION, EMULSION INTRAVENOUS at 20:50

## 2020-01-01 RX ADMIN — ACETAMINOPHEN 1000 MG: 500 TABLET ORAL at 11:08

## 2020-01-01 RX ADMIN — Medication 1 AMPULE: at 20:11

## 2020-01-01 RX ADMIN — INSULIN LISPRO 2 UNITS: 100 INJECTION, SOLUTION INTRAVENOUS; SUBCUTANEOUS at 11:59

## 2020-01-01 RX ADMIN — CHLORHEXIDINE GLUCONATE 15 ML: 0.12 RINSE ORAL at 20:49

## 2020-01-01 RX ADMIN — LEVETIRACETAM 500 MG: 5 INJECTION INTRAVENOUS at 16:27

## 2020-01-01 RX ADMIN — Medication 10 ML: at 05:53

## 2020-01-01 RX ADMIN — HYDROMORPHONE HYDROCHLORIDE 4 MG: 2 INJECTION, SOLUTION INTRAMUSCULAR; INTRAVENOUS; SUBCUTANEOUS at 15:26

## 2020-01-01 RX ADMIN — Medication 125 MCG/HR: at 21:48

## 2020-01-01 RX ADMIN — LEVETIRACETAM 500 MG: 500 SOLUTION ORAL at 08:04

## 2020-01-01 RX ADMIN — PIPERACILLIN AND TAZOBACTAM 3.38 G: 3; .375 INJECTION, POWDER, LYOPHILIZED, FOR SOLUTION INTRAVENOUS at 04:13

## 2020-01-01 RX ADMIN — PROPOFOL 50 MCG/KG/MIN: 10 INJECTION, EMULSION INTRAVENOUS at 18:05

## 2020-01-01 RX ADMIN — Medication 10 ML: at 17:11

## 2020-01-01 RX ADMIN — DOCUSATE SODIUM - SENNOSIDES 1 TABLET: 50; 8.6 TABLET, FILM COATED ORAL at 17:00

## 2020-01-01 RX ADMIN — LORAZEPAM 1 MG: 2 INJECTION INTRAMUSCULAR; INTRAVENOUS at 05:29

## 2020-01-01 RX ADMIN — Medication 10 ML: at 22:00

## 2020-01-01 RX ADMIN — PROPOFOL 30 MCG/KG/MIN: 10 INJECTION, EMULSION INTRAVENOUS at 19:30

## 2020-01-01 RX ADMIN — MINOXIDIL 5 MG: 2.5 TABLET ORAL at 17:11

## 2020-01-01 RX ADMIN — LORAZEPAM 2 MG: 2 INJECTION INTRAMUSCULAR; INTRAVENOUS at 00:26

## 2020-01-01 RX ADMIN — HYDROMORPHONE HYDROCHLORIDE 3 MG: 2 INJECTION, SOLUTION INTRAMUSCULAR; INTRAVENOUS; SUBCUTANEOUS at 18:58

## 2020-01-01 RX ADMIN — LORAZEPAM 2 MG: 2 INJECTION INTRAMUSCULAR; INTRAVENOUS at 04:47

## 2020-01-01 RX ADMIN — Medication 200 MCG/HR: at 19:21

## 2020-01-01 RX ADMIN — CLONIDINE HYDROCHLORIDE 0.3 MG: 0.1 TABLET ORAL at 09:36

## 2020-01-01 RX ADMIN — LEVETIRACETAM 2000 MG: 100 INJECTION, SOLUTION INTRAVENOUS at 13:53

## 2020-01-01 RX ADMIN — LEVETIRACETAM 500 MG: 5 INJECTION INTRAVENOUS at 16:45

## 2020-01-01 RX ADMIN — LORAZEPAM 1 MG: 2 INJECTION INTRAMUSCULAR; INTRAVENOUS at 01:56

## 2020-01-01 RX ADMIN — MINOXIDIL 5 MG: 2.5 TABLET ORAL at 17:14

## 2020-01-01 RX ADMIN — Medication 35 MCG/HR: at 13:45

## 2020-01-01 RX ADMIN — LORAZEPAM 1 MG: 2 INJECTION INTRAMUSCULAR; INTRAVENOUS at 03:30

## 2020-01-01 RX ADMIN — PROPOFOL 50 MCG/KG/MIN: 10 INJECTION, EMULSION INTRAVENOUS at 02:36

## 2020-01-01 RX ADMIN — ACETAMINOPHEN 1000 MG: 500 TABLET ORAL at 17:14

## 2020-01-01 RX ADMIN — HYDROMORPHONE HYDROCHLORIDE 4 MG: 2 INJECTION, SOLUTION INTRAMUSCULAR; INTRAVENOUS; SUBCUTANEOUS at 19:08

## 2020-01-01 RX ADMIN — HYDROMORPHONE HYDROCHLORIDE 3 MG: 2 INJECTION, SOLUTION INTRAMUSCULAR; INTRAVENOUS; SUBCUTANEOUS at 14:47

## 2020-01-01 RX ADMIN — Medication 1 AMPULE: at 08:44

## 2020-01-01 RX ADMIN — LEVETIRACETAM 500 MG: 5 INJECTION INTRAVENOUS at 03:03

## 2020-01-01 RX ADMIN — PROPOFOL 30 MCG/KG/MIN: 10 INJECTION, EMULSION INTRAVENOUS at 02:10

## 2020-01-01 RX ADMIN — PIPERACILLIN AND TAZOBACTAM 3.38 G: 3; .375 INJECTION, POWDER, LYOPHILIZED, FOR SOLUTION INTRAVENOUS at 05:38

## 2020-01-01 RX ADMIN — PROPOFOL 50 MCG/KG/MIN: 10 INJECTION, EMULSION INTRAVENOUS at 22:30

## 2020-01-01 RX ADMIN — Medication 75 MCG/HR: at 12:14

## 2020-01-01 RX ADMIN — Medication 75 MCG/HR: at 22:10

## 2020-01-01 RX ADMIN — LEVETIRACETAM 500 MG: 500 SOLUTION ORAL at 17:10

## 2020-01-01 RX ADMIN — PROPOFOL 40 MCG/KG/MIN: 10 INJECTION, EMULSION INTRAVENOUS at 12:15

## 2020-01-01 RX ADMIN — LIDOCAINE HYDROCHLORIDE 10 ML: 20 INJECTION, SOLUTION INFILTRATION; PERINEURAL at 16:47

## 2020-01-01 RX ADMIN — CHLORHEXIDINE GLUCONATE 15 ML: 0.12 RINSE ORAL at 08:42

## 2020-01-01 RX ADMIN — Medication 150 MCG/HR: at 03:21

## 2020-01-01 RX ADMIN — Medication 10 ML: at 05:25

## 2020-01-01 RX ADMIN — CLONIDINE HYDROCHLORIDE 0.3 MG: 0.1 TABLET ORAL at 17:35

## 2020-01-01 RX ADMIN — PROPOFOL 100 MG: 10 INJECTION, EMULSION INTRAVENOUS at 18:35

## 2020-01-01 RX ADMIN — LEVETIRACETAM 500 MG: 5 INJECTION INTRAVENOUS at 05:20

## 2020-01-01 RX ADMIN — HEPARIN SODIUM 3800 UNITS: 1000 INJECTION INTRAVENOUS; SUBCUTANEOUS at 11:15

## 2020-01-01 RX ADMIN — LEVETIRACETAM 500 MG: 5 INJECTION INTRAVENOUS at 03:56

## 2020-01-01 RX ADMIN — Medication 10 ML: at 23:42

## 2020-01-01 RX ADMIN — Medication 10 ML: at 13:16

## 2020-01-01 RX ADMIN — PROPOFOL 35 MCG/KG/MIN: 10 INJECTION, EMULSION INTRAVENOUS at 09:03

## 2020-01-01 RX ADMIN — HYDROMORPHONE HYDROCHLORIDE 4 MG: 2 INJECTION, SOLUTION INTRAMUSCULAR; INTRAVENOUS; SUBCUTANEOUS at 03:27

## 2020-01-01 RX ADMIN — PROPOFOL 50 MCG/KG/MIN: 10 INJECTION, EMULSION INTRAVENOUS at 22:41

## 2020-01-01 RX ADMIN — PROPOFOL 40 MCG/KG/MIN: 10 INJECTION, EMULSION INTRAVENOUS at 06:41

## 2020-01-01 RX ADMIN — Medication 1 AMPULE: at 08:16

## 2020-01-01 RX ADMIN — Medication 10 ML: at 13:17

## 2020-01-01 RX ADMIN — INSULIN LISPRO 2 UNITS: 100 INJECTION, SOLUTION INTRAVENOUS; SUBCUTANEOUS at 06:03

## 2020-01-01 RX ADMIN — LORAZEPAM 1 MG: 2 INJECTION INTRAMUSCULAR; INTRAVENOUS at 21:52

## 2020-01-01 RX ADMIN — PROPOFOL 20 MCG/KG/MIN: 10 INJECTION, EMULSION INTRAVENOUS at 17:59

## 2020-01-01 RX ADMIN — Medication 150 MCG/HR: at 05:36

## 2020-01-01 RX ADMIN — Medication 10 ML: at 21:08

## 2020-01-01 RX ADMIN — LORAZEPAM 1.5 MG: 2 INJECTION INTRAMUSCULAR; INTRAVENOUS at 11:34

## 2020-01-01 RX ADMIN — CLONIDINE HYDROCHLORIDE 0.3 MG: 0.1 TABLET ORAL at 08:03

## 2020-01-01 RX ADMIN — SODIUM CHLORIDE 5 MG/HR: 9 INJECTION, SOLUTION INTRAVENOUS at 13:36

## 2020-01-01 RX ADMIN — ACETAMINOPHEN 1000 MG: 500 TABLET ORAL at 05:55

## 2020-01-01 RX ADMIN — ACETAMINOPHEN 1000 MG: 500 TABLET ORAL at 05:57

## 2020-01-01 RX ADMIN — Medication 50 MCG/HR: at 14:21

## 2020-01-01 RX ADMIN — SODIUM CHLORIDE 0.4 MCG/KG/HR: 9 INJECTION, SOLUTION INTRAVENOUS at 11:32

## 2020-01-01 RX ADMIN — INSULIN LISPRO 2 UNITS: 100 INJECTION, SOLUTION INTRAVENOUS; SUBCUTANEOUS at 18:31

## 2020-01-01 RX ADMIN — PROPOFOL 50 MCG/KG/MIN: 10 INJECTION, EMULSION INTRAVENOUS at 06:04

## 2020-01-01 RX ADMIN — LORAZEPAM 1.5 MG: 2 INJECTION INTRAMUSCULAR; INTRAVENOUS at 16:35

## 2020-01-01 RX ADMIN — PROPOFOL 15 MCG/KG/MIN: 10 INJECTION, EMULSION INTRAVENOUS at 14:46

## 2020-01-01 RX ADMIN — HYDROMORPHONE HYDROCHLORIDE 2 MG: 2 INJECTION, SOLUTION INTRAMUSCULAR; INTRAVENOUS; SUBCUTANEOUS at 10:03

## 2020-01-01 RX ADMIN — VANCOMYCIN HYDROCHLORIDE 2000 MG: 10 INJECTION, POWDER, LYOPHILIZED, FOR SOLUTION INTRAVENOUS at 15:42

## 2020-01-01 RX ADMIN — CLONIDINE HYDROCHLORIDE 0.2 MG: 0.1 TABLET ORAL at 21:59

## 2020-01-01 RX ADMIN — Medication 10 ML: at 06:36

## 2020-01-01 RX ADMIN — FAMOTIDINE 20 MG: 20 TABLET, FILM COATED ORAL at 12:17

## 2020-01-01 RX ADMIN — ROCURONIUM BROMIDE 100 MG: 10 INJECTION INTRAVENOUS at 12:46

## 2020-01-01 RX ADMIN — HYDRALAZINE HYDROCHLORIDE 10 MG: 20 INJECTION INTRAMUSCULAR; INTRAVENOUS at 04:13

## 2020-01-01 RX ADMIN — LORAZEPAM 2 MG: 2 INJECTION INTRAMUSCULAR; INTRAVENOUS at 23:56

## 2020-01-01 RX ADMIN — LEVETIRACETAM 500 MG: 5 INJECTION INTRAVENOUS at 15:53

## 2020-01-01 RX ADMIN — CLONIDINE HYDROCHLORIDE 0.3 MG: 0.1 TABLET ORAL at 12:17

## 2020-01-01 RX ADMIN — CLONIDINE HYDROCHLORIDE 0.3 MG: 0.1 TABLET ORAL at 17:20

## 2020-01-01 RX ADMIN — ACETAMINOPHEN 1000 MG: 500 TABLET ORAL at 23:44

## 2020-01-01 RX ADMIN — Medication 1 AMPULE: at 21:36

## 2020-01-01 RX ADMIN — PIPERACILLIN AND TAZOBACTAM 3.38 G: 3; .375 INJECTION, POWDER, LYOPHILIZED, FOR SOLUTION INTRAVENOUS at 17:34

## 2020-01-01 RX ADMIN — LORAZEPAM 1 MG: 2 INJECTION INTRAMUSCULAR; INTRAVENOUS at 19:00

## 2020-01-01 RX ADMIN — VANCOMYCIN HYDROCHLORIDE 750 MG: 750 INJECTION, POWDER, LYOPHILIZED, FOR SOLUTION INTRAVENOUS at 17:33

## 2020-01-01 RX ADMIN — LORAZEPAM 1.5 MG: 2 INJECTION INTRAMUSCULAR; INTRAVENOUS at 00:41

## 2020-01-01 RX ADMIN — Medication 75 MCG/HR: at 16:37

## 2020-01-01 RX ADMIN — ETOMIDATE 30 MG: 2 INJECTION, SOLUTION INTRAVENOUS at 12:53

## 2020-01-01 RX ADMIN — HYDROMORPHONE HYDROCHLORIDE 4 MG: 2 INJECTION, SOLUTION INTRAMUSCULAR; INTRAVENOUS; SUBCUTANEOUS at 00:26

## 2020-01-01 RX ADMIN — FENTANYL CITRATE 100 MCG: 50 INJECTION, SOLUTION INTRAMUSCULAR; INTRAVENOUS at 18:35

## 2020-01-01 RX ADMIN — MINOXIDIL 5 MG: 2.5 TABLET ORAL at 09:36

## 2020-01-01 RX ADMIN — CHLORHEXIDINE GLUCONATE 15 ML: 0.12 RINSE ORAL at 21:15

## 2020-01-01 RX ADMIN — FAMOTIDINE 20 MG: 20 TABLET, FILM COATED ORAL at 08:39

## 2020-01-01 RX ADMIN — INSULIN LISPRO 2 UNITS: 100 INJECTION, SOLUTION INTRAVENOUS; SUBCUTANEOUS at 13:15

## 2020-01-01 RX ADMIN — LORAZEPAM 2 MG: 2 INJECTION INTRAMUSCULAR; INTRAVENOUS at 20:21

## 2020-01-01 RX ADMIN — CHLORHEXIDINE GLUCONATE 15 ML: 0.12 RINSE ORAL at 09:34

## 2020-01-01 RX ADMIN — HEPARIN SODIUM IN SODIUM CHLORIDE: 200 INJECTION INTRAVENOUS at 16:47

## 2020-01-01 RX ADMIN — Medication 10 ML: at 05:27

## 2020-01-01 RX ADMIN — Medication 10 ML: at 06:20

## 2020-01-01 RX ADMIN — FAMOTIDINE 20 MG: 20 TABLET, FILM COATED ORAL at 09:33

## 2020-01-01 RX ADMIN — CHLORHEXIDINE GLUCONATE 15 ML: 0.12 RINSE ORAL at 20:42

## 2020-01-01 RX ADMIN — PROPOFOL 35 MCG/KG/MIN: 10 INJECTION, EMULSION INTRAVENOUS at 03:30

## 2020-01-01 RX ADMIN — ACETAMINOPHEN 1000 MG: 500 TABLET ORAL at 05:44

## 2020-01-01 RX ADMIN — ASPIRIN 300 MG: 300 SUPPOSITORY RECTAL at 15:07

## 2020-01-01 RX ADMIN — CHLORHEXIDINE GLUCONATE 15 ML: 0.12 RINSE ORAL at 21:59

## 2020-01-01 RX ADMIN — PIPERACILLIN AND TAZOBACTAM 3.38 G: 3; .375 INJECTION, POWDER, LYOPHILIZED, FOR SOLUTION INTRAVENOUS at 04:29

## 2020-01-01 RX ADMIN — LORAZEPAM 1 MG: 2 INJECTION INTRAMUSCULAR; INTRAVENOUS at 23:43

## 2020-01-01 RX ADMIN — METOPROLOL TARTRATE 12.5 MG: 25 TABLET, FILM COATED ORAL at 21:38

## 2020-01-01 RX ADMIN — PIPERACILLIN AND TAZOBACTAM 3.38 G: 3; .375 INJECTION, POWDER, LYOPHILIZED, FOR SOLUTION INTRAVENOUS at 16:46

## 2020-01-01 RX ADMIN — LORAZEPAM 2 MG: 2 INJECTION INTRAMUSCULAR; INTRAVENOUS at 12:29

## 2020-01-01 RX ADMIN — Medication 1 AMPULE: at 12:18

## 2020-01-01 RX ADMIN — MIDAZOLAM HYDROCHLORIDE 2 MG: 1 INJECTION, SOLUTION INTRAMUSCULAR; INTRAVENOUS at 15:32

## 2020-01-01 RX ADMIN — LORAZEPAM 1.5 MG: 2 INJECTION INTRAMUSCULAR; INTRAVENOUS at 08:06

## 2020-01-01 RX ADMIN — HYDROMORPHONE HYDROCHLORIDE 3 MG: 2 INJECTION, SOLUTION INTRAMUSCULAR; INTRAVENOUS; SUBCUTANEOUS at 03:17

## 2020-01-01 RX ADMIN — SODIUM CHLORIDE 5 MG/HR: 9 INJECTION, SOLUTION INTRAVENOUS at 05:50

## 2020-01-01 RX ADMIN — HYDROMORPHONE HYDROCHLORIDE 1 MG: 1 INJECTION, SOLUTION INTRAMUSCULAR; INTRAVENOUS; SUBCUTANEOUS at 05:29

## 2020-01-01 RX ADMIN — FAMOTIDINE 20 MG: 20 TABLET, FILM COATED ORAL at 08:03

## 2020-01-01 RX ADMIN — LEVETIRACETAM 500 MG: 5 INJECTION INTRAVENOUS at 03:21

## 2020-01-01 RX ADMIN — HYDROMORPHONE HYDROCHLORIDE 4 MG: 2 INJECTION, SOLUTION INTRAMUSCULAR; INTRAVENOUS; SUBCUTANEOUS at 11:58

## 2020-01-01 RX ADMIN — FENTANYL CITRATE 50 MCG: 0.05 INJECTION, SOLUTION INTRAMUSCULAR; INTRAVENOUS at 12:15

## 2020-01-01 RX ADMIN — CLONIDINE HYDROCHLORIDE 0.3 MG: 0.1 TABLET ORAL at 17:11

## 2020-01-01 RX ADMIN — PROPOFOL 45 MCG/KG/MIN: 10 INJECTION, EMULSION INTRAVENOUS at 17:41

## 2020-01-01 RX ADMIN — HYDROMORPHONE HYDROCHLORIDE 4 MG: 2 INJECTION, SOLUTION INTRAMUSCULAR; INTRAVENOUS; SUBCUTANEOUS at 23:55

## 2020-01-01 RX ADMIN — LEVETIRACETAM 500 MG: 5 INJECTION INTRAVENOUS at 03:32

## 2020-01-01 RX ADMIN — CLONIDINE HYDROCHLORIDE 0.3 MG: 0.1 TABLET ORAL at 08:39

## 2020-01-01 RX ADMIN — Medication 10 ML: at 13:07

## 2020-01-01 RX ADMIN — INSULIN LISPRO 2 UNITS: 100 INJECTION, SOLUTION INTRAVENOUS; SUBCUTANEOUS at 23:38

## 2020-01-01 RX ADMIN — LEVETIRACETAM 500 MG: 5 INJECTION INTRAVENOUS at 16:00

## 2020-01-01 RX ADMIN — LEVETIRACETAM 500 MG: 5 INJECTION INTRAVENOUS at 03:26

## 2020-01-01 RX ADMIN — ALBUMIN (HUMAN) 12.5 G: 0.25 INJECTION, SOLUTION INTRAVENOUS at 11:20

## 2020-01-01 RX ADMIN — Medication 10 ML: at 23:30

## 2020-01-01 RX ADMIN — MINOXIDIL 5 MG: 2.5 TABLET ORAL at 17:00

## 2020-01-01 RX ADMIN — POLYETHYLENE GLYCOL 3350 17 G: 17 POWDER, FOR SOLUTION ORAL at 09:19

## 2020-01-01 RX ADMIN — LORAZEPAM 4 MG: 2 INJECTION INTRAMUSCULAR; INTRAVENOUS at 10:26

## 2020-01-01 RX ADMIN — MINOXIDIL 5 MG: 2.5 TABLET ORAL at 08:39

## 2020-01-01 RX ADMIN — WATER 2 G: 1 INJECTION INTRAMUSCULAR; INTRAVENOUS; SUBCUTANEOUS at 07:48

## 2020-01-01 RX ADMIN — DIAZEPAM 5 MG: 5 TABLET ORAL at 08:10

## 2020-01-01 RX ADMIN — Medication 10 ML: at 13:26

## 2020-01-01 RX ADMIN — HYDROMORPHONE HYDROCHLORIDE 4 MG: 2 INJECTION, SOLUTION INTRAMUSCULAR; INTRAVENOUS; SUBCUTANEOUS at 22:07

## 2020-01-01 RX ADMIN — Medication 10 ML: at 05:45

## 2020-01-01 RX ADMIN — LORAZEPAM 2 MG: 2 INJECTION INTRAMUSCULAR; INTRAVENOUS at 11:58

## 2020-01-01 RX ADMIN — ACETAMINOPHEN 1000 MG: 500 TABLET ORAL at 06:39

## 2020-01-01 RX ADMIN — FENTANYL CITRATE 50 MCG: 0.05 INJECTION, SOLUTION INTRAMUSCULAR; INTRAVENOUS at 03:47

## 2020-01-01 RX ADMIN — LORAZEPAM 2 MG: 2 INJECTION INTRAMUSCULAR; INTRAVENOUS at 16:14

## 2020-01-01 RX ADMIN — SODIUM CHLORIDE 2.5 MG/HR: 9 INJECTION, SOLUTION INTRAVENOUS at 22:36

## 2020-01-01 RX ADMIN — CHLORHEXIDINE GLUCONATE 15 ML: 0.12 RINSE ORAL at 21:10

## 2020-01-01 RX ADMIN — Medication 10 ML: at 05:55

## 2020-01-01 RX ADMIN — PIPERACILLIN AND TAZOBACTAM 3.38 G: 3; .375 INJECTION, POWDER, LYOPHILIZED, FOR SOLUTION INTRAVENOUS at 05:36

## 2020-01-01 RX ADMIN — PIPERACILLIN AND TAZOBACTAM 3.38 G: 3; .375 INJECTION, POWDER, LYOPHILIZED, FOR SOLUTION INTRAVENOUS at 16:56

## 2020-01-01 RX ADMIN — ROCURONIUM BROMIDE 10 MG: 10 INJECTION INTRAVENOUS at 20:25

## 2020-01-01 RX ADMIN — ROCURONIUM BROMIDE 10 MG: 10 INJECTION INTRAVENOUS at 19:41

## 2020-01-01 RX ADMIN — ALBUMIN (HUMAN) 12.5 G: 0.25 INJECTION, SOLUTION INTRAVENOUS at 18:32

## 2020-01-01 RX ADMIN — FAMOTIDINE 20 MG: 20 TABLET, FILM COATED ORAL at 08:16

## 2020-01-01 RX ADMIN — Medication 10 ML: at 05:44

## 2020-01-01 RX ADMIN — PROPOFOL 35 MCG/KG/MIN: 10 INJECTION, EMULSION INTRAVENOUS at 22:06

## 2020-01-01 RX ADMIN — ACETAMINOPHEN 1000 MG: 500 TABLET ORAL at 17:00

## 2020-01-01 RX ADMIN — FENTANYL CITRATE 50 MCG: 0.05 INJECTION, SOLUTION INTRAMUSCULAR; INTRAVENOUS at 09:40

## 2020-01-01 RX ADMIN — NEBIVOLOL HYDROCHLORIDE 20 MG: 10 TABLET ORAL at 22:00

## 2020-01-01 RX ADMIN — CHLORHEXIDINE GLUCONATE 15 ML: 0.12 RINSE ORAL at 08:04

## 2020-01-01 RX ADMIN — FENTANYL CITRATE 50 MCG: 0.05 INJECTION, SOLUTION INTRAMUSCULAR; INTRAVENOUS at 13:11

## 2020-01-01 RX ADMIN — HYDROMORPHONE HYDROCHLORIDE 3 MG: 2 INJECTION, SOLUTION INTRAMUSCULAR; INTRAVENOUS; SUBCUTANEOUS at 23:00

## 2020-01-01 RX ADMIN — ACETAMINOPHEN 650 MG: 650 SUPPOSITORY RECTAL at 10:26

## 2020-01-01 RX ADMIN — ROCURONIUM BROMIDE 20 MG: 10 INJECTION INTRAVENOUS at 20:59

## 2020-01-01 RX ADMIN — ACETAMINOPHEN 1000 MG: 500 TABLET ORAL at 00:59

## 2020-01-01 RX ADMIN — FAMOTIDINE 20 MG: 20 TABLET, FILM COATED ORAL at 08:10

## 2020-01-01 RX ADMIN — Medication 10 ML: at 22:58

## 2020-01-01 RX ADMIN — MINOXIDIL 5 MG: 2.5 TABLET ORAL at 12:17

## 2020-01-01 RX ADMIN — FAMOTIDINE 20 MG: 20 TABLET, FILM COATED ORAL at 09:17

## 2020-01-01 RX ADMIN — PIPERACILLIN AND TAZOBACTAM 3.38 G: 3; .375 INJECTION, POWDER, LYOPHILIZED, FOR SOLUTION INTRAVENOUS at 17:08

## 2020-01-01 RX ADMIN — Medication 10 ML: at 14:25

## 2020-01-01 RX ADMIN — MINOXIDIL 5 MG: 2.5 TABLET ORAL at 22:00

## 2020-01-01 RX ADMIN — ROCURONIUM BROMIDE 50 MG: 10 INJECTION INTRAVENOUS at 18:35

## 2020-01-01 RX ADMIN — INSULIN GLARGINE 15 UNITS: 100 INJECTION, SOLUTION SUBCUTANEOUS at 08:04

## 2020-01-01 RX ADMIN — VANCOMYCIN HYDROCHLORIDE 750 MG: 750 INJECTION, POWDER, LYOPHILIZED, FOR SOLUTION INTRAVENOUS at 09:05

## 2020-01-01 RX ADMIN — Medication 10 ML: at 21:38

## 2020-01-01 RX ADMIN — ACETAMINOPHEN 1000 MG: 500 TABLET ORAL at 17:19

## 2020-01-01 RX ADMIN — Medication 150 MCG/HR: at 23:49

## 2020-01-01 RX ADMIN — NEBIVOLOL HYDROCHLORIDE 20 MG: 10 TABLET ORAL at 22:58

## 2020-01-01 RX ADMIN — SODIUM CHLORIDE 50 ML/HR: 900 INJECTION, SOLUTION INTRAVENOUS at 01:06

## 2020-01-01 RX ADMIN — TAMSULOSIN HYDROCHLORIDE 0.4 MG: 0.4 CAPSULE ORAL at 22:00

## 2020-01-01 RX ADMIN — Medication 100 MCG/HR: at 16:23

## 2020-01-01 RX ADMIN — LORAZEPAM 2 MG: 2 INJECTION INTRAMUSCULAR; INTRAVENOUS at 08:56

## 2020-01-01 RX ADMIN — DEXTROSE MONOHYDRATE 12.5 G: 25 INJECTION, SOLUTION INTRAVENOUS at 05:27

## 2020-01-01 RX ADMIN — INSULIN LISPRO 2 UNITS: 100 INJECTION, SOLUTION INTRAVENOUS; SUBCUTANEOUS at 17:35

## 2020-01-01 RX ADMIN — PROPOFOL 35 MCG/KG/MIN: 10 INJECTION, EMULSION INTRAVENOUS at 11:59

## 2020-01-01 RX ADMIN — METOPROLOL SUCCINATE 25 MG: 25 TABLET, EXTENDED RELEASE ORAL at 11:33

## 2020-01-01 RX ADMIN — HYDROMORPHONE HYDROCHLORIDE 2 MG: 2 INJECTION, SOLUTION INTRAMUSCULAR; INTRAVENOUS; SUBCUTANEOUS at 10:27

## 2020-01-01 RX ADMIN — ACETAMINOPHEN 1000 MG: 500 TABLET ORAL at 00:58

## 2020-01-01 RX ADMIN — LORAZEPAM 1 MG: 2 INJECTION INTRAMUSCULAR; INTRAVENOUS at 14:45

## 2020-01-01 RX ADMIN — PROPOFOL 50 MCG/KG/MIN: 10 INJECTION, EMULSION INTRAVENOUS at 17:13

## 2020-01-01 RX ADMIN — LORAZEPAM 2 MG: 2 INJECTION INTRAMUSCULAR; INTRAVENOUS at 15:31

## 2020-01-01 RX ADMIN — ROCURONIUM BROMIDE 10 MG: 10 INJECTION INTRAVENOUS at 19:16

## 2020-01-01 RX ADMIN — ACETAMINOPHEN 650 MG: 325 TABLET ORAL at 08:11

## 2020-01-01 RX ADMIN — CLONIDINE HYDROCHLORIDE 0.2 MG: 0.1 TABLET ORAL at 17:00

## 2020-01-01 RX ADMIN — Medication 10 ML: at 21:16

## 2020-01-01 RX ADMIN — Medication 1 AMPULE: at 21:15

## 2020-01-01 RX ADMIN — LORAZEPAM 1.5 MG: 2 INJECTION INTRAMUSCULAR; INTRAVENOUS at 03:17

## 2020-01-01 RX ADMIN — ACETAMINOPHEN 1000 MG: 500 TABLET ORAL at 00:28

## 2020-01-01 RX ADMIN — Medication 1 AMPULE: at 21:08

## 2020-01-01 RX ADMIN — PROPOFOL 35 MCG/KG/MIN: 10 INJECTION, EMULSION INTRAVENOUS at 13:17

## 2020-01-01 RX ADMIN — ALBUMIN (HUMAN) 12.5 G: 0.25 INJECTION, SOLUTION INTRAVENOUS at 06:10

## 2020-01-01 RX ADMIN — ACETAMINOPHEN 1000 MG: 500 TABLET ORAL at 12:21

## 2020-01-01 RX ADMIN — PROPOFOL 35 MCG/KG/MIN: 10 INJECTION, EMULSION INTRAVENOUS at 06:49

## 2020-01-01 RX ADMIN — Medication 10 MG: at 19:38

## 2020-01-01 RX ADMIN — HEPARIN SODIUM 3800 UNITS: 1000 INJECTION INTRAVENOUS; SUBCUTANEOUS at 13:24

## 2020-01-01 RX ADMIN — INSULIN GLARGINE 15 UNITS: 100 INJECTION, SOLUTION SUBCUTANEOUS at 11:58

## 2020-01-01 RX ADMIN — MINOXIDIL 5 MG: 2.5 TABLET ORAL at 08:11

## 2020-01-01 RX ADMIN — Medication 10 ML: at 22:50

## 2020-01-01 RX ADMIN — HYDROMORPHONE HYDROCHLORIDE 1 MG: 1 INJECTION, SOLUTION INTRAMUSCULAR; INTRAVENOUS; SUBCUTANEOUS at 01:56

## 2020-01-01 RX ADMIN — HYDROMORPHONE HYDROCHLORIDE 3 MG: 2 INJECTION, SOLUTION INTRAMUSCULAR; INTRAVENOUS; SUBCUTANEOUS at 21:25

## 2020-01-01 RX ADMIN — HYDROMORPHONE HYDROCHLORIDE 4 MG: 2 INJECTION, SOLUTION INTRAMUSCULAR; INTRAVENOUS; SUBCUTANEOUS at 04:47

## 2020-01-01 RX ADMIN — HYDROMORPHONE HYDROCHLORIDE 1 MG: 1 INJECTION, SOLUTION INTRAMUSCULAR; INTRAVENOUS; SUBCUTANEOUS at 03:30

## 2020-01-01 RX ADMIN — HYDROMORPHONE HYDROCHLORIDE 3 MG: 2 INJECTION, SOLUTION INTRAMUSCULAR; INTRAVENOUS; SUBCUTANEOUS at 17:52

## 2020-01-01 RX ADMIN — MINOXIDIL 5 MG: 2.5 TABLET ORAL at 17:22

## 2020-01-01 RX ADMIN — PROPOFOL 30 MCG/KG/MIN: 10 INJECTION, EMULSION INTRAVENOUS at 10:09

## 2020-01-01 RX ADMIN — PROPOFOL 40 MCG/KG/MIN: 10 INJECTION, EMULSION INTRAVENOUS at 14:35

## 2020-01-01 RX ADMIN — GLYCOPYRROLATE 0.2 MG: 0.2 INJECTION, SOLUTION INTRAMUSCULAR; INTRAVENOUS at 19:28

## 2020-01-01 RX ADMIN — CHLORHEXIDINE GLUCONATE 15 ML: 0.12 RINSE ORAL at 08:18

## 2020-01-01 RX ADMIN — CLONIDINE HYDROCHLORIDE 0.3 MG: 0.1 TABLET ORAL at 17:10

## 2020-01-01 RX ADMIN — HYDROMORPHONE HYDROCHLORIDE 1 MG: 1 INJECTION, SOLUTION INTRAMUSCULAR; INTRAVENOUS; SUBCUTANEOUS at 21:52

## 2020-01-01 RX ADMIN — CLONIDINE HYDROCHLORIDE 0.3 MG: 0.1 TABLET ORAL at 21:38

## 2020-01-01 RX ADMIN — Medication 1 AMPULE: at 20:41

## 2020-01-01 RX ADMIN — CLONIDINE HYDROCHLORIDE 0.2 MG: 0.1 TABLET ORAL at 08:10

## 2020-01-01 RX ADMIN — ACETAMINOPHEN 1000 MG: 500 TABLET ORAL at 17:11

## 2020-01-01 RX ADMIN — DOCUSATE SODIUM - SENNOSIDES 1 TABLET: 50; 8.6 TABLET, FILM COATED ORAL at 12:17

## 2020-01-01 RX ADMIN — ALBUMIN (HUMAN) 12.5 G: 0.25 INJECTION, SOLUTION INTRAVENOUS at 23:23

## 2020-01-01 RX ADMIN — CLONIDINE HYDROCHLORIDE 0.3 MG: 0.1 TABLET ORAL at 09:17

## 2020-01-01 RX ADMIN — MINOXIDIL 5 MG: 2.5 TABLET ORAL at 09:18

## 2020-01-01 RX ADMIN — LORAZEPAM 1.5 MG: 2 INJECTION INTRAMUSCULAR; INTRAVENOUS at 10:02

## 2020-01-01 RX ADMIN — LORAZEPAM 2 MG: 2 INJECTION INTRAMUSCULAR; INTRAVENOUS at 15:34

## 2020-01-01 RX ADMIN — Medication 150 MCG/HR: at 16:02

## 2020-01-01 RX ADMIN — PIPERACILLIN AND TAZOBACTAM 3.38 G: 3; .375 INJECTION, POWDER, LYOPHILIZED, FOR SOLUTION INTRAVENOUS at 16:57

## 2020-01-01 RX ADMIN — PIPERACILLIN AND TAZOBACTAM 3.38 G: 3; .375 INJECTION, POWDER, LYOPHILIZED, FOR SOLUTION INTRAVENOUS at 05:30

## 2020-07-01 PROBLEM — M46.42 CERVICAL DISCITIS: Status: ACTIVE | Noted: 2020-01-01

## 2020-07-01 PROBLEM — G93.40 ACUTE ENCEPHALOPATHY: Status: ACTIVE | Noted: 2020-01-01

## 2020-07-01 PROBLEM — R56.9 SEIZURE (HCC): Status: ACTIVE | Noted: 2020-01-01

## 2020-07-01 PROBLEM — I10 MALIGNANT HYPERTENSION: Status: ACTIVE | Noted: 2020-01-01

## 2020-07-01 NOTE — ED TRIAGE NOTES
1243: pt arrived as a level 1 stroke. Evaluated by MD and was determined that he needed to be intubated before going to CT. EMS was called for AMS since 1000 in route had a seizure and was given versed.  [facility] preparing to intubate.  per EMS.

## 2020-07-01 NOTE — ED NOTES
Dr. Dallas Harrison aware pt has fistula in L arm that is not currently in use because pt has permacath.  Dr. Dallas Harrison wants to continue to use the 2 L sided IVs

## 2020-07-01 NOTE — PROGRESS NOTES
Spiritual Care Assessment/Progress Note [address] 
 
 
NAME: Yulia Weiner      MRN: [id number] AGE: 72 y.o. SEX: male Taoism Affiliation: Phone2Action  
Language: [place] 7/1/2020     Total Time (in minutes): 5 Spiritual Assessment begun in [facility] EMERGENCY DEPT through conversation with: 
  
    [x]Patient        [] Family    [] Friend(s) Reason for Consult: Initial visit, Crisis Spiritual beliefs: (Please include comment if needed) 
   [] Identifies with a riccardo tradition:     
   [] Supported by a riccardo community:        
   [] Claims no spiritual orientation:       
   [] Seeking spiritual identity:            
   [] Adheres to an individual form of spirituality:       
   [x] Not able to assess:                   
 
    
Identified resources for coping:  
   [] Prayer                           
   [] Music                  [] Guided Imagery 
   [] Family/friends                 [] Pet visits [] Devotional reading                         [x] Unknown 
   [] Other:                                        
 
 
Interventions offered during this visit: (See comments for more details) Plan of Care: 
 
 [] Support spiritual and/or cultural needs  
 [] Support AMD and/or advance care planning process    
 [] Support grieving process 
 [] Coordinate Rites and/or Rituals  
 [] Coordination with community clergy [] No spiritual needs identified at this time 
 [] Detailed Plan of Care below (See Comments)  [] Make referral to Music Therapy 
[] Make referral to Pet Therapy    
[] Make referral to Addiction services 
[] Make referral to [facility] 
[] Make referral to Spiritual Care Partner 
[] No future visits requested       
[] Follow up visits as needed Comments: Responded to Code Stroke for [place] [address] couldn't assess pt support needs because pt was actively receiving medical care. There was no family present and no spiritual or emotional care needs expressed at the time of this visit. [facility] will continue to follow to assess any necessary emotional/spiritual support needs for pt or family. Lele Ribera MDiv. Staff  Request  Support/Spiritual Care Services via [facility]
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
## 2020-07-01 NOTE — CONSULTS
Pt admitted with altered ms thought to be related to perscription drug intake. Intubated in er. Stroke workup shows sub acute cervical diskitis osteomyelitis of c6-7. Marked bony erosion of c7. Recommend stat blood cultures. Broad spectrum abx. Mri c spine. Soft cervical collar immobilization

## 2020-07-01 NOTE — PROGRESS NOTES
Offered follow-support for this pt in [address] [facility] ED11. Pt still unavailable to be assessed and no family present on this visit. Per RN family will be contacted by EMS. Contact Spiritual Care Services for any emotional/spiritual support needs. Placido Veras MDiv. Staff  Request  Support/Spiritual Care Services via [address] [place] [address]

## 2020-07-01 NOTE — DIALYSIS
Came to Tx Pt this evening, turns out he was already in the OR. Please page [facility] services when Pt is back on floor so the on call nurse will know when he is ready. [phone]

## 2020-07-01 NOTE — ED NOTES
TRANSFER - OUT REPORT: 
 
Verbal report given to Pineda Belcher (name) on [facility] [address]  being transferred to CCU (unit) for routine progression of care Report consisted of patients Situation, Background, Assessment and  
Recommendations(SBAR). Information from the following report(s) SBAR, ED Summary, [facility] and Recent Results was reviewed with the receiving nurse. Lines:  
Triple Lumen 07/01/20 Left Internal jugular (Active) Central Line Being Utilized Yes 7/1/2020  5:40 PM  
Site Assessment Clean, dry, & intact 7/1/2020  5:40 PM  
Infiltration Assessment 0 7/1/2020  5:40 PM  
Dressing Status Clean, dry, & intact 7/1/2020  5:40 PM  
Dressing Type Transparent 7/1/2020  5:40 PM  
Proximal Hub Color/Line Status White 7/1/2020  5:40 PM  
Positive Blood Return (Medial Site) Yes 7/1/2020  5:40 PM  
Medial Hub Color/Line Status Brown 7/1/2020  5:40 PM  
Positive Blood Return (Lateral Site) Yes 7/1/2020  5:40 PM  
Distal Hub Color/Line Status Blue 7/1/2020  5:40 PM  
Positive Blood Return (Site #3) Yes 7/1/2020  5:40 PM  
   
Peripheral IV 07/01/20 Right Antecubital (Active) Site Assessment Clean, dry, & intact 7/1/2020 12:57 PM  
Phlebitis Assessment 0 7/1/2020 12:57 PM  
Infiltration Assessment 0 7/1/2020 12:57 PM  
Dressing Status Clean, dry, & intact 7/1/2020 12:57 PM  
Dressing Type Transparent 7/1/2020 12:57 PM  
Hub Color/Line Status Green;Flushed 7/1/2020 12:57 PM  
Action Taken Blood drawn 7/1/2020 12:57 PM  
   
Peripheral IV 07/01/20 Right Hand (Active) Site Assessment Clean, dry, & intact 7/1/2020  3:18 PM  
Phlebitis Assessment 0 7/1/2020  3:18 PM  
Infiltration Assessment 0 7/1/2020  3:18 PM  
Dressing Status Clean, dry, & intact 7/1/2020  3:18 PM  
Dressing Type Transparent 7/1/2020  3:18 PM  
Hub Color/Line Status Pink;Flushed 7/1/2020  3:18 PM  
Action Taken Blood drawn 7/1/2020  3:18 PM  
  
 
Opportunity for questions and clarification was provided. Patient to go to OR before going to CCU

## 2020-07-01 NOTE — ANESTHESIA PREPROCEDURE EVALUATION
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 
S/P Aorto-bifemoral Bypass AAA 
 
TTE (2/8/18): Left ventricle: Systolic function was normal. Ejection fraction was 
estimated in the range of 60 % to 65 %. No obvious wall motion 
abnormalities identified in the views obtained. There was mild concentric 
hypertrophy. Mitral valve: There was moderate regurgitation. Aortic valve: There was mild stenosis. Tricuspid valve: There was mild regurgitation. Pericardium: A trivial pericardial effusion was identified. GI/Hepatic/Renal 
  
GERD Renal disease: ESRD 
PUD Endo/Other Diabetes: type 2 Morbid obesity and arthritis Other Findings Comments: Sepsis Gout Physical Exam 
 
Airway Mallampati: III 
TM Distance: 4 - 6 cm Neck ROM: normal range of motion Mouth opening: Normal 
Intubated Cardiovascular Rhythm: regular Rate: normal 
 
 
 
 Dental 
No notable dental hx Pulmonary Breath sounds clear to auscultation Abdominal 
Abdominal exam normal 
 
 
 Other Findings Anesthetic Plan ASA: 4, emergent Anesthesia type: general 
 
Monitoring Plan: BIS Induction: Intravenous Unable to speak to patient due to AMS. Family not present. Surgical emergency.

## 2020-07-01 NOTE — PROGRESS NOTES
Pharmacy Automatic Renal Dosing Protocol - Antimicrobials Indication for Antimicrobials: CNS Current Regimen of Each Antimicrobial: 
Vancomycin pharmacy to dose - MWF dialysis (Start Date ; Day # 1) Previous Antimicrobial Therapy: 
Cefepime 2g IV x 1 (Start Date ; Day Goal Level: VANCOMYCIN TROUGH GOAL RANGE Vancomycin Trough: 20 - 25 mcg/mL  (AUC: 400 - 600 mg/hr/Liter/day) Date Dose & Interval Measured (mcg/mL) Extrapolated (mcg/mL) Date & time of next level: tbd Significant Cultures:  
: paired blood - pending Radiology / Imaging results: (X-ray, CT scan or MRI):  
: CTA head/neck:  55% stenosis in neck, emboli in bilateral M2 and M3 branches in head Paralysis, amputations, malnutrition: none Labs: 
Recent Labs  
  20 
1251 CREA 9.27* BUN 52* WBC 14.7* Temp (24hrs), Av.6 °F (36.4 °C), Min:97.3 °F (36.3 °C), Max:98 °F (36.7 °C) Creatinine Clearance (mL/min) or Dialysis: MWF dialyis Impression/Plan:  
Vancomycin 2000mg IV x 1, then 750mg after dialysis Antimicrobial stop date to be determined Pharmacy will follow daily and adjust medications as appropriate for renal function and/or serum levels. Thank you, Chad Ryan PHARMD 
 
Recommended duration of therapy 
http://[facility]/[place]/[facility]/Pharmacy/Clinical%20Companion/Duration%20of%20ABX%20therapy. docx Renal Dosing 
http://[facility]/[place]/[facility]/Pharmacy/Clinical%20Companion/Renal%[email or web address]. pdf
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
## 2020-07-01 NOTE — CONSULTS
Consultation Note NAME: Gertrudis Padilla :  1950 MRN:  [id number] Date/Time:  2020 7:11 PM 
 
I have been asked to see this patient by Dr. Ajith Gonzalez  for advice/opinion re: ESKD. Assessment :    Plan: ESKD AVF/perm cath Acute respiratory failure on vent Seizure/AMS OM/discitis at C6-7 Sepsis Likely CLABSI Severe HTN 
 MWF HD at [facility]; HD today after OR (was to have had a MRI, but felt it was not needed) For emergent surgery Subjective: CHIEF COMPLAINT:  eskd HISTORY OF PRESENT ILLNESS:    
Gertrudis is a 72 y.o.   male who has a history of the below. Unable to get history from  Babatunde Main (intubated, sedated). I reviewed his chart. He developed AMS and had seizure like activity on route to the hospital. Has both a left UE AVF and a perm cath. Past Medical History:  
Diagnosis Date  AAA (abdominal aortic aneurysm) ([facility] [address] 34mm 2017  Anemia   
 gets shots from Dr. Kramer An - last dose 2018  BPH (benign prostatic hypertrophy)  CAD (coronary artery disease) s/p stents, sees Dr. Rudi Leon  Cancer ([facility] [address] skin cancer on leg  Chronic kidney disease -[facility]  End stage renal disease ([facility] [address] Dr. Williams Islas  GERD (gastroesophageal reflux disease)  Gout  Other and unspecified hyperlipidemia  PUD (peptic ulcer disease)  PVD (peripheral vascular disease) ([facility] [address]   
 s/p PTCA of left femoral artery in 2014  Sleep apnea CPAP  Type II or unspecified type diabetes mellitus without mention of complication, uncontrolled Dr. Marcia Diaz  Unspecified essential hypertension  Unspecified vitamin D deficiency Past Surgical History:  
Procedure Laterality Date  CARDIAC SURG PROCEDURE UNLIST    
 stents  HX CATARACT REMOVAL    
 HX COLONOSCOPY    
 HX CORONARY STENT PLACEMENT    
 HX ENDOSCOPY    
 HX KNEE ARTHROSCOPY    
 right x2, left x1  
 HX OTHER SURGICAL skin cancer removed from leg  IR INSERT NON TUNL CVC OVER 5 YRS  2019  IR INSERT TUNL CVC W/O PORT OVER 5 YR  2019  VASCULAR SURGERY PROCEDURE UNLIST    
 aorto-bifem bypass  VASCULAR SURGERY PROCEDURE UNLIST    
 left carotid endarterectomy  VASCULAR SURGERY PROCEDURE UNLIST    
 right femoral PTCA Social History Tobacco Use  Smoking status: Former Smoker Packs/day: 2.00 Years: 25.00 Pack years: 50.00 Last attempt to quit: 3/11/1991 Years since quittin.3  Smokeless tobacco: Never Used Substance Use Topics  Alcohol use: Yes Comment: very occasional  
  
Family History Problem Relation Age of Onset  Diabetes Mother  Heart Disease Mother  Heart Disease Maternal Grandmother  Diabetes Daughter   
     gestational  
Joneen Cook Diabetes Sister  Stroke Sister  Cancer Father  Hypertension Father No Known Allergies Prior to Admission medications Medication Sig Start Date End Date Taking? Authorizing Provider  
ferric citrate (Auryxia) 210 mg iron tablet Take 630 mg by mouth three (3) times daily (with meals). Yes Provider, Historical  
D3-E-Se-soy kgwji-wzzdfc-fehhf (Prostate 2.4) 1,200-15-35 unit-unit-mcg cap Take 1 Cap by mouth two (2) times a day. OTC Super Beta Prostate 1 cap bid   Yes Provider, Historical  
OTHER Take 1 Tab by mouth nightly. OTC Neuriva (for brain): 1 tab qhs   Yes Provider, Historical  
Basaglar KwikPen U-100 Insulin 100 unit/mL (3 mL) inpn Inject 8 units every morning ONLY if blood sugar is over 150, otherwise hold. 20  Yes Henri Helms MD  
furosemide (LASIX) 80 mg tablet Take 1 Tab by mouth two (2) times a day. 19  Yes Michelle Mckeon MD  
cloNIDine HCl (CATAPRES) 0.2 mg tablet Take 0.4 mg by mouth two (2) times a day. Yes Provider, Historical  
minoxidil (LONITEN) 10 mg tablet Take 5 mg by mouth two (2) times a day.    Yes Provider, Historical  
 NIFEdipine ER (ADALAT CC) 60 mg ER tablet Take 60 mg by mouth every twelve (12) hours. Yes Provider, Historical  
atorvastatin (LIPITOR) 40 mg tablet Take 40 mg by mouth every evening. Yes Provider, Historical  
acetaminophen (TYLENOL) 500 mg tablet Take 1,000 mg by mouth every six (6) hours as needed for Pain. Yes Other, MD Corby  
folic acid-vit A5-TLR H13 (FOLTX) 2.5-25-2 mg tablet Take 1 Tab by mouth nightly. Yes Other, MD Corby  
omega-3 fatty acids (FISH OIL CONCENTRATE) 1,000 mg cap Take 1,000 mg by mouth two (2) times a day. Yes Provider, Historical  
BYSTOLIC 20 mg tablet Take 20 mg by mouth nightly. 12/15/17  Yes Provider, Historical  
PRALUENT PEN 75 mg/mL injector pen 75 mg by SubCUTAneous route Once every 2 weeks. 10/11/17  Yes Provider, Historical  
tamsulosin (FLOMAX) 0.4 mg capsule Take 0.4 mg by mouth nightly. Yes Provider, Historical  
clopidogrel (PLAVIX) 75 mg tablet Take 75 mg by mouth nightly. Yes Provider, Historical  
Insulin Needles, Disposable, 31 gauge x 3/16\" ndle USE DAILY AS DIRECTED 2/10/20   Alvin Fajardo MD  
 
REVIEW OF SYSTEMS:   
 [x]  Unable to obtain reliable ROS due to  [x] mental status  [x] sedated   [x] intubated 
 [] Total of 12 systems reviewed as follows: 
Constitutional: negative fever, negative chills, negative weight loss Eyes:   negative visual changes ENT:   negative sore throat, tongue or lip swelling Respiratory:  negative cough, negative dyspnea Cards:  negative for chest pain, palpitations, lower extremity edema GI:   negative for nausea, vomiting, diarrhea, and abdominal pain :  negative for frequency, dysuria Integument:  negative for rash and pruritus Heme:  negative for easy bruising and gum/nose bleeding Musculoskel: negative for myalgias,  back pain and muscle weakness Neuro:  negative for headaches, dizziness, vertigo Psych:  negative for feelings of anxiety, depression Travel?: none Objective: VITALS:   
 Visit Vitals /63 Pulse 90 Temp 99.8 °F (37.7 °C) Resp 19 Ht 5' 8\" (1.727 m) Wt 75.5 kg (166 lb 7.2 oz) SpO2 99% BMI 25.31 kg/m² PHYSICAL EXAM: 
Gen:  [x]  WD [x]  WN  [] cachectic []  thin []  obese []  disheveled [x]  ill apearing  [x]   Critical  []   Chronic    []  No acute distress HEENT:   [x] NC/AT/PERRLA/EOMI 
  [] pink conjunctivae      [] pale conjunctivae PERRL  [] yes  [] no      [] moist mucosa    [] dry mucosa 
  hearing intact to voice [] yes  [] No 
              
NECK:   supple [] yes  [x] no        masses [] yes  [] No 
             thyroid  []  non tender  []  tender RESP:   [x] CTA bilaterally/no wheezing/rhonchi/rales/crackles [] rhonchi bilaterally - no dullness  [] wheezing   [] rhonchi   [] crackles  
  use of accessory muscles [] yes [] no CARD:   [x]  regular rate and rhythm/No murmurs/rubs/gallops 
  murmur  [] yes ()  [] no      Rubs  [] yes  [] no       Gallops [] yes  [] no 
  Rate []  regular  []  irregular        carotid bruits  [] Right  []  Left LE edema [x] yes  [] no           JVP  []  yes   []  no 
 
ABD:    [x] soft/non distended/non tender/+bowel sounds/no HSM []  Rigid    tenderness [] yes [] no   Liver enlargement  []  yes []  no  
             Spleen enlargement  []  yes []  no     distended []  yes [] no  
  bowel sound  [] hypoactive   [] hyperactive LYMPH:    Neck []  yes [x]  no       Axillae []  yes []  no SKIN:   Rashes []  yes   [x]  no    Ulcers []  yes   []  no 
             [] tight to palpitation    skin turgor []  good  [] poor  [] decreased              Cyanosis/clubbing []  yes []  no 
 
NEUR:   [] cranial nerves II-XII grossly intact    
  [] Cranial nerves deficit 
               []  facial droop    []  slurred speech   [] aphasic  
  [] Strength normal     []  weakness  []  LUE  []   RUE/ []  LLE  []   RLE 
  follows commands  []  yes []  no        
 
 PSYCH:   insight [] poor [] good   Alert and Oriented to  [] person  [] place  []  time  
                 [] depressed [] anxious [] agitated  [] lethargic [] stuporous  [x] sedated LAB DATA REVIEWED:   
Recent Labs  
  07/01/20 
1251 WBC 14.7* HGB 13.8 HCT 43.1 * Recent Labs  
  07/01/20 
1251   
K 3.5 CL 97  
CO2 16* BUN 52* CREA 9.27* * CA 9.5 Recent Labs  
  07/01/20 
1251 ALT 18  
* TBILI 0.5 ALB 3.1*  
GLOB 5.4* Recent Labs  
  07/01/20 
1251 INR 1.0 PTP 10.0 APTT 27.0 No results for input(s): FE, TIBC, PSAT, FERR in the last 72 hours. No results for input(s): PH, PCO2, PO2 in the last 72 hours. Recent Labs  
  07/01/20 
1720  CKMB 1.9 Lab Results Component Value Date/Time Glucose (POC) 225 (H) 07/01/2020 05:29 PM  
 Glucose (POC) 155 (H) 07/01/2020 12:54 PM  
 Glucose (POC) 136 (H) 10/17/2019 04:29 PM  
 Glucose (POC) 147 (H) 10/17/2019 02:16 PM  
 Glucose (POC) 149 (H) 10/17/2019 02:04 PM  
 Glucose (POC) 148 (H) 09/17/2019 11:24 AM  
 Glucose (POC) 99 09/16/2019 10:38 PM  
 
 
Procedures: see electronic medical records for all procedures/Xrays and details which were not copied into this note but were reviewed prior to creation of Plan.   
________________________________________________________________________ 
  
 
___________________________________________________ Consulting Physician:  Valentina Lu MD

## 2020-07-01 NOTE — PROCEDURES
Patient Name: Iva Davis : 1950 Age: 72 y.o. Ordering physician: No ref. provider found Date of EE2020 EEG procedure number:  
Diagnosis: seizure Interpreting physician: Yuval Becerra MD 
 
ELECTROENCEPHALOGRAM REPORT  
 
PROCEDURE: EEG. CLINICAL INDICATION: The patient is a 72 y.o. male with a history of possible seizures. EEG to rule out seizures, rule out stroke, rule out cortical abnormality. EEG CLASSIFICATION: Abnormal  
 
DESCRIPTION OF THE RECORD:  
The background of this recording contains a posteriorly-located occipital alpha rhythm of 7 Hz that attenuates with eye opening. Throughout the recording, there were no clear areas of focal slowing nor spike or spike-and-wave discharges seen. Hyperventilation was not performed. During the recording the patient did not achieve stage II sleep INTERPRETATION:  
Abnormal. Mild to moderate diffuse cerebral dysfunction which is non specific for etiology but can be seen in toxic/metabolic states. No seizures. Clinical correlation recommended.  
 
 
Yuval Becerra MD 
Neurologist

## 2020-07-01 NOTE — H&P
Hospitalist Admission Note NAME: Loftin Diamond Pallas :  1950 MRN:  [id number] Date/Time:  2020 3:28 PM 
 
Patient PCP: Shaheed Davalos MD  
Renal:  Dr. [facility] Cardiologist:  Dr. Kristy Kim Vascular:  Dr. Shayan Teran 
______________________________________________________________________ Assessment & Plan: 
Osteomyelitis-discitis at C6-C7 with erosive endplate changes, prevertebral and epidural phlegmon, resulting in severe spinal canal stenosis and cord compression at C6-C7. Sepsis, POA due to above. WBC 14.7, lactic 2.8,  
--cefepime given. Continue vancomycin and zosyn. --blood cultures sent 
--neurosurgery consulted but patient is seen by Ortho so orthopedic consult called. --stat MRI cervical spine with and without contrast.  Discussed with son Kamilah Neal about risk for systemic progressive fibrosis with MRI contrast.  Spoke with renal Dr. Pallavi Velez who recommends contrast and will for arrange dialysis today after study is done. Acute encephalopathy, POA with new onset seizure --patient had right gaze deviation and decorticate posturing en route with seizure. Broke with versed 5mg en route. Intubated for airway protection in ER 
--CT head negative 
--CTA head and neck with multiple small nonocclusive calcified emboli within the bilateral M2 and M3 segments, some of which were present in 2018. 
--aspirin 300mg rectal given in ER.   
--hold further aspirin as may need surgery for epidural phlegmon, discitis 
--given keppra in ER. Will continue keppra 500mg IV q12h.   
--consult neurology. --at time of evaluation, patient restless moving legs spontaneously, wrists in soft restraints, on propofol and fentanyl drip. Opens eyes and makes eye contact with verbal stimulus, squeezes with right hand to command 
--mri brain with and without contrast ordered ESRD on HD MWF. Has permacath. Nonfunctioning fistula in left arm 
--nephrology consult appreciated HARDIK on cpap --intubated in ER for airway protection. --CXR with possible mild interstitial edema. Nephrology arranging for dialysis today after MRI studies --intensivist consulted for vent management CAD, hx stents 2003. Follow with Dr. Jerri Gonzalez. Echo 2/2018 EF 60-65% Paroxysmal afib 9/2019, not on anticoagulation Malignant HTN /78 Elevated trop 0.19, has chronic mild troponin elevation likely due to CKD. EKG without active ischemia. 
--hold plavix and aspirin as may need neck surgery --continue cardene drip for blood pressure --restart bystolic, clonidine, minoxidil DM 
--put on low dose SSI. BS 160s 
--hold lantus 10u for now Gout 
--resume allopurinol GERD, hx PUD 
--IV protonix BPH 
PAD 
S/p left CEA 2009 by Dr. Raffy Stanton Hx aortobifemoral graft Distal AAA borderline aneurysm 2017 Hx chronic anemia, hgb normal today 13.8 
 
--Body mass index is 25.31 kg/m². Code: full code DVT prophylaxis: SCD Surrogate decision maker:  Son Janene Davis [phone] Subjective: CHIEF COMPLAINT:  Altered mental status HISTORY OF PRESENT ILLNESS:    
Gertrudis Rivera is a 72 y.o. male with ESRD, DM, CAD, p. afib not on anticoagulation, PAD brought in by EMS for AMS started at 10am as reported by family. EMS noted patient was confused and restless as stroke alert. En route, he developed seizure with right gaze deviation and decorticate posturing. Was given versed 5mg IV with break in seizure. He then became very agitated and combative. Son reports EMS counted that vicodin count missing 10 tabs and suspected that patient taking more than prescribed because been having a lot of neck pain. In ER, was intubated for airway protection. Loaded with keppra. BP markedly elevated 250/90. Was admitted 9/2019 for sepsis concerning for SBP and had removal of peritoneal dialysis catheter, p.  Afib, converted to SR, acute on chronic diastolic chf with acute hypoxic respiratory failure, urinary retention requiring maldonado. We were asked to admit for work up and evaluation of the above problems. Past Medical History:  
Diagnosis Date  AAA (abdominal aortic aneurysm) ([facility] 34mm 2017  Anemia   
 gets shots from Dr. Fernanda Hoffman - last dose 2018  BPH (benign prostatic hypertrophy)  CAD (coronary artery disease) s/p stents, sees Dr. Mcgovern Leak  Cancer ([facility] skin cancer on leg  Chronic kidney disease M-W-F [facility]  End stage renal disease ([facility] Dr. Fernanda Hoffman  GERD (gastroesophageal reflux disease)  Gout  Other and unspecified hyperlipidemia  PUD (peptic ulcer disease)  PVD (peripheral vascular disease) ([facility]   
 s/p PTCA of left femoral artery in 2014  Sleep apnea CPAP  Type II or unspecified type diabetes mellitus without mention of complication, uncontrolled Dr. Shabana Liu  Unspecified essential hypertension  Unspecified vitamin D deficiency Past Surgical History:  
Procedure Laterality Date  CARDIAC SURG PROCEDURE UNLIST    
 stents  HX CATARACT REMOVAL    
 HX COLONOSCOPY    
 HX CORONARY STENT PLACEMENT    
 HX ENDOSCOPY    
 HX KNEE ARTHROSCOPY    
 right x2, left x1  
 HX OTHER SURGICAL    
 skin cancer removed from leg  IR INSERT NON TUNL CVC OVER 5 YRS  2019  IR INSERT TUNL CVC W/O PORT OVER 5 YR  2019  VASCULAR SURGERY PROCEDURE UNLIST    
 aorto-bifem bypass  VASCULAR SURGERY PROCEDURE UNLIST    
 left carotid endarterectomy  VASCULAR SURGERY PROCEDURE UNLIST    
 right femoral PTCA Social History Tobacco Use  Smoking status: Former Smoker Packs/day: 2.00 Years: 25.00 Pack years: 50.00 Last attempt to quit: 3/11/1991 Years since quittin.3  Smokeless tobacco: Never Used Substance Use Topics  Alcohol use: Yes   Comment: very occasional  
  
 Family History Problem Relation Age of Onset  Diabetes Mother  Heart Disease Mother  Heart Disease Maternal Grandmother  Diabetes Daughter   
     gestational  
Anna Downy Diabetes Sister  Stroke Sister  Cancer Father  Hypertension Father No Known Allergies Prior to Admission medications Medication Sig Start Date End Date Taking? Authorizing Provider  
ferric citrate (Auryxia) 210 mg iron tablet Take 630 mg by mouth three (3) times daily (with meals). Yes Provider, Historical  
D3-E-Se-soy tuukm-ptfqsa-oiybh (Prostate 2.4) 1,200-15-35 unit-unit-mcg cap Take 1 Cap by mouth two (2) times a day. OTC Super Beta Prostate 1 cap bid   Yes Provider, Historical  
OTHER Take 1 Tab by mouth nightly. OTC Neuriva (for brain): 1 tab qhs   Yes Provider, Historical  
Basaglar KwikPen U-100 Insulin 100 unit/mL (3 mL) inpn Inject 8 units every morning ONLY if blood sugar is over 150, otherwise hold. 6/19/20  Yes Vandana Farrell MD  
furosemide (LASIX) 80 mg tablet Take 1 Tab by mouth two (2) times a day. 9/17/19  Yes Manas Hutchinson MD  
cloNIDine HCl (CATAPRES) 0.2 mg tablet Take 0.4 mg by mouth two (2) times a day. Yes Provider, Historical  
minoxidil (LONITEN) 10 mg tablet Take 5 mg by mouth two (2) times a day. Yes Provider, Historical  
NIFEdipine ER (ADALAT CC) 60 mg ER tablet Take 60 mg by mouth every twelve (12) hours. Yes Provider, Historical  
atorvastatin (LIPITOR) 40 mg tablet Take 40 mg by mouth every evening. Yes Provider, Historical  
acetaminophen (TYLENOL) 500 mg tablet Take 1,000 mg by mouth every six (6) hours as needed for Pain. Yes Other, MD Croby  
folic acid-vit S3-NUJ O40 (FOLTX) 2.5-25-2 mg tablet Take 1 Tab by mouth nightly. Yes Other, MD Corby  
omega-3 fatty acids (FISH OIL CONCENTRATE) 1,000 mg cap Take 1,000 mg by mouth two (2) times a day. Yes Provider, Historical  
BYSTOLIC 20 mg tablet Take 20 mg by mouth nightly.  12/15/17  Yes Provider, Historical  
PRALUENT PEN 75 mg/mL injector pen 75 mg by SubCUTAneous route Once every 2 weeks. 10/11/17  Yes Provider, Historical  
tamsulosin (FLOMAX) 0.4 mg capsule Take 0.4 mg by mouth nightly. Yes Provider, Historical  
clopidogrel (PLAVIX) 75 mg tablet Take 75 mg by mouth nightly. Yes Provider, Historical  
Insulin Needles, Disposable, 31 gauge x 3/16\" ndle USE DAILY AS DIRECTED 2/10/20   Addy Farley MD  
 
REVIEW OF SYSTEMS:  POSITIVE= Bold. Negative = normal text Unable to obtain due to mental status Objective: VITALS:   
Visit Vitals /61 Pulse 88 Temp 99.1 °F (37.3 °C) Resp 18 Ht 5' 8\" (1.727 m) Wt 75.5 kg (166 lb 7.2 oz) SpO2 100% BMI 25.31 kg/m² Temp (24hrs), Av.5 °F (36.9 °C), Min:97.3 °F (36.3 °C), Max:99.1 °F (37.3 °C) Body mass index is 25.31 kg/m². PHYSICAL EXAM: 
 
General:    Ill appearing male, intubated, agitated, thrashing legs on bed, wrists in soft restraints. On propofol and fentanyl drip. Patient open eyes and looks at me when called his name. Squeeze right hand to command. Becomes restless and agitated again when not getting verbal redirection HEENT: Atraumatic, anicteric sclerae, pink conjunctivae Hearing intact. Neck:  Supple, symmetrical,  thyroid: non tender Lungs:   Clear to auscultation bilaterally. No Wheezing or Rhonchi. No rales. Chest wall:  Permacath in place. No tenderness  No Accessory muscle use. Heart:   Regular  rhythm,  3/6 holosystolic  murmur   No gallop. No edema. Abdomen:   Soft, non-tender. Not distended. Bowel sounds normal. No masses Indwelling maldonado with clear urine. Extremities: No cyanosis. No clubbing Skin:     Not pale Not Jaundiced  No rashes Psych:  + agitation. Neurologic: Makes eye contact, pupils equal.  No facial asymmetry. Moving legs b/l. Squeezing with both hands. Peripheral pulse: Bilateral, DP, 1+ Capillary refill:  normal 
 
IMAGING RESULTS: 
 []       I have personally reviewed the actual   []     CXR  []     CT scan CXR: 
CT : 
EKG: 
 ________________________________________________________________________ Care Plan discussed with: 
  Comments Patient y Family  y Jorge Folk on phone RN janine Becerra Care Manager Consultant:  y Dr. Osa Lazar Dr. Bulah Collins Claudene Rook from ortho  
________________________________________________________________________ Prophylaxis: 
GI protonix DVT SCD  
________________________________________________________________________ Recommended Disposition: TBD Home with Family HH/PT/OT/RN   
SNF/LTC   
[facility]   
________________________________________________________________________ Code Status: 
Full Code y  
DNR/DNI   
________________________________________________________________________ TOTAL TIME:  40 minutes Comments  
 y Reviewed previous records  
>50% of visit spent in counseling and coordination of care  Discussion with patient and/or family and questions answered 
  
 
 
______________________________________________________________________ Lalo Irving MD 
 
 
Procedures: see electronic medical records for all procedures/Xrays and details which were not copied into this note but were reviewed prior to creation of Plan. LAB DATA REVIEWED:   
Recent Results (from the past 24 hour(s)) CBC WITH AUTOMATED DIFF Collection Time: 07/01/20 12:51 PM  
Result Value Ref Range WBC 14.7 (H) 4.1 - 11.1 K/uL  
 RBC 4.14 4.10 - 5.70 M/uL  
 HGB 13.8 12.1 - 17.0 g/dL HCT 43.1 36.6 - 50.3 % .1 (H) 80.0 - 99.0 FL  
 MCH 33.3 26.0 - 34.0 PG  
 MCHC 32.0 30.0 - 36.5 g/dL  
 RDW 14.6 (H) 11.5 - 14.5 % PLATELET 454 (H) 977 - 400 K/uL MPV 10.5 8.9 - 12.9 FL  
 NRBC 0.0 0  WBC ABSOLUTE NRBC 0.00 0.00 - 0.01 K/uL NEUTROPHILS 71 32 - 75 % LYMPHOCYTES 16 12 - 49 % MONOCYTES 10 5 - 13 % EOSINOPHILS 1 0 - 7 % BASOPHILS 1 0 - 1 % IMMATURE GRANULOCYTES 1 (H) 0.0 - 0.5 % ABS. NEUTROPHILS 10.7 (H) 1.8 - 8.0 K/UL  
 ABS. LYMPHOCYTES 2.3 0.8 - 3.5 K/UL  
 ABS. MONOCYTES 1.5 (H) 0.0 - 1.0 K/UL  
 ABS. EOSINOPHILS 0.1 0.0 - 0.4 K/UL  
 ABS. BASOPHILS 0.1 0.0 - 0.1 K/UL  
 ABS. IMM. GRANS. 0.1 (H) 0.00 - 0.04 K/UL  
 DF AUTOMATED PROTHROMBIN TIME + INR Collection Time: 07/01/20 12:51 PM  
Result Value Ref Range INR 1.0 0.9 - 1.1 Prothrombin time 10.0 9.0 - 11.1 sec METABOLIC PANEL, COMPREHENSIVE Collection Time: 07/01/20 12:51 PM  
Result Value Ref Range Sodium 139 136 - 145 mmol/L Potassium 3.5 3.5 - 5.1 mmol/L Chloride 97 97 - 108 mmol/L  
 CO2 16 (L) 21 - 32 mmol/L Anion gap 26 (H) 5 - 15 mmol/L Glucose 161 (H) 65 - 100 mg/dL BUN 52 (H) 6 - 20 MG/DL Creatinine 9.27 (H) 0.70 - 1.30 MG/DL  
 BUN/Creatinine ratio 6 (L) 12 - 20 GFR est AA 7 (L) >60 ml/min/1.73m2 GFR est non-AA 6 (L) >60 ml/min/1.73m2 Calcium 9.5 8.5 - 10.1 MG/DL Bilirubin, total 0.5 0.2 - 1.0 MG/DL  
 ALT (SGPT) 18 12 - 78 U/L  
 AST (SGOT) 24 15 - 37 U/L Alk. phosphatase 136 (H) 45 - 117 U/L Protein, total 8.5 (H) 6.4 - 8.2 g/dL Albumin 3.1 (L) 3.5 - 5.0 g/dL Globulin 5.4 (H) 2.0 - 4.0 g/dL A-G Ratio 0.6 (L) 1.1 - 2.2 PTT Collection Time: 07/01/20 12:51 PM  
Result Value Ref Range aPTT 27.0 22.1 - 32.0 sec  
 aPTT, therapeutic range     58.0 - 77.0 SECS  
TROPONIN I Collection Time: 07/01/20 12:51 PM  
Result Value Ref Range Troponin-I, Qt. 0.19 (H) <0.05 ng/mL GLUCOSE, POC Collection Time: 07/01/20 12:54 PM  
Result Value Ref Range Glucose (POC) 155 (H) 65 - 100 mg/dL Performed by Claudette Hernandez URINALYSIS W/ REFLEX CULTURE Collection Time: 07/01/20  1:52 PM  
Result Value Ref Range Color YELLOW/STRAW Appearance CLEAR CLEAR Specific gravity 1.017 1.003 - 1.030    
 pH (UA) 8.0 5.0 - 8.0 Protein 300 (A) NEG mg/dL Glucose 100 (A) NEG mg/dL Ketone 15 (A) NEG mg/dL Bilirubin Negative NEG Blood Negative NEG Urobilinogen 0.2 0.2 - 1.0 EU/dL Nitrites Negative NEG Leukocyte Esterase Negative NEG    
 WBC 0-4 0 - 4 /hpf  
 RBC 0-5 0 - 5 /hpf Epithelial cells FEW FEW /lpf Bacteria Negative NEG /hpf  
 UA:UC IF INDICATED CULTURE NOT INDICATED BY UA RESULT CNI Other: Renal Epithelial cells Present POC EG7 Collection Time: 07/01/20  3:02 PM  
Result Value Ref Range Calcium, ionized (POC) 1.09 (L) 1.12 - 1.32 mmol/L  
 FIO2 (POC) 50 % pH (POC) 7.51 (H) 7.35 - 7.45    
 pCO2 (POC) 30.3 (L) 35.0 - 45.0 MMHG  
 pO2 (POC) 179 (H) 80 - 100 MMHG  
 HCO3 (POC) 24.1 22 - 26 MMOL/L Base excess (POC) 1 mmol/L  
 sO2 (POC) 100 (H) 92 - 97 % Site RIGHT BRACHIAL Device: VENT Mode ASSIST CONTROL Tidal volume 500 ml Set Rate 12 bpm  
 PEEP/CPAP (POC) 6 cmH2O Allens test (POC) N/A Specimen type (POC) ARTERIAL Total resp.  rate 16
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
## 2020-07-01 NOTE — CONSULTS
Consults Asked to see patient for respiratory failure, AMS 
 
CC: Unable to obtain HPI: 72year old male with multiple medical problems including CAD s/p stent, DM, ESRD on HD who was found altered and confused by his wife earlier this morning who called EMS. En route to the ER patient had what appeared to be a seizure and was given versed. On arrival was agonal and emergently intubated the ER. He is being admitted to the ICU. On my exam patient is intubated and having jerking motions of his upper and lower extremities, however, when I call his name he responds and seems to nod yes/no appropriately to questions. Getting an EEG. PMH: CAD s/p stent, DM, ESRD on HD M/W/F, anemia, BPH, PVD, [place], GERD, multiple surgeries SH: Quit smoking about 30 years ago per chart review. Rare ETOH 
FH: CAD 
 
ROS: Unable to obtain full ROS as patient is intubated PE:  
Vitals reviewed--/90, remainder stable Gen: Elderly, chronically ill appearing male in no distress, constant jerking motion HEENT: NCAT Mouth: ETT 
CV: Regular Lungs: CTA B/L, no wheezing GI: Soft, no distention Ext: No edema, constant jerking motion of upper and lower extremities Neuro: Alert, seems to respond appropriately to questions Msk: Muscle wasting Lab Results Component Value Date/Time WBC 14.7 (H) 07/01/2020 12:51 PM  
 HGB 13.8 07/01/2020 12:51 PM  
 Hematocrit (POC) 32 (L) 10/17/2019 02:16 PM  
 HCT 43.1 07/01/2020 12:51 PM  
 PLATELET 903 (H) 10/68/6315 12:51 PM  
 .1 (H) 07/01/2020 12:51 PM  
 
Lab Results Component Value Date/Time  Sodium 139 07/01/2020 12:51 PM  
 Potassium 3.5 07/01/2020 12:51 PM  
 Chloride 97 07/01/2020 12:51 PM  
 CO2 16 (L) 07/01/2020 12:51 PM  
 Anion gap 26 (H) 07/01/2020 12:51 PM  
 Glucose 161 (H) 07/01/2020 12:51 PM  
 BUN 52 (H) 07/01/2020 12:51 PM  
 Creatinine 9.27 (H) 07/01/2020 12:51 PM  
 BUN/Creatinine ratio 6 (L) 07/01/2020 12:51 PM  
 GFR est AA 7 (L) 07/01/2020 12:51 PM  
 GFR est non-AA 6 (L) 07/01/2020 12:51 PM  
 Calcium 9.5 07/01/2020 12:51 PM  
 
CXR reviewed--ETT high. Mild edema pattern CT code neuro negative for bleed or large vessel occlusion. Does show osteomyelitis/discitis of C6-C7 with severe spinal canal stenosis and cord compression Impression: 
-acute respiratory failure requiring intubation due to inability to protect the airway 
-suspected seizures 
-marked hypertension 
-abnormal Cspine imaging as noted above 
-history of paroxysmal afib 
-ESRD on HD M/W/F 
-DM Plan: 
-continue vent support 
-check post intubation ABG 
-ETT needs to be advanced 
-keppra given in the ER 
-cardene running, titrate to SBP < 180 
-EEG being done during my exam 
-neuro consult 
-cultures ordered 
-broad spectrum antibiotics with vanc and zosyn for possible osteo 
-neurosurgery consult 
-renal consult for dialysis needs 
-accuchecks, SSI 
-initiate DVT ppx Of note, in reviewing the chart the patient was a DNR in September during an admission at that time. This will need to be clarified. Patient is critically ill and at high risk of decompensation. CCT 36 minutes

## 2020-07-01 NOTE — PROGRESS NOTES
Pharmacy Medication Reconciliation The patient was interviewed regarding current PTA medication list, use and drug allergies;  patient present in room but given 5mg of midazolam prior to arrival and intubated upon arrival.   
 
Allergy Update: Patient has no known allergies. Recommendations/Findings: The following amendments were made to the patient's active medication list on file at [address]:  
1) Additions:  
+super beta prostate 
+neuriva 
+auryxia 2) Deletions:  
-allopurinol 
-vitamin D 
 
3) Changes: (Old regimen: nifedipine 60mg ER daily /New regimen: nifedipine 60mg ER bid) Pertinent Findings:  
-patient intubated upon arrival, had seizure at dialysis. Dialysis MWF, last completed dialysis was on Monday, 20.  
 
-Clarified PTA med list with Rx query,and home medication list. PTA medication list was corrected to the following:  
 
Prior to Admission Medications Prescriptions Last Dose Informant Taking? BYSTOLIC 20 mg tablet  at 2100 Transfer Papers Yes Sig: Take 20 mg by mouth nightly. Basaglar KwikPen U-100 Insulin 100 unit/mL (3 mL) inpn 2020 at Unknown time Transfer Papers Yes Sig: Inject 8 units every morning ONLY if blood sugar is over 150, otherwise hold. D3-E-Se-soy izysd-rxiacv-symsp (Prostate 2.4) 1,200-15-35 unit-unit-mcg cap  Transfer Papers Yes Sig: Take 1 Cap by mouth two (2) times a day. OTC Super Beta Prostate 1 cap bid Insulin Needles, Disposable, 31 gauge x 3/16\" ndle  Transfer Papers No  
Sig: USE DAILY AS DIRECTED  
NIFEdipine ER (ADALAT CC) 60 mg ER tablet 2020 at Unknown time Transfer Papers Yes Sig: Take 60 mg by mouth every twelve (12) hours. OTHER 2020 at Unknown time Transfer Papers Yes Sig: Take 1 Tab by mouth nightly. OTC Neuriva (for brain): 1 tab qhs PRALUENT PEN 75 mg/mL injector pen  Transfer Papers Yes Si mg by SubCUTAneous route Once every 2 weeks. acetaminophen (TYLENOL) 500 mg tablet  Transfer Papers Yes Sig: Take 1,000 mg by mouth every six (6) hours as needed for Pain. atorvastatin (LIPITOR) 40 mg tablet 6/30/2020 at 2100 Transfer Papers Yes Sig: Take 40 mg by mouth every evening. cloNIDine HCl (CATAPRES) 0.2 mg tablet 7/1/2020 at Unknown time Transfer Papers Yes Sig: Take 0.4 mg by mouth two (2) times a day. clopidogrel (PLAVIX) 75 mg tablet 6/30/2020 at Unknown time Transfer Papers Yes Sig: Take 75 mg by mouth nightly. ferric citrate (Auryxia) 210 mg iron tablet 7/1/2020 at Unknown time Transfer Papers Yes Sig: Take 630 mg by mouth three (3) times daily (with meals). folic acid-vit R3-ABU V28 (FOLTX) 2.5-25-2 mg tablet 6/30/2020 at Unknown time Transfer Papers Yes Sig: Take 1 Tab by mouth nightly. furosemide (LASIX) 80 mg tablet 7/1/2020 at Unknown time Transfer Papers Yes Sig: Take 1 Tab by mouth two (2) times a day. minoxidil (LONITEN) 10 mg tablet 7/1/2020 at Unknown time Transfer Papers Yes Sig: Take 5 mg by mouth two (2) times a day. omega-3 fatty acids (FISH OIL CONCENTRATE) 1,000 mg cap 7/1/2020 at Unknown time Transfer Papers Yes Sig: Take 1,000 mg by mouth two (2) times a day. tamsulosin (FLOMAX) 0.4 mg capsule 6/30/2020 at 2100 Transfer Papers Yes Sig: Take 0.4 mg by mouth nightly. Facility-Administered Medications: None Thank you, Rosaline Barakat, [address]

## 2020-07-02 NOTE — PROGRESS NOTES
07/02/20 6129 07/02/20 5495 Vent Settings FIO2 (%)  --  30 % Pressure Support (cm H2O)  --  6 cm H2O  
PEEP/VENT (cm H2O)  --  6 cm H20 ABCDEF Bundle SBT Safety Screen Passed Yes  --   
SBT Trial Passed Yes  -- Weaning Parameters Spontaneous Breathing Trial Complete Yes  -- Resp Rate Observed 12 11 Ve 6.7 6.1  494 RSBI 21 22 Vent Method/Mode Ventilation Method  --  Conventional  
Ventilator Mode  --  Spontaneous

## 2020-07-02 NOTE — PROGRESS NOTES
Nutrition Assessment: 
 
RECOMMENDATIONS:  
Initiate TF via OGT:  
 Start Osmolite 1.2 @ 10mL/h, advance rate 10mL q 12h as tolerated to Goal Rate of 40mL/h + Prosource BID + 50mL H2O flush q 6h (provides 1272kcals/83gPro/151gCHO/987mL) DIETITIANS INTERVENTIONS/PLAN:  
Initiate TF 
 
ASSESSMENT:  
Pt admitted with seizure + malignant HTN. PMH: CAD, ESRD, DM, GERD. Chart reviewed, case discussed during CCU rounds. Pt is s/p spinal surgery. He is intubated and sedated with propofol @ 18. 1mL/h, which provides 478 kcals daily. HOB at 30 degrees (no higher). Per discussion with Dr. Lovely Dawson will start TF today. Given extensive spinal surgery and HOB only at 30 will start low and slowly advance. TF at goal rate + propofol will meet 100% kcal and protein needs. MST not filled out, however no fat/muscle wasting noted per NFPE. No family in the room to speak with. SUBJECTIVE/OBJECTIVE:  
Pt intubated and sedated Diet Order: NPO 
% Eaten:  No data found. Pertinent Medications:pepcid, humalog, zosyn, miralax, pericolace, vancomycin; Drips: propofol, fentanyl. I/O's: -2.6L Chemistries: 
Lab Results Component Value Date/Time Sodium 136 07/02/2020 03:25 AM  
 Potassium 4.1 07/02/2020 03:25 AM  
 Chloride 98 07/02/2020 03:25 AM  
 CO2 24 07/02/2020 03:25 AM  
 Anion gap 14 07/02/2020 03:25 AM  
 Glucose 169 (H) 07/02/2020 03:25 AM  
 BUN 48 (H) 07/02/2020 03:25 AM  
 Creatinine 7.84 (H) 07/02/2020 03:25 AM  
 BUN/Creatinine ratio 6 (L) 07/02/2020 03:25 AM  
 GFR est AA 8 (L) 07/02/2020 03:25 AM  
 GFR est non-AA 7 (L) 07/02/2020 03:25 AM  
 Calcium 8.7 07/02/2020 03:25 AM  
 Alk.  phosphatase 113 07/02/2020 03:25 AM  
 Protein, total 6.9 07/02/2020 03:25 AM  
 Albumin 2.5 (L) 07/02/2020 03:25 AM  
 Globulin 4.4 (H) 07/02/2020 03:25 AM  
 A-G Ratio 0.6 (L) 07/02/2020 03:25 AM  
 ALT (SGPT) 17 07/02/2020 03:25 AM  
  
Anthropometrics: Height: 5' 8\" (172.7 cm) Weight: 77.6 kg (171 lb 1.2 oz) [] standing scale     [x]bed scale    []stated   []unknown IBW (%IBW):   ( ) UBW (%UBW):   (  %) BMI: Body mass index is 26.01 kg/m². This BMI is indicative of: 
[]Underweight   [x]Normal(for age)   []Overweight   [] Obesity   [] Extreme Obesity (BMI>40) Estimated Nutrition Needs (Based on): 8495 Kcals/day(PSU (MSJ 1515)) , 78 g(1gPro/kg) Protein Carbohydrate: At Least 130 g/day  Fluids: 1800 mL/day Last BM: PTA   []Active     []Hyperactive  [x]Hypoactive       [] Absent   BS Skin:    [] Intact   [x] Incision  [] Breakdown   [] DTI   [] Tears/Excoriation/Abrasion  []Edema [] Other: Wt Readings from Last 30 Encounters:  
07/01/20 77.6 kg (171 lb 1.2 oz) 10/22/19 75.5 kg (166 lb 6.4 oz) 10/17/19 77.5 kg (170 lb 13.7 oz) 09/17/19 78.8 kg (173 lb 11.6 oz) 04/26/19 89 kg (196 lb 3.2 oz) 01/21/19 82.4 kg (181 lb 9.6 oz) 10/22/18 81.5 kg (179 lb 10.8 oz) 09/07/18 83.9 kg (184 lb 15.5 oz) 09/04/18 84.1 kg (185 lb 6.5 oz) 08/23/18 82.6 kg (182 lb 3.2 oz) 06/15/18 80.5 kg (177 lb 6.4 oz) 02/26/18 85.8 kg (189 lb 3.2 oz) 02/11/18 87.6 kg (193 lb 1.6 oz) 10/27/17 90.4 kg (199 lb 6.4 oz)  
07/27/17 87.8 kg (193 lb 8 oz)  
03/29/17 89.4 kg (197 lb 3.2 oz) 01/26/17 86.2 kg (190 lb) 01/10/17 87.2 kg (192 lb 3.2 oz) 12/19/16 89.5 kg (197 lb 6.4 oz)  
07/26/16 92.2 kg (203 lb 3.2 oz) 02/26/16 89 kg (196 lb 3.2 oz)  
09/24/15 85.3 kg (188 lb) 04/23/15 85.3 kg (188 lb) 12/18/14 85.8 kg (189 lb 3.2 oz) 08/21/14 90.5 kg (199 lb 9.6 oz) 04/04/14 89.3 kg (196 lb 12.8 oz) 02/11/14 90.7 kg (200 lb) 12/02/13 91.1 kg (200 lb 14.4 oz) 10/16/13 85.3 kg (188 lb)  
07/25/13 86.6 kg (191 lb) NUTRITION DIAGNOSES:  
Problem:  Inadequate protein-energy intake Etiology: related to pt intubated and NPO Signs/Symptoms: as evidenced by NPO + propofol meets <30% kcal and 0% protein needs.     
 
NUTRITION INTERVENTIONS: 
 Enteral/Parenteral Nutrition: Initiate enteral nutrition GOAL:  
Pt will tolerate TF initiation with residuals <500mL in 2-4 days. Cultural, Gnosticism, or Ethnic Dietary Needs: LEARNING NEEDS (Diet, Food/Nutrient-Drug Interaction):  
 [x] None Identified 
 [] Identified and Education Provided/Documented 
 [] Identified and Pt declined/was not appropriate [x] Interdisciplinary Care Plan Reviewed/Documented  
 [x] Participated in Discharge Planning: UTD [x] Interdisciplinary Rounds NUTRITION RISK:  
 [x] High              [] Moderate           []  Low  []  Minimal/Uncompromised PT SEEN FOR:  
 [x]  MD Consult: []Calorie Count []Diabetic Diet Education []Diet Education []Electrolyte Management []General Nutrition Management and Supplements [x]Management of Tube Feeding []TPN Recommendations []  RN Referral:  []MST score >=2 
   []Enteral/Parenteral Nutrition PTA []Pregnant: Gestational DM or Multigestation  
[]  Low BMI []  Re-Screen  
[]  LOS []  NPO/clears x 5 days []  New TF/TPN Kitty Peter RD, [facility] Pager [phone] Weekend Pager [phone]

## 2020-07-02 NOTE — OP NOTES
[address] 
OPERATIVE REPORT Name:  Margo Robertson 
MR#:  [id number] :  1950 ACCOUNT #:  [de-identified] DATE OF SERVICE:  2020 PREOPERATIVE DIAGNOSES: 
1. Epidural abscess with diskitis C6-C7. 
2.  We were unable to have any further diagnosis as the patient has been intubated and sedated. POSTOPERATIVE DIAGNOSES: 
1. Epidural abscess with diskitis C6-C7. 
2.  Osteomyelitis and destruction of significant part of the C6 and C7 vertebral bodies. PROCEDURES PERFORMED: 
1. C5-C6 anterior cervical diskectomy and fusion. 2.  C7-T1 anterior cervical diskectomy and fusion. 3.  C6 partial corpectomy, a C7 partial corpectomy, a C6-7 anterior fusion. 4.  Application of interbody biomechanical device x3. 
5. Instrumentation C5 through T1. 
6.  Use of operative microscope. 7.  Use of allograft bone for spine fusion. SURGEON:  Nomi Puga MD 
 
FIRST ASSISTANT:  Pawan Blakely. ANESTHESIA:  General. 
 
COMPLICATIONS:  None. SPECIMENS REMOVED:  None. IMPLANTS:  Nanovis FortiCore interbody biomechanical device and Nanovis FortiBridge anterior cervical plate. ESTIMATED BLOOD LOSS:  50 mL. DRAINS:  Drains x1. INDICATIONS FOR THE PROCEDURE:  The patient is a 50-year-old gentleman with diabetes and hemodialysis. He proceeded to present to the hospital today with seizure activity and sepsis and was intubated in the emergency room. We have not been able to obtain any sort of a neural exam.  He was at that point, testing had begun and the CTA of the head demonstrated that he did have a cervical epidural abscess with diskitis at C6-C7 partial destruction of both C6-C7 vertebral body. At this point, once we were informed of the consult, we decided to proceed with surgery urgently to preserve neurologic function if possible.   At this point, it is a no, given he did not have a nerve exam.  We also decided to avoid the MRI as our hospital does not have a capability of a MRI under general anesthesia for a patient who is intubated, the ventilator is not compatible with our MRI machine and transferring him to a facility with such capability would significantly delay his care. I discussed with his son over the phone in detail as documented in our clinic notes. NARRATIVE OF THE PROCEDURE:  After informed consent was obtained and the operative site was properly marked, the patient was moved back to the operating room and underwent general endotracheal anesthesia. He was positioned supine on the operating room table and a bump was placed between the shoulder blade. Head halter was applied with 10 pounds of skin traction and I then proceeded to obtain fluoroscopy to verify the level of the incision and then prepped and draped in the usual manner. A time-out was obtained verifying that this was the correct patient, the correct surgery, the correct site as well as that he had received IV antibiotics within 30 minutes of the incision. I then proceeded to perform my standard anterior approach to the cervical spine, we have to proceed with the right-sided approach given that his central line was on the left side. I was able to find a plane between the trachea and esophagus medially and the carotid sheath laterally. There was able to find the spine and find an area of thickened tissue on the area of infection. Once that was identified, fluoroscopy was used to verify that we were in the correct level. I then elevated longus colli muscles on both the right and left side, protecting the sympathetic chain and exposing the uncinate processes bilaterally. I was able to find the infection with severe destruction of the bone at C6 and C7 with half of the vertebral body at both levels have been somewhat destructive.   I proceeded to place a [place] pin at C5 and another one at T1, applied gentle axial distraction and the self-retaining retractor and bring in the operative microscope. At C5-6 , I performed a box annulotomy with a #15 blade with a pituitary to remove the disk as well as a curette for the full diskectomy, leaving open the disk and cartilaginous endplates until we reached the PLL. I then removed the anterior osteophyte and posterior osteophytes with high-speed drill and then removed the PLL with a Kerrison #1 followed by Kerrison #2. Once this was completed, I selected the implant with a trial for that level, the implant was packed with allograft bone and inserted in the proper location with a good fit. The procedure was then repeated in the exact same manner at C7-T1 including the diskectomy. The decompression, application of interbody biomechanical device, packed with allograft bone at both levels for the anterior fusion at both levels and in C5-6 and C7-T1. This was performed  in both of those levels. Once this was completed, I proceeded to look at the level where the infection was at C6-C7. There was significant bone destruction. I used a curette and a pituitary to remove the disk debris and sent it for cultures. I removed all of the destroyed vertebral bodies at C6 and C7. The part that was destroyed was removed with a curette for the corpectomy. Curette and pituitary was used to do it and then an excisional debridement was used with a curette and a pituitary to remove the epidural abscess as well as using a nerve hook to gently tease off the epidural abscess from the dura. Once the abscess was fully decompressed and removed. I irrigated the wound with Irrisept. I then measured for interbody device at C6-C7, the device was inserted and packed with allograft bone for the anterior fusion. Once this was completed, I irrigated the wound once again, selected the plate and drilled 4 screws, bilateral screws at C5, C6, C7, and T1.   Once the screws were in place, we locked with a final locking device. I proceeded then to place a deep drain, closed the platysma was 2-0 Vicryl, subcu with 3-0 Vicryl, the skin with a 3-0 running Monocryl, and Dermabond. Sterile dressing was applied. The patient was then awakened and transferred to PACU in stable condition. POSTOPERATIVE PLAN:  The patient is going to be transferred to the ICU where he will remain under the care of our critical team.  We are going to continue his IV antibiotics and hopefully perform a neural exam when he is fully awake. Rock Mesa MD 
 
 
[id number] 
D:  07/01/2020 20:53 T:  07/02/2020 3:27 JOB #:  [id number] CC:  Dorcus Bloch, MD

## 2020-07-02 NOTE — BRIEF OP NOTE
Brief Postoperative Note Patient: Osito Paulson YOB: 1950 MRN: [id number] Date of Procedure: 7/1/2020 Pre-Op Diagnosis: C6-C7 DISCITIS, EPIDURAL ABSCESS Post-Op Diagnosis: Same as preoperative diagnosis. Procedure(s): 
C5-T1 Anterior Cervical Discectomy Fusion (ACDF) with C6-C7 Corepectomy Surgeon(s): 
Bert Castle MD 
 
Surgical Assistant: None Anesthesia: General  
 
Estimated Blood Loss (mL): less than 50 Complications: None Specimens:  
ID Type Source Tests Collected by Time Destination 1 : C6-C7 Epidural Abscess or discitis Tissue Abscess ANAEROBIC/AEROBIC/GRAM STAIN Bert Castle MD 7/1/2020 1933 Microbiology Implants:  
Implant Name Type Inv. Item Serial No.  Lot No. LRB No. Used Action GRAFT BONE OSTEOAMP 2.5ML --  - [id number]  GRAFT BONE OSTEOAMP 2.5ML --  [id number] [facility] NA N/A 1 Implanted SPACER CERV FLAT 6D [id number] -- FORTICORE - SNA  SPACER CERV FLAT 6D [id number] -- FORTICORE NA [facility] [id number] N/A 1 Implanted SPACER CERV FLAT 6D [id number] -- FORTICORE - SNA  SPACER CERV FLAT 6D [id number] -- FORTICORE NA [employer] [facility] [id number] N/A 1 Implanted SPACER CERV FLAT 6D [id number] -- FORTICORE - SNA  SPACER CERV FLAT 6D [id number] -- FORTICORE NA [facility] [id number] N/A 1 Implanted SCR ANT CERV VA 4.5X16MM -- FORTIBRIDGE - SNA  SCR ANT CERV VA 4.5X16MM -- FORTIBRIDGE NA [facility] [id number] N/A 2 Implanted SELF DRILL VARIABLE SCREW 4.0 x 16 mm   NA  [id number] N/A 6 Implanted 48 mm PLATE   NA  NA N/A 1 Implanted Drains:  
Orogastric Tube 07/01/20 (Active) Site Assessment Clean, dry, & intact 7/1/2020  6:12 PM  
External Insertion Mick (cms) 55 cms 7/1/2020  6:12 PM  
Action Taken Placement verified (comment) 7/1/2020  6:12 PM  
 
 
Findings: Epidural abscess Electronically Signed by Nomi Puga MD on 7/1/2020 at 8:36 PM

## 2020-07-02 NOTE — PROGRESS NOTES
NAME: Gertrudis White :  1950 MRN:  [id number] Assessment :    Plan: 
--ESKD AVF/perm cath Acute respiratory failure on vent Seizure/AMS OM/discitis/abcess at C6-7 Sepsis Likely CLABSI at some point Severe HTN MWF HD at [facility]; s/p HD early this am with 3 liters off; no further HD today; HD tomorrow (hopefully using AVF) BC NGTD () According to his outpatient he never f/u with Dr. Vianca Coto re: AVF and they haven't used AVF thus far; We need to get perm cath out - will ask Dr. Vianca Coto to look at AVF to make sure he is ok with us using Hold SERENITY for now (Hgb > 11) Subjective: Chief Complaint:  Intubated, eyes open. I spoke with his ICU nurse. Review of Systems: 
 
Symptom Y/N Comments  Symptom Y/N Comments Fever/Chills    Chest Pain Poor Appetite    Edema Cough    Abdominal Pain Sputum    Joint Pain SOB/RUBIO    Pruritis/Rash Nausea/vomit    Tolerating PT/OT Diarrhea    Tolerating Diet Constipation    Other Could not obtain due to: See above Objective: VITALS:  
Last 24hrs VS reviewed since prior progress note. Most recent are: 
Visit Vitals /50 Pulse (!) 53 Temp 98.8 °F (37.1 °C) Resp 17 Ht 5' 8\" (1.727 m) Wt 77.6 kg (171 lb 1.2 oz) SpO2 99% BMI 26.01 kg/m² Intake/Output Summary (Last 24 hours) at 2020 8716 Last data filed at 2020 6205 Gross per 24 hour Intake 1128.79 ml Output 3690 ml Net -2561.21 ml Telemetry Reviewed: PHYSICAL EXAM: 
General: Intubated in the icu No corazon Left ue avf with thrill/bruit Lab Data Reviewed: (see below) Medications Reviewed: (see below) PMH/SH reviewed - no change compared to H&P 
________________________________________________________________________ Care Plan discussed with: 
Patient Family RN Care Manager Consultant:     
 
  Comments >50% of visit spent in counseling and coordination of care    
 
________________________________________________________________________ Dinorah Matias MD  
 
Procedures: see electronic medical records for all procedures/Xrays and details which 
were not copied into this note but were reviewed prior to creation of Plan. LABS: 
Recent Labs  
  07/02/20 
0325 07/01/20 
1251 WBC 11.6* 14.7* HGB 11.3* 13.8 HCT 35.9* 43.1 * 474* Recent Labs  
  07/02/20 
0325 07/01/20 
1251  139  
K 4.1 3.5 CL 98 97 CO2 24 16* BUN 48* 52* CREA 7.84* 9.27* * 161* CA 8.7 9.5 Recent Labs  
  07/02/20 
0325 07/01/20 
1251  136* TP 6.9 8.5* ALB 2.5* 3.1*  
GLOB 4.4* 5.4* Recent Labs  
  07/01/20 
1251 INR 1.0 PTP 10.0 APTT 27.0 No results for input(s): FE, TIBC, PSAT, FERR in the last 72 hours. Lab Results Component Value Date/Time Folate 42.1 (H) 09/08/2012 10:24 AM  
  
No results for input(s): PH, PCO2, PO2 in the last 72 hours. Recent Labs  
  07/01/20 
1720  CKMB 1.9 No components found for: [place] Lab Results Component Value Date/Time Color YELLOW/STRAW 07/01/2020 01:52 PM  
 Appearance CLEAR 07/01/2020 01:52 PM  
 Specific gravity 1.017 07/01/2020 01:52 PM  
 pH (UA) 8.0 07/01/2020 01:52 PM  
 Protein 300 (A) 07/01/2020 01:52 PM  
 Glucose 100 (A) 07/01/2020 01:52 PM  
 Ketone 15 (A) 07/01/2020 01:52 PM  
 Bilirubin Negative 07/01/2020 01:52 PM  
 Urobilinogen 0.2 07/01/2020 01:52 PM  
 Nitrites Negative 07/01/2020 01:52 PM  
 Leukocyte Esterase Negative 07/01/2020 01:52 PM  
 Epithelial cells FEW 07/01/2020 01:52 PM  
 Bacteria Negative 07/01/2020 01:52 PM  
 WBC 0-4 07/01/2020 01:52 PM  
 RBC 0-5 07/01/2020 01:52 PM  
 
 
MEDICATIONS: 
Current Facility-Administered Medications Medication Dose Route Frequency  vancomycin (VANCOCIN) 750 mg in 0.9% sodium chloride (MBP/ADV) 250 mL  750 mg IntraVENous ONCE  
 fentaNYL citrate (PF) injection 50 mcg  50 mcg IntraVENous Q1H PRN  
 fentaNYL (PF) 1,500 mcg/30 mL (50 mcg/mL) infusion   mcg/hr IntraVENous TITRATE  niCARdipine (CARDENE) 25 mg in 0.9% sodium chloride 250 mL infusion  0-15 mg/hr IntraVENous TITRATE  propofol (DIPRIVAN) 10 mg/mL infusion  0-50 mcg/kg/min IntraVENous TITRATE  VANCOMYCIN INFORMATION NOTE   Other Rx Dosing/Monitoring  piperacillin-tazobactam (ZOSYN) 3.375 g in 0.9% sodium chloride (MBP/ADV) 100 mL  3.375 g IntraVENous Q12H  
 sodium chloride (NS) flush 5-40 mL  5-40 mL IntraVENous Q8H  
 sodium chloride (NS) flush 5-40 mL  5-40 mL IntraVENous PRN  
 acetaminophen (TYLENOL) tablet 650 mg  650 mg Oral Q6H PRN  
 ondansetron (ZOFRAN) injection 4 mg  4 mg IntraVENous Q6H PRN  
 LORazepam (ATIVAN) injection 2 mg  2 mg IntraVENous PRN  
 levETIRAcetam (KEPPRA) 500 mg in saline (iso-osm) 100 mL IVPB (premix)  500 mg IntraVENous Q12H  
 dexmedeTOMidine (PRECEDEX) 400 mcg in 0.9% sodium chloride 100 mL infusion  0.2-1.4 mcg/kg/hr IntraVENous TITRATE  insulin lispro (HUMALOG) injection   SubCUTAneous Q6H  
 glucose chewable tablet 16 g  4 Tab Oral PRN  
 dextrose (D50W) injection syrg 12.5-25 g  12.5-25 g IntraVENous PRN  
 glucagon (GLUCAGEN) injection 1 mg  1 mg IntraMUSCular PRN  minoxidiL (LONITEN) tablet 5 mg  5 mg Per NG tube BID  nebivoloL (BYSTOLIC) tablet 20 mg  20 mg PEG Tube QHS  cloNIDine HCL (CATAPRES) tablet 0.2 mg  0.2 mg Per NG tube BID  tamsulosin (FLOMAX) capsule 0.4 mg  0.4 mg Oral QHS  pantoprazole (PROTONIX) 40 mg in 0.9% sodium chloride 10 mL injection  40 mg IntraVENous DAILY  sodium chloride (NS) flush 5-40 mL  5-40 mL IntraVENous Q8H  
 sodium chloride (NS) flush 5-40 mL  5-40 mL IntraVENous PRN  
 acetaminophen (TYLENOL) tablet 1,000 mg  1,000 mg Oral Q6H  
  oxyCODONE IR (ROXICODONE) tablet 5 mg  5 mg Oral Q3H PRN  
 oxyCODONE IR (ROXICODONE) tablet 10 mg  10 mg Oral Q3H PRN  
 HYDROmorphone (PF) (DILAUDID) injection 1 mg  1 mg IntraVENous Q3H PRN  
 naloxone (NARCAN) injection 0.4 mg  0.4 mg IntraVENous PRN  
 ondansetron (ZOFRAN) injection 4 mg  4 mg IntraVENous Q4H PRN  prochlorperazine (COMPAZINE) with saline injection 5 mg  5 mg IntraVENous Q6H PRN  
 famotidine (PEPCID) tablet 20 mg  20 mg Oral BID  hydrOXYzine HCL (ATARAX) tablet 10 mg  10 mg Oral Q8H PRN  
 senna-docusate (PERICOLACE) 8.6-50 mg per tablet 1 Tab  1 Tab Oral BID  polyethylene glycol (MIRALAX) packet 17 g  17 g Oral DAILY  [START ON 7/3/2020] bisacodyL (DULCOLAX) suppository 10 mg  10 mg Rectal DAILY PRN  phenol throat spray (CHLORASEPTIC) 1 Spray  1 Spray Oral PRN  
 benzocaine-menthoL (CEPACOL) lozenge 1 Lozenge  1 Lozenge Oral PRN  
 diazePAM (VALIUM) tablet 5 mg  5 mg Oral Q6H PRN  
 cyclobenzaprine (FLEXERIL) tablet 10 mg  10 mg Oral BID PRN
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
## 2020-07-02 NOTE — PROGRESS NOTES
Chart reviewed. Pt remains intubated, sedated, and not following commands when awake.  Will hold at this time and continue to follow for PT eval.

## 2020-07-02 NOTE — DIALYSIS
[facility] Dialysis Team [place]  [phone] Vitals   Pre   Post   Assessment   Pre   Post    
Temp  Temp: 97.9 °F (36.6 °C) (07/02/20 0315) 98.3 LOC  Intubated/sedated Intubated/ 
sedated HR   Pulse (Heart Rate): (!) 53 (07/02/20 0315) 54 Lungs   Clear throughout Clear throughout B/P   BP: 126/53 (07/02/20 0330) 129/52 Cardiac   Sinus Maryfrances Finders Sinus Maryfrances Finders Resp   Resp Rate: 13 (07/02/20 0315) 14 Skin   Warm/dry/intact Warm/dry/ 
intact Pain level     Edema  None None Orders: Duration:   Start:    0311 End:    0641 Total:   3.5 hours Dialyzer:   Dialyzer/Set Up Inspection: Zeb Livingston (07/02/20 0897) K Bath:   Dialysate K (mEq/L): 4 (07/02/20 0311) Ca Bath:   Dialysate CA (mEq/L): 2.5 (07/02/20 0311) Na/Bicarb:   Dialysate NA (mEq/L): 138 (07/02/20 0311) Target Fluid Removal:   Goal/Amount of Fluid to Remove (mL): 3000 mL (07/02/20 0311) Access Type & Location:   [facility] CVC Labs Obtained/Reviewed Critical Results Called   Date when labs were drawn- 
Hgb-   
HGB Date Value Ref Range Status 07/02/2020 11.3 (L) 12.1 - 17.0 g/dL Final  
 
K-   
Potassium Date Value Ref Range Status 07/01/2020 3.5 3.5 - 5.1 mmol/L Final  
 
Ca-  
Calcium Date Value Ref Range Status 07/01/2020 9.5 8.5 - 10.1 MG/DL Final  
 
Bun-  
BUN Date Value Ref Range Status 07/01/2020 52 (H) 6 - 20 MG/DL Final  
 
Creat-  
Creatinine Date Value Ref Range Status 07/01/2020 9.27 (H) 0.70 - 1.30 MG/DL Final  
 
  
Medications/ Blood Products Given Name   Dose   Route and Time Blood Volume Processed (BVP):   60 Net Fluid Removed:  3000 mls Comments Time Out Done:  
Primary Nurse Rpt Ruth Cody RN 
Primary Nurse Rpt Pk Siddiqui RN 
Pt Education:HD procedure Care Plan:Pt will tolerate HD with hemodynamic stability Tx Summary:HD via the RIJ CVC which aspirates and flushes easily.   Louisa changed to  CVC with sterile technique. No redness/tenderness/drng at the insertion site. Clear biocclusive drsg with biopatch placed. At 0500 the patient's sedation was decreased by the primary RN and the blood pressure was intermittently elevated. All possible blood rinsed back to the patient at the conclusion of the treatment. CVC flushed and capped. Admiting Diagnosis:Cervical diskitis,Osteomyelitis Pt's previous [address] Consent signed - Informed Consent Verified: Yes (07/02/20 7390) Nemo Consent - Obtained Hepatitis Status- Negative HepBSAg 9/18/19, Reactive/Immune HepBSAb 4/1/20 Machine #- Machine Number: [id number] (07/02/20 2636) Telemetry status-Bedside/Remote Monitor Pre-dialysis wt. -

## 2020-07-02 NOTE — CONSULTS
Consults Asked to see patient for respiratory failure, AMS 
 
CC: Unable to obtain HPI: 72year old male with multiple medical problems including CAD s/p stent, DM, ESRD on HD who was found altered and confused by his wife earlier this morning who called EMS. En route to the ER patient had what appeared to be a seizure and was given versed. On arrival was agonal and emergently intubated the ER. He is being admitted to the ICU. On my exam patient is intubated and having jerking motions of his upper and lower extremities, however, when I call his name he responds and seems to nod yes/no appropriately to questions. Getting an EEG. 
 
7/2: Taken emergently to the OR last night for debridement of epidural abscess and ACDF of C5-T1. Brought back to the ICU intubated and sedated. Doing well on PS 6/6 this morning but unresponsive on sedation. PMH: CAD s/p stent, DM, ESRD on HD M/W/F, anemia, BPH, PVD, Charo, GERD, multiple surgeries SH: Quit smoking about 30 years ago per chart review. Rare ETOH 
FH: CAD 
 
ROS: Unable to obtain full ROS as patient is intubated PE:  
Vitals stable Gen: Elderly, chronically ill appearing male in no distress HEENT: NCAT Neck: Ccollar Mouth: ETT 
CV: Regular Lungs: CTA B/L, no wheezing GI: Soft, no distention Ext: No edema, constant jerking motion of upper and lower extremities Neuro: Sedated, RASS -3 Msk: Muscle wasting Lab Results Component Value Date/Time WBC 11.6 (H) 07/02/2020 03:25 AM  
 HGB 11.3 (L) 07/02/2020 03:25 AM  
 Hematocrit (POC) 32 (L) 10/17/2019 02:16 PM  
 HCT 35.9 (L) 07/02/2020 03:25 AM  
 PLATELET 431 (H) 37/55/7039 03:25 AM  
 .2 (H) 07/02/2020 03:25 AM  
 
Lab Results Component Value Date/Time  Sodium 136 07/02/2020 03:25 AM  
 Potassium 4.1 07/02/2020 03:25 AM  
 Chloride 98 07/02/2020 03:25 AM  
 CO2 24 07/02/2020 03:25 AM  
 Anion gap 14 07/02/2020 03:25 AM  
 Glucose 169 (H) 07/02/2020 03:25 AM  
 BUN 48 (H) 07/02/2020 03:25 AM  
 Creatinine 7.84 (H) 07/02/2020 03:25 AM  
 BUN/Creatinine ratio 6 (L) 07/02/2020 03:25 AM  
 GFR est AA 8 (L) 07/02/2020 03:25 AM  
 GFR est non-AA 7 (L) 07/02/2020 03:25 AM  
 Calcium 8.7 07/02/2020 03:25 AM  
 
CXR reviewed--ETT high. Mild edema pattern CT code neuro negative for bleed or large vessel occlusion. Does show osteomyelitis/discitis of C6-C7 with severe spinal canal stenosis and cord compression Impression: 
-acute respiratory failure requiring intubation due to inability to protect the airway 
-suspected seizures 
-marked hypertension 
-abnormal Cspine imaging as noted above; s/p emergent surgery 7/1 PM for debridement of epidural abscess and ACDF of C5-T1 
-history of paroxysmal afib 
-ESRD on HD M/W/F 
-DM Plan: 
-continue vent support 
-reduce sedation this morning for neuro exam and possible extubation 
-keppra given in the ER, continue for now 
-cardene as needed for BP control -EEG without seizures 
-neuro following 
-cultures pending 
-broad spectrum antibiotics with vanc and zosyn  
-ortho following closely 
-HD per renal 
-accuchecks, SSI 
-initiate DVT ppx when ok with ortho; SCD for now Of note, in reviewing the chart the patient was a DNR in September during an admission at that time. This will need to be clarified. Patient is critically ill and at high risk of decompensation. CCT 35 minutes

## 2020-07-02 NOTE — PROGRESS NOTES
Pharmacy Automatic Renal Dosing Protocol - Antimicrobials Indication for Antimicrobials: osteomyelitis/cervical diskitis Current Regimen of Each Antimicrobial: 
Vancomycin pharmacy to dose - MWF dialysis (Start Date ; Day # 2) Zosyn 3.375g IV every 12h (start: , day 2) Previous Antimicrobial Therapy: 
Cefepime 2g IV x 1 (Start Date ; Day Goal Level: VANCOMYCIN TROUGH GOAL RANGE Vancomycin Trough: 20 - 25 mcg/mL  (AUC: 400 - 600 mg/hr/Liter/day) Date Dose & Interval Measured (mcg/mL) Extrapolated (mcg/mL) Date & time of next level: tbd Significant Cultures:  
: paired blood - ngsf (pending) :  epidural abscess - ngsf (pending) :  anareobes - pending) Radiology / Imaging results: (X-ray, CT scan or MRI):  
: CTA head/neck:  55% stenosis in neck, emboli in bilateral M2 and M3 branches in head Paralysis, amputations, malnutrition: none Labs: 
Recent Labs  
  20 
0325 20 
1251 CREA 7.84* 9.27* BUN 48* 52* WBC 11.6* 14.7* Temp (24hrs), Av.3 °F (36.8 °C), Min:97.3 °F (36.3 °C), Max:100 °F (37.8 °C) Creatinine Clearance (mL/min) or Dialysis: MWF dialyis Impression/Plan:  
HD is complete with no notes mentioning complication. Will order vancomycin 750mg IV x 1 Continue zosyn dosed for HD Antimicrobial stop date to be determined Pharmacy will follow daily and adjust medications as appropriate for renal function and/or serum levels. Thank you, SHERYL MurdockD 
 
Recommended duration of therapy 
http://[facility]/[place]/[facility]/Pharmacy/Clinical%20Companion/Duration%20of%20ABX%20therapy. docx Renal Dosing 
http://[facility]/[place]/[facility]/Pharmacy/Clinical%20Companion/Renal%[id number]. pdf

## 2020-07-02 NOTE — ANESTHESIA POSTPROCEDURE EVALUATION
Procedure(s): 
C5-T1 Anterior Cervical Discectomy Fusion (ACDF) with C6-C7 Corepectomy. general 
 
Anesthesia Post Evaluation Patient location during evaluation: PACU Note status: Adequate. Level of consciousness: responsive to verbal stimuli and sleepy but conscious Pain management: satisfactory to patient Airway patency: patent Anesthetic complications: no 
Cardiovascular status: acceptable Respiratory status: acceptable Hydration status: acceptable Comments: +Post-Anesthesia Evaluation and Assessment Patient: Lizet Calderon MRN: [id number]  SSN: [id number] YOB: 1950  Age: 72 y.o. Sex: male Cardiovascular Function/Vital Signs /85   Pulse 82   Temp 36.7 °C (98.1 °F)   Resp 16   Ht 5' 8\" (1.727 m)   Wt 75.5 kg (166 lb 7.2 oz)   SpO2 100%   BMI 25.31 kg/m² Patient is status post Procedure(s): 
C5-T1 Anterior Cervical Discectomy Fusion (ACDF) with C6-C7 Corepectomy. Nausea/Vomiting: Controlled. Postoperative hydration reviewed and adequate. Pain: 
   
Managed. Neurological Status: At baseline. Mental Status and Level of Consciousness: Arousable. Pulmonary Status:  
O2 Device: Other (comment) (07/01/20 2121) Adequate oxygenation and airway patent. Complications related to anesthesia: None Post-anesthesia assessment completed. No concerns. Signed By: Carley Alvares MD  
 7/1/2020 Post anesthesia nausea and vomiting:  controlled INITIAL Post-op Vital signs:  
Vitals Value Taken Time /85 7/1/2020  9:21 PM  
Temp 36.6 °C (97.9 °F) 7/1/2020  9:30 PM  
Pulse 63 7/1/2020  9:23 PM  
Resp 14 7/1/2020  9:23 PM  
SpO2 100 % 7/1/2020  9:30 PM  
Vitals shown include unvalidated device data.

## 2020-07-02 NOTE — PROGRESS NOTES
2109 Patient arrived via stretcher for OR to CCU 2528. Unable to completed admission database due to patient being intubated and sedated. No family present at this time. 1153 [facility] with [facility] paged about patient being ready for dialysis. Awaiting return phone call. [phone] Deandre with [facility] returned phone call and was informed that the patient had returned from surgery and was suppose to be getting dialysis tonight according to Dr. Geovanna Prado note. She will get back in contact with me to let me know who will be coming to do patient's dialysis's treatment. 2316 On-call Ortho surgery MD paged for order clarification. Awaiting return phone call. [phone] No return phone call received from on-call Ortho PA 
 
0006 In-house hospitalist Dr. Silvana Morocho called about the patient having an order for NS @ 125ml/hr. Explained that the patient was intubated and on dialysis. Order received to decrease rate to 50 ml/hr, order read back and verified. [id number] On-call for [facility] paged to find out status of patient getting dialysis treatment tonight. Awaiting return call. [phone] Deandre with [facility] returned phone call. She stated that Jasvir Speedy will be coming to do patient dialysis treatment tonight. [id number] Dialysis nurse present on unit, discussed need for dialysis consent 
 
0300  Patient's son(Huey) called about getting consent for Hemodialysis treatment, consent received. Son updated on patients status.

## 2020-07-02 NOTE — PROGRESS NOTES
Occupational Therapy Note: 
 
Orders acknowledged and chart reviewed. Pt currently intubated, sedated and not following commands. Will hold OT evaluation at this time and continue to follow. Thank you.  
 
Roel Delgadillo, OTR/L

## 2020-07-02 NOTE — CONSULTS
NEUROLOGY NOTE Chief Complaint Patient presents with  Extremity Weakness Reason for Consult I have been asked by Roopa Perales MD to see the patient in neurological consultation to render advice and opinion regarding seizure HPI Prudence Wauseon is a 72 y.o. male who present with seizure. Pt is intubated and cannot provide any hx. Hx obtained by chart review. AMS started on 10am at 7/1/2020 as reported by family. EMS noted patient was confused and restless as stroke alert. En route, he developed seizure with right gaze deviation and decorticate posturing. Was given versed 5mg IV with break in seizure. He then became very agitated and combative. Son reports EMS counted that vicodin count missing 10 tabs and suspected that patient taking more than prescribed because been having a lot of neck pain. 
  
In ER, was intubated for airway protection. Loaded with keppra. BP markedly elevated 250/90. 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 ROS A ten system review of constitutional, cardiovascular, respiratory, musculoskeletal, endocrine, skin, SHEENT, genitourinary, psychiatric and neurologic systems was obtained and is unremarkable except as stated in HPI  
 
PMH Past Medical History:  
Diagnosis Date  AAA (abdominal aortic aneurysm) ([facility] [address] 34mm 2/2017  Anemia   
 gets shots from Dr. Cherie Baca - last dose 8/31/2018  BPH (benign prostatic hypertrophy)  CAD (coronary artery disease) s/p stents, sees Dr. Jose Mendez  Cancer ([facility] [address] skin cancer on leg  Chronic kidney disease M[facility]-F [facility]  End stage renal disease ([facility] [address] Dr. Cherie Baca  GERD (gastroesophageal reflux disease)  Gout  Other and unspecified hyperlipidemia  PUD (peptic ulcer disease)  PVD (peripheral vascular disease) ([facility] [address]   
 s/p PTCA of left femoral artery in July 2014  Sleep apnea CPAP  Type II or unspecified type diabetes mellitus without mention of complication, uncontrolled Dr. Brandee Reza  Unspecified essential hypertension  Unspecified vitamin D deficiency [place] Family History Problem Relation Age of Onset  Diabetes Mother  Heart Disease Mother  Heart Disease Maternal Grandmother  Diabetes Daughter   
     gestational  
Noreene Peat Diabetes Sister  Stroke Sister  Cancer Father  Hypertension Father [facility] Social History Socioeconomic History  Marital status:  Spouse name: Not on file  Number of children: Not on file  Years of education: Not on file  Highest education level: Not on file Tobacco Use  Smoking status: Former Smoker Packs/day: 2.00 Years: 25.00 Pack years: 50.00 Last attempt to quit: 3/11/1991 Years since quittin.3  Smokeless tobacco: Never Used Substance and Sexual Activity  Alcohol use: Yes Comment: very occasional  
 Drug use: No  
Social History Narrative [place] with wife of 28 years. Has 1 son and 1 daughter and 4 step-children. 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 ALLERGIES No Known Allergies PHYSICAL EXAMINATION:  
Patient Vitals for the past 24 hrs: 
 Temp Pulse Resp BP SpO2  
20 1300  (!) 52 15 137/45 99 % 20 1230 99.1 °F (37.3 °C) (!) 55 16 145/55 99 % 20 1210  (!) 55 12  99 % 20 1200 99.2 °F (37.3 °C) 62 17 142/48 99 % 20 1130 99.2 °F (37.3 °C) 67 12 159/51 99 % 20 1100 99 °F (37.2 °C) 66 12 190/56 100 % 20 1030 98.9 °F (37.2 °C) 67 13 192/60 100 % 20 1000 98.8 °F (37.1 °C) 66 17 196/56 100 % 20 0930 98.8 °F (37.1 °C) (!) 51 22 145/49 99 % 20 0900 98.8 °F (37.1 °C) (!) 53 17 142/50 99 % 20 0830 98.6 °F (37 °C) (!) 53 13 145/48 99 % 07/02/20 0821 98.6 °F (37 °C)      
07/02/20 0800 98.5 °F (36.9 °C) (!) 53 16 137/47 99 % 07/02/20 0748  (!) 54 10  99 % 07/02/20 0730 98.3 °F (36.8 °C) (!) 53 15 137/48 99 % 07/02/20 0700 98.1 °F (36.7 °C) (!) 54 10 128/48 99 % 07/02/20 0641  (!) 53  117/58   
07/02/20 0626  (!) 58  116/58   
07/02/20 0611  (!) 55  156/64   
07/02/20 0600 97.9 °F (36.6 °C) (!) 57 13 149/56 100 % 07/02/20 0556  63  149/56   
07/02/20 0541  61  144/57   
07/02/20 0530 97.7 °F (36.5 °C) 63 13 169/67 100 % 07/02/20 0526  69  169/67   
07/02/20 0511  65  177/68   
07/02/20 0500 97.6 °F (36.4 °C) (!) 55 12 157/64 100 % 07/02/20 0456  66  157/64   
07/02/20 0441  (!) 53  143/58   
07/02/20 0430 97.7 °F (36.5 °C) (!) 53 14 142/57 98 % 07/02/20 0426  (!) 54  142/57   
07/02/20 0415 97.7 °F (36.5 °C) (!) 55 13 124/55 97 % 07/02/20 0411  (!) 52  126/56   
07/02/20 0403  (!) 53 13  97 % 07/02/20 0400 97.8 °F (36.6 °C) (!) 55 15 131/54 98 % 07/02/20 0356  (!) 52  131/54   
07/02/20 0345 97.8 °F (36.6 °C) (!) 54 12 122/51 97 % 07/02/20 0341  (!) 59  122/51   
07/02/20 0330 97.8 °F (36.6 °C) (!) 54 13 126/53 97 % 07/02/20 0326  (!) 55  126/53   
07/02/20 0315 97.9 °F (36.6 °C) (!) 53 13 131/49 97 % 07/02/20 0311 97.9 °F (36.6 °C) (!) 56 12 146/46 97 % 07/02/20 0300 97.9 °F (36.6 °C) (!) 57 12 153/48 97 % 07/02/20 0245 97.9 °F (36.6 °C) 62 12 153/47 97 % 07/02/20 0230 97.8 °F (36.6 °C) 61 13 159/48 98 % 07/02/20 0215 97.8 °F (36.6 °C) 62 13 159/50 98 % 07/02/20 0200 97.8 °F (36.6 °C) 62 12 160/49 98 % 07/02/20 0145 97.8 °F (36.6 °C) 62 15 158/50 98 % 07/02/20 0130 97.9 °F (36.6 °C) 62 12 162/49 98 % 07/02/20 0115 97.9 °F (36.6 °C) 60 16 157/48 99 % 07/02/20 0100 98 °F (36.7 °C) 60 16 154/50 99 % 07/02/20 0045 98 °F (36.7 °C) 60 15 155/50 99 % 07/02/20 0030 98 °F (36.7 °C) (!) 59 16 151/50 99 % 07/02/20 0015 97.9 °F (36.6 °C) 60 17 154/49 99 % 07/02/20 0000 97.9 °F (36.6 °C) 61 18 156/51 99 % 07/01/20 2346  62 17  99 % 07/01/20 2345 97.9 °F (36.6 °C) 61 15 158/57 99 % 07/01/20 2330 97.9 °F (36.6 °C) 61 14 161/53 99 % 07/01/20 2315 97.9 °F (36.6 °C) 61 17 168/55 99 % 07/01/20 2307 97.9 °F (36.6 °C) 61 18  99 % 07/01/20 2306 97.8 °F (36.6 °C) 62 17  99 % 07/01/20 2305 97.8 °F (36.6 °C) 62 17  100 % 07/01/20 2304 97.8 °F (36.6 °C) 61 17  100 % 07/01/20 2303 97.8 °F (36.6 °C) 61 17  100 % 07/01/20 2302 97.8 °F (36.6 °C) 62 18  100 % 07/01/20 2301 97.8 °F (36.6 °C) 62 17  100 % 07/01/20 2300 97.8 °F (36.6 °C) 61 18 176/59 100 % 07/01/20 2245 97.8 °F (36.6 °C) 62 18 187/59 100 % 07/01/20 2236    195/62   
07/01/20 2230 97.8 °F (36.6 °C) 63 15 195/62 100 % 07/01/20 2215 97.9 °F (36.6 °C) 63 18 192/61 100 % 07/01/20 2200 97.9 °F (36.6 °C) 64 14 (!) 201/59 100 % 07/01/20 2159  64  200/64   
07/01/20 2145 98 °F (36.7 °C) 65 21 200/64 100 % 07/01/20 2130 97.9 °F (36.6 °C)    100 % 07/01/20 2121 98.1 °F (36.7 °C) 82 16 157/85 100 % 07/01/20 2118  62 20  99 % 07/01/20 2115 98.3 °F (36.8 °C) 63 19 155/56 100 % 07/01/20 1804  90 19 148/63 99 % 07/01/20 1700    126/73   
07/01/20 1630 99.8 °F (37.7 °C) 94 21 177/59 97 % 07/01/20 1625 99.8 °F (37.7 °C) 99 20 187/57 97 % 07/01/20 1615 100 °F (37.8 °C) 98 22 161/70 97 % 07/01/20 1600 99.5 °F (37.5 °C) 93 22 170/59 99 % 07/01/20 1545 99.5 °F (37.5 °C) 90 22 171/67 98 % 07/01/20 1535 99.5 °F (37.5 °C) 93 21 173/63 99 % 07/01/20 1530 99.3 °F (37.4 °C) 92 20 177/58 99 % 07/01/20 1525 99.3 °F (37.4 °C) 91 22 180/58 99 % 07/01/20 1520 99.3 °F (37.4 °C) 89 17 174/56 99 % 07/01/20 1515 99.3 °F (37.4 °C) 88 25 173/57 99 % 07/01/20 1510 99.3 °F (37.4 °C) 87 17 169/58 98 % 07/01/20 1505 99.1 °F (37.3 °C) 88 18 178/61 100 % 07/01/20 1504  87 17  100 % 07/01/20 1500 99.1 °F (37.3 °C) 93 15 179/60 100 % 07/01/20 1455 99.1 °F (37.3 °C) 95 19 (!) 189/95 100 % 07/01/20 1450 98.9 °F (37.2 °C) 99 26 186/60 100 % 07/01/20 1445 98.9 °F (37.2 °C) 99 24 179/67 100 % 07/01/20 1441 98.9 °F (37.2 °C) (!) 102 19 187/71 100 % General:  
General appearance: Pt is in no acute distress Distal pulses are preserved Fundoscopic exam: attempted Neurological Examination:  
Mental Status: Opens eyes on stimulation. Intubated. Non verbal. Does not follow commands. Cranial Nerves: Surgical pupils, Extraocular movements are full. Facial movement intact. Motor: Strength - withdraws all 4 ext. Normal tone. No atrophy. Sensation: intact to pain LAB DATA REVIEWED:   
Recent Results (from the past 24 hour(s)) LACTIC ACID Collection Time: 07/01/20  3:01 PM  
Result Value Ref Range Lactic acid 2.8 (HH) 0.4 - 2.0 MMOL/L  
POC EG7 Collection Time: 07/01/20  3:02 PM  
Result Value Ref Range Calcium, ionized (POC) 1.09 (L) 1.12 - 1.32 mmol/L  
 FIO2 (POC) 50 % pH (POC) 7.51 (H) 7.35 - 7.45    
 pCO2 (POC) 30.3 (L) 35.0 - 45.0 MMHG  
 pO2 (POC) 179 (H) 80 - 100 MMHG  
 HCO3 (POC) 24.1 22 - 26 MMOL/L Base excess (POC) 1 mmol/L  
 sO2 (POC) 100 (H) 92 - 97 % Site RIGHT BRACHIAL Device: VENT Mode ASSIST CONTROL Tidal volume 500 ml Set Rate 12 bpm  
 PEEP/CPAP (POC) 6 cmH2O Allens test (POC) N/A Specimen type (POC) ARTERIAL Total resp. rate 16 ACETAMINOPHEN Collection Time: 07/01/20  3:10 PM  
Result Value Ref Range Acetaminophen level <2 (L) 10 - 30 ug/mL SALICYLATE Collection Time: 07/01/20  3:10 PM  
Result Value Ref Range Salicylate level 8.0 2.8 - 20.0 MG/DL  
EKG, 12 LEAD, INITIAL Collection Time: 07/01/20  3:52 PM  
Result Value Ref Range Ventricular Rate 91 BPM  
 Atrial Rate 91 BPM  
 P-R Interval 146 ms  
 QRS Duration 98 ms Q-T Interval 436 ms  
 QTC Calculation (Bezet) 536 ms Calculated P Axis 15 degrees Calculated R Axis 1 degrees Calculated T Axis 59 degrees Diagnosis ** Poor data quality, interpretation may be adversely affected Normal sinus rhythm Nonspecific ST and T wave abnormality Prolonged QT When compared with ECG of 14-SEP-2019 10:20, Sinus rhythm has replaced Atrial fibrillation ST less depressed in Anterior leads Confirmed by Mona Cortes ([id number]) on 7/1/2020 6:32:35 PM 
  
TROPONIN I Collection Time: 07/01/20  5:20 PM  
Result Value Ref Range Troponin-I, Qt. 0.88 (H) <0.05 ng/mL CK W/ CKMB & INDEX Collection Time: 07/01/20  5:20 PM  
Result Value Ref Range CK - MB 1.9 <3.6 NG/ML  
 CK-MB Index 1.8 0.0 - 2.5  39 - 308 U/L  
LACTIC ACID Collection Time: 07/01/20  5:26 PM  
Result Value Ref Range Lactic acid 1.0 0.4 - 2.0 MMOL/L  
GLUCOSE, POC Collection Time: 07/01/20  5:29 PM  
Result Value Ref Range Glucose (POC) 225 (H) 65 - 100 mg/dL Performed by Carlota Kirby EKG, 12 LEAD, INITIAL Collection Time: 07/01/20  6:27 PM  
Result Value Ref Range Ventricular Rate 70 BPM  
 Atrial Rate 70 BPM  
 P-R Interval 168 ms QRS Duration 90 ms Q-T Interval 482 ms QTC Calculation (Bezet) 520 ms Calculated P Axis 90 degrees Calculated R Axis 6 degrees Calculated T Axis 81 degrees Diagnosis Normal sinus rhythm Minimal voltage criteria for LVH, may be normal variant Nonspecific T wave abnormality Confirmed by Alok Villafuerte M.D. ([id number]) on 7/2/2020 10:24:29 AM 
  
CULTURE, TISSUE Kwasi Schneider STAIN Collection Time: 07/01/20  7:33 PM  
Result Value Ref Range Special Requests: C6 C7 EPIDURAL ABSCESS   
 GRAM STAIN RARE WBCS SEEN    
 GRAM STAIN NO ORGANISMS SEEN Culture result: CULTURE IN PROGRESS,FURTHER UPDATES TO FOLLOW    
GLUCOSE, POC Collection Time: 07/02/20 12:56 AM  
Result Value Ref Range Glucose (POC) 173 (H) 65 - 100 mg/dL Performed by Jaspal Friedman ([address]) CBC WITH AUTOMATED DIFF  
 Collection Time: 07/02/20  3:25 AM  
Result Value Ref Range WBC 11.6 (H) 4.1 - 11.1 K/uL  
 RBC 3.48 (L) 4.10 - 5.70 M/uL  
 HGB 11.3 (L) 12.1 - 17.0 g/dL HCT 35.9 (L) 36.6 - 50.3 % .2 (H) 80.0 - 99.0 FL  
 MCH 32.5 26.0 - 34.0 PG  
 MCHC 31.5 30.0 - 36.5 g/dL  
 RDW 14.6 (H) 11.5 - 14.5 % PLATELET 093 (H) 453 - 400 K/uL MPV 10.8 8.9 - 12.9 FL  
 NRBC 0.0 0  WBC ABSOLUTE NRBC 0.00 0.00 - 0.01 K/uL NEUTROPHILS 81 (H) 32 - 75 % LYMPHOCYTES 8 (L) 12 - 49 % MONOCYTES 9 5 - 13 % EOSINOPHILS 0 0 - 7 % BASOPHILS 1 0 - 1 % IMMATURE GRANULOCYTES 1 (H) 0.0 - 0.5 % ABS. NEUTROPHILS 9.5 (H) 1.8 - 8.0 K/UL  
 ABS. LYMPHOCYTES 0.9 0.8 - 3.5 K/UL  
 ABS. MONOCYTES 1.1 (H) 0.0 - 1.0 K/UL  
 ABS. EOSINOPHILS 0.0 0.0 - 0.4 K/UL  
 ABS. BASOPHILS 0.1 0.0 - 0.1 K/UL  
 ABS. IMM. GRANS. 0.1 (H) 0.00 - 0.04 K/UL  
 DF AUTOMATED METABOLIC PANEL, COMPREHENSIVE Collection Time: 07/02/20  3:25 AM  
Result Value Ref Range Sodium 136 136 - 145 mmol/L Potassium 4.1 3.5 - 5.1 mmol/L Chloride 98 97 - 108 mmol/L  
 CO2 24 21 - 32 mmol/L Anion gap 14 5 - 15 mmol/L Glucose 169 (H) 65 - 100 mg/dL BUN 48 (H) 6 - 20 MG/DL Creatinine 7.84 (H) 0.70 - 1.30 MG/DL  
 BUN/Creatinine ratio 6 (L) 12 - 20 GFR est AA 8 (L) >60 ml/min/1.73m2 GFR est non-AA 7 (L) >60 ml/min/1.73m2 Calcium 8.7 8.5 - 10.1 MG/DL Bilirubin, total 0.4 0.2 - 1.0 MG/DL  
 ALT (SGPT) 17 12 - 78 U/L  
 AST (SGOT) 29 15 - 37 U/L Alk. phosphatase 113 45 - 117 U/L Protein, total 6.9 6.4 - 8.2 g/dL Albumin 2.5 (L) 3.5 - 5.0 g/dL Globulin 4.4 (H) 2.0 - 4.0 g/dL A-G Ratio 0.6 (L) 1.1 - 2.2 POC EG7 Collection Time: 07/02/20  5:14 AM  
Result Value Ref Range Calcium, ionized (POC) 1.15 1.12 - 1.32 mmol/L  
 FIO2 (POC) 30 %  pH (POC) 7.40 7.35 - 7.45    
 pCO2 (POC) 45.2 (H) 35.0 - 45.0 MMHG  
 pO2 (POC) 96 80 - 100 MMHG  
 HCO3 (POC) 27.7 (H) 22 - 26 MMOL/L  
 Base excess (POC) 3 mmol/L  
 sO2 (POC) 97 92 - 97 % Site RIGHT RADIAL Device: VENT Mode ASSIST CONTROL Tidal volume 500 ml Set Rate 12 bpm  
 PEEP/CPAP (POC) 6 cmH2O Allens test (POC) YES Specimen type (POC) ARTERIAL Total resp. rate 14 GLUCOSE, POC Collection Time: 07/02/20  6:14 AM  
Result Value Ref Range Glucose (POC) 123 (H) 65 - 100 mg/dL Performed by Jaspal Blood ([address]) GLUCOSE, POC Collection Time: 07/02/20 11:07 AM  
Result Value Ref Range Glucose (POC) 117 (H) 65 - 100 mg/dL Performed by Solmon Intiza Imaging review: 
CT Head Normal 
 
EEG Mild to moderate slowing CTA Neck Findings consistent with osteomyelitis-discitis at C6-C7 with erosive 
endplate changes, prevertebral and epidural phlegmon, resulting in severe spinal 
canal stenosis and cord compression at C6-C7 HOME MEDS Prior to Admission Medications Prescriptions Last Dose Informant Patient Reported? Taking? BYSTOLIC 20 mg tablet 5/13/6843 at 2100 Transfer Papers Yes Yes Sig: Take 20 mg by mouth nightly. Basaglar KwikPen U-100 Insulin 100 unit/mL (3 mL) inpn 7/1/2020 at Unknown time Transfer Papers No Yes Sig: Inject 8 units every morning ONLY if blood sugar is over 150, otherwise hold. D3-E-Se-soy hiasc-ijjesa-hjdyx (Prostate 2.4) 1,200-15-35 unit-unit-mcg cap  Transfer Papers Yes Yes Sig: Take 1 Cap by mouth two (2) times a day. OTC Super Beta Prostate 1 cap bid Insulin Needles, Disposable, 31 gauge x 3/16\" ndle  Transfer Papers No No  
Sig: USE DAILY AS DIRECTED  
NIFEdipine ER (ADALAT CC) 60 mg ER tablet 7/1/2020 at Unknown time Transfer Papers Yes Yes Sig: Take 60 mg by mouth every twelve (12) hours. OTHER 6/30/2020 at Unknown time Transfer Papers Yes Yes Sig: Take 1 Tab by mouth nightly. OTC Neuriva (for brain): 1 tab qhs PRALUENT PEN 75 mg/mL injector pen  Transfer Papers Yes Yes Si mg by SubCUTAneous route Once every 2 weeks. acetaminophen (TYLENOL) 500 mg tablet  Transfer Papers Yes Yes Sig: Take 1,000 mg by mouth every six (6) hours as needed for Pain. atorvastatin (LIPITOR) 40 mg tablet 2020 at 2100 Transfer Papers Yes Yes Sig: Take 40 mg by mouth every evening. cloNIDine HCl (CATAPRES) 0.2 mg tablet 2020 at Unknown time Transfer Papers Yes Yes Sig: Take 0.4 mg by mouth two (2) times a day. clopidogrel (PLAVIX) 75 mg tablet 2020 at Unknown time Transfer Papers Yes Yes Sig: Take 75 mg by mouth nightly. ferric citrate (Auryxia) 210 mg iron tablet 2020 at Unknown time Transfer Papers Yes Yes Sig: Take 630 mg by mouth three (3) times daily (with meals). folic acid-vit Y6-YJM E97 (FOLTX) 2.5-25-2 mg tablet 2020 at Unknown time Transfer Papers Yes Yes Sig: Take 1 Tab by mouth nightly. furosemide (LASIX) 80 mg tablet 2020 at Unknown time Transfer Papers No Yes Sig: Take 1 Tab by mouth two (2) times a day. minoxidil (LONITEN) 10 mg tablet 2020 at Unknown time Transfer Papers Yes Yes Sig: Take 5 mg by mouth two (2) times a day. omega-3 fatty acids (FISH OIL CONCENTRATE) 1,000 mg cap 2020 at Unknown time Transfer Papers Yes Yes Sig: Take 1,000 mg by mouth two (2) times a day. tamsulosin (FLOMAX) 0.4 mg capsule 2020 at 2100 Transfer Papers Yes Yes Sig: Take 0.4 mg by mouth nightly. Facility-Administered Medications: None CURRENT MEDS Current Facility-Administered Medications Medication Dose Route Frequency  [START ON 7/3/2020] famotidine (PEPCID) tablet 20 mg  20 mg Oral DAILY  alcohol 62% (NOZIN) nasal  1 Ampule  1 Ampule Topical Q12H  
 fentaNYL (PF) 1,500 mcg/30 mL (50 mcg/mL) infusion   mcg/hr IntraVENous TITRATE  niCARdipine (CARDENE) 25 mg in 0.9% sodium chloride 250 mL infusion  0-15 mg/hr IntraVENous TITRATE  propofol (DIPRIVAN) 10 mg/mL infusion  0-50 mcg/kg/min IntraVENous TITRATE  VANCOMYCIN INFORMATION NOTE   Other Rx Dosing/Monitoring  piperacillin-tazobactam (ZOSYN) 3.375 g in 0.9% sodium chloride (MBP/ADV) 100 mL  3.375 g IntraVENous Q12H  
 sodium chloride (NS) flush 5-40 mL  5-40 mL IntraVENous Q8H  
 levETIRAcetam (KEPPRA) 500 mg in saline (iso-osm) 100 mL IVPB (premix)  500 mg IntraVENous Q12H  
 dexmedeTOMidine (PRECEDEX) 400 mcg in 0.9% sodium chloride 100 mL infusion  0.2-1.4 mcg/kg/hr IntraVENous TITRATE  insulin lispro (HUMALOG) injection   SubCUTAneous Q6H  
 minoxidiL (LONITEN) tablet 5 mg  5 mg Per NG tube BID  nebivoloL (BYSTOLIC) tablet 20 mg  20 mg PEG Tube QHS  cloNIDine HCL (CATAPRES) tablet 0.2 mg  0.2 mg Per NG tube BID  tamsulosin (FLOMAX) capsule 0.4 mg  0.4 mg Oral QHS  sodium chloride (NS) flush 5-40 mL  5-40 mL IntraVENous Q8H  
 acetaminophen (TYLENOL) tablet 1,000 mg  1,000 mg Oral Q6H  
 senna-docusate (PERICOLACE) 8.6-50 mg per tablet 1 Tab  1 Tab Oral BID  polyethylene glycol (MIRALAX) packet 17 g  17 g Oral DAILY IMPRESSION/RECOMMENDATIONS: 
Tc Bautista is a 72 y.o. male who presents with alt mental status and new onset seizure en route to the hospital. Pt is now on keppra 500 mg iv bid. Pt has Epidural abscess with Diskitis c6-c7 s/p C5- T1 ACDF with partial C6 and C7 corpectomy. Non focal exam.  
 
I suspect that his seizure was provoked by infection, medication and there is a questions about use. I will continue keppra 500 mg iv/po bid for 1 wk and then can be stopped. Will follow up in office. Thank you very much for this consultation. Tana Worthington MD 
Neurologist  
 
30 min cct provided.

## 2020-07-02 NOTE — PROGRESS NOTES
ORTHO POST OP SPINE PROGRESS NOTE 2020 Admit Date: 2020 Admit Diagnosis: Seizure ([facility] [address] [R56.9] Acute encephalopathy [G93.40] Malignant hypertension [I10] Cervical discitis [M46.42] Procedure: Procedure(s): 
C5-T1 Anterior Cervical Discectomy Fusion (ACDF) with C6-C7 Corepectomy Post Op day: 1 Day Post-Op Subjective:  
 
Gertrudis Ko is a patient who Is status post C5 through T1 ACDF with partial C6 and C7 corpectomy. .  
Seen in ER yesterday afternoon after onset of  Altered mental status noticed at home. He subsequently began seizing and was admitted to the ER unresponsive. He was intubated. CT had shown concern for cervical cord compression and diskitis and brought in for surgery last night. He remains sedated. He will awaken to painful stimuli but does not follow commands Review of Systems: Pertinent items are noted in HPI. Objective:  
 
PT/OT:  
Distance Ambulated:          
Time Ambulated (min):       
Ambulation Response: Activity Response: Tolerated well Assistive Device:                
 
Vital Signs:   
Blood pressure 117/58, pulse (!) 54, temperature 97.9 °F (36.6 °C), resp. rate 10, height 5' 8\" (1.727 m), weight 77.6 kg (171 lb 1.2 oz), SpO2 99 %. Temp (24hrs), Av.3 °F (36.8 °C), Min:97.3 °F (36.3 °C), Max:100 °F (37.8 °C) No intake/output data recorded. 1901 -  0700 In: 1028.3 [I.V.:1028.3] Out: 0512 [Urine:435; Drains:5] LAB:   
Recent Labs  
  20 
0325 HGB 11.3* WBC 11.6*  
* Wound/Drain Assessment: 
Drain:   
 
Dressing:  
 
Physical Exam: 
Incision clean, dry, and intact Cervical collar in place Assessment:  
  
Patient Active Problem List  
Diagnosis Code  Uncontrolled type 2 diabetes mellitus with diabetic nephropathy, without long-term current use of insulin ([facility]) E11.21, E11.65  
 Essential hypertension, benign I10  
 Hyperlipidemia LDL goal <70 E78.5  Iron deficiency anemia D50.9  SOB (shortness of breath) R06.02  
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  
 Acute respiratory failure (HCC) J96.00  Bilateral pneumonia J18.9  Pulmonary edema J81.1  Sepsis ([address] A41.9  VIVIEN (acute kidney injury) ([facility] [address] N17.9  Orthostatic dizziness R42  Acute on chronic renal failure (HCC) N17.9, N18.9  Malignant hypertension I10  
 Seizure ([address] R56.9  Cervical discitis M46.42  
 Acute encephalopathy G93.40 Plan:  
 
Continue PT/OT/Rehab Discontinue:  
Consult: S/p C5-T1 acdf Continue C collar  
preliminary culture showing no growth 
antibx/med management per critical team

## 2020-07-02 NOTE — PROGRESS NOTES
Sedation decreased, pat w/ legs OOB. 1128 Ortho NP at bedside. Updated w/ pat status. Clarification on pat positioning discussed. New orders. 56 Pat son updated w/ pat status.

## 2020-07-02 NOTE — PROGRESS NOTES
Hospitalist Progress Note NAME: Loftin Diamond Pallas :  1950 MRN:  [id number] Assessment / Plan: 
Epidural abscess with Diskitis c6-c7 s/p C5- T1 ACDF with partial C6 and C7 corpectomy Sepsis, POA due to above Presented with AMS and Seizure CT Neck showed Osteomyelitis-discitis at C6-C7 with erosive endplate changes, prevertebral and epidural phlegmon, resulting in severe spinal canal stenosis and cord compression at C6-C7. S/p C5 through T1 ACDF with partial C6 and C7 corpectomy  by Ortho Tissue cultures sent, [address] pending IV keppra 
-Ortho spine surgeon on board 
-Consult ID 
-Continue Vanc/zosyn Acute Respiratory Failure POA 
2/2 above and for airway protection 
-Vent management as per Pulmonary 
 
  
ESRD on HD MWF. Has permacath. --Vascular to see if Fistula can be used 
--nephrology consult appreciated 
  
HARDIK on cpap --intubated in ER for airway protection.  
 
 
  
CAD, hx stents . Follow with Dr. Kristy Kim. Echo 2018 EF 60-65% Paroxysmal afib 2019, not on anticoagulation Malignant HTN /78 Elevated trop 0.19, has chronic mild troponin elevation likely due to CKD. EKG without active ischemia. 
--hold plavix and aspirin, resume once okay with Surgeon 
--restart bystolic, clonidine, minoxidil  
  
DM 
--put on low dose SSI. BS 160s - 
  
Gout 
--resume allopurinol 
  
GERD, hx PUD 
--IV protonix 
  
BPH 
PAD 
S/p left CEA  by Dr. Shayan Teran Hx aortobifemoral graft Distal AAA borderline aneurysm 2017 Hx chronic anemia, hgb normal today 13.8 
  
--Body mass index is 25.31 kg/m². 
  
Code: full code DVT prophylaxis: SCD Surrogate decision maker:  Jenaro Neal [phone] Subjective: Chief Complaint / Reason for Physician Visit \"Intubated and sedated with Propofol. .  Discussed with RN events overnight. Review of Systems: 
Symptom Y/N Comments  Symptom Y/N Comments Fever/Chills    Chest Pain Poor Appetite    Edema Cough    Abdominal Pain Sputum    Joint Pain SOB/RUBIO    Pruritis/Rash Nausea/vomit    Tolerating PT/OT Diarrhea    Tolerating Diet Constipation    Other Could NOT obtain due to: Intubated Objective: VITALS:  
Last 24hrs VS reviewed since prior progress note. Most recent are: 
Patient Vitals for the past 24 hrs: 
 Temp Pulse Resp BP SpO2  
07/02/20 1210  (!) 55 12  99 % 07/02/20 1200 99.2 °F (37.3 °C) 62 17 142/48 99 % 07/02/20 1130 99.2 °F (37.3 °C) 67 12 159/51 99 % 07/02/20 1100 99 °F (37.2 °C) 66 12 190/56 100 % 07/02/20 1030 98.9 °F (37.2 °C) 67 13 192/60 100 % 07/02/20 1000 98.8 °F (37.1 °C) 66 17 196/56 100 % 07/02/20 0930 98.8 °F (37.1 °C) (!) 51 22 145/49 99 % 07/02/20 0900 98.8 °F (37.1 °C) (!) 53 17 142/50 99 % 07/02/20 0830 98.6 °F (37 °C) (!) 53 13 145/48 99 % 07/02/20 0821 98.6 °F (37 °C)      
07/02/20 0800 98.5 °F (36.9 °C) (!) 53 16 137/47 99 % 07/02/20 0748  (!) 54 10  99 % 07/02/20 0730 98.3 °F (36.8 °C) (!) 53 15 137/48 99 % 07/02/20 0700 98.1 °F (36.7 °C) (!) 54 10 128/48 99 % 07/02/20 0641  (!) 53  117/58   
07/02/20 0626  (!) 58  116/58   
07/02/20 0611  (!) 55  156/64   
07/02/20 0600 97.9 °F (36.6 °C) (!) 57 13 149/56 100 % 07/02/20 0556  63  149/56   
07/02/20 0541  61  144/57   
07/02/20 0530 97.7 °F (36.5 °C) 63 13 169/67 100 % 07/02/20 0526  69  169/67   
07/02/20 0511  65  177/68   
07/02/20 0500 97.6 °F (36.4 °C) (!) 55 12 157/64 100 % 07/02/20 0456  66  157/64   
07/02/20 0441  (!) 53  143/58   
07/02/20 0430 97.7 °F (36.5 °C) (!) 53 14 142/57 98 % 07/02/20 0426  (!) 54  142/57   
07/02/20 0415 97.7 °F (36.5 °C) (!) 55 13 124/55 97 % 07/02/20 0411  (!) 52  126/56   
07/02/20 0403  (!) 53 13  97 % 07/02/20 0400 97.8 °F (36.6 °C) (!) 55 15 131/54 98 % 07/02/20 0356  (!) 52  131/54   
07/02/20 0345 97.8 °F (36.6 °C) (!) 54 12 122/51 97 % 07/02/20 0341  (!) 59  122/51   
07/02/20 0330 97.8 °F (36.6 °C) (!) 54 13 126/53 97 % 07/02/20 0326  (!) 55  126/53   
07/02/20 0315 97.9 °F (36.6 °C) (!) 53 13 131/49 97 % 07/02/20 0311 97.9 °F (36.6 °C) (!) 56 12 146/46 97 % 07/02/20 0300 97.9 °F (36.6 °C) (!) 57 12 153/48 97 % 07/02/20 0245 97.9 °F (36.6 °C) 62 12 153/47 97 % 07/02/20 0230 97.8 °F (36.6 °C) 61 13 159/48 98 % 07/02/20 0215 97.8 °F (36.6 °C) 62 13 159/50 98 % 07/02/20 0200 97.8 °F (36.6 °C) 62 12 160/49 98 % 07/02/20 0145 97.8 °F (36.6 °C) 62 15 158/50 98 % 07/02/20 0130 97.9 °F (36.6 °C) 62 12 162/49 98 % 07/02/20 0115 97.9 °F (36.6 °C) 60 16 157/48 99 % 07/02/20 0100 98 °F (36.7 °C) 60 16 154/50 99 % 07/02/20 0045 98 °F (36.7 °C) 60 15 155/50 99 % 07/02/20 0030 98 °F (36.7 °C) (!) 59 16 151/50 99 % 07/02/20 0015 97.9 °F (36.6 °C) 60 17 154/49 99 % 07/02/20 0000 97.9 °F (36.6 °C) 61 18 156/51 99 % 07/01/20 2346  62 17  99 % 07/01/20 2345 97.9 °F (36.6 °C) 61 15 158/57 99 % 07/01/20 2330 97.9 °F (36.6 °C) 61 14 161/53 99 % 07/01/20 2315 97.9 °F (36.6 °C) 61 17 168/55 99 % 07/01/20 2307 97.9 °F (36.6 °C) 61 18  99 % 07/01/20 2306 97.8 °F (36.6 °C) 62 17  99 % 07/01/20 2305 97.8 °F (36.6 °C) 62 17  100 % 07/01/20 2304 97.8 °F (36.6 °C) 61 17  100 % 07/01/20 2303 97.8 °F (36.6 °C) 61 17  100 % 07/01/20 2302 97.8 °F (36.6 °C) 62 18  100 % 07/01/20 2301 97.8 °F (36.6 °C) 62 17  100 % 07/01/20 2300 97.8 °F (36.6 °C) 61 18 176/59 100 % 07/01/20 2245 97.8 °F (36.6 °C) 62 18 187/59 100 % 07/01/20 2236    195/62   
07/01/20 2230 97.8 °F (36.6 °C) 63 15 195/62 100 % 07/01/20 2215 97.9 °F (36.6 °C) 63 18 192/61 100 % 07/01/20 2200 97.9 °F (36.6 °C) 64 14 (!) 201/59 100 % 07/01/20 2159  64  200/64   
07/01/20 2145 98 °F (36.7 °C) 65 21 200/64 100 % 07/01/20 2130 97.9 °F (36.6 °C)    100 % 07/01/20 2121 98.1 °F (36.7 °C) 82 16 157/85 100 % 07/01/20 2118  62 20  99 % 07/01/20 2115 98.3 °F (36.8 °C) 63 19 155/56 100 % 07/01/20 1804  90 19 148/63 99 % 07/01/20 1700    126/73   
07/01/20 1630 99.8 °F (37.7 °C) 94 21 177/59 97 % 07/01/20 1625 99.8 °F (37.7 °C) 99 20 187/57 97 % 07/01/20 1615 100 °F (37.8 °C) 98 22 161/70 97 % 07/01/20 1600 99.5 °F (37.5 °C) 93 22 170/59 99 % 07/01/20 1545 99.5 °F (37.5 °C) 90 22 171/67 98 % 07/01/20 1535 99.5 °F (37.5 °C) 93 21 173/63 99 % 07/01/20 1530 99.3 °F (37.4 °C) 92 20 177/58 99 % 07/01/20 1525 99.3 °F (37.4 °C) 91 22 180/58 99 % 07/01/20 1520 99.3 °F (37.4 °C) 89 17 174/56 99 % 07/01/20 1515 99.3 °F (37.4 °C) 88 25 173/57 99 % 07/01/20 1510 99.3 °F (37.4 °C) 87 17 169/58 98 % 07/01/20 1505 99.1 °F (37.3 °C) 88 18 178/61 100 % 07/01/20 1504  87 17  100 % 07/01/20 1500 99.1 °F (37.3 °C) 93 15 179/60 100 % 07/01/20 1455 99.1 °F (37.3 °C) 95 19 (!) 189/95 100 % 07/01/20 1450 98.9 °F (37.2 °C) 99 26 186/60 100 % 07/01/20 1445 98.9 °F (37.2 °C) 99 24 179/67 100 % 07/01/20 1441 98.9 °F (37.2 °C) (!) 102 19 187/71 100 % 07/01/20 1430 98.7 °F (37.1 °C) 99 21 182/62 100 % 07/01/20 1403    (!) 229/72   
07/01/20 1400 98.2 °F (36.8 °C) (!) 106 17 (!) 229/72 100 % 07/01/20 1355 98 °F (36.7 °C) (!) 106 16 (!) 235/76 100 % 07/01/20 1351  (!) 101  (!) 242/78   
07/01/20 1350 97.5 °F (36.4 °C) 92 14 (!) 242/78 100 % 07/01/20 1348 97.3 °F (36.3 °C)      
07/01/20 1345  (!) 108 15 (!) 250/79 100 % 07/01/20 1340  (!) 103 13 (!) 259/84 100 % 07/01/20 1336  (!) 104 12 (!) 270/89 100 % 07/01/20 1328  95 15  100 % 07/01/20 1326    (!) 266/92   
07/01/20 1307  88 17  100 % 07/01/20 1259  99  (!) 219/86   
07/01/20 1247  (!) 103 30 (!) 256/91 100 % Intake/Output Summary (Last 24 hours) at 7/2/2020 1246 Last data filed at 7/2/2020 1158 Gross per 24 hour Intake 1483.99 ml Output 3690 ml Net -2206.01 ml PHYSICAL EXAM: 
 General: WD, WN. intubated EENT:  EOMI. Anicteric sclerae. MMM Resp:  CTA bilaterally, no wheezing or rales. No accessory muscle use CV:  Regular  rhythm,  No edema GI:  Soft, Non distended, Non tender.  +Bowel sounds Neurologic:      intubated Psych:   intubated Skin:  No rashes. No jaundice Reviewed most current lab test results and cultures  YES Reviewed most current radiology test results   YES Review and summation of old records today    NO Reviewed patient's current orders and MAR    YES 
PMH/SH reviewed - no change compared to H&P 
________________________________________________________________________ Care Plan discussed with: 
  Comments Patient Family  x   
RN x Care Manager Consultant Multidiciplinary team rounds were held today with , nursing, pharmacist and clinical coordinator. Patient's plan of care was discussed; medications were reviewed and discharge planning was addressed. ________________________________________________________________________ Total NON critical care TIME:  28   Minutes Total CRITICAL CARE TIME Spent:   Minutes non procedure based Comments >50% of visit spent in counseling and coordination of care    
________________________________________________________________________ Kirstin Fischer MD  
 
Procedures: see electronic medical records for all procedures/Xrays and details which were not copied into this note but were reviewed prior to creation of Plan. LABS: 
I reviewed today's most current labs and imaging studies. Pertinent labs include: 
Recent Labs  
  07/02/20 
0325 07/01/20 
1251 WBC 11.6* 14.7* HGB 11.3* 13.8 HCT 35.9* 43.1 * 474* Recent Labs  
  07/02/20 
0325 07/01/20 
1251  139  
K 4.1 3.5 CL 98 97 CO2 24 16* * 161* BUN 48* 52* CREA 7.84* 9.27* CA 8.7 9.5 ALB 2.5* 3.1* TBILI 0.4 0.5 ALT 17 18 INR  --  1.0 Signed: Darlene Arrington MD

## 2020-07-03 NOTE — PROGRESS NOTES
1900- received bedside report from Austin Babb, [facility] and assumed care of pt. 
2000- assessment complete. 2300- tube feeding osmolite started  At 10 ml/hr 
 
[id number]- pt bathed and turned, mouth care, sxing.  
 
0500- cardene gtt restarted for SBP >180   
 
0620- increase cardene gtt to 7.5 mcg/min. 
0700- report given to Yasir Yin

## 2020-07-03 NOTE — CONSULTS
Brief Vascular Surgery  Consult Note Impression: HAO AVF is patent with good thrill Plan: 
OK to use AVF If AVF can be cannulated, remove permcath If AVF can't be cannulated, continue to use catheter and we can do angiogram 1st of week Jonas Hernandez MD 
 
 Pt will be admitted to MICU. Full H&P to follow.

## 2020-07-03 NOTE — PROGRESS NOTES
Physical Therapy Chart reviewed and noted patient remains intubated and sedated. Remains not medically appropriate for PT evaluation at this time. Will continue to follow.  
Kitty Whelan, PT, DPT

## 2020-07-03 NOTE — PROGRESS NOTES
Attempted to see pt for OT services. Pt remains intubated and sedated on two sedatives. Noted ACDF surgery for epidural abscess. Pt remains inappropriate for OT services at this time. Will defer but continue to follow.

## 2020-07-03 NOTE — PROGRESS NOTES
Po cervical fusion for infection. Intubated and on dialysis. Wbc 11./6 Patient Vitals for the past 12 hrs: 
 Temp Pulse Resp BP SpO2  
07/03/20 1030  65 14 175/60 100 % 07/03/20 1015  64 12 156/59 100 % 07/03/20 1000  64 12 150/55 100 % 07/03/20 0945  64 12 153/59 100 % 07/03/20 0930  64 12 148/56 100 % 07/03/20 0915  64 12 155/55 100 % 07/03/20 0900  63 13 145/49 98 % 07/03/20 0845  65 14 183/51 100 % 07/03/20 0830  62 12 161/43 100 % 07/03/20 0824  65 14  100 % 07/03/20 0815 98.4 °F (36.9 °C) 63 14 192/57 100 % 07/03/20 0800  (!) 56 16 182/51 97 % 07/03/20 0730  (!) 59 13 168/44 99 % 07/03/20 0706 98.6 °F (37 °C)      
07/03/20 0700  (!) 57 14 159/42   
07/03/20 0641    171/57   
07/03/20 0600  61 12 187/40   
07/03/20 0525  97   100 % 07/03/20 0500  (!) 54 12 170/54   
07/03/20 0400 99.3 °F (37.4 °C) 63 18 (!) 211/46   
07/03/20 0323  61 17  100 % 07/03/20 0300  (!) 51 13 162/40 100 % 07/03/20 0200 99.5 °F (37.5 °C) (!) 55 10 150/40 100 % 07/03/20 0130    155/42   
07/03/20 0100  64 13 192/52 (!) 85 % 07/03/20 0000 99.5 °F (37.5 °C) (!) 56 16 149/44 98 % 07/02/20 2322  61 18  100 % 07/02/20 2300  (!) 58 12 160/46 98 % Hard collar in place' No excessive swelling Continue medical management

## 2020-07-03 NOTE — CONSULTS
Infectious Disease Consult Ale Denton MD FAC Date of Consultation:  July 2,2020 Referring Physician: Dr Gavin Forbes Subjective:  
 
Patient is a 72 y.o. male who is being seen - for epidural abscess IMPRESSION:  
- Severe sepsis with , change in mental status & seizure - Epidural abscess OM, discitis C6/7 Erosive endplate changes, prevertebral and epidural phlegmon, resulting in severe spinal 
canal stenosis and cord compression at C6-C7. On CT neck C5-T1 Anterior Cervical Discectomy Fusion & C6-C7 Corepectomy on 7/1 
- BC- 7/01- NG 
- Tissue culture - scant Staph epidermidis- ID, JOSE pending, anaerobic - NG 
- Acute respiratory failure intubated - ESRD on HD Has permacath & AVF( created 10/17/19) H/o PD & peritonitis Fld culture - 9/11 /19 + for Pseudomonas ( sensitive to Zosyn) s/p removal of PD on 9/12/19 
   
- ParoxysmalAfib 
- HARDIK - CAD h/o stenting PLAN:  
  
 
- Continue Vancomycin/ Zosyn IV  
- Plan for removal of Permacath, probable source of infectious process. PD related peritonitis was treated back in Sept '19 , less likely source of infection . Culture results will be helpful in determining etiology of infection - Recommend repeat BC with fevers >101 , ECHO Gertrudisdiya Alves a  72 y.o. male with history of coronary disease status post stents, type 2 diabetes, ESRD on HD MWF who  presented  to the ED with cc of altered mental status and unresponsiveness. Per ED note patient arrives as a level 1 stroke alert. Call went out to EMS after patient was noted to be confused per wife around 10 AM on morning of admission. When EMS arrived patient was awake but disoriented without focal neurologic deficits.  Per ED note  en route he developed contractures with decorticate positioning and had right gaze deviation and developed tonic-clonic seizure activity,  then  agonal respirations which did not break . Per ED note patient arrived in  ED unresponsive with agonal respirations. Noted to have spontaneous movement of all extremities. CTA NECK: 
NASCET method was utilized for calculating stenosis. 
  
Severe calcific atherosclerosis of the aortic arch. Patent stent in the proximal 
left subclavian artery with surrounding dense calcification. Dense calcification 
at the origin of the right brachiocephalic artery with mild stenosis. Postoperative changes of left carotid endarterectomy. No evidence of significant 
residual stenosis of the proximal left internal carotid artery. Dense calcific 
atherosclerosis of the right carotid bifurcation with approximately 55% stenosis 
of the proximal right internal carotid artery. 
  
There is a codominant vertebrobasilar arterial system. The left vertebral artery 
arises directed from the aortic arch. Mild stenosis at the origin of the left 
vertebral artery. 
  
There is expansion of the C6-C7 disc space with hypodense material, and 
partially calcified prevertebral and epidural soft tissue. There are erosive 
endplate changes of the inferior C6 and superior C7 vertebral bodies, worst at C7. There is severe spinal canal stenosis and cord compression at C6-C7.  
  
Visualized soft tissues of the neck are unremarkable. Partially visualized small 
right pleural effusion. Few small scattered groundglass opacities noted in the 
bilateral upper lobes, left greater than right. Endotracheal tube noted in the 
proximal thoracic trachea. A right IJ central venous catheter is noted. 
  
IMPRESSION IMPRESSION:  
  
CTA Head: 1. No evidence of large vessel occlusion or flow-limiting stenosis. 2. Multiple small calcified nonocclusive emboli in the bilateral M2 and M3 
branches, some of which were present in 2018, and some of which are new since 2018. 3. Additional atherosclerotic disease as above CTA Neck: 1. No evidence of flow-limiting stenosis. 2. Status post left carotid endarterectomy without evidence of significant 
recurrent stenosis. 3. Approximately 55% stenosis of the proximal right internal carotid artery by NASCET criteria. 4. Patent proximal small left subclavian artery stent. 5. Additional atherosclerotic disease as above. 6. Findings consistent with osteomyelitis-discitis at C6-C7 with erosive 
endplate changes, prevertebral and epidural phlegmon, resulting in severe spinal 
canal stenosis and cord compression at C6-C7. MRI of the cervical spine without 
and with contrast is recommended when the patient is able. 7. Partially visualized small right pleural effusion. Scattered nonspecific 
groundglass opacities in the bilateral upper lobes, left greater than right, 
which are favored to represent an infectious or inflammatory process, with 
differential including viral pneumonia. 
  
 
Patient Active Problem List  
Diagnosis Code  Uncontrolled type 2 diabetes mellitus with diabetic nephropathy, without long-term current use of insulin ([facility]) E11.21, E11.65  
 Essential hypertension, benign I10  
 Hyperlipidemia LDL goal <70 E78.5  Iron deficiency anemia D50.9  SOB (shortness of breath) R06.02  
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  
 Acute respiratory failure (HCC) J96.00  Bilateral pneumonia J18.9  Pulmonary edema J81.1  Sepsis ([address] A41.9  VIVIEN (acute kidney injury) ([address] N17.9  Orthostatic dizziness R42  Acute on chronic renal failure (HCC) N17.9, N18.9  Malignant hypertension I10  
 Seizure ([address] R56.9  Cervical discitis M46.42  
 Acute encephalopathy G93.40 Past Medical History:  
Diagnosis Date  AAA (abdominal aortic aneurysm) ([address] 34mm 2/2017  Anemia   
 gets shots from Dr. Gildardo Ferguson - last dose 8/31/2018  BPH (benign prostatic hypertrophy)  CAD (coronary artery disease) s/p stents, sees Dr. Adama Hong  Cancer ([facility] skin cancer on leg  Chronic kidney disease [facility]  End stage renal disease ([facility] Dr. Regi Isaacs  GERD (gastroesophageal reflux disease)  Gout  Other and unspecified hyperlipidemia  PUD (peptic ulcer disease)  PVD (peripheral vascular disease) ([facility]   
 s/p PTCA of left femoral artery in 2014  Sleep apnea CPAP  Type II or unspecified type diabetes mellitus without mention of complication, uncontrolled Dr. Magdy Schultz  Unspecified essential hypertension  Unspecified vitamin D deficiency Family History Problem Relation Age of Onset  Diabetes Mother  Heart Disease Mother  Heart Disease Maternal Grandmother  Diabetes Daughter   
     gestational  
[facility] Diabetes Sister  Stroke Sister  Cancer Father  Hypertension Father Social History Tobacco Use  Smoking status: Former Smoker Packs/day: 2.00 Years: 25.00 Pack years: 50.00 Last attempt to quit: 3/11/1991 Years since quittin.3  Smokeless tobacco: Never Used Substance Use Topics  Alcohol use: Yes Comment: very occasional  
 
Past Surgical History:  
Procedure Laterality Date  CARDIAC SURG PROCEDURE UNLIST    
 stents  HX CATARACT REMOVAL    
 HX COLONOSCOPY    
 HX CORONARY STENT PLACEMENT    
 HX ENDOSCOPY    
 HX KNEE ARTHROSCOPY    
 right x2, left x1  
 HX OTHER SURGICAL    
 skin cancer removed from leg  IR INSERT NON TUNL CVC OVER 5 YRS  2019  IR INSERT TUNL CVC W/O PORT OVER 5 YR  2019  VASCULAR SURGERY PROCEDURE UNLIST    
 aorto-bifem bypass  VASCULAR SURGERY PROCEDURE UNLIST    
 left carotid endarterectomy  VASCULAR SURGERY PROCEDURE UNLIST    
 right femoral PTCA Prior to Admission medications Medication Sig Start Date End Date Taking? Authorizing Provider  
ferric citrate (Auryxia) 210 mg iron tablet Take 630 mg by mouth three (3) times daily (with meals). Yes Provider, Historical  
D3-E-Se-soy psjvd-ohzegx-tqwxe (Prostate 2.4) 1,200-15-35 unit-unit-mcg cap Take 1 Cap by mouth two (2) times a day. OTC Super Beta Prostate 1 cap bid   Yes Provider, Historical  
OTHER Take 1 Tab by mouth nightly. OTC Neuriva (for brain): 1 tab qhs   Yes Provider, Historical  
Basaglar KwikPen U-100 Insulin 100 unit/mL (3 mL) inpn Inject 8 units every morning ONLY if blood sugar is over 150, otherwise hold. 6/19/20  Yes Nidia Chicas MD  
furosemide (LASIX) 80 mg tablet Take 1 Tab by mouth two (2) times a day. 9/17/19  Yes Nadine Parra MD  
cloNIDine HCl (CATAPRES) 0.2 mg tablet Take 0.4 mg by mouth two (2) times a day. Yes Provider, Historical  
minoxidil (LONITEN) 10 mg tablet Take 5 mg by mouth two (2) times a day. Yes Provider, Historical  
NIFEdipine ER (ADALAT CC) 60 mg ER tablet Take 60 mg by mouth every twelve (12) hours. Yes Provider, Historical  
atorvastatin (LIPITOR) 40 mg tablet Take 40 mg by mouth every evening. Yes Provider, Historical  
acetaminophen (TYLENOL) 500 mg tablet Take 1,000 mg by mouth every six (6) hours as needed for Pain. Yes Other, MD Corby  
folic acid-vit K5-EWK S79 (FOLTX) 2.5-25-2 mg tablet Take 1 Tab by mouth nightly. Yes Other, MD Corby  
omega-3 fatty acids (FISH OIL CONCENTRATE) 1,000 mg cap Take 1,000 mg by mouth two (2) times a day. Yes Provider, Historical  
BYSTOLIC 20 mg tablet Take 20 mg by mouth nightly. 12/15/17  Yes Provider, Historical  
PRALUENT PEN 75 mg/mL injector pen 75 mg by SubCUTAneous route Once every 2 weeks. 10/11/17  Yes Provider, Historical  
tamsulosin (FLOMAX) 0.4 mg capsule Take 0.4 mg by mouth nightly. Yes Provider, Historical  
clopidogrel (PLAVIX) 75 mg tablet Take 75 mg by mouth nightly.    Yes Provider, Historical  
 Insulin Needles, Disposable, 31 gauge x 3/16\" ndle USE DAILY AS DIRECTED 2/10/20   Víctor Jeronimo MD  
 
No Known Allergies Review of Systems:  Review of systems not obtained due to patient factors. 10 point review of systems obtained . All other systems negative Objective:  
Blood pressure (!) 214/54, pulse 66, temperature 99.3 °F (37.4 °C), temperature source Rectal, resp. rate 12, height 5' 8\" (1.727 m), weight 165 lb 9.1 oz (75.1 kg), SpO2 94 %. Temp (24hrs), Av.1 °F (37.3 °C), Min:98.4 °F (36.9 °C), Max:99.5 °F (37.5 °C) Current Facility-Administered Medications Medication Dose Route Frequency  niCARdipine (CARDENE) 25 mg in 0.9% sodium chloride 250 mL infusion  0-15 mg/hr IntraVENous TITRATE  cloNIDine HCL (CATAPRES) tablet 0.3 mg  0.3 mg Per NG tube BID  famotidine (PEPCID) tablet 20 mg  20 mg Oral DAILY  alcohol 62% (NOZIN) nasal  1 Ampule  1 Ampule Topical Q12H  
 fentaNYL citrate (PF) injection 50 mcg  50 mcg IntraVENous Q1H PRN  
 fentaNYL (PF) 1,500 mcg/30 mL (50 mcg/mL) infusion   mcg/hr IntraVENous TITRATE  propofol (DIPRIVAN) 10 mg/mL infusion  0-50 mcg/kg/min IntraVENous TITRATE  VANCOMYCIN INFORMATION NOTE   Other Rx Dosing/Monitoring  piperacillin-tazobactam (ZOSYN) 3.375 g in 0.9% sodium chloride (MBP/ADV) 100 mL  3.375 g IntraVENous Q12H  
 sodium chloride (NS) flush 5-40 mL  5-40 mL IntraVENous Q8H  
 sodium chloride (NS) flush 5-40 mL  5-40 mL IntraVENous PRN  
 acetaminophen (TYLENOL) tablet 650 mg  650 mg Oral Q6H PRN  
 ondansetron (ZOFRAN) injection 4 mg  4 mg IntraVENous Q6H PRN  
 LORazepam (ATIVAN) injection 2 mg  2 mg IntraVENous PRN  
 levETIRAcetam (KEPPRA) 500 mg in saline (iso-osm) 100 mL IVPB (premix)  500 mg IntraVENous Q12H  
 insulin lispro (HUMALOG) injection   SubCUTAneous Q6H  
 glucose chewable tablet 16 g  4 Tab Oral PRN  
 dextrose (D50W) injection syrg 12.5-25 g  12.5-25 g IntraVENous PRN  
  glucagon (GLUCAGEN) injection 1 mg  1 mg IntraMUSCular PRN  minoxidiL (LONITEN) tablet 5 mg  5 mg Per NG tube BID  nebivoloL (BYSTOLIC) tablet 20 mg  20 mg PEG Tube QHS  tamsulosin (FLOMAX) capsule 0.4 mg  0.4 mg Oral QHS  sodium chloride (NS) flush 5-40 mL  5-40 mL IntraVENous Q8H  
 sodium chloride (NS) flush 5-40 mL  5-40 mL IntraVENous PRN  
 acetaminophen (TYLENOL) tablet 1,000 mg  1,000 mg Oral Q6H  
 oxyCODONE IR (ROXICODONE) tablet 5 mg  5 mg Oral Q3H PRN  
 oxyCODONE IR (ROXICODONE) tablet 10 mg  10 mg Oral Q3H PRN  
 naloxone (NARCAN) injection 0.4 mg  0.4 mg IntraVENous PRN  
 hydrOXYzine HCL (ATARAX) tablet 10 mg  10 mg Oral Q8H PRN  
 senna-docusate (PERICOLACE) 8.6-50 mg per tablet 1 Tab  1 Tab Oral BID  polyethylene glycol (MIRALAX) packet 17 g  17 g Oral DAILY  bisacodyL (DULCOLAX) suppository 10 mg  10 mg Rectal DAILY PRN  phenol throat spray (CHLORASEPTIC) 1 Spray  1 Spray Oral PRN  
 benzocaine-menthoL (CEPACOL) lozenge 1 Lozenge  1 Lozenge Oral PRN  
 diazePAM (VALIUM) tablet 5 mg  5 mg Oral Q6H PRN  
 cyclobenzaprine (FLEXERIL) tablet 10 mg  10 mg Oral BID PRN Exam:   
General:  Sedated and on the ventilator. No acute distress. Eyes:  Sclera anicteric. Pupils equal, round, reactive to light. Mouth/Throat: Orotracheal tube in place. Neck: Brace, dressings on  
Lungs:   Clear to auscultation bilaterally, good effort. Cardiovascular:  Regular rate and rhythm, no murmur, click, rub, or gallop. Abdomen:   Soft, non-tender, bowel sounds normal, non-distended. Extremities: No cyanosis or edema. Skin: No acute rash or lesions. Lymph Nodes: Cervical and supraclavicular normal.  
Musculoskeletal:  No swelling or deformity. Lines/Devices:  Intact, no erythema, drainage, or tenderness. Psychiatric: Sedated and appears comfortable on ventilator. Data Review: CBC:  
Recent Labs  
  07/03/20 
0533 07/02/20 
0325 07/01/20 
1251 WBC 11.6* 11.6* 14.7*  
RBC 3.10* 3.48* 4.14  
HGB 10.4* 11.3* 13.8 HCT 32.7* 35.9* 43.1  416* 474* GRANS 69 81* 71 LYMPH 9* 8* 16 EOS 4 0 1 CMP:  
Recent Labs  
  07/03/20 
0533 07/02/20 
0325 07/01/20 
1251 * 169* 161* * 136 139  
K 4.5 4.1 3.5 CL 98 98 97 CO2 24 24 16* BUN 41* 48* 52* CREA 7.58* 7.84* 9.27* CA 8.8 8.7 9.5 AGAP 12 14 26* BUCR 5* 6* 6*  113 136* TP 6.9 6.9 8.5* ALB 2.2* 2.5* 3.1*  
GLOB 4.7* 4.4* 5.4* AGRAT 0.5* 0.6* 0.6* Lab Results Component Value Date/Time Culture result: NO ANAEROBES ISOLATED 07/01/2020 07:33 PM  
 Culture result: (A) 07/01/2020 07:33 PM  
  SCANT STAPHYLOCOCCUS EPIDERMIDIS SENSITIVITY TO FOLLOW Culture result: NO GROWTH 2 DAYS 07/01/2020 02:30 PM  
 Culture result: HEAVY PSEUDOMONAS AERUGINOSA (A) 09/11/2019 01:35 AM  
 Culture result: NO GROWTH 5 DAYS 09/10/2019 05:15 PM  
  
 
 
XR Results (most recent): 
Results from Hospital Encounter encounter on 07/01/20 XR CHEST PORT Narrative EXAM: XR CHEST PORT INDICATION: central line placement COMPARISON: 7/1/2020 FINDINGS: A portable AP radiograph of the chest was obtained at 1709 hours. A 
double lumen right IJ line is again shown terminating at the cavoatrial 
junction. Endotracheal tube is again shown terminating at the thoracic inlet. There is interval placement of a transesophageal tube extending into the 
stomach. There is interval placement of a left IJ line terminating in the 
proximal superior vena cava. No consolidation is shown. A borderline appearance 
of interstitial pulmonary edema is again noted. There is no pneumothorax or 
pleural effusion. Cardiac and mediastinal contours appear stable. .  
  
 Impression IMPRESSION: Lines and tubes as above. No acute findings. ICD-10-CM ICD-9-CM 1. Seizure ([address] R56.9 780.39   
2. Encephalopathy G93.40 348.30 3. Acute respiratory failure, unspecified whether with hypoxia or hypercapnia ([facility]) J96.00 518.81   
4. Cervical spinal cord compression ([facility]) G95.20 336.9 Antibiotic History Vancomycin/ Zosyn IV 7/1 Signed By: Bienvenido Araiza MD [facility]

## 2020-07-03 NOTE — PROCEDURES
[facility] Dialysis Team [place]  [phone] Vitals   Pre   Post   Assessment   Pre   Post    
Temp  Temp: 98.4 °F (36.9 °C) (07/03/20 0815)  99.3 Rectal LOC  Intubated and sedated. Opens eyes and becomes agitated at times. In soft wrist restraints. Same HR   Pulse (Heart Rate): 65 (07/03/20 0824) 65 Lungs   Diminished Same B/P   BP: 192/57 (07/03/20 0815) 192/65 Cardiac   NSR Same Resp   Resp Rate: 14 (07/03/20 0824) 14 Skin   Fragile/Dry Same Pain level   0 0 Edema  Trace BLE None Orders: Duration:   Start:    0815 End:    1200 Total:   3.5 hours Dialyzer:   Dialyzer/Set Up Inspection: Radha Feeling (07/03/20 0815) K Bath:   Dialysate K (mEq/L): 3 (07/03/20 0815) Ca Bath:   Dialysate CA (mEq/L): 2.5 (07/03/20 0815) Na/Bicarb:   Dialysate NA (mEq/L): 138 (07/03/20 0815) Target Fluid Removal:   Goal/Amount of Fluid to Remove (mL): 3000 mL (07/03/20 0815) Access Type & Location:   R Sub CVC: Dressing CDI dated for 7/2/20. No s/s of infection. Both lumens aspirate & flush well. Running well at . Labs Obtained/Reviewed Critical Results Called   Date when labs were drawn- 
Hgb-   
HGB Date Value Ref Range Status 07/03/2020 10.4 (L) 12.1 - 17.0 g/dL Final  
 
K-   
Potassium Date Value Ref Range Status 07/03/2020 4.5 3.5 - 5.1 mmol/L Final  
 
Ca-  
Calcium Date Value Ref Range Status 07/03/2020 8.8 8.5 - 10.1 MG/DL Final  
 
Bun-  
BUN Date Value Ref Range Status 07/03/2020 41 (H) 6 - 20 MG/DL Final  
 
Creat-  
Creatinine Date Value Ref Range Status 07/03/2020 7.58 (H) 0.70 - 1.30 MG/DL Final  
 
  
Medications/ Blood Products Given Name   Dose   Route and Time None Blood Volume Processed (BVP):    75.4L Net Fluid Removed:  3000ml Comments Time Out Done: 4660 Primary Nurse Rpt Pre: Telma Llanes RN 
Primary Nurse Rpt Post: Telma Llanes RN 
Pt Education: Access/ Procedural 
 Care Plan: Attempt to use AVF and pull permcath if successful. Tx Summary: SBAR received from Primary RN. Pt intubated and sedated. Wakes up agitated at times. Consent signed & on file. 0815: Cannulated L AVF. Venous access with great flash and blood return. Arterial access with poor blood return. Repositioned needle with no success. Recannulated arterial with a new needle. Asp still sluggish. Attempted to start treatment. Access pressures running extremely high with no success with repositioning needle. Dr. Awa Mayer aware. Switched over to the patients permcath   
 
0825: Both lumens of Garry/permcath disinfected with Prevantics per policy. Each lumen aspirated for blood return and flushed with Normal Saline per policy. VSS. Dialysis Tx restarted. 1200: Tx ended. VSS. All possible blood returned to patient. Central line catheter flushed with normal saline per policy. Ports disinfected with Prevantics per policy, lines disconnect and lines capped using aseptic technique. Bed locked and in the lowest position, call bell and belongings in reach. SBAR given to Primary, RN. Patient is stable at time of my departure. All Dialysis related medications have been reviewed. Admiting Diagnosis: [facility]omyel[facility]psis [facility] Consent signed - Informed Consent Verified: Yes (07/03/20 0815) Nemo Consent - Verified Hepatitis Status- Immune 9/11/19 Machine #- Machine Number: [id number] (07/03/20 6176) Telemetry status- Bedside

## 2020-07-03 NOTE — PROGRESS NOTES
NAME: Gertrudis Reveles Has :  1950 MRN:  [id number] Assessment :    Plan: 
--ESKD AVF/perm cath Acute respiratory failure on vent Seizure/AMS OM/discitis/abcess at C6-7 Sepsis Likely CLABSI at some point Severe HTN MWF HD at [address]; HD today (arterial needle would not work - could not get good flow; using perm cath; UF as able (mildly low Na and still HTN) BC NGTD () Pull perm cath if successful in using AVF Hold SERENITY for now (Hgb > 10) Subjective: Chief Complaint:  Intubated, eyes open. I spoke with his HD nurse. The patient was seen on dialysis at 9:29 AM .  BP is stable. Catheter is functioning well. Review of Systems: 
 
Symptom Y/N Comments  Symptom Y/N Comments Fever/Chills    Chest Pain Poor Appetite    Edema Cough    Abdominal Pain Sputum    Joint Pain SOB/RUBIO    Pruritis/Rash Nausea/vomit    Tolerating PT/OT Diarrhea    Tolerating Diet Constipation    Other Could not obtain due to: See above Objective: VITALS:  
Last 24hrs VS reviewed since prior progress note. Most recent are: 
Visit Vitals /57 Pulse 61 Temp 99.3 °F (37.4 °C) Resp 12 Ht 5' 8\" (1.727 m) Wt 75.1 kg (165 lb 9.1 oz) SpO2 100% BMI 25.17 kg/m² Intake/Output Summary (Last 24 hours) at 7/3/2020 0703 Last data filed at 7/3/2020 0400 Gross per 24 hour Intake 941.39 ml Output 9 ml Net 932.39 ml Telemetry Reviewed: PHYSICAL EXAM: 
General: Intubated in the icu No corazon Left ue avf with thrill/bruit Lab Data Reviewed: (see below) Medications Reviewed: (see below) PMH/SH reviewed - no change compared to H&P 
________________________________________________________________________ Care Plan discussed with: 
Patient Family RN Care Manager Consultant:     
 
  Comments >50% of visit spent in counseling and coordination of care    
 
________________________________________________________________________ Samir Richards MD  
 
Procedures: see electronic medical records for all procedures/Xrays and details which 
were not copied into this note but were reviewed prior to creation of Plan. LABS: 
Recent Labs  
  07/03/20 
0533 07/02/20 
0325 WBC 11.6* 11.6* HGB 10.4* 11.3* HCT 32.7* 35.9*  
 416* Recent Labs  
  07/03/20 
0533 07/02/20 
0325 07/01/20 
1251 * 136 139  
K 4.5 4.1 3.5 CL 98 98 97 CO2 24 24 16* BUN 41* 48* 52* CREA 7.58* 7.84* 9.27* * 169* 161* CA 8.8 8.7 9.5 Recent Labs  
  07/03/20 
0533 07/02/20 
0325 07/01/20 
1251  113 136* TP 6.9 6.9 8.5* ALB 2.2* 2.5* 3.1*  
GLOB 4.7* 4.4* 5.4* Recent Labs  
  07/01/20 
1251 INR 1.0 PTP 10.0 APTT 27.0 No results for input(s): FE, TIBC, PSAT, FERR in the last 72 hours. Lab Results Component Value Date/Time Folate 42.1 (H) 09/08/2012 10:24 AM  
  
No results for input(s): PH, PCO2, PO2 in the last 72 hours. Recent Labs  
  07/01/20 
1720  CKMB 1.9 No components found for: [place] Lab Results Component Value Date/Time Color YELLOW/STRAW 07/01/2020 01:52 PM  
 Appearance CLEAR 07/01/2020 01:52 PM  
 Specific gravity 1.017 07/01/2020 01:52 PM  
 pH (UA) 8.0 07/01/2020 01:52 PM  
 Protein 300 (A) 07/01/2020 01:52 PM  
 Glucose 100 (A) 07/01/2020 01:52 PM  
 Ketone 15 (A) 07/01/2020 01:52 PM  
 Bilirubin Negative 07/01/2020 01:52 PM  
 Urobilinogen 0.2 07/01/2020 01:52 PM  
 Nitrites Negative 07/01/2020 01:52 PM  
 Leukocyte Esterase Negative 07/01/2020 01:52 PM  
 Epithelial cells FEW 07/01/2020 01:52 PM  
 Bacteria Negative 07/01/2020 01:52 PM  
 WBC 0-4 07/01/2020 01:52 PM  
 RBC 0-5 07/01/2020 01:52 PM  
 
 
MEDICATIONS: 
Current Facility-Administered Medications Medication Dose Route Frequency  famotidine (PEPCID) tablet 20 mg  20 mg Oral DAILY  alcohol 62% (NOZIN) nasal  1 Ampule  1 Ampule Topical Q12H  
 fentaNYL citrate (PF) injection 50 mcg  50 mcg IntraVENous Q1H PRN  
 fentaNYL (PF) 1,500 mcg/30 mL (50 mcg/mL) infusion   mcg/hr IntraVENous TITRATE  niCARdipine (CARDENE) 25 mg in 0.9% sodium chloride 250 mL infusion  0-15 mg/hr IntraVENous TITRATE  propofol (DIPRIVAN) 10 mg/mL infusion  0-50 mcg/kg/min IntraVENous TITRATE  VANCOMYCIN INFORMATION NOTE   Other Rx Dosing/Monitoring  piperacillin-tazobactam (ZOSYN) 3.375 g in 0.9% sodium chloride (MBP/ADV) 100 mL  3.375 g IntraVENous Q12H  
 sodium chloride (NS) flush 5-40 mL  5-40 mL IntraVENous Q8H  
 sodium chloride (NS) flush 5-40 mL  5-40 mL IntraVENous PRN  
 acetaminophen (TYLENOL) tablet 650 mg  650 mg Oral Q6H PRN  
 ondansetron (ZOFRAN) injection 4 mg  4 mg IntraVENous Q6H PRN  
 LORazepam (ATIVAN) injection 2 mg  2 mg IntraVENous PRN  
 levETIRAcetam (KEPPRA) 500 mg in saline (iso-osm) 100 mL IVPB (premix)  500 mg IntraVENous Q12H  
 dexmedeTOMidine (PRECEDEX) 400 mcg in 0.9% sodium chloride 100 mL infusion  0.2-1.4 mcg/kg/hr IntraVENous TITRATE  insulin lispro (HUMALOG) injection   SubCUTAneous Q6H  
 glucose chewable tablet 16 g  4 Tab Oral PRN  
 dextrose (D50W) injection syrg 12.5-25 g  12.5-25 g IntraVENous PRN  
 glucagon (GLUCAGEN) injection 1 mg  1 mg IntraMUSCular PRN  minoxidiL (LONITEN) tablet 5 mg  5 mg Per NG tube BID  nebivoloL (BYSTOLIC) tablet 20 mg  20 mg PEG Tube QHS  cloNIDine HCL (CATAPRES) tablet 0.2 mg  0.2 mg Per NG tube BID  tamsulosin (FLOMAX) capsule 0.4 mg  0.4 mg Oral QHS  sodium chloride (NS) flush 5-40 mL  5-40 mL IntraVENous Q8H  
 sodium chloride (NS) flush 5-40 mL  5-40 mL IntraVENous PRN  
 acetaminophen (TYLENOL) tablet 1,000 mg  1,000 mg Oral Q6H  
 oxyCODONE IR (ROXICODONE) tablet 5 mg  5 mg Oral Q3H PRN  
  oxyCODONE IR (ROXICODONE) tablet 10 mg  10 mg Oral Q3H PRN  
 naloxone (NARCAN) injection 0.4 mg  0.4 mg IntraVENous PRN  
 hydrOXYzine HCL (ATARAX) tablet 10 mg  10 mg Oral Q8H PRN  
 senna-docusate (PERICOLACE) 8.6-50 mg per tablet 1 Tab  1 Tab Oral BID  polyethylene glycol (MIRALAX) packet 17 g  17 g Oral DAILY  bisacodyL (DULCOLAX) suppository 10 mg  10 mg Rectal DAILY PRN  phenol throat spray (CHLORASEPTIC) 1 Spray  1 Spray Oral PRN  
 benzocaine-menthoL (CEPACOL) lozenge 1 Lozenge  1 Lozenge Oral PRN  
 diazePAM (VALIUM) tablet 5 mg  5 mg Oral Q6H PRN  
 cyclobenzaprine (FLEXERIL) tablet 10 mg  10 mg Oral BID PRN

## 2020-07-03 NOTE — PROGRESS NOTES
Hospitalist Progress Note NAME: Gertrudis Hinson :  1950 MRN:  [id number] Assessment / Plan: 
Epidural abscess with Diskitis c6-c7 s/p C5- T1 ACDF with partial C6 and C7 corpectomy Sepsis, POA due to above Presented with AMS and Seizure CT Neck showed Osteomyelitis-discitis at C6-C7 with erosive endplate changes, prevertebral and epidural phlegmon, resulting in severe spinal canal stenosis and cord compression at C6-C7. S/p C5 through T1 ACDF with partial C6 and C7 corpectomy  by Ortho Tissue cultures sent, [address] pending, so far no growth IV keppra, discussed with neuro, can stop keppra after 1 week 
-Ortho spine surgeon on board 
-ID on board. 
-Continue Vanc/zosyn Acute Respiratory Failure POA 
/2 above and for airway protection 
-Vent management as per Pulmonary 
 
  
ESRD on HD MWF. Has permacath. --has AVF 
--nephrology consult appreciated 
  
HARDIK on cpap --intubated in ER for airway protection.  
 
 
  
CAD, hx stents . Follow with Dr. Capo Young. Echo 2018 EF 60-65% Paroxysmal afib 2019, not on anticoagulation Malignant HTN /78 Elevated trop 0.19, has chronic mild troponin elevation likely due to CKD. EKG without active ischemia. 
--hold plavix and aspirin, resume once okay with Surgeon 
--restart bystolic, clonidine, minoxidil  
  
DM 
--put on low dose SSI. BS 160s - 
  
Gout 
--resume allopurinol 
  
GERD, hx PUD 
--IV protonix 
  
BPH 
PAD 
S/p left CEA  by Dr. Simms January Hx aortobifemoral graft Distal AAA borderline aneurysm 2017 Hx chronic anemia, hgb normal today 13.8 
  
--Body mass index is 25.31 kg/m². 
  
Code: full code DVT prophylaxis: SCD Surrogate decision maker:  Jenaro Araiza [phone] Subjective: Chief Complaint / Reason for Physician Visit \"Intubated and sedated with Propofol. .  Discussed with RN events overnight. Getting hemodialysis Review of Systems: Symptom Y/N Comments  Symptom Y/N Comments Fever/Chills    Chest Pain Poor Appetite    Edema Cough    Abdominal Pain Sputum    Joint Pain SOB/RUBIO    Pruritis/Rash Nausea/vomit    Tolerating PT/OT Diarrhea    Tolerating Diet Constipation    Other Could NOT obtain due to: Intubated Objective: VITALS:  
Last 24hrs VS reviewed since prior progress note. Most recent are: 
Patient Vitals for the past 24 hrs: 
 Temp Pulse Resp BP SpO2  
07/03/20 1230  69 17 (!) 214/54 100 % 07/03/20 1211  70 20  100 % 07/03/20 1200 99.3 °F (37.4 °C) 65 14 192/65 100 % 07/03/20 1145  64 13 148/55 100 % 07/03/20 1130  66 13 148/57 100 % 07/03/20 1115  (!) 54 12 154/46 100 % 07/03/20 1100  (!) 56 12 172/53 100 % 07/03/20 1045  67 14 194/64 100 % 07/03/20 1030  65 14 175/60 100 % 07/03/20 1015  64 12 156/59 100 % 07/03/20 1000  64 12 150/55 100 % 07/03/20 0945  64 12 153/59 100 % 07/03/20 0930  64 12 148/56 100 % 07/03/20 0915  64 12 155/55 100 % 07/03/20 0900  63 13 145/49 98 % 07/03/20 0845  65 14 183/51 100 % 07/03/20 0830  62 12 161/43 100 % 07/03/20 0824  65 14  100 % 07/03/20 0815 98.4 °F (36.9 °C) 63 14 192/57 100 % 07/03/20 0800  (!) 56 16 182/51 97 % 07/03/20 0730  (!) 59 13 168/44 99 % 07/03/20 0706 98.6 °F (37 °C)      
07/03/20 0700  (!) 57 14 159/42   
07/03/20 0641    171/57   
07/03/20 0600  61 12 187/40   
07/03/20 0525  97   100 % 07/03/20 0500  (!) 54 12 170/54   
07/03/20 0400 99.3 °F (37.4 °C) 63 18 (!) 211/46   
07/03/20 0323  61 17  100 % 07/03/20 0300  (!) 51 13 162/40 100 % 07/03/20 0200 99.5 °F (37.5 °C) (!) 55 10 150/40 100 % 07/03/20 0130    155/42   
07/03/20 0100  64 13 192/52 (!) 85 % 07/03/20 0000 99.5 °F (37.5 °C) (!) 56 16 149/44 98 % 07/02/20 2322  61 18  100 % 07/02/20 2300  (!) 58 12 160/46 98 % 07/02/20 2200 99.2 °F (37.3 °C) (!) 55 18 154/40 98 % 07/02/20 2100  60 21 162/41 98 % 07/02/20 2000  (!) 56 15 163/44 100 % 07/02/20 1947 98.7 °F (37.1 °C) (!) 52 12  99 % 07/02/20 1900  (!) 52 13 130/40 98 % 07/02/20 1800  (!) 55 13 140/41 99 % 07/02/20 1700  (!) 53 18 159/45 99 % 07/02/20 1518 99.4 °F (37.4 °C) (!) 51 12  99 % 07/02/20 1500  (!) 51 14 140/46 99 % 07/02/20 1430  (!) 50 16 125/45 98 % 07/02/20 1400  (!) 50 17 120/47 98 % 07/02/20 1330  (!) 53 11 134/48 98 % 07/02/20 1300  (!) 52 15 137/45 99 % Intake/Output Summary (Last 24 hours) at 7/3/2020 1250 Last data filed at 7/3/2020 1228 Gross per 24 hour Intake 1342.67 ml Output 3009 ml Net -1666.33 ml PHYSICAL EXAM: 
General: WD, WN. intubated EENT:  EOMI. Anicteric sclerae. MMM Resp:  CTA bilaterally, no wheezing or rales. No accessory muscle use CV:  Regular  rhythm,  No edema GI:  Soft, Non distended, Non tender.  +Bowel sounds Neurologic:      intubated Psych:   intubated Skin:  No rashes. No jaundice Reviewed most current lab test results and cultures  YES Reviewed most current radiology test results   YES Review and summation of old records today    NO Reviewed patient's current orders and MAR    YES 
PMH/ reviewed - no change compared to H&P 
________________________________________________________________________ Care Plan discussed with: 
  Comments Patient Family  x   
RN x Care Manager Consultant Multidiciplinary team rounds were held today with , nursing, pharmacist and clinical coordinator. Patient's plan of care was discussed; medications were reviewed and discharge planning was addressed. ________________________________________________________________________ Total NON critical care TIME:  28   Minutes Total CRITICAL CARE TIME Spent:   Minutes non procedure based Comments >50% of visit spent in counseling and coordination of care ________________________________________________________________________ Angelo Stokes MD  
 
Procedures: see electronic medical records for all procedures/Xrays and details which were not copied into this note but were reviewed prior to creation of Plan. LABS: 
I reviewed today's most current labs and imaging studies. Pertinent labs include: 
Recent Labs  
  07/03/20 0533 07/02/20 0325 07/01/20 
1251 WBC 11.6* 11.6* 14.7* HGB 10.4* 11.3* 13.8 HCT 32.7* 35.9* 43.1  416* 474* Recent Labs  
  07/03/20 0533 07/02/20 
0325 07/01/20 
1251 * 136 139  
K 4.5 4.1 3.5 CL 98 98 97 CO2 24 24 16* * 169* 161* BUN 41* 48* 52* CREA 7.58* 7.84* 9.27* CA 8.8 8.7 9.5 ALB 2.2* 2.5* 3.1* TBILI 0.8 0.4 0.5 ALT 12 17 18 INR  --   --  1.0 Signed: Angelo Stokes MD

## 2020-07-03 NOTE — PROGRESS NOTES
1900 - 2000 Bedside and Verbal shift change report given to Laila Bell (oncoming nurse) by Clair Metzger (offgoing nurse). Report included the following information SBAR, Procedure Summary, Intake/Output, MAR, Recent Results and Cardiac Rhythm SR.  
 
2000 - 2200 Shift assessment complete, intubated, sedated on propofol and fentanyl. Neck / collar in place  VSS, will continue to monitor. Mulu [address] on call was paged regarding \" flomax\" that cannot be crushed and \"bystolic\" with low BP and bradycardia. 0000 - 0200 Reassessment complete, no changes. MAGED drain was pulled as ordered. Incontinence care done for dark loose stool. 0200 - 0400 Bathed with soap / water / CHG. Jessica Peeling / Millard Jonnie etc ... changed. See flow sheet for details. 0400 - 0600 Reassessment complete, no changes noted from previous assessment. See summary for details. Propofol held for SAT / SBT. See RT note for details. 0600 - 0800 Resting quietly in bed. Bedside and Verbal shift change report given to Segun Vicente (oncoming nurse) by Laila Bell (offgoing nurse).  Report included the following information SBAR, Procedure Summary, Intake/Output, MAR, Recent Results and Cardiac Rhythm SB.

## 2020-07-03 NOTE — PROGRESS NOTES
07/03/20 8844 ABCDEF Bundle SBT Trial Passed No  
SBT Trial Reason for Failure Respiratory distress; Respiratory rate > 35

## 2020-07-03 NOTE — PROGRESS NOTES
CRITICAL CARE: 
 
No acute events overnight Sedated, intubated Undergoing RRT 
 
ROS: Unable to obtain full ROS as patient is intubated PE:  
Vitals stable Gen: Elderly, chronically ill appearing male intubated HEENT: ETT in place Neck: Ccollar Mouth: ETT 
CV: Regular Lungs: CTA B/L, no wheezing GI: Soft, no distention Ext: No edema, constant jerking motion of upper and lower extremities Neuro: Sedated, RASS -3 Msk: Muscle wasting Lab Results Component Value Date/Time WBC 11.6 (H) 07/03/2020 05:33 AM  
 HGB 10.4 (L) 07/03/2020 05:33 AM  
 Hematocrit (POC) 32 (L) 10/17/2019 02:16 PM  
 HCT 32.7 (L) 07/03/2020 05:33 AM  
 PLATELET 412 45/72/3636 05:33 AM  
 .5 (H) 07/03/2020 05:33 AM  
 
Lab Results Component Value Date/Time Sodium 134 (L) 07/03/2020 05:33 AM  
 Potassium 4.5 07/03/2020 05:33 AM  
 Chloride 98 07/03/2020 05:33 AM  
 CO2 24 07/03/2020 05:33 AM  
 Anion gap 12 07/03/2020 05:33 AM  
 Glucose 102 (H) 07/03/2020 05:33 AM  
 BUN 41 (H) 07/03/2020 05:33 AM  
 Creatinine 7.58 (H) 07/03/2020 05:33 AM  
 BUN/Creatinine ratio 5 (L) 07/03/2020 05:33 AM  
 GFR est AA 9 (L) 07/03/2020 05:33 AM  
 GFR est non-AA 7 (L) 07/03/2020 05:33 AM  
 Calcium 8.8 07/03/2020 05:33 AM  
 
CXR reviewed: no acute changes Impression: 
-acute respiratory failure requiring intubation due to inability to protect the airway 
-suspected seizures 
-marked hypertension 
-abnormal Cspine imaging as noted above; s/p emergent surgery 7/1 PM for debridement of epidural abscess and ACDF of C5-T1 
-history of paroxysmal afib 
-ESRD on HD M/W/F 
-DM Plan: 
-continue vent support 
-keppra given in the ER, continue for now 
-cardene as needed for BP control -EEG without seizures 
-neuro following 
-cultures pending 
-broad spectrum antibiotics with vanc and zosyn  
-ortho following closely 
-HD per renal 
-accuchecks, SSI 
-initiate DVT ppx when ok with ortho; SCD for now Of note, in reviewing the chart the patient was a DNR in September during an admission at that time. This will need to be clarified.   
 
Maria L Rosenberg MD

## 2020-07-03 NOTE — PROGRESS NOTES
Pharmacy Automatic Renal Dosing Protocol - Antimicrobials Indication for Antimicrobials: osteomyelitis/cervical diskitis ACDF of C5-T1 Current Regimen of Each Antimicrobial: 
Vancomycin pharmacy to dose - MWF dialysis (Start Date ; Day # 3) Zosyn 3.375g IV every 12h (start: , day 3) Previous Antimicrobial Therapy: 
Cefepime 2g IV x 1 (Start Date ; Day Goal Level: VANCOMYCIN TROUGH GOAL RANGE Vancomycin Trough: 20 - 25 mcg/mL  (AUC: 400 - 600 mg/hr/Liter/day) Date Dose & Interval Measured (mcg/mL) Extrapolated (mcg/mL)  
7/3/20 750 mg IV after HD 23.4 Date & time of next level: tbd Significant Cultures:  
: paired blood - ngsf (pending) :  epidural abscess - ngsf (pending) :  anareobes - (pending) Radiology / Imaging results: (X-ray, CT scan or MRI):  
: CTA head/neck:  55% stenosis in neck, emboli in bilateral M2 and M3 branches in head Paralysis, amputations, malnutrition: none Labs: 
Recent Labs  
  20 
0533 20 
0325 20 
1251 CREA 7.58* 7.84* 9.27* BUN 41* 48* 52* WBC 11.6* 11.6* 14.7* Temp (24hrs), Av.1 °F (37.3 °C), Min:98.4 °F (36.9 °C), Max:99.5 °F (37.5 °C) Creatinine Clearance (mL/min) or Dialysis: MWF dialysis Impression/Plan: · Vancomycin random level = 23.4, will decrease post-HD dosing from 750mg to 500 mg after each HD 
· Continue zosyn dosed for HD · Antimicrobial stop date to be determined Pharmacy will follow daily and adjust medications as appropriate for renal function and/or serum levels. Thank you, Lex Irving, [address] [facility]

## 2020-07-04 NOTE — PROGRESS NOTES
07/04/20 0535 ABCDEF Bundle SBT Trial Passed No  
SBT Trial Reason for Failure Low tidal volume; Respiratory rate < 8

## 2020-07-04 NOTE — PROGRESS NOTES
Infectious Disease Progress IMPRESSION:  
- Severe sepsis with, change in mental status & seizure - Epidural abscess OM, discitis C 6/7 Erosive endplate changes, prevertebral and epidural phlegmon, resulting in severe spinal 
canal stenosis and cord compression at C6-C7. On CT neck C5-T1 Anterior Cervical Discectomy Fusion & C6-C7 Corepectomy on 7/1 
- BC- 7/01- NG 
- Tissue culture - scant Staph epidermidis- ( Ox R )  anaerobic - NG 
- Acute respiratory failure intubated - ESRD on HD Has permacath & AVF( created 10/17/19) H/o PD & peritonitis Fld culture - 9/11 /19 + for Pseudomonas ( sensitive to Zosyn) s/p removal of PD on 9/12/19 
  - ParoxysmalAfib 
- HARDIK - CAD h/o stenting PLAN:  
  
 
- Continue Vancomycin/ Zosyn IV  Pending finalizatio of cultures - Plan for removal of Permacath, probable source of infectious process. PD related peritonitis was treated back in Sept '19 , less likely source of infection . Culture results will be helpful in determining etiology of infection - Recommend repeat BC with fevers >101 ,  
- ECHO , GAURAV Subjective:  
 
Pt seen . Intubated D/w RN events of day Patient Active Problem List  
Diagnosis Code  Uncontrolled type 2 diabetes mellitus with diabetic nephropathy, without long-term current use of insulin ([facility]) E11.21, E11.65  
 Essential hypertension, benign I10  
 Hyperlipidemia LDL goal <70 E78.5  Iron deficiency anemia D50.9  SOB (shortness of breath) R06.02  
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  
 Acute respiratory failure (HCC) J96.00  Bilateral pneumonia J18.9  Pulmonary edema J81.1  Sepsis ([address] A41.9  VIVIEN (acute kidney injury) ([address] N17.9  Orthostatic dizziness R42  Acute on chronic renal failure (HCC) N17.9, N18.9  Malignant hypertension I10  
 Seizure ([facility] R56.9  Cervical discitis M46.42  
 Acute encephalopathy G93.40 Past Medical History:  
Diagnosis Date  AAA (abdominal aortic aneurysm) ([facility] 34mm 2017  Anemia   
 gets shots from Dr. Alina Ross - last dose 2018  BPH (benign prostatic hypertrophy)  CAD (coronary artery disease) s/p stents, sees Dr. Solomon Brian  Cancer ([facility] skin cancer on leg  Chronic kidney disease [facility]-F [facility]  End stage renal disease ([facility] Dr. Alina Ross  GERD (gastroesophageal reflux disease)  Gout  Other and unspecified hyperlipidemia  PUD (peptic ulcer disease)  PVD (peripheral vascular disease) ([facility]   
 s/p PTCA of left femoral artery in 2014  Sleep apnea CPAP  Type II or unspecified type diabetes mellitus without mention of complication, uncontrolled Dr. Ti Nur  Unspecified essential hypertension  Unspecified vitamin D deficiency Family History Problem Relation Age of Onset  Diabetes Mother  Heart Disease Mother  Heart Disease Maternal Grandmother  Diabetes Daughter   
     [facility] Diabetes Sister  Stroke Sister  Cancer Father  Hypertension Father Social History Tobacco Use  Smoking status: Former Smoker Packs/day: 2.00 Years: 25.00 Pack years: 50.00 Last attempt to quit: 3/11/1991 Years since quittin.3  Smokeless tobacco: Never Used Substance Use Topics  Alcohol use: Yes Comment: very occasional  
 
Past Surgical History:  
Procedure Laterality Date  CARDIAC SURG PROCEDURE UNLIST    
 stents  HX CATARACT REMOVAL    
 HX COLONOSCOPY    
 HX CORONARY STENT PLACEMENT    
 HX ENDOSCOPY    
 HX KNEE ARTHROSCOPY    
 right x2, left x1  
 HX OTHER SURGICAL    
 skin cancer removed from leg  IR INSERT NON TUNL CVC OVER 5 YRS  2019  IR INSERT TUNL CVC W/O PORT OVER 5 YR  9/16/2019  VASCULAR SURGERY PROCEDURE UNLIST    
 aorto-bifem bypass  VASCULAR SURGERY PROCEDURE UNLIST    
 left carotid endarterectomy  VASCULAR SURGERY PROCEDURE UNLIST    
 right femoral PTCA Prior to Admission medications Medication Sig Start Date End Date Taking? Authorizing Provider  
ferric citrate (Auryxia) 210 mg iron tablet Take 630 mg by mouth three (3) times daily (with meals). Yes Provider, Historical  
D3-E-Se-soy kakxp-jgfutw-gzhax (Prostate 2.4) 1,200-15-35 unit-unit-mcg cap Take 1 Cap by mouth two (2) times a day. OTC Super Beta Prostate 1 cap bid   Yes Provider, Historical  
OTHER Take 1 Tab by mouth nightly. OTC Neuriva (for brain): 1 tab qhs   Yes Provider, Historical  
Basaglar KwikPen U-100 Insulin 100 unit/mL (3 mL) inpn Inject 8 units every morning ONLY if blood sugar is over 150, otherwise hold. 6/19/20  Yes Clay Treadwell MD  
furosemide (LASIX) 80 mg tablet Take 1 Tab by mouth two (2) times a day. 9/17/19  Yes Marcos Prieto MD  
cloNIDine HCl (CATAPRES) 0.2 mg tablet Take 0.4 mg by mouth two (2) times a day. Yes Provider, Historical  
minoxidil (LONITEN) 10 mg tablet Take 5 mg by mouth two (2) times a day. Yes Provider, Historical  
NIFEdipine ER (ADALAT CC) 60 mg ER tablet Take 60 mg by mouth every twelve (12) hours. Yes Provider, Historical  
atorvastatin (LIPITOR) 40 mg tablet Take 40 mg by mouth every evening. Yes Provider, Historical  
acetaminophen (TYLENOL) 500 mg tablet Take 1,000 mg by mouth every six (6) hours as needed for Pain. Yes Other, MD Corby  
folic acid-vit I0-RPE F48 (FOLTX) 2.5-25-2 mg tablet Take 1 Tab by mouth nightly. Yes Other, MD Corby  
omega-3 fatty acids (FISH OIL CONCENTRATE) 1,000 mg cap Take 1,000 mg by mouth two (2) times a day. Yes Provider, Historical  
BYSTOLIC 20 mg tablet Take 20 mg by mouth nightly.  12/15/17  Yes Provider, Historical  
 PRALUENT PEN 75 mg/mL injector pen 75 mg by SubCUTAneous route Once every 2 weeks. 10/11/17  Yes Provider, Historical  
tamsulosin (FLOMAX) 0.4 mg capsule Take 0.4 mg by mouth nightly. Yes Provider, Historical  
clopidogrel (PLAVIX) 75 mg tablet Take 75 mg by mouth nightly. Yes Provider, Historical  
Insulin Needles, Disposable, 31 gauge x 3/16\" ndle USE DAILY AS DIRECTED 2/10/20   Tana Houser MD  
 
No Known Allergies Review of Systems:  Review of systems not obtained due to patient factors. 10 point review of systems obtained . All other systems negative Objective:  
Blood pressure 164/40, pulse (!) 56, temperature 99.1 °F (37.3 °C), resp. rate 12, height 5' 8\" (1.727 m), weight 158 lb 11.7 oz (72 kg), SpO2 100 %. Temp (24hrs), Av.9 °F (37.2 °C), Min:98 °F (36.7 °C), Max:100.1 °F (37.8 °C) Current Facility-Administered Medications Medication Dose Route Frequency  niCARdipine (CARDENE) 25 mg in 0.9% sodium chloride 250 mL infusion  0-15 mg/hr IntraVENous TITRATE  cloNIDine HCL (CATAPRES) tablet 0.3 mg  0.3 mg Per NG tube BID  chlorhexidine (ORAL CARE KIT) 0.12 % mouthwash 15 mL  15 mL Oral Q12H  
 famotidine (PEPCID) tablet 20 mg  20 mg Oral DAILY  alcohol 62% (NOZIN) nasal  1 Ampule  1 Ampule Topical Q12H  
 fentaNYL citrate (PF) injection 50 mcg  50 mcg IntraVENous Q1H PRN  
 fentaNYL (PF) 1,500 mcg/30 mL (50 mcg/mL) infusion   mcg/hr IntraVENous TITRATE  propofol (DIPRIVAN) 10 mg/mL infusion  0-50 mcg/kg/min IntraVENous TITRATE  VANCOMYCIN INFORMATION NOTE   Other Rx Dosing/Monitoring  piperacillin-tazobactam (ZOSYN) 3.375 g in 0.9% sodium chloride (MBP/ADV) 100 mL  3.375 g IntraVENous Q12H  
 sodium chloride (NS) flush 5-40 mL  5-40 mL IntraVENous Q8H  
 sodium chloride (NS) flush 5-40 mL  5-40 mL IntraVENous PRN  
 acetaminophen (TYLENOL) tablet 650 mg  650 mg Oral Q6H PRN  
 ondansetron (ZOFRAN) injection 4 mg  4 mg IntraVENous Q6H PRN  
  LORazepam (ATIVAN) injection 2 mg  2 mg IntraVENous PRN  
 levETIRAcetam (KEPPRA) 500 mg in saline (iso-osm) 100 mL IVPB (premix)  500 mg IntraVENous Q12H  
 insulin lispro (HUMALOG) injection   SubCUTAneous Q6H  
 glucose chewable tablet 16 g  4 Tab Oral PRN  
 dextrose (D50W) injection syrg 12.5-25 g  12.5-25 g IntraVENous PRN  
 glucagon (GLUCAGEN) injection 1 mg  1 mg IntraMUSCular PRN  minoxidiL (LONITEN) tablet 5 mg  5 mg Per NG tube BID  sodium chloride (NS) flush 5-40 mL  5-40 mL IntraVENous Q8H  
 sodium chloride (NS) flush 5-40 mL  5-40 mL IntraVENous PRN  
 acetaminophen (TYLENOL) tablet 1,000 mg  1,000 mg Oral Q6H  
 oxyCODONE IR (ROXICODONE) tablet 5 mg  5 mg Oral Q3H PRN  
 oxyCODONE IR (ROXICODONE) tablet 10 mg  10 mg Oral Q3H PRN  
 naloxone (NARCAN) injection 0.4 mg  0.4 mg IntraVENous PRN  
 hydrOXYzine HCL (ATARAX) tablet 10 mg  10 mg Oral Q8H PRN  
 senna-docusate (PERICOLACE) 8.6-50 mg per tablet 1 Tab  1 Tab Oral BID  polyethylene glycol (MIRALAX) packet 17 g  17 g Oral DAILY  bisacodyL (DULCOLAX) suppository 10 mg  10 mg Rectal DAILY PRN  phenol throat spray (CHLORASEPTIC) 1 Spray  1 Spray Oral PRN  
 benzocaine-menthoL (CEPACOL) lozenge 1 Lozenge  1 Lozenge Oral PRN  
 diazePAM (VALIUM) tablet 5 mg  5 mg Oral Q6H PRN  
 cyclobenzaprine (FLEXERIL) tablet 10 mg  10 mg Oral BID PRN Exam:   
General:  Sedated and on the ventilator. No acute distress. Eyes:  Sclera anicteric. Pupils equal, round, reactive to light. Mouth/Throat: Orotracheal tube in place. Neck: Brace, dressings on  
Lungs:   Clear to auscultation bilaterally, good effort. Cardiovascular:  Regular rate and rhythm, no murmur, click, rub, or gallop. Abdomen:   Soft, non-tender, bowel sounds normal, non-distended. Extremities: No cyanosis or edema. Skin: No acute rash or lesions. Lymph Nodes: Cervical and supraclavicular normal.  
Musculoskeletal:  No swelling or deformity. Lines/Devices:  Intact, no erythema, drainage, or tenderness. Psychiatric: Sedated and appears comfortable on ventilator. Data Review: CBC:  
Recent Labs  
  07/04/20 0253 07/03/20 0533 07/02/20 
0325 WBC 9.3 11.6* 11.6*  
RBC 3.10* 3.10* 3.48* HGB 10.4* 10.4* 11.3* HCT 32.4* 32.7* 35.9*  
 375 416* GRANS  --  69 81* LYMPH  --  9* 8* EOS  --  4 0 CMP:  
Recent Labs  
  07/04/20 0253 07/03/20 0533 07/02/20 
0325 * 102* 169* * 134* 136  
K 4.5 4.5 4.1 CL 96* 98 98 CO2 23 24 24 BUN 34* 41* 48* CREA 5.61* 7.58* 7.84* CA 8.5 8.8 8.7 AGAP 14 12 14 BUCR 6* 5* 6* AP  --  100 113 TP  --  6.9 6.9 ALB  --  2.2* 2.5*  
GLOB  --  4.7* 4.4* AGRAT  --  0.5* 0.6* Lab Results Component Value Date/Time Culture result: NO ANAEROBES ISOLATED 07/01/2020 07:33 PM  
 Culture result: (A) 07/01/2020 07:33 PM  
  SCANT STAPHYLOCOCCUS EPIDERMIDIS (OXACILLIN RESISTANT) Culture result: NO GROWTH 3 DAYS 07/01/2020 02:30 PM  
 Culture result: HEAVY PSEUDOMONAS AERUGINOSA (A) 09/11/2019 01:35 AM  
 Culture result: NO GROWTH 5 DAYS 09/10/2019 05:15 PM  
  
 
 
XR Results (most recent): 
Results from Hospital Encounter encounter on 07/01/20 XR CHEST PORT Narrative EXAM: XR CHEST PORT INDICATION: central line placement COMPARISON: 7/1/2020 FINDINGS: A portable AP radiograph of the chest was obtained at 1709 hours. A 
double lumen right IJ line is again shown terminating at the cavoatrial 
junction. Endotracheal tube is again shown terminating at the thoracic inlet. There is interval placement of a transesophageal tube extending into the 
stomach. There is interval placement of a left IJ line terminating in the 
proximal superior vena cava. No consolidation is shown. A borderline appearance 
of interstitial pulmonary edema is again noted. There is no pneumothorax or 
pleural effusion. Cardiac and mediastinal contours appear stable. Macario Angry Impression IMPRESSION: Lines and tubes as above. No acute findings. ICD-10-CM ICD-9-CM 1. Seizure ([facility] R56.9 780.39   
2. Encephalopathy G93.40 348.30   
3. Acute respiratory failure, unspecified whether with hypoxia or hypercapnia ([facility]) J96.00 518.81   
4. Cervical spinal cord compression ([facility]) G95.20 336.9 5. Acute osteomyelitis of cervical spine ([facility]) M46.22 730.08   
6. Discitis of cervical region M46.42 722.91   
7. Epidural abscess G06.2 324.9 8. Acute encephalopathy G93.40 348.30   
9. Cervical discitis M46.42 722.91   
10. Malignant hypertension I10 401.0 11. Acute blood loss anemia D62 285.1 12. Acute renal failure with acute renal cortical necrosis superimposed on chronic kidney disease, on chronic dialysis ([facility]) N17.1 584.6 N18.9 585.9 Z99.2 V45.11 Antibiotic History Vancomycin/ Zosyn IV 7/1 Signed By: Pol[facility] Mott [facility]

## 2020-07-04 NOTE — PROGRESS NOTES
Physical Therapy Chart reviewed and noted patient remains intubated and sedated. Continues to be not medically appropriate for PT evaluation at this time. Will continue to follow.  
Kitty Whelan, PT, DPT

## 2020-07-04 NOTE — PROGRESS NOTES
CRITICAL CARE: 
 
Sedated on vent- C collar in place no WOB. ROS: Unable to obtain full ROS as patient is intubated PE:  
Vitals stable Gen: Elderly, chronically ill appearing male intubated HEENT: ETT in place Neck: Ccollar Mouth: ETT 
CV: Regular Lungs: CTA B/L, no wheezing GI: Soft, no distention Ext: No edema, constant jerking motion of upper and lower extremities Neuro: Sedated, RASS -3 Msk: Muscle wasting Lab Results Component Value Date/Time WBC 9.3 07/04/2020 02:53 AM  
 HGB 10.4 (L) 07/04/2020 02:53 AM  
 Hematocrit (POC) 32 (L) 10/17/2019 02:16 PM  
 HCT 32.4 (L) 07/04/2020 02:53 AM  
 PLATELET 877 89/02/6640 02:53 AM  
 .5 (H) 07/04/2020 02:53 AM  
 
Lab Results Component Value Date/Time Sodium 133 (L) 07/04/2020 02:53 AM  
 Potassium 4.5 07/04/2020 02:53 AM  
 Chloride 96 (L) 07/04/2020 02:53 AM  
 CO2 23 07/04/2020 02:53 AM  
 Anion gap 14 07/04/2020 02:53 AM  
 Glucose 170 (H) 07/04/2020 02:53 AM  
 BUN 34 (H) 07/04/2020 02:53 AM  
 Creatinine 5.61 (H) 07/04/2020 02:53 AM  
 BUN/Creatinine ratio 6 (L) 07/04/2020 02:53 AM  
 GFR est AA 12 (L) 07/04/2020 02:53 AM  
 GFR est non-AA 10 (L) 07/04/2020 02:53 AM  
 Calcium 8.5 07/04/2020 02:53 AM  
 
CXR reviewed: no acute changes Impression: 
-acute respiratory failure requiring intubation due to inability to protect the airway 
-suspected seizures 
-marked hypertension 
-abnormal Cspine imaging as noted above; s/p emergent surgery 7/1 PM for debridement of epidural abscess and ACDF of C5-T1 
-history of paroxysmal afib 
-ESRD on HD M/W/F 
-DM 
- OSAS Plan: 
-continue vent support 
-keppra given in the ER, continue for now 
-cardene as needed for BP control -EEG without seizures 
-neuro following 
-cultures pending 
-broad spectrum antibiotics with vanc and zosyn  
-ortho following closely 
-HD per renal 
-accuchecks, SSI 
-initiate DVT ppx when ok with ortho; SCD for now Pt is critically ill CCT EOP 30 min. At risk for decline due to sepsis Beronica Carlin MD

## 2020-07-04 NOTE — PROGRESS NOTES
Po for diskitis , intubated in the icu. A bit more responsive today. Had HD yesterday Patient Vitals for the past 12 hrs: 
 Temp Pulse Resp BP SpO2  
07/04/20 0900  (!) 59 12 161/42   
07/04/20 0858  (!) 59 14  97 % 07/04/20 0832  (!) 59 12  100 % 07/04/20 0807 98 °F (36.7 °C) (!) 57 12 149/44 100 % 07/04/20 0800  (!) 58 12    
07/04/20 0700  (!) 59 12 (!) 126/39   
07/04/20 0600  (!) 58 12 123/41   
07/04/20 0500  62 21 196/49 93 % 07/04/20 0400 98.1 °F (36.7 °C) (!) 58 12 (!) 120/36 98 % 07/04/20 0317  61 12  100 % 07/04/20 0300  60 12 (!) 121/36   
07/04/20 0100  (!) 55 16 (!) 135/37 100 % 07/04/20 0000 100.1 °F (37.8 °C) 60 12 (!) 142/39 100 % 07/03/20 2314  (!) 57 12  100 % 07/03/20 2300  (!) 55 12 (!) 134/36 100 % Dressing clean and dry Swelling improving Continue medical management Pt will start when medically ready

## 2020-07-04 NOTE — PROGRESS NOTES
NAME: Gertrudis Adams :  1950 MRN:  [id number] Assessment :    Plan: 
--ESKD AVF/perm cath Acute respiratory failure on vent Seizure/AMS OM/discitis/abcess at C6-7 Sepsis Likely CLABSI at some point Severe HTN MWF HD at [address]; no HD today (arterial needle would not work - could not get good flow; used perm cath; outpatient f/u with Dr. Prasanna Fernandes to problem solve AVF) Would change out perm cath early next week. Needs echo? BC NGTD () Hold SERENITY for now (Hgb > 10) Subjective: Chief Complaint:  Note from HD nurse: Arterial access with poor blood return. Repositioned needle with no success. Recannulated arterial with a new needle. Asp still sluggish. Attempted to start treatment. Access pressures running extremely high with no success with repositioning needle Intubated, sedated. Review of Systems: 
 
Symptom Y/N Comments  Symptom Y/N Comments Fever/Chills    Chest Pain Poor Appetite    Edema Cough    Abdominal Pain Sputum    Joint Pain SOB/RUBIO    Pruritis/Rash Nausea/vomit    Tolerating PT/OT Diarrhea    Tolerating Diet Constipation    Other Could not obtain due to: See above Objective: VITALS:  
Last 24hrs VS reviewed since prior progress note. Most recent are: 
Visit Vitals /49 Pulse 62 Temp 98.1 °F (36.7 °C) Resp 21 Ht 5' 8\" (1.727 m) Wt 75.1 kg (165 lb 9.1 oz) SpO2 93% BMI 25.17 kg/m² Intake/Output Summary (Last 24 hours) at 2020 4184 Last data filed at 2020 0300 Gross per 24 hour Intake 1033.73 ml Output 3040 ml Net -2006.27 ml Telemetry Reviewed: PHYSICAL EXAM: 
General: Intubated in the icu No corazon Left ue avf with thrill/bruit Lab Data Reviewed: (see below) Medications Reviewed: (see below) PMH/SH reviewed - no change compared to H&P 
 ________________________________________________________________________ Care Plan discussed with: 
Patient Family RN Care Manager Consultant:     
 
  Comments >50% of visit spent in counseling and coordination of care    
 
________________________________________________________________________ Cheryl Cooper MD  
 
Procedures: see electronic medical records for all procedures/Xrays and details which 
were not copied into this note but were reviewed prior to creation of Plan. LABS: 
Recent Labs  
  07/04/20 0253 07/03/20 
0533 WBC 9.3 11.6* HGB 10.4* 10.4* HCT 32.4* 32.7*  
 375 Recent Labs  
  07/04/20 
0253 07/03/20 
0533 07/02/20 
0325 * 134* 136  
K 4.5 4.5 4.1 CL 96* 98 98 CO2 23 24 24 BUN 34* 41* 48* CREA 5.61* 7.58* 7.84* * 102* 169* CA 8.5 8.8 8.7 Recent Labs  
  07/03/20 
0533 07/02/20 
0325 07/01/20 
1251  113 136* TP 6.9 6.9 8.5* ALB 2.2* 2.5* 3.1*  
GLOB 4.7* 4.4* 5.4* Recent Labs  
  07/01/20 
1251 INR 1.0 PTP 10.0 APTT 27.0 No results for input(s): FE, TIBC, PSAT, FERR in the last 72 hours. Lab Results Component Value Date/Time Folate 42.1 (H) 09/08/2012 10:24 AM  
  
No results for input(s): PH, PCO2, PO2 in the last 72 hours. Recent Labs  
  07/01/20 
1720  CKMB 1.9 No components found for: [place] Lab Results Component Value Date/Time  Color YELLOW/STRAW 07/01/2020 01:52 PM  
 Appearance CLEAR 07/01/2020 01:52 PM  
 Specific gravity 1.017 07/01/2020 01:52 PM  
 pH (UA) 8.0 07/01/2020 01:52 PM  
 Protein 300 (A) 07/01/2020 01:52 PM  
 Glucose 100 (A) 07/01/2020 01:52 PM  
 Ketone 15 (A) 07/01/2020 01:52 PM  
 Bilirubin Negative 07/01/2020 01:52 PM  
 Urobilinogen 0.2 07/01/2020 01:52 PM  
 Nitrites Negative 07/01/2020 01:52 PM  
 Leukocyte Esterase Negative 07/01/2020 01:52 PM  
 Epithelial cells FEW 07/01/2020 01:52 PM  
 Bacteria Negative 07/01/2020 01:52 PM  
 WBC 0-4 07/01/2020 01:52 PM  
 RBC 0-5 07/01/2020 01:52 PM  
 
 
MEDICATIONS: 
Current Facility-Administered Medications Medication Dose Route Frequency  niCARdipine (CARDENE) 25 mg in 0.9% sodium chloride 250 mL infusion  0-15 mg/hr IntraVENous TITRATE  cloNIDine HCL (CATAPRES) tablet 0.3 mg  0.3 mg Per NG tube BID  chlorhexidine (ORAL CARE KIT) 0.12 % mouthwash 15 mL  15 mL Oral Q12H  
 famotidine (PEPCID) tablet 20 mg  20 mg Oral DAILY  alcohol 62% (NOZIN) nasal  1 Ampule  1 Ampule Topical Q12H  
 fentaNYL citrate (PF) injection 50 mcg  50 mcg IntraVENous Q1H PRN  
 fentaNYL (PF) 1,500 mcg/30 mL (50 mcg/mL) infusion   mcg/hr IntraVENous TITRATE  propofol (DIPRIVAN) 10 mg/mL infusion  0-50 mcg/kg/min IntraVENous TITRATE  VANCOMYCIN INFORMATION NOTE   Other Rx Dosing/Monitoring  piperacillin-tazobactam (ZOSYN) 3.375 g in 0.9% sodium chloride (MBP/ADV) 100 mL  3.375 g IntraVENous Q12H  
 sodium chloride (NS) flush 5-40 mL  5-40 mL IntraVENous Q8H  
 sodium chloride (NS) flush 5-40 mL  5-40 mL IntraVENous PRN  
 acetaminophen (TYLENOL) tablet 650 mg  650 mg Oral Q6H PRN  
 ondansetron (ZOFRAN) injection 4 mg  4 mg IntraVENous Q6H PRN  
 LORazepam (ATIVAN) injection 2 mg  2 mg IntraVENous PRN  
 levETIRAcetam (KEPPRA) 500 mg in saline (iso-osm) 100 mL IVPB (premix)  500 mg IntraVENous Q12H  
 insulin lispro (HUMALOG) injection   SubCUTAneous Q6H  
 glucose chewable tablet 16 g  4 Tab Oral PRN  
 dextrose (D50W) injection syrg 12.5-25 g  12.5-25 g IntraVENous PRN  
 glucagon (GLUCAGEN) injection 1 mg  1 mg IntraMUSCular PRN  minoxidiL (LONITEN) tablet 5 mg  5 mg Per NG tube BID  sodium chloride (NS) flush 5-40 mL  5-40 mL IntraVENous Q8H  
 sodium chloride (NS) flush 5-40 mL  5-40 mL IntraVENous PRN  
 acetaminophen (TYLENOL) tablet 1,000 mg  1,000 mg Oral Q6H  
  oxyCODONE IR (ROXICODONE) tablet 5 mg  5 mg Oral Q3H PRN  
 oxyCODONE IR (ROXICODONE) tablet 10 mg  10 mg Oral Q3H PRN  
 naloxone (NARCAN) injection 0.4 mg  0.4 mg IntraVENous PRN  
 hydrOXYzine HCL (ATARAX) tablet 10 mg  10 mg Oral Q8H PRN  
 senna-docusate (PERICOLACE) 8.6-50 mg per tablet 1 Tab  1 Tab Oral BID  polyethylene glycol (MIRALAX) packet 17 g  17 g Oral DAILY  bisacodyL (DULCOLAX) suppository 10 mg  10 mg Rectal DAILY PRN  phenol throat spray (CHLORASEPTIC) 1 Spray  1 Spray Oral PRN  
 benzocaine-menthoL (CEPACOL) lozenge 1 Lozenge  1 Lozenge Oral PRN  
 diazePAM (VALIUM) tablet 5 mg  5 mg Oral Q6H PRN  
 cyclobenzaprine (FLEXERIL) tablet 10 mg  10 mg Oral BID PRN

## 2020-07-04 NOTE — PROGRESS NOTES
1900 - 2000 Bedside and Verbal shift change report given to Kelsie Olguin (oncoming nurse) by Cheryle Gary (offgoing nurse). Report included the following information SBAR, Intake/Output, MAR, Recent Results and Cardiac Rhythm SB.  
 
2000 - 2200 Shift assessment complete, restless / agitation due to nurse holding sedation. Eyes open but does not track / focus. Does not follow commands. BP and HR low, will continue to monitor. Tried to pull ETT while having echo done. Sedation was restarted for comfort. 2200 - 0000 Resting quietly in bed, sedated on propofol and fentanyl. 0000 - 0200 Reassessment complete, now resting quietly in bed. Eyes open to voice. Bath given. See summary for details. 0200 - 0400 Repositioned for comfort. 0400 - 0600 Reassessment complete, no changes noted. Fed set / tubing changed. Central line dressing changed. HYPOGLYCEMIC EPISODE DOCUMENTATION Patient with hypoglycemic episode(s) at 525(time) on 7/5(date). BG value(s) pre-treatment 65 Was patient symptomatic? [] yes, [x] no Patient was treated with the following rescue medications/treatments: [x] D50 [] Glucose tablets 
              [] Glucagon 
              [] 4oz juice 
              [] 6oz reg soda 
              [] 8oz low fat milk BG value post-treatment: 240 Once BG treated and value greater than 80mg/dl, pt was provided with the following: 
[] snack 
[] meal 
Name of MD notified:Standing order applied The following orders were received: Standing 
 
@0520 Propofol and TF held for SAT / SBT, see RT note for more information 0600 - 0800 Incontinence care done for large loose black BM. Bedside and Verbal shift change report given to Aracelis Mujica (oncoming nurse) by Kelsie Olguin (offgoing nurse).  Report included the following information SBAR, Kardex, Procedure Summary, Intake/Output, MAR, Recent Results and Cardiac Rhythm SB.

## 2020-07-04 NOTE — PROGRESS NOTES
Hospitalist Progress Note NAME: Gertrudis Ruby :  1950 MRN:  [id number] Assessment / Plan: 
Epidural abscess with Diskitis c6-c7 s/p C5- T1 ACDF with partial C6 and C7 corpectomy Sepsis, POA due to above Presented with AMS and Seizure CT Neck showed Osteomyelitis-discitis at C6-C7 with erosive endplate changes, prevertebral and epidural phlegmon, resulting in severe spinal canal stenosis and cord compression at C6-C7. S/p C5 through T1 ACDF with partial C6 and C7 corpectomy  by Ortho IV keppra, discussed with neuro, can stop keppra after 1 week, if no more seizures -Ortho spine surgeon on board 
 
-ID on board. 
-Continue Vanc/zosyn 
-Tissue culture- Staph Epi, BCx NGTD Get ECHO Acute Respiratory Failure POA 
/ above and for airway protection 
-Vent management as per Pulmonary 
 
  
ESRD on HD MWF. Has permacath. --has AVF, but Could not get good blood flow --Has right Permcath, will need to change it next week. 
  
HARDIK on cpap --intubated in ER for airway protection.  
 
 
  
CAD, hx stents . Follow with Dr. Юлия Rodriguez. Echo 2018 EF 60-65% Paroxysmal afib 2019, not on anticoagulation Malignant HTN /78 Elevated trop 0.19, has chronic mild troponin elevation likely due to CKD. EKG without active ischemia. 
--hold plavix and aspirin, resume once okay with Surgeon 
--restart bystolic, clonidine, minoxidil  
  
DM 
--put on low dose SSI. BS 160s - 
  
Gout 
--resume allopurinol 
  
GERD, hx PUD 
--IV protonix 
  
BPH 
PAD 
S/p left CEA  by Dr. Dodd Fairly Hx aortobifemoral graft Distal AAA borderline aneurysm 2017 Hx chronic anemia, hgb normal today 13.8 
  
--Body mass index is 25.31 kg/m². 
  
Code: full code DVT prophylaxis: SCD Surrogate decision maker:  Jenaro Rice [phone] Discussed with son at bedside Subjective: Chief Complaint / Reason for Physician Visit Failed SAT trial. Sedated. Son at bedside Review of Systems: 
Symptom Y/N Comments  Symptom Y/N Comments Fever/Chills    Chest Pain Poor Appetite    Edema Cough    Abdominal Pain Sputum    Joint Pain SOB/RUBIO    Pruritis/Rash Nausea/vomit    Tolerating PT/OT Diarrhea    Tolerating Diet Constipation    Other Could NOT obtain due to: Intubated Objective: VITALS:  
Last 24hrs VS reviewed since prior progress note. Most recent are: 
Patient Vitals for the past 24 hrs: 
 Temp Pulse Resp BP SpO2  
07/04/20 1300  (!) 51 12 153/44 100 % 07/04/20 1200  (!) 45 12 126/40 100 % 07/04/20 1112  (!) 46 12  100 % 07/04/20 1100  (!) 48 12 116/41   
07/04/20 1056  (!) 48 12  100 % 07/04/20 1000  (!) 57 14 (!) 113/35   
07/04/20 0900  (!) 59 12 161/42   
07/04/20 0858  (!) 59 14  97 % 07/04/20 0832  (!) 59 12  100 % 07/04/20 0807 98 °F (36.7 °C) (!) 57 12 149/44 100 % 07/04/20 0800  (!) 58 12    
07/04/20 0700  (!) 59 12 (!) 126/39   
07/04/20 0600  (!) 58 12 123/41   
07/04/20 0500  62 21 196/49 93 % 07/04/20 0400 98.1 °F (36.7 °C) (!) 58 12 (!) 120/36 98 % 07/04/20 0317  61 12  100 % 07/04/20 0300  60 12 (!) 121/36   
07/04/20 0100  (!) 55 16 (!) 135/37 100 % 07/04/20 0000 100.1 °F (37.8 °C) 60 12 (!) 142/39 100 % 07/03/20 2314  (!) 57 12  100 % 07/03/20 2300  (!) 55 12 (!) 134/36 100 % 07/03/20 2200  (!) 54 12 (!) 119/36 100 % 07/03/20 2100  (!) 54 12 113/41 100 % 07/03/20 2000 99.1 °F (37.3 °C) (!) 56 15 (!) 123/38 96 % 07/03/20 1944  63 12  96 % 07/03/20 1900  64 12 127/44 97 % 07/03/20 1800  60 17 146/48 96 % 07/03/20 1700 99.9 °F (37.7 °C) (!) 54 12 151/48 96 % 07/03/20 1600  66 18 173/57 94 % 07/03/20 1557  66 12  94 % 07/03/20 1500  (!) 57 14 170/48 98 % 07/03/20 1400  (!) 56 20 164/40 98 % Intake/Output Summary (Last 24 hours) at 7/4/2020 1347 Last data filed at 7/4/2020 0800 Gross per 24 hour Intake 1263.1 ml  
 Output 40 ml Net 1223.1 ml PHYSICAL EXAM: 
General: WD, WN. intubated EENT:  EOMI. Anicteric sclerae. MMM Resp:  CTA bilaterally, no wheezing or rales. No accessory muscle use CV:  Regular  rhythm,  No edema GI:  Soft, Non distended, Non tender.  +Bowel sounds Neurologic:      intubated Psych:   intubated Skin:  No rashes. No jaundice Reviewed most current lab test results and cultures  YES Reviewed most current radiology test results   YES Review and summation of old records today    NO Reviewed patient's current orders and MAR    YES 
PMH/SH reviewed - no change compared to H&P 
________________________________________________________________________ Care Plan discussed with: 
  Comments Patient Family  x   
RN x Care Manager Consultant Multidiciplinary team rounds were held today with , nursing, pharmacist and clinical coordinator. Patient's plan of care was discussed; medications were reviewed and discharge planning was addressed. ________________________________________________________________________ Total NON critical care TIME:  28   Minutes Total CRITICAL CARE TIME Spent:   Minutes non procedure based Comments >50% of visit spent in counseling and coordination of care    
________________________________________________________________________ Katarina Saez MD  
 
Procedures: see electronic medical records for all procedures/Xrays and details which were not copied into this note but were reviewed prior to creation of Plan. LABS: 
I reviewed today's most current labs and imaging studies. Pertinent labs include: 
Recent Labs  
  07/04/20 
0253 07/03/20 
0533 07/02/20 
0325 WBC 9.3 11.6* 11.6* HGB 10.4* 10.4* 11.3* HCT 32.4* 32.7* 35.9*  
 375 416* Recent Labs  
  07/04/20 
0253 07/03/20 
0533 07/02/20 
0325 * 134* 136  
K 4.5 4.5 4.1 CL 96* 98 98 CO2 23 24 24 * 102* 169* BUN 34* 41* 48* CREA 5.61* 7.58* 7.84* CA 8.5 8.8 8.7 ALB  --  2.2* 2.5* TBILI  --  0.8 0.4 ALT  --  12 17 Signed: Sammuel Schwab, MD

## 2020-07-04 NOTE — PROGRESS NOTES
Occupational Therapy Patient continues to be intubated and sedated, and not yet appropriate for OT evaluation. Will defer for today and follow up tomorrow.

## 2020-07-05 NOTE — PROGRESS NOTES
Hospitalist Progress Note NAME: Gertrudis Sevilla Wilmot :  1950 MRN:  [id number] Assessment / Plan: 
Epidural abscess with Diskitis c6-c7 s/p C5- T1 ACDF with partial C6 and C7 corpectomy Sepsis, POA due to above Presented with AMS and Seizure CT Neck showed Osteomyelitis-discitis at C6-C7 with erosive endplate changes, prevertebral and epidural phlegmon, resulting in severe spinal canal stenosis and cord compression at C6-C7. S/p C5 through T1 ACDF with partial C6 and C7 corpectomy  by Ortho IV keppra, discussed with neuro, if no more seizures, stop Keppra next week 
-Ortho spine surgeon on board 
 
-ID on board. Antibiotic as per ID 
-Tissue culture- Staph Epi, BCx NGTD 
-Echo pending Acute Respiratory Failure POA 
/2 above and for airway protection 
-Vent management as per Pulmonary 
 
  
ESRD on HD MWF. Has permacath. --has AVF, but Could not get good blood flow --Has right Permcath, will need to change it next week. 
  
HARDIK on cpap --intubated in ER for airway protection.  
 
 
  
CAD, hx stents . Follow with Dr. Margi Nieves. Echo 2018 EF 60-65% Paroxysmal afib 2019, not on anticoagulation Malignant HTN /78 Elevated trop 0.19, has chronic mild troponin elevation likely due to CKD. EKG without active ischemia. 
--hold plavix and aspirin, resume once okay with Surgeon 
--restart bystolic, clonidine, minoxidil  
  
DM 
--put on low dose SSI. BS 160s - 
  
Gout 
--resume allopurinol 
  
GERD, hx PUD 
--IV protonix 
  
BPH 
PAD 
S/p left CEA  by Dr. Jaxon Rivera Hx aortobifemoral graft Distal AAA borderline aneurysm 2017 Hx chronic anemia, hgb normal today 13.8 
  
--Body mass index is 25.31 kg/m². 
  
Code: full code DVT prophylaxis: SCD Surrogate decision maker:  Jenaro Potts [phone] Subjective: Chief Complaint / Reason for Physician Visit Failed SBT trial today Review of Systems: 
Symptom Y/N Comments  Symptom Y/N Comments Fever/Chills    Chest Pain Poor Appetite    Edema Cough    Abdominal Pain Sputum    Joint Pain SOB/RUBIO    Pruritis/Rash Nausea/vomit    Tolerating PT/OT Diarrhea    Tolerating Diet Constipation    Other Could NOT obtain due to: Intubated Objective: VITALS:  
Last 24hrs VS reviewed since prior progress note. Most recent are: 
Patient Vitals for the past 24 hrs: 
 Temp Pulse Resp BP SpO2  
07/05/20 1100  (!) 56 12 159/47 100 % 07/05/20 1000  (!) 55 12 145/45   
07/05/20 0900  (!) 55 12 (!) 144/36 100 % 07/05/20 0836  60 12  100 % 07/05/20 0800 98.8 °F (37.1 °C) (!) 55 12 (!) 141/36 100 % 07/05/20 0700  64 12 (!) 146/34   
07/05/20 0600  63 12 170/45 100 % 07/05/20 0500  (!) 50 12 138/41 100 % 07/05/20 0400 98.9 °F (37.2 °C) (!) 53 12 173/45 100 % 07/05/20 0321  (!) 50 13  100 % 07/05/20 0300  (!) 53 12 152/43 100 % 07/05/20 0200  (!) 48 10 (!) 121/39   
07/05/20 0100  (!) 48 12 (!) 120/36 100 % 07/05/20 0000 99.1 °F (37.3 °C) (!) 46 12 (!) 118/35 100 % 07/04/20 2323  (!) 46 12  100 % 07/04/20 2300  (!) 47 12 (!) 119/37   
07/04/20 2200  (!) 46 12 (!) 123/35   
07/04/20 2100  (!) 46 14 (!) 99/32   
07/04/20 2000 99.3 °F (37.4 °C) 65 12 (!) 171/38 100 % 07/04/20 1947    (!) 105/36   
07/04/20 1907  77 12  100 % 07/04/20 1900  (!) 46 12 (!) 105/36 100 % 07/04/20 1800  (!) 52 12 (!) 149/38 100 % 07/04/20 1711  (!) 56  164/40   
07/04/20 1700  (!) 55 16 174/42 100 % 07/04/20 1600 99.1 °F (37.3 °C) (!) 48 12 (!) 113/37 100 % 07/04/20 1504  (!) 52 12  100 % 07/04/20 1500  (!) 53 12 153/55 100 % 07/04/20 1400  (!) 50 15 134/43 100 % 07/04/20 1300  (!) 51 12 153/44 100 % 07/04/20 1200  (!) 45 12 126/40 100 % Intake/Output Summary (Last 24 hours) at 7/5/2020 1136 Last data filed at 7/5/2020 0800 Gross per 24 hour Intake 2038.97 ml Output  Net 2038.97 ml PHYSICAL EXAM: 
 General: WD, WN. intubated EENT:  EOMI. Anicteric sclerae. MMM Resp:  CTA bilaterally, no wheezing or rales. No accessory muscle use CV:  Regular  rhythm,  No edema GI:  Soft, Non distended, Non tender.  +Bowel sounds Neurologic:      intubated Psych:   intubated Skin:  No rashes. No jaundice Reviewed most current lab test results and cultures  YES Reviewed most current radiology test results   YES Review and summation of old records today    NO Reviewed patient's current orders and MAR    YES 
PMH/SH reviewed - no change compared to H&P 
________________________________________________________________________ Care Plan discussed with: 
  Comments Patient Family  x   
RN x Care Manager Consultant Multidiciplinary team rounds were held today with , nursing, pharmacist and clinical coordinator. Patient's plan of care was discussed; medications were reviewed and discharge planning was addressed. ________________________________________________________________________ Total NON critical care TIME:  28   Minutes Total CRITICAL CARE TIME Spent:   Minutes non procedure based Comments >50% of visit spent in counseling and coordination of care    
________________________________________________________________________ Sylvia Knight MD  
 
Procedures: see electronic medical records for all procedures/Xrays and details which were not copied into this note but were reviewed prior to creation of Plan. LABS: 
I reviewed today's most current labs and imaging studies. Pertinent labs include: 
Recent Labs  
  07/05/20 
0351 07/04/20 
0253 07/03/20 
0533 WBC 9.8 9.3 11.6* HGB 10.0* 10.4* 10.4* HCT 32.0* 32.4* 32.7*  
* 376 375 Recent Labs  
  07/05/20 
0351 07/04/20 
0253 07/03/20 
0533 * 133* 134* K 4.7 4.5 4.5  
CL 95* 96* 98  
CO2 20* 23 24 * 170* 102* BUN 55* 34* 41* CREA 6.68* 5.61* 7.58* CA 8.8 8.5 8.8 ALB  --   --  2.2* TBILI  --   --  0.8 ALT  --   --  12 Signed: Jared Deras MD

## 2020-07-05 NOTE — PROGRESS NOTES
CRITICAL CARE: 
 
Sedated on vent- C collar in place no WOB. Failed SBT today due to apnea. ROS: Unable to obtain full ROS as patient is intubated PE:  
Vitals stable Gen: Elderly, chronically ill appearing male intubated HEENT: ETT in place Neck: Ccollar Mouth: ETT 
CV: Regular Lungs: CTA B/L, no wheezing GI: Soft, no distention Ext: No edema, constant jerking motion of upper and lower extremities Neuro: Sedated, RASS -3 Msk: Muscle wasting Lab Results Component Value Date/Time WBC 9.8 07/05/2020 03:51 AM  
 HGB 10.0 (L) 07/05/2020 03:51 AM  
 Hematocrit (POC) 32 (L) 10/17/2019 02:16 PM  
 HCT 32.0 (L) 07/05/2020 03:51 AM  
 PLATELET 275 (H) 79/37/9390 03:51 AM  
 .0 (H) 07/05/2020 03:51 AM  
 
Lab Results Component Value Date/Time Sodium 130 (L) 07/05/2020 03:51 AM  
 Potassium 4.7 07/05/2020 03:51 AM  
 Chloride 95 (L) 07/05/2020 03:51 AM  
 CO2 20 (L) 07/05/2020 03:51 AM  
 Anion gap 15 07/05/2020 03:51 AM  
 Glucose 113 (H) 07/05/2020 03:51 AM  
 BUN 55 (H) 07/05/2020 03:51 AM  
 Creatinine 6.68 (H) 07/05/2020 03:51 AM  
 BUN/Creatinine ratio 8 (L) 07/05/2020 03:51 AM  
 GFR est AA 10 (L) 07/05/2020 03:51 AM  
 GFR est non-AA 8 (L) 07/05/2020 03:51 AM  
 Calcium 8.8 07/05/2020 03:51 AM  
 
CXR reviewed: no acute changes Impression: 
-acute respiratory failure requiring intubation due to inability to protect the airway 
-suspected seizures 
-marked hypertension 
-abnormal Cspine imaging as noted above; s/p emergent surgery 7/1 PM for debridement of epidural abscess and ACDF of C5-T1 
-history of paroxysmal afib 
-ESRD on HD M/W/F 
-DM 
- OSAS Plan: 
-continue vent support 
-reduce sedation to goal RASS -1 Daily SBTs May need trach 
-cardene as needed for BP control -EEG without seizures 
-neuro following 
-cultures pending 
-broad spectrum antibiotics with vanc and zosyn  
-ortho following closely 
-HD per renal 
-accuchecks, SSI -initiate DVT ppx when ok with ortho; SCD for now Pt is critically ill CCT EOP 30 min. At risk for decline due to sepsis Bal Hernandez MD

## 2020-07-05 NOTE — PROGRESS NOTES
Occupational Therapy Note Pt remains sedated. Will continue to follow for OT evaluation once appropriate. Will f/up tomorrow if pt able to participate in ADL assessment. Thank you.  
Nam Pretty OTR/L

## 2020-07-05 NOTE — PROGRESS NOTES
1900 - 2000 Bedside and Verbal shift change report given to Alicia Allison (oncoming nurse) by Ammon Hodgkin (offgoing nurse). Report included the following information SBAR, Kardex, Procedure Summary, Intake/Output, MAR, Recent Results and Cardiac Rhythm SR/SB. 2000 - 2200 Shift assessment complete, eyes open spontaneously but does not track / focus. Does not follow commands. Restless and fighting movements. VSS, will continue to monitor. 2200 - 0000 Repositioned for comfort. 0000 - 0200 Reassessment complete, no changes noted from previous assessment. See flow sheet for details. @ 0013 prn fentanyl given for comfort, see MAR for details. 0200 - 0400 Bath given and severe systolic HTN noted with stimulation. Provider on call paged and Dr Marylen Charleston returned call with orders. @0347 prn fentanyl  given for comfort. See MAR for details. 0400 - 0600 Reassessment complete, no change. 0600 - 0800 Resting quietly in bed. HD nurse @ bedside for treatment. Bedside and Verbal shift change report given to Karina Natarajan (oncoming nurse) by Alicia Allison (offgoing nurse).  Report included the following information SBAR, Procedure Summary, Intake/Output, MAR, Recent Results and Cardiac Rhythm SR.

## 2020-07-05 NOTE — PROGRESS NOTES
07/05/20 0596 ABCDEF Bundle SBT Safety Screen Passed Yes SBT Trial Passed No  
SBT Trial Reason for Failure Respiratory rate < 8 
(periods of Apnea during trial)

## 2020-07-05 NOTE — PROGRESS NOTES
ORTHO - Progress Note Post Op day: 4 Days Post-Op Gertrudis Chatham Pen     [id number]  male    72 y.o.    1950 Admit date: 2020 Date of Surgery: 2020 Procedures: Procedure(s): C5-T1 Anterior Cervical Discectomy Fusion (ACDF) with C6-C7 Corepectomy Admitting Physician: Cameron Faria MD  
Surgeon:  Gary Rice) and Role:    Anthony Monae MD - Primary SUBJECTIVE: 
  
Jb Das is a 72 y.o. male s/p a  C5-T1 Anterior Cervical Discectomy Fusion (ACDF) with C6-C7 Corepectomy---- Intubated/sedated OBJECTIVE: 
 
  
Physical Exam: 
General: Intubated and sedated Dressing/Wound:  Clean, dry and intact. No erythema or drainage,  Collar intact Vital Signs:  
  
  
Patient Vitals for the past 8 hrs: 
 BP Temp Pulse Resp SpO2  
20 1100 159/47  (!) 56 12 100 % 20 1000 145/45  (!) 55 12   
20 0900 (!) 144/36  (!) 55 12 100 % 20 0836   60 12 100 % 20 0800 (!) 141/36 98.8 °F (37.1 °C) (!) 55 12 100 % Temp (24hrs), Av °F (37.2 °C), Min:98.8 °F (37.1 °C), Max:99.3 °F (37.4 °C) Date 20 0700 - 20 5321 Shift 7012-5654 1292-3813 0869-9327 24 Hour Total  
INTAKE  
I.V.(mL/kg/hr) 37.8(0.1)   37.8 NG/   190 Shift Total(mL/kg) 227.8(3)   227.8(3) OUTPUT Shift Total(mL/kg) Weight (kg) 76 76 76 76 Labs:  
  
 
Recent Labs  
  20 
3463 HCT 32.0*  
HGB 10.0*  
 
PT/OT:  
 
  
  
  
ASSESSMENT / PLAN:  
Active Problems: 
  Malignant hypertension (2020) Seizure ([address] (2020) Cervical discitis (2020) Acute encephalopathy (2020) Pt remains intubated -failed SBT Afebrile WBC down--- Now 9.8 Pt remains critically ill Will follow Signed By: Bruno Grewal PA-C

## 2020-07-05 NOTE — PROGRESS NOTES
Pt remains sedated. Will continue to follow for PT evaluation once appropriate. Joseline De La O, PT, DPT Board-Certified Geriatric Clinical Specialist  
Certified Exercise Expert for Aging Adults

## 2020-07-05 NOTE — PROGRESS NOTES
0700: Report received from Yrn John, [address] [place] [address]. Patient resting intubated sedated and restrained in bed. Eyes open to voice. No command following at this time. 0805: Assessment completed. 0900: Incontinent of stool -  Small, black. Incontinence care provided 0945: Dr. Galarza Neelima at bedside. Update given and plan of care discussed. Plan for HD tomorrow. 1230: Reassessment completed 1400: Residual assessed - 400mL. TF paused 1630: Reassessment completed. Residual assessed - 200mL. Will continue to hold TF.  
 
1900: Report given to Yrn John RN.

## 2020-07-05 NOTE — PROGRESS NOTES
NAME: Gertrudis Caceres :  1950 MRN:  [id number] Assessment :    Plan: 
--ESKD- ZJZ-DEZ-Yzvtyfjcuossjh AVF/perm cath Acute respiratory failure on vent Seizure/AMS OM/discitis/abcess at C6-7 Sepsis Likely CLABSI at some point Severe HTN Bradycardia Volume OK. Potassium OK. Good SBP. No pressors. No acute need for HD today. Arterial needle would not work - could not get good flow; used perm cath; outpatient f/u with Dr. Thea Mendez to problem solve AVF Would change out perm cath early next week. Needs echo? BC NGTD () Hold SERENITY for now (Hgb > 10) On vent. Critically ill at significant risk of deterioration. Subjective: Chief Complaint:  Intubated. Sedated. Review of Systems: UTO. Intubated. Symptom Y/N Comments  Symptom Y/N Comments Fever/Chills    Chest Pain Poor Appetite    Edema Cough    Abdominal Pain Sputum    Joint Pain SOB/RUBIO    Pruritis/Rash Nausea/vomit    Tolerating PT/OT Diarrhea    Tolerating Diet Constipation    Other Could not obtain due to: See above Objective: VITALS:  
Last 24hrs VS reviewed since prior progress note. Most recent are: 
Visit Vitals BP (!) 144/36 Pulse (!) 55 Temp 98.8 °F (37.1 °C) Resp 12 Ht 5' 8\" (1.727 m) Wt 76 kg (167 lb 8.8 oz) SpO2 100% BMI 25.48 kg/m² Intake/Output Summary (Last 24 hours) at 2020 0940 Last data filed at 2020 0800 Gross per 24 hour Intake 2038.97 ml Output  Net 2038.97 ml Telemetry Reviewed: PHYSICAL EXAM: 
General: Intubated in the icu No corazon Left ue avf with thrill/bruit Lab Data Reviewed: (see below) Medications Reviewed: (see below) PMH/SH reviewed - no change compared to H&P 
________________________________________________________________________ Care Plan discussed with: 
Patient Family RN    
 Care Manager Consultant:     
 
  Comments >50% of visit spent in counseling and coordination of care    
 
________________________________________________________________________ Cesar Walker MD  
 
Procedures: see electronic medical records for all procedures/Xrays and details which 
were not copied into this note but were reviewed prior to creation of Plan. LABS: 
Recent Labs  
  07/05/20 0351 07/04/20 0253 WBC 9.8 9.3 HGB 10.0* 10.4* HCT 32.0* 32.4*  
* 376 Recent Labs  
  07/05/20 0351 07/04/20 0253 07/03/20 0533 * 133* 134* K 4.7 4.5 4.5  
CL 95* 96* 98  
CO2 20* 23 24 BUN 55* 34* 41* CREA 6.68* 5.61* 7.58* * 170* 102* CA 8.8 8.5 8.8 Recent Labs  
  07/03/20 
0533   
TP 6.9 ALB 2.2*  
GLOB 4.7* No results for input(s): INR, PTP, APTT, INREXT, INREXT in the last 72 hours. No results for input(s): FE, TIBC, PSAT, FERR in the last 72 hours. Lab Results Component Value Date/Time Folate 42.1 (H) 09/08/2012 10:24 AM  
  
No results for input(s): PH, PCO2, PO2 in the last 72 hours. No results for input(s): CPK, CKMB in the last 72 hours. No lab exists for component: TROPONINI No components found for: [place] Lab Results Component Value Date/Time Color YELLOW/STRAW 07/01/2020 01:52 PM  
 Appearance CLEAR 07/01/2020 01:52 PM  
 Specific gravity 1.017 07/01/2020 01:52 PM  
 pH (UA) 8.0 07/01/2020 01:52 PM  
 Protein 300 (A) 07/01/2020 01:52 PM  
 Glucose 100 (A) 07/01/2020 01:52 PM  
 Ketone 15 (A) 07/01/2020 01:52 PM  
 Bilirubin Negative 07/01/2020 01:52 PM  
 Urobilinogen 0.2 07/01/2020 01:52 PM  
 Nitrites Negative 07/01/2020 01:52 PM  
 Leukocyte Esterase Negative 07/01/2020 01:52 PM  
 Epithelial cells FEW 07/01/2020 01:52 PM  
 Bacteria Negative 07/01/2020 01:52 PM  
 WBC 0-4 07/01/2020 01:52 PM  
 RBC 0-5 07/01/2020 01:52 PM  
 
 
MEDICATIONS: 
Current Facility-Administered Medications Medication Dose Route Frequency  niCARdipine (CARDENE) 25 mg in 0.9% sodium chloride 250 mL infusion  0-15 mg/hr IntraVENous TITRATE  cloNIDine HCL (CATAPRES) tablet 0.3 mg  0.3 mg Per NG tube BID  chlorhexidine (ORAL CARE KIT) 0.12 % mouthwash 15 mL  15 mL Oral Q12H  
 famotidine (PEPCID) tablet 20 mg  20 mg Oral DAILY  alcohol 62% (NOZIN) nasal  1 Ampule  1 Ampule Topical Q12H  
 fentaNYL citrate (PF) injection 50 mcg  50 mcg IntraVENous Q1H PRN  
 fentaNYL (PF) 1,500 mcg/30 mL (50 mcg/mL) infusion   mcg/hr IntraVENous TITRATE  propofol (DIPRIVAN) 10 mg/mL infusion  0-50 mcg/kg/min IntraVENous TITRATE  VANCOMYCIN INFORMATION NOTE   Other Rx Dosing/Monitoring  piperacillin-tazobactam (ZOSYN) 3.375 g in 0.9% sodium chloride (MBP/ADV) 100 mL  3.375 g IntraVENous Q12H  
 sodium chloride (NS) flush 5-40 mL  5-40 mL IntraVENous Q8H  
 sodium chloride (NS) flush 5-40 mL  5-40 mL IntraVENous PRN  
 acetaminophen (TYLENOL) tablet 650 mg  650 mg Oral Q6H PRN  
 ondansetron (ZOFRAN) injection 4 mg  4 mg IntraVENous Q6H PRN  
 LORazepam (ATIVAN) injection 2 mg  2 mg IntraVENous PRN  
 levETIRAcetam (KEPPRA) 500 mg in saline (iso-osm) 100 mL IVPB (premix)  500 mg IntraVENous Q12H  
 insulin lispro (HUMALOG) injection   SubCUTAneous Q6H  
 glucose chewable tablet 16 g  4 Tab Oral PRN  
 dextrose (D50W) injection syrg 12.5-25 g  12.5-25 g IntraVENous PRN  
 glucagon (GLUCAGEN) injection 1 mg  1 mg IntraMUSCular PRN  minoxidiL (LONITEN) tablet 5 mg  5 mg Per NG tube BID  sodium chloride (NS) flush 5-40 mL  5-40 mL IntraVENous Q8H  
 sodium chloride (NS) flush 5-40 mL  5-40 mL IntraVENous PRN  
 acetaminophen (TYLENOL) tablet 1,000 mg  1,000 mg Oral Q6H  
 oxyCODONE IR (ROXICODONE) tablet 5 mg  5 mg Oral Q3H PRN  
 oxyCODONE IR (ROXICODONE) tablet 10 mg  10 mg Oral Q3H PRN  
 naloxone (NARCAN) injection 0.4 mg  0.4 mg IntraVENous PRN  
  hydrOXYzine HCL (ATARAX) tablet 10 mg  10 mg Oral Q8H PRN  
 senna-docusate (PERICOLACE) 8.6-50 mg per tablet 1 Tab  1 Tab Oral BID  polyethylene glycol (MIRALAX) packet 17 g  17 g Oral DAILY  bisacodyL (DULCOLAX) suppository 10 mg  10 mg Rectal DAILY PRN  phenol throat spray (CHLORASEPTIC) 1 Spray  1 Spray Oral PRN  
 benzocaine-menthoL (CEPACOL) lozenge 1 Lozenge  1 Lozenge Oral PRN  
 diazePAM (VALIUM) tablet 5 mg  5 mg Oral Q6H PRN  
 cyclobenzaprine (FLEXERIL) tablet 10 mg  10 mg Oral BID PRN

## 2020-07-06 NOTE — PROGRESS NOTES
CRITICAL CARE: 
 
Sedated on vent- C collar in place no WOB. Failed SBT today due to apnea. ROS: Unable to obtain full ROS as patient is intubated PE:  
Vitals stable Gen: Elderly, chronically ill appearing male intubated HEENT: ETT in place Neck: Ccollar Mouth: ETT 
CV: Regular Lungs: CTA B/L, no wheezing GI: Soft, no distention Ext: No edema, constant jerking motion of upper and lower extremities Neuro: Sedated, RASS -3 Msk: Muscle wasting Lab Results Component Value Date/Time WBC 13.5 (H) 07/06/2020 04:04 AM  
 HGB 11.4 (L) 07/06/2020 04:04 AM  
 Hematocrit (POC) 32 (L) 10/17/2019 02:16 PM  
 HCT 35.7 (L) 07/06/2020 04:04 AM  
 PLATELET 998 (H) 41/02/4137 04:04 AM  
 .6 (H) 07/06/2020 04:04 AM  
 
Lab Results Component Value Date/Time Sodium 128 (L) 07/06/2020 04:04 AM  
 Potassium 4.8 07/06/2020 04:04 AM  
 Chloride 90 (L) 07/06/2020 04:04 AM  
 CO2 16 (L) 07/06/2020 04:04 AM  
 Anion gap 22 (H) 07/06/2020 04:04 AM  
 Glucose 192 (H) 07/06/2020 04:04 AM  
 BUN 68 (H) 07/06/2020 04:04 AM  
 Creatinine 8.66 (H) 07/06/2020 04:04 AM  
 BUN/Creatinine ratio 8 (L) 07/06/2020 04:04 AM  
 GFR est AA 7 (L) 07/06/2020 04:04 AM  
 GFR est non-AA 6 (L) 07/06/2020 04:04 AM  
 Calcium 9.6 07/06/2020 04:04 AM  
 
CXR reviewed: no acute changes Impression: 
-acute respiratory failure requiring intubation due to inability to protect the airway 
-suspected seizures 
-marked hypertension 
-abnormal Cspine imaging as noted above; s/p emergent surgery 7/1 PM for debridement of epidural abscess and ACDF of C5-T1 
-history of paroxysmal afib 
-ESRD on HD M/W/F 
-DM 
- OSAS Plan: 
-continue vent support 
-reduce sedation to goal RASS -1 
-Daily SBTs 
-cardene as needed for BP control -EEG without seizures 
-neuro following 
-cultures pending 
-broad spectrum antibiotics with vanc and zosyn  
-ortho following closely 
-HD per renal 
-accuchecks, SSI 
-tube feeds -initiate DVT ppx when ok with ortho; SCD for now Rebeca Tovar MD

## 2020-07-06 NOTE — INTERDISCIPLINARY ROUNDS
Interdisciplinary team rounds were held 7/6/2020 with the following team members:Care Management, Nursing, Nutrition, Palliative Care, Pharmacy, Physical Therapy, Physician, Respiratory Therapy and Clinical Coordinator. Plan of care discussed. See clinical pathway and/or care plan for interventions and desired outcomes.

## 2020-07-06 NOTE — PROCEDURES
[facility] Dialysis Team [place]  [phone] Vitals   Pre   Post   Assessment   Pre   Post    
Temp  Temp: 99.3 °F (37.4 °C) (07/06/20 0750)  98.6 LOC  Intubated and sedated. Eyes open occasionally. Same HR   Pulse (Heart Rate): 86 (07/06/20 0750) 96 Lungs   Diminished Same B/P   BP: 178/58 (07/06/20 0750) 135/51 Cardiac   NSR Same Resp   Resp Rate: 16 (07/06/20 0750) 17 Skin   Fragile, s/p surgical in Same Pain level   0 0 Edema  1+ BLE Trace Orders: Duration:   Start:    6105 End:     Total:     
Dialyzer:   Dialyzer/Set Up Inspection: [place] (07/06/20 0750) K Bath:   Dialysate K (mEq/L): 3 (07/06/20 0750) Ca Bath:   Dialysate CA (mEq/L): 2.5 (07/06/20 0750) Na/Bicarb:   Dialysate NA (mEq/L): 138 (07/06/20 0750) Target Fluid Removal:   Goal/Amount of Fluid to Remove (mL): 3000 mL (07/06/20 0750) Access Type & Location:   R Sub CVC: Dressing CDI dated for 7/2/20. No s/s of infection. Both lumens aspirate & flush well. Running well at . Labs Obtained/Reviewed Critical Results Called   Date when labs were drawn- 
Hgb-   
HGB Date Value Ref Range Status 07/06/2020 11.4 (L) 12.1 - 17.0 g/dL Final  
 
K-   
Potassium Date Value Ref Range Status 07/06/2020 4.8 3.5 - 5.1 mmol/L Final  
 
Ca-  
Calcium Date Value Ref Range Status 07/06/2020 9.6 8.5 - 10.1 MG/DL Final  
 
Bun-  
BUN Date Value Ref Range Status 07/06/2020 68 (H) 6 - 20 MG/DL Final  
 
Creat-  
Creatinine Date Value Ref Range Status 07/06/2020 8.66 (H) 0.70 - 1.30 MG/DL Final  
 
  
Medications/ Blood Products Given Name   Dose   Route and Time Heparin  3800 Units 1.9 A 1.9 V Blood Volume Processed (BVP):    64.0 L Net Fluid Removed:  2830 ml Comments Time Out Done: 9952 Primary Nurse Rpt Pre: Samantha Poole RN 
Primary Nurse Rpt Post: Samantha Poole RN 
Pt Education: Procedural 
Care Plan: Continue with HD per MD. Tx Summary: SBAR received from Primary RN. Pt Intubated and sedated. Consent signed & on file. 8144: Both lumens of Garry/permcath disinfected with Prevantics per policy. Each lumen aspirated for blood return and flushed with Normal Saline per policy. VSS. Dialysis Tx initiated. 0800: Dr. Missy Nesscel in to see pt. Orders received to draw paired blood cultures from permcath. Orders received to use pt's permcath instead of fistula. 
 
8354: Caryn Yu with Infection control notified and OK with drawing cultures ordered by MD. 
 
1110: Tx ended 10 minutes early. Circuit started to clot. Blood returned before unable. VSS. Paired blood cultures obtained from arterial limb after properly disinfecting per policy. Central line catheter flushed with normal saline per policy. Ports disinfected with Prevantics per policy, lines disconnected, Heparin dwells instilled, and lines capped using aseptic technique. Bed locked and in the lowest position, call bell and belongings in reach. SBAR given to Primary, RN. Patient is stable at time of my departure. All Dialysis related medications have been reviewed. Admiting Diagnosis: Osteomyelitis/sepsis Pt's previous clinic- [facility] Consent signed - Informed Consent Verified: Yes (07/06/20 0750) [facility] Consent - on file Hepatitis Status- Immune 9/11/19 Machine #- Machine Number: [id number] (07/06/20 0750) Telemetry status- Bedside

## 2020-07-06 NOTE — PROGRESS NOTES
NAME: Gertrudis Padilla :  1950 MRN:  [id number] Assessment :    Plan: 
--ESKD- [id number] AVF/perm cath Acute respiratory failure on vent Seizure/AMS OM/discitis/abcess at C6-7 Sepsis Likely CLABSI at some point Severe HTN Bradycardia Seen on HD, bp stable BC drawn today at end of HD. Will pull PC, have samir for now AVF not working (perhaps can declot etc prior to [facility] pulled) BC NGTD () Hold SERENITY for now (Hgb > 10) On vent. Critically ill at significant risk of deterioration. Subjective: Chief Complaint:  Intubated. Sedated. Review of Systems: UTO. Intubated. Symptom Y/N Comments  Symptom Y/N Comments Fever/Chills    Chest Pain Poor Appetite    Edema Cough    Abdominal Pain Sputum    Joint Pain SOB/RUBIO    Pruritis/Rash Nausea/vomit    Tolerating PT/OT Diarrhea    Tolerating Diet Constipation    Other Could not obtain due to: See above Objective: VITALS:  
Last 24hrs VS reviewed since prior progress note. Most recent are: 
Visit Vitals /51 (BP 1 Location: Right arm, BP Patient Position: At rest) Pulse 86 Temp 98.4 °F (36.9 °C) Resp 13 Ht 5' 8\" (1.727 m) Wt 77.5 kg (170 lb 13.7 oz) SpO2 100% BMI 25.98 kg/m² Intake/Output Summary (Last 24 hours) at 2020 1351 Last data filed at 2020 1115 Gross per 24 hour Intake 834.58 ml Output 3460 ml Net -2625.42 ml Telemetry Reviewed: PHYSICAL EXAM: 
General: Intubated in the icu ETT in place No rales RRR 
PC in place No corazon Left ue avf with thrill/bruit Lab Data Reviewed: (see below) Medications Reviewed: (see below) PMH/SH reviewed - no change compared to H&P 
________________________________________________________________________ Care Plan discussed with: 
Patient Family RN    
 Care Manager Consultant:     
 
  Comments >50% of visit spent in counseling and coordination of care    
 
________________________________________________________________________ Cassi Kim MD  
 
Procedures: see electronic medical records for all procedures/Xrays and details which 
were not copied into this note but were reviewed prior to creation of Plan. LABS: 
Recent Labs  
  07/06/20 0404 07/05/20 0351 WBC 13.5* 9.8 HGB 11.4* 10.0* HCT 35.7* 32.0*  
* 417* Recent Labs  
  07/06/20 0404 07/05/20 0351 07/04/20 
5924 * 130* 133* K 4.8 4.7 4.5  
CL 90* 95* 96* CO2 16* 20* 23 BUN 68* 55* 34* CREA 8.66* 6.68* 5.61* * 113* 170* CA 9.6 8.8 8.5 No results for input(s): AP, TBIL, TP, ALB, GLOB, GGT, AML, LPSE in the last 72 hours. No lab exists for component: SGOT, GPT, AMYP, HLPSE No results for input(s): INR, PTP, APTT, INREXT, INREXT in the last 72 hours. No results for input(s): FE, TIBC, PSAT, FERR in the last 72 hours. Lab Results Component Value Date/Time Folate 42.1 (H) 09/08/2012 10:24 AM  
  
No results for input(s): PH, PCO2, PO2 in the last 72 hours. No results for input(s): CPK, CKMB in the last 72 hours. No lab exists for component: TROPONINI No components found for: [place] Lab Results Component Value Date/Time  Color YELLOW/STRAW 07/01/2020 01:52 PM  
 Appearance CLEAR 07/01/2020 01:52 PM  
 Specific gravity 1.017 07/01/2020 01:52 PM  
 pH (UA) 8.0 07/01/2020 01:52 PM  
 Protein 300 (A) 07/01/2020 01:52 PM  
 Glucose 100 (A) 07/01/2020 01:52 PM  
 Ketone 15 (A) 07/01/2020 01:52 PM  
 Bilirubin Negative 07/01/2020 01:52 PM  
 Urobilinogen 0.2 07/01/2020 01:52 PM  
 Nitrites Negative 07/01/2020 01:52 PM  
 Leukocyte Esterase Negative 07/01/2020 01:52 PM  
 Epithelial cells FEW 07/01/2020 01:52 PM  
 Bacteria Negative 07/01/2020 01:52 PM  
 WBC 0-4 07/01/2020 01:52 PM  
 RBC 0-5 07/01/2020 01:52 PM  
 
 MEDICATIONS: 
Current Facility-Administered Medications Medication Dose Route Frequency  hydrALAZINE (APRESOLINE) 20 mg/mL injection 10 mg  10 mg IntraVENous Q6H PRN  
 heparin (porcine) 1,000 unit/mL injection 3,800 Units  3,800 Units Hemodialysis DIALYSIS PRN  
 metoclopramide HCl (REGLAN) injection 10 mg  10 mg IntraVENous Q6H  
 cloNIDine HCL (CATAPRES) tablet 0.3 mg  0.3 mg Per NG tube BID  chlorhexidine (ORAL CARE KIT) 0.12 % mouthwash 15 mL  15 mL Oral Q12H  
 famotidine (PEPCID) tablet 20 mg  20 mg Oral DAILY  alcohol 62% (NOZIN) nasal  1 Ampule  1 Ampule Topical Q12H  
 fentaNYL citrate (PF) injection 50 mcg  50 mcg IntraVENous Q1H PRN  
 fentaNYL (PF) 1,500 mcg/30 mL (50 mcg/mL) infusion   mcg/hr IntraVENous TITRATE  propofol (DIPRIVAN) 10 mg/mL infusion  0-50 mcg/kg/min IntraVENous TITRATE  VANCOMYCIN INFORMATION NOTE   Other Rx Dosing/Monitoring  piperacillin-tazobactam (ZOSYN) 3.375 g in 0.9% sodium chloride (MBP/ADV) 100 mL  3.375 g IntraVENous Q12H  
 sodium chloride (NS) flush 5-40 mL  5-40 mL IntraVENous Q8H  
 sodium chloride (NS) flush 5-40 mL  5-40 mL IntraVENous PRN  
 acetaminophen (TYLENOL) tablet 650 mg  650 mg Oral Q6H PRN  
 ondansetron (ZOFRAN) injection 4 mg  4 mg IntraVENous Q6H PRN  
 LORazepam (ATIVAN) injection 2 mg  2 mg IntraVENous PRN  
 levETIRAcetam (KEPPRA) 500 mg in saline (iso-osm) 100 mL IVPB (premix)  500 mg IntraVENous Q12H  
 insulin lispro (HUMALOG) injection   SubCUTAneous Q6H  
 glucose chewable tablet 16 g  4 Tab Oral PRN  
 dextrose (D50W) injection syrg 12.5-25 g  12.5-25 g IntraVENous PRN  
 glucagon (GLUCAGEN) injection 1 mg  1 mg IntraMUSCular PRN  minoxidiL (LONITEN) tablet 5 mg  5 mg Per NG tube BID  sodium chloride (NS) flush 5-40 mL  5-40 mL IntraVENous Q8H  
 sodium chloride (NS) flush 5-40 mL  5-40 mL IntraVENous PRN  
 acetaminophen (TYLENOL) tablet 1,000 mg  1,000 mg Oral Q6H  
  oxyCODONE IR (ROXICODONE) tablet 5 mg  5 mg Oral Q3H PRN  
 oxyCODONE IR (ROXICODONE) tablet 10 mg  10 mg Oral Q3H PRN  
 naloxone (NARCAN) injection 0.4 mg  0.4 mg IntraVENous PRN  
 hydrOXYzine HCL (ATARAX) tablet 10 mg  10 mg Oral Q8H PRN  
 senna-docusate (PERICOLACE) 8.6-50 mg per tablet 1 Tab  1 Tab Oral BID  polyethylene glycol (MIRALAX) packet 17 g  17 g Oral DAILY  bisacodyL (DULCOLAX) suppository 10 mg  10 mg Rectal DAILY PRN  phenol throat spray (CHLORASEPTIC) 1 Spray  1 Spray Oral PRN  
 benzocaine-menthoL (CEPACOL) lozenge 1 Lozenge  1 Lozenge Oral PRN  
 diazePAM (VALIUM) tablet 5 mg  5 mg Oral Q6H PRN  
 cyclobenzaprine (FLEXERIL) tablet 10 mg  10 mg Oral BID PRN

## 2020-07-06 NOTE — PROGRESS NOTES
Hospitalist Progress Note NAME: Gertrudis Valero :  1950 MRN:  [id number] Assessment / Plan: 
Epidural abscess with Diskitis c6-c7 s/p C5- T1 ACDF with partial C6 and C7 corpectomy Sepsis, POA due to above Presented with AMS and Seizure CT Neck showed Osteomyelitis-discitis at C6-C7 with erosive endplate changes, prevertebral and epidural phlegmon, resulting in severe spinal canal stenosis and cord compression at C6-C7. S/p C5 through T1 ACDF with partial C6 and C7 corpectomy  by Ortho IV keppra, discussed with neuro, if no more seizures, stop Keppra next week 
-Ortho spine surgeon on board 
 
-ID on board. Antibiotic as per ID 
-Tissue culture- Staph Epi, BCx NGTD 
-Echo -  No endocarditis-  
 
Acute Respiratory Failure POA 
2/2 above and for airway protection 
-Vent management as per Pulmonary 
 
  
ESRD on HD MWF. Has permacath. --has AVF, but Could not get good blood flow --Has right Permcath, will need to change. 
  
HARDIK on cpap --intubated in ER for airway protection.  
 
 
  
CAD, hx stents . Follow with Dr. Giselle Givens. Echo 2018 EF 60-65% Paroxysmal afib 2019, not on anticoagulation Malignant HTN /78 Elevated trop 0.19, has chronic mild troponin elevation likely due to CKD. EKG without active ischemia. 
--hold plavix and aspirin, resume once okay with Surgeon 
--restart bystolic, clonidine, minoxidil  
  
DM 
--put on low dose SSI. BS 160s - 
  
Gout 
--resume allopurinol 
  
GERD, hx PUD 
--IV protonix 
  
BPH 
PAD 
S/p left CEA  by Dr. Nanette Gtz Hx aortobifemoral graft Distal AAA borderline aneurysm 2017 Hx chronic anemia, hgb normal today 13.8 
  
--Body mass index is 25.31 kg/m². 
  
Code: full code DVT prophylaxis: SCD Surrogate decision maker:  Jenaro Herring [phone] Subjective: Chief Complaint / Reason for Physician Visit 
-Remain intubated and sedated with propofol and fentanyl Review of Systems: Symptom Y/N Comments  Symptom Y/N Comments Fever/Chills    Chest Pain Poor Appetite    Edema Cough    Abdominal Pain Sputum    Joint Pain SOB/RUBIO    Pruritis/Rash Nausea/vomit    Tolerating PT/OT Diarrhea    Tolerating Diet Constipation    Other Could NOT obtain due to: Intubated Objective: VITALS:  
Last 24hrs VS reviewed since prior progress note. Most recent are: 
Patient Vitals for the past 24 hrs: 
 Temp Pulse Resp BP SpO2  
07/06/20 1030  96 17 122/45 98 % 07/06/20 1015  96 15 123/44 98 % 07/06/20 1000  96 14 130/50 98 % 07/06/20 0945  96 14 121/50 98 % 07/06/20 0930  97 13 124/58 98 % 07/06/20 0917  97  170/53   
07/06/20 0915  95 12 170/53 99 % 07/06/20 0900  94 12 157/56 99 % 07/06/20 0845  93 23 143/50 98 % 07/06/20 0830  92 15 156/57 99 % 07/06/20 0815  89 13 178/60 99 % 07/06/20 0814  91 17  99 % 07/06/20 0800  86 14 197/66 99 % 07/06/20 0750 99.3 °F (37.4 °C) 86 16 178/58 99 % 07/06/20 0700  87 12 160/49 99 % 07/06/20 0600  94 24 182/52 100 % 07/06/20 0500  95 (!) 34 183/53 100 % 07/06/20 0415  83 26 154/42 100 % 07/06/20 0400 98.4 °F (36.9 °C) 85 19 (!) 243/61 100 % 07/06/20 0327  88 16  98 % 07/06/20 0300  79 13 (!) 224/60 100 % 07/06/20 0200  75 22 (!) 223/54 99 % 07/06/20 0100  70 20 (!) 155/39 98 % 07/06/20 0015  79 15 174/43 99 % 07/06/20 0000 99.1 °F (37.3 °C) 76 13 (!) 211/53 99 % 07/05/20 2320  65 12  98 % 07/05/20 2300  64 18 159/43 98 % 07/05/20 2200  65 14 158/41 98 % 07/05/20 2100  74 12 194/46 98 % 07/05/20 2000 98 °F (36.7 °C) 64 16 196/51 99 % 07/05/20 1911  (!) 58 12  98 % 07/05/20 1900  (!) 59 20 156/41 97 % 07/05/20 1800  63 17 170/44 97 % 07/05/20 1700  61 12 168/51 99 % 07/05/20 1600 98.8 °F (37.1 °C)      
07/05/20 1529  (!) 56 13  100 % 07/05/20 1500  (!) 58 14 132/43 100 % 07/05/20 1230 99 °F (37.2 °C)     07/05/20 1100  (!) 56 12 159/47 100 % Intake/Output Summary (Last 24 hours) at 7/6/2020 1041 Last data filed at 7/6/2020 0800 Gross per 24 hour Intake 919.63 ml Output 630 ml Net 289.63 ml PHYSICAL EXAM: 
General: WD, WN. intubated EENT:  EOMI. Anicteric sclerae. MMM Resp:  CTA bilaterally, no wheezing or rales. No accessory muscle use CV:  Regular  rhythm,  No edema GI:  Soft, Non distended, Non tender.  +Bowel sounds Neurologic:      intubated Psych:   intubated Skin:  No rashes. No jaundice Reviewed most current lab test results and cultures  YES Reviewed most current radiology test results   YES Review and summation of old records today    NO Reviewed patient's current orders and MAR    YES 
PMH/SH reviewed - no change compared to H&P 
________________________________________________________________________ Care Plan discussed with: 
  Comments Patient Family  x   
RN x Care Manager Consultant Multidiciplinary team rounds were held today with , nursing, pharmacist and clinical coordinator. Patient's plan of care was discussed; medications were reviewed and discharge planning was addressed. ________________________________________________________________________ Total NON critical care TIME:  28   Minutes Total CRITICAL CARE TIME Spent:   Minutes non procedure based Comments >50% of visit spent in counseling and coordination of care    
________________________________________________________________________ Cristino Rice MD  
 
Procedures: see electronic medical records for all procedures/Xrays and details which were not copied into this note but were reviewed prior to creation of Plan. LABS: 
I reviewed today's most current labs and imaging studies. Pertinent labs include: 
Recent Labs  
  07/06/20 
0404 07/05/20 
0351 07/04/20 
0253 WBC 13.5* 9.8 9.3 HGB 11.4* 10.0* 10.4* HCT 35.7* 32.0* 32.4*  
 * 417* 376 Recent Labs  
  07/06/20 
0404 07/05/20 
0351 07/04/20 
7967 * 130* 133* K 4.8 4.7 4.5  
CL 90* 95* 96* CO2 16* 20* 23 * 113* 170* BUN 68* 55* 34* CREA 8.66* 6.68* 5.61* CA 9.6 8.8 8.5 Signed: Felipe Parker MD

## 2020-07-06 NOTE — PROGRESS NOTES
Nutrition Assessment: 
 
RECOMMENDATIONS:  
Resume TF, advance back to goal rate Add reglan ASSESSMENT:  
Chart reviewed, case discussed during CCU rounds. Pt remains intubated and sedated with propofol @ 18. 1mL/h, which provides 478 kcals daily. TF was at goal but pt developed high residuals yesterday (up to 400mL) and TF is now on hold. He is already on a bowel regimen and had a BM yesterday. Per discussion with Dr. Garcia Danger to be added per my recommendations. Plan for HD today. TF at goal + propofol will meet 100% kcal and protein needs. Dietitians Intervention(s)/Plan(s): Resume TF, add reglan SUBJECTIVE/OBJECTIVE:  
Pt intubated and sedated Diet Order: NPO, Other (comment)(TF via OGT: Osmolite 1.2 @ 40mL/h + Prosource BID + 50mL H2O flush q 6h (provides 1272kcals/83gPro/151gCHO/987mL) ) 
% Eaten:  No data found. Osmolite 1.2 at 0 mL/hr flush with 50 mL  Q6H  via OG Tube   Residuals: 400 mL Pertinent Medications:pepcid, humalog, reglan, zosyn, miralax, pericolace, vancomycin; Drips: propofol, fentanyl I/O's:-341mL Chemistries: 
Lab Results Component Value Date/Time Sodium 128 (L) 07/06/2020 04:04 AM  
 Potassium 4.8 07/06/2020 04:04 AM  
 Chloride 90 (L) 07/06/2020 04:04 AM  
 CO2 16 (L) 07/06/2020 04:04 AM  
 Anion gap 22 (H) 07/06/2020 04:04 AM  
 Glucose 192 (H) 07/06/2020 04:04 AM  
 BUN 68 (H) 07/06/2020 04:04 AM  
 Creatinine 8.66 (H) 07/06/2020 04:04 AM  
 BUN/Creatinine ratio 8 (L) 07/06/2020 04:04 AM  
 GFR est AA 7 (L) 07/06/2020 04:04 AM  
 GFR est non-AA 6 (L) 07/06/2020 04:04 AM  
 Calcium 9.6 07/06/2020 04:04 AM  
 Albumin 2.2 (L) 07/03/2020 05:33 AM  
  
Anthropometrics: Height: 5' 8\" (172.7 cm) Weight: 77.5 kg (170 lb 13.7 oz)  [] standing scale    []bed scale    []stated   []unknown IBW (%IBW):   ( ) UBW (%UBW):   (  %) BMI: Body mass index is 25.98 kg/m². This BMI is indicative of: []Underweight   [x]Normal   []Overweight   [] Obesity   [] Extreme Obesity (BMI>40) Estimated Nutrition Needs (Based on): 6812 Kcals/day(PSU (MSJ 2298)) , 78 g(1gPro/kg) Protein Carbohydrate: At Least 130 g/day  Fluids: per MD mL/day Last BM: 7/5   [x]Active     []Hyperactive  []Hypoactive       [] Absent   BS Skin:    [] Intact   [x] Incision  [] Breakdown   [] DTI   [] Tears/Excoriation/Abrasion  [x]Edema(+1-trace-generalized; +1 pitting-BLE) [] Other: Wt Readings from Last 30 Encounters:  
07/06/20 77.5 kg (170 lb 13.7 oz) 10/22/19 75.5 kg (166 lb 6.4 oz) 10/17/19 77.5 kg (170 lb 13.7 oz) 09/17/19 78.8 kg (173 lb 11.6 oz) 04/26/19 89 kg (196 lb 3.2 oz) 01/21/19 82.4 kg (181 lb 9.6 oz) 10/22/18 81.5 kg (179 lb 10.8 oz) 09/07/18 83.9 kg (184 lb 15.5 oz) 09/04/18 84.1 kg (185 lb 6.5 oz) 08/23/18 82.6 kg (182 lb 3.2 oz) 06/15/18 80.5 kg (177 lb 6.4 oz) 02/26/18 85.8 kg (189 lb 3.2 oz) 02/11/18 87.6 kg (193 lb 1.6 oz) 10/27/17 90.4 kg (199 lb 6.4 oz)  
07/27/17 87.8 kg (193 lb 8 oz)  
03/29/17 89.4 kg (197 lb 3.2 oz) 01/26/17 86.2 kg (190 lb) 01/10/17 87.2 kg (192 lb 3.2 oz) 12/19/16 89.5 kg (197 lb 6.4 oz)  
07/26/16 92.2 kg (203 lb 3.2 oz) 02/26/16 89 kg (196 lb 3.2 oz)  
09/24/15 85.3 kg (188 lb) 04/23/15 85.3 kg (188 lb) 12/18/14 85.8 kg (189 lb 3.2 oz) 08/21/14 90.5 kg (199 lb 9.6 oz) 04/04/14 89.3 kg (196 lb 12.8 oz) 02/11/14 90.7 kg (200 lb) 12/02/13 91.1 kg (200 lb 14.4 oz) 10/16/13 85.3 kg (188 lb)  
07/25/13 86.6 kg (191 lb) Dx of Malnutrition since admission: no 
 
NUTRITION DIAGNOSES:  
Problem:  Inadequate protein-energy intake Etiology: related to pt intubated and NPO Signs/Symptoms: as evidenced by NPO + propofol meets <30% kcal and 0% protein needs. Previous dx resolving. NUTRITION INTERVENTIONS: 
  Enteral/Parenteral Nutrition: Other(Resume TF as ordered) GOAL:  
 Pt will tolerate TF resumptions with residuals <500mL in 2-4 days NUTRITION MONITORING AND EVALUATION Previous Goal: Pt will tolerate TF initiation with residuals <500mL in 2-4 days Previous Goal Met: Progressing Previous Recommendations Implemented: Yes Cultural, Lutheran, or Ethnic Dietary Needs: None LEARNING NEEDS (Diet, Food/Nutrient-Drug Interaction):  
 [x] None Identified 
 [] Identified and Education Provided/Documented 
 [] Identified and Pt declined/was not appropriate [x] Interdisciplinary Care Plan Reviewed/Documented  
 [x] Participated in Discharge Planning: UTD [x] Interdisciplinary Rounds NUTRITION RISK:  
 [x] High              [] Moderate           []  Low  []  Minimal/Uncompromised Audra Adams RD, [facility] Pager [phone] Weekend Pager [phone]

## 2020-07-06 NOTE — PROGRESS NOTES
07/06/20 0600 ABCDEF Bundle SBT Safety Screen Passed No  
SBT Screen Reason for Failure Agitation 
(pt hypertensive)

## 2020-07-06 NOTE — PROGRESS NOTES
ORTHO POST OP SPINE PROGRESS NOTE 2020 Admit Date: 2020 Admit Diagnosis: Seizure ([facility] [address] [R56.9] Acute encephalopathy [G93.40] Malignant hypertension [I10] Cervical discitis [M46.42] Procedure: Procedure(s): 
C5-T1 Anterior Cervical Discectomy Fusion (ACDF) with C6-C7 Corepectomy Post Op day: 5 Days Post-Op Subjective:  
 
Gertrudis Pringle is a patient who is s/p C5-T1 ACDF with partial corpectomy C6 and C7 done for discitis. POD #5. remains intubated in CCU. Ruby Reyez Review of Systems: Pertinent items are noted in HPI. Objective:  
 
PT/OT:  
Distance Ambulated:          
Time Ambulated (min):       
Ambulation Response: Activity Response: Fairly tolerated Assistive Device:              Assistive Device: Fall prevention device Vital Signs:   
Blood pressure 133/53, pulse 84, temperature 98.4 °F (36.9 °C), resp. rate 16, height 5' 8\" (1.727 m), weight 77.5 kg (170 lb 13.7 oz), SpO2 100 %. Temp (24hrs), Av.6 °F (37 °C), Min:98 °F (36.7 °C), Max:99.3 °F (37.4 °C) 
 
 
701 - 1900 In: 21.1 [I.V.:21.1] Out: 7692  
 190 -  0700 In: 2618.7 [I.V.:1838.7] Out: 630 LAB:   
Recent Labs  
  20 
0404 HGB 11.4* WBC 13.5*  
* Wound/Drain Assessment: 
Drain:   
 
Dressing:  
 
Physical Exam: 
Incision clean, dry, and intact C Collar. drain removed Assessment:  
  
Patient Active Problem List  
Diagnosis Code  Uncontrolled type 2 diabetes mellitus with diabetic nephropathy, without long-term current use of insulin (HCC) E11.21, E11.65  
 Essential hypertension, benign I10  
 Hyperlipidemia LDL goal <70 E78.5  Iron deficiency anemia D50.9  SOB (shortness of breath) R06.02  
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  
 Acute respiratory failure (HCC) J96.00  
  Bilateral pneumonia J18.9  Pulmonary edema J81.1  Sepsis ([facility] [address] A41.9  VIVIEN (acute kidney injury) ([facility] [address] N17.9  Orthostatic dizziness R42  Acute on chronic renal failure (HCC) N17.9, N18.9  Malignant hypertension I10  
 Seizure ([facility] [address] R56.9  Cervical discitis M46.42  
 Acute encephalopathy G93.40 Plan:  
 
Continue PT/OT/Rehab S/p C5-T1 acdf Remains critical with sedation/intubation Antibx per ID Mgmt per medicine Will follow

## 2020-07-06 NOTE — PROGRESS NOTES
Occupational Therapy: 
07/06/20 Chart reviewed for OT evaluation, noted pt failed SBT this AM. Pt currently intubated and sedated. Will defer and continue to follow. Thank you, Olivia Wiley OTR/L

## 2020-07-06 NOTE — PROGRESS NOTES
Pharmacy Automatic Renal Dosing Protocol - Antimicrobials Indication for Antimicrobials: C6-7 osteomyelitis/cervical diskitis and epidural abscess, s/p ACDF of C5-T1 with C6-7 corpectomy  Current Regimen of Each Antimicrobial: 
Vancomycin 750 mg after each HD; Start Date ; Day # 6 Zosyn 3.375g IV every 12h; start: , day # 6 Previous Antimicrobial Therapy: 
Cefepime 2g IV x 1 (Start Date ; Day Goal Level: VANCOMYCIN TROUGH GOAL RANGE Vancomycin Trough: 20 - 25 mcg/mL  (AUC: 400 - 600 mg/hr/Liter/day) Date Dose & Interval Measured (mcg/mL) Extrapolated (mcg/mL)  
7/3/20 750 mg IV after HD 23.4 Date & time of next level: 7/10 Significant Cultures:  
: paired blood cx: NGTD, pending : epidural abscess - SCANT STAPHYLOCOCCUS EPIDERMIDIS (OXACILLIN RESISTANT) (Final) : anareobes - NG - Final 
 paired blood cx: NGTD, pending Radiology / Imaging results: (X-ray, CT scan or MRI):  
: CTA head/neck:  55% stenosis in neck, emboli in bilateral M2 and M3 branches in head Paralysis, amputations, malnutrition: none Labs: 
Recent Labs  
  20 
0404 20 
9296 20 
9271 CREA 8.66* 6.68* 5.61* BUN 68* 55* 34* WBC 13.5* 9.8 9.3 Temp (24hrs), Av.7 °F (37.1 °C), Min:98 °F (36.7 °C), Max:99.3 °F (37.4 °C) Creatinine Clearance (mL/min) or Dialysis: MWF dialysis Impression/Plan:  
Continue current dose of vanc, zosyn; appropriate for indication/renal function Antimicrobial stop date to be determined Pharmacy will follow daily and adjust medications as appropriate for renal function and/or serum levels. Thank you, SHAHBAZ Soto

## 2020-07-06 NOTE — PROGRESS NOTES
PT note:  
 
Chart reviewed and spoke with nursing. Patient is still intubated, failed SBT, and sedated. Will defer and continue to follow for PT evaluation.   
 
Emerald Galo, PT, DPT

## 2020-07-06 NOTE — PROGRESS NOTES
0700  Report from Maria D Mohan RN 
 
0800  Assessment complete. Patient opens eyes to voice but no command following withdraws from pain. BORA. Patient is intubated on vent a/c 30% 500 12 resp. 6 peep tolerating well. Left IJ with Propofol at 40 mcg and Fentanyl at 150 mcg plus a KVO. Right Perma Cath with HD being done now. BP elevated will monitor. OG in place no residual noted verified placement. Lungs coarse diminished  Heart RR  Abdomen semi soft hypoactive BS. C-Collar in place swelling noted to right side of neck. Ext's pulses positive trace edema in lower. Restraints in place 1000  Patient still on HD tolerating well BP stable 
 
1200  Assessment complete no changes from previous. HD complete 2830 L taken off. BP stable now  Dr Missy Kirk ordered [facility] off Perma cath once HD completed. Dialysis nurse will do. 
 
1400  Patient resting comfortably on sedation no signs of distress noted. 1515  Patient with 2nd loose watery stool spoke with Dr Jennie Jenkins ok to insert flexi-seal. 
 
1440  IR here to place new Garry in per orders. 1600  Assessment complete no changes from previous  VSS 
 
1620  IR unable to insert new Garry attempted both femorals and right IJ. Dr Missy Kirk here spoke with IR ok to stop and leave Perma Cath in.   Once Neck is cleared may attempt right IJ. 
 
1900  Report given to Lali Novoa RN
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
## 2020-07-07 PROBLEM — Z71.89 COUNSELING REGARDING ADVANCE CARE PLANNING AND GOALS OF CARE: Status: ACTIVE | Noted: 2020-01-01

## 2020-07-07 NOTE — PROGRESS NOTES
Bedside and Verbal shift change report given to Jim Landis RN (oncoming nurse) by Evan Martinez (offgoing nurse). Report included the following information SBAR, Kardex, Recent Results and Cardiac Rhythm sinus tachycardia,1st degree avb.

## 2020-07-07 NOTE — PROGRESS NOTES
07/07/20 0535 ABCDEF Bundle SBT Safety Screen Passed No  
SBT Screen Reason for Failure (acute sinus Tach 130's)

## 2020-07-07 NOTE — PROGRESS NOTES
Attempted visit for spiritual assessment as pt is a new palliative consult. Pt is on a vent and no family present. Consulted pt's chart. Chaplains will continue to offer support as needed. Chaplain Dl, MDiv, MS, [facility] 
[phone]

## 2020-07-07 NOTE — PROGRESS NOTES
Infectious Disease Progress IMPRESSION:  
- Severe sepsis with, change in mental status & seizure - Epidural abscess OM, discitis C 6/7 Erosive endplate changes, prevertebral and epidural phlegmon, resulting in severe spinal 
canal stenosis and cord compression at C6-C7. On CT neck C5-T1 Anterior Cervical Discectomy Fusion & C6-C7 Corepectomy on 7/1 
- BC- 7/01- NG 
- Tissue culture - scant Staph epidermidis- ( Ox R )  anaerobic - NG 
- Acute respiratory failure intubated - ESRD on HD,probable line related infection BC- NG at this time S/p removal of permacath , placement of Garry & HD  
   AVF  Present ( created 10/17/19) H/o PD & peritonitis Fld culture - 9/11 /19 + for Pseudomonas ( sensitive to Zosyn) s/p removal of PD on 9/12/19 
  - ParoxysmalAfib 
- HARDIK - CAD h/o stenting PLAN:  
  
 
- Continue Vancomycin/ Zosyn IV  
- Repeat BC with fevers >101, BC drawn with HD 
- ECHO- no vegetation, GAURAV - when stable Subjective:  
 
Pt seen . Intubated D/w Dr Carmen Nye Patient Active Problem List  
Diagnosis Code  Uncontrolled type 2 diabetes mellitus with diabetic nephropathy, without long-term current use of insulin ([facility]) E11.21, E11.65  
 Essential hypertension, benign I10  
 Hyperlipidemia LDL goal <70 E78.5  Iron deficiency anemia D50.9  SOB (shortness of breath) R06.02  
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  
 Acute respiratory failure (HCC) J96.00  Bilateral pneumonia J18.9  Pulmonary edema J81.1  Sepsis ([address] A41.9  VIVIEN (acute kidney injury) ([address] N17.9  Orthostatic dizziness R42  Acute on chronic renal failure (HCC) N17.9, N18.9  Malignant hypertension I10  
 Seizure ([address] R56.9  Cervical discitis M46.42  
 Acute encephalopathy G93.40 Past Medical History:  
Diagnosis Date  AAA (abdominal aortic aneurysm) ([facility] 34mm 2017  Anemia   
 gets shots from Dr. Cici Rothman - last dose 2018  BPH (benign prostatic hypertrophy)  CAD (coronary artery disease) s/p stents, sees Dr. Domenica Ortiz  Cancer ([facility] skin cancer on leg  Chronic kidney disease M[facility]-F L[facility]  End stage renal disease ([facility] Dr. Cici Rothman  GERD (gastroesophageal reflux disease)  Gout  Other and unspecified hyperlipidemia  PUD (peptic ulcer disease)  PVD (peripheral vascular disease) ([facility]   
 s/p PTCA of left femoral artery in 2014  Sleep apnea CPAP  Type II or unspecified type diabetes mellitus without mention of complication, uncontrolled Dr. Aurea Deluna  Unspecified essential hypertension  Unspecified vitamin D deficiency Family History Problem Relation Age of Onset  Diabetes Mother  Heart Disease Mother  Heart Disease Maternal Grandmother  Diabetes Daughter   
     gestational  
Ibrahim Diabetes Sister  Stroke Sister  Cancer Father  Hypertension Father Social History Tobacco Use  Smoking status: Former Smoker Packs/day: 2.00 Years: 25.00 Pack years: 50.00 Last attempt to quit: 3/11/1991 Years since quittin.3  Smokeless tobacco: Never Used Substance Use Topics  Alcohol use: Yes Comment: very occasional  
 
Past Surgical History:  
Procedure Laterality Date  CARDIAC SURG PROCEDURE UNLIST    
 stents  HX CATARACT REMOVAL    
 HX COLONOSCOPY    
 HX CORONARY STENT PLACEMENT    
 HX ENDOSCOPY    
 HX KNEE ARTHROSCOPY    
 right x2, left x1  
 HX OTHER SURGICAL    
 skin cancer removed from leg  IR INSERT NON TUNL CVC OVER 5 YRS  2019  IR INSERT NON TUNL CVC OVER 5 YRS  2020  IR INSERT TUNL CVC W/O PORT OVER 5 YR  2019  VASCULAR SURGERY PROCEDURE UNLIST    
 aorto-bifem bypass  VASCULAR SURGERY PROCEDURE UNLIST    
 left carotid endarterectomy  VASCULAR SURGERY PROCEDURE UNLIST    
 right femoral PTCA Prior to Admission medications Medication Sig Start Date End Date Taking? Authorizing Provider  
ferric citrate (Auryxia) 210 mg iron tablet Take 630 mg by mouth three (3) times daily (with meals). Yes Provider, Historical  
D3-E-Se-soy suyyj-mulojk-ofshb (Prostate 2.4) 1,200-15-35 unit-unit-mcg cap Take 1 Cap by mouth two (2) times a day. OTC Super Beta Prostate 1 cap bid   Yes Provider, Historical  
OTHER Take 1 Tab by mouth nightly. OTC Neuriva (for brain): 1 tab qhs   Yes Provider, Historical  
Basaglar KwikPen U-100 Insulin 100 unit/mL (3 mL) inpn Inject 8 units every morning ONLY if blood sugar is over 150, otherwise hold. 6/19/20  Yes Annika Thompson MD  
furosemide (LASIX) 80 mg tablet Take 1 Tab by mouth two (2) times a day. 9/17/19  Yes Jil Koroma MD  
cloNIDine HCl (CATAPRES) 0.2 mg tablet Take 0.4 mg by mouth two (2) times a day. Yes Provider, Historical  
minoxidil (LONITEN) 10 mg tablet Take 5 mg by mouth two (2) times a day. Yes Provider, Historical  
NIFEdipine ER (ADALAT CC) 60 mg ER tablet Take 60 mg by mouth every twelve (12) hours. Yes Provider, Historical  
atorvastatin (LIPITOR) 40 mg tablet Take 40 mg by mouth every evening. Yes Provider, Historical  
acetaminophen (TYLENOL) 500 mg tablet Take 1,000 mg by mouth every six (6) hours as needed for Pain. Yes Other, MD Corby  
folic acid-vit Z7-DXH A79 (FOLTX) 2.5-25-2 mg tablet Take 1 Tab by mouth nightly. Yes Other, MD Corby  
omega-3 fatty acids (FISH OIL CONCENTRATE) 1,000 mg cap Take 1,000 mg by mouth two (2) times a day. Yes Provider, Historical  
BYSTOLIC 20 mg tablet Take 20 mg by mouth nightly. 12/15/17  Yes Provider, Historical  
PRALUENT PEN 75 mg/mL injector pen 75 mg by SubCUTAneous route Once every 2 weeks.  10/11/17  Yes Provider, Historical  
 tamsulosin (FLOMAX) 0.4 mg capsule Take 0.4 mg by mouth nightly. Yes Provider, Historical  
clopidogrel (PLAVIX) 75 mg tablet Take 75 mg by mouth nightly. Yes Provider, Historical  
Insulin Needles, Disposable, 31 gauge x 3/16\" ndle USE DAILY AS DIRECTED 2/10/20   Bharat Patterson MD  
 
No Known Allergies Review of Systems:  Review of systems not obtained due to patient factors. 10 point review of systems obtained . All other systems negative Objective:  
Blood pressure 116/42, pulse 75, temperature 99.5 °F (37.5 °C), resp. rate 14, height 5' 8\" (1.727 m), weight 170 lb 13.7 oz (77.5 kg), SpO2 99 %. Temp (24hrs), Av.9 °F (37.2 °C), Min:98.4 °F (36.9 °C), Max:99.5 °F (37.5 °C) Current Facility-Administered Medications Medication Dose Route Frequency  hydrALAZINE (APRESOLINE) 20 mg/mL injection 10 mg  10 mg IntraVENous Q6H PRN  
 heparin (porcine) 1,000 unit/mL injection 3,800 Units  3,800 Units Hemodialysis DIALYSIS PRN  
 metoclopramide HCl (REGLAN) injection 10 mg  10 mg IntraVENous Q6H  
 heparin (porcine) pf 500 Units  500 Units InterCATHeter ONCE  cloNIDine HCL (CATAPRES) tablet 0.3 mg  0.3 mg Per NG tube BID  chlorhexidine (ORAL CARE KIT) 0.12 % mouthwash 15 mL  15 mL Oral Q12H  
 famotidine (PEPCID) tablet 20 mg  20 mg Oral DAILY  alcohol 62% (NOZIN) nasal  1 Ampule  1 Ampule Topical Q12H  
 fentaNYL citrate (PF) injection 50 mcg  50 mcg IntraVENous Q1H PRN  
 fentaNYL (PF) 1,500 mcg/30 mL (50 mcg/mL) infusion   mcg/hr IntraVENous TITRATE  propofol (DIPRIVAN) 10 mg/mL infusion  0-50 mcg/kg/min IntraVENous TITRATE  VANCOMYCIN INFORMATION NOTE   Other Rx Dosing/Monitoring  piperacillin-tazobactam (ZOSYN) 3.375 g in 0.9% sodium chloride (MBP/ADV) 100 mL  3.375 g IntraVENous Q12H  
 sodium chloride (NS) flush 5-40 mL  5-40 mL IntraVENous Q8H  
 sodium chloride (NS) flush 5-40 mL  5-40 mL IntraVENous PRN  
  acetaminophen (TYLENOL) tablet 650 mg  650 mg Oral Q6H PRN  
 ondansetron (ZOFRAN) injection 4 mg  4 mg IntraVENous Q6H PRN  
 LORazepam (ATIVAN) injection 2 mg  2 mg IntraVENous PRN  
 levETIRAcetam (KEPPRA) 500 mg in saline (iso-osm) 100 mL IVPB (premix)  500 mg IntraVENous Q12H  
 insulin lispro (HUMALOG) injection   SubCUTAneous Q6H  
 glucose chewable tablet 16 g  4 Tab Oral PRN  
 dextrose (D50W) injection syrg 12.5-25 g  12.5-25 g IntraVENous PRN  
 glucagon (GLUCAGEN) injection 1 mg  1 mg IntraMUSCular PRN  minoxidiL (LONITEN) tablet 5 mg  5 mg Per NG tube BID  sodium chloride (NS) flush 5-40 mL  5-40 mL IntraVENous Q8H  
 sodium chloride (NS) flush 5-40 mL  5-40 mL IntraVENous PRN  
 acetaminophen (TYLENOL) tablet 1,000 mg  1,000 mg Oral Q6H  
 oxyCODONE IR (ROXICODONE) tablet 5 mg  5 mg Oral Q3H PRN  
 oxyCODONE IR (ROXICODONE) tablet 10 mg  10 mg Oral Q3H PRN  
 naloxone (NARCAN) injection 0.4 mg  0.4 mg IntraVENous PRN  
 hydrOXYzine HCL (ATARAX) tablet 10 mg  10 mg Oral Q8H PRN  
 senna-docusate (PERICOLACE) 8.6-50 mg per tablet 1 Tab  1 Tab Oral BID  polyethylene glycol (MIRALAX) packet 17 g  17 g Oral DAILY  bisacodyL (DULCOLAX) suppository 10 mg  10 mg Rectal DAILY PRN  phenol throat spray (CHLORASEPTIC) 1 Spray  1 Spray Oral PRN  
 benzocaine-menthoL (CEPACOL) lozenge 1 Lozenge  1 Lozenge Oral PRN  
 diazePAM (VALIUM) tablet 5 mg  5 mg Oral Q6H PRN  
 cyclobenzaprine (FLEXERIL) tablet 10 mg  10 mg Oral BID PRN Exam:   
General:  Sedated and on the ventilator. No acute distress. Eyes:  Sclera anicteric. Pupils equal, round, reactive to light. Mouth/Throat: endotracheal tube in place. Neck: Brace, dressings on  
Lungs:   Clear to auscultation bilaterally, good effort. Cardiovascular:  Regular rate and rhythm, no murmur, click, rub, or gallop. Abdomen:   Soft, non-tender, bowel sounds normal, non-distended. Extremities: No cyanosis or edema. Skin: No acute rash or lesions. Lymph Nodes: Cervical and supraclavicular normal.  
Musculoskeletal:  No swelling or deformity. Lines/Devices:  Intact, no erythema, drainage, or tenderness. Psychiatric: Sedated and appears comfortable on ventilator. Data Review: CBC:  
Recent Labs  
  07/06/20 
0404 07/05/20 0351 07/04/20 
5394 WBC 13.5* 9.8 9.3  
RBC 3.48* 3.02* 3.10* HGB 11.4* 10.0* 10.4* HCT 35.7* 32.0* 32.4*  
* 417* 376 GRANS 71 63  --   
LYMPH 7* 11*  --   
EOS 4 6  --   
 
CMP:  
Recent Labs  
  07/06/20 
0404 07/05/20 
0351 07/04/20 
0253 * 113* 170* * 130* 133* K 4.8 4.7 4.5  
CL 90* 95* 96* CO2 16* 20* 23 BUN 68* 55* 34* CREA 8.66* 6.68* 5.61* CA 9.6 8.8 8.5 AGAP 22* 15 14 BUCR 8* 8* 6* Lab Results Component Value Date/Time Culture result: NO ANAEROBES ISOLATED 07/01/2020 07:33 PM  
 Culture result: (A) 07/01/2020 07:33 PM  
  SCANT STAPHYLOCOCCUS EPIDERMIDIS (OXACILLIN RESISTANT) Culture result: NO GROWTH 5 DAYS 07/01/2020 02:30 PM  
 Culture result: HEAVY PSEUDOMONAS AERUGINOSA (A) 09/11/2019 01:35 AM  
 Culture result: NO GROWTH 5 DAYS 09/10/2019 05:15 PM  
  
 
 
XR Results (most recent): 
Results from Hospital Encounter encounter on 07/01/20 XR CHEST PORT Narrative EXAM: XR CHEST PORT INDICATION: central line placement COMPARISON: 7/1/2020 FINDINGS: A portable AP radiograph of the chest was obtained at 1709 hours. A 
double lumen right IJ line is again shown terminating at the cavoatrial 
junction. Endotracheal tube is again shown terminating at the thoracic inlet. There is interval placement of a transesophageal tube extending into the 
stomach. There is interval placement of a left IJ line terminating in the 
proximal superior vena cava. No consolidation is shown. A borderline appearance 
of interstitial pulmonary edema is again noted. There is no pneumothorax or pleural effusion. Cardiac and mediastinal contours appear stable. .  
  
 Impression IMPRESSION: Lines and tubes as above. No acute findings. ICD-10-CM ICD-9-CM 1. Seizure ([address] R56.9 780.39   
2. Encephalopathy G93.40 348.30   
3. Acute respiratory failure, unspecified whether with hypoxia or hypercapnia ([facility]) J96.00 518.81   
4. Cervical spinal cord compression ([facility]) G95.20 336.9 5. Acute osteomyelitis of cervical spine ([facility]) M46.22 730.08   
6. Discitis of cervical region M46.42 722.91   
7. Epidural abscess G06.2 324.9 8. Acute encephalopathy G93.40 348.30   
9. Cervical discitis M46.42 722.91   
10. Malignant hypertension I10 401.0 11. Acute blood loss anemia D62 285.1 12. Acute renal failure with acute renal cortical necrosis superimposed on chronic kidney disease, on chronic dialysis ([facility]) N17.1 584.6 N18.9 585.9 Z99.2 V45.11   
13. Staphylococcus epidermidis infection B95.7 041.19 Antibiotic History Vancomycin/ Zosyn IV 7/1 Signed By: Irvin Plunkett MD [facility]

## 2020-07-07 NOTE — PROGRESS NOTES
NAME: Gertrudis Caceres :  1950 MRN:  [id number] Assessment :    Plan: 
--ESKD- JCU-GKR-Dzghyqxoqpixdy AVF/perm cath Acute respiratory failure on vent Seizure/AMS OM/discitis/abcess at C6-7 Sepsis Likely CLABSI at some point Severe HTN Bradycardia IR attempted new access placement yesterday-couldn't place samir due to occlusive disease. PC for now-See IR note-once ortho gives clearance from c collar then a samir can be attempted from the neck AVF not working (perhaps can declot etc prior to [facility] pulled) BC NGTD () Hold SERENITY for now (Hgb > 10) On vent. Critically ill at significant risk of deterioration. Subjective: Chief Complaint:  Intubated. Sedated. Review of Systems: UTO. Intubated. Symptom Y/N Comments  Symptom Y/N Comments Fever/Chills    Chest Pain Poor Appetite    Edema Cough    Abdominal Pain Sputum    Joint Pain SOB/RUBIO    Pruritis/Rash Nausea/vomit    Tolerating PT/OT Diarrhea    Tolerating Diet Constipation    Other Could not obtain due to: See above Objective: VITALS:  
Last 24hrs VS reviewed since prior progress note. Most recent are: 
Visit Vitals /59 Pulse (!) 122 Temp 99.1 °F (37.3 °C) Resp 13 Ht 5' 8\" (1.727 m) Wt 77.1 kg (169 lb 15.6 oz) SpO2 99% BMI 25.84 kg/m² Intake/Output Summary (Last 24 hours) at 2020 1443 Last data filed at 2020 1200 Gross per 24 hour Intake 1077.42 ml Output 240 ml Net 837.42 ml Telemetry Reviewed: PHYSICAL EXAM: 
General: Intubated in the icu ETT in place No rales RRR 
PC in place No corazon Left ue avf with thrill/bruit Lab Data Reviewed: (see below) Medications Reviewed: (see below) PMH/SH reviewed - no change compared to H&P 
________________________________________________________________________ Care Plan discussed with: 
Patient Family RN Care Manager Consultant:     
 
  Comments >50% of visit spent in counseling and coordination of care    
 
________________________________________________________________________ Annette Baxter MD  
 
Procedures: see electronic medical records for all procedures/Xrays and details which 
were not copied into this note but were reviewed prior to creation of Plan. LABS: 
Recent Labs  
  07/07/20 0457 07/06/20 
0404 WBC 12.5* 13.5* HGB 10.9* 11.4* HCT 34.8* 35.7* * 505* Recent Labs  
  07/07/20 0457 07/06/20 
0404 07/05/20 
0351 * 128* 130*  
K 4.9 4.8 4.7 CL 94* 90* 95* CO2 19* 16* 20* BUN 45* 68* 55* CREA 6.58* 8.66* 6.68* * 192* 113* CA 10.0 9.6 8.8 No results for input(s): AP, TBIL, TP, ALB, GLOB, GGT, AML, LPSE in the last 72 hours. No lab exists for component: SGOT, GPT, AMYP, HLPSE No results for input(s): INR, PTP, APTT, INREXT, INREXT in the last 72 hours. No results for input(s): FE, TIBC, PSAT, FERR in the last 72 hours. Lab Results Component Value Date/Time Folate 42.1 (H) 09/08/2012 10:24 AM  
  
No results for input(s): PH, PCO2, PO2 in the last 72 hours. No results for input(s): CPK, CKMB in the last 72 hours. No lab exists for component: TROPONINI No components found for: [place] Lab Results Component Value Date/Time  Color YELLOW/STRAW 07/01/2020 01:52 PM  
 Appearance CLEAR 07/01/2020 01:52 PM  
 Specific gravity 1.017 07/01/2020 01:52 PM  
 pH (UA) 8.0 07/01/2020 01:52 PM  
 Protein 300 (A) 07/01/2020 01:52 PM  
 Glucose 100 (A) 07/01/2020 01:52 PM  
 Ketone 15 (A) 07/01/2020 01:52 PM  
 Bilirubin Negative 07/01/2020 01:52 PM  
 Urobilinogen 0.2 07/01/2020 01:52 PM  
 Nitrites Negative 07/01/2020 01:52 PM  
 Leukocyte Esterase Negative 07/01/2020 01:52 PM  
 Epithelial cells FEW 07/01/2020 01:52 PM  
 Bacteria Negative 07/01/2020 01:52 PM  
 WBC 0-4 07/01/2020 01:52 PM  
 RBC 0-5 07/01/2020 01:52 PM  
 
 
MEDICATIONS: 
Current Facility-Administered Medications Medication Dose Route Frequency  hydrALAZINE (APRESOLINE) 20 mg/mL injection 10 mg  10 mg IntraVENous Q6H PRN  
 heparin (porcine) 1,000 unit/mL injection 3,800 Units  3,800 Units Hemodialysis DIALYSIS PRN  
 cloNIDine HCL (CATAPRES) tablet 0.3 mg  0.3 mg Per NG tube BID  chlorhexidine (ORAL CARE KIT) 0.12 % mouthwash 15 mL  15 mL Oral Q12H  
 famotidine (PEPCID) tablet 20 mg  20 mg Oral DAILY  alcohol 62% (NOZIN) nasal  1 Ampule  1 Ampule Topical Q12H  
 fentaNYL citrate (PF) injection 50 mcg  50 mcg IntraVENous Q1H PRN  
 fentaNYL (PF) 1,500 mcg/30 mL (50 mcg/mL) infusion   mcg/hr IntraVENous TITRATE  propofol (DIPRIVAN) 10 mg/mL infusion  0-50 mcg/kg/min IntraVENous TITRATE  VANCOMYCIN INFORMATION NOTE   Other Rx Dosing/Monitoring  piperacillin-tazobactam (ZOSYN) 3.375 g in 0.9% sodium chloride (MBP/ADV) 100 mL  3.375 g IntraVENous Q12H  
 sodium chloride (NS) flush 5-40 mL  5-40 mL IntraVENous Q8H  
 sodium chloride (NS) flush 5-40 mL  5-40 mL IntraVENous PRN  
 acetaminophen (TYLENOL) tablet 650 mg  650 mg Oral Q6H PRN  
 ondansetron (ZOFRAN) injection 4 mg  4 mg IntraVENous Q6H PRN  
 LORazepam (ATIVAN) injection 2 mg  2 mg IntraVENous PRN  
 levETIRAcetam (KEPPRA) 500 mg in saline (iso-osm) 100 mL IVPB (premix)  500 mg IntraVENous Q12H  
 insulin lispro (HUMALOG) injection   SubCUTAneous Q6H  
 glucose chewable tablet 16 g  4 Tab Oral PRN  
 dextrose (D50W) injection syrg 12.5-25 g  12.5-25 g IntraVENous PRN  
 glucagon (GLUCAGEN) injection 1 mg  1 mg IntraMUSCular PRN  minoxidiL (LONITEN) tablet 5 mg  5 mg Per NG tube BID  sodium chloride (NS) flush 5-40 mL  5-40 mL IntraVENous Q8H  
 sodium chloride (NS) flush 5-40 mL  5-40 mL IntraVENous PRN  
 oxyCODONE IR (ROXICODONE) tablet 5 mg  5 mg Oral Q3H PRN  
  oxyCODONE IR (ROXICODONE) tablet 10 mg  10 mg Oral Q3H PRN  
 naloxone (NARCAN) injection 0.4 mg  0.4 mg IntraVENous PRN  
 hydrOXYzine HCL (ATARAX) tablet 10 mg  10 mg Oral Q8H PRN  
 bisacodyL (DULCOLAX) suppository 10 mg  10 mg Rectal DAILY PRN  phenol throat spray (CHLORASEPTIC) 1 Spray  1 Spray Oral PRN  
 benzocaine-menthoL (CEPACOL) lozenge 1 Lozenge  1 Lozenge Oral PRN  
 diazePAM (VALIUM) tablet 5 mg  5 mg Oral Q6H PRN  
 cyclobenzaprine (FLEXERIL) tablet 10 mg  10 mg Oral BID PRN

## 2020-07-07 NOTE — PROGRESS NOTES
Pharmacy Automatic Renal Dosing Protocol - Antimicrobials Indication for Antimicrobials: C6-7 osteomyelitis/cervical diskitis and epidural abscess, s/p ACDF of C5-T1 with C6-7 corpectomy  Current Regimen of Each Antimicrobial: 
Vancomycin 750 mg after each HD; Start Date ; Day # 7 Zosyn 3.375g IV every 12h; start: , day # 7 Previous Antimicrobial Therapy: 
Cefepime 2g IV x 1 (Start Date ; Day Goal Level: VANCOMYCIN TROUGH GOAL RANGE Vancomycin Trough: 20 - 25 mcg/mL  (AUC: 400 - 600 mg/hr/Liter/day) Date Dose & Interval Measured (mcg/mL) Extrapolated (mcg/mL)  
7/3/20 750 mg IV after HD 23.4 Date & time of next level: 7/10 Significant Cultures:  
: paired blood cx: NGTD, pending : epidural abscess - SCANT STAPHYLOCOCCUS EPIDERMIDIS (OXACILLIN RESISTANT) (Final) : anareobes - NG - Final 
 paired blood cx: NGTD, pending Radiology / Imaging results: (X-ray, CT scan or MRI):  
: CTA head/neck:  55% stenosis in neck, emboli in bilateral M2 and M3 branches in head Paralysis, amputations, malnutrition: none Labs: 
Recent Labs  
  20 
0457 20 
0404 20 
4456 CREA 6.58* 8.66* 6.68* BUN 45* 68* 55* WBC 12.5* 13.5* 9.8 Temp (24hrs), Av.2 °F (37.3 °C), Min:98.4 °F (36.9 °C), Max:99.8 °F (37.7 °C) Creatinine Clearance (mL/min) or Dialysis: MWF dialysis Impression/Plan:  
Continue current dose of vanc, zosyn; appropriate for indication/renal function Antimicrobial stop date to be determined Pharmacy will follow daily and adjust medications as appropriate for renal function and/or serum levels. Thank you, SHAHBAZ Macias

## 2020-07-07 NOTE — PROGRESS NOTES
0700  Report from [facility] RN 
 
0800  Assessment complete  See flow sheet  HR remains increased 1200  Assessment complete  No changes from previous 1440  Low tidal volume then high vent keeps alarming Propofol increased see MAR 
 
1515  Appears more calm with increased Propofol 
 
1600  Patient appears to be more calm at this time  Report given to Pilo Jackson RN

## 2020-07-07 NOTE — PROGRESS NOTES
Hospitalist Progress Note NAME: Gertrudis Bravo :  1950 MRN:  [id number] Assessment / Plan: 
Epidural abscess with Diskitis c6-c7 s/p C5- T1 ACDF with partial C6 and C7 corpectomy Sepsis, POA due to above Presented with AMS and Seizure CT Neck showed Osteomyelitis-discitis at C6-C7 with erosive endplate changes, prevertebral and epidural phlegmon, resulting in severe spinal canal stenosis and cord compression at C6-C7. S/p C5 through T1 ACDF with partial C6 and C7 corpectomy  by Ortho IV keppra, discussed with neuro, if no more seizures, stop Keppra next week 
-Ortho spine surgeon on board 
 
-ID on board. Antibiotic as per ID 
-Tissue culture- Staph Epi, BCx NGTD 
-Echo -  No endocarditis-  
 
Acute Respiratory Failure POA 
2/2 above and for airway protection 
-Vent management as per Pulmonary 
 
  
ESRD on HD MWF. Has permacath. --has AVF, but Could not get good blood flow, may need declotting before DC  
--Has right Permcath, will need to change IR attempted new access placement yesterday-couldn't place samir due to occlusive disease 
awaiting clearance from ortho in order to attempt samir the neck  
  
HARDIK on cpap --intubated in ER for airway protection.  
 
  
CAD, hx stents . Follow with Dr. Maricruz Brito. Echo 2018 EF 60-65% Paroxysmal afib 2019, not on anticoagulation Malignant HTN /78 Elevated trop 0.19, has chronic mild troponin elevation likely due to CKD. EKG without active ischemia. 
--hold plavix and aspirin, resume once okay with Surgeon --c/w  bystolic, clonidine, minoxidil  
  
DM 
--put on low dose SSI. BS 160s - 
  
Gout 
--resume allopurinol 
  
GERD, hx PUD 
--IV protonix 
  
BPH 
PAD 
S/p left CEA  by Dr. Mcclain Liner Hx aortobifemoral graft Distal AAA borderline aneurysm 2017 Hx chronic anemia, hgb normal today 13.8 
  
--Body mass index is 25.31 kg/m². 
  
Code: full code DVT prophylaxis: SCD 
 Surrogate decision maker:  Jenaro Irwin [phone] Subjective: Chief Complaint / Reason for Physician Visit 
-Remain intubated and sedated with propofol and fentanyl Review of Systems: 
Symptom Y/N Comments  Symptom Y/N Comments Fever/Chills    Chest Pain Poor Appetite    Edema Cough    Abdominal Pain Sputum    Joint Pain SOB/RUBIO    Pruritis/Rash Nausea/vomit    Tolerating PT/OT Diarrhea    Tolerating Diet Constipation    Other Could NOT obtain due to: Intubated Objective: VITALS:  
Last 24hrs VS reviewed since prior progress note. Most recent are: 
Patient Vitals for the past 24 hrs: 
 Temp Pulse Resp BP SpO2  
07/07/20 1200 99.1 °F (37.3 °C) (!) 128 12 145/74 99 % 07/07/20 1116  (!) 128 19  99 % 07/07/20 1100  (!) 124 11 140/49 98 % 07/07/20 1030  (!) 125 12 118/53 94 % 07/07/20 1000  (!) 124 18 153/64 94 % 07/07/20 0930  (!) 125 15 139/47 97 % 07/07/20 0921  (!) 125 15 164/51 98 % 07/07/20 0900  (!) 124 21 177/59 100 % 07/07/20 0800 99.8 °F (37.7 °C) (!) 122 14 178/59 97 % 07/07/20 0735  (!) 124 17  97 % 07/07/20 0700  (!) 124 12 160/55 96 % 07/07/20 0600  (!) 126 15 158/53 96 % 07/07/20 0500 99.4 °F (37.4 °C) (!) 121 16  94 % 07/07/20 0400  81 12 161/51 99 % 07/07/20 0354  82 17  99 % 07/07/20 0300 99 °F (37.2 °C) 81 12 157/58 99 % 07/06/20 2328  77 13  99 % 07/06/20 2300 99.4 °F (37.4 °C) 77 13 128/41 99 % 07/06/20 2200  75 17 118/43 99 % 07/06/20 2100  76 16 121/40 99 % 07/06/20 2000 99.1 °F (37.3 °C) 77 13 153/45 99 % 07/06/20 1901  75 14  99 % 07/06/20 1900  75 12 116/42 99 % 07/06/20 1800  76 14 111/43 98 % 07/06/20 1741  78 14 131/49 98 % 07/06/20 1715  79 14 123/42 98 % 07/06/20 1700  78 14 (!) 106/39 98 % 07/06/20 1630  79 12 105/40 99 % 07/06/20 1600 99.5 °F (37.5 °C) 79 21 (!) 123/36 98 % 07/06/20 1530  83 12 (!) 151/38 100 % 07/06/20 1519  84 16  100 % 07/06/20 1500  83 15 133/53 98 % 07/06/20 1430  82 17 115/51 99 % 07/06/20 1400  81 17 119/48 99 % 07/06/20 1300  83 14 128/47 100 % Intake/Output Summary (Last 24 hours) at 7/7/2020 1241 Last data filed at 7/7/2020 1200 Gross per 24 hour Intake 1077.42 ml Output 240 ml Net 837.42 ml PHYSICAL EXAM: 
General: WD, WN. intubated EENT:  EOMI. Anicteric sclerae. MMM Resp:  CTA bilaterally, no wheezing or rales. No accessory muscle use CV:  Regular  rhythm,  No edema GI:  Soft, Non distended, Non tender.  +Bowel sounds Neurologic:      intubated Psych:   intubated Skin:  No rashes. No jaundice Reviewed most current lab test results and cultures  YES Reviewed most current radiology test results   YES Review and summation of old records today    NO Reviewed patient's current orders and MAR    YES 
PMH/ reviewed - no change compared to H&P 
________________________________________________________________________ Care Plan discussed with: 
  Comments Patient Family RN x Care Manager Consultant Multidiciplinary team rounds were held today with , nursing, pharmacist and clinical coordinator. Patient's plan of care was discussed; medications were reviewed and discharge planning was addressed. ________________________________________________________________________ Total NON critical care TIME:  25   Minutes Total CRITICAL CARE TIME Spent:   Minutes non procedure based Comments >50% of visit spent in counseling and coordination of care x   
________________________________________________________________________ Arleen Garza MD  
 
Procedures: see electronic medical records for all procedures/Xrays and details which were not copied into this note but were reviewed prior to creation of Plan. LABS: 
I reviewed today's most current labs and imaging studies. Pertinent labs include: 
Recent Labs  
  07/07/20 
0457 07/06/20 
0404 07/05/20 
0351 WBC 12.5* 13.5* 9.8 HGB 10.9* 11.4* 10.0* HCT 34.8* 35.7* 32.0*  
* 505* 417* Recent Labs  
  07/07/20 
0457 07/06/20 
0404 07/05/20 
0351 * 128* 130*  
K 4.9 4.8 4.7 CL 94* 90* 95* CO2 19* 16* 20* * 192* 113* BUN 45* 68* 55* CREA 6.58* 8.66* 6.68* CA 10.0 9.6 8.8 Signed: Craig Pemberton MD

## 2020-07-07 NOTE — PROGRESS NOTES
1900 - 2000 Bedside and Verbal shift change report given to Arcelia Henderson (oncoming nurse) by Doris Partida (offgoing nurse). Report included the following information SBAR, Procedure Summary, Intake/Output, MAR, Recent Results and Cardiac Rhythm ST.  
 
2000 - 2200 Shift assessment complete, eyes open to voice but does not focus nor track and does not follow commands. Restlessness, fighting with ventilator. Tachycardic and tachypneic, will continue to monitor. 2200 - 0000 Repositioned for comfort. 0000 - 0200 Reassessment complete, resting quietly in bed. No other changes noted, see summary for details. 0200 - 0400 Repositioned for comfort. 0400 - 0600 Reassessment complete, no changes noted. Bathed with soap / water / CHG. Linen changed, gown changed etc... hair care . .. 0600 - 0745 Resting quietly in bed. Bedside and Verbal shift change report given to Ossie Simmonds (oncoming nurse) by Arcelia Henderson (offgoing nurse).  Report included the following information SBAR, Kardex, Procedure Summary, Intake/Output, MAR, Recent Results and Cardiac Rhythm SA.

## 2020-07-07 NOTE — INTERDISCIPLINARY ROUNDS
Interdisciplinary team rounds were held 7/7/2020 with the following team members:Care Management, Diabetes Treatment Specialist, Nursing, Nutrition, Pharmacy, Physical Therapy, Physician and Respiratory Therapy. Plan of care discussed. See clinical pathway and/or care plan for interventions and desired outcomes.

## 2020-07-07 NOTE — PROGRESS NOTES
1600 Report received from Apolinar Elizalde [place]. Assessment completed. Patient turn Q2. Patient remains tachycardiac and dyschronous on the vent at times. Appears comfortable. 1930 Report given to [facility]
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
## 2020-07-07 NOTE — TELEPHONE ENCOUNTER
Louie Marilyn (son) is calling stating that he just missed a phone call from the office.     Best contact: [phone]

## 2020-07-07 NOTE — PROGRESS NOTES
Chart reviewed and spoke with nursing. Patient continues to be intubated, sedated, and failed SBT this morning.  Patient continues to not be medically stable for OT evaluation.

## 2020-07-07 NOTE — PROGRESS NOTES
Patient had an uneventful night until decreasing sedation in preparation for weaning Jessica Nims HR elevated, BP elevated, patient became restless.

## 2020-07-07 NOTE — PROGRESS NOTES
CRITICAL CARE: 
 
Sedated on vent- C collar in place no WOB. Failing SBT daily. ROS: Unable to obtain full ROS as patient is intubated PE:  
Vitals stable Gen: Elderly, chronically ill appearing male intubated HEENT: ETT in place Neck: Ccollar Mouth: ETT 
CV: Regular Lungs: CTA B/L, no wheezing GI: Soft, no distention Ext: No edema, constant jerking motion of upper and lower extremities Neuro: Sedated, RASS -3 Msk: Muscle wasting Lab Results Component Value Date/Time WBC 12.5 (H) 07/07/2020 04:57 AM  
 HGB 10.9 (L) 07/07/2020 04:57 AM  
 Hematocrit (POC) 32 (L) 10/17/2019 02:16 PM  
 HCT 34.8 (L) 07/07/2020 04:57 AM  
 PLATELET 048 (H) 41/54/8669 04:57 AM  
 .5 (H) 07/07/2020 04:57 AM  
 
Lab Results Component Value Date/Time Sodium 131 (L) 07/07/2020 04:57 AM  
 Potassium 4.9 07/07/2020 04:57 AM  
 Chloride 94 (L) 07/07/2020 04:57 AM  
 CO2 19 (L) 07/07/2020 04:57 AM  
 Anion gap 18 (H) 07/07/2020 04:57 AM  
 Glucose 163 (H) 07/07/2020 04:57 AM  
 BUN 45 (H) 07/07/2020 04:57 AM  
 Creatinine 6.58 (H) 07/07/2020 04:57 AM  
 BUN/Creatinine ratio 7 (L) 07/07/2020 04:57 AM  
 GFR est AA 10 (L) 07/07/2020 04:57 AM  
 GFR est non-AA 8 (L) 07/07/2020 04:57 AM  
 Calcium 10.0 07/07/2020 04:57 AM  
 
CXR reviewed: no acute changes Impression: 
-acute respiratory failure requiring intubation due to inability to protect the airway 
-suspected seizures 
-marked hypertension 
-abnormal Cspine imaging as noted above; s/p emergent surgery 7/1 PM for debridement of epidural abscess and ACDF of C5-T1 
-history of paroxysmal afib 
-ESRD on HD M/W/F 
-DM 
- OSAS Plan: 
-continue vent support, unable to wean 
-reduce sedation to goal RASS -1 
-Daily SBTs 
-cardene as needed for BP control -EEG without seizures 
-neuro following 
-cultures pending 
-broad spectrum antibiotics with vanc and zosyn  
-ortho following closely 
-HD per renal 
-accuchecks, SSI 
-tube feeds -initiate DVT ppx when ok with ortho; SCD for now Coy Jeffers MD
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
## 2020-07-07 NOTE — PROGRESS NOTES
Orthopedic NP Progress Note Post Op day: 6 Days Post-Op July 7, 2020 11:10 AM  
 
Gertrudis Keith Attending Physician: Treatment Team: Attending Provider: Danny Gould MD; Consulting Provider: Shanna Epps MD; Consulting Provider: Zahida Bradley DO; Consulting Provider: Lindsay Rosenberg MD; Consulting Provider: Jim Gray MD; Care Manager: [place] Aretaga; Utilization Review: Larisa Gaston RN; Consulting Provider: Yuliana Mullins MD; Physician: Cherelle Partida MD; Consulting Provider: Silvia Toro; Primary Nurse: Moiz Martinez Vital Signs:   
Patient Vitals for the past 8 hrs: 
 BP Temp Pulse Resp SpO2  
07/07/20 1030 118/53  (!) 125 12 94 % 07/07/20 1000 153/64  (!) 124 18 94 % 07/07/20 0930 139/47  (!) 125 15 97 % 07/07/20 0921 164/51  (!) 125 15 98 % 07/07/20 0900 177/59  (!) 124 21 100 % 07/07/20 0800 178/59 99.8 °F (37.7 °C) (!) 122 14 97 % 07/07/20 0735   (!) 124 17 97 % 07/07/20 0700 160/55  (!) 124 12 96 % 07/07/20 0600 158/53  (!) 126 15 96 % 07/07/20 0500  99.4 °F (37.4 °C) (!) 121 16 94 % 07/07/20 0400 161/51  81 12 99 % 07/07/20 0354   82 17 99 % BMI (calculated): 25.9 (07/06/20 2200) Intake/Output: 
07/07 0701 - 07/07 1900 In: 36.8 [I.V.:36.8] Out: 40 [Drains:40] 
07/05 1901 - 07/07 0700 In: 1712.9 [I.V.:1697.9] Out: 3660 [Drains:200] Pain Control:  
Pain Assessment Pain Scale 1: Behavioral Pain Scale (BPS) Pain Intensity 1: 3 LAB:   
Recent Labs  
  07/07/20 
0457 HCT 34.8* HGB 10.9* Lab Results Component Value Date/Time  Sodium 131 (L) 07/07/2020 04:57 AM  
 Potassium 4.9 07/07/2020 04:57 AM  
 Chloride 94 (L) 07/07/2020 04:57 AM  
 CO2 19 (L) 07/07/2020 04:57 AM  
 Glucose 163 (H) 07/07/2020 04:57 AM  
 BUN 45 (H) 07/07/2020 04:57 AM  
 Creatinine 6.58 (H) 07/07/2020 04:57 AM  
 Calcium 10.0 07/07/2020 04:57 AM  
 
 
 Subjective:  Gertrudis Lynn is a 72 y.o. male s/p a  Procedure(s): 
C5-T1 Anterior Cervical Discectomy Fusion (ACDF) with C6-C7 Corepectomy Procedure(s): 
C5-T1 Anterior Cervical Discectomy Fusion (ACDF) with C6-C7 Corepectomy. Tolerating diet. Objective:  
General: pt sedated and intubated C collar in place - incision clean/dry/ intact PT/OT:  
Gait:    
            
 
 
Assessment:  
 s/p Procedure(s): 
C5-T1 Anterior Cervical Discectomy Fusion (ACDF) with C6-C7 Corepectomy Active Problems: 
  Malignant hypertension (7/1/2020) Seizure ([address] (7/1/2020) Cervical discitis (7/1/2020) Acute encephalopathy (7/1/2020) Plan:  
-  Continue PT/OT - PROM  
-  VTE Prophylaxes - TEDS &/or SCDs  
-  GI Prophylaxis - pepcid -  Palliative Care to discuss plan of care with family, continue medical management as per Dr. Burleigh Schaumann and Dr. Param Maki Signed By: Freedom Yun NP Orthopedic Nurse Practitioner

## 2020-07-07 NOTE — PROGRESS NOTES
PT note:  
 
Chart reviewed and spoke with nursing. Patient continues to be intubated, sedated, and failed SBT this morning. Patient continues to not be medically stable for PT evaluation.   
 
Coy Sainz, PT, DPT

## 2020-07-07 NOTE — PROGRESS NOTES
Infectious Disease Progress IMPRESSION:  
- Severe sepsis with change in mental status & seizure - Epidural abscess OM, discitis C 6/7 Erosive endplate changes, prevertebral and epidural phlegmon, resulting in severe spinal 
canal stenosis and cord compression at C6-C7. On CT neck C5-T1 Anterior Cervical Discectomy Fusion & C6-C7 Corepectomy on 7/1 
- BC- 7/01- NG 
- Tissue culture - scant Staph epidermidis- ( Ox R )  anaerobic - NG 
- Acute respiratory failure intubated - ESRD on HD,probable line related infection BC- NG at this time I am told HD line was not changed yesterday AVF pPresent not currently working ( created 10/17/19) H/o PD & peritonitis Fld culture - 9/11 /19 + for Pseudomonas ( sensitive to Zosyn) s/p removal of PD on 9/12/19 
  - Paroxysmal Afib 
- HARDIK - CAD h/o stenting PLAN:  
  
 
- Continue Vancomycin/ Zosyn IV  
- Repeat BC with fevers >101, BC drawn with HD- pending - Needs removal of permacath, placement of new HD access  
- ECHO- no vegetation, GAURAV - recommend when stable Subjective:  
 
Pt seen . Intubated, sedated D/w RN Patient Active Problem List  
Diagnosis Code  Uncontrolled type 2 diabetes mellitus with diabetic nephropathy, without long-term current use of insulin ([facility]) E11.21, E11.65  
 Essential hypertension, benign I10  
 Hyperlipidemia LDL goal <70 E78.5  Iron deficiency anemia D50.9  SOB (shortness of breath) R06.02  
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  
 Acute respiratory failure (HCC) J96.00  Bilateral pneumonia J18.9  Pulmonary edema J81.1  Sepsis ([address] A41.9  VIVIEN (acute kidney injury) ([address] N17.9  Orthostatic dizziness R42  Acute on chronic renal failure (HCC) N17.9, N18.9  Malignant hypertension I10  
  Seizure ([facility] R56.9  Cervical discitis M46.42  
 Acute encephalopathy G93.40 Past Medical History:  
Diagnosis Date  AAA [facility]abdominal aortic [facility]) [facility] 34mm 2017  Anemia   
 gets shots from Dr. Fernanda Hoffman - last dose 2018  BPH (benign prostatic hypertrophy)  CAD (coronary artery disease) s/p stents, sees Dr. Mcgovern Leak  Cancer ([facility] skin cancer on leg  Chronic kidney disease -[facility]  End stage renal disease ([facility] Dr. Fernanda Hoffman  GERD (gastroesophageal reflux disease)  Gout  Other and unspecified hyperlipidemia  PUD (peptic ulcer disease)  PVD (peripheral vascular disease) ([facility]   
 s/p PTCA of left femoral artery in 2014  Sleep apnea CPAP  Type II or unspecified type diabetes mellitus without mention of complication, uncontrolled Dr. Shabana Liu  Unspecified essential hypertension  Unspecified vitamin D deficiency Family History Problem Relation Age of Onset  Diabetes Mother  Heart Disease Mother  Heart Disease Maternal Grandmother  Diabetes Daughter   
     gestational  
25 [facility] Diabetes Sister  Stroke Sister  Cancer Father  Hypertension Father Social History Tobacco Use  Smoking status: Former Smoker Packs/day: 2.00 Years: 25.00 Pack years: 50.00 Last attempt to quit: 3/11/1991 Years since quittin.3  Smokeless tobacco: Never Used Substance Use Topics  Alcohol use: Yes Comment: very occasional  
 
Past Surgical History:  
Procedure Laterality Date  CARDIAC SURG PROCEDURE UNLIST    
 stents  HX CATARACT REMOVAL    
 HX COLONOSCOPY    
 HX CORONARY STENT PLACEMENT    
 HX ENDOSCOPY    
 HX KNEE ARTHROSCOPY    
 right x2, left x1  
 HX OTHER SURGICAL    
 skin cancer removed from leg  IR INSERT NON TUNL CVC OVER 5 YRS  2019  IR INSERT NON TUNL CVC OVER 5 YRS  2020  IR INSERT TUNL CVC W/O PORT OVER 5 YR  9/16/2019  VASCULAR SURGERY PROCEDURE UNLIST    
 aorto-bifem bypass  VASCULAR SURGERY PROCEDURE UNLIST    
 left carotid endarterectomy  VASCULAR SURGERY PROCEDURE UNLIST    
 right femoral PTCA Prior to Admission medications Medication Sig Start Date End Date Taking? Authorizing Provider  
ferric citrate (Auryxia) 210 mg iron tablet Take 630 mg by mouth three (3) times daily (with meals). Yes Provider, Historical  
D3-E-Se-soy bwlky-lggtkx-ugepa (Prostate 2.4) 1,200-15-35 unit-unit-mcg cap Take 1 Cap by mouth two (2) times a day. OTC Super Beta Prostate 1 cap bid   Yes Provider, Historical  
OTHER Take 1 Tab by mouth nightly. OTC Neuriva (for brain): 1 tab qhs   Yes Provider, Historical  
Basaglar KwikPen U-100 Insulin 100 unit/mL (3 mL) inpn Inject 8 units every morning ONLY if blood sugar is over 150, otherwise hold. 6/19/20  Yes Addy Farley MD  
furosemide (LASIX) 80 mg tablet Take 1 Tab by mouth two (2) times a day. 9/17/19  Yes Ivan Abrams MD  
cloNIDine HCl (CATAPRES) 0.2 mg tablet Take 0.4 mg by mouth two (2) times a day. Yes Provider, Historical  
minoxidil (LONITEN) 10 mg tablet Take 5 mg by mouth two (2) times a day. Yes Provider, Historical  
NIFEdipine ER (ADALAT CC) 60 mg ER tablet Take 60 mg by mouth every twelve (12) hours. Yes Provider, Historical  
atorvastatin (LIPITOR) 40 mg tablet Take 40 mg by mouth every evening. Yes Provider, Historical  
acetaminophen (TYLENOL) 500 mg tablet Take 1,000 mg by mouth every six (6) hours as needed for Pain. Yes Other, MD Corby  
folic acid-vit G4-QCD S89 (FOLTX) 2.5-25-2 mg tablet Take 1 Tab by mouth nightly. Yes Other, MD Corby  
omega-3 fatty acids (FISH OIL CONCENTRATE) 1,000 mg cap Take 1,000 mg by mouth two (2) times a day. Yes Provider, Historical  
BYSTOLIC 20 mg tablet Take 20 mg by mouth nightly.  12/15/17  Yes Provider, Historical  
 PRALUENT PEN 75 mg/mL injector pen 75 mg by SubCUTAneous route Once every 2 weeks. 10/11/17  Yes Provider, Historical  
tamsulosin (FLOMAX) 0.4 mg capsule Take 0.4 mg by mouth nightly. Yes Provider, Historical  
clopidogrel (PLAVIX) 75 mg tablet Take 75 mg by mouth nightly. Yes Provider, Historical  
Insulin Needles, Disposable, 31 gauge x 3/16\" ndle USE DAILY AS DIRECTED 2/10/20   Víctor Jeronimo MD  
 
No Known Allergies Review of Systems:  Review of systems not obtained due to patient factors. 10 point review of systems obtained . All other systems negative Objective:  
Blood pressure 166/65, pulse (!) 126, temperature 99.1 °F (37.3 °C), resp. rate 18, height 5' 8\" (1.727 m), weight 169 lb 15.6 oz (77.1 kg), SpO2 99 %. Temp (24hrs), Av.3 °F (37.4 °C), Min:99 °F (37.2 °C), Max:99.8 °F (37.7 °C) Current Facility-Administered Medications Medication Dose Route Frequency  hydrALAZINE (APRESOLINE) 20 mg/mL injection 10 mg  10 mg IntraVENous Q6H PRN  
 heparin (porcine) 1,000 unit/mL injection 3,800 Units  3,800 Units Hemodialysis DIALYSIS PRN  
 cloNIDine HCL (CATAPRES) tablet 0.3 mg  0.3 mg Per NG tube BID  chlorhexidine (ORAL CARE KIT) 0.12 % mouthwash 15 mL  15 mL Oral Q12H  
 famotidine (PEPCID) tablet 20 mg  20 mg Oral DAILY  alcohol 62% (NOZIN) nasal  1 Ampule  1 Ampule Topical Q12H  
 fentaNYL citrate (PF) injection 50 mcg  50 mcg IntraVENous Q1H PRN  
 fentaNYL (PF) 1,500 mcg/30 mL (50 mcg/mL) infusion   mcg/hr IntraVENous TITRATE  propofol (DIPRIVAN) 10 mg/mL infusion  0-50 mcg/kg/min IntraVENous TITRATE  VANCOMYCIN INFORMATION NOTE   Other Rx Dosing/Monitoring  piperacillin-tazobactam (ZOSYN) 3.375 g in 0.9% sodium chloride (MBP/ADV) 100 mL  3.375 g IntraVENous Q12H  
 sodium chloride (NS) flush 5-40 mL  5-40 mL IntraVENous Q8H  
 sodium chloride (NS) flush 5-40 mL  5-40 mL IntraVENous PRN  
  acetaminophen (TYLENOL) tablet 650 mg  650 mg Oral Q6H PRN  
 ondansetron (ZOFRAN) injection 4 mg  4 mg IntraVENous Q6H PRN  
 LORazepam (ATIVAN) injection 2 mg  2 mg IntraVENous PRN  
 levETIRAcetam (KEPPRA) 500 mg in saline (iso-osm) 100 mL IVPB (premix)  500 mg IntraVENous Q12H  
 insulin lispro (HUMALOG) injection   SubCUTAneous Q6H  
 glucose chewable tablet 16 g  4 Tab Oral PRN  
 dextrose (D50W) injection syrg 12.5-25 g  12.5-25 g IntraVENous PRN  
 glucagon (GLUCAGEN) injection 1 mg  1 mg IntraMUSCular PRN  minoxidiL (LONITEN) tablet 5 mg  5 mg Per NG tube BID  sodium chloride (NS) flush 5-40 mL  5-40 mL IntraVENous Q8H  
 sodium chloride (NS) flush 5-40 mL  5-40 mL IntraVENous PRN  
 oxyCODONE IR (ROXICODONE) tablet 5 mg  5 mg Oral Q3H PRN  
 oxyCODONE IR (ROXICODONE) tablet 10 mg  10 mg Oral Q3H PRN  
 naloxone (NARCAN) injection 0.4 mg  0.4 mg IntraVENous PRN  
 hydrOXYzine HCL (ATARAX) tablet 10 mg  10 mg Oral Q8H PRN  
 bisacodyL (DULCOLAX) suppository 10 mg  10 mg Rectal DAILY PRN  phenol throat spray (CHLORASEPTIC) 1 Spray  1 Spray Oral PRN  
 benzocaine-menthoL (CEPACOL) lozenge 1 Lozenge  1 Lozenge Oral PRN  
 diazePAM (VALIUM) tablet 5 mg  5 mg Oral Q6H PRN  
 cyclobenzaprine (FLEXERIL) tablet 10 mg  10 mg Oral BID PRN Exam:   
General:  Sedated and on the ventilator. No acute distress. Eyes:  Sclera anicteric. Pupils equal, round, reactive to light. Mouth/Throat: endotracheal tube in place. Neck: Brace, dressings on  
Lungs:   Clear to auscultation bilaterally, good effort. Cardiovascular:  Regular rate and rhythm, no murmur, click, rub, or gallop. Abdomen:   Soft, non-tender, bowel sounds normal, non-distended. Extremities: No cyanosis or edema. Skin: No acute rash or lesions. Lymph Nodes: Cervical and supraclavicular normal.  
Musculoskeletal:  No swelling or deformity. Lines/Devices:  Intact, no erythema, drainage, or tenderness. Psychiatric: Sedated and appears comfortable on ventilator. Data Review: CBC:  
Recent Labs  
  07/07/20 0457 07/06/20 
0404 07/05/20 
0351 WBC 12.5* 13.5* 9.8  
RBC 3.33* 3.48* 3.02* HGB 10.9* 11.4* 10.0* HCT 34.8* 35.7* 32.0*  
* 505* 417* GRANS  --  71 63 LYMPH  --  7* 11* EOS  --  4 6 CMP:  
Recent Labs  
  07/07/20 0457 07/06/20 
0404 07/05/20 
0351 * 192* 113* * 128* 130*  
K 4.9 4.8 4.7 CL 94* 90* 95* CO2 19* 16* 20* BUN 45* 68* 55* CREA 6.58* 8.66* 6.68* CA 10.0 9.6 8.8 AGAP 18* 22* 15  
BUCR 7* 8* 8* Lab Results Component Value Date/Time Culture result: NO GROWTH AFTER 20 HOURS 07/06/2020 11:32 AM  
 Culture result: NO ANAEROBES ISOLATED 07/01/2020 07:33 PM  
 Culture result: (A) 07/01/2020 07:33 PM  
  SCANT STAPHYLOCOCCUS EPIDERMIDIS (OXACILLIN RESISTANT) Culture result: NO GROWTH 5 DAYS 07/01/2020 02:30 PM  
 Culture result: HEAVY PSEUDOMONAS AERUGINOSA (A) 09/11/2019 01:35 AM  
  
 
 
XR Results (most recent): 
Results from Hospital Encounter encounter on 07/01/20 XR CHEST PORT Narrative Portable chest single view dated 7/7/2020 Comparison chest dated 7/1/2020 History is respiratory failure A single portable AP semierect view of the chest was obtained. The cardiac 
slight appears to be slightly enlarged. There continues to be slight prominence 
of the pulmonary interstitial markings. There has been a slight decrease in the 
conspicuity of the interstitial markings since the previous examination. This 
may represent improved congestive change/pulmonary edema. The distal end of the 
nasogastric tube is not included on this film. There has been interval 
resolution of the gaseous distention of the stomach was was present at the time 
of the previous examination. The endotracheal tube is not visualized and may 
have been removed. The dual-lumen catheter on the right and the central venous catheter on the left remain unchanged in position. Impression IMPRESSION: 
1. Slight interval improvement of the congestive change/pulmonary edema. 2. Interval resolution of the gaseous distention of the stomach. 3. Nonvisualization of the endotracheal tube. No change in position of the 
dual-lumen catheter on the right and the central venous catheter on the left. ICD-10-CM ICD-9-CM 1. Seizure ([address] R56.9 780.39   
2. Encephalopathy G93.40 348.30   
3. Acute respiratory failure, unspecified whether with hypoxia or hypercapnia ([facility]) J96.00 518.81   
4. Cervical spinal cord compression ([facility]) G95.20 336.9 5. Acute osteomyelitis of cervical spine ([facility]) M46.22 730.08   
6. Discitis of cervical region M46.42 722.91   
7. Epidural abscess G06.2 324.9 8. Acute encephalopathy G93.40 348.30   
9. Cervical discitis M46.42 722.91   
10. Malignant hypertension I10 401.0 11. Acute blood loss anemia D62 285.1 12. Acute renal failure with acute renal cortical necrosis superimposed on chronic kidney disease, on chronic dialysis ([facility]) N17.1 584.6 N18.9 585.9 Z99.2 V45.11   
13. Staphylococcus epidermidis infection B95.7 041.19   
14. Acute respiratory failure with hypoxia ([facility]) J96.01 518.81   
15. Uncontrolled type 2 diabetes mellitus with diabetic nephropathy, without long-term current use of insulin ([facility]) E11.21 250.42 E11.65 583.81 Antibiotic History Vancomycin/ Zosyn IV 7/1 Signed By: Edwar Macario MD FACP

## 2020-07-08 NOTE — PROGRESS NOTES
Palliative Medicine Consult Patient Name: Adenike Barraza YOB: 1950 Date of Initial Consult: 7/7/20 Reason for Consult: Care decisions Requesting Provider: Dr. Maria De Jesus Shipley Primary Care Physician: Zahida Carreno MD 
  
 SUMMARY:  
Adenike Barraza is a 72 y.o. with a past history of CAD with stents, Type 2 DM, ESRD with HD on MWF, HTN, HARDIK uses CPAP, afib with RVR, who was admitted on 7/1/2020 from home with a diagnosis of osteomyelitis-discitis at C6-C7, sepsis, acute encephalopathy with new onset seizure, ESRD, malignant HTN. Current medical issues leading to Palliative Medicine involvement include: care decisions r/t respiratory failure, sepsis and encephalopathy. Psychosocial: Patient lives with ex-wife prior to admission. Son reports his dad had MVA approximately 1 month ago and was having neck pain and using opiate prescription for pain prior to admission. Was to see Dr. Ady ramirez. 7/8 Patient remains intubate and sedated on ventilator. Fails all SBTs. Remains tachycardic. Prognosis poor per Dr. Dionna Jaquez and Dr. Carmen Nye. PALLIATIVE DIAGNOSES:  
1. Goals of care 2. Respiratory failure 3. Acute encephalopathy with new onset seizures 4. Sepsis possible related to permacath 5. Osteomyelitis/discitis, s/p C5-T1 ACDF with C6-C7 corpectomy on 7/1/20. PLAN:  
1. Prior to visit, I completed an extensive review of patient's medical records, including medical documentation, vital signs, MARs, and results of various labs and other diagnostics. I also spoke with patient's nurse, Mariela Mora, Dr. Dionna Jaquez, and pulmonologist/intensivist Dr. Maria De Jesus Shipley. 2. Patient seen and assessed. Remains on full ventilator support and sedated on Propofol. Remains minimally responsive as eyes open to pain. Received hemodialysis today. Remains tachycardic.   
3. Family meeting held at bedside with both of patient's sons, Phyllis Heman and LG. We discussed his wishes as to code status since he was a DNR on a previous admission. Both agreed that he would want that. They also stated that their father would not want to have all of these medical devices keeping him alive. Comfort care was discussed with a focus on pain and anxiety control. They are going to speak to immediate family tonight to see if anyone wants to see him and will discuss plans to withdraw care tomorrow. 4. Patient made DNR status. 5. Pastoral care consulted for family support during this visit. 6. Communicated plan of care with: Palliative IDT 
 
 
 GOALS OF CARE / TREATMENT PREFERENCES:  
[====Goals of Care====] GOALS OF CARE: 
Patient/Health Care Proxy Stated Goals: Other (comment)(family to plan withdrawal of care tomorrow) TREATMENT PREFERENCES:  
Code Status: Partial Code Advance Care Planning: 
Advance Care Planning 10/10/2019 Patient's Healthcare Decision Maker is: -  
Primary Decision Maker Name -  
Primary Decision Maker Phone Number -  
Primary Decision Maker Relationship to Patient -  
Confirm Advance Directive Yes, on file Does the patient have other document types - Medical Interventions: Other (comment)(will transition to comfort care tomorrow when family is ready) The palliative care team has discussed with patient / health care proxy about goals of care / treatment preferences for patient. 
[====Goals of Care====] HISTORY:  
 
History obtained from: Chart and nurse CHIEF COMPLAINT: AMS/unresponsive HPI/SUBJECTIVE: The patient is:  
[] Verbal and participatory [x] Non-participatory due to: Intubation, AMS 73 yo male admitted to hospital after being found unresponsive at home by exwife. Patient reported by EMS to have tonic clonic seizure during transport with agonal respirations. Patient was intubated emergently in ER.  Head CT negative for acute bleed or stroke, but revealed osteomyelitits/discitis at C6-C7 with severe spinal cord stenosis and cord compression. Patient had emergent C5-T1 ACDF with C6-C7 corpectomy secondary to discitis and epidural abcess on 7/1. Transferred to ICU on ventilator with malignant HTN post op. Patient has remained pn ventilator since surgery. Receives dialysis MWF via permacath. Patient has fistula in left arm that has faint thrill but is not accessible per vascular surgery. This will need to be addressed on this admission when patient is more stable. IR saw patient on 7/6 to attempt to place new dialysis access and was unable to via femoral arteries or right IJ. They will attempt to place new dialysis catheter when cervical collar can be removed and patient head turned for LIJ access. Patient was noted to be DNR on previous admission. Will speak with patient's son, Paolo Mayo, to clarify this status and attempt to obtain AMD. Clinical Pain Assessment (nonverbal scale for severity on nonverbal patients):  
Clinical Pain Assessment Severity: 0 Adult Nonverbal Pain Scale Face: No particular expression or smile Activity (Movement): Lying quietly, normal position Guarding: Lying quietly, no positioning of hands over areas of body Physiology (Vital Signs): Stable vital signs Respiratory: Baseline RR/SpO2 compliant with ventilator Total Score: 0 
 
 
 FUNCTIONAL ASSESSMENT:  
 
Palliative Performance Scale (PPS): PPS: 20 
 
 
 PSYCHOSOCIAL/SPIRITUAL SCREENING:  
 
Advance Care Planning: 
Advance Care Planning 10/10/2019 Patient's Healthcare Decision Maker is: -  
Primary Decision Maker Name -  
Primary Decision Maker Phone Number -  
Primary Decision Maker Relationship to Patient -  
Confirm Advance Directive Yes, on file Does the patient have other document types - Any spiritual / Nondenominational concerns: 
[] Yes /  [x] No 
 
Caregiver Burnout: 
[] Yes /  [] No /  [x] No Caregiver Present Anticipatory grief assessment: [] Normal  / [] Maladaptive ESAS Anxiety: Anxiety: 0 
 
ESAS Depression: Depression: 0 REVIEW OF SYSTEMS:  
 
Positive and pertinent negative findings in ROS are noted above in HPI. The following systems were [] reviewed / [x] unable to be reviewed as noted in HPI Other findings are noted below. Systems: constitutional, ears/nose/mouth/throat, respiratory, gastrointestinal, genitourinary, musculoskeletal, integumentary, neurologic, psychiatric, endocrine. Positive findings noted below. Modified ESAS Completed by: provider Fatigue: 0 Drowsiness: 0 Depression: 0 Pain: 0 Anxiety: 0 Nausea: 0 Anorexia: 0 Dyspnea: 0 Stool Occurrence(s): 1 PHYSICAL EXAM:  
 
From RN flowsheet: 
Wt Readings from Last 3 Encounters:  
07/08/20 160 lb 7.9 oz (72.8 kg) 10/22/19 166 lb 6.4 oz (75.5 kg) 10/17/19 170 lb 13.7 oz (77.5 kg) Blood pressure 153/62, pulse (!) 131, temperature 99.5 °F (37.5 °C), resp. rate 14, height 5' 8\" (1.727 m), weight 160 lb 7.9 oz (72.8 kg), SpO2 99 %. Pain Scale 1: Behavioral Pain Scale (BPS) Pain Intensity 1: 4 Last bowel movement, if known:  
 
Constitutional: Acutely ill patient, intubated and sedated Eyes: pupils equal but pinpoint, anicteric ENMT: no nasal discharge, moist mucous membranes, orally intubated Cardiovascular: regular rhythm, tachycardic, distal pulses intact, left arm AV fistula intact with faint thrill Respiratory: breathing not labored, symmetric, on full vent support Gastrointestinal: soft non-tender, +bowel sounds, NGT in place with TF Musculoskeletal: no deformity, no tenderness to palpation, cervical collar intact Skin: warm, dry, DDI to neck Neurologic: sedated, unresponsive on Propofol Psychiatric: ELIE HISTORY:  
 
Active Problems: 
  Malignant hypertension (7/1/2020) Seizure ([address] (7/1/2020) Cervical discitis (7/1/2020) Acute encephalopathy (7/1/2020) Counseling regarding advance care planning and goals of care (7/7/2020) Past Medical History:  
Diagnosis Date  AAA (abdominal aortic aneurysm) ([facility] [address] 34mm 2/2017  Anemia   
 gets shots from Dr. Trang De Guzman - last dose 8/31/2018  BPH (benign prostatic hypertrophy)  CAD (coronary artery disease) s/p stents, sees Dr. Phyllis Devi  Cancer ([facility] [address] skin cancer on leg  Chronic kidney disease [facility]  End stage renal disease ([facility] [address] Dr. Trang De Guzman  GERD (gastroesophageal reflux disease)  Gout  Other and unspecified hyperlipidemia  PUD (peptic ulcer disease)  PVD (peripheral vascular disease) ([facility] [address]   
 s/p PTCA of left femoral artery in July 2014  Sleep apnea CPAP  Type II or unspecified type diabetes mellitus without mention of complication, uncontrolled Dr. Echo Don  Unspecified essential hypertension  Unspecified vitamin D deficiency Past Surgical History:  
Procedure Laterality Date  CARDIAC SURG PROCEDURE UNLIST    
 stents  HX CATARACT REMOVAL    
 HX COLONOSCOPY    
 HX CORONARY STENT PLACEMENT    
 HX ENDOSCOPY    
 HX KNEE ARTHROSCOPY    
 right x2, left x1  
 HX OTHER SURGICAL    
 skin cancer removed from leg  IR INSERT NON TUNL CVC OVER 5 YRS  9/11/2019  IR INSERT NON TUNL CVC OVER 5 YRS  7/6/2020  IR INSERT TUNL CVC W/O PORT OVER 5 YR  9/16/2019  VASCULAR SURGERY PROCEDURE UNLIST    
 aorto-bifem bypass  VASCULAR SURGERY PROCEDURE UNLIST    
 left carotid endarterectomy  VASCULAR SURGERY PROCEDURE UNLIST    
 right femoral PTCA Family History Problem Relation Age of Onset  Diabetes Mother  Heart Disease Mother  Heart Disease Maternal Grandmother  Diabetes Daughter   
     gestational  
[place] Neas Diabetes Sister  Stroke Sister  Cancer Father  Hypertension Father History reviewed, no pertinent family history. Social History Tobacco Use  
  Smoking status: Former Smoker Packs/day: 2.00 Years: 25.00 Pack years: 50.00 Last attempt to quit: 3/11/1991 Years since quittin.3  Smokeless tobacco: Never Used Substance Use Topics  Alcohol use: Yes Comment: very occasional  
 
No Known Allergies Current Facility-Administered Medications Medication Dose Route Frequency  zinc oxide-cod liver oil (DESITIN) 40 % paste   Topical PRN  
 cloNIDine HCL (CATAPRES) tablet 0.3 mg  0.3 mg Per NG tube TID  albumin human 25% (BUMINATE) solution 12.5 g  12.5 g IntraVENous Q6H  
 dexmedeTOMidine in 0.9 % NaCl (PRECEDEX) 400 mcg/100 mL (4 mcg/mL) infusion soln  0.2-1.4 mcg/kg/hr IntraVENous TITRATE  levETIRAcetam (KEPPRA) oral solution 500 mg  500 mg Per NG tube BID  insulin glargine (LANTUS) injection 15 Units  15 Units SubCUTAneous DAILY  metoprolol tartrate (LOPRESSOR) tablet 12.5 mg  12.5 mg Oral Q12H  hydrALAZINE (APRESOLINE) 20 mg/mL injection 10 mg  10 mg IntraVENous Q6H PRN  
 heparin (porcine) 1,000 unit/mL injection 3,800 Units  3,800 Units Hemodialysis DIALYSIS PRN  chlorhexidine (ORAL CARE KIT) 0.12 % mouthwash 15 mL  15 mL Oral Q12H  
 famotidine (PEPCID) tablet 20 mg  20 mg Oral DAILY  alcohol 62% (NOZIN) nasal  1 Ampule  1 Ampule Topical Q12H  
 fentaNYL citrate (PF) injection 50 mcg  50 mcg IntraVENous Q1H PRN  
 fentaNYL (PF) 1,500 mcg/30 mL (50 mcg/mL) infusion   mcg/hr IntraVENous TITRATE  VANCOMYCIN INFORMATION NOTE   Other Rx Dosing/Monitoring  piperacillin-tazobactam (ZOSYN) 3.375 g in 0.9% sodium chloride (MBP/ADV) 100 mL  3.375 g IntraVENous Q12H  
 sodium chloride (NS) flush 5-40 mL  5-40 mL IntraVENous Q8H  
 sodium chloride (NS) flush 5-40 mL  5-40 mL IntraVENous PRN  
 acetaminophen (TYLENOL) tablet 650 mg  650 mg Oral Q6H PRN  
 ondansetron (ZOFRAN) injection 4 mg  4 mg IntraVENous Q6H PRN  
 LORazepam (ATIVAN) injection 2 mg  2 mg IntraVENous PRN  
  insulin lispro (HUMALOG) injection   SubCUTAneous Q6H  
 glucose chewable tablet 16 g  4 Tab Oral PRN  
 dextrose (D50W) injection syrg 12.5-25 g  12.5-25 g IntraVENous PRN  
 glucagon (GLUCAGEN) injection 1 mg  1 mg IntraMUSCular PRN  
 sodium chloride (NS) flush 5-40 mL  5-40 mL IntraVENous Q8H  
 sodium chloride (NS) flush 5-40 mL  5-40 mL IntraVENous PRN  
 oxyCODONE IR (ROXICODONE) tablet 5 mg  5 mg Oral Q3H PRN  
 oxyCODONE IR (ROXICODONE) tablet 10 mg  10 mg Oral Q3H PRN  
 naloxone (NARCAN) injection 0.4 mg  0.4 mg IntraVENous PRN  
 hydrOXYzine HCL (ATARAX) tablet 10 mg  10 mg Oral Q8H PRN  
 bisacodyL (DULCOLAX) suppository 10 mg  10 mg Rectal DAILY PRN  phenol throat spray (CHLORASEPTIC) 1 Spray  1 Spray Oral PRN  
 benzocaine-menthoL (CEPACOL) lozenge 1 Lozenge  1 Lozenge Oral PRN  
 diazePAM (VALIUM) tablet 5 mg  5 mg Oral Q6H PRN  
 cyclobenzaprine (FLEXERIL) tablet 10 mg  10 mg Oral BID PRN  
 
 
 
 LAB AND IMAGING FINDINGS:  
 
Lab Results Component Value Date/Time WBC 10.5 07/08/2020 03:21 AM  
 HGB 10.0 (L) 07/08/2020 03:21 AM  
 PLATELET 684 (H) 03/07/4764 03:21 AM  
 
Lab Results Component Value Date/Time Sodium 130 (L) 07/08/2020 03:21 AM  
 Potassium 4.8 07/08/2020 03:21 AM  
 Chloride 95 (L) 07/08/2020 03:21 AM  
 CO2 18 (L) 07/08/2020 03:21 AM  
 BUN 69 (H) 07/08/2020 03:21 AM  
 Creatinine 7.70 (H) 07/08/2020 03:21 AM  
 Calcium 9.9 07/08/2020 03:21 AM  
 Magnesium 3.1 (H) 07/08/2020 03:21 AM  
 Phosphorus >13.5 (H) 07/08/2020 03:21 AM  
  
Lab Results Component Value Date/Time Alk. phosphatase 100 07/03/2020 05:33 AM  
 Protein, total 6.9 07/03/2020 05:33 AM  
 Albumin 2.2 (L) 07/03/2020 05:33 AM  
 Globulin 4.7 (H) 07/03/2020 05:33 AM  
 
Lab Results Component Value Date/Time INR 1.0 07/01/2020 12:51 PM  
 Prothrombin time 10.0 07/01/2020 12:51 PM  
 aPTT 27.0 07/01/2020 12:51 PM  
  
Lab Results Component Value Date/Time Iron 14 (L) 09/08/2012 10:24 AM  
 TIBC 472 (H) 09/08/2012 10:24 AM  
 Iron % saturation 3 (L) 09/08/2012 10:24 AM  
 Ferritin 6 (L) 09/08/2012 10:24 AM  
  
Lab Results Component Value Date/Time pH 7.44 02/07/2018 04:00 PM  
 PCO2 29 (L) 02/07/2018 04:00 PM  
 PO2 77 (L) 02/07/2018 04:00 PM  
 
No components found for: [place] Lab Results Component Value Date/Time  (H) 07/08/2020 03:21 AM  
 CK - MB 1.9 07/01/2020 05:20 PM  
  
 
 
   
 
Total time:  
Counseling / coordination time, spent as noted above:  
> 50% counseling / coordination?: y 
Prolonged service was provided for  []30 min   []75 min in face to face time in the presence of the patient, spent as noted above. Time Start:  
Time End:  
Note: this can only be billed with 86129 (initial) or 02179 (follow up). If multiple start / stop times, list each separately.

## 2020-07-08 NOTE — PROGRESS NOTES
Responded to request from staff regarding family at bedside of patient. Nurse indicated that support would appreciated due to critical condition of patient. Prayed with family and patient. Informed family of  availability. Rev. Sarah Trevizo EdD MDiv  For [facility] Page [phone])

## 2020-07-08 NOTE — PROGRESS NOTES
Infectious Disease Progress IMPRESSION:  
- Severe sepsis with change in mental status & seizure - Epidural abscess OM, discitis C 6/7 Erosive endplate changes, prevertebral and epidural phlegmon, resulting in severe spinal 
canal stenosis and cord compression at C6-C7. On CT neck C5-T1 Anterior Cervical Discectomy Fusion & C6-C7 Corepectomy on 7/1 
- BC- 7/01, 7/06 - NG 
- Tissue culture - scant Staph epidermidis- ( Ox R )  anaerobic - NG 
- Acute respiratory failure intubated - ESRD on HD,probable line related infection BC- NG at this time HD line was not changed , will need to be tken to OR for procedure AVF present not currently working ( created 10/17/19) H/o PD & peritonitis Fld culture - 9/11 /19 + for Pseudomonas ( sensitive to Zosyn) s/p removal of PD on 9/12/19 
  - Paroxysmal Afib 
- HARDIK - CAD h/o stenting PLAN:  
  
 
- Continue Vancomycin/ Zosyn IV  
- Repeat BC with fevers >101 
- Needs removal of perma cath, placement of new HD access- to be done when more stable 
- ECHO- no vegetation, GAURAV - recommend when stable - Recommend re evaluation by Neuro, D/w Dr Harley Horan 
- Overall prognosis poor Subjective:  
 
Pt seen. Intubated, sedated D/w RN, no new issues Patient Active Problem List  
Diagnosis Code  Uncontrolled type 2 diabetes mellitus with diabetic nephropathy, without long-term current use of insulin (HCC) E11.21, E11.65  
 Essential hypertension, benign I10  
 Hyperlipidemia LDL goal <70 E78.5  Iron deficiency anemia D50.9  SOB (shortness of breath) R06.02  
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  
 Acute respiratory failure (HCC) J96.00  Bilateral pneumonia J18.9  Pulmonary edema J81.1  Sepsis ([address] A41.9  VIVIEN (acute kidney injury) ([address] N17.9  Orthostatic dizziness R42  Acute on chronic renal failure (HCC) N17.9, N18.9  Malignant hypertension I10  
 Seizure ([facility] R56.9  Cervical discitis M46.42  
 Acute encephalopathy G93.40  Counseling regarding advance care planning and goals of care Z71.89 Past Medical History:  
Diagnosis Date  AAA (abdominal aortic aneurysm) ([facility] [address] 34mm 2017  Anemia   
 gets shots from Dr. Mandi Herron - last dose 2018  BPH (benign prostatic hypertrophy)  CAD (coronary artery disease) s/p stents, sees Dr. Kenisha Horvath  Cancer ([facility] skin cancer on leg  Chronic kidney disease -[facility]- [facility]  End stage renal disease ([facility] Dr. Mandi Herron  GERD (gastroesophageal reflux disease)  Gout  Other and unspecified hyperlipidemia  PUD (peptic ulcer disease)  PVD (peripheral vascular disease) ([facility]   
 s/p PTCA of left femoral artery in 2014  Sleep apnea CPAP  Type II or unspecified type diabetes mellitus without mention of complication, uncontrolled Dr. Param Blue  Unspecified essential hypertension  Unspecified vitamin D deficiency Family History Problem Relation Age of Onset  Diabetes Mother  Heart Disease Mother  Heart Disease Maternal Grandmother  Diabetes Daughter   
     gestational  
Ibrahim Diabetes Sister  Stroke Sister  Cancer Father  Hypertension Father Social History Tobacco Use  Smoking status: Former Smoker Packs/day: 2.00 Years: 25.00 Pack years: 50.00 Last attempt to quit: 3/11/1991 Years since quittin.3  Smokeless tobacco: Never Used Substance Use Topics  Alcohol use: Yes Comment: very occasional  
 
Past Surgical History:  
Procedure Laterality Date  CARDIAC SURG PROCEDURE UNLIST    
 stents  HX CATARACT REMOVAL    
 HX COLONOSCOPY    
 HX CORONARY STENT PLACEMENT    
 HX ENDOSCOPY    
 HX KNEE ARTHROSCOPY right x2, left x1  
 HX OTHER SURGICAL    
 skin cancer removed from leg  IR INSERT NON TUNL CVC OVER 5 YRS  9/11/2019  IR INSERT NON TUNL CVC OVER 5 YRS  7/6/2020  IR INSERT TUNL CVC W/O PORT OVER 5 YR  9/16/2019  VASCULAR SURGERY PROCEDURE UNLIST    
 aorto-bifem bypass  VASCULAR SURGERY PROCEDURE UNLIST    
 left carotid endarterectomy  VASCULAR SURGERY PROCEDURE UNLIST    
 right femoral PTCA Prior to Admission medications Medication Sig Start Date End Date Taking? Authorizing Provider  
ferric citrate (Auryxia) 210 mg iron tablet Take 630 mg by mouth three (3) times daily (with meals). Yes Provider, Historical  
D3-E-Se-soy gvmfb-ufvbct-zrvye (Prostate 2.4) 1,200-15-35 unit-unit-mcg cap Take 1 Cap by mouth two (2) times a day. OTC Super Beta Prostate 1 cap bid   Yes Provider, Historical  
OTHER Take 1 Tab by mouth nightly. OTC Neuriva (for brain): 1 tab qhs   Yes Provider, Historical  
Basaglar KwikPen U-100 Insulin 100 unit/mL (3 mL) inpn Inject 8 units every morning ONLY if blood sugar is over 150, otherwise hold. 6/19/20  Yes Osman Wong MD  
furosemide (LASIX) 80 mg tablet Take 1 Tab by mouth two (2) times a day. 9/17/19  Yes Alex Shepherd MD  
cloNIDine HCl (CATAPRES) 0.2 mg tablet Take 0.4 mg by mouth two (2) times a day. Yes Provider, Historical  
minoxidil (LONITEN) 10 mg tablet Take 5 mg by mouth two (2) times a day. Yes Provider, Historical  
NIFEdipine ER (ADALAT CC) 60 mg ER tablet Take 60 mg by mouth every twelve (12) hours. Yes Provider, Historical  
atorvastatin (LIPITOR) 40 mg tablet Take 40 mg by mouth every evening. Yes Provider, Historical  
acetaminophen (TYLENOL) 500 mg tablet Take 1,000 mg by mouth every six (6) hours as needed for Pain. Yes Other, MD Corby  
folic acid-vit Y1-CCK V55 (FOLTX) 2.5-25-2 mg tablet Take 1 Tab by mouth nightly.    Yes Other, MD Corby  
omega-3 fatty acids (FISH OIL CONCENTRATE) 1,000 mg cap Take 1,000 mg by mouth two (2) times a day. Yes Provider, Historical  
BYSTOLIC 20 mg tablet Take 20 mg by mouth nightly. 12/15/17  Yes Provider, Historical  
PRALUENT PEN 75 mg/mL injector pen 75 mg by SubCUTAneous route Once every 2 weeks. 10/11/17  Yes Provider, Historical  
tamsulosin (FLOMAX) 0.4 mg capsule Take 0.4 mg by mouth nightly. Yes Provider, Historical  
clopidogrel (PLAVIX) 75 mg tablet Take 75 mg by mouth nightly. Yes Provider, Historical  
Insulin Needles, Disposable, 31 gauge x 3/16\" ndle USE DAILY AS DIRECTED 2/10/20   Magdy Leon MD  
 
No Known Allergies Review of Systems:  Review of systems not obtained due to patient factors. 10 point review of systems obtained . All other systems negative Objective:  
Blood pressure 116/68, pulse (!) 130, temperature 98.7 °F (37.1 °C), temperature source Axillary, resp. rate 14, height 5' 8\" (1.727 m), weight 160 lb 7.9 oz (72.8 kg), SpO2 99 %. Temp (24hrs), Av.7 °F (37.1 °C), Min:98.3 °F (36.8 °C), Max:98.9 °F (37.2 °C) Current Facility-Administered Medications Medication Dose Route Frequency  zinc oxide-cod liver oil (DESITIN) 40 % paste   Topical PRN  
 cloNIDine HCL (CATAPRES) tablet 0.3 mg  0.3 mg Per NG tube TID  albumin human 25% (BUMINATE) solution 12.5 g  12.5 g IntraVENous Q6H  
 dexmedeTOMidine in 0.9 % NaCl (PRECEDEX) 400 mcg/100 mL (4 mcg/mL) infusion soln  0.2-1.4 mcg/kg/hr IntraVENous TITRATE  levETIRAcetam (KEPPRA) oral solution 500 mg  500 mg Per NG tube BID  insulin glargine (LANTUS) injection 15 Units  15 Units SubCUTAneous DAILY  metoprolol tartrate (LOPRESSOR) tablet 12.5 mg  12.5 mg Oral Q12H  hydrALAZINE (APRESOLINE) 20 mg/mL injection 10 mg  10 mg IntraVENous Q6H PRN  
 heparin (porcine) 1,000 unit/mL injection 3,800 Units  3,800 Units Hemodialysis DIALYSIS PRN  chlorhexidine (ORAL CARE KIT) 0.12 % mouthwash 15 mL  15 mL Oral Q12H  
 famotidine (PEPCID) tablet 20 mg  20 mg Oral DAILY  alcohol 62% (NOZIN) nasal  1 Ampule  1 Ampule Topical Q12H  
 fentaNYL citrate (PF) injection 50 mcg  50 mcg IntraVENous Q1H PRN  
 fentaNYL (PF) 1,500 mcg/30 mL (50 mcg/mL) infusion   mcg/hr IntraVENous TITRATE  VANCOMYCIN INFORMATION NOTE   Other Rx Dosing/Monitoring  piperacillin-tazobactam (ZOSYN) 3.375 g in 0.9% sodium chloride (MBP/ADV) 100 mL  3.375 g IntraVENous Q12H  
 sodium chloride (NS) flush 5-40 mL  5-40 mL IntraVENous Q8H  
 sodium chloride (NS) flush 5-40 mL  5-40 mL IntraVENous PRN  
 acetaminophen (TYLENOL) tablet 650 mg  650 mg Oral Q6H PRN  
 ondansetron (ZOFRAN) injection 4 mg  4 mg IntraVENous Q6H PRN  
 LORazepam (ATIVAN) injection 2 mg  2 mg IntraVENous PRN  
 insulin lispro (HUMALOG) injection   SubCUTAneous Q6H  
 glucose chewable tablet 16 g  4 Tab Oral PRN  
 dextrose (D50W) injection syrg 12.5-25 g  12.5-25 g IntraVENous PRN  
 glucagon (GLUCAGEN) injection 1 mg  1 mg IntraMUSCular PRN  
 sodium chloride (NS) flush 5-40 mL  5-40 mL IntraVENous Q8H  
 sodium chloride (NS) flush 5-40 mL  5-40 mL IntraVENous PRN  
 oxyCODONE IR (ROXICODONE) tablet 5 mg  5 mg Oral Q3H PRN  
 oxyCODONE IR (ROXICODONE) tablet 10 mg  10 mg Oral Q3H PRN  
 naloxone (NARCAN) injection 0.4 mg  0.4 mg IntraVENous PRN  
 hydrOXYzine HCL (ATARAX) tablet 10 mg  10 mg Oral Q8H PRN  
 bisacodyL (DULCOLAX) suppository 10 mg  10 mg Rectal DAILY PRN  phenol throat spray (CHLORASEPTIC) 1 Spray  1 Spray Oral PRN  
 benzocaine-menthoL (CEPACOL) lozenge 1 Lozenge  1 Lozenge Oral PRN  
 diazePAM (VALIUM) tablet 5 mg  5 mg Oral Q6H PRN  
 cyclobenzaprine (FLEXERIL) tablet 10 mg  10 mg Oral BID PRN Exam:   
General:  Sedated and on the ventilator. No acute distress. Eyes:  Sclera anicteric. Pupils equal, round, reactive to light. Mouth/Throat: endotracheal tube in place. Neck: Brace, dressings on  
Lungs:   Clear to auscultation bilaterally, good effort. Cardiovascular:  Regular rate and rhythm, no murmur, click, rub, or gallop. Abdomen:   Soft, non-tender, bowel sounds normal, non-distended. Extremities: No cyanosis or edema. Skin: No acute rash or lesions. Lymph Nodes: Cervical and supraclavicular normal.  
Musculoskeletal:  No swelling or deformity. Lines/Devices:  Intact, no erythema, drainage, or tenderness. Psychiatric: Sedated and appears comfortable on ventilator. Data Review: CBC:  
Recent Labs  
  07/08/20 0321 07/07/20 0457 07/06/20 
0404 WBC 10.5 12.5* 13.5*  
RBC 3.04* 3.33* 3.48* HGB 10.0* 10.9* 11.4* HCT 31.7* 34.8* 35.7* * 546* 505* GRANS  --   --  71  
LYMPH  --   --  7* EOS  --   --  4 CMP:  
Recent Labs  
  07/08/20 0321 07/07/20 0457 07/06/20 
0404 * 163* 192* * 131* 128* K 4.8 4.9 4.8  
CL 95* 94* 90* CO2 18* 19* 16*  
BUN 69* 45* 68* CREA 7.70* 6.58* 8.66* CA 9.9 10.0 9.6 AGAP 17* 18* 22* BUCR 9* 7* 8* Lab Results Component Value Date/Time Culture result: NO GROWTH 2 DAYS 07/06/2020 11:32 AM  
 Culture result: NO ANAEROBES ISOLATED 07/01/2020 07:33 PM  
 Culture result: (A) 07/01/2020 07:33 PM  
  SCANT STAPHYLOCOCCUS EPIDERMIDIS (OXACILLIN RESISTANT) Culture result: NO GROWTH 5 DAYS 07/01/2020 02:30 PM  
 Culture result: HEAVY PSEUDOMONAS AERUGINOSA (A) 09/11/2019 01:35 AM  
  
 
 
XR Results (most recent): 
Results from Hospital Encounter encounter on 07/01/20 XR CHEST PORT Narrative EXAM: XR CHEST PORT INDICATION: Respiratory failure, seizure, encephalopathy, spinal cord 
compression. COMPARISON: Portable chest on 7/7/2020 TECHNIQUE: Semiupright portable chest AP view FINDINGS: Right jugular dialysis catheter is unchanged and in good position. Enteric tube extends into the abdomen but the field-of-view. Left jugular 
central line is unchanged and in good position in the superior vena cava. Cardiac monitoring wires overlie the thorax. The cardiomediastinal and hilar 
contours are within normal limits. Mild pulmonary vascular prominence is 
unchanged. Mild perihilar interstitial edema is slightly increased. No evidence of 
pneumonia. No pneumothorax. Bones are unchanged, including cervical spine 
hardware. Impression IMPRESSION: 
 
Slight increase in mild pulmonary edema. No pneumothorax. ICD-10-CM ICD-9-CM 1. Seizure ([address] R56.9 780.39   
2. Encephalopathy G93.40 348.30   
3. Acute respiratory failure, unspecified whether with hypoxia or hypercapnia ([facility]) J96.00 518.81   
4. Cervical spinal cord compression ([facility]) G95.20 336.9 5. Acute osteomyelitis of cervical spine ([facility]) M46.22 730.08   
6. Discitis of cervical region M46.42 722.91   
7. Epidural abscess G06.2 324.9 8. Acute encephalopathy G93.40 348.30   
9. Cervical discitis M46.42 722.91   
10. Malignant hypertension I10 401.0 11. Acute blood loss anemia D62 285.1 12. Acute renal failure with acute renal cortical necrosis superimposed on chronic kidney disease, on chronic dialysis ([facility]) N17.1 584.6 N18.9 585.9 Z99.2 V45.11   
13. Staphylococcus epidermidis infection B95.7 041.19   
14. Acute respiratory failure with hypoxia ([facility]) J96.01 518.81   
15. Uncontrolled type 2 diabetes mellitus with diabetic nephropathy, without long-term current use of insulin ([facility]) E11.21 250.42 E11.65 583.81   
16. Counseling regarding advance care planning and goals of care Z71.89 V65.49 Antibiotic History Vancomycin/ Zosyn IV 7/1 Signed By: Mao Alvarenga MD FACP

## 2020-07-08 NOTE — PROGRESS NOTES
1900 - 2000 Bedside and Verbal shift change report given to Magali Tran (oncoming nurse) by Pancho Valderrama (offgoing nurse). Report included the following information SBAR, Procedure Summary, Intake/Output, MAR, Recent Results and Cardiac Rhythm ST.  
 
2000 - 2200 Shift assessment complete,eyes open to voice but does not follow commands. Sedated on precedex and fentanyl. BP labile, tachycardic , nurse will continue to monitor. Visitor came in to see patient after visitation hour, she stated she is his daughter and was told that her father wasn't doing well. She was allowed to see him for 5 minutes. 2200 - 0000 Repositioned for comfort. Tube feed advanced to goal. 
 
0000 - 0200 Reassessment complete, no changes. 0200 - 0400 Repositioned for comfort. 47643 - 0600 Reassessment complete, no changes. PRN  Hydralazine given for SBP > 200, see MAR for details. Bathed with soap / water / CHG. Doron Rafter / Lenin Fendt etc ... changed. See flow sheet for details. 0600 - 0730 Mouth care done. Bedside and Verbal shift change report given to Indy Ortiz (oncoming nurse) by Magali Tran (offgoing nurse). Report included the following information SBAR, Intake/Output, MAR, Recent Results and Cardiac Rhythm ST / A fib.

## 2020-07-08 NOTE — PROGRESS NOTES
ORTHO POST OP SPINE PROGRESS NOTE 2020 Admit Date: 2020 Admit Diagnosis: Seizure ([facility] [address] [R56.9] Acute encephalopathy [G93.40] Malignant hypertension [I10] Cervical discitis [M46.42] Procedure: Procedure(s): 
C5-T1 Anterior Cervical Discectomy Fusion (ACDF) with C6-C7 Corepectomy Post Op day: 7 Days Post-Op Subjective:  
 
Gertrudis Nguyen is a patient who Is status post C5 through T1 ACDF done for diskitis /epidural abscess 1 week ago. Has remained intubated the in CCU and has failed SBT. ESRD and in need of new port for HD  access. Review of Systems: Pertinent items are noted in HPI. Objective:  
 
PT/OT:  
Distance Ambulated:          
Time Ambulated (min):       
Ambulation Response: Activity Response: Poorly tolerated Assistive Device:              Assistive Device: Fall prevention device Vital Signs:   
Blood pressure 96/52, pulse (!) 122, temperature 98.3 °F (36.8 °C), resp. rate 12, height 5' 8\" (1.727 m), weight 72.8 kg (160 lb 7.9 oz), SpO2 96 %. Temp (24hrs), Av.8 °F (37.1 °C), Min:98.3 °F (36.8 °C), Max:99.1 °F (37.3 °C) No intake/output data recorded.  1901 -  0700 In: 2077.7 [I.V.:1352.7] Out: 140 [Drains:140] LAB:   
Recent Labs  
  20 
0321 HGB 10.0* WBC 10.5 * Wound/Drain Assessment: 
Drain:   
 
Dressing:  
 
Physical Exam: 
Incision clean, dry, and intact Assessment:  
  
Patient Active Problem List  
Diagnosis Code  Uncontrolled type 2 diabetes mellitus with diabetic nephropathy, without long-term current use of insulin ([facility]) E11.21, E11.65  
 Essential hypertension, benign I10  
 Hyperlipidemia LDL goal <70 E78.5  Iron deficiency anemia D50.9  SOB (shortness of breath) R06.02  
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  
  Acute respiratory failure (HCC) J96.00  Bilateral pneumonia J18.9  Pulmonary edema J81.1  Sepsis ([facility] [address] A41.9  VIVIEN (acute kidney injury) ([facility] [address] N17.9  Orthostatic dizziness R42  Acute on chronic renal failure (HCC) N17.9, N18.9  Malignant hypertension I10  
 Seizure ([facility] [address] R56.9  Cervical discitis M46.42  
 Acute encephalopathy G93.40  Counseling regarding advance care planning and goals of care Z71.89 Plan:  
 
Continue PT/OT/Rehab DVT prophylaxis 
 patient remains in CCU and critically ill Okay to remove collar for HD access Medical management per primary team 
Dr. Brandt Galo aware in agreement with the above plan Okay to resume anticoagulation from   surgery standpoint

## 2020-07-08 NOTE — PROGRESS NOTES
CRITICAL CARE: 
 
Sedated on vent- C collar in place no WOB. Failing SBT daily. No acute events overnight ROS: Unable to obtain full ROS as patient is intubated PE:  
Vitals stable Gen: Elderly, chronically ill appearing male intubated HEENT: ETT in place Neck: Ccollar Mouth: ETT 
CV: Regular Lungs: CTA B/L, no wheezing GI: Soft, no distention Ext: No edema, constant jerking motion of upper and lower extremities Neuro: Sedated, RASS -3 Msk: Muscle wasting Lab Results Component Value Date/Time WBC 10.5 07/08/2020 03:21 AM  
 HGB 10.0 (L) 07/08/2020 03:21 AM  
 Hematocrit (POC) 32 (L) 10/17/2019 02:16 PM  
 HCT 31.7 (L) 07/08/2020 03:21 AM  
 PLATELET 978 (H) 05/52/7196 03:21 AM  
 .3 (H) 07/08/2020 03:21 AM  
 
Lab Results Component Value Date/Time Sodium 130 (L) 07/08/2020 03:21 AM  
 Potassium 4.8 07/08/2020 03:21 AM  
 Chloride 95 (L) 07/08/2020 03:21 AM  
 CO2 18 (L) 07/08/2020 03:21 AM  
 Anion gap 17 (H) 07/08/2020 03:21 AM  
 Glucose 201 (H) 07/08/2020 03:21 AM  
 BUN 69 (H) 07/08/2020 03:21 AM  
 Creatinine 7.70 (H) 07/08/2020 03:21 AM  
 BUN/Creatinine ratio 9 (L) 07/08/2020 03:21 AM  
 GFR est AA 9 (L) 07/08/2020 03:21 AM  
 GFR est non-AA 7 (L) 07/08/2020 03:21 AM  
 Calcium 9.9 07/08/2020 03:21 AM  
 
CXR reviewed: no acute changes Impression: 
-acute respiratory failure requiring intubation due to inability to protect the airway 
-suspected seizures 
-marked hypertension 
-abnormal Cspine imaging as noted above; s/p emergent surgery 7/1 PM for debridement of epidural abscess and ACDF of C5-T1 
-history of paroxysmal afib 
-ESRD on HD M/W/F 
-DM 
- OSAS Plan: 
-continue vent support, unable to wean 
-reduce sedation to goal RASS -1 
-Daily SBTs, not tolerating it 
-cardene as needed for BP control -EEG without seizures 
-neuro following 
-broad spectrum antibiotics with vanc and zosyn  
-ortho following closely 
-HD per renal 
-accuchecks, SSI 
-tube feeds -initiate DVT ppx when ok with ortho; SCD for now Shandra Carbone MD

## 2020-07-08 NOTE — PROGRESS NOTES
Chart reviewed and discussed during interdisciplinary rounds. Pt remains intubated and medically unstable. Will sign off and can be re-consulted if there is a change in status.

## 2020-07-08 NOTE — PROGRESS NOTES
RYAN PLAN:  TBD Pt is 72yo male admitted from home for treatment of epidural abscess with diskitis of C6-C7 who underwent ACDF with partial corpectomy on 7/2. ID following for abx ESRD on HD Remains on full ventillator support, propofol Lives with his ex-wife (still on good terms). Son Jose E Goddard is surrogate medical decision maker. Palliative Care talking w/ son. Prognosis very poor. Nelli Christianson, MSW

## 2020-07-08 NOTE — PROGRESS NOTES
Nutrition:  Chart reviewed, case discussed during CCU rounds. Pt was on Osmolite 1.2 however propofol is being discontinued 2' hypertriglyceridemia so enteral needs will increase. Given hyperphosphatemia and K in the high 4's will change formula to Nepro. Change to Nepro @ 30mL/h, advance to Goal Rate of 40mL/h + 50mL H2O flush q 6h (provides 1728kcals/78gPro/155gCHO/898mL) Will continue to monitor pt's case closely. Thank you! Alec Holbrook RD, [facility] Pager [phone]

## 2020-07-08 NOTE — PROGRESS NOTES
07/08/20 0503 ABCDEF Bundle SBT Safety Screen Passed No  
SBT Screen Reason for Failure Agitation (increased )
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
## 2020-07-08 NOTE — PROGRESS NOTES
Hospitalist Progress Note NAME: Gertrudis Keith :  1950 MRN:  [id number] Assessment / Plan: 
Epidural abscess with Diskitis c6-c7 s/p C5- T1 ACDF with partial C6 and C7 corpectomy Sepsis, POA due to above Presented with AMS and Seizure CT Neck showed Osteomyelitis-discitis at C6-C7 with erosive endplate changes, prevertebral and epidural phlegmon, resulting in severe spinal canal stenosis and cord compression at C6-C7. S/p C5 through T1 ACDF with partial C6 and C7 corpectomy  by Ortho IV keppra, discussed with neuro, if no more seizures, stop Keppra next week 
-Ortho spine surgeon on board 
 
-ID on board. Antibiotic as per ID 
-Tissue culture- Staph Epi, BCx NGTD 
-Echo -  No endocarditis-  
 
Acute Respiratory Failure POA 
2/2 above and for airway protection 
-Vent management as per Pulmonary 
 
  
ESRD on HD MWF. Has permacath. --has AVF, but Could not get good blood flow, may need declotting before DC  
--Has right Permcath, will need to change IR attempted new access placement on -couldn't place samir due to occlusive disease Ortho cleared to get HD access via the neck  
  
HARDIK on cpap --intubated in ER for airway protection.  
 
  
CAD, hx stents . Follow with Dr. Maria Luz Hudson. Echo 2018 EF 60-65% Paroxysmal afib 2019, not on anticoagulation Malignant HTN /78 Elevated trop 0.19, has chronic mild troponin elevation likely due to CKD. EKG without active ischemia. 
--hold plavix and aspirin per nephro , cleared by ortho to resume SERENITY as hb bordeline  
--c/w  bystolic, clonidine, minoxidil  
  
DM 
--put on low dose SSI. BS 160s - 
  
Gout 
--resume allopurinol 
  
GERD, hx PUD 
--IV protonix 
  
BPH 
PAD 
S/p left CEA  by Dr. Hussain Boo Hx aortobifemoral graft Distal AAA borderline aneurysm  Hx chronic anemia,   
--Body mass index is 25.31 kg/m². 
  
Code: full code DVT prophylaxis: SCD 
 Surrogate decision maker:  Jenaro Rice [phone] Subjective: Chief Complaint / Reason for Physician Visit 
-Remain intubated and sedated with propofol and fentanyl Review of Systems: 
Symptom Y/N Comments  Symptom Y/N Comments Fever/Chills    Chest Pain Poor Appetite    Edema Cough    Abdominal Pain Sputum    Joint Pain SOB/RUBIO    Pruritis/Rash Nausea/vomit    Tolerating PT/OT Diarrhea    Tolerating Diet Constipation    Other Could NOT obtain due to: Intubated Objective: VITALS:  
Last 24hrs VS reviewed since prior progress note. Most recent are: 
Patient Vitals for the past 24 hrs: 
 Temp Pulse Resp BP SpO2  
07/08/20 1459  (!) 130 14  99 % 07/08/20 1430  (!) 132 14 116/68 99 % 07/08/20 1423  (!) 132 16 106/56 98 % 07/08/20 1417  (!) 132 14 99/47 98 % 07/08/20 1306 98.7 °F (37.1 °C) (!) 134 12 114/50 98 % 07/08/20 1300  (!) 136 12 103/60 98 % 07/08/20 1245  (!) 134 11 134/60 100 % 07/08/20 1230  (!) 133 15 (!) 219/84 100 % 07/08/20 1215  (!) 133 16 (!) 202/83 100 % 07/08/20 1200 98.7 °F (37.1 °C) (!) 132 13 (!) 203/75 100 % 07/08/20 1145  (!) 132 12 182/81 100 % 07/08/20 1130  (!) 131 16 152/75 100 % 07/08/20 1123  (!) 130 29  100 % 07/08/20 1115  (!) 130 14 117/65 99 % 07/08/20 1100  (!) 131 10 121/58 98 % 07/08/20 1045  (!) 131 11 104/61 98 % 07/08/20 1030  (!) 130 14 103/53 98 % 07/08/20 1023  (!) 130  95/53   
07/08/20 1017    99/54   
07/08/20 1015  (!) 129 12 (!) 88/53 98 % 07/08/20 1003 98.9 °F (37.2 °C) (!) 125 12 106/57 98 % 07/08/20 0930  (!) 122 14 133/60 98 % 07/08/20 0900 98.9 °F (37.2 °C) (!) 122 11 127/59 98 % 07/08/20 0830  (!) 122 20 123/59 98 % 07/08/20 0807  (!) 122 15  97 % 07/08/20 0800  (!) 123 16 107/52 97 % 07/08/20 0730  (!) 122 12 96/52 96 % 07/08/20 0700  (!) 121 12 123/54 98 % 07/08/20 0630  (!) 118 13 165/70 99 % 07/08/20 0600  (!) 117 17 159/63 100 % 07/08/20 0530  (!) 120 14 114/49 96 % 07/08/20 0500  (!) 119 13 113/55 99 % 07/08/20 0430  (!) 115 12 147/54 100 % 07/08/20 0400 98.3 °F (36.8 °C) (!) 126 12 171/69 100 % 07/08/20 0341  (!) 111 18  99 % 07/08/20 0330  (!) 117 19 128/55 98 % 07/08/20 0300  (!) 119 19 148/58 99 % 07/08/20 0230  (!) 112 19 132/60 99 % 07/08/20 0200  (!) 111 20 125/51 99 % 07/08/20 0130  (!) 112 16 135/59 99 % 07/08/20 0100  (!) 114 21 132/53 100 % 07/08/20 0030  (!) 114 21 146/52 99 % 07/08/20 0000 98.9 °F (37.2 °C) (!) 114 22 146/52 98 % 07/07/20 2330  (!) 112 21 124/50 98 % 07/07/20 2308  (!) 113 15  99 % 07/07/20 2300  (!) 108 12 136/47 99 % 07/07/20 2230  (!) 133 17 125/61 99 % 07/07/20 2200  (!) 107 25 112/48 98 % 07/07/20 2130  (!) 131 12 115/45 97 % 07/07/20 2100  (!) 133 13 101/48 98 % 07/07/20 2030  (!) 132 12 124/58 99 % 07/07/20 2000  (!) 131 14 143/62 98 % 07/07/20 1930  (!) 132 12 124/67 98 % 07/07/20 1900  (!) 133 12 122/61 98 % 07/07/20 1800  (!) 129 12 154/69 99 % 07/07/20 1700  (!) 122 12 130/63 98 % Intake/Output Summary (Last 24 hours) at 7/8/2020 1621 Last data filed at 7/8/2020 1306 Gross per 24 hour Intake 1517.94 ml Output 880 ml Net 637.94 ml PHYSICAL EXAM: 
General: WD, WN. intubated EENT:  EOMI. Anicteric sclerae. MMM Resp:  CTA bilaterally, no wheezing or rales. No accessory muscle use CV:  Regular  rhythm,  No edema GI:  Soft, Non distended, Non tender.  +Bowel sounds Neurologic:      intubated Psych:   intubated Skin:  No rashes. No jaundice Reviewed most current lab test results and cultures  YES Reviewed most current radiology test results   YES Review and summation of old records today    NO Reviewed patient's current orders and MAR    YES 
PMH/SH reviewed - no change compared to H&P 
 ________________________________________________________________________ Care Plan discussed with: 
  Comments Patient Family RN x Care Manager Consultant Multidiciplinary team rounds were held today with , nursing, pharmacist and clinical coordinator. Patient's plan of care was discussed; medications were reviewed and discharge planning was addressed. ________________________________________________________________________ Total NON critical care TIME:  25   Minutes Total CRITICAL CARE TIME Spent:   Minutes non procedure based Comments >50% of visit spent in counseling and coordination of care x   
________________________________________________________________________ Leny Shepard MD  
 
Procedures: see electronic medical records for all procedures/Xrays and details which were not copied into this note but were reviewed prior to creation of Plan. LABS: 
I reviewed today's most current labs and imaging studies. Pertinent labs include: 
Recent Labs  
  07/08/20 
0321 07/07/20 
0457 07/06/20 
0404 WBC 10.5 12.5* 13.5* HGB 10.0* 10.9* 11.4* HCT 31.7* 34.8* 35.7* * 546* 505* Recent Labs  
  07/08/20 
0321 07/07/20 
0457 07/06/20 
0404 * 131* 128* K 4.8 4.9 4.8  
CL 95* 94* 90* CO2 18* 19* 16* * 163* 192* BUN 69* 45* 68* CREA 7.70* 6.58* 8.66* CA 9.9 10.0 9.6 MG 3.1*  --   --   
PHOS >13.5*  --   --   
 
 
Signed: Leny Shepard MD

## 2020-07-08 NOTE — PROGRESS NOTES
NAME: Gertrudis Lopez :  1950 MRN:  [id number] Assessment :    Plan: 
--ESKD- [id number]-[id number] AVF/perm cath Acute respiratory failure on vent Seizure/AMS OM/discitis/abcess at C6-7 Sepsis Likely CLABSI at some point Severe HTN Bradycardia Ortho has given clearance to remove c collar for cath exchange Will d/w with surgery-his avf needs a surgical procedure Very tenuous status-seen on hd today- 
Changing minoxidil to metoprolol D/W ID ok to wait until next week for catheter exchange since we were prepared to take him to the OR for this. His prognosis is poor TF to be changed BC NGTD () Family meeting today Hold SERENITY for now (Hgb > 10) On vent. Critically ill at significant risk of deterioration. Subjective: Chief Complaint:  Intubated. Sedated. Review of Systems: UTO. Intubated. Symptom Y/N Comments  Symptom Y/N Comments Fever/Chills    Chest Pain Poor Appetite    Edema Cough    Abdominal Pain Sputum    Joint Pain SOB/RUBIO    Pruritis/Rash Nausea/vomit    Tolerating PT/OT Diarrhea    Tolerating Diet Constipation    Other Could not obtain due to: See above Objective: VITALS:  
Last 24hrs VS reviewed since prior progress note. Most recent are: 
Visit Vitals /60 Pulse (!) 122 Temp 98.9 °F (37.2 °C) Resp 14 Ht 5' 8\" (1.727 m) Wt 72.8 kg (160 lb 7.9 oz) SpO2 98% BMI 24.40 kg/m² Intake/Output Summary (Last 24 hours) at 2020 5544 Last data filed at 2020 0600 Gross per 24 hour Intake 1727.94 ml Output 80 ml Net 1647.94 ml Telemetry Reviewed: PHYSICAL EXAM: 
General: Intubated in the icu ETT in place No rales RRR 
PC in place No corazon Left ue avf with thrill/bruit Lab Data Reviewed: (see below) Medications Reviewed: (see below) PM/ reviewed - no change compared to H&P 
________________________________________________________________________ Care Plan discussed with: 
Patient Family RN Care Manager Consultant:     
 
  Comments >50% of visit spent in counseling and coordination of care    
 
________________________________________________________________________ Janay Urena MD  
 
Procedures: see electronic medical records for all procedures/Xrays and details which 
were not copied into this note but were reviewed prior to creation of Plan. LABS: 
Recent Labs  
  07/08/20 0321 07/07/20 0457 WBC 10.5 12.5* HGB 10.0* 10.9* HCT 31.7* 34.8*  
* 546* Recent Labs  
  07/08/20 0321 07/07/20 0457 07/06/20 
0404 * 131* 128* K 4.8 4.9 4.8  
CL 95* 94* 90* CO2 18* 19* 16*  
BUN 69* 45* 68* CREA 7.70* 6.58* 8.66* * 163* 192* CA 9.9 10.0 9.6 MG 3.1*  --   --   
PHOS >13.5*  --   -- No results for input(s): AP, TBIL, TP, ALB, GLOB, GGT, AML, LPSE in the last 72 hours. No lab exists for component: SGOT, GPT, AMYP, HLPSE No results for input(s): INR, PTP, APTT, INREXT, INREXT in the last 72 hours. No results for input(s): FE, TIBC, PSAT, FERR in the last 72 hours. Lab Results Component Value Date/Time Folate 42.1 (H) 09/08/2012 10:24 AM  
  
No results for input(s): PH, PCO2, PO2 in the last 72 hours. No results for input(s): CPK, CKMB in the last 72 hours. No lab exists for component: TROPONINI No components found for: [place] Lab Results Component Value Date/Time  Color YELLOW/STRAW 07/01/2020 01:52 PM  
 Appearance CLEAR 07/01/2020 01:52 PM  
 Specific gravity 1.017 07/01/2020 01:52 PM  
 pH (UA) 8.0 07/01/2020 01:52 PM  
 Protein 300 (A) 07/01/2020 01:52 PM  
 Glucose 100 (A) 07/01/2020 01:52 PM  
 Ketone 15 (A) 07/01/2020 01:52 PM  
 Bilirubin Negative 07/01/2020 01:52 PM  
 Urobilinogen 0.2 07/01/2020 01:52 PM  
 Nitrites Negative 07/01/2020 01:52 PM  
 Leukocyte Esterase Negative 07/01/2020 01:52 PM  
 Epithelial cells FEW 07/01/2020 01:52 PM  
 Bacteria Negative 07/01/2020 01:52 PM  
 WBC 0-4 07/01/2020 01:52 PM  
 RBC 0-5 07/01/2020 01:52 PM  
 
 
MEDICATIONS: 
Current Facility-Administered Medications Medication Dose Route Frequency  zinc oxide-cod liver oil (DESITIN) 40 % paste   Topical PRN  
 cloNIDine HCL (CATAPRES) tablet 0.3 mg  0.3 mg Per NG tube TID  metoprolol succinate (TOPROL-XL) XL tablet 25 mg  25 mg Oral DAILY  albumin human 25% (BUMINATE) solution 12.5 g  12.5 g IntraVENous Q6H  
 hydrALAZINE (APRESOLINE) 20 mg/mL injection 10 mg  10 mg IntraVENous Q6H PRN  
 heparin (porcine) 1,000 unit/mL injection 3,800 Units  3,800 Units Hemodialysis DIALYSIS PRN  chlorhexidine (ORAL CARE KIT) 0.12 % mouthwash 15 mL  15 mL Oral Q12H  
 famotidine (PEPCID) tablet 20 mg  20 mg Oral DAILY  alcohol 62% (NOZIN) nasal  1 Ampule  1 Ampule Topical Q12H  
 fentaNYL citrate (PF) injection 50 mcg  50 mcg IntraVENous Q1H PRN  
 fentaNYL (PF) 1,500 mcg/30 mL (50 mcg/mL) infusion   mcg/hr IntraVENous TITRATE  propofol (DIPRIVAN) 10 mg/mL infusion  0-50 mcg/kg/min IntraVENous TITRATE  VANCOMYCIN INFORMATION NOTE   Other Rx Dosing/Monitoring  piperacillin-tazobactam (ZOSYN) 3.375 g in 0.9% sodium chloride (MBP/ADV) 100 mL  3.375 g IntraVENous Q12H  
 sodium chloride (NS) flush 5-40 mL  5-40 mL IntraVENous Q8H  
 sodium chloride (NS) flush 5-40 mL  5-40 mL IntraVENous PRN  
 acetaminophen (TYLENOL) tablet 650 mg  650 mg Oral Q6H PRN  
 ondansetron (ZOFRAN) injection 4 mg  4 mg IntraVENous Q6H PRN  
 LORazepam (ATIVAN) injection 2 mg  2 mg IntraVENous PRN  
 levETIRAcetam (KEPPRA) 500 mg in saline (iso-osm) 100 mL IVPB (premix)  500 mg IntraVENous Q12H  
 insulin lispro (HUMALOG) injection   SubCUTAneous Q6H  
 glucose chewable tablet 16 g  4 Tab Oral PRN  
  dextrose (D50W) injection syrg 12.5-25 g  12.5-25 g IntraVENous PRN  
 glucagon (GLUCAGEN) injection 1 mg  1 mg IntraMUSCular PRN  
 sodium chloride (NS) flush 5-40 mL  5-40 mL IntraVENous Q8H  
 sodium chloride (NS) flush 5-40 mL  5-40 mL IntraVENous PRN  
 oxyCODONE IR (ROXICODONE) tablet 5 mg  5 mg Oral Q3H PRN  
 oxyCODONE IR (ROXICODONE) tablet 10 mg  10 mg Oral Q3H PRN  
 naloxone (NARCAN) injection 0.4 mg  0.4 mg IntraVENous PRN  
 hydrOXYzine HCL (ATARAX) tablet 10 mg  10 mg Oral Q8H PRN  
 bisacodyL (DULCOLAX) suppository 10 mg  10 mg Rectal DAILY PRN  phenol throat spray (CHLORASEPTIC) 1 Spray  1 Spray Oral PRN  
 benzocaine-menthoL (CEPACOL) lozenge 1 Lozenge  1 Lozenge Oral PRN  
 diazePAM (VALIUM) tablet 5 mg  5 mg Oral Q6H PRN  
 cyclobenzaprine (FLEXERIL) tablet 10 mg  10 mg Oral BID PRN

## 2020-07-08 NOTE — INTERDISCIPLINARY ROUNDS
Interdisciplinary team rounds were held 7/8/2020 with the following team members:Care Management, Diabetes Treatment Specialist, Nursing, Nutrition, Pharmacy, Physical Therapy, Physician, Respiratory Therapy and Clinical Coordinator. Plan of care discussed. See clinical pathway and/or care plan for interventions and desired outcomes.

## 2020-07-08 NOTE — DIALYSIS
[facility] Dialysis Team Mason Ochoa  [phone] Vitals   Pre   Post   Assessment   Pre   Post    
Temp  Temp: 98.9 °F (37.2 °C) (07/08/20 1003)  98.7 LOC  Sedated on ventilator Sedated on ventilator HR   Pulse (Heart Rate): (!) 125 (07/08/20 1003) 134 Lungs   Ventilator; congested vent B/P   BP: 106/57 (07/08/20 1003) 114/50 Cardiac   RRR; tachy 120s-130s Tachy 130s Resp   Resp Rate: 12 (07/08/20 1003) 12 Skin   CDI CDI Pain level  Pain Intensity 1: 4 (07/08/20 0400)  Edema  trace 
 
 trace Orders: Duration:   Start:    1003 End:    1306 Total:   3 hrs Dialyzer:   Dialyzer/Set Up Inspection: José Luis East (07/08/20 1003) K Bath:   Dialysate K (mEq/L): 2 (07/08/20 1003) Ca Bath:   Dialysate CA (mEq/L): 2.5 (07/08/20 1003) Na/Bicarb:   Dialysate NA (mEq/L): 138 (07/08/20 1003) Target Fluid Removal:   Goal/Amount of Fluid to Remove (mL): 2000 mL (07/08/20 1003) Access Type & Location:   Right tunneled CVC dated 7/6/20. No warmth, drainage, or redness noted however per RN, suspected line infection. Each catheter limb disinfected for 60 seconds per limb with alcohol swabs. Caps removed, dialysis CVC hub scrubbed with Prevantics for 5 seconds, followed by a 5 second dry time per Hospital P&P.  
+aspirated/+flushed *venous port has a lot of resistance with aspiration Labs Obtained/Reviewed Critical Results Called   Date when labs were drawn- 
Hgb-   
HGB Date Value Ref Range Status 07/08/2020 10.0 (L) 12.1 - 17.0 g/dL Final  
 
K-   
Potassium Date Value Ref Range Status 07/08/2020 4.8 3.5 - 5.1 mmol/L Final  
  Comment:  
  Sample is hemolyzed (hemoglobin is 
likely >50 mg/dL) and thus the 
potassium, AST, ammonia, and 
phosphorus results may be adversely 
affected. If the clinical situation 
warrants, recommend recollection of 
a new specimen with attention to 
preventing hemolysis. Ca-  
Calcium Date Value Ref Range Status 07/08/2020 9.9 8.5 - 10.1 MG/DL Final  
 
Bun-  
BUN Date Value Ref Range Status 07/08/2020 69 (H) 6 - 20 MG/DL Final  
  Comment:  
  INVESTIGATED PER DELTA CHECK PROTOCOL Creat-  
Creatinine Date Value Ref Range Status 07/08/2020 7.70 (H) 0.70 - 1.30 MG/DL Final  
 
  
Medications/ Blood Products Given Name   Dose   Route and Time Heparin 1:1000  A 1.9 mL / V 1.9 mL Blood Volume Processed (BVP):    57.6 L Net Fluid Removed:  800 mL Comments Time Out Done: 3206 Primary Nurse Rpt Pre: Ossie Simmonds, RN 
Primary Nurse Rpt Post: Ossie Simmonds, RN 
Pt Education: patient sedated Care Plan: continue current HD plan of care Tx Summary: 
1003 HD treatment initiated per physician order. 1015 BP 88/53. UF off.  
1017 BP 99/54. UF remains off. 
1030 /53. UF back on. Goal reduced to 1.5 kg. 
1120 Albumin initiated per physician order by Primary RN. 1204 /75. Reached out to Dr Benito Zelaya regarding rapid increase in BP and requested to stop Albumin. Telephone order with readback to hold Albumin for now. Primary RN made aware and Albumin stopped. [address] out to Dr Benito Zelaya to update on patient status and current vitals. HR ranging 134-139, /60, and goal had been cut back to 1kg due to increased HR.  
1305 Telephone order with readback from Dr Benito Zelaya to terminate treatment at this time. 1306 HD treatment terminated per physician order. All possible blood returned to patient. Each catheter limb disinfected for 60 seconds per limb with alcohol swabs. Dialysis CVC hub scrubbed with Prevantics for 5 seconds, followed by a 5 second dry time per Hospital P&P.  
+flushed/+heplock/+capped. Admiting Diagnosis: Seizure/ Acute encephalopathy / Malignant hypertension Pt's previous clinic- [id number] Consent signed - Informed Consent Verified: Yes (07/08/20 1003) Nemo Consent - verified Hepatitis Status- 9/11/19 neg/imm Machine #- Machine Number: [id number] (07/08/20 1003) Telemetry status- ICU monitoring Pre-dialysis wt. -

## 2020-07-08 NOTE — DIABETES MGMT
[address] [facility] [address] INITIAL NOTE Presentation Maritza Kruse is a 72 y.o. male admitted from the ER 7/1/20 with AMS. Hx CAD, HARDIK, ESRD. Findings & progress include:  
7/1/20 EEG: Abnormal. Mild to moderate diffuse cerebral dysfunction which is non specific for etiology but can be seen in toxic/metabolic states. No seizures 7/8/20 WBC 10.5. Mag 3.1. Jessica Nims Phos >13.5. Triglycerides 771. . Blood cultures negative. CXR: Slight increase in mild pulmonary edema. No pneumothorax. Family meeting today DX: Acute encephalopathy. C6-7 Discitis with abscess. ARF. ESRD. Type 2 diabetes TX: C5-T1 Anterior Cervical Discectomy Fusion (ACDF) with C6-C7 Corepectomy 7/1/20 Clinical care includes: 
 AC vent support via ET Sedation via Precedex Fentanyl IV Dialysis Nutrition via TF Diabetes: Patient has known Type 2 diabetes, treated with Basaglar PTA. Family history positive for diabetes in mother, daughter & sister. Consulted by Provider for advanced diabetes nursing assessment and care, specifically related to  
[x] Inpatient management strategy Diabetes-related medical history Microvascular disease Nephropathy Macrovascular disease CAD PVD Other associated conditions Sleep apnea Diabetes medication history-deferred Subjective NA Objective Physical exam 
General Intubated & sedated. Non-responsive per nursing Vital Signs Afebrile. Sinus tachycardia Visit Vitals /65 Pulse (!) 130 Temp 98.9 °F (37.2 °C) (Axillary) Resp 29 Ht 5' 8\" (1.727 m) Wt 72.8 kg (160 lb 7.9 oz) SpO2 100% BMI 24.40 kg/m² Laboratory Lab Results Component Value Date/Time Hemoglobin A1c 8.0 (H) 07/03/2020 05:33 AM  
 Hemoglobin A1c, External 10.5 01/09/2019 Lab Results Component Value Date/Time LDL, calculated 30 01/16/2019 08:48 AM  
 
Lab Results Component Value Date/Time Creatinine (POC) 5.8 (H) 10/17/2019 02:16 PM  
 Creatinine 7.70 (H) 07/08/2020 03:21 AM  
 
Lab Results Component Value Date/Time Sodium 130 (L) 07/08/2020 03:21 AM  
 Potassium 4.8 07/08/2020 03:21 AM  
 Chloride 95 (L) 07/08/2020 03:21 AM  
 CO2 18 (L) 07/08/2020 03:21 AM  
 Anion gap 17 (H) 07/08/2020 03:21 AM  
 Glucose 201 (H) 07/08/2020 03:21 AM  
 BUN 69 (H) 07/08/2020 03:21 AM  
 Creatinine 7.70 (H) 07/08/2020 03:21 AM  
 BUN/Creatinine ratio 9 (L) 07/08/2020 03:21 AM  
 GFR est AA 9 (L) 07/08/2020 03:21 AM  
 GFR est non-AA 7 (L) 07/08/2020 03:21 AM  
 Calcium 9.9 07/08/2020 03:21 AM  
 Bilirubin, total 0.8 07/03/2020 05:33 AM  
 Alk. phosphatase 100 07/03/2020 05:33 AM  
 Protein, total 6.9 07/03/2020 05:33 AM  
 Albumin 2.2 (L) 07/03/2020 05:33 AM  
 Globulin 4.7 (H) 07/03/2020 05:33 AM  
 A-G Ratio 0.5 (L) 07/03/2020 05:33 AM  
 ALT (SGPT) 12 07/03/2020 05:33 AM  
 
Lab Results Component Value Date/Time ALT (SGPT) 12 07/03/2020 05:33 AM  
 
Factors affecting BG pattern Factor Dose Comments Nutrition: 
NPO Tube feeding via OG  
 
155 grams CHO/24 hrs Infection Zosyn WBC 10.5 AKF on CKF HD Blood glucose pattern Assessment and Plan Nursing Diagnosis Risk for unstable blood glucose pattern Nursing Intervention Domain 1071 Decision-making Support Nursing Interventions Examined current inpatient diabetes control Explored factors facilitating and impeding inpatient management Evaluation This gentleman, with Type 2 diabetes, hasn't achieved inpatient blood glucose target of 100-180mg/dl. Inpatient blood glucose management has been impacted by 
[x] Kidney dysfunction 
[x]  Critically ill Recommendations Recommend: 
 
Basal insulin  
[x] 0.2 units/kg/D = Lantus insulin 15 units D Corrective insulin 
[x] Normal sensitivity Billing Code(s) I personally reviewed chart, notes, data and current medications in the medical record, and examined the patient at bedside before making care recommendations. [x] [id number] IP subsequent hospital care - 30 minutes [facility] Access via [address] [place] [address]

## 2020-07-09 PROBLEM — Z51.5 END OF LIFE CARE: Status: ACTIVE | Noted: 2020-01-01

## 2020-07-09 PROBLEM — G06.1 ABSCESS IN EPIDURAL SPACE OF CERVICAL SPINE: Status: ACTIVE | Noted: 2020-01-01

## 2020-07-09 NOTE — PROGRESS NOTES
0700 Bedside and Verbal shift change report received from Xavier Poole, [facility] (offgoing nurse). Report included the following information SBAR, Kardex, Intake/Output, MAR, Accordion and Recent Results. 0800 Assessment complete. See flowsheet for details. 1100 Patient's ex-wife at bedside; waiting for son to arrive. Family has been in and out of room visiting with patient. Planning to compassionately extubate today per Gisella with palliative team. 
 
1425 Comfort measures initiated per son's request. Palliative at bedside. Orders received for extubation and PRN medications. 1520 Report given to Dipesh Poole RN.

## 2020-07-09 NOTE — PROGRESS NOTES
Palliative Medicine Consult Patient Name: Gualberto Begum YOB: 1950 Date of Initial Consult: 7/7/20 Reason for Consult: Care decisions Requesting Provider: Dr. Darek Lovell Primary Care Physician: Pratima Kate MD 
  
 SUMMARY:  
Gualberto Begum is a 72 y.o. with a past history of CAD with stents, Type 2 DM, ESRD with HD on MWF, HTN, HARDIK uses CPAP, afib with RVR, who was admitted on 7/1/2020 from home with a diagnosis of osteomyelitis-discitis at C6-C7, sepsis, acute encephalopathy with new onset seizure, ESRD, malignant HTN. Current medical issues leading to Palliative Medicine involvement include: care decisions r/t respiratory failure, sepsis and encephalopathy. Psychosocial: Patient lives with ex-wife prior to admission. Son reports his dad had MVA approximately 1 month ago and was having neck pain and using opiate prescription for pain prior to admission. Was to see Dr. Alix ramirez. 7/8 Patient remains intubate and sedated on ventilator. Fails all SBTs. Remains tachycardic. Prognosis poor per Dr. Missy Kirk and Dr. Sajan Downey. PALLIATIVE DIAGNOSES:  
1. Goals of care 2. Respiratory failure 3. Acute encephalopathy with new onset seizures 4. Sepsis possible related to permacath 5. Osteomyelitis/discitis, s/p C5-T1 ACDF with C6-C7 corpectomy on 7/1/20. PLAN:  
1. Prior to visit, I completed a review of patient's medical records, including medical documentation, vital signs, MARs, and results of various labs and other diagnostics. I also spoke with patient's nurse, Migel Foster, and pulmonologist/intensivist Dr. Darek Lovell. 2. Family all in to visit during the day. Just spoke with family, discussed expectations with compassionate extubation; God decides when pt takes his last breath, we will ensure comfort. Family is ready to proceed. 3. 2:30pm:  Comfort orders placed, reviewed with RN and RT. 4. Patient made DNR status. 5. Pastoral care consulted for family support during this visit. 6. 3:45pm: notified of Hospice consult to Hospice RN and family. 7. Communicated plan of care with: Palliative IDT 
 
 
 GOALS OF CARE / TREATMENT PREFERENCES:  
[====Goals of Care====] GOALS OF CARE: 
Patient/Health Care Proxy Stated Goals: Other (comment)(family to plan withdrawal of care tomorrow) TREATMENT PREFERENCES:  
Code Status: DNR Advance Care Planning: 
Advance Care Planning 10/10/2019 Patient's Healthcare Decision Maker is: -  
Primary Decision Maker Name -  
Primary Decision Maker Phone Number -  
Primary Decision Maker Relationship to Patient -  
Confirm Advance Directive Yes, on file Does the patient have other document types - Medical Interventions: Other (comment)(will transition to comfort care tomorrow when family is ready) The palliative care team has discussed with patient / health care proxy about goals of care / treatment preferences for patient. 
[====Goals of Care====] HISTORY:  
 
History obtained from: Chart and nurse CHIEF COMPLAINT: AMS/unresponsive HPI/SUBJECTIVE: The patient is:  
[] Verbal and participatory [x] Non-participatory due to: Intubation, AMS 73 yo male admitted to hospital after being found unresponsive at home by exwife. Patient reported by EMS to have tonic clonic seizure during transport with agonal respirations. Patient was intubated emergently in ER. Head CT negative for acute bleed or stroke, but revealed osteomyelitits/discitis at C6-C7 with severe spinal cord stenosis and cord compression. Patient had emergent C5-T1 ACDF with C6-C7 corpectomy secondary to discitis and epidural abcess on 7/1. Transferred to ICU on ventilator with malignant HTN post op. Patient has remained pn ventilator since surgery. Receives dialysis MWF via permacath. Patient has fistula in left arm that has faint thrill but is not accessible per vascular surgery. This will need to be addressed on this admission when patient is more stable. IR saw patient on 7/6 to attempt to place new dialysis access and was unable to via femoral arteries or right IJ. They will attempt to place new dialysis catheter when cervical collar can be removed and patient head turned for LIJ access. Patient was noted to be DNR on previous admission. Will speak with patient's son, Earl Henriquez, to clarify this status and attempt to obtain AMD. Clinical Pain Assessment (nonverbal scale for severity on nonverbal patients):  
Clinical Pain Assessment Severity: 0 Adult Nonverbal Pain Scale Face: Occasional grimace, tearing, frowning, wrinkled forehead Activity (Movement): Seeking attention through movement or slow, cautious movement Guarding: Splinting areas of the body, tense Physiology (Vital Signs): Change in any of the following: SBP > 20 mm HG or HR > 20/minute Respiratory: RR > 10 above baseline or 5% decrease SpO2 mild asynchrony with ventilator Total Score: 5 FUNCTIONAL ASSESSMENT:  
 
Palliative Performance Scale (PPS): PPS: 20 
 
 
 PSYCHOSOCIAL/SPIRITUAL SCREENING:  
 
Advance Care Planning: 
Advance Care Planning 10/10/2019 Patient's Healthcare Decision Maker is: -  
Primary Decision Maker Name -  
Primary Decision Maker Phone Number -  
Primary Decision Maker Relationship to Patient -  
Confirm Advance Directive Yes, on file Does the patient have other document types - Any spiritual / Bahai concerns: 
[] Yes /  [x] No 
 
Caregiver Burnout: 
[] Yes /  [] No /  [x] No Caregiver Present Anticipatory grief assessment:  
[] Normal  / [] Maladaptive ESAS Anxiety: Anxiety: 0 
 
ESAS Depression: Depression: 0 REVIEW OF SYSTEMS:  
 
Positive and pertinent negative findings in ROS are noted above in HPI. The following systems were [] reviewed / [x] unable to be reviewed as noted in HPI Other findings are noted below. Systems: constitutional, ears/nose/mouth/throat, respiratory, gastrointestinal, genitourinary, musculoskeletal, integumentary, neurologic, psychiatric, endocrine. Positive findings noted below. Modified ESAS Completed by: provider Fatigue: 0 Drowsiness: 0 Depression: 0 Pain: 0 Anxiety: 0 Nausea: 0 Anorexia: 0 Dyspnea: 0 Stool Occurrence(s): 1 PHYSICAL EXAM:  
 
From RN flowsheet: 
Wt Readings from Last 3 Encounters:  
07/08/20 160 lb 7.9 oz (72.8 kg) 10/22/19 166 lb 6.4 oz (75.5 kg) 10/17/19 170 lb 13.7 oz (77.5 kg) Blood pressure 145/60, pulse (!) 137, temperature 100 °F (37.8 °C), resp. rate 19, height 5' 8\" (1.727 m), weight 160 lb 7.9 oz (72.8 kg), SpO2 97 %. Pain Scale 1: Behavioral Pain Scale (BPS) Pain Intensity 1: 3 Pain Location 1: Neck, Spine, cervical 
Pain Orientation 1: Anterior, Other (comment) Pain Description 1: Aching, Hypersensitivity, Sore Pain Intervention(s) 1: Repositioned Last bowel movement, if known:  
 
Constitutional: Acutely ill patient, intubated and sedated Eyes: pupils equal but pinpoint, anicteric ENMT: no nasal discharge, moist mucous membranes, orally intubated Cardiovascular: regular rhythm, tachycardic, distal pulses intact, left arm AV fistula intact with faint thrill Respiratory: breathing not labored, symmetric, on full vent support Gastrointestinal: soft non-tender, +bowel sounds, NGT in place with TF Musculoskeletal: no deformity, no tenderness to palpation, cervical collar intact Skin: warm, dry, DDI to neck Neurologic: sedated, unresponsive on Propofol Psychiatric: ELIE HISTORY:  
 
Active Problems: 
  Malignant hypertension (7/1/2020) Seizure ([address] (7/1/2020) Cervical discitis (7/1/2020) Acute encephalopathy (7/1/2020) Counseling regarding advance care planning and goals of care (7/7/2020) Past Medical History:  
Diagnosis Date  AAA (abdominal aortic aneurysm) ([address] 34mm 2/2017  Anemia   
 gets shots from Dr. Willard Koehler - last dose 2018  BPH (benign prostatic hypertrophy)  CAD (coronary artery disease) s/p stents, sees Dr. Randy Hung  Cancer ([facility] skin cancer on leg  Chronic kidney disease [facility]  End stage renal disease ([facility] Dr. Willard Koehler  GERD (gastroesophageal reflux disease)  Gout  Other and unspecified hyperlipidemia  PUD (peptic ulcer disease)  PVD (peripheral vascular disease) ([facility]   
 s/p PTCA of left femoral artery in 2014  Sleep apnea CPAP  Type II or unspecified type diabetes mellitus without mention of complication, uncontrolled Dr. Anders Marquez  Unspecified essential hypertension  Unspecified vitamin D deficiency Past Surgical History:  
Procedure Laterality Date  CARDIAC SURG PROCEDURE UNLIST    
 stents  HX CATARACT REMOVAL    
 HX COLONOSCOPY    
 HX CORONARY STENT PLACEMENT    
 HX ENDOSCOPY    
 HX KNEE ARTHROSCOPY    
 right x2, left x1  
 HX OTHER SURGICAL    
 skin cancer removed from leg  IR INSERT NON TUNL CVC OVER 5 YRS  2019  IR INSERT NON TUNL CVC OVER 5 YRS  2020  IR INSERT TUNL CVC W/O PORT OVER 5 YR  2019  VASCULAR SURGERY PROCEDURE UNLIST    
 aorto-bifem bypass  VASCULAR SURGERY PROCEDURE UNLIST    
 left carotid endarterectomy  VASCULAR SURGERY PROCEDURE UNLIST    
 right femoral PTCA Family History Problem Relation Age of Onset  Diabetes Mother  Heart Disease Mother  Heart Disease Maternal Grandmother  Diabetes Daughter   
     gestational  
[facility] Diabetes Sister  Stroke Sister  Cancer Father  Hypertension Father History reviewed, no pertinent family history. Social History Tobacco Use  Smoking status: Former Smoker Packs/day: 2.00 Years: 25.00 Pack years: 50.00 Last attempt to quit: 3/11/1991 Years since quittin.3  Smokeless tobacco: Never Used Substance Use Topics  Alcohol use: Yes Comment: very occasional  
 
No Known Allergies Current Facility-Administered Medications Medication Dose Route Frequency  LORazepam (ATIVAN) injection 1 mg  1 mg IntraVENous Q15MIN PRN  
 HYDROmorphone (PF) (DILAUDID) injection 1 mg  1 mg IntraVENous Q15MIN PRN  
 HYDROmorphone (PF) (DILAUDID) injection 2 mg  2 mg IntraVENous ONCE  
 LORazepam (ATIVAN) injection 1 mg  1 mg IntraVENous ONCE  
 scopolamine (TRANSDERM-SCOP) 1 mg over 3 days 1 Patch  1 Patch TransDERmal Q72H  
 glycopyrrolate (ROBINUL) injection 0.2 mg  0.2 mg IntraVENous TID  levETIRAcetam (KEPPRA) oral solution 500 mg  500 mg Per NG tube BID  piperacillin-tazobactam (ZOSYN) 3.375 g in 0.9% sodium chloride (MBP/ADV) 100 mL  3.375 g IntraVENous Q12H  
 sodium chloride (NS) flush 5-40 mL  5-40 mL IntraVENous Q8H  
 sodium chloride (NS) flush 5-40 mL  5-40 mL IntraVENous Q8H  
 sodium chloride (NS) flush 5-40 mL  5-40 mL IntraVENous PRN  
 
 
 
 LAB AND IMAGING FINDINGS:  
 
Lab Results Component Value Date/Time WBC 11.2 (H) 07/09/2020 03:57 AM  
 HGB 9.2 (L) 07/09/2020 03:57 AM  
 PLATELET 091 79/28/9545 03:57 AM  
 
Lab Results Component Value Date/Time Sodium 133 (L) 07/09/2020 03:57 AM  
 Potassium 3.9 07/09/2020 03:57 AM  
 Chloride 98 07/09/2020 03:57 AM  
 CO2 25 07/09/2020 03:57 AM  
 BUN 40 (H) 07/09/2020 03:57 AM  
 Creatinine 5.05 (H) 07/09/2020 03:57 AM  
 Calcium 9.8 07/09/2020 03:57 AM  
 Magnesium 3.1 (H) 07/08/2020 03:21 AM  
 Phosphorus >13.5 (H) 07/08/2020 03:21 AM  
  
Lab Results Component Value Date/Time Alk. phosphatase 100 07/03/2020 05:33 AM  
 Protein, total 6.9 07/03/2020 05:33 AM  
 Albumin 2.2 (L) 07/03/2020 05:33 AM  
 Globulin 4.7 (H) 07/03/2020 05:33 AM  
 
Lab Results Component Value Date/Time  INR 1.0 07/01/2020 12:51 PM  
 Prothrombin time 10.0 07/01/2020 12:51 PM  
 aPTT 27.0 07/01/2020 12:51 PM  
  
 Lab Results Component Value Date/Time Iron 14 (L) 09/08/2012 10:24 AM  
 TIBC 472 (H) 09/08/2012 10:24 AM  
 Iron % saturation 3 (L) 09/08/2012 10:24 AM  
 Ferritin 6 (L) 09/08/2012 10:24 AM  
  
Lab Results Component Value Date/Time pH 7.44 02/07/2018 04:00 PM  
 PCO2 29 (L) 02/07/2018 04:00 PM  
 PO2 77 (L) 02/07/2018 04:00 PM  
 
No components found for: [place] Lab Results Component Value Date/Time  (H) 07/08/2020 03:21 AM  
 CK - MB 1.9 07/01/2020 05:20 PM  
  
 
 
   
 
Total time:  
Counseling / coordination time, spent as noted above:  
> 50% counseling / coordination?: y 
Prolonged service was provided for  []30 min   []75 min in face to face time in the presence of the patient, spent as noted above. Time Start:  
Time End:  
Note: this can only be billed with 64826 (initial) or 92781 (follow up). If multiple start / stop times, list each separately.

## 2020-07-09 NOTE — DISCHARGE SUMMARY
Hospitalist Discharge Summary Patient ID: 
Tali Malloy 
[id number] 
60 y.o. 
1950 7/1/2020 PCP on record: Ari Laura MD 
 
Admit date: 7/1/2020 Discharge date and time: 7/9/2020 DISCHARGE DIAGNOSIS: 
Epidural abscess with Diskitis c6-c7 s/p C5- T1 ACDF with partial C6 and C7 corpectomy Sepsis, POA due to above Acute Respiratory Failure POA 
ESRD on HD MWF.    
HARDIK on cpap 
CAD, hx stents 2003.  Follow with Dr. Eliseo Johnson. Yoshi Jaimee 2/2018 EF 60-65% Paroxysmal afib 9/2019, not on anticoagulation Malignant HTN /78 Elevated trop 0.19, has chronic mild troponin elevation likely due to CKD.  EKG without active ischemia. -DM Gout GERD, hx PUD BPH 
PAD 
S/p left CEA 2009 by Dr. Prasanna Fernandes Hx aortobifemoral graft Distal AAA borderline aneurysm 2017 Hx chronic anemia,   
 
CONSULTATIONS: 
IP CONSULT TO NEPHROLOGY 
IP CONSULT TO NEUROLOGY 
IP CONSULT TO INTENSIVIST 
IP CONSULT TO INFECTIOUS DISEASES 
IP CONSULT TO PALLIATIVE CARE - PROVIDER 
IP CONSULT TO ORTHOPEDIC SURGERY 
IP CONSULT TO VASCULAR SURGERY Excerpted HPI from H&P of Enrique Eisenberg MD: 
Tali Malloy is a 72 y.o. male with ESRD, DM, CAD, p. afib not on anticoagulation, PAD brought in by EMS for AMS started at 10am as reported by family. EMS noted patient was confused and restless as stroke alert. En route, he developed seizure with right gaze deviation and decorticate posturing. Was given versed 5mg IV with break in seizure. He then became very agitated and combative. Son reports EMS counted that vicodin count missing 10 tabs and suspected that patient taking more than prescribed because been having a lot of neck pain. 
  
In ER, was intubated for airway protection. Loaded with keppra. BP markedly elevated 250/90. 
  
Was admitted 9/2019 for sepsis concerning for SBP and had removal of peritoneal dialysis catheter, p.  Afib, converted to SR, acute on chronic diastolic chf with acute hypoxic respiratory failure, urinary retention requiring maldonado. 
  
We were asked to admit for work up and evaluation of the above problems. ______________________________________________________________________ DISCHARGE SUMMARY/HOSPITAL COURSE:  for full details see H&P, daily progress notes, labs, consult notes. Epidural abscess with Diskitis c6-c7 s/p C5- T1 ACDF with partial C6 and C7 corpectomy Sepsis, POA due to above 
  
Presented with AMS and Seizure CT Neck showed Osteomyelitis-discitis at C6-C7 with erosive endplate changes, prevertebral and epidural phlegmon, resulting in severe spinal canal stenosis and cord compression at C6-C7.  
  
S/p C5 through T1 ACDF with partial C6 and C7 corpectomy 07/02 by Ortho IV keppra, discussed with neuro, if no more seizures, stop Keppra next week 
-Ortho spine surgeon on board 
  
-ID on board. Antibiotic as per ID 
-Tissue culture- Staph Epi, BCx NGTD 
-Echo -July 4  No endocarditis-  
  
Family decided on hospice and on compassionate extubation Acute Respiratory Failure POA Was intubated /ventilated Family decided on compassionate extubation  
  
  
ESRD on HD MWF.  on hospice , therefore no more HD k  
  
HARDIK on cpap 
  
CAD, hx stents 2003.  Follow with Dr. Rufino Sue. Coco Greenhouse 2/2018 EF 60-65% Paroxysmal afib 9/2019, not on anticoagulation Malignant HTN /78 Elevated trop 0.19, has chronic mild troponin elevation likely due to CKD.  EKG without active ischemia. -on hospice   
DM Gout GERD, hx PUD 
bPH 
PAD 
S/p left CEA 2009 by Dr. Shaheed Harrison Hx aortobifemoral graft Distal AAA borderline aneurysm 2017 Hx chronic anemia,   
 
On hospice  
 
 
_______________________________________________________________________ Patient seen and examined by me on discharge day. Pertinent Findings: 
Gen:   Intubated when seen Chest:mechanical breath sounds CVS:   Regular rhythm. No edema Abd:  Soft, not distended, not tender Neuro:  Unresponsive  
_______________________________________________________________________ DISCHARGE MEDICATIONS:  
Current Discharge Medication List  
  
 
 
 
________________________________________________________________ Risk of deterioration: High 
 
Condition at Discharge:  Stable 
__________________________________________________________________ Disposition IP Hospice 
 
____________________________________________________________________ Code Status: DNR/DNI 
___________________________________________________________________ Total time in minutes spent coordinating this discharge (includes going over instructions, follow-up, prescriptions, and preparing report for sign off to her PCP) :  35 minutes Signed: 
Arleen Garza MD

## 2020-07-09 NOTE — PROGRESS NOTES
Renal-note plans for hospice. No further HD. Antonio Rose [phone] notified.   Will see again upon request. 
Keeley Sherman MD

## 2020-07-09 NOTE — PROGRESS NOTES
0700  Report from Gerald Farmer RN 
 
0800  Assessment complete se flow sheet  HR remains elevated  BP stable at this time 0830  Dialysis nurse here to start HD 
 
0940  HR remains elevated 50 mcg Fentanyl given to help relax/sedate patient and try to bring HR down for HD 
 
1120  Albumin started to help with BP and HR per Nephrology orders 1132  Precedex started weaning propofol off 
 
1200  Assessment complete no changes [id number]  Propofol off Precedex titrated up see MAR 
 
1241  Fentanyl increased to 200 mcg for sedation 1354  BP down Precedex titrated down see MAR 
 
[id number]  Precedex  Titrated up and down see MAR and Fentanyl down to 150 mcg see MAR. BP now more stable HR remains elevated milena coughing with vent setting 1500  No signs of distress no coughing with vent BP stable HR remains elevated 1600  Assessment complete no changes 80  Family in with patient and Palliative. Patient now a Partial code no CPR or shock. End of care possibly tomorrow after rest of family see patient 1800  BP stable HR still elevated 1900  Report to Gerald Farmer RN

## 2020-07-09 NOTE — PROGRESS NOTES
NAME: Gertrudis Knight :  1950 MRN:  [id number] Assessment :    Plan: 
--ESKD- [id number]-[id number] AVF/perm cath Acute respiratory failure on vent Seizure/AMS OM/discitis/abcess at C6-7 Sepsis Likely CLABSI at some point Severe HTN Bradycardia HD MWF Poor prognosis ID following Holding on any catheter exchange at this point Palliative working with family I do not see him surviving this hospitalization Subjective: Chief Complaint:  Intubated. Sedated. Review of Systems: UTO. Intubated. Symptom Y/N Comments  Symptom Y/N Comments Fever/Chills    Chest Pain Poor Appetite    Edema Cough    Abdominal Pain Sputum    Joint Pain SOB/RUBIO    Pruritis/Rash Nausea/vomit    Tolerating PT/OT Diarrhea    Tolerating Diet Constipation    Other Could not obtain due to: See above Objective: VITALS:  
Last 24hrs VS reviewed since prior progress note. Most recent are: 
Visit Vitals /60 Pulse (!) 137 Temp 100 °F (37.8 °C) Resp 19 Ht 5' 8\" (1.727 m) Wt 72.8 kg (160 lb 7.9 oz) SpO2 97% BMI 24.40 kg/m² Intake/Output Summary (Last 24 hours) at 2020 1245 Last data filed at 2020 1200 Gross per 24 hour Intake 2008.66 ml Output 1020 ml Net 988.66 ml Telemetry Reviewed: PHYSICAL EXAM: 
General: Intubated in the icu ETT in place No rales RRR 
PC in place No corazon Left ue avf with thrill/bruit Lab Data Reviewed: (see below) Medications Reviewed: (see below) PMH/SH reviewed - no change compared to H&P 
________________________________________________________________________ Care Plan discussed with: 
Patient Family RN Care Manager Consultant:     
 
  Comments >50% of visit spent in counseling and coordination of care ________________________________________________________________________ Qamar Chinchilla MD  
 
Procedures: see electronic medical records for all procedures/Xrays and details which 
were not copied into this note but were reviewed prior to creation of Plan. LABS: 
Recent Labs  
  07/09/20 0357 07/08/20 0321 WBC 11.2* 10.5 HGB 9.2* 10.0* HCT 28.8* 31.7*  476* Recent Labs  
  07/09/20 0357 07/08/20 0321 07/07/20 
0457 * 130* 131*  
K 3.9 4.8 4.9 CL 98 95* 94* CO2 25 18* 19* BUN 40* 69* 45* CREA 5.05* 7.70* 6.58* * 201* 163* CA 9.8 9.9 10.0 MG  --  3.1*  --   
PHOS  --  >13.5*  -- No results for input(s): AP, TBIL, TP, ALB, GLOB, GGT, AML, LPSE in the last 72 hours. No lab exists for component: SGOT, GPT, AMYP, HLPSE No results for input(s): INR, PTP, APTT, INREXT, INREXT in the last 72 hours. No results for input(s): FE, TIBC, PSAT, FERR in the last 72 hours. Lab Results Component Value Date/Time Folate 42.1 (H) 09/08/2012 10:24 AM  
  
No results for input(s): PH, PCO2, PO2 in the last 72 hours. Recent Labs  
  07/08/20 0321 * No components found for: [place] Lab Results Component Value Date/Time Color YELLOW/STRAW 07/01/2020 01:52 PM  
 Appearance CLEAR 07/01/2020 01:52 PM  
 Specific gravity 1.017 07/01/2020 01:52 PM  
 pH (UA) 8.0 07/01/2020 01:52 PM  
 Protein 300 (A) 07/01/2020 01:52 PM  
 Glucose 100 (A) 07/01/2020 01:52 PM  
 Ketone 15 (A) 07/01/2020 01:52 PM  
 Bilirubin Negative 07/01/2020 01:52 PM  
 Urobilinogen 0.2 07/01/2020 01:52 PM  
 Nitrites Negative 07/01/2020 01:52 PM  
 Leukocyte Esterase Negative 07/01/2020 01:52 PM  
 Epithelial cells FEW 07/01/2020 01:52 PM  
 Bacteria Negative 07/01/2020 01:52 PM  
 WBC 0-4 07/01/2020 01:52 PM  
 RBC 0-5 07/01/2020 01:52 PM  
 
 
MEDICATIONS: 
Current Facility-Administered Medications Medication Dose Route Frequency  [START ON 7/10/2020] insulin glargine (LANTUS) injection 20 Units  20 Units SubCUTAneous DAILY  zinc oxide-cod liver oil (DESITIN) 40 % paste   Topical PRN  
 cloNIDine HCL (CATAPRES) tablet 0.3 mg  0.3 mg Per NG tube TID  dexmedeTOMidine in 0.9 % NaCl (PRECEDEX) 400 mcg/100 mL (4 mcg/mL) infusion soln  0.2-1.4 mcg/kg/hr IntraVENous TITRATE  levETIRAcetam (KEPPRA) oral solution 500 mg  500 mg Per NG tube BID  metoprolol tartrate (LOPRESSOR) tablet 12.5 mg  12.5 mg Oral Q12H  
 [START ON 7/10/2020] Vancomycin level with am labs 7/10  1 Each Other Rx Dosing/Monitoring  hydrALAZINE (APRESOLINE) 20 mg/mL injection 10 mg  10 mg IntraVENous Q6H PRN  
 heparin (porcine) 1,000 unit/mL injection 3,800 Units  3,800 Units Hemodialysis DIALYSIS PRN  chlorhexidine (ORAL CARE KIT) 0.12 % mouthwash 15 mL  15 mL Oral Q12H  
 famotidine (PEPCID) tablet 20 mg  20 mg Oral DAILY  alcohol 62% (NOZIN) nasal  1 Ampule  1 Ampule Topical Q12H  
 fentaNYL citrate (PF) injection 50 mcg  50 mcg IntraVENous Q1H PRN  
 fentaNYL (PF) 1,500 mcg/30 mL (50 mcg/mL) infusion   mcg/hr IntraVENous TITRATE  VANCOMYCIN INFORMATION NOTE   Other Rx Dosing/Monitoring  piperacillin-tazobactam (ZOSYN) 3.375 g in 0.9% sodium chloride (MBP/ADV) 100 mL  3.375 g IntraVENous Q12H  
 sodium chloride (NS) flush 5-40 mL  5-40 mL IntraVENous Q8H  
 sodium chloride (NS) flush 5-40 mL  5-40 mL IntraVENous PRN  
 acetaminophen (TYLENOL) tablet 650 mg  650 mg Oral Q6H PRN  
 ondansetron (ZOFRAN) injection 4 mg  4 mg IntraVENous Q6H PRN  
 LORazepam (ATIVAN) injection 2 mg  2 mg IntraVENous PRN  
 insulin lispro (HUMALOG) injection   SubCUTAneous Q6H  
 glucose chewable tablet 16 g  4 Tab Oral PRN  
 dextrose (D50W) injection syrg 12.5-25 g  12.5-25 g IntraVENous PRN  
 glucagon (GLUCAGEN) injection 1 mg  1 mg IntraMUSCular PRN  
 sodium chloride (NS) flush 5-40 mL  5-40 mL IntraVENous Q8H  
  sodium chloride (NS) flush 5-40 mL  5-40 mL IntraVENous PRN  
 oxyCODONE IR (ROXICODONE) tablet 5 mg  5 mg Oral Q3H PRN  
 oxyCODONE IR (ROXICODONE) tablet 10 mg  10 mg Oral Q3H PRN  
 naloxone (NARCAN) injection 0.4 mg  0.4 mg IntraVENous PRN  
 hydrOXYzine HCL (ATARAX) tablet 10 mg  10 mg Oral Q8H PRN  
 bisacodyL (DULCOLAX) suppository 10 mg  10 mg Rectal DAILY PRN  phenol throat spray (CHLORASEPTIC) 1 Spray  1 Spray Oral PRN  
 benzocaine-menthoL (CEPACOL) lozenge 1 Lozenge  1 Lozenge Oral PRN  
 diazePAM (VALIUM) tablet 5 mg  5 mg Oral Q6H PRN  
 cyclobenzaprine (FLEXERIL) tablet 10 mg  10 mg Oral BID PRN

## 2020-07-09 NOTE — PROGRESS NOTES
CRITICAL CARE: 
 
Sedated on vent- C collar in place no WOB. Failing SBT daily. No acute events overnight ROS: Unable to obtain full ROS as patient is intubated PE:  
Vitals stable Gen: Elderly, chronically ill appearing male intubated HEENT: ETT in place Neck: Ccollar Mouth: ETT 
CV: Regular Lungs: CTA B/L, no wheezing GI: Soft, no distention Ext: No edema, constant jerking motion of upper and lower extremities Neuro: Sedated, RASS -3 Msk: Muscle wasting Lab Results Component Value Date/Time WBC 11.2 (H) 07/09/2020 03:57 AM  
 HGB 9.2 (L) 07/09/2020 03:57 AM  
 Hematocrit (POC) 32 (L) 10/17/2019 02:16 PM  
 HCT 28.8 (L) 07/09/2020 03:57 AM  
 PLATELET 736 08/19/1717 03:57 AM  
 .8 (H) 07/09/2020 03:57 AM  
 
Lab Results Component Value Date/Time Sodium 133 (L) 07/09/2020 03:57 AM  
 Potassium 3.9 07/09/2020 03:57 AM  
 Chloride 98 07/09/2020 03:57 AM  
 CO2 25 07/09/2020 03:57 AM  
 Anion gap 10 07/09/2020 03:57 AM  
 Glucose 205 (H) 07/09/2020 03:57 AM  
 BUN 40 (H) 07/09/2020 03:57 AM  
 Creatinine 5.05 (H) 07/09/2020 03:57 AM  
 BUN/Creatinine ratio 8 (L) 07/09/2020 03:57 AM  
 GFR est AA 14 (L) 07/09/2020 03:57 AM  
 GFR est non-AA 11 (L) 07/09/2020 03:57 AM  
 Calcium 9.8 07/09/2020 03:57 AM  
 
CXR reviewed: no acute changes Impression: 
-acute respiratory failure requiring intubation due to inability to protect the airway 
-suspected seizures 
-marked hypertension 
-abnormal Cspine imaging as noted above; s/p emergent surgery 7/1 PM for debridement of epidural abscess and ACDF of C5-T1 
-history of paroxysmal afib 
-ESRD on HD M/W/F 
-DM 
- OSAS Plan: 
-continue vent support, unable to wean 
-sedation to goal RASS -1 
-Daily SBTs, not tolerating it 
-cardene as needed for BP control -EEG without seizures 
-neuro following 
-broad spectrum antibiotics -ortho following closely 
-HD per renal 
-accuchecks, SSI 
-tube feeds 
-PUD/DVT prophylaxis Clair Razo MD

## 2020-07-09 NOTE — HOSPICE
[facility] Good Help to Those in Need 
[phone] Nursing Note Patient Name: Osito Paulson YOB: 1950 Age: 72 y.o[address] [facility] RN Note:  Hospice consult noted. Chart reviewed. Will be seeing patient soon to assess for IP hospice and to meet family that are present. If there are any questions, please call [facility] at [phone]. Thank you for the opportunity to be of service to this patient and his family. 1645: In to meet with Milli Canela/ex-wife and Claudio Mario/son. Discussed Hospice philosophy, general plan of care, levels of care, services and on call procedures. Family information packet provided & reviewed. Patient meet IP hospice criteria and family has agreed to admit. Consents have been signed. Patient will be transferred to [place] [address]/hospice suite on Oncology floor. Plan of care discussed with patient's nurse and [facility] for d/c order.

## 2020-07-09 NOTE — PROGRESS NOTES
07/09/20 0530 07/09/20 0545 ABCDEF Bundle SBT Safety Screen Passed Yes  --   
SBT Trial Passed  --  Yes Weaning Parameters Spontaneous Breathing Trial Complete  --  Yes Resp Rate Observed  --  25 Ve  --  11  
VT  --  404 RSBI  --  57 SATS 96%

## 2020-07-09 NOTE — PROGRESS NOTES
Problem: Falls - Risk of 
Goal: *Absence of Falls Description: Document Mathieu Jimenez Fall Risk and appropriate interventions in the flowsheet. Outcome: Progressing Towards Goal 
Note: Fall Risk Interventions: 
Mobility Interventions: Bed/chair exit alarm, Communicate number of staff needed for ambulation/transfer, Strengthening exercises (ROM-active/passive) Mentation Interventions: Adequate sleep, hydration, pain control, Bed/chair exit alarm, Evaluate medications/consider consulting pharmacy, Reorient patient Medication Interventions: Evaluate medications/consider consulting pharmacy Elimination Interventions: Bed/chair exit alarm, Toileting schedule/hourly rounds Problem: Patient Education: Go to Patient Education Activity Goal: Patient/Family Education Outcome: Progressing Towards Goal

## 2020-07-09 NOTE — DIABETES MGMT
[address] INITIAL NOTE Presentation Tc Bautista is a 72 y.o. male admitted from the ER 7/1/20 with AMS. Hx CAD, HARDIK, ESRD. Findings & progress include:  
7/1/20 EEG: Abnormal. Mild to moderate diffuse cerebral dysfunction which is non specific for etiology but can be seen in toxic/metabolic states. No seizures 7/8/20 WBC 10.5. Mag 3.1. [place] Phos >13.5. Triglycerides 771. . Blood cultures negative. CXR: Slight increase in mild pulmonary edema. No pneumothorax. Family meeting 7/9/20 WBC 11.2. BGas normal. Kidney parameters noted. Blood culture No growth. CXR: No change DX: Acute encephalopathy. C6-7 Discitis with abscess. ARF. ESRD. Type 2 diabetes TX: C5-T1 Anterior Cervical Discectomy Fusion (ACDF) with C6-C7 Corepectomy 7/1/20 Clinical care includes: 
 AC vent support via ET Sedation via Precedex Fentanyl IV 
 BP management via clonidine & lopressor ABx Nutrition via TF Dialysis GI prophylaxis Diabetes: Patient has known Type 2 diabetes, treated with Basaglar PTA. Family history positive for diabetes in mother, daughter & sister. Subjective NA Objective Physical exam 
General Intubated & sedated. Responds to voice per nursing. Family at bedside Vital Signs Febrile. Sinus tachycardia Visit Vitals /64 Pulse (!) 137 Temp (!) 101 °F (38.3 °C) Resp 15 Ht 5' 8\" (1.727 m) Wt 72.8 kg (160 lb 7.9 oz) SpO2 99% BMI 24.40 kg/m² Laboratory Lab Results Component Value Date/Time Hemoglobin A1c 8.0 (H) 07/03/2020 05:33 AM  
 Hemoglobin A1c, External 10.5 01/09/2019 Lab Results Component Value Date/Time LDL, calculated 30 01/16/2019 08:48 AM  
 
Lab Results Component Value Date/Time Creatinine (POC) 5.8 (H) 10/17/2019 02:16 PM  
 Creatinine 5.05 (H) 07/09/2020 03:57 AM  
 
Lab Results Component Value Date/Time  Sodium 133 (L) 07/09/2020 03:57 AM  
 Potassium 3.9 07/09/2020 03:57 AM  
 Chloride 98 07/09/2020 03:57 AM  
 CO2 25 07/09/2020 03:57 AM  
 Anion gap 10 07/09/2020 03:57 AM  
 Glucose 205 (H) 07/09/2020 03:57 AM  
 BUN 40 (H) 07/09/2020 03:57 AM  
 Creatinine 5.05 (H) 07/09/2020 03:57 AM  
 BUN/Creatinine ratio 8 (L) 07/09/2020 03:57 AM  
 GFR est AA 14 (L) 07/09/2020 03:57 AM  
 GFR est non-AA 11 (L) 07/09/2020 03:57 AM  
 Calcium 9.8 07/09/2020 03:57 AM  
 Bilirubin, total 0.8 07/03/2020 05:33 AM  
 Alk. phosphatase 100 07/03/2020 05:33 AM  
 Protein, total 6.9 07/03/2020 05:33 AM  
 Albumin 2.2 (L) 07/03/2020 05:33 AM  
 Globulin 4.7 (H) 07/03/2020 05:33 AM  
 A-G Ratio 0.5 (L) 07/03/2020 05:33 AM  
 ALT (SGPT) 12 07/03/2020 05:33 AM  
 
Lab Results Component Value Date/Time ALT (SGPT) 12 07/03/2020 05:33 AM  
 
Factors affecting BG pattern Factor Dose Comments Nutrition: 
NPO Tube feeding via OG  
 
155 grams CHO/24 hrs At goal rate Infection Zosyn WBC 11.2 AKF on CKF HD Blood glucose pattern Assessment and Plan Nursing Diagnosis Risk for unstable blood glucose pattern Nursing Intervention Domain 5843 Decision-making Support Nursing Interventions Examined current inpatient diabetes control Explored factors facilitating and impeding inpatient management Evaluation This gentleman, with Type 2 diabetes, hasn't achieved inpatient blood glucose target of 100-180mg/dl. Lantus insulin started 7/8/20 at 15 units D along with corrective insulin. Would advance basal insulin dose to 20 units D today. Recommendations Recommend: 
 
Increasing Lantus insulin 20 units D Continuing corrective insulin Billing Code(s) I personally reviewed chart, notes, data and current medications in the medical record, and examined the patient at bedside before making care recommendations. [x] 57043 IP subsequent hospital care - 15 minutes [facility] Access via [address] [place] [address]

## 2020-07-09 NOTE — PROGRESS NOTES
Attempted follow up visit on referral from [address] [place] [address] Pkwy for this pt in ED [facility] CCU 2528. Pt was unavailable for visit and there were no family or friends present during this visit. Contact spiritual care services for any emotional or spiritual support needs. Jennifer Olmstead MDiv. Staff  Request  Support/Spiritual Care Services via [address] [place] [address]
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
## 2020-07-09 NOTE — PROGRESS NOTES
Care Management: 
 
Hospice consult sent to [address] [place] [address] via [facility]. Berenice Terry RN ACM [id number]

## 2020-07-09 NOTE — INTERDISCIPLINARY ROUNDS
Critical care interdisciplinary rounds held on 07/09/2020. Following members present, Pharmacy, Diabetes Treatment, Case Management, Respiratory Therapy, Physical Therapy and Nutrition. Led by Guille Goodwin RN and Dr. Aldo Dickson. Plan of care discussed. See clinical pathway for plan of care and interventions and desired outcomes.

## 2020-07-09 NOTE — HOSPICE
[facility] [employer] [facility] Good Help to Those in Need 
[phone] Inpatient Nursing Admission Patient Name: Jeanette Jimenez YOB: 1950 Age: 72 y.o. Date of Hospice Admission: 7/9/2020 Hospice Attending Elected by Patient: Marciano Silva MD 
Primary Care Physician: Allen Grijalva MD 
Admitting RN: Ny [facility] ([facility]/Routine/Respite): GIP [id number]/ HOSPICE SUMMARY  
ER Visits/ Hospitalizations in past year: 3 Hospice Diagnosis: Abscess in epidural space of cervical spine [G06.1] Onset Date of Hospice Diagnosis: 7/9/20 Summary of Disease Progression Leading to Hospice Diagnosis: Per Palliative NP Julia Madera Jeanette Jimenez is a 72 y.o. with a past history of CAD with stents, Type 2 DM, ESRD with HD on MWF, HTN, HARDIK uses CPAP, afib with RVR, who was admitted on 7/1/2020 from home with a diagnosis of osteomyelitis-discitis at C6-C7, sepsis, acute encephalopathy with new onset seizure, ESRD, malignant HTN. Current medical issues leading to Palliative Medicine involvement include: care decisions r/t respiratory failure, sepsis and encephalopathy. 
  
7/8 Patient remains intubate and sedated on ventilator. Fails all SBTs. Remains tachycardic. Prognosis poor per Dr. Ana Cristina Lopez and Dr. Juanito Sherman. 7/9: Patient compassionately extubated and family elected hospice services. Co-Morbidities:  
Patient Active Problem List  
Diagnosis Code  Uncontrolled type 2 diabetes mellitus with diabetic nephropathy, without long-term current use of insulin (HCC) E11.21, E11.65  
 Essential hypertension, benign I10  
 Hyperlipidemia LDL goal <70 E78.5  Iron deficiency anemia D50.9  SOB (shortness of breath) R06.02  
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  
 Acute respiratory failure (HCC) J96.00  Bilateral pneumonia J18.9  Pulmonary edema J81.1  Sepsis ([address] A41.9  VIVIEN (acute kidney injury) ([facility] N17.9  Orthostatic dizziness R42  Acute on chronic renal failure (HCC) N17.9, N18.9  Malignant hypertension I10  
 Seizure ([address] R56.9  Cervical discitis M46.42  
 Acute encephalopathy G93.40  Counseling regarding advance care planning and goals of care Z71.89  
 End of life care Z51.5  Abscess in epidural space of cervical spine G06.1 Diagnoses RELATED to the terminal prognosis: SOB Other Diagnoses: DM Rationale for a prognosis of life expectancy of 6 months or less if the disease follows its normal course (Disease Specific History):  
 
Gertrudis Cody is a 72 y.o. who was admitted to [facility]. The patient's principle diagnosis of abscess of cervical spine has resulted in current hospitalization, patient compassionately extubated in setting of renal failure . Functionally, the patient's Palliative Performance Scale has declined over a period of one week and is estimated at 20. Objective information that support this patients limited prognosis includes: Creatine 5.5, GFR of 11,osteomyelitis-discitis at C6-C7, sepsis, acute encephalopathy with new onset seizure, The patient/family chose comfort measures with the support of Hospice. Patient meets for GIP LOC as evidenced by pain, agitation and secretions Prognosis estimated based on 07/09/20 clinical assessment is:  
[] Few to Many Hours [x] Hours to Days  
[] Few to Many Days  
[] Days to Weeks  
[] Few to Many Weeks  
[] Weeks to Months  
[] Few to Many Months ASSESSMENT Patient self-reports:  []  Yes    [x] No 
 
SYMPTOMS: Secretions, pain and agitation SIGNS/PHYSICAL FINDINGS: Eyes open, non verbal, with neck brace, lung sounds rhonchi and labored, tachycardic with , elevated BP K[place]: 20 
 
 
 
 Learning Assessment: 
Patient Is patient willing/able to learn? no What is the highest level of education completed? Learning preference (written material, demonstration, visual)? Learning barriers (ESOL, [place]esh-[place], poor vision)? Caregiver Is caregiver willing to learn care for patient? yes What is the highest level of education completed? unknown Learning preference (written material, demonstration, visual)? unknown Learning barriers (ESOL, [place]esh-[place], poor vision)? CLINICAL INFORMATION Wt Readings from Last 3 Encounters:  
07/08/20 72.8 kg (160 lb 7.9 oz) 10/22/19 75.5 kg (166 lb 6.4 oz) 10/17/19 77.5 kg (170 lb 13.7 oz) Ht Readings from Last 3 Encounters:  
07/08/20 5' 8\" (1.727 m)  
10/22/19 5' 8\" (1.727 m)  
10/17/19 5' 8\" (1.727 m) There is no height or weight on file to calculate BMI. There were no vitals taken for this visit. LAB VALUES No results found for this visit on 07/09/20 (from the past 12 hour(s)). No results found for this visit on 07/09/20 (from the past 6 hour(s)). Lab Results Component Value Date/Time Protein, total 6.9 07/03/2020 05:33 AM  
 Albumin 2.2 (L) 07/03/2020 05:33 AM  
 
 
Currently this patient has: 
[x] Supplemental O2 [x] Peripheral IV  [] PICC    [] PORT  
[] Major Catheter [] NG Tube   [x] PEG Tube [] Ostomy   
[] AICD: Has ICD been deactivated? [] Yes [] No:______   HD acess PLAN 1. Admit GIP LOC for symptom management for pain agitation and secretions 2. Secretions- scheduled robinul q4hrs 3. Pain- scheduled dilaudid 1mg and prn 4. Agitation- scheduled ativan 1mg and prn Hospice Team Frequency Orders: 
Skilled Nurse -  Daily x 7 days /every other day x 7 days  with 5 PRN visits for symptom control. MSW  1 visit for initial assessment/evaluation for family support and need for volunteer services. Cherie James  1 visit for initial assessment/evaluation for spiritual support. ADVANCE CARE PLANNING (Complete in ACP Flow Sheet) Code Status: DNR Durable DNR: []  Yes  [x]  No 
Code Status Discussed/Confirmed: 
Preference for Other Life Sustaining Treatment Discussed/Confirmed: 
Hospitalization Preference: Patient would like to receive end of life care at [facility] Advance Care Planning 10/10/2019 Patient's Healthcare Decision Maker is: -  
Primary Decision Maker Name -  
Primary Decision Maker Phone Number -  
Primary Decision Maker Relationship to Patient -  
Confirm Advance Directive Yes, on file Does the patient have other document types -  Service: [] Yes  []  No      [x] Unknown Appropriate for Pinning Ceremony:  [] Yes     [] No 
Zoroastrian: Nondenominational  Home: [address] [place] [address] DISCHARGE PLANNING 1. Discharge Plan: Patient expected to  at [facility] but if stabilizes then discuss options with family 2. Patient/Family teaching: end of life care 3. Response to patient/family teaching: receptive SOCIAL/EMOTIONAL/SPIRITUAL NEEDS Spiritual Issues Identified:  available as needed Psych/ Social/ Emotional Issues Identified: LCSW to follow during admission Caregiver Support: 
[] Provided information on End of Life Care  
[] Material Provided: Gone From My Sight or [facility]  
 
CARE COORDINATION Dr. Wanda Forrester contacted, discharge to hospice order received Dr. Wanda Forrester contacted, agrees to serve as attending provider for hospice and provided verbal certification of terminal illness with life expectancy of 6 months or less. Orders for hospice admission, medications and plan of treatment received. Medication reconciliation completed. MEDS: See medication list below DME: Per hospital 
Supplies: Per hospital 
IDT communication to include MD, SN, SW, CH and support team 
 
ALLERGIES AND MEDICATIONS Allergies: No Known Allergies No current facility-administered medications for this encounter.

## 2020-07-10 NOTE — DIABETES MGMT
Signing off. Milagros Dickey DNP, RN, ACNS-BC, BC-ADM, CDE Clinical Nurse Specialist-Diabetes & Endocrine disorders [facility] (Ambulatory \"Specialty\" practice) [phone] (Fax)  [phone]

## 2020-07-10 NOTE — PROGRESS NOTES
Problem: Dyspnea Due to End of Life Goal: Demonstrate understanding of and ability to manage respiratory symptoms at end of life Outcome: Progressing Towards Goal

## 2020-07-10 NOTE — PROGRESS NOTES
Spiritual Care Assessment/Progress Note [address] 
 
 
NAME: Ginny Brown      MRN: [id number] AGE: 72 y.o. SEX: male Restoration Affiliation: [facility]  
Language: [place] 7/10/2020     Total Time (in minutes): 35  
 
Spiritual Assessment begun in [facility] through conversation with: 
  
    []Patient        [] Family    [x] Friend(s) Reason for Consult: End-of-life support Spiritual beliefs: (Please include comment if needed) [x] Identifies with a riccardo tradition:     
   [] Supported by a riccardo community:        
   [] Claims no spiritual orientation:       
   [] Seeking spiritual identity:            
   [] Adheres to an individual form of spirituality:       
   [] Not able to assess:                   
 
    
Identified resources for coping:  
   [] Prayer                           
   [] Music                  [] Guided Imagery [x] Family/friends                 [] Pet visits [] Devotional reading                         [] Unknown 
   [] Other:                                         
 
 
Interventions offered during this visit: (See comments for more details) Family/Friend(s): Affirmation of emotions/emotional suffering, Affirmation of riccardo, Catharsis/review of pertinent events in supportive environment, Coping skills reviewed/reinforced, End of life issues discussed, Iconic (affirming the presence of God/Higher Power), Normalization of emotional/spiritual concerns, Prayer (assurance of) Plan of Care: 
 
 [] Support spiritual and/or cultural needs  
 [] Support AMD and/or advance care planning process [x] Support grieving process 
 [] Coordinate Rites and/or Rituals  
 [] Coordination with community clergy [] No spiritual needs identified at this time 
 [] Detailed Plan of Care below (See Comments)  [] Make referral to Music Therapy 
[] Make referral to Pet Therapy    
[] Make referral to Addiction services [] Make referral to [facility] 
[] Make referral to Spiritual Care Partner 
[] No future visits requested       
[x] Follow up visits as needed At time of visit pt was resting peacefully and did not awake during visit. At bedside was his former wife, Bladimir Reid. She and the pt were  over [de-identified] years and have been  for a few years. However, they continue to live in the same home. Both have children from previous relationships and raised the blended family together. Gladys Jw has been his primary caregiver over the past few years and she spoke of her pain in seeing him suffer. Listened to her utterances of grief in the face of his loss. Andrez Khan of ongoing  support as needed. Chaplain Loreto, MDiv, MS, [facility] 
[phone])

## 2020-07-10 NOTE — PROGRESS NOTES
Problem: Comfort Deficit Goal: Reduce/control pain Description: Patient will report that pain has been reduced or controlled through verbal and nonverbal means and that measures to promote comfort are effective. Note: Pt with s/s of pain. Dilaudid scheduled with prn available Problem: Anxiety/Agitation Goal: Verbalize and demonstrate ability to manage anxiety Description: The patient/family/caregiver will verbalize and demonstrate ability to manage the patient's anxiety throughout hospice care. Note: Pt with s/s of agitation/restlessness. Scheduled lorazepam with prn available

## 2020-07-10 NOTE — HOSPICE
[facility] Good Help to Those in Need 
[phone] [facility] Daily Nursing Note Patient Name: Osito Paulson YOB: 1950 Age: 72 y.o. Date of Visit: 07/10/20 Facility of Care: [address] [place] [address] Patient Room: [facility]/ Hospice Attending: Ingrid Shaver MD 
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 Pain 2. Secretions 3. Seizure disorder 4. SOB 5. Agitation/restlessness 6. Decreased responsiveness ASSESSMENT & PLAN Must update Plan of Care including visit frequencies for IDT members 1. Pain: grimacing/moaning/tachycardic. Pt on scheduled dilaudid that was increased along with increase in prn dose 2. Secretions: LS clear. PRN robinul available 3. Seizure disorderpt has hx of seizures. PRN lorazepam 4mg q10 minutes available:  
4. SOB: RR 24/labored. Increase in scheduled dilaudid and prn with use of O2 at 2L/nc 5. Agitation/restlessness: no s/s of agitation this morning. Pt is on scheduled lorazepam and has prn available 6. Decreased responsiveness: pt requires frequent assessments by skilled medical staff for non-verbal cues of distress/discomfort Spiritual Interventions: Hospital and [address] available 24/7 Psych/ Social/ Emotional Interventions: Pt's ex-wife Betsy Terrell is at bedside and pt's son Kingsley Garcia stayed throughout the night. Pt's ex-wife is PETRA. Pt and ex-wife had been  for 30 years and  for only a few but still live together. Initial assessment will be completed by hospice SW 
 
Care Coordination Needs: communication with IDG and staff at [address] [place] [address] Care plan and New Orders discussed / approved with Cristofer Kamara MD. Description History and Chart Review Narrative History of last 24 hours that demonstrates care cannot be provided in another setting: 
 
Pt requiring frequent assessments by skilled medical staff with administration of IV medications along with adjustments in medication dosage. This care cannot be provided outside the inpatient setting. What has been done to control the patient's symptoms in the last 24 hours? Administration of scheduled and prn IV medications with titration Does the patient currently require IV medications? Yes Does the patient currently require scheduled medications? Yes Does the patient currently require a PCA? no 
 
List number of doses of PRN medications in last 24 hours: 
Medication 1: lorazepam 
Number of doses: 4 Medication 2: dilaudid Number of doses: 4 Medication 3:  
Number of doses: Supporting documentation for GIP need for pain control: 
[x] Frequent evaluation by a doctor, nurse practitioner, nurse  
[x] Frequent medication adjustment   
[x] IVs that cannot be administered at home  
[x] Aggressive pain management  
[x] Complicated technical delivery of medications Supporting documentation for GIP need for symptom control: 
[x]  Sudden decline necessitating intensive nursing intervention 
[]  Uncontrolled / intractable nausea or vomiting  
[x]  Pathological fractures 
[]  Advanced open wounds requiring frequent skilled care [x] Unmanageable respiratory distress 
[] New or worsening delirium  
[] Delirium with behavior issues: Is 24 hour caregiver present due to safety concerns with agitation? (yes/no) [] Imminent death  with skilled nursing needs documented above DISCHARGE PLANNING 1. Discharge Plan: Pt will more than likely  at [facility] but if stabilizes, family will take pt home. 2. Patient/Family teaching: EOL processes 3. Response to patient/family teaching: verbalized understanding ASSESSMENT   
KARNOFSKY: 10% Prognosis estimated based on 07/10/20 clinical assessment is:  
[] Few to Many Hours [x] Hours to Days  
[] Few to Many Days  
[] Days to Weeks  
[] Few to Many Weeks  
[] Weeks to Months  
[] Few to Many Months Quality Measure: Patient self-reports:  [] Yes    [x] No 
 
ESAS:  
Time of Assessment: 9812 Pain (1-10): 8 Fatigue (1-10): 8 Shortness of breath (1-10): 8 Nausea (1-10): Appetite (1-10): Anxiety: (1-10): Depression: (1-10): Well-being: (1-10): Constipation:  No 
LAST BM: 7/10/20 CLINICAL INFORMATION Patient Vitals for the past 12 hrs: 
 Temp Pulse Resp BP SpO2  
07/10/20 0950   26    
07/10/20 0800 (!) 101.3 °F (38.5 °C) (!) 132 20 151/83 94 % Currently this patient has: 
[x] Supplemental O2 [x] IV [x] LIJ     
[] PORT  
[] NG Tube   
[] PEG Tube  
[] Ostomy    
[x] Major draining _yellow__ urine 
[] Other:  
 
SIGNS/PHYSICAL FINDINGS Skin (including wound): 
[] Warm, dry, supple, intact and color normal for race [x] Warm  
[] Dry  
[] Cool    
[] Clammy      
[] Diaphoretic Turgor 
 [] Normal 
 [x] Decreased Color: 
 [] Pink 
 [] Pale 
 [] Cyanotic 
 [] Erythema 
 [] Jaundice [x] Normal for Race 
[x]  Wounds: 
 
Neuro: 
[] Lethargy 
[] Restlessness / agitation 
[] Confusion / delirium 
[] Hallucinations 
[] Responds to maximal stimulation 
[x] Decreased responsiveness 
[] Seizures Cardiac: 
[] Dyspnea on Exertion 
[] JVD [] Murmur 
[] Palpitations [] Hypotension 
[x] Hypertension 
[x] Tachycardia [] Bradycardia [x] Irregular HR 
[] Pulses Decreased 
[] Pulses Absent [x] Edema:  Right hand/lower extremity [] Mottling:      
 
Respiratory: 
Breath sounds:  
 [] Diminished 
 [] Wheeze 
 [] Rhonchi 
 [] Rales  
[] Even and unlabored 
[x] Labored:   24        
[] Cough 
 [] Non Productive 
 [] Productive 
  [] Description:          
[] Deep suctioned  
[x] O2 at _2__ LPM 
[] High flow oxygen greater than 10 LPM 
[] Bi-Pap GI [x] Abdomen (soft) [] Ascites 
[] Nausea 
[] Vomiting 
[] Incontinent of bowels [x] Bowel sounds (yes) [] Diarrhea 
[] Constipation (see above including last bowel movement) [] Checked for impaction 
[x] Last BM 7/10/20 Nutrition Diet:__NPO__ Appetite:  
[] Good  
[] Fair  
[] Poor  
[] Tube Feeding  
[] Voiding 
[] Incontinent [x] Major Musculoskeletal 
[] Balance/[place] Unsteady [x] Weak Strength:  
 [] Normal  
 [] Limited [x] Decreasing Activities:  
 [] Up as tolerated 
 [x] Bedridden  
 [] Specify: 
 
SAFETY [x] 24 hr. Caregiver [x] Side rails ? [x] Hospital bed  
[] Reviewed Falls & Safety ALLERGIES AND MEDICATIONS Allergies: No Known Allergies Current Facility-Administered Medications Medication Dose Route Frequency  HYDROmorphone (PF) (DILAUDID) injection 2 mg  2 mg IntraVENous Q4H  
 LORazepam (ATIVAN) injection 1.5 mg  1.5 mg IntraVENous Q4H  
 HYDROmorphone (PF) (DILAUDID) injection 2 mg  2 mg IntraVENous Q30MIN PRN  
 LORazepam (ATIVAN) injection 1.5 mg  1.5 mg IntraVENous Q30MIN PRN  
 glycopyrrolate (ROBINUL) injection 0.2 mg  0.2 mg IntraVENous Q4H PRN  
 LORazepam (ATIVAN) injection 4 mg  4 mg IntraVENous Q10MIN PRN  
 acetaminophen (TYLENOL) suppository 650 mg  650 mg Rectal Q4H PRN  
 sodium chloride (NS) flush 5-10 mL  5-10 mL IntraVENous PRN  
 bisacodyL (DULCOLAX) suppository 10 mg  10 mg Rectal DAILY PRN Visit Time In: 1442 Visit Time Out: 1000

## 2020-07-10 NOTE — PROGRESS NOTES
Oncology End of Shift Note Bedside shift change report given to Caridad RN (incoming nurse) by Jeanette Lenz (outgoing nurse[facility] [address] Report included the following information SBAR, Kardex and MAR. Shift Summary:  
Patient was transferred from ICU to this unit yesterday evening. Patient was restless and was having breakthrough pain, Dilaudid and Ativan were given intermittently along with the scheduled doses, see PRN MAR. Patient fecal pouch was changed and it had 700ml. Patient's family was present at the bedside throughout the night. Patient is on 2L of oxygen. Patient was given a CHG bath. Patient teaching and routine rounding has been done. Issues for Physician to Address:    
 
Patient on Cardiac Monitoring? [] Yes 
[x] No 
 
Rhythm:   
 
 
 
Shift Events Jeanette Lenz

## 2020-07-10 NOTE — H&P
[employer] [facility] [facility] Good Help to Those in Need 
[phone] Patient Name: Tc Bautista YOB: 1950 Date of Provider Hospice Visit: 07/10/20 Level of Care:   [x] General Inpatient (GIP)    [] Routine   [] Respite Current Location of Care: 
[] [facility] [] [facility] [x] [facility] [] [facility] [] Hospice [facility], patient referred from: 
[] [facility] [] [facility] [] [facility] [] [facility] [] Home [] Other:  
 
Date of Original Hospice Admission: 7/09/2020 Hospice Medical Director at time of admission: Nba Katz MD 
 
Principle Hospice Diagnosis: Cervical spine abscess Diagnoses RELATED to the terminal prognosis:  
Acute respiratory failure ESRD on HD last session on 7/8 (only partial session) Other Diagnoses: CAD s/p stents PAD 
PAF not on AC 
HTN 
DM 
HARDIK GERD 
BPH S/p left CEA Anemia Shellie Bautista is a 72y.o. year old who was admitted to [facility]. He has ESRD on HD, CAD, PAD, PAF and was admitted to [facility] on 7/1/2020 with AMS with seizure en route to hospital. He was intubated in the ER for airway protection. 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 He underwent C5 through T1 ACDF with partial C6 and C7 corpectomy on 7/2. He did not improve significantly with interventions. The family chose to discontinue dialysis, to complete compassionate extubation on 7/9, and to pursue comfort measures with the support of Hospice. He was admitted to [facility] LOC for pain, dyspnea, and agitation. Objective information:  
7/1/2020 CTA Head and Neck IMPRESSION:  
CTA Head: 1. No evidence of large vessel occlusion or flow-limiting stenosis. 2. Multiple small calcified nonocclusive emboli in the bilateral M2 and M3 
branches, some of which were present in 2018, and some of which are new since 2018. 3. Additional atherosclerotic disease as above. CTA Neck: 1. No evidence of flow-limiting stenosis. 2. Status post left carotid endarterectomy without evidence of significant 
recurrent stenosis. 3. Approximately 55% stenosis of the proximal right internal carotid artery by NASCET criteria. 4. Patent proximal small left subclavian artery stent. 5. Additional atherosclerotic disease as above. 6. Findings consistent with osteomyelitis-discitis at C6-C7 with erosive 
endplate changes, prevertebral and epidural phlegmon, resulting in severe spinal 
canal stenosis and cord compression at C6-C7. MRI of the cervical spine without 
and with contrast is recommended when the patient is able. 7. Partially visualized small right pleural effusion. Scattered nonspecific 
groundglass opacities in the bilateral upper lobes, left greater than right, 
which are favored to represent an infectious or inflammatory process, with 
differential including viral pneumonia. HOSPICE ASSESSMENT Active Symptoms and Issues: 
-Dyspnea and tachypnea 
-Generalized pain 
-Anxiety/agitation/restlessness 
-Secretions 
-Decreased responsiveness/Unresponsiveness PLAN 1. GIP level of care needed for persistent symptoms necessitating frequent skilled nursing assessment and administration of parenteral medications. Needs monitoring for need to titrate sx mgt regimen for optimization of comfort. Pt is at high risk of rapid decline and death due to terminal disease process. 2. Based on inadequate response to lower doses of dilaudid and prn use overnight, we have titrated dilaudid to 3 mg IV Q4H routinely and prn pain, air hunger 3. Based on inadequate response to lower dose of ativan and prn use overnight, we have titrated ativan to 1.5 mg IV Q prn anxiety, agitation, restlessness 4. Will utilize the above routine ativan as seizure ppx. Will have seizure dose of ativan available in case of seizure.  No known seizure since [facility] admission so far. 5. Glycopyrrolate prn to help prevent formation of new secretions. Recommend recovery positioning for drainage of current secretions. Gentle anterior suction of secretions okay (i.e. suction around lips/teeth). Recommend avoiding deep suction to prevent irritation, pain, bleeding. 6. Prn bisacodyl for constipation 7. Prn tylenol and/or toradol for fever. 6. Counseled family on nonverbal signs of pain and restlessness 9. Will monitor sxs and prn use for need to schedule/titrate medications further 10. D/c all previous medications which are not contributing to comfort at this time 11.  and SW to support family needs 12. Disposition: anticipate that pt will decline and die in the coming hours-days without stabilizing enough for care in the home setting 13. Hospice Plan of care was reviewed in detail and agree with current plan of care Prognosis estimated based on 07/10/20 clinical assessment is:  
[x] Hours to Days--discussed with ex-wife and son today   
[] Days to Weeks   
[] Other: 
 
Communicated plan of care with: Hospice Case Manager; Hospice IDT; Care Team 
 
 GOALS OF CARE Patient/Medical POA stated Goal of Care: optimize patient comfort [x] I have reviewed and/or updated ACP information in the Advance Care Planning Navigator. This information is available in the [address] link in the patient's chart header. Primary Medical Decision Maker:   Primary Decision Maker (Active): Kayla Eason - [phone] Resuscitation Status: DNR If DNR is there a Durable DNR on file? : [] Yes [x] No (If no, complete Durable DNR) HISTORY History obtained from: chart review, hospice liaison, bedside nurse, patient's ex-wife, patient's son CHIEF COMPLAINT: shortness of breath and tachypnea per nursing The patient is:  
[] Verbal 
[] Nonverbal 
[x] Unresponsive HPI/SUBJECTIVE:   
7/10: patient admitted afternoon of 7/9 for dyspnea, pain, agitation. Ex-wife at bedside this am. She reviews they have still lived together after divorce and that he has been declining over the last 4 months. Her goal is for his comfort. She does think his breathing has improved some after medication adjustments this morning. REVIEW OF SYSTEMS The following systems were: [] reviewed  [x] unable to be reviewed as pt is unresponsive Positive ROS include bold items: 
Constitutional: fatigue, weakness, in pain, short of breath Ears/nose/mouth/throat: increased airway secretions Respiratory:shortness of breath, wheezing Gastrointestinal:poor appetite, nausea, vomiting, abdominal pain, constipation, diarrhea Musculoskeletal:pain, deformities, swelling legs Neurologic:confusion, hallucinations, weakness Psychiatric:anxiety, feeling depressed, poor sleep Endocrine: increased thirst, increased hunger Adult Non-Verbal Pain Assessment Score: 1 Face [x] 0   No particular expression or smile 
[] 1   Occasional grimace, tearing, frowning, wrinkled forehead 
[] 2   Frequent grimace, tearing, frowning, wrinkled forehead Activity (movement) [x] 0   Lying quietly, normal position 
[] 1   Seeking attention through movement or slow, cautious movement 
[] 2   Restless, excessive activity and/or withdrawal reflexes Guarding 
[x] 0   Lying quietly, no positioning of hands over areas of body 
[] 1   Splinting areas of the body, tense 
[] 2   Rigid, stiff Physiology (vital signs) [x] 0   Stable vital signs [] 1   Change in any of the following: SBP > 20mm Hg; HR > 20/minute 
[] 2   Change in any of the following: SBP > 30mm Hg; HR > 25/minute Respiratory 
[] 0   Baseline RR/SpO2, compliant with ventilator 
[x] 1   RR > 10 above baseline, or 5% drop SpO2, mild asynchrony with ventilator 
[] 2   RR > 20 above baseline, or 10% drop SpO2, asynchrony with ventilator FUNCTIONAL ASSESSMENT Palliative Performance Scale (PPS): 10 
 
 
 PSYCHOSOCIAL/SPIRITUAL ASSESSMENT Active Problems: Abscess in epidural space of cervical spine (2020) Past Medical History:  
Diagnosis Date  AAA (abdominal aortic aneurysm) ([facility] [address] 34mm 2017  Anemia   
 gets shots from Dr. Fernanda Hoffman - last dose 2018  BPH (benign prostatic hypertrophy)  CAD (coronary artery disease) s/p stents, sees Dr. Mcgovern Leak  Cancer ([facility] [address] skin cancer on leg  Chronic kidney disease [facility]  End stage renal disease ([facility] [address] Dr. Fernanda Hoffman  GERD (gastroesophageal reflux disease)  Gout  Other and unspecified hyperlipidemia  PUD (peptic ulcer disease)  PVD (peripheral vascular disease) ([facility] [address]   
 s/p PTCA of left femoral artery in 2014  Sleep apnea CPAP  Type II or unspecified type diabetes mellitus without mention of complication, uncontrolled Dr. Shabana Liu  Unspecified essential hypertension  Unspecified vitamin D deficiency Past Surgical History:  
Procedure Laterality Date  CARDIAC SURG PROCEDURE UNLIST    
 stents  HX CATARACT REMOVAL    
 HX COLONOSCOPY    
 HX CORONARY STENT PLACEMENT    
 HX ENDOSCOPY    
 HX KNEE ARTHROSCOPY    
 right x2, left x1  
 HX OTHER SURGICAL    
 skin cancer removed from leg  IR INSERT NON TUNL CVC OVER 5 YRS  2019  IR INSERT NON TUNL CVC OVER 5 YRS  2020  IR INSERT TUNL CVC W/O PORT OVER 5 YR  2019  VASCULAR SURGERY PROCEDURE UNLIST    
 aorto-bifem bypass  VASCULAR SURGERY PROCEDURE UNLIST    
 left carotid endarterectomy  VASCULAR SURGERY PROCEDURE UNLIST    
 right femoral PTCA Social History Tobacco Use  Smoking status: Former Smoker Packs/day: 2.00 Years: 25.00 Pack years: 50.00 Last attempt to quit: 3/11/1991 Years since quittin.3  Smokeless tobacco: Never Used Substance Use Topics  Alcohol use: Yes   Comment: very occasional  
 
 Family History Problem Relation Age of Onset  Diabetes Mother  Heart Disease Mother  Heart Disease Maternal Grandmother  Diabetes Daughter   
     gestational  
[facility] Diabetes Sister  Stroke Sister  Cancer Father  Hypertension Father No Known Allergies Current Facility-Administered Medications Medication Dose Route Frequency  LORazepam (ATIVAN) injection 1.5 mg  1.5 mg IntraVENous Q4H  
 glycopyrrolate (ROBINUL) injection 0.2 mg  0.2 mg IntraVENous Q4H PRN  
 LORazepam (ATIVAN) injection 1.5 mg  1.5 mg IntraVENous Q15MIN PRN  
 HYDROmorphone (PF) (DILAUDID) injection 3 mg  3 mg IntraVENous Q4H  
 HYDROmorphone (PF) (DILAUDID) injection 3 mg  3 mg IntraVENous Q15MIN PRN  
 LORazepam (ATIVAN) injection 4 mg  4 mg IntraVENous Q10MIN PRN  
 acetaminophen (TYLENOL) suppository 650 mg  650 mg Rectal Q4H PRN  
 sodium chloride (NS) flush 5-10 mL  5-10 mL IntraVENous PRN  
 bisacodyL (DULCOLAX) suppository 10 mg  10 mg Rectal DAILY PRN  
 
 
 PHYSICAL EXAM  
 
Wt Readings from Last 3 Encounters:  
07/08/20 72.8 kg (160 lb 7.9 oz) 10/22/19 75.5 kg (166 lb 6.4 oz) 10/17/19 77.5 kg (170 lb 13.7 oz) Visit Vitals /83 (BP 1 Location: Right arm, BP Patient Position: At rest) Pulse (!) 132 Temp (!) 101.3 °F (38.5 °C) Resp 26 SpO2 94% Supplemental O2  [x] Yes  [] NO 
 
Currently this patient has: 
[x] Peripheral IV [] PICC  [] PORT [] ICD [x] Major Catheter--placed 7/10 [] NG Tube   [] PEG Tube   
[] Rectal Tube [] Drain 
[] Other:  
 
Constitutional: lying abed, C-collar in place, eyes open/awake Eyes: lids normal, no drainage ENMT: dry mm, no exudate, nares normal 
Cardiovascular: tachycardia, regular rhythm, peripheral pulses palpable Respiratory: agonal breaths upper 20s per minute with only mild secretions Gastrointestinal: hypoactive BS, soft, NT 
Musculoskeletal: no gross deformity or TTP other than c-collar in place Skin: warm, dry, no mottling Neurologic: does not respond to questions or stir to exam 
Psychiatric: lethargic, unresponsive Pertinent Lab and or Imaging Tests: 
Lab Results Component Value Date/Time Sodium 133 (L) 07/09/2020 03:57 AM  
 Potassium 3.9 07/09/2020 03:57 AM  
 Chloride 98 07/09/2020 03:57 AM  
 CO2 25 07/09/2020 03:57 AM  
 Anion gap 10 07/09/2020 03:57 AM  
 Glucose 205 (H) 07/09/2020 03:57 AM  
 BUN 40 (H) 07/09/2020 03:57 AM  
 Creatinine 5.05 (H) 07/09/2020 03:57 AM  
 BUN/Creatinine ratio 8 (L) 07/09/2020 03:57 AM  
 GFR est AA 14 (L) 07/09/2020 03:57 AM  
 GFR est non-AA 11 (L) 07/09/2020 03:57 AM  
 Calcium 9.8 07/09/2020 03:57 AM  
 
Lab Results Component Value Date/Time Protein, total 6.9 07/03/2020 05:33 AM  
 Albumin 2.2 (L) 07/03/2020 05:33 AM  
 
   
 
Total time:  
Counseling / coordination time:  
> 50% counseling / coordination?:  
 
Abril Mullen MD 
[facility]

## 2020-07-10 NOTE — PROGRESS NOTES
Bedside shift change report given to Denys Hitchcock (oncoming nurse) by Georges Naranjo (offgoing nurse). Report included the following information SBAR, Kardex, Intake/Output, MAR and Accordion.

## 2020-07-11 NOTE — PROGRESS NOTES
Bedside shift change report given to Felecia Doty RN (oncoming nurse) by Carmen Hemphill 
 (offgoing nurse). Report included the following information SBAR, Kardex and MAR.

## 2020-07-11 NOTE — PROGRESS NOTES
Bedside shift change report given to VIRGIL Dove (oncoming nurse) by Zamzam Diego RN (offgoing nurse). Report included the following information SBAR, Kardex, Intake/Output and Recent Results.

## 2020-07-11 NOTE — PROGRESS NOTES
Oncology End of Shift Note Bedside shift change report given to Robi Liang (incoming nurse) by Jose L Adams (outgoing nurse) on [address]. Report included the following information SBAR, Kardex, Intake/Output and MAR. Shift Summary: slept throughout the night, kept comfortable with scheduled and prn medications, son stayed at the bedside, turned twice Issues for Physician to Address:    
 
Patient on Cardiac Monitoring? [] Yes 
[x] No 
 
Rhythm:   
 
 
 
Shift Events Son asked if we could find out where is ID and insurance card are. I  Called security which states they do not have it. Pt's son stated that he already talked with [facility] and they do not have it.  ED charge nurse did not see it in any rooms but will check the lost and found and will contact me with updates.  
'

## 2020-07-11 NOTE — HOSPICE
[employer] [facility] Good Help to Those in Need 
[phone] GIP Daily Nursing Note Patient Name: Norma Maza YOB: 1950 Age: 72 y.o. Date of Visit: 07/11/20 Facility of Care: [facility] Patient Room: [facility] Attending: Farzana Rausch MD 
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 Patient with fever, very tachycardic, hypertensive, on 2 Liters of O2 via nasal cannula 2. Patient with grimace, neck collar on, eyes open 3. Patient warm to touch, small concentrated urine output in maldonado, dialysis has stopped. ASSESSMENT & PLAN Must update Plan of Care including visit frequencies for IDT members 1. Increase IV dilaudid to 4mg every 4 hours for pain, dyspnea, prn available every 15 minutes as needed for severity 2. Increase IV ativan to 2mg every 4 hours for anxiety, agitation, prn available every 15 minutes for increased symptoms 3. IV toradol 15mg every 6 hours as needed for fever, tylenol suppository available as needed. 4. Oral care as tolerated. 5. Support family as they are gathering to say their good byes Spiritual Interventions: Family states patient is a Hinduism man and would appreciate  services, notified family that hospital  is available 24/7 and to notify floor RN for need. Psych/ Social/ Emotional Interventions: Family coming to terms with patient transitioning. Offered support. Care Coordination Needs: Reviewed with Dr. Judd Worrell and family at bedside. Care plan and New Orders discussed / approved with Dr. Judd Worrell MD. Description History and Chart Review If this is initial GIP note must document RN assessment/MD communication in previous setting. Specifically document nursing/medication needs in last 24 hours to support GIP care Narrative History of last 24 hours that demonstrates care cannot be provided in another setting: Patient requiring IV medications,titration of medications, RN monitoring What has been done to control the patient's symptoms in the last 24 hours? Increased dosing of IV medications, medications added to treat fevers Does the patient currently require IV medications? yes Does the patient currently require scheduled medications? yes Does the patient currently require a PCA? no 
 
List number of doses of PRN medications in last 24 hours: 
Medication 1: toradol Number of doses:1 Medication 2: tylenol Number of doses: 2 Medication 3:  
Number of doses: Supporting documentation for GIP need for pain control: 
[x] Frequent evaluation by a doctor, nurse practitioner, nurse  
[x] Frequent medication adjustment   
[x] IVs that cannot be administered at home  
[] Aggressive pain management  
[] Complicated technical delivery of medications Supporting documentation for GIP need for symptom control: 
[x]  Sudden decline necessitating intensive nursing intervention 
[]  Uncontrolled / intractable nausea or vomiting  
[]  Pathological fractures 
[]  Advanced open wounds requiring frequent skilled care [x] Unmanageable respiratory distress 
[] New or worsening delirium  
[] Delirium with behavior issues: Is 24 hour caregiver present due to safety concerns with agitation? (yes/no) [] Imminent death  with skilled nursing needs documented above DISCHARGE PLANNING Daily discharge planning required for GIP 1. Discharge Plan: Patient appears imminent, will likely pass at [facility] 2. Patient/Family teaching: reviewed plan of care, end of life symptoms 3. Response to patient/family teaching: family is accepting ASSESSMENT   
KARNOFSKY: 20 Prognosis estimated based on 07/11/20 clinical assessment is:  
[x] Few to Many Hours [] Hours to Days  
[] Few to Many Days  
[] Days to Weeks  
[] Few to Many Weeks  
[] Weeks to Months  
[] Few to Many Months Quality Measure: Patient self-reports:  [] Yes    [x] No 
 
ESAS:  
Time of Assessment: 5077 Pain (1-10):9 Fatigue (1-10): 9 Shortness of breath (1-10):9 Nausea (1-10): 5 Appetite (1-10): Anxiety: (1-10): Depression: (1-10): Well-being: (1-10): Constipation: _ Yes  _x No 
LAST BM: 7/10/2020 CLINICAL INFORMATION Patient Vitals for the past 12 hrs: 
 Temp Pulse Resp BP SpO2  
07/11/20 0723 (!) 101.2 °F (38.4 °C) (!) 137 18 190/81 96 % Currently this patient has: 
[x] Supplemental O2  
[] IV [x] PICC Quad lumen IJ    
[] PORT  
[] NG Tube   
[] PEG Tube  
[] Ostomy    
[x] Major draining _amber______ urine 
[] Other:  
 
SIGNS/PHYSICAL FINDINGS Skin (including wound): 
[] Warm, dry, supple, intact and color normal for race [x] Warm  
[x] Dry  
[] Cool    
[] Clammy      
[] Diaphoretic Turgor 
 [] Normal 
 [x] Decreased Color: 
 [] Pink 
 [] Pale 
 [] Cyanotic 
 [] Erythema [x] Jaundice [] Normal for Race 
[x]  Wounds: neck Neuro: 
[] Lethargy 
[] Restlessness / agitation 
[] Confusion / delirium 
[] Hallucinations 
[] Responds to maximal stimulation 
[x] Unresponsive 
[] Seizures Cardiac: 
[x] Dyspnea on Exertion 
[] JVD [] Murmur 
[] Palpitations [] Hypotension 
[x] Hypertension 
[x] Tachycardia [] Bradycardia [x] Irregular HR 
[] Pulses Decreased 
[] Pulses Absent [x] Edema:   1+ bilateral feet non pitting     
[] Mottling:      
 
Respiratory: 
Breath sounds:  
 [x] Diminished 
 [] Wheeze [x] Rhonchi 
 [] Rales  
[] Even and unlabored 
[x] Labored:  24         
[] Cough 
 [] Non Productive 
 [] Productive 
  [] Description:          
[] Deep suctioned  
[x] O2 at 2_ LPM 
[] High flow oxygen greater than 10 LPM 
[] Bi-Pap GI [x] Abdomen (describe) soft, hyperactive bowel sounds 
[] Ascites 
[] Nausea 
[] Vomiting 
[x] Incontinent of bowels [x] Bowel sounds (yes/no) [] Diarrhea 
[] Constipation (see above including last bowel movement) [] Checked for impaction 
[x] Last BM 07/10/2020 Nutrition Diet:__NPO________ Appetite:  
[] Good  
[] Fair  
[] Poor  
[] Tube Feeding  
[] Voiding 
[] Incontinent [x] Major Musculoskeletal 
[] Balance/[place] Unsteady [x] Weak Strength:  
 [] Normal  
 [] Limited [x] Decreasing Activities:  
 [] Up as tolerated 
 [x] Bedridden  
 [] Specify: 
 
SAFETY [] 24 hr. Caregiver [x] Side rails ? [x] Hospital bed  
[] Reviewed Falls & Safety ALLERGIES AND MEDICATIONS Allergies: No Known Allergies Current Facility-Administered Medications Medication Dose Route Frequency  ketorolac (TORADOL) injection 15 mg  15 mg IntraVENous Q6H PRN  
 LORazepam (ATIVAN) injection 1.5 mg  1.5 mg IntraVENous Q4H  
 glycopyrrolate (ROBINUL) injection 0.2 mg  0.2 mg IntraVENous Q4H PRN  
 LORazepam (ATIVAN) injection 1.5 mg  1.5 mg IntraVENous Q15MIN PRN  
 HYDROmorphone (PF) (DILAUDID) injection 3 mg  3 mg IntraVENous Q4H  
 HYDROmorphone (PF) (DILAUDID) injection 3 mg  3 mg IntraVENous Q15MIN PRN  
 LORazepam (ATIVAN) injection 4 mg  4 mg IntraVENous Q10MIN PRN  
 acetaminophen (TYLENOL) suppository 650 mg  650 mg Rectal Q4H PRN  
 sodium chloride (NS) flush 5-10 mL  5-10 mL IntraVENous PRN  
 bisacodyL (DULCOLAX) suppository 10 mg  10 mg Rectal DAILY PRN Visit Time In: 1000 Visit Time Out: 4065

## 2020-07-12 NOTE — PROGRESS NOTES
Bedside shift change report given to VIRGIL Dove  (oncoming nurse) by [facility], RN (offgoing nurse). Report included the following information SBAR, Kardex and MAR.

## 2020-07-12 NOTE — PROGRESS NOTES
[facility] Good Help to Those in Need 
[phone] Patient Name: Belkis Lainez YOB: 1950 Date of Provider Hospice Visit: 07/12/20 Level of Care:   [x] General Inpatient (GIP)    [] Routine   [] Respite Current Location of Care: 
[] [address] [place] [address] [] [facility] [address] [facility] [] [address] [facility] [] Hospice House THE [facility]) IF [facility], patient referred from: 
[] [address] [place] [address] [] [facility] [] [address] [facility] [] [address] [place] [] Home [] Other:  
 
Date of Original Hospice Admission: 7/09/2020 Hospice Medical Director at time of admission: Carlos Walker MD 
 
Principle Hospice Diagnosis: Cervical spine abscess Diagnoses RELATED to the terminal prognosis:  
Acute respiratory failure ESRD on HD last session on 7/8 (only partial session) Other Diagnoses: CAD s/p stents PAD 
PAF not on AC 
HTN 
DM 
HARDIK GERD 
BPH S/p left CEA Anemia Dell Lainez is a 72y.o. year old who was admitted to [facility]. He has ESRD on HD, CAD, PAD, PAF and was admitted to [address] [place] [address] on 7/1/2020 with AMS with seizure en route to hospital. He was intubated in the ER for airway protection. 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 He underwent C5 through T1 ACDF with partial C6 and C7 corpectomy on 7/2. He did not improve significantly with interventions. The family chose to discontinue dialysis, to complete compassionate extubation on 7/9, and to pursue comfort measures with the support of Hospice. He was admitted to [facility] for pain, dyspnea, and agitation. Objective information:  
7/1/2020 CTA Head and Neck IMPRESSION:  
CTA Head: 1. No evidence of large vessel occlusion or flow-limiting stenosis. 2. Multiple small calcified nonocclusive emboli in the bilateral M2 and M3 
branches, some of which were present in 2018, and some of which are new since 2018. 3. Additional atherosclerotic disease as above. CTA Neck: 1. No evidence of flow-limiting stenosis. 2. Status post left carotid endarterectomy without evidence of significant 
recurrent stenosis. 3. Approximately 55% stenosis of the proximal right internal carotid artery by NASCET criteria. 4. Patent proximal small left subclavian artery stent. 5. Additional atherosclerotic disease as above. 6. Findings consistent with osteomyelitis-discitis at C6-C7 with erosive 
endplate changes, prevertebral and epidural phlegmon, resulting in severe spinal 
canal stenosis and cord compression at C6-C7. MRI of the cervical spine without 
and with contrast is recommended when the patient is able. 7. Partially visualized small right pleural effusion. Scattered nonspecific 
groundglass opacities in the bilateral upper lobes, left greater than right, 
which are favored to represent an infectious or inflammatory process, with 
differential including viral pneumonia. HOSPICE ASSESSMENT Active Symptoms and Issues: 
-Dyspnea and tachypnea 
-Generalized pain 
-Anxiety/agitation/restlessness 
-Secretions 
-Decreased responsiveness/Unresponsiveness PLAN 1. [facility] level of care needed for persistent symptoms necessitating frequent skilled nursing assessment and administration of parenteral medications. Needs monitoring for need to titrate sx mgt regimen for optimization of comfort. Pt is at high risk of rapid decline and death due to terminal disease process. 2.  Respiratory distress : I have adjusted dilaudid to 4 mg I/V every 3 hrs , continue with dilaudid prn (0 used in last 24 hrs ). 3.  I have  titrated ativan to 2 mg IV Q  4 hrs scheduled, for anxiety, agitation, restlessness. 4. We  have seizure dose of ativan available in case of seizure. No known seizure since [facility] admission so far. 5. Glycopyrrolate prn to help prevent formation of new secretions. Recommend recovery positioning for drainage of current secretions. Gentle anterior suction of secretions okay (i.e. suction around lips/teeth). Recommend avoiding deep suction to prevent irritation, pain, bleeding. 6. Prn bisacodyl for constipation 7. Fever : not responding to  tylenol , added toradol 15 mg I/V q 6 hrs prn. 
8. Counseled family on nonverbal signs of pain and restlessness 9. Will monitor sxs and prn use for need to schedule/titrate medications further. 10. Psychosocial support : offered to ex wife and son at bed side , they note patient was earlier having difficulty breathing , now comfortable since adjustment of medication this morning . Emotional support offered. 6.  and SW to support family needs 12. Disposition: anticipate that pt will decline and die in the coming hours-days without stabilizing enough for care in the home setting 13. Hospice Plan of care was reviewed in detail and agree with current plan of care Prognosis estimated based on 07/12/20 clinical assessment is:  
[x] Hours to Days--discussed with ex-wife and son today   
[] Days to Weeks   
[] Other: 
 
Communicated plan of care with: Hospice Case Manager; Hospice IDT; Care Team 
 
 GOALS OF CARE Patient/Medical POA stated Goal of Care: optimize patient comfort [x] I have reviewed and/or updated ACP information in the Advance Care Planning Navigator. This information is available in the [address] [place] [address] Drive link in the patient's chart header. Primary Medical Decision Maker:   Primary Decision Maker (Active): aSde Davidson Son - [phone] Resuscitation Status: DNR If DNR is there a Durable DNR on file? : [] Yes [x] No (If no, complete Durable DNR) HISTORY History obtained from: chart review, hospice liaison, bedside nurse, patient's ex-wife, patient's son CHIEF COMPLAINT: shortness of breath and tachypnea per nursing The patient is:  
[] Verbal 
[] Nonverbal 
[x] Unresponsive HPI/SUBJECTIVE:   
7/10: patient admitted afternoon of 7/9 for dyspnea, pain, agitation. Ex-wife at bedside this am. She reviews they have still lived together after divorce and that he has been declining over the last 4 months. Her goal is for his comfort. She does think his breathing has improved some after medication adjustments this morning. 7/12/20: Ex wife at bed side, earlier patient was in respiratory distress,  patient is comfortable , after adjustment of medication . REVIEW OF SYSTEMS The following systems were: [] reviewed  [x] unable to be reviewed as pt is unresponsive Positive ROS include bold items: 
Constitutional: fatigue, weakness, in pain, short of breath Ears/nose/mouth/throat: increased airway secretions Respiratory:shortness of breath, wheezing Gastrointestinal:poor appetite, nausea, vomiting, abdominal pain, constipation, diarrhea Musculoskeletal:pain, deformities, swelling legs Neurologic:confusion, hallucinations, weakness Psychiatric:anxiety, feeling depressed, poor sleep Endocrine: increased thirst, increased hunger Adult Non-Verbal Pain Assessment Score: 1 Face [x] 0   No particular expression or smile 
[] 1   Occasional grimace, tearing, frowning, wrinkled forehead 
[] 2   Frequent grimace, tearing, frowning, wrinkled forehead Activity (movement) [x] 0   Lying quietly, normal position 
[] 1   Seeking attention through movement or slow, cautious movement 
[] 2   Restless, excessive activity and/or withdrawal reflexes Guarding 
[x] 0   Lying quietly, no positioning of hands over areas of body 
[] 1   Splinting areas of the body, tense 
[] 2   Rigid, stiff Physiology (vital signs) [x] 0   Stable vital signs [] 1   Change in any of the following: SBP > 20mm Hg; HR > 20/minute 
[] 2   Change in any of the following: SBP > 30mm Hg; HR > 25/minute Respiratory 
[] 0   Baseline RR/SpO2, compliant with ventilator [x] 1   RR > 10 above baseline, or 5% drop SpO2, mild asynchrony with ventilator 
[] 2   RR > 20 above baseline, or 10% drop SpO2, asynchrony with ventilator FUNCTIONAL ASSESSMENT Palliative Performance Scale (PPS): 10 PSYCHOSOCIAL/SPIRITUAL ASSESSMENT Active Problems: Abscess in epidural space of cervical spine (7/9/2020) Past Medical History:  
Diagnosis Date  AAA (abdominal aortic aneurysm) ([facility] 34mm 2/2017  Anemia   
 gets shots from Dr. Regi Isaacs - last dose 8/31/2018  BPH (benign prostatic hypertrophy)  CAD (coronary artery disease) s/p stents, sees Dr. Adama Hong  [facility] cancer on leg  Chronic kidney disease [facility]  End stage renal disease ([facility] Dr. Regi Isaacs  GERD (gastroesophageal reflux disease)  Gout  Other and unspecified hyperlipidemia  PUD (peptic ulcer disease)  PVD (peripheral vascular disease) ([facility]   
 s/p PTCA of left femoral artery in July 2014  Sleep apnea CPAP  Type II or unspecified type diabetes mellitus without mention of complication, uncontrolled Dr. Magdy Schultz  Unspecified essential hypertension  Unspecified vitamin D deficiency Past Surgical History:  
Procedure Laterality Date  CARDIAC SURG PROCEDURE UNLIST    
 stents  HX CATARACT REMOVAL    
 HX COLONOSCOPY    
 HX CORONARY STENT PLACEMENT    
 HX ENDOSCOPY    
 HX KNEE ARTHROSCOPY    
 right x2, left x1  
 HX OTHER SURGICAL    
 skin cancer removed from leg  IR INSERT NON TUNL CVC OVER 5 YRS  9/11/2019  IR INSERT NON TUNL CVC OVER 5 YRS  7/6/2020  IR INSERT TUNL CVC W/O PORT OVER 5 YR  9/16/2019  VASCULAR SURGERY PROCEDURE UNLIST    
 aorto-bifem bypass  VASCULAR SURGERY PROCEDURE UNLIST    
 left carotid endarterectomy  VASCULAR SURGERY PROCEDURE UNLIST    
 right femoral PTCA Social History Tobacco Use  Smoking status: Former Smoker Packs/day: 2.00 Years: 25.00 Pack years: 50.00 Last attempt to quit: 3/11/1991 Years since quittin.3  Smokeless tobacco: Never Used Substance Use Topics  Alcohol use: Yes Comment: very occasional  
 
Family History Problem Relation Age of Onset  Diabetes Mother  Heart Disease Mother  Heart Disease Maternal Grandmother  Diabetes Daughter   
     gestational  
Tanner Carvalhoo Diabetes Sister  Stroke Sister  Cancer Father  Hypertension Father No Known Allergies Current Facility-Administered Medications Medication Dose Route Frequency  HYDROmorphone (PF) (DILAUDID) injection 4 mg  4 mg IntraVENous Q3H  
 ketorolac (TORADOL) injection 15 mg  15 mg IntraVENous Q6H PRN  
 LORazepam (ATIVAN) injection 2 mg  2 mg IntraVENous Q4H  
 HYDROmorphone (PF) (DILAUDID) injection 4 mg  4 mg IntraVENous Q15MIN PRN  
 LORazepam (ATIVAN) injection 2 mg  2 mg IntraVENous Q15MIN PRN  
 glycopyrrolate (ROBINUL) injection 0.2 mg  0.2 mg IntraVENous Q4H PRN  
 LORazepam (ATIVAN) injection 4 mg  4 mg IntraVENous Q10MIN PRN  
 acetaminophen (TYLENOL) suppository 650 mg  650 mg Rectal Q4H PRN  
 sodium chloride (NS) flush 5-10 mL  5-10 mL IntraVENous PRN  
 bisacodyL (DULCOLAX) suppository 10 mg  10 mg Rectal DAILY PRN  
 
 
 PHYSICAL EXAM  
 
Wt Readings from Last 3 Encounters:  
20 160 lb 7.9 oz (72.8 kg) 10/22/19 166 lb 6.4 oz (75.5 kg) 10/17/19 170 lb 13.7 oz (77.5 kg) Visit Vitals /60 Pulse (!) 141 Temp (!) 101.2 °F (38.4 °C) Resp 18 SpO2 98% Supplemental O2  [x] Yes  [] NO 
 
Currently this patient has: 
[x] Peripheral IV [] PICC  [] PORT [] ICD [x] Major Catheter--placed 7/10 [] NG Tube   [] PEG Tube   
[] Rectal Tube [] Drain 
[] Other:  
 
Constitutional: lying abed, C-collar in place, unresponsive. Eyes: lids normal, no drainage ENMT: dry mm, no exudate, nares normal 
 Cardiovascular: tachycardia, regular rhythm, peripheral pulses palpable Respiratory: normal breathing pattern,  with only mild secretions Gastrointestinal: hypoactive BS, soft, NT 
Musculoskeletal: no gross deformity or TTP other than c-collar in place Skin: warm, dry, no mottling Neurologic: unresponsive, does not respond to verbal stimuli. Psychiatric: unable to evaluate because of patient condition . Pertinent Lab and or Imaging Tests: 
Lab Results Component Value Date/Time Sodium 133 (L) 07/09/2020 03:57 AM  
 Potassium 3.9 07/09/2020 03:57 AM  
 Chloride 98 07/09/2020 03:57 AM  
 CO2 25 07/09/2020 03:57 AM  
 Anion gap 10 07/09/2020 03:57 AM  
 Glucose 205 (H) 07/09/2020 03:57 AM  
 BUN 40 (H) 07/09/2020 03:57 AM  
 Creatinine 5.05 (H) 07/09/2020 03:57 AM  
 BUN/Creatinine ratio 8 (L) 07/09/2020 03:57 AM  
 GFR est AA 14 (L) 07/09/2020 03:57 AM  
 GFR est non-AA 11 (L) 07/09/2020 03:57 AM  
 Calcium 9.8 07/09/2020 03:57 AM  
 
Lab Results Component Value Date/Time Protein, total 6.9 07/03/2020 05:33 AM  
 Albumin 2.2 (L) 07/03/2020 05:33 AM  
 
   
 
Total time:  
Counseling / coordination time:  
> 50% counseling / coordination?:  
 
Johanne Gil MD 
[facility]

## 2020-07-12 NOTE — HOSPICE
[facility] Help to Those in Need 
[phone] GIP Daily Nursing Note Patient Name: Jeanette Jimenez YOB: 1950 Age: 72 y.o. Date of Visit: 07/12/20 Facility of Care: [facility] Patient Room: [facility]ice Attending: Marciano Silva MD 
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 Recurrent fevers, sinus tachycardia and hypertension. 2. 
3.  
 
 
 
ASSESSMENT & PLAN Must update Plan of Care including visit frequencies for IDT members 1.  *** Spiritual Interventions: *** Psych/ Social/ Emotional Interventions: *** Care Coordination Needs: *** Care plan and New Orders discussed / approved with *** MD. Description History and Chart Review If this is initial GIP note must document RN assessment/MD communication in previous setting. Specifically document nursing/medication needs in last 24 hours to support GIP care Narrative History of last 24 hours that demonstrates care cannot be provided in another setting: 
*** What has been done to control the patient's symptoms in the last 24 hours? *** Does the patient currently require IV medications? *** Does the patient currently require scheduled medications? *** Does the patient currently require a PCA? *** List number of doses of PRN medications in last 24 hours: 
Medication 1: 
Number of doses: 
 
Medication 2:  
Number of doses: 
 
Medication 3:  
Number of doses: Supporting documentation for GIP need for pain control: 
[] Frequent evaluation by a doctor, nurse practitioner, nurse  
[] Frequent medication adjustment   
[] IVs that cannot be administered at home  
[] Aggressive pain management  
[] Complicated technical delivery of medications Supporting documentation for GIP need for symptom control: 
[]  Sudden decline necessitating intensive nursing intervention []  Uncontrolled / intractable nausea or vomiting  
[]  Pathological fractures 
[]  Advanced open wounds requiring frequent skilled care 
[] Unmanageable respiratory distress 
[] New or worsening delirium  
[] Delirium with behavior issues: Is 24 hour caregiver present due to safety concerns with agitation? (yes/no) [] Imminent death  with skilled nursing needs documented above DISCHARGE PLANNING Daily discharge planning required for GIP 1. Discharge Plan: *** 
2. Patient/Family teaching: *** 3. Response to patient/family teaching: *** ASSESSMENT   
KARNOFSKY: *** Prognosis estimated based on 07/12/20 clinical assessment is:  
[] Few to Many Hours [] Hours to Days  
[] Few to Many Days  
[] Days to Weeks  
[] Few to Many Weeks  
[] Weeks to Months  
[] Few to Many Months Quality Measure: Patient self-reports:  [] Yes    [] No 
 
ESAS:  
Time of Assessment: *** Pain (1-10):*** Fatigue (1-10): *** Shortness of breath (1-10):*** 
Nausea (1-10): *** Appetite (1-10): Anxiety: (1-10): Depression: (1-10): Well-being: (1-10): Constipation: _ Yes  _ No 
LAST BM: *** CLINICAL INFORMATION No data found. Currently this patient has: 
[] Supplemental O2  
[] IV   
[] PICC [] PORT  
[] NG Tube   
[] PEG Tube  
[] Ostomy    
[] Major draining _______ urine 
[] Other:  
 
SIGNS/PHYSICAL FINDINGS Skin (including wound): 
[] Warm, dry, supple, intact and color normal for race 
[] Warm  
[] Dry  
[] Cool    
[] Clammy      
[] Diaphoretic Turgor 
 [] Normal 
 [] Decreased Color: 
 [] Pink 
 [] Pale 
 [] Cyanotic 
 [] Erythema 
 [] Jaundice [] Normal for Race 
[]  Wounds: 
 
Neuro: 
[] Lethargy 
[] Restlessness / agitation 
[] Confusion / delirium 
[] Hallucinations 
[] Responds to maximal stimulation 
[] Unresponsive 
[] Seizures Cardiac: 
[] Dyspnea on Exertion 
[] JVD [] Murmur 
[] Palpitations [] Hypotension 
[] Hypertension 
[] Tachycardia [] Bradycardia 
[] Irregular HR 
 [] Pulses Decreased 
[] Pulses Absent 
[] Edema:       (Location, Grade and Pitting) [] Mottling:      (Location) Respiratory: 
Breath sounds:  
 [] Diminished 
 [] Wheeze 
 [] Rhonchi 
 [] Rales  
[] Even and unlabored 
[] Labored:           
[] Cough 
 [] Non Productive 
 [] Productive 
  [] Description:          
[] Deep suctioned  
[] O2 at ___ LPM 
[] High flow oxygen greater than 10 LPM 
[] Bi-Pap GI 
[] Abdomen (describe) [] Ascites 
[] Nausea 
[] Vomiting 
[] Incontinent of bowels 
[] Bowel sounds (yes/no) [] Diarrhea 
[] Constipation (see above including last bowel movement) [] Checked for impaction 
[] Last BM *** Nutrition Diet:__________ Appetite:  
[] Good  
[] Fair  
[] Poor  
[] Tube Feeding  
[] Voiding 
[] Incontinent  
[] Major Musculoskeletal 
[] Balance/[place] Unsteady  
[] Weak Strength:  
 [] Normal  
 [] Limited  
 [] Decreasing Activities:  
 [] Up as tolerated 
 [] Bedridden  
 [] Specify: 
 
SAFETY [] 24 hr. Caregiver  
[] Side rails ? [] Hospital bed  
[] Reviewed Falls & Safety ALLERGIES AND MEDICATIONS Allergies: No Known Allergies Current Facility-Administered Medications Medication Dose Route Frequency  HYDROmorphone (PF) (DILAUDID) injection 4 mg  4 mg IntraVENous Q3H  
 ketorolac (TORADOL) injection 15 mg  15 mg IntraVENous Q6H PRN  
 LORazepam (ATIVAN) injection 2 mg  2 mg IntraVENous Q4H  
 HYDROmorphone (PF) (DILAUDID) injection 4 mg  4 mg IntraVENous Q15MIN PRN  
 LORazepam (ATIVAN) injection 2 mg  2 mg IntraVENous Q15MIN PRN  
 glycopyrrolate (ROBINUL) injection 0.2 mg  0.2 mg IntraVENous Q4H PRN  
 LORazepam (ATIVAN) injection 4 mg  4 mg IntraVENous Q10MIN PRN  
 acetaminophen (TYLENOL) suppository 650 mg  650 mg Rectal Q4H PRN  
 sodium chloride (NS) flush 5-10 mL  5-10 mL IntraVENous PRN  
 bisacodyL (DULCOLAX) suppository 10 mg  10 mg Rectal DAILY PRN Visit Time In: *** Visit Time Out: ***

## 2020-07-12 NOTE — PROGRESS NOTES
Bedside shift change report given to Melissa Leavitt RN (oncoming nurse) by Elle Kearney  (offgoing nurse). Report included the following information SBAR, Kardex and MAR.

## 2020-07-12 NOTE — PROGRESS NOTES
Oncology End of Shift Note Bedside shift change report given to Lili Belcher (incoming nurse) by Jorge Montero (outgoing nurse) on [address]. Report included the following information SBAR, Kardex, Intake/Output and MAR. Shift Summary: kept comfortable throughout the night with scheduled meds, son by bedside, step daughter came to visit Issues for Physician to Address:    
 
Patient on Cardiac Monitoring? [] Yes 
[x] No 
 
Rhythm:   
 
 
 
Shift Events Jorge Montero

## 2020-07-12 NOTE — HOSPICE
[facility] Good Help to Those in Need 
[phone] Social Work Admission Note Patient Name: Constantino Cleveland YOB: 1950 Age: 72 y.o. Date of Visit: 07/12/20 Facility of Care: [facility] Patient Room: [facility]/01 Hospice Attending: Robel Carbone MD 
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  
 [x]  GIP []  Respite 
 []  Routine NARRATIVE  
LCSW visited pt and pts significant other, Jair Shea. Pt was lethargic and appeared to be in the active stage of dying. Pt had no reports of pain and appeared comfortable. Pts wife reported pt had some labored breathing this morning which concerned her and has discussed with the hospice RN and physician. Jair Shea stated she was coping ok and only wanted pt to be comfortable at this time. Jair Shea stated her and pt had been together for 40 years but are . Despite being  she reported they have \"stuck together\". Jair Shea reported both pt and her have children from previous marriages. Jair Shea reported she had been caring for pt at home throughout his disease process and it has been very hard. LCSW provided supportive counseling. Pts significant other reported good support from friends and one was present during visit. Jair Shea reported no other needs at this time. LCSW provided information on hospice SW role and encouragement for her to call with any needs/concerns. LCSW will continue to monitor and assess needs. ADVANCE CARE PLANNING Code Status: DDNR Durable DNR: Dary Dumont  _ No 
Advance Care Planning 10/10/2019 Patient's Healthcare Decision Maker is: -  
Primary Decision Maker Name -  
Primary Decision Maker Phone Number -  
Primary Decision Maker Relationship to Patient -  
Confirm Advance Directive Yes, on file Does the patient have other document types - Relationship Status: 
[]  Single    
[]       
[x]     
[]  Domestic Partner    
[]  / []  Common Law 
[]   
[]  Unknown If in a relationship, name of partner/spouse: Keven Fierro Duration of relationship: They have been together for 40 yrs. They are  but have \"stuck together\" Muslim: Protestant  Home: Unknown at this time. Resources Provided: Hospice resource guide provided at admission. Social Work Initial Assessment Gender: 
male Race/Ethnicity: (griselda all that apply) []  [place] [facility] or [place] Native 
[]  [place] 
[]  [place] or [place] 
[]   or  
[]  Native [place] or [place] 
[]  [place]haskar Kor[place] [x]  Unknown 
  
 Service:   
[]  Yes  
[x]  No      
[]  Unknown Appropriate for Pinning Ceremony:  
[]  Yes     
[x]  No 
Is patient using VA benefits? []  Yes     
[]  No 
  
Primary Language: English 
[]   Needed 
[]   utilized during visit Ability to express thoughts/needs/feelings 
[]  Expressed thoughts/feelings/needs without difficulty 
[]  Requires extra time and cuing 
[]  Speech limited single words 
[]  Uses only gestures (eye, blinking eye or head movement/pointing) []  Unable to express thoughts/feelings/needs (speech unintelligible or inappropriate) [x]  Unresponsive Notes:  Pt appears to be actively dying. 
  
Mental Status: 
[]  Alert-oriented to:   
 []  Person   
 []  Place   
 []  Time 
[x]  Comatose-responds to:  
 []   Verbal stimuli  
 []  Tactile stimuli  
 []  Painful stimuli 
[]  Forgetful 
[]  Disoriented/Confused 
[x]  Lethargic 
[]  Agitated 
[]  Other (specify):   
Notes:  Pt appears to be in the active stage of dying. 
  
Patients description of Illness/Current Health Status:   
[x]  Patient unable to discuss 
[]  Patient unwilling to discuss 
[]  (Specify) Knowledge/Understanding of Disease Process Patient:  
 []  Demonstrates knowledge/understanding of disease process 
 []  Demonstrates knowledge/understanding of treatment plan []  Demonstrates knowledge/understanding of prognosis []  Demonstrates acceptance of prognosis []  Demonstrates knowledge/understanding of resuscitation status 
 []  Other (specify) Caregiver: 
 [x]  Demonstrates knowledge/understanding of disease process [x]  Demonstrates knowledge/understanding of treatment plan 
 [x]  Demonstrates knowledge/understanding of prognosis [x]  Demonstrates acceptance of prognosis [x]  Demonstrates knowledge/understanding of resuscitation status 
 []  Other (specify) Notes: Pt appears actively dying. Pts significant other appears accepting and to only want pt to have comfort care at this time. 
  
Patients living arrangement/care setting: 
Use the [facility] when the PATIENTS current health status necessitated a change in his/her primary residence. Prior Current Response [x]             []    Patients own home/residence []             []    Home of family member/friend []             []    Boarding home  
           []             []    Assisted living facility/penitentiary center []             []    Hospital/Acute care facility []             []    Skilled nursing facility []             []    Long term care facility/Nursing home  
           []             [x]    Hospice in Patient Primary Caregiver: 
[]  No Primary Caregiver Name of Primary Caregiver: JADON at [facility] Relationship or Primary Caregiver:  
 []  Spouse/Significant other     
 []  Natural Child      
 []  Step child     
 []  Sibling 
 []  Parent 
 []  Friend/Neighbor 
 []  Community/Caodaism Volunteer 
 []  Paid help [x]  Other (specify):___Pt is GIP at [facility]________ Notes:  Pt is currently GIP at [facility]. Pt had been previously taken care of by Paulina Bassett at home. 
  
Family members/Significant others: 
Name: Rip Marrero Relationship: Significant other Phone Number:[phone] Actively involved in care?   [x]  Yes  []  No 
 
 Name: 
Relationship: 
Phone Number: Actively involved in care? []  Yes  []  No 
 
Name: 
Relationship: 
Phone Number: Actively involved in care? []  Yes  []  No 
 
Social support systems: (select ONE best description) []  Excellent social support system which includes three or more family members or friends 
[x]  Good social support system which includes two or less members or friends 
[]  [address] support which includes one family member or friend 
[]  Poor social support; no family members or friends; basically ALONE Notes:  Yevgeniy Sotomayor reports good support from family and friends. 
  
Emotional Status: (griselda all that apply) Patient Caregiver Response  
              []                []    Mood/Affect stable and appropriate    
              []                []    Angry  
              []                []    Anxious []                []    Apprehensive []                []    Avoidant  
              []                []    Clinging  
              []                []    Depressed  
              []                []    Distraught  
              []                []    Elated []                []    Euphoric  
              []                []    Fearful  
              []                []    Flat Affect  
              []                []    Helpless []                []    Hostile []                []    Impulsive []                []    Irritable  
              []                []    Labile  
              []                []    Manic  
              []                []    Restlessness []                [x]    Sad  
              []                []    Suspicious []                []    Tearful  
              []                []    Withdrawn Notes:  Pt appears to be actively dying.  Pts significant other appears accepting and only wants pt to be comfortable at this time. Coping Skills (strengths/weakness):  
 Patient: Coping Skills (strength/weakness): Unable to assess. Family/caregiver (strength/weakness): Pts significant other appeared accepting. 
  
Jewett of care (griselda all that apply):    
[x]  No burden evident  
[]  Family must administer medications  
[]  Illness causing financial strain  
[]  Family/Support feels overwhelmed  
[]  Family/Support sleep disturbed with patients care  
[]  Patients care causes extra physical stress  of death 
[]  Illness causes changes in family lifestyle 
[]  Illness impacting family/support employment 
[]  Family experiencing increased time demands 
[]  Patients behavior endangers family 
[]  Denial of patients illness 
[]  Concern over outcome of illness/fear 
[]  Patients behavior embarrassing to family Notes:  
  
Risk Factors: (griselda all that apply):   
[x]  No burden evident  
[]  Alcohol abuse 
[]  Financial resources inadequate to meet basic needs (food/house/etc) []  Financial resources inadequate to meet health care needs (supplies/equipment/medications) 
[]  Food/nutrition resources inadequate 
[]  Home environment unsafe/inadequate for home care 
[]  Homicidal risk 
[]  Lives alone or without concerned relatives 
[]  Multiple medications/complex schedule 
[]  Physical limitations increase likelihood of falls 
[]  Plan of care/treatments complicated 
[]  Substance use/abuse 
[]  Suicidal risk 
[]  Visual impairment threatens safety/ability to perform self-care 
[]  Other (specify): 
  
Abuse/Neglect (actual/potential risks): 
[x]  No signs of abuse/neglect 
[]  History of abuse/neglect                 []  [id number]          []  Sexual 
[]  History of domestic violence 
[]  Lacks adequate physical care 
[]  Lacks emotional nurturing/support 
[]  Lacks appropriate stimulation/cognitive experiences 
[]  Left alone inappropriately 
[]  Lacks necessary supervision []  Inadequate or delayed medical care 
[]  Unsafe environment (i.e guns/drug use/history of violence in the home/etc.) []  Bruising or other physical signs of injury present 
[]  Other (specify): 
Notes:  
[]  Refer to child/adult protective services Current Sources of Stress (in Addition to Current Illness):  
[x]  None reported 
[]  Bills/Debt   
[]  Career/Job change   
[]   (short term)   
[]   (long term)   
[]  Death of a child (recent)   
[]  Death of a parent (recent)  
[]  Death of a spouse (recent)  
[]  Employment status changed  
[]  Family discord   
[]  Financial loss/Inadequate inther (specify):come 
[]  Job loss 
[]  Legal issues unresolved 
[]  Lifestyle change 
[]  Marital discord 
[]  Marriage within the last year 
[]  Paperwork (insurance/legal/etc) overwhelming 
[]  Separation/Divorce 
[]  Other (specify): 
Notes:  
  
Current Community Resources Being Utilized 1. Interventions/Plan of Care 1. Assess social and emotional factors related to coping with end of life issues 2. Community resource planning/referral  
3. Relocation to different care setting if/when symptoms stabilize 4. Discharge Planning 1. MSW Assessment Completed by: Sajan Feliciano LCSW 
07/12/20 Time In: 12:30 PM       
Time Out:1:15 PM

## 2020-07-13 NOTE — HOSPICE
[facility] Good Help to Those in Need 
[phone] Discharge/Death Nursing Note Patient Name: Krista Lopez YOB: 1950 Age: 72 y.o. Date of Death: 20 Admitted Date: 2020 Time of Death: 1 Facility of Care: [address] [place] [address] Level of Care: [facility] Patient Room: [address] [place] [address] Hospice Attending: Elsa Lantigua MD 
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 [facility] staff was not present at the time of death At the time of death the patient was documented as no corneal reflex, no gag reflex, no heart sounds, no spontaneous breath sounds, no withdrawal from painful stimuli The pt  within [address] [facility] The following were notified of the patient's death: Hospice, , nursing supervisor, MD  
 
Medications were disposed of per facility protocol Discharge Summary Discharge Reason: Death Summary of Care Provided: 
 
[x] Post mortem care provided by [address] [place] [address] staff [x] Notification of  home by nursing supervisor  
[] Referrals/Community resources provided:  
[] Goals completed 
[] Durable Medical Equipment vendor notified Disciplines involved: [x] RN [x] SW [x]  [] SEGUNDO [] Vol [] PT [] OT [] ST [] BC 
 
[x] IDT communication/notification Attending Physician, Dr. Bettye Asher, notified of death Bereaved Advance Care Planning 10/10/2019 Patient's Healthcare Decision Maker is: -  
Primary Decision Maker Name -  
Primary Decision Maker Phone Number -  
Primary Decision Maker Relationship to Patient -  
Confirm Advance Directive Yes, on file Does the patient have other document types -

## 2020-07-13 NOTE — PROGRESS NOTES
Responded to page regarding the passing of patient. Provided support for family Rev. Brandi Haq EdD MDiv Chaplain [facility] Page [phone])

## 2020-07-13 NOTE — PROGRESS NOTES
Pt  during this shift, another RN contacted the nursing supervisor, the doctor and the . I called [facility]. Form is filled out, family has left the bedside. Needs postmortem care

## 2020-07-13 NOTE — PROGRESS NOTES
1: Called by family. Family at bedside said, \"I cant hear him breath anymore. \" Pt assessed and appear to . Pupils fixed and dilated, no chest rise and fall, no heart or lung sounds, no palpable pulses. More family coming to bedside. [id number]: Nursing Supervisor, Dimitry Meraz infomred of pt passing. Dr. Enoch Patel called to pronounce pt, and  paged, awaiting return call. Informed Primary RN, Baljinder Kessler.

## 2020-07-14 ENCOUNTER — TELEPHONE (OUTPATIENT)
Dept: FAMILY MEDICINE CLINIC | Age: 70
End: 2020-07-14

## 2020-07-14 NOTE — TELEPHONE ENCOUNTER
Sommer lundberg/ [facility] is calling wanting to know if Dr. Loida Medina will sign off on death certificate.     Best contact: [phone]

## 2020-07-20 NOTE — DISCHARGE SUMMARY
Discharge Summary    [facility]  Good Help to Those in Need  [phone]      Date of Admission: 7/9/2020  Date of Discharge: 7/13/2020    Nahomy Preciado is a 72y.o. year old who was admitted to [facility] at [id number] [place] [address] with a Hospice diagnosis of Abscess in epidural space of cervical spine [G06.1]. Pt was admitted for [facility] level care. Per HPI:  Zahra Esquivel is a 72y.o. year old who was admitted to [facility]. He has ESRD on HD, CAD, PAD, PAF and was admitted to [address] [place] [address] on 7/1/2020 with AMS with seizure en route to hospital. He was intubated in the ER for airway protection. 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 He underwent C5 through T1 ACDF with partial C6 and C7 corpectomy on 7/2. He did not improve significantly with interventions. The family chose to discontinue dialysis, to complete compassionate extubation on 7/9, and to pursue comfort measures with the support of Hospice. He was admitted to [facility] for pain, dyspnea, and agitation. \"    The patient's care was focused on comfort, and the patient passed away on 7/13/2020.     Yemi Jeronimo MD

## 2020-10-01 NOTE — PROGRESS NOTES
Death Note Physical Exam: No corneal reflex. No gag reflex. No heart sounds on auscultation. No spontaneous breath sounds. No withdrawal from painful stimuli.  
 
Time of Death 5.40 am  
 60

## (undated) DEVICE — TRAY PREP DRY W/ PREM GLV 2 APPL 6 SPNG 2 UNDPD 1 OVERWRAP

## (undated) DEVICE — TOWEL SURG W17XL27IN STD BLU COT NONFENESTRATED PREWASHED

## (undated) DEVICE — DRAPE MICSCP 65MM LENS FOR LEICA VARI-LENS2

## (undated) DEVICE — COVER,TABLE,60X90,STERILE: Brand: MEDLINE

## (undated) DEVICE — DRILL BIT

## (undated) DEVICE — GDWIRE ANGIO ZIPWIRE 0.035X150 --

## (undated) DEVICE — LAMINECTOMY RICHMOND-LF: Brand: MEDLINE INDUSTRIES, INC.

## (undated) DEVICE — SPONGE: SPECIALTY PEANUT XR 100/CS: Brand: MEDICAL ACTION INDUSTRIES

## (undated) DEVICE — SOLUTION IV 1000ML 0.9% SOD CHL

## (undated) DEVICE — AEGIS 1" DISK 4MM HOLE, PEEL OPEN: Brand: MEDLINE

## (undated) DEVICE — SUTURE GORTX SZ 4-0 L24IN NONABSORBABLE L13MM PT-13 3/8 CIR 5K08A

## (undated) DEVICE — SHEAR RMFG HARMONIC FOCUS 9CM -- OEM ITEM L#322125

## (undated) DEVICE — INFECTION CONTROL KIT SYS

## (undated) DEVICE — 1200 GUARD II KIT W/5MM TUBE W/O VAC TUBE: Brand: GUARDIAN

## (undated) DEVICE — SYSTEM SKIN CLSR 22CM DERMBND PRINEO

## (undated) DEVICE — GARMENT,MEDLINE,DVT,INT,CALF,MED, GEN2: Brand: MEDLINE

## (undated) DEVICE — GOWN,SIRUS,NONRNF,SETINSLV,2XL,18/CS: Brand: MEDLINE

## (undated) DEVICE — DRAPE,LAPAROTOMY,PCH,STERILE: Brand: MEDLINE

## (undated) DEVICE — APPLICATOR BNDG 1MM ADH PREMIERPRO EXOFIN

## (undated) DEVICE — SLEEVE TRCR L100MM DIA5MM UNIV STBL FOR BLDELSS DIL TIP

## (undated) DEVICE — SMOKE EVACUATION PENCIL: Brand: VALLEYLAB

## (undated) DEVICE — SUTURE PERMAHAND SZ 2-0 L30IN NONABSORBABLE BLK SILK W/O A305H

## (undated) DEVICE — SUT PROL 6-0 18IN RB2 DA BLU --

## (undated) DEVICE — REM POLYHESIVE ADULT PATIENT RETURN ELECTRODE: Brand: VALLEYLAB

## (undated) DEVICE — DRAPE PRB US TRNSDCR 6X96IN --

## (undated) DEVICE — SUTURE VCRL UD BR CT 3-0 18IN CT1 J838D

## (undated) DEVICE — NEEDLE HYPO 18GA L1.5IN PNK S STL HUB POLYPR SHLD REG BVL

## (undated) DEVICE — MAGNETIC INSTRUMENT PAD 10" X 16"; MEDIUM; DISPOSABLE: Brand: CARDINAL HEALTH

## (undated) DEVICE — SUTURE PROL SZ 2-0 L36IN NONABSORBABLE BLU RB-1 L17MM 1/2 8559H

## (undated) DEVICE — HANDLE LT SNAP ON ULT DURABLE LENS FOR TRUMPF ALC DISPOSABLE

## (undated) DEVICE — Z DISCONTINUED USE 2717541 SUTURE STRATAFIX SZ 3-0 L30CM NONABSORBABLE UD L26MM FS 3/8

## (undated) DEVICE — RESERVOIR,SUCTION,100CC,SILICONE: Brand: MEDLINE

## (undated) DEVICE — Z CONVERTED USE 2107985 COVER FLROSCP W36XL28IN 4 SIDE ADH

## (undated) DEVICE — INTENDED FOR TISSUE SEPARATION, AND OTHER PROCEDURES THAT REQUIRE A SHARP SURGICAL BLADE TO PUNCTURE OR CUT.: Brand: BARD-PARKER ® CARBON RIB-BACK BLADES

## (undated) DEVICE — SUTURE MCRYL SZ 4-0 L27IN ABSRB UD L19MM PS-2 1/2 CIR PRIM Y426H

## (undated) DEVICE — COVER,MAYO STAND,STERILE: Brand: MEDLINE

## (undated) DEVICE — STERILE POLYISOPRENE POWDER-FREE SURGICAL GLOVES WITH EMOLLIENT COATING: Brand: PROTEXIS

## (undated) DEVICE — SUTURE PROL SZ 5-0 L24IN NONABSORBABLE BLU RB-2 L13IN 1/2 8554H

## (undated) DEVICE — STERILE POLYISOPRENE POWDER-FREE SURGICAL GLOVES: Brand: PROTEXIS

## (undated) DEVICE — 3M™ MEDIPORE™ H SOFT CLOTH SURGICAL TAPE 2864, 4 INCH X 10 YARD (10CM X 9,14M), 12 ROLLS/CASE: Brand: 3M™ MEDIPORE™

## (undated) DEVICE — TROCAR LAP L100MM DIA5MM BLDELSS W/ STBL SL ENDOPATH XCEL

## (undated) DEVICE — STRAP,POSITIONING,KNEE/BODY,FOAM,4X60": Brand: MEDLINE

## (undated) DEVICE — THIS ADAPTER IS A DOUBLE SEALING FEMALE LUER LOCK ADAPTER WITH A 2-PIECE, COMBINATION COMPRESSION FIT/BARBED CATHETER CONNECTOR. THE ADAPTER IS USED TO CONNECT THE PD CATHETER TO A SOLUTION TRANSFER SET WITH LOCKING CONNECTOR.: Brand: LOCKING TITANIUM ADAPTER FOR PERITONEAL DIALYSIS CATHETER

## (undated) DEVICE — SYR 3ML LL TIP 1/10ML GRAD --

## (undated) DEVICE — FLOSEAL HEMOSTATIC MATRIX, 5 ML: Brand: FLOSEAL

## (undated) DEVICE — SPONGE GZ W4XL4IN COT 12 PLY TYP VII WVN C FLD DSGN

## (undated) DEVICE — SURGICAL PROCEDURE PACK BASIN MAJ SET CUST NO CAUT

## (undated) DEVICE — PREP CHLORAPRP 10.5ML ORG STRL --

## (undated) DEVICE — BLADE ASSEMB CLP HAIR FINE --

## (undated) DEVICE — SURGICAL PROCEDURE KIT GEN LAPAROSCOPY LF

## (undated) DEVICE — Device

## (undated) DEVICE — SUTURE VCRL SZ 3-0 L27IN ABSRB UD L26MM SH 1/2 CIR J416H

## (undated) DEVICE — MASTISOL ADHESIVE LIQ 2/3ML

## (undated) DEVICE — SUT PROL 7-0 18IN BV1 DA BLU --

## (undated) DEVICE — SOLUTION IRRIG 1000ML H2O STRL BLT

## (undated) DEVICE — BONE WAX WHITE: Brand: BONE WAX WHITE

## (undated) DEVICE — SLIM BODY SKIN STAPLER: Brand: APPOSE ULC

## (undated) DEVICE — 3M™ TEGADERM™ TRANSPARENT FILM DRESSING FRAME STYLE, 1626W, 4 IN X 4-3/4 IN (10 CM X 12 CM), 50/CT 4CT/CASE: Brand: 3M™ TEGADERM™

## (undated) DEVICE — SUTURE VCRL SZ 2-0 L18IN ABSRB UD CT-1 L36MM 1/2 CIR J839D

## (undated) DEVICE — SUT ETHLN 4-0 18IN PS2 BLK --

## (undated) DEVICE — DEVON™ KNEE AND BODY STRAP 60" X 3" (1.5 M X 7.6 CM): Brand: DEVON

## (undated) DEVICE — TUBING, SUCTION, 1/4" X 10', STRAIGHT: Brand: MEDLINE

## (undated) DEVICE — ROCKER SWITCH PENCIL BLADE ELECTRODE, HOLSTER: Brand: EDGE

## (undated) DEVICE — 4-PORT MANIFOLD: Brand: NEPTUNE 2

## (undated) DEVICE — (D)PREP SKN CHLRAPRP APPL 26ML -- CONVERT TO ITEM 371833

## (undated) DEVICE — 3.0MM PRECISION NEURO (MATCH HEAD)

## (undated) DEVICE — SHEARS ENDOSCP L36CM DIA5MM ULTRASONIC CRV TIP W/ ADV

## (undated) DEVICE — SUTURE VCRL SZ 4-0 L27IN ABSRB UD L19MM PS-2 3/8 CIR PRIM J426H

## (undated) DEVICE — SHEET, T, LAPAROTOMY, STERILE: Brand: MEDLINE

## (undated) DEVICE — INSULATED BLADE ELECTRODE: Brand: EDGE

## (undated) DEVICE — CASPAR DISTR PIN12MMSTER: Brand: AESCULAP

## (undated) DEVICE — SUT PROL 6-0 30IN C1 DA BLU --

## (undated) DEVICE — BANDAGE,GAUZE,BULKEE II,4.5"X4.1YD,STRL: Brand: MEDLINE

## (undated) DEVICE — SOL INJ SOD CL 0.9% 1000ML BG --

## (undated) DEVICE — SUT PROL 6-0 24IN BV1 DA BLU --

## (undated) DEVICE — SUTURE PROL SZ 0 L30IN NONABSORBABLE BLU L26MM CT-2 1/2 CIR 8412H

## (undated) DEVICE — SOL ANTI-FOG 6ML MEDC -- MEDICHOICE - CONVERT TO 358427

## (undated) DEVICE — SUTURE PERMAHAND SZ 3-0 L30IN NONABSORBABLE BLK SILK BRAID A304H

## (undated) DEVICE — DRAIN SURG 10FR L1/8IN DIA3.2MM SIL CHN RND HUBLESS FULL

## (undated) DEVICE — HALTER TRACTION HD W/ TRI COTTON LINING FOAM LTX

## (undated) DEVICE — GAUZE SPONGES,12 PLY: Brand: CURITY

## (undated) DEVICE — LABEL MED CARD MRMC STRL

## (undated) DEVICE — PROBE VASC 8MHZ WTRPRF

## (undated) DEVICE — BIPOLAR FORCEPS CORD: Brand: VALLEYLAB

## (undated) DEVICE — SUTURE VCRL SZ 3-0 L27IN ABSRB VLT L26MM SH 1/2 CIR J316H

## (undated) DEVICE — SOLUTION IV 500ML 0.9% SOD CHL FLX CONT

## (undated) DEVICE — 3M™ STERI-DRAPE™ INSTRUMENT POUCH 1018: Brand: STERI-DRAPE™

## (undated) DEVICE — TUBING IRRIG L77IN DIA0.241IN L BOR FOR CYSTO W/ NVENT

## (undated) DEVICE — SYR 10ML LUER LOK 1/5ML GRAD --

## (undated) DEVICE — STAPLER SKIN H3.9MM WIRE DIA0.58MM CRWN 6.9MM 35 STPL ROT